# Patient Record
Sex: FEMALE | Race: WHITE | NOT HISPANIC OR LATINO | Employment: UNEMPLOYED | URBAN - METROPOLITAN AREA
[De-identification: names, ages, dates, MRNs, and addresses within clinical notes are randomized per-mention and may not be internally consistent; named-entity substitution may affect disease eponyms.]

---

## 2018-07-14 ENCOUNTER — HOSPITAL ENCOUNTER (EMERGENCY)
Facility: HOSPITAL | Age: 28
Discharge: HOME | End: 2018-07-14
Attending: EMERGENCY MEDICINE
Payer: COMMERCIAL

## 2018-07-14 ENCOUNTER — APPOINTMENT (EMERGENCY)
Dept: CT IMAGING | Facility: HOSPITAL | Age: 28
End: 2018-07-14
Payer: COMMERCIAL

## 2018-07-14 VITALS
SYSTOLIC BLOOD PRESSURE: 100 MMHG | WEIGHT: 121.25 LBS | RESPIRATION RATE: 18 BRPM | OXYGEN SATURATION: 100 % | DIASTOLIC BLOOD PRESSURE: 70 MMHG | HEART RATE: 74 BPM | TEMPERATURE: 97.7 F

## 2018-07-14 DIAGNOSIS — R11.0 NAUSEA: ICD-10-CM

## 2018-07-14 DIAGNOSIS — N23 RENAL COLIC ON RIGHT SIDE: Primary | ICD-10-CM

## 2018-07-14 DIAGNOSIS — N39.0 UTI (URINARY TRACT INFECTION): ICD-10-CM

## 2018-07-14 LAB
ALBUMIN SERPL BCP-MCNC: 3.9 G/DL (ref 3.5–5)
ALP SERPL-CCNC: 58 U/L (ref 46–116)
ALT SERPL W P-5'-P-CCNC: 19 U/L (ref 12–78)
ANION GAP SERPL CALCULATED.3IONS-SCNC: 5 MMOL/L (ref 4–13)
AST SERPL W P-5'-P-CCNC: 13 U/L (ref 5–45)
BACTERIA UR QL AUTO: ABNORMAL /HPF
BASOPHILS # BLD AUTO: 0.02 THOUSANDS/ΜL (ref 0–0.1)
BASOPHILS NFR BLD AUTO: 0 % (ref 0–1)
BILIRUB SERPL-MCNC: 0.5 MG/DL (ref 0.2–1)
BILIRUB UR QL STRIP: NEGATIVE
BUN SERPL-MCNC: 9 MG/DL (ref 5–25)
CALCIUM SERPL-MCNC: 9.4 MG/DL (ref 8.3–10.1)
CHLORIDE SERPL-SCNC: 103 MMOL/L (ref 100–108)
CK SERPL-CCNC: 57 U/L (ref 26–192)
CLARITY UR: ABNORMAL
CO2 SERPL-SCNC: 31 MMOL/L (ref 21–32)
COLOR UR: YELLOW
CREAT SERPL-MCNC: 0.82 MG/DL (ref 0.6–1.3)
EOSINOPHIL # BLD AUTO: 0.21 THOUSAND/ΜL (ref 0–0.61)
EOSINOPHIL NFR BLD AUTO: 3 % (ref 0–6)
ERYTHROCYTE [DISTWIDTH] IN BLOOD BY AUTOMATED COUNT: 13 % (ref 11.6–15.1)
EXT PREG TEST URINE: NEGATIVE
GFR SERPL CREATININE-BSD FRML MDRD: 98 ML/MIN/1.73SQ M
GLUCOSE SERPL-MCNC: 108 MG/DL (ref 65–140)
GLUCOSE UR STRIP-MCNC: NEGATIVE MG/DL
HCT VFR BLD AUTO: 41 % (ref 34.8–46.1)
HGB BLD-MCNC: 13.8 G/DL (ref 11.5–15.4)
HGB UR QL STRIP.AUTO: ABNORMAL
KETONES UR STRIP-MCNC: NEGATIVE MG/DL
LEUKOCYTE ESTERASE UR QL STRIP: ABNORMAL
LIPASE SERPL-CCNC: 60 U/L (ref 73–393)
LYMPHOCYTES # BLD AUTO: 2.12 THOUSANDS/ΜL (ref 0.6–4.47)
LYMPHOCYTES NFR BLD AUTO: 30 % (ref 14–44)
MCH RBC QN AUTO: 30.4 PG (ref 26.8–34.3)
MCHC RBC AUTO-ENTMCNC: 33.7 G/DL (ref 31.4–37.4)
MCV RBC AUTO: 90 FL (ref 82–98)
MONOCYTES # BLD AUTO: 0.5 THOUSAND/ΜL (ref 0.17–1.22)
MONOCYTES NFR BLD AUTO: 7 % (ref 4–12)
MUCOUS THREADS UR QL AUTO: ABNORMAL
NEUTROPHILS # BLD AUTO: 4.3 THOUSANDS/ΜL (ref 1.85–7.62)
NEUTS SEG NFR BLD AUTO: 60 % (ref 43–75)
NITRITE UR QL STRIP: NEGATIVE
NON-SQ EPI CELLS URNS QL MICRO: ABNORMAL /HPF
PH UR STRIP.AUTO: 6 [PH] (ref 4.5–8)
PLATELET # BLD AUTO: 278 THOUSANDS/UL (ref 149–390)
PMV BLD AUTO: 9.9 FL (ref 8.9–12.7)
POTASSIUM SERPL-SCNC: 4.3 MMOL/L (ref 3.5–5.3)
PROT SERPL-MCNC: 6.9 G/DL (ref 6.4–8.2)
PROT UR STRIP-MCNC: ABNORMAL MG/DL
RBC # BLD AUTO: 4.54 MILLION/UL (ref 3.81–5.12)
RBC #/AREA URNS AUTO: ABNORMAL /HPF
SODIUM SERPL-SCNC: 139 MMOL/L (ref 136–145)
SP GR UR STRIP.AUTO: 1.01 (ref 1–1.03)
UROBILINOGEN UR QL STRIP.AUTO: 0.2 E.U./DL
WBC # BLD AUTO: 7.15 THOUSAND/UL (ref 4.31–10.16)
WBC #/AREA URNS AUTO: ABNORMAL /HPF

## 2018-07-14 PROCEDURE — 80053 COMPREHEN METABOLIC PANEL: CPT | Performed by: EMERGENCY MEDICINE

## 2018-07-14 PROCEDURE — 83690 ASSAY OF LIPASE: CPT | Performed by: EMERGENCY MEDICINE

## 2018-07-14 PROCEDURE — 96376 TX/PRO/DX INJ SAME DRUG ADON: CPT

## 2018-07-14 PROCEDURE — 74176 CT ABD & PELVIS W/O CONTRAST: CPT

## 2018-07-14 PROCEDURE — 82550 ASSAY OF CK (CPK): CPT | Performed by: EMERGENCY MEDICINE

## 2018-07-14 PROCEDURE — 85025 COMPLETE CBC W/AUTO DIFF WBC: CPT | Performed by: EMERGENCY MEDICINE

## 2018-07-14 PROCEDURE — 81025 URINE PREGNANCY TEST: CPT | Performed by: EMERGENCY MEDICINE

## 2018-07-14 PROCEDURE — 96361 HYDRATE IV INFUSION ADD-ON: CPT

## 2018-07-14 PROCEDURE — 99284 EMERGENCY DEPT VISIT MOD MDM: CPT

## 2018-07-14 PROCEDURE — 96375 TX/PRO/DX INJ NEW DRUG ADDON: CPT

## 2018-07-14 PROCEDURE — 87086 URINE CULTURE/COLONY COUNT: CPT

## 2018-07-14 PROCEDURE — 81001 URINALYSIS AUTO W/SCOPE: CPT

## 2018-07-14 PROCEDURE — 87077 CULTURE AEROBIC IDENTIFY: CPT

## 2018-07-14 PROCEDURE — 36415 COLL VENOUS BLD VENIPUNCTURE: CPT | Performed by: EMERGENCY MEDICINE

## 2018-07-14 PROCEDURE — 96365 THER/PROPH/DIAG IV INF INIT: CPT

## 2018-07-14 RX ORDER — RANITIDINE 150 MG/1
150 TABLET ORAL 2 TIMES DAILY
COMMUNITY
End: 2018-08-28 | Stop reason: HOSPADM

## 2018-07-14 RX ORDER — OXYCODONE HYDROCHLORIDE AND ACETAMINOPHEN 5; 325 MG/1; MG/1
1 TABLET ORAL EVERY 4 HOURS PRN
Qty: 20 TABLET | Refills: 0 | Status: SHIPPED | OUTPATIENT
Start: 2018-07-14 | End: 2018-07-18

## 2018-07-14 RX ORDER — METOCLOPRAMIDE 10 MG/1
10 TABLET ORAL 4 TIMES DAILY
Qty: 28 TABLET | Refills: 0 | Status: SHIPPED | OUTPATIENT
Start: 2018-07-14 | End: 2018-07-21

## 2018-07-14 RX ORDER — TAMSULOSIN HYDROCHLORIDE 0.4 MG/1
0.4 CAPSULE ORAL
Qty: 10 CAPSULE | Refills: 0 | Status: SHIPPED | OUTPATIENT
Start: 2018-07-14 | End: 2018-08-03 | Stop reason: ALTCHOICE

## 2018-07-14 RX ORDER — TAMSULOSIN HYDROCHLORIDE 0.4 MG/1
0.4 CAPSULE ORAL ONCE
Status: COMPLETED | OUTPATIENT
Start: 2018-07-14 | End: 2018-07-14

## 2018-07-14 RX ORDER — ONDANSETRON 2 MG/ML
4 INJECTION INTRAMUSCULAR; INTRAVENOUS ONCE
Status: COMPLETED | OUTPATIENT
Start: 2018-07-14 | End: 2018-07-14

## 2018-07-14 RX ORDER — CEPHALEXIN 500 MG/1
500 CAPSULE ORAL 4 TIMES DAILY
Qty: 28 CAPSULE | Refills: 0 | Status: SHIPPED | OUTPATIENT
Start: 2018-07-14 | End: 2018-07-21

## 2018-07-14 RX ADMIN — HYDROMORPHONE HYDROCHLORIDE 0.5 MG: 1 INJECTION, SOLUTION INTRAMUSCULAR; INTRAVENOUS; SUBCUTANEOUS at 02:39

## 2018-07-14 RX ADMIN — CEFTRIAXONE 1000 MG: 1 INJECTION, POWDER, FOR SOLUTION INTRAMUSCULAR; INTRAVENOUS at 02:41

## 2018-07-14 RX ADMIN — ONDANSETRON 4 MG: 2 INJECTION INTRAMUSCULAR; INTRAVENOUS at 01:28

## 2018-07-14 RX ADMIN — SODIUM CHLORIDE 1000 ML: 0.9 INJECTION, SOLUTION INTRAVENOUS at 01:28

## 2018-07-14 RX ADMIN — TAMSULOSIN HYDROCHLORIDE 0.4 MG: 0.4 CAPSULE ORAL at 03:35

## 2018-07-14 RX ADMIN — HYDROMORPHONE HYDROCHLORIDE 0.5 MG: 1 INJECTION, SOLUTION INTRAMUSCULAR; INTRAVENOUS; SUBCUTANEOUS at 01:28

## 2018-07-14 NOTE — DISCHARGE INSTRUCTIONS
Acute Nausea and Vomiting   WHAT YOU NEED TO KNOW:   Acute nausea and vomiting start suddenly, worsen quickly, and last a short time  DISCHARGE INSTRUCTIONS:   Return to the emergency department if:   · You see blood in your vomit or your bowel movements  · You have sudden, severe pain in your chest and upper abdomen after hard vomiting or retching  · You have swelling in your neck and chest      · You are dizzy, cold, and thirsty and your eyes and mouth are dry  · You are urinating very little or not at all  · You have muscle weakness, leg cramps, and trouble breathing  · Your heart is beating much faster than normal      · You continue to vomit for more than 48 hours  Contact your healthcare provider if:   · You have frequent dry heaves (vomiting but nothing comes out)  · Your nausea and vomiting does not get better or go away after you use medicine  · You have questions or concerns about your condition or treatment  Medicines: You may need any of the following:  · Medicines  may be given to calm your stomach and stop your vomiting  You may also need medicines to help you feel more relaxed or to stop nausea and vomiting caused by motion sickness  · Gastrointestinal stimulants  are used to help empty your stomach and bowels  This may help decrease nausea and vomiting  · Take your medicine as directed  Contact your healthcare provider if you think your medicine is not helping or if you have side effects  Tell him or her if you are allergic to any medicine  Keep a list of the medicines, vitamins, and herbs you take  Include the amounts, and when and why you take them  Bring the list or the pill bottles to follow-up visits  Carry your medicine list with you in case of an emergency  Prevent or manage acute nausea and vomiting:   · Do not drink alcohol  Alcohol may upset or irritate your stomach  Too much alcohol can also cause acute nausea and vomiting  · Control stress    Headaches due to stress may cause nausea and vomiting  Find ways to relax and manage your stress  Get more rest and sleep  · Drink more liquids as directed  Vomiting can lead to dehydration  It is important to drink more liquids to help replace lost body fluids  Ask your healthcare provider how much liquid to drink each day and which liquids are best for you  Your provider may recommend that you drink an oral rehydration solution (ORS)  ORS contains water, salts, and sugar that are needed to replace the lost body fluids  Ask what kind of ORS to use, how much to drink, and where to get it  · Eat smaller meals, more often  Eat small amounts of food every 2 to 3 hours, even if you are not hungry  Food in your stomach may decrease your nausea  · Talk to your healthcare provider before you take over-the-counter (OTC) medicines  These medicines can cause serious problems if you use certain other medicines, or you have a medical condition  You may have problems if you use too much or use them for longer than the label says  Follow directions on the label carefully  Follow up with your healthcare provider as directed:  Write down your questions so you remember to ask them during your follow-up visits  © 2017 [employer]  Information is for End User's use only and may not be sold, redistributed or otherwise used for commercial purposes  All illustrations and images included in CareNotes® are the copyrighted property of [email or web address]  or Esau Ramesh  The above information is an  only  It is not intended as medical advice for individual conditions or treatments  Talk to your doctor, nurse or pharmacist before following any medical regimen to see if it is safe and effective for you  Renal Colic   WHAT YOU NEED TO KNOW:   Renal colic is severe pain in your lower back or sides  The pain is usually on one side, but may be on both sides of your lower back   Renal colic may start quickly, come and go, and become worse over time  Renal colic is caused by a blockage in your urinary tract  The most common cause of a blockage is a kidney stone  Blood clots, ureter spasms, and dead tissue may also block your urinary tract  DISCHARGE INSTRUCTIONS:   Return to the emergency department if:   · You cannot stop vomiting  · You see new or increased bleeding when you urinate  · You are urinating less than usual, or not at all  · Your pain is not getting better even after treatment  Contact your healthcare provider if:   · You have fever  · You need to urinate more often than usual, or right away  · You see a stone in your urine strainer after you urinate  · You have questions or concerns about your condition or care  Medicines:   · Medicines  may help decrease pain and muscle spasms  You may also need medicine to calm your stomach and stop vomiting  · Take your medicine as directed  Contact your healthcare provider if you think your medicine is not helping or if you have side effects  Tell him of her if you are allergic to any medicine  Keep a list of the medicines, vitamins, and herbs you take  Include the amounts, and when and why you take them  Bring the list or the pill bottles to follow-up visits  Carry your medicine list with you in case of an emergency  Manage your symptoms:   · Drink liquids as directed  to help decrease pain and flush blockages from your urinary tract  Ask how much liquid to drink each day and which liquids are best for you  You may need to drink about 3 liters (12 glasses) of liquids each day  Half of your total daily liquids should be water  Limit coffee, tea, and soda to 2 cups daily  Your urine should be pale and clear  · Strain your urine every time you urinate  Urinate into a strainer (funnel with a fine mesh on the bottom) or glass jar to collect kidney stones  Give the kidney stones to your healthcare provider at your next visit  · Eat a variety of healthy foods  Healthy foods include fruits, vegetables, whole-grain breads, low-fat dairy products, beans, lean meats, and fish  You may need to increase the amount of citrus fruit you eat, such as oranges  Ask your healthcare provider how much salt, calcium, and protein you should eat  · Avoid activity in hot temperatures  Heat may cause you to become dehydrated and urinate less  Follow up with your healthcare provider as directed: You may need to return for tests to check if your blockage has cleared  Write down your questions so you remember to ask them during your visits  © 2017 [employer] Information is for End User's use only and may not be sold, redistributed or otherwise used for commercial purposes  All illustrations and images included in CareNotes® are the copyrighted property of [employer] , [employer]  or Esau Ramesh  The above information is an  only  It is not intended as medical advice for individual conditions or treatments  Talk to your doctor, nurse or pharmacist before following any medical regimen to see if it is safe and effective for you  Urinary Tract Infection in Women   WHAT YOU NEED TO KNOW:   A urinary tract infection (UTI) is caused by bacteria that get inside your urinary tract  Most bacteria that enter your urinary tract come out when you urinate  If the bacteria stay in your urinary tract, you may get an infection  Your urinary tract includes your kidneys, ureters, bladder, and urethra  Urine is made in your kidneys, and it flows from the ureters to the bladder  Urine leaves the bladder through the urethra  A UTI is more common in your lower urinary tract, which includes your bladder and urethra  DISCHARGE INSTRUCTIONS:   Return to the emergency department if:   · You are urinating very little or not at all  · You have a high fever with shaking chills       · You have side or back pain that gets worse   Contact your healthcare provider if:   · You have a fever  · You do not feel better after 2 days of taking antibiotics  · You are vomiting  · You have questions or concerns about your condition or care  Medicines:   · Antibiotics  help fight a bacterial infection  · Medicines  may be given to decrease pain and burning when you urinate  They will also help decrease the feeling that you need to urinate often  These medicines will make your urine orange or red  · Take your medicine as directed  Contact your healthcare provider if you think your medicine is not helping or if you have side effects  Tell him or her if you are allergic to any medicine  Keep a list of the medicines, vitamins, and herbs you take  Include the amounts, and when and why you take them  Bring the list or the pill bottles to follow-up visits  Carry your medicine list with you in case of an emergency  Follow up with your healthcare provider as directed:  Write down your questions so you remember to ask them during your visits  Prevent another UTI:   · Empty your bladder often  Urinate and empty your bladder as soon as you feel the need  Do not hold your urine for long periods of time  · Wipe from front to back after you urinate or have a bowel movement  This will help prevent germs from getting into your urinary tract through your urethra  · Drink liquids as directed  Ask how much liquid to drink each day and which liquids are best for you  You may need to drink more liquids than usual to help flush out the bacteria  Do not drink alcohol, caffeine, or citrus juices  These can irritate your bladder and increase your symptoms  Your healthcare provider may recommend cranberry juice to help prevent a UTI  · Urinate after you have sex  This can help flush out bacteria passed during sex  · Do not douche or use feminine deodorants  These can change the chemical balance in your vagina      · Change sanitary pads or tampons often  This will help prevent germs from getting into your urinary tract  · Do pelvic muscle exercises often  Pelvic muscle exercises may help you start and stop urinating  Strong pelvic muscles may help you empty your bladder easier  Squeeze these muscles tightly for 5 seconds like you are trying to hold back urine  Then relax for 5 seconds  Gradually work up to squeezing for 10 seconds  Do 3 sets of 15 repetitions a day, or as directed  © 2017 [employer] Information is for End User's use only and may not be sold, redistributed or otherwise used for commercial purposes  All illustrations and images included in CareNotes® are the copyrighted property of [employer]  or Esau Ramesh  The above information is an  only  It is not intended as medical advice for individual conditions or treatments  Talk to your doctor, nurse or pharmacist before following any medical regimen to see if it is safe and effective for you

## 2018-07-14 NOTE — ED PROVIDER NOTES
History  Chief Complaint   Patient presents with    Flank Pain     Pt  complains of pain to right flank area starting around 2am yesterday morning, denies injury or urinary problems  Patient is a 29year old female with constant worsening right flank pain without radiation since 0200 last night  No fever  (+) nausea yesterday  No vomiting or diarrhea  LMP - 2-3 days ago  No urinary sx  No trauma  No h/o kidney stones  States she did not drive here  No rash  [facility] website checked on this patient and last Rx filled was on 2/18/18 for percocet for 2 day supply  History provided by:  Patient (boyfriend)   used: No    Flank Pain   Associated symptoms: nausea    Associated symptoms: no diarrhea, no fever and no vomiting        Prior to Admission Medications   Prescriptions Last Dose Informant Patient Reported? Taking? ranitidine (ZANTAC) 150 mg tablet   Yes Yes   Sig: Take 150 mg by mouth 2 (two) times a day      Facility-Administered Medications: None       Past Medical History:   Diagnosis Date    Asthma        No past surgical history on file  No family history on file  I have reviewed and agree with the history as documented  Social History   Substance Use Topics    Smoking status: Current Every Day Smoker     Packs/day: 0 50     Types: Cigarettes    Smokeless tobacco: Never Used    Alcohol use No        Review of Systems   Constitutional: Negative for fever  Gastrointestinal: Positive for nausea  Negative for abdominal pain, diarrhea and vomiting  Genitourinary: Positive for flank pain  Negative for difficulty urinating  Skin: Negative for rash  All other systems reviewed and are negative  Physical Exam  Physical Exam   Constitutional: She is oriented to person, place, and time  She appears well-developed and well-nourished  She appears distressed (moderate)  HENT:   Head: Normocephalic and atraumatic     Mucous membranes somewhat moist     Eyes: No scleral icterus  Neck: No tracheal deviation present  Cardiovascular: Normal rate, regular rhythm and normal heart sounds  No murmur heard  Pulmonary/Chest: Effort normal and breath sounds normal  No stridor  No respiratory distress  Abdominal: Soft  Bowel sounds are normal  She exhibits no distension  There is no tenderness  R CVAT  Musculoskeletal: She exhibits no edema or deformity  Neurological: She is alert and oriented to person, place, and time  Skin: Skin is warm and dry  No rash noted  Psychiatric:   Somewhat anxious  Nursing note and vitals reviewed        Vital Signs  ED Triage Vitals [07/14/18 0031]   Temperature Pulse Respirations Blood Pressure SpO2   97 7 °F (36 5 °C) 95 18 112/67 97 %      Temp Source Heart Rate Source Patient Position - Orthostatic VS BP Location FiO2 (%)   Oral Monitor Sitting Right arm --      Pain Score       9           Vitals:    07/14/18 0031 07/14/18 0315   BP: 112/67 98/71   Pulse: 95 78   Patient Position - Orthostatic VS: Sitting        Visual Acuity      ED Medications  Medications   sodium chloride 0 9 % bolus 1,000 mL (0 mL Intravenous Stopped 7/14/18 0228)   ondansetron (ZOFRAN) injection 4 mg (4 mg Intravenous Given 7/14/18 0128)   HYDROmorphone (DILAUDID) injection 0 5 mg (0 5 mg Intravenous Given 7/14/18 0128)   HYDROmorphone (DILAUDID) injection 0 5 mg (0 5 mg Intravenous Given 7/14/18 0239)   ceftriaxone (ROCEPHIN) 1 g/50 mL in dextrose IVPB (0 mg Intravenous Stopped 7/14/18 0311)   tamsulosin (FLOMAX) capsule 0 4 mg (0 4 mg Oral Given 7/14/18 0335)       Diagnostic Studies  Results Reviewed     Procedure Component Value Units Date/Time    Lipase [[id number]]  (Abnormal) Collected:  07/14/18 0120    Lab Status:  Final result Specimen:  Blood from Arm, Right Updated:  07/14/18 0205     Lipase 60 (L) u/L     Comprehensive metabolic panel [[id number]] Collected:  07/14/18 0120    Lab Status:  Final result Specimen:  Blood from Arm, Right Updated: 07/14/18 0205     Sodium 139 mmol/L      Potassium 4 3 mmol/L      Chloride 103 mmol/L      CO2 31 mmol/L      Anion Gap 5 mmol/L      BUN 9 mg/dL      Creatinine 0 82 mg/dL      Glucose 108 mg/dL      Calcium 9 4 mg/dL      AST 13 U/L      ALT 19 U/L      Alkaline Phosphatase 58 U/L      Total Protein 6 9 g/dL      Albumin 3 9 g/dL      Total Bilirubin 0 50 mg/dL      eGFR 98 ml/min/1 73sq m     Narrative:         National Kidney Disease Education Program recommendations are as follows:  GFR calculation is accurate only with a steady state creatinine  Chronic Kidney disease less than 60 ml/min/1 73 sq  meters  Kidney failure less than 15 ml/min/1 73 sq  meters  CK Total with Reflex CKMB [[id number]]  (Normal) Collected:  07/14/18 0120    Lab Status:  Final result Specimen:  Blood from Arm, Right Updated:  07/14/18 0205     Total CK 57 U/L     Urine Microscopic [[id number]]  (Abnormal) Collected:  07/14/18 0124    Lab Status:  Final result Specimen:  Urine from Urine, Clean Catch Updated:  07/14/18 0140     RBC, UA None Seen /hpf      WBC, UA Innumerable (A) /hpf      Epithelial Cells Occasional /hpf      Bacteria, UA Occasional /hpf      MUCOUS THREADS Occasional (A)    Urine culture [[id number]] Collected:  07/14/18 0124    Lab Status:   In process Specimen:  Urine from Urine, Clean Catch Updated:  07/14/18 0140    CBC and differential [[id number]]  (Normal) Collected:  07/14/18 0120    Lab Status:  Final result Specimen:  Blood from Arm, Right Updated:  07/14/18 0138     WBC 7 15 Thousand/uL      RBC 4 54 Million/uL      Hemoglobin 13 8 g/dL      Hematocrit 41 0 %      MCV 90 fL      MCH 30 4 pg      MCHC 33 7 g/dL      RDW 13 0 %      MPV 9 9 fL      Platelets 795 Thousands/uL      Neutrophils Relative 60 %      Lymphocytes Relative 30 %      Monocytes Relative 7 %      Eosinophils Relative 3 %      Basophils Relative 0 %      Neutrophils Absolute 4 30 Thousands/µL      Lymphocytes Absolute 2 12 Thousands/µL Monocytes Absolute 0 50 Thousand/µL      Eosinophils Absolute 0 21 Thousand/µL      Basophils Absolute 0 02 Thousands/µL     POCT pregnancy, urine [[id number]]  (Normal) Resulted:  07/14/18 0118    Lab Status:  Final result Updated:  07/14/18 0118     EXT PREG TEST UR (Ref: Negative) negative    ED Urine Macroscopic [[id number]]  (Abnormal) Collected:  07/14/18 0124    Lab Status:  Final result Specimen:  Urine Updated:  07/14/18 0117     Color, UA Yellow     Clarity, UA Cloudy     pH, UA 6 0     Leukocytes, UA Moderate (A)     Nitrite, UA Negative     Protein, UA Trace (A) mg/dl      Glucose, UA Negative mg/dl      Ketones, UA Negative mg/dl      Urobilinogen, UA 0 2 E U /dl      Bilirubin, UA Negative     Blood, UA Trace (A)     Specific [place], UA 1 015    Narrative:       CLINITEK RESULT                 CT renal stone study abdomen pelvis without contrast   ED Interpretation by Susu Finney MD (07/14 0308)   FINDINGS:      RIGHT KIDNEY AND URETER:   5 mm obstructing stone the distal right ureter (2:116; 602:82) resulting in right hydroureteronephrosis  LEFT KIDNEY AND URETER:   No urinary tract calculi   No hydronephrosis or hydroureter  URINARY BLADDER:    Unremarkable  No significant abnormality in the visualized lung bases  Limited low radiation dose noncontrast CT evaluation demonstrates no clinically significant abnormality of liver, spleen, pancreas, or adrenal glands  No calcified gallstones or gallbladder wall thickening noted  Trace ascites in the pelvis likely physiologic  No bulky lymphadenopathy on this limited noncontrast study  Bowel loops appear unremarkable  The appendix is well seen and there is no evidence of acute appendicitis  No acute fracture or destructive osseous lesion is identified        Impression:         5 mm obstructing stone the distal right ureter (2:116; 602:82) resulting in right hydroureteronephrosis                Workstation performed: [id number]         Final Result by Carly Gomez MD (07/14 0305)       5 mm obstructing stone the distal right ureter (2:116; 602:82) resulting in right hydroureteronephrosis  Workstation performed: [id number]                    Procedures  Procedures       Phone Contacts  ED Phone Contact    ED Course  ED Course as of Jul 14 0336   Sat Jul 14, 2018   0230 Labs d/w patient  Patient still with pain so more IV dilaudid ordered  IV rocephin ordered for UTI      0308 CT d/w patient  Pain improved  MDM  Number of Diagnoses or Management Options  Diagnosis management comments: DDx including but not limited to: renal colic, pyelonephritis, UTI, GI etiology, appendicitis, diverticulitis, cholecystitis, biliary colic, rhabdomyolysis, tumor  Amount and/or Complexity of Data Reviewed  Clinical lab tests: ordered and reviewed  Tests in the radiology section of CPT®: ordered and reviewed  Decide to obtain previous medical records or to obtain history from someone other than the patient: yes  Independent visualization of images, tracings, or specimens: yes      CritCare Time    Disposition  Final diagnoses:   Renal colic on right side   UTI (urinary tract infection)   Nausea     Time reflects when diagnosis was documented in both MDM as applicable and the Disposition within this note     Time User Action Codes Description Comment    7/14/2018  3:34 AM Leila Gola Add [C57] Renal colic on right side     7/14/2018  3:34 AM Leila Gola Add [N39 0] UTI (urinary tract infection)     7/14/2018  3:34 AM Leila Gola Add [R11 0] Nausea       ED Disposition     ED Disposition Condition Comment    Discharge  [facility] discharge to home/self care  Condition at discharge: Stable        Follow-up Information     Follow up With Specialties Details Why Rossi Chapa U  51  For Urology [facility] Call in 3 days Strain all urine  Drink fluids   no driving with percocet  Do not use acetaminophen with percocet  Return sooner if increased pain, fever, vomiting, difficulty urinating, rash  [address] [facility] [address] [phone]          Patient's Medications   Discharge Prescriptions    CEPHALEXIN (KEFLEX) 500 MG CAPSULE    Take 1 capsule (500 mg total) by mouth 4 (four) times a day for 7 days       Start Date: 7/14/2018 End Date: 7/21/2018       Order Dose: 500 mg       Quantity: 28 capsule    Refills: 0    METOCLOPRAMIDE (REGLAN) 10 MG TABLET    Take 1 tablet (10 mg total) by mouth 4 (four) times a day for 7 days As needed for nausea       Start Date: 7/14/2018 End Date: 7/21/2018       Order Dose: 10 mg       Quantity: 28 tablet    Refills: 0    OXYCODONE-ACETAMINOPHEN (PERCOCET) 5-325 MG PER TABLET    Take 1 tablet by mouth every 4 (four) hours as needed for moderate pain for up to 4 days Max Daily Amount: 6 tablets       Start Date: 7/14/2018 End Date: 7/18/2018       Order Dose: 1 tablet       Quantity: 20 tablet    Refills: 0    TAMSULOSIN (FLOMAX) 0 4 MG    Take 1 capsule (0 4 mg total) by mouth daily with dinner for 10 days       Start Date: 7/14/2018 End Date: 7/24/2018       Order Dose: 0 4 mg       Quantity: 10 capsule    Refills: 0     No discharge procedures on file      ED Provider  Electronically Signed by           Lanre Bazan MD  07/14/18 5999

## 2018-07-14 NOTE — ED NOTES
Discharged with instructions  Verbalized understanding  No distress at this time       Colette Cordova RN  07/14/18 1516

## 2018-07-15 LAB — BACTERIA UR CULT: ABNORMAL

## 2018-07-22 ENCOUNTER — HOSPITAL ENCOUNTER (EMERGENCY)
Facility: HOSPITAL | Age: 28
Discharge: HOME | End: 2018-07-22
Attending: EMERGENCY MEDICINE
Payer: COMMERCIAL

## 2018-07-22 ENCOUNTER — APPOINTMENT (EMERGENCY)
Dept: ULTRASOUND IMAGING | Facility: HOSPITAL | Age: 28
End: 2018-07-22
Payer: COMMERCIAL

## 2018-07-22 VITALS
DIASTOLIC BLOOD PRESSURE: 55 MMHG | HEART RATE: 84 BPM | RESPIRATION RATE: 16 BRPM | WEIGHT: 122 LBS | TEMPERATURE: 98.6 F | SYSTOLIC BLOOD PRESSURE: 98 MMHG | OXYGEN SATURATION: 95 %

## 2018-07-22 DIAGNOSIS — R10.9 RIGHT FLANK PAIN: Primary | ICD-10-CM

## 2018-07-22 LAB
ANION GAP SERPL CALCULATED.3IONS-SCNC: 2 MMOL/L (ref 4–13)
BACTERIA UR QL AUTO: ABNORMAL /HPF
BASOPHILS # BLD AUTO: 0.03 THOUSANDS/ΜL (ref 0–0.1)
BASOPHILS NFR BLD AUTO: 0 % (ref 0–1)
BILIRUB UR QL STRIP: NEGATIVE
BUN SERPL-MCNC: 6 MG/DL (ref 5–25)
CALCIUM SERPL-MCNC: 9.7 MG/DL (ref 8.3–10.1)
CHLORIDE SERPL-SCNC: 105 MMOL/L (ref 100–108)
CLARITY UR: CLEAR
CO2 SERPL-SCNC: 30 MMOL/L (ref 21–32)
COLOR UR: YELLOW
CREAT SERPL-MCNC: 0.85 MG/DL (ref 0.6–1.3)
EOSINOPHIL # BLD AUTO: 0.22 THOUSAND/ΜL (ref 0–0.61)
EOSINOPHIL NFR BLD AUTO: 3 % (ref 0–6)
ERYTHROCYTE [DISTWIDTH] IN BLOOD BY AUTOMATED COUNT: 12.9 % (ref 11.6–15.1)
EXT PREG TEST URINE: NEGATIVE
GFR SERPL CREATININE-BSD FRML MDRD: 94 ML/MIN/1.73SQ M
GLUCOSE SERPL-MCNC: 82 MG/DL (ref 65–140)
GLUCOSE UR STRIP-MCNC: NEGATIVE MG/DL
HCT VFR BLD AUTO: 42.8 % (ref 34.8–46.1)
HGB BLD-MCNC: 14.3 G/DL (ref 11.5–15.4)
HGB UR QL STRIP.AUTO: ABNORMAL
KETONES UR STRIP-MCNC: NEGATIVE MG/DL
LEUKOCYTE ESTERASE UR QL STRIP: ABNORMAL
LYMPHOCYTES # BLD AUTO: 2.05 THOUSANDS/ΜL (ref 0.6–4.47)
LYMPHOCYTES NFR BLD AUTO: 25 % (ref 14–44)
MCH RBC QN AUTO: 30.1 PG (ref 26.8–34.3)
MCHC RBC AUTO-ENTMCNC: 33.4 G/DL (ref 31.4–37.4)
MCV RBC AUTO: 90 FL (ref 82–98)
MONOCYTES # BLD AUTO: 0.52 THOUSAND/ΜL (ref 0.17–1.22)
MONOCYTES NFR BLD AUTO: 6 % (ref 4–12)
MUCOUS THREADS UR QL AUTO: ABNORMAL
NEUTROPHILS # BLD AUTO: 5.51 THOUSANDS/ΜL (ref 1.85–7.62)
NEUTS SEG NFR BLD AUTO: 66 % (ref 43–75)
NITRITE UR QL STRIP: NEGATIVE
NON-SQ EPI CELLS URNS QL MICRO: ABNORMAL /HPF
PH UR STRIP.AUTO: 7.5 [PH] (ref 4.5–8)
PLATELET # BLD AUTO: 284 THOUSANDS/UL (ref 149–390)
PMV BLD AUTO: 9.8 FL (ref 8.9–12.7)
POTASSIUM SERPL-SCNC: 3.9 MMOL/L (ref 3.5–5.3)
PROT UR STRIP-MCNC: NEGATIVE MG/DL
RBC # BLD AUTO: 4.75 MILLION/UL (ref 3.81–5.12)
RBC #/AREA URNS AUTO: ABNORMAL /HPF
SODIUM SERPL-SCNC: 137 MMOL/L (ref 136–145)
SP GR UR STRIP.AUTO: 1.01 (ref 1–1.03)
UROBILINOGEN UR QL STRIP.AUTO: 0.2 E.U./DL
WBC # BLD AUTO: 8.33 THOUSAND/UL (ref 4.31–10.16)
WBC #/AREA URNS AUTO: ABNORMAL /HPF

## 2018-07-22 PROCEDURE — 99284 EMERGENCY DEPT VISIT MOD MDM: CPT

## 2018-07-22 PROCEDURE — 96374 THER/PROPH/DIAG INJ IV PUSH: CPT

## 2018-07-22 PROCEDURE — 81025 URINE PREGNANCY TEST: CPT | Performed by: PHYSICIAN ASSISTANT

## 2018-07-22 PROCEDURE — 85025 COMPLETE CBC W/AUTO DIFF WBC: CPT | Performed by: PHYSICIAN ASSISTANT

## 2018-07-22 PROCEDURE — 80048 BASIC METABOLIC PNL TOTAL CA: CPT | Performed by: PHYSICIAN ASSISTANT

## 2018-07-22 PROCEDURE — 36415 COLL VENOUS BLD VENIPUNCTURE: CPT | Performed by: PHYSICIAN ASSISTANT

## 2018-07-22 PROCEDURE — 81001 URINALYSIS AUTO W/SCOPE: CPT | Performed by: PHYSICIAN ASSISTANT

## 2018-07-22 PROCEDURE — 96361 HYDRATE IV INFUSION ADD-ON: CPT

## 2018-07-22 PROCEDURE — 76770 US EXAM ABDO BACK WALL COMP: CPT

## 2018-07-22 RX ORDER — MORPHINE SULFATE 4 MG/ML
4 INJECTION, SOLUTION INTRAMUSCULAR; INTRAVENOUS ONCE
Status: COMPLETED | OUTPATIENT
Start: 2018-07-22 | End: 2018-07-22

## 2018-07-22 RX ADMIN — SODIUM CHLORIDE 1000 ML: 0.9 INJECTION, SOLUTION INTRAVENOUS at 19:11

## 2018-07-22 RX ADMIN — MORPHINE SULFATE 4 MG: 4 INJECTION, SOLUTION INTRAMUSCULAR; INTRAVENOUS at 19:12

## 2018-07-22 NOTE — ED PROVIDER NOTES
History  Chief Complaint   Patient presents with    Pelvic Pain     Pt  states was diagnosed with kidney stone on the 13th, still having pain in right flank and now also has pelvic pain  Denies seeing urologist, states is waiting for referral from pcp  55-year-old female presents for evaluation of right flank pain  Patient was seen the emergency department on 07/14 for the same complaints, she was diagnosed with a right-sided 5 mm obstructing kidney stone and was discharged home with Keflex, Flomax, Percocet and Reglan  She states she has been taking the medications however she continues to have pain  She has been straining her urine and denies passing the stone  She followed-up with her PCP and was referred to Urology but has not yet followed up  She describes a constant sharp pain in her right flank that radiates into the groin  Associated nausea but no vomiting  Denies any fevers or chills chest pain shortness of breath  She admits to urinary frequency and urgency but denies any gross hematuria  She has no other complaints this time  She has no history of kidney stones  Prior to Admission Medications   Prescriptions Last Dose Informant Patient Reported? Taking? cephalexin (KEFLEX) 500 mg capsule   No No   Sig: Take 1 capsule (500 mg total) by mouth 4 (four) times a day for 7 days   metoclopramide (REGLAN) 10 mg tablet   No No   Sig: Take 1 tablet (10 mg total) by mouth 4 (four) times a day for 7 days As needed for nausea   ranitidine (ZANTAC) 150 mg tablet   Yes Yes   Sig: Take 150 mg by mouth 2 (two) times a day   tamsulosin (FLOMAX) 0 4 mg   No No   Sig: Take 1 capsule (0 4 mg total) by mouth daily with dinner for 10 days      Facility-Administered Medications: None       Past Medical History:   Diagnosis Date    Asthma        History reviewed  No pertinent surgical history  History reviewed  No pertinent family history    I have reviewed and agree with the history as documented  Social History   Substance Use Topics    Smoking status: Current Every Day Smoker     Packs/day: 0 50     Types: Cigarettes    Smokeless tobacco: Never Used    Alcohol use No        Review of Systems   Constitutional: Negative for chills and fever  HENT: Negative for congestion and sore throat  Respiratory: Negative for cough, shortness of breath and wheezing  Cardiovascular: Negative for chest pain and palpitations  Gastrointestinal: Positive for nausea  Negative for abdominal pain, diarrhea and vomiting  Genitourinary: Positive for flank pain, frequency and urgency  Negative for dysuria and hematuria  Musculoskeletal: Negative for back pain  Skin: Negative for rash  Neurological: Negative for dizziness, weakness, light-headedness, numbness and headaches  Physical Exam  Physical Exam   Constitutional: She is oriented to person, place, and time  She appears well-developed and well-nourished  Non-toxic appearance  She does not have a sickly appearance  She does not appear ill  She appears distressed (appears uncomfortable)  HENT:   Head: Normocephalic and atraumatic  Right Ear: External ear normal    Left Ear: External ear normal    Nose: Nose normal    Mouth/Throat: Oropharynx is clear and moist    Eyes: Conjunctivae and EOM are normal  Pupils are equal, round, and reactive to light  Neck: Normal range of motion  Neck supple  Cardiovascular: Normal rate, regular rhythm and normal heart sounds  Exam reveals no gallop and no friction rub  No murmur heard  Pulmonary/Chest: Effort normal and breath sounds normal  No respiratory distress  She has no decreased breath sounds  She has no wheezes  She has no rhonchi  She has no rales  Abdominal: Soft  Bowel sounds are normal  She exhibits no distension and no mass  There is tenderness in the suprapubic area  There is no rigidity, no rebound, no guarding and no CVA tenderness         Musculoskeletal: Normal range of motion  Neurological: She is alert and oriented to person, place, and time  Skin: Skin is warm and dry  Capillary refill takes less than 2 seconds  She is not diaphoretic  Psychiatric: She has a normal mood and affect  Nursing note and vitals reviewed        Vital Signs  ED Triage Vitals [07/22/18 1805]   Temperature Pulse Respirations Blood Pressure SpO2   98 6 °F (37 °C) (!) 111 16 111/56 98 %      Temp Source Heart Rate Source Patient Position - Orthostatic VS BP Location FiO2 (%)   Oral Monitor Sitting Left arm --      Pain Score       9           Vitals:    07/22/18 1805 07/22/18 1926 07/22/18 1930 07/22/18 2030   BP: 111/56 104/58 104/61 98/55   Pulse: (!) 111 85 90 84   Patient Position - Orthostatic VS: Sitting Sitting Sitting Sitting       Visual Acuity      ED Medications  Medications   sodium chloride 0 9 % bolus 1,000 mL (0 mL Intravenous Stopped 7/22/18 2051)   morphine (PF) 4 mg/mL injection 4 mg (4 mg Intravenous Given 7/22/18 1912)       Diagnostic Studies  Results Reviewed     Procedure Component Value Units Date/Time    POCT pregnancy, urine [[id number]]  (Normal) Resulted:  07/22/18 2100    Lab Status:  Final result Updated:  07/22/18 2100     EXT PREG TEST UR (Ref: Negative) negative    Urine Microscopic [[id number]]  (Abnormal) Collected:  07/22/18 1836    Lab Status:  Final result Specimen:  Urine from Urine, Clean Catch Updated:  07/22/18 1914     RBC, UA 10-20 (A) /hpf      WBC, UA 0-1 (A) /hpf      Epithelial Cells Occasional /hpf      Bacteria, UA Occasional /hpf      MUCOUS THREADS Occasional (A)    Basic metabolic panel [[id number]]  (Abnormal) Collected:  07/22/18 1845    Lab Status:  Final result Specimen:  Blood from Arm, Right Updated:  07/22/18 1904     Sodium 137 mmol/L      Potassium 3 9 mmol/L      Chloride 105 mmol/L      CO2 30 mmol/L      Anion Gap 2 (L) mmol/L      BUN 6 mg/dL      Creatinine 0 85 mg/dL      Glucose 82 mg/dL      Calcium 9 7 mg/dL      eGFR 94 ml/min/1 73sq m Narrative:         National Kidney Disease Education Program recommendations are as follows:  GFR calculation is accurate only with a steady state creatinine  Chronic Kidney disease less than 60 ml/min/1 73 sq  meters  Kidney failure less than 15 ml/min/1 73 sq  meters  CBC and differential [[id number]]  (Normal) Collected:  07/22/18 1845    Lab Status:  Final result Specimen:  Blood from Arm, Right Updated:  07/22/18 1859     WBC 8 33 Thousand/uL      RBC 4 75 Million/uL      Hemoglobin 14 3 g/dL      Hematocrit 42 8 %      MCV 90 fL      MCH 30 1 pg      MCHC 33 4 g/dL      RDW 12 9 %      MPV 9 8 fL      Platelets 274 Thousands/uL      Neutrophils Relative 66 %      Lymphocytes Relative 25 %      Monocytes Relative 6 %      Eosinophils Relative 3 %      Basophils Relative 0 %      Neutrophils Absolute 5 51 Thousands/µL      Lymphocytes Absolute 2 05 Thousands/µL      Monocytes Absolute 0 52 Thousand/µL      Eosinophils Absolute 0 22 Thousand/µL      Basophils Absolute 0 03 Thousands/µL     UA w Reflex to Microscopic w Reflex to Culture [[id number]]  (Abnormal) Collected:  07/22/18 1836    Lab Status:  Final result Specimen:  Urine from Urine, Clean Catch Updated:  07/22/18 1842     Color, UA Yellow     Clarity, UA Clear     Specific Gravity, UA 1 015     pH, UA 7 5     Leukocytes, UA Trace (A)     Nitrite, UA Negative     Protein, UA Negative mg/dl      Glucose, UA Negative mg/dl      Ketones, UA Negative mg/dl      Urobilinogen, UA 0 2 E U /dl      Bilirubin, UA Negative     Blood, UA Moderate (A)                 US kidney and bladder   Final Result by Yolanda Mckinney MD (07/22 1905)   Normal renal ultrasound  No significant residual right hydronephrosis  Previously described distal right ureteral stone is not visualized        Workstation performed: [id number]                    Procedures  Procedures       Phone Contacts  ED Phone Contact    ED Course                               MDM  Number of Diagnoses or Management Options  Right flank pain: established and worsening  Diagnosis management comments:   28 yo F who presents for evaluation of worsening right flank pain  Seen in the ED on 7/14 diagnosed with 5mm obstructing right stone  Denies passing the stone  Will check labs, urine, renal US  Her labs were reviewed and benign  Urine with RBC  US of renal/bladder notes improvement of hydronephrosis and no visualized stone which I suspect has passed/is about to pass  Patient was advised to continue fluids, continue keflex, take ibuprofen and tylenol for pain and f/u with urology  Denies allergy to ibuprofen  RTED for worsening  Patient verbalizes understanding and agrees with plan  Amount and/or Complexity of Data Reviewed  Tests in the radiology section of CPT®: ordered and reviewed  Decide to obtain previous medical records or to obtain history from someone other than the patient: yes  Review and summarize past medical records: yes    Patient Progress  Patient progress: stable    CritCare Time    Disposition  Final diagnoses:   Right flank pain     Time reflects when diagnosis was documented in both MDM as applicable and the Disposition within this note     Time User Action Codes Description Comment    7/22/2018  8:49 PM Sd Jones Add [R10 9] Right flank pain       ED Disposition     ED Disposition Condition Comment    Discharge  [facility] discharge to home/self care      Condition at discharge: Good        Follow-up Information     Follow up With Specialties Details Why Contact Info Additional [address] [facility] Schedule an appointment as soon as possible for a visit  Reji Romo [address] [place] [id number]  [facility] Emergency Department Emergency Medicine  If symptoms worsen - fevers or worsening pain [address] [address]  [phone]  ED, [address] [facility] [facility], [id number]          Discharge Medication List as of 7/22/2018  9:09 PM      CONTINUE these medications which have NOT CHANGED    Details   ranitidine (ZANTAC) 150 mg tablet Take 150 mg by mouth 2 (two) times a day, Historical Med      tamsulosin (FLOMAX) 0 4 mg Take 1 capsule (0 4 mg total) by mouth daily with dinner for 10 days, Starting Sat 7/14/2018, Until Tue 7/24/2018, Print         STOP taking these medications       cephalexin (KEFLEX) 500 mg capsule Comments:   Reason for Stopping:         metoclopramide (REGLAN) 10 mg tablet Comments:   Reason for Stopping:             No discharge procedures on file      ED Provider  Electronically Signed by           Stefano Heller PA-C  07/25/18 7134

## 2018-07-23 NOTE — DISCHARGE INSTRUCTIONS
Abdominal Pain   WHAT YOU NEED TO KNOW:   Abdominal pain can be dull, achy, or sharp  You may have pain in one area of your abdomen, or in your entire abdomen  Your pain may be caused by a condition such as constipation, food sensitivity or poisoning, infection, or a blockage  Abdominal pain can also be from a hernia, appendicitis, or an ulcer  Liver, gallbladder, or kidney conditions can also cause abdominal pain  The cause of your abdominal pain may be unknown  DISCHARGE INSTRUCTIONS:   Return to the emergency department if:   · You have new chest pain or shortness of breath  · You have pulsing pain in your upper abdomen or lower back that suddenly becomes constant  · Your pain is in the right lower abdominal area and worsens with movement  · You have a fever over 100 4°F (38°C) or shaking chills  · You are vomiting and cannot keep food or liquids down  · Your pain does not improve or gets worse over the next 8 to 12 hours  · You see blood in your vomit or bowel movements, or they look black and tarry  · Your skin or the whites of your eyes turn yellow  · You are a woman and have a large amount of vaginal bleeding that is not your monthly period  Contact your healthcare provider if:   · You have pain in your lower back  · You are a man and have pain in your testicles  · You have pain when you urinate  · You have questions or concerns about your condition or care  Follow up with your healthcare provider within 24 hours or as directed:  Write down your questions so you remember to ask them during your visits  Medicines:   · Medicines  may be given to calm your stomach and prevent vomiting or to decrease pain  Ask how to take pain medicine safely  · Take your medicine as directed  Contact your healthcare provider if you think your medicine is not helping or if you have side effects  Tell him of her if you are allergic to any medicine   Keep a list of the medicines, vitamins, and herbs you take  Include the amounts, and when and why you take them  Bring the list or the pill bottles to follow-up visits  Carry your medicine list with you in case of an emergency  © 2017 [address] [employer] Information is for End User's use only and may not be sold, redistributed or otherwise used for commercial purposes  All illustrations and images included in CareNotes® are the copyrighted property of [employer] M [employer]  or Esau Ramesh  The above information is an  only  It is not intended as medical advice for individual conditions or treatments  Talk to your doctor, nurse or pharmacist before following any medical regimen to see if it is safe and effective for you

## 2018-08-03 ENCOUNTER — HOSPITAL ENCOUNTER (EMERGENCY)
Facility: HOSPITAL | Age: 28
Discharge: HOME | End: 2018-08-03
Attending: EMERGENCY MEDICINE | Admitting: EMERGENCY MEDICINE
Payer: COMMERCIAL

## 2018-08-03 VITALS
RESPIRATION RATE: 18 BRPM | SYSTOLIC BLOOD PRESSURE: 108 MMHG | DIASTOLIC BLOOD PRESSURE: 67 MMHG | TEMPERATURE: 98.1 F | WEIGHT: 124.12 LBS | HEART RATE: 66 BPM | OXYGEN SATURATION: 96 %

## 2018-08-03 DIAGNOSIS — L03.019 PARONYCHIA OF FINGER: ICD-10-CM

## 2018-08-03 DIAGNOSIS — K08.89 PAIN, DENTAL: Primary | ICD-10-CM

## 2018-08-03 PROCEDURE — 99283 EMERGENCY DEPT VISIT LOW MDM: CPT

## 2018-08-03 RX ORDER — CEPHALEXIN 250 MG/1
500 CAPSULE ORAL 4 TIMES DAILY
Qty: 28 CAPSULE | Refills: 0 | Status: SHIPPED | OUTPATIENT
Start: 2018-08-03 | End: 2018-08-10

## 2018-08-03 RX ORDER — CEPHALEXIN 250 MG/1
500 CAPSULE ORAL ONCE
Status: COMPLETED | OUTPATIENT
Start: 2018-08-03 | End: 2018-08-03

## 2018-08-03 RX ORDER — OXYCODONE HYDROCHLORIDE AND ACETAMINOPHEN 5; 325 MG/1; MG/1
1 TABLET ORAL ONCE
Status: COMPLETED | OUTPATIENT
Start: 2018-08-03 | End: 2018-08-03

## 2018-08-03 RX ORDER — OXYCODONE HYDROCHLORIDE AND ACETAMINOPHEN 5; 325 MG/1; MG/1
1 TABLET ORAL EVERY 4 HOURS PRN
Qty: 15 TABLET | Refills: 0 | Status: SHIPPED | OUTPATIENT
Start: 2018-08-03 | End: 2018-08-13

## 2018-08-03 RX ADMIN — OXYCODONE HYDROCHLORIDE AND ACETAMINOPHEN 1 TABLET: 5; 325 TABLET ORAL at 01:17

## 2018-08-03 RX ADMIN — CEPHALEXIN 500 MG: 250 CAPSULE ORAL at 01:17

## 2018-08-03 NOTE — DISCHARGE INSTRUCTIONS
Paronychia   WHAT YOU NEED TO KNOW:   What is paronychia? Paronychia is an infection of your nail fold caused by bacteria or a fungus  The nail fold is the skin around your nail  Paronychia may happen suddenly and last for 6 weeks or longer  You may have paronychia on more than 1 finger or toe  What increases my risk for paronychia? · Trauma:  Any injury that causes your skin to tear can lead to infection  Your risk is increased if you have ingrown nails, bite your nails, or wear acrylic nails  · Frequent contact with water:  Jobs that require you to soak your hands in water often may increase your risk for paronychia  Common examples are nurses, cooks, and   Swimmers also have increased risk  · Medical conditions:  Diabetes and other conditions that cause a weak immune system can increase your risk  Some examples are skin cancer, psoriasis, HIV, and lupus  · Chemicals:  Contact with soaps, detergents, and other chemicals can cause inflammation and lead to paronychia  · Allergies: Allergies to certain foods, nail polish, or latex can cause inflammation and increase your risk  What are the signs and symptoms of paronychia? · Red, hot, swollen, painful nail fold    · Pus coming out of your nail fold when you press on it    · Nail that pulls away from your nail fold and may fall off    · Changes in nail color, such as green nails    · Fever    · Thick, rough nail, or ridges in the nail  How is paronychia diagnosed? Your healthcare provider will examine your nails and ask about your symptoms  He may press on your infected nail to see if pus drains from it  He will send any pus to a lab for tests to learn what germ is causing your infection  This is called a fluid culture  How is paronychia treated? · Medicine:     ¨ Td vaccine  is a booster shot used to help prevent tetanus and diphtheria   The Td booster may be given to adolescents and adults every 10 years or for certain wounds and injuries  ¨ Antibiotics: This medicine will help fight or prevent an infection caused by bacteria  It may be given as a pill, cream, or ointment  ¨ Steroids: This medicine will help decrease inflammation  It may be given as a pill, cream, or ointment  ¨ Antifungal medicine: This medicine helps kill fungus that may be causing your infection  It may be given as a cream or ointment  ¨ NSAIDs:  These medicines decrease pain and swelling  NSAIDs are available without a doctor's order  Ask your healthcare provider which medicine is right for you  Ask how much to take and when to take it  Take as directed  NSAIDs can cause stomach bleeding and kidney problems if not taken correctly  · Procedures: You may need surgery to drain an abscess (pus pocket) in your finger or toe  Your nail may need to be removed  Infected tissue around your nail may also need to be removed  What are the risks of paronychia? Your nail may become loose, deformed, or fall off  The infection may form a large abscess on your nail  The infection may spread to nearby tissue and bone  How can paronychia be prevented? · Avoid chemicals and allergens that may harm your skin and nails  This includes soaps, laundry detergents, and nail products  · Keep your nails clean and dry  Do not soak your nails in water  Use cotton-lined rubber gloves or wear 2 rubber gloves if you work with food or water  The gloves will help protect your nail folds  · Keep your nails short  Do not bite your nails, pick at your hangnails, suck your fingers, or wear fake nails  Bring your own nail tools when you go to the nail salon  How can I manage my symptoms? · Soak your nail:  Soak your nail in a mixture of equal parts vinegar and water 3 or 4 times each day  This will help decrease inflammation  · Apply a warm compress:  Soak a washcloth in warm water and place it on your nail  This will help decrease inflammation       · Elevate:  Raise your nail above the level of your heart as often as you can  This will help decrease swelling and pain  Prop your nail on pillows or blankets to keep it elevated comfortably  · Use lotion:  Apply lotion after you wash your hands  This will prevent the skin from becoming too dry  When should I contact my healthcare provider? · Your nail becomes loose, deformed, or falls off  · You have a large abscess on your nail  · You have questions or concerns about your condition or care  When should I seek immediate care? · You have severe nail pain  · The inflammation spreads to your hand or arm  CARE AGREEMENT:   You have the right to help plan your care  Learn about your health condition and how it may be treated  Discuss treatment options with your caregivers to decide what care you want to receive  You always have the right to refuse treatment  The above information is an  only  It is not intended as medical advice for individual conditions or treatments  Talk to your doctor, nurse or pharmacist before following any medical regimen to see if it is safe and effective for you  © 2017 [facility] [facility] Information is for End User's use only and may not be sold, redistributed or otherwise used for commercial purposes  All illustrations and images included in CareNotes® are the copyrighted property of [employer]  or Esau Ramesh  Toothache   WHAT YOU NEED TO KNOW:   A toothache is pain that is caused by irritation of the nerves in the center of your tooth  The irritation may be caused by several problems, such as a cavity, an infection, a cracked tooth, or gum disease  It is very important to follow up with your dentist so the cause of your toothache can be diagnosed and treated  This can help prevent more serious problems  DISCHARGE INSTRUCTIONS:   Medicines: You may  need any of the following:  · NSAIDs  decrease swelling and pain   This medicine can be bought with or without a doctor's order  This medicine can cause stomach bleeding or kidney problems in certain people  If you take blood thinner medicine, always ask your healthcare provider if NSAIDs are safe for you  Always read the medicine label and follow the directions on it before using this medicine  · Acetaminophen  decreases pain  It is available without a doctor's order  Ask how much to take and how often to take it  Follow directions  Acetaminophen can cause liver damage if not taken correctly  · Pain medicine  may be given as a pill or as medicine that you put directly on your tooth or gums  Do not wait until the pain is severe before you take this medicine  · Antibiotics  help fight or prevent an infection caused by bacteria  Take them as directed  · Take your medicine as directed  Contact your healthcare provider if you think your medicine is not helping or if you have side effects  Tell him of her if you are allergic to any medicine  Keep a list of the medicines, vitamins, and herbs you take  Include the amounts, and when and why you take them  Bring the list or the pill bottles to follow-up visits  Carry your medicine list with you in case of an emergency  Follow up with your dentist as directed: You may be referred to a dental surgeon  Write down your questions so you remember to ask them during your visits  Self-care:   · Rinse your mouth with warm salt water 4 times a day or as directed  · You may need to eat soft foods to help relieve pain caused by chewing  Contact your dentist if:   · You have questions or concerns about your condition or care  Return to the emergency department if:   · You have trouble breathing  · You have swelling in your face or neck  · You have a fever and chills  · You have trouble speaking or swallowing  · You have trouble opening or closing your mouth    © 2017 [employer] Information is for End User's use only and may not be sold, redistributed or otherwise used for commercial purposes  All illustrations and images included in CareNotes® are the copyrighted property of [employer]  or Esau Ramesh  The above information is an  only  It is not intended as medical advice for individual conditions or treatments  Talk to your doctor, nurse or pharmacist before following any medical regimen to see if it is safe and effective for you

## 2018-08-03 NOTE — ED NOTES
Discharged with instructions  Verbalized understanding  No distress at this time       Viri Hanson RN  08/03/18 7405

## 2018-08-03 NOTE — ED PROVIDER NOTES
History  Chief Complaint   Patient presents with    Dental Pain     Pt reports to ED c/o tooth pain on her R upper jaw and on the front upper jaw   Finger Pain     Pt reports that she pulled a hang nail on her L middle finger and that she has puss coming out of the site     Patient presents to the emergency department with 2 complaints  The 1st is chronic dental pain for which she is getting tooth extraction at the end of the month  She states that Motrin and Tylenol are not relieving her pain  She also complains of a swelling to the left middle finger with some greenish discharge for the past 2 days  She denies trauma fall or injury  Denies fever chills  Prior to Admission Medications   Prescriptions Last Dose Informant Patient Reported? Taking? ranitidine (ZANTAC) 150 mg tablet 8/3/2018 at Unknown time  Yes Yes   Sig: Take 150 mg by mouth 2 (two) times a day      Facility-Administered Medications: None       Past Medical History:   Diagnosis Date    Asthma        History reviewed  No pertinent surgical history  History reviewed  No pertinent family history  I have reviewed and agree with the history as documented  Social History   Substance Use Topics    Smoking status: Current Every Day Smoker     Packs/day: 0 50     Types: Cigarettes    Smokeless tobacco: Never Used    Alcohol use No        Review of Systems   Constitutional: Negative for activity change, appetite change, chills, diaphoresis, fatigue and fever  HENT: Positive for dental problem  Negative for drooling, facial swelling, sore throat, trouble swallowing and voice change  Respiratory: Negative for chest tightness and shortness of breath  Skin: Positive for wound  Neurological: Negative for dizziness and weakness  Physical Exam  Physical Exam   Constitutional: She is oriented to person, place, and time  She appears well-developed and well-nourished  Nontoxic appearance  No respiratory distress  Patient looks comfortable sitting upright in the stretcher  HENT:   Head: Normocephalic and atraumatic  Mouth/Throat: No oropharyngeal exudate  No trismus or submandibular tenderness  No facial swelling  Normal voice without drooling  Poor dentition without obvious gingival swelling foul odor or discharge  Neck: Normal range of motion  Neck supple  Musculoskeletal: Normal range of motion  She exhibits tenderness  Small area of erythema and swelling to the ulnar side of the left 3rd nail plate  No active discharge  No fluctuance  No induration  Neurological: She is alert and oriented to person, place, and time  She displays normal reflexes  No cranial nerve deficit or sensory deficit  She exhibits normal muscle tone  Coordination normal    Skin: Skin is warm and dry  Psychiatric: She has a normal mood and affect  Her behavior is normal  Judgment and thought content normal    Nursing note and vitals reviewed  Vital Signs  ED Triage Vitals [08/03/18 0046]   Temperature Pulse Respirations Blood Pressure SpO2   98 1 °F (36 7 °C) 66 18 108/67 96 %      Temp Source Heart Rate Source Patient Position - Orthostatic VS BP Location FiO2 (%)   Oral Monitor Sitting Right arm --      Pain Score       9           Vitals:    08/03/18 0046   BP: 108/67   Pulse: 66   Patient Position - Orthostatic VS: Sitting       Visual Acuity      ED Medications  Medications   oxyCODONE-acetaminophen (PERCOCET) 5-325 mg per tablet 1 tablet (1 tablet Oral Given 8/3/18 0117)   cephalexin (KEFLEX) capsule 500 mg (500 mg Oral Given 8/3/18 0117)       Diagnostic Studies  Results Reviewed     None                 No orders to display              Procedures  Procedures       Phone Contacts  ED Phone Contact    ED Course  ED Course as of Aug 03 0127   Fri Aug 03, 2018   0103 Patient is stable for discharge  Keflex given for her mild paronychial infection and Percocet was given for her dental pain   She does have a appointment with an oral surgeon for tooth extraction  I did discuss signs and symptoms that would require return to the emergency department  [facility]  CritCare Time    Disposition  Final diagnoses:   Pain, dental   Paronychia of finger     Time reflects when diagnosis was documented in both MDM as applicable and the Disposition within this note     Time User Action Codes Description Comment    8/3/2018  1:05 AM Tamar Guerrero Add [K08 89] Pain, dental     8/3/2018  1:05 AM Abdias Mckeon Add [W37 313] Paronychia of finger       ED Disposition     ED Disposition Condition Comment    Discharge  Mike Pérez discharge to home/self care  Condition at discharge: Stable        Follow-up Information     Follow up With Specialties Details Why [facility] dentist  Schedule an appointment as soon as possible for a visit            Discharge Medication List as of 8/3/2018  1:08 AM      START taking these medications    Details   cephalexin (KEFLEX) 250 mg capsule Take 2 capsules (500 mg total) by mouth 4 (four) times a day for 7 days, Starting Fri 8/3/2018, Until Fri 8/10/2018, Normal      oxyCODONE-acetaminophen (PERCOCET) 5-325 mg per tablet Take 1 tablet by mouth every 4 (four) hours as needed for moderate pain for up to 10 days Max Daily Amount: 6 tablets, Starting Fri 8/3/2018, Until Mon 8/13/2018, Print         CONTINUE these medications which have NOT CHANGED    Details   ranitidine (ZANTAC) 150 mg tablet Take 150 mg by mouth 2 (two) times a day, Historical Med           No discharge procedures on file      ED Provider  Electronically Signed by           Robbin Peres MD  08/03/18 8168

## 2018-08-23 ENCOUNTER — APPOINTMENT (EMERGENCY)
Dept: RADIOLOGY | Facility: HOSPITAL | Age: 28
End: 2018-08-23
Payer: COMMERCIAL

## 2018-08-23 ENCOUNTER — HOSPITAL ENCOUNTER (EMERGENCY)
Facility: HOSPITAL | Age: 28
Discharge: HOME | End: 2018-08-23
Attending: EMERGENCY MEDICINE | Admitting: EMERGENCY MEDICINE
Payer: COMMERCIAL

## 2018-08-23 ENCOUNTER — APPOINTMENT (EMERGENCY)
Dept: CT IMAGING | Facility: HOSPITAL | Age: 28
End: 2018-08-23
Payer: COMMERCIAL

## 2018-08-23 ENCOUNTER — TELEPHONE (OUTPATIENT)
Dept: UROLOGY | Facility: AMBULATORY SURGERY CENTER | Age: 28
End: 2018-08-23

## 2018-08-23 VITALS
OXYGEN SATURATION: 99 % | RESPIRATION RATE: 20 BRPM | WEIGHT: 123 LBS | DIASTOLIC BLOOD PRESSURE: 72 MMHG | SYSTOLIC BLOOD PRESSURE: 118 MMHG | HEART RATE: 58 BPM | TEMPERATURE: 98.5 F

## 2018-08-23 DIAGNOSIS — N39.0 UTI (URINARY TRACT INFECTION): ICD-10-CM

## 2018-08-23 DIAGNOSIS — N23 RENAL COLIC ON RIGHT SIDE: Primary | ICD-10-CM

## 2018-08-23 DIAGNOSIS — R11.0 NAUSEA: ICD-10-CM

## 2018-08-23 LAB
ALBUMIN SERPL BCP-MCNC: 3.6 G/DL (ref 3.5–5)
ALP SERPL-CCNC: 59 U/L (ref 46–116)
ALT SERPL W P-5'-P-CCNC: 19 U/L (ref 12–78)
AMORPH PHOS CRY URNS QL MICRO: ABNORMAL /HPF
ANION GAP SERPL CALCULATED.3IONS-SCNC: 11 MMOL/L (ref 4–13)
AST SERPL W P-5'-P-CCNC: 12 U/L (ref 5–45)
ATRIAL RATE: 83 BPM
B-HCG SERPL-ACNC: <2 MIU/ML
BACTERIA UR QL AUTO: ABNORMAL /HPF
BASOPHILS # BLD AUTO: 0.04 THOUSANDS/ΜL (ref 0–0.1)
BASOPHILS NFR BLD AUTO: 0 % (ref 0–1)
BILIRUB SERPL-MCNC: 0.1 MG/DL (ref 0.2–1)
BILIRUB UR QL STRIP: NEGATIVE
BUN SERPL-MCNC: 11 MG/DL (ref 5–25)
CALCIUM SERPL-MCNC: 9.2 MG/DL (ref 8.3–10.1)
CHLORIDE SERPL-SCNC: 104 MMOL/L (ref 100–108)
CLARITY UR: ABNORMAL
CO2 SERPL-SCNC: 26 MMOL/L (ref 21–32)
COLOR UR: YELLOW
CREAT SERPL-MCNC: 0.76 MG/DL (ref 0.6–1.3)
EOSINOPHIL # BLD AUTO: 0.23 THOUSAND/ΜL (ref 0–0.61)
EOSINOPHIL NFR BLD AUTO: 3 % (ref 0–6)
ERYTHROCYTE [DISTWIDTH] IN BLOOD BY AUTOMATED COUNT: 12.7 % (ref 11.6–15.1)
GFR SERPL CREATININE-BSD FRML MDRD: 107 ML/MIN/1.73SQ M
GLUCOSE SERPL-MCNC: 95 MG/DL (ref 65–140)
GLUCOSE UR STRIP-MCNC: NEGATIVE MG/DL
HCT VFR BLD AUTO: 40.2 % (ref 34.8–46.1)
HGB BLD-MCNC: 13.5 G/DL (ref 11.5–15.4)
HGB UR QL STRIP.AUTO: ABNORMAL
IMM GRANULOCYTES # BLD AUTO: 0.02 THOUSAND/UL (ref 0–0.2)
IMM GRANULOCYTES NFR BLD AUTO: 0 % (ref 0–2)
KETONES UR STRIP-MCNC: NEGATIVE MG/DL
LACTATE SERPL-SCNC: 0.6 MMOL/L (ref 0.5–2)
LEUKOCYTE ESTERASE UR QL STRIP: ABNORMAL
LIPASE SERPL-CCNC: 83 U/L (ref 73–393)
LYMPHOCYTES # BLD AUTO: 3.02 THOUSANDS/ΜL (ref 0.6–4.47)
LYMPHOCYTES NFR BLD AUTO: 33 % (ref 14–44)
MCH RBC QN AUTO: 30.4 PG (ref 26.8–34.3)
MCHC RBC AUTO-ENTMCNC: 33.6 G/DL (ref 31.4–37.4)
MCV RBC AUTO: 91 FL (ref 82–98)
MONOCYTES # BLD AUTO: 0.67 THOUSAND/ΜL (ref 0.17–1.22)
MONOCYTES NFR BLD AUTO: 7 % (ref 4–12)
NEUTROPHILS # BLD AUTO: 5.19 THOUSANDS/ΜL (ref 1.85–7.62)
NEUTS SEG NFR BLD AUTO: 57 % (ref 43–75)
NITRITE UR QL STRIP: NEGATIVE
NON-SQ EPI CELLS URNS QL MICRO: ABNORMAL /HPF
NRBC BLD AUTO-RTO: 0 /100 WBCS
P AXIS: 66 DEGREES
PH UR STRIP.AUTO: 7.5 [PH] (ref 4.5–8)
PLATELET # BLD AUTO: 264 THOUSANDS/UL (ref 149–390)
PMV BLD AUTO: 9.9 FL (ref 8.9–12.7)
POTASSIUM SERPL-SCNC: 3.9 MMOL/L (ref 3.5–5.3)
PR INTERVAL: 124 MS
PROT SERPL-MCNC: 6.7 G/DL (ref 6.4–8.2)
PROT UR STRIP-MCNC: ABNORMAL MG/DL
QRS AXIS: 88 DEGREES
QRSD INTERVAL: 96 MS
QT INTERVAL: 352 MS
QTC INTERVAL: 413 MS
RBC # BLD AUTO: 4.44 MILLION/UL (ref 3.81–5.12)
RBC #/AREA URNS AUTO: ABNORMAL /HPF
SODIUM SERPL-SCNC: 141 MMOL/L (ref 136–145)
SP GR UR STRIP.AUTO: 1.02 (ref 1–1.03)
T WAVE AXIS: 46 DEGREES
UROBILINOGEN UR QL STRIP.AUTO: 1 E.U./DL
VENTRICULAR RATE: 83 BPM
WBC # BLD AUTO: 9.17 THOUSAND/UL (ref 4.31–10.16)
WBC #/AREA URNS AUTO: ABNORMAL /HPF

## 2018-08-23 PROCEDURE — 85025 COMPLETE CBC W/AUTO DIFF WBC: CPT | Performed by: EMERGENCY MEDICINE

## 2018-08-23 PROCEDURE — 96361 HYDRATE IV INFUSION ADD-ON: CPT

## 2018-08-23 PROCEDURE — 87077 CULTURE AEROBIC IDENTIFY: CPT

## 2018-08-23 PROCEDURE — 96376 TX/PRO/DX INJ SAME DRUG ADON: CPT

## 2018-08-23 PROCEDURE — 99285 EMERGENCY DEPT VISIT HI MDM: CPT

## 2018-08-23 PROCEDURE — 80053 COMPREHEN METABOLIC PANEL: CPT | Performed by: EMERGENCY MEDICINE

## 2018-08-23 PROCEDURE — 83690 ASSAY OF LIPASE: CPT | Performed by: EMERGENCY MEDICINE

## 2018-08-23 PROCEDURE — 84702 CHORIONIC GONADOTROPIN TEST: CPT | Performed by: EMERGENCY MEDICINE

## 2018-08-23 PROCEDURE — 93005 ELECTROCARDIOGRAM TRACING: CPT

## 2018-08-23 PROCEDURE — 87186 SC STD MICRODIL/AGAR DIL: CPT

## 2018-08-23 PROCEDURE — 87086 URINE CULTURE/COLONY COUNT: CPT

## 2018-08-23 PROCEDURE — 96375 TX/PRO/DX INJ NEW DRUG ADDON: CPT

## 2018-08-23 PROCEDURE — 93010 ELECTROCARDIOGRAM REPORT: CPT | Performed by: INTERNAL MEDICINE

## 2018-08-23 PROCEDURE — 36415 COLL VENOUS BLD VENIPUNCTURE: CPT | Performed by: EMERGENCY MEDICINE

## 2018-08-23 PROCEDURE — 71046 X-RAY EXAM CHEST 2 VIEWS: CPT

## 2018-08-23 PROCEDURE — 74177 CT ABD & PELVIS W/CONTRAST: CPT

## 2018-08-23 PROCEDURE — 83605 ASSAY OF LACTIC ACID: CPT | Performed by: EMERGENCY MEDICINE

## 2018-08-23 PROCEDURE — 96365 THER/PROPH/DIAG IV INF INIT: CPT

## 2018-08-23 PROCEDURE — 81001 URINALYSIS AUTO W/SCOPE: CPT

## 2018-08-23 RX ORDER — SODIUM CHLORIDE 9 MG/ML
125 INJECTION, SOLUTION INTRAVENOUS CONTINUOUS
Status: DISCONTINUED | OUTPATIENT
Start: 2018-08-23 | End: 2018-08-23 | Stop reason: HOSPADM

## 2018-08-23 RX ORDER — TAMSULOSIN HYDROCHLORIDE 0.4 MG/1
0.4 CAPSULE ORAL ONCE
Status: COMPLETED | OUTPATIENT
Start: 2018-08-23 | End: 2018-08-23

## 2018-08-23 RX ORDER — OXYCODONE HYDROCHLORIDE AND ACETAMINOPHEN 5; 325 MG/1; MG/1
1 TABLET ORAL ONCE
Status: COMPLETED | OUTPATIENT
Start: 2018-08-23 | End: 2018-08-23

## 2018-08-23 RX ORDER — TAMSULOSIN HYDROCHLORIDE 0.4 MG/1
0.4 CAPSULE ORAL
Qty: 10 CAPSULE | Refills: 0 | Status: SHIPPED | OUTPATIENT
Start: 2018-08-23 | End: 2018-08-28 | Stop reason: HOSPADM

## 2018-08-23 RX ORDER — CEPHALEXIN 500 MG/1
500 CAPSULE ORAL 4 TIMES DAILY
Qty: 28 CAPSULE | Refills: 0 | Status: SHIPPED | OUTPATIENT
Start: 2018-08-23 | End: 2018-08-30

## 2018-08-23 RX ORDER — METOCLOPRAMIDE 10 MG/1
10 TABLET ORAL 4 TIMES DAILY
Qty: 28 TABLET | Refills: 0 | Status: SHIPPED | OUTPATIENT
Start: 2018-08-23 | End: 2018-08-30

## 2018-08-23 RX ORDER — OXYCODONE HYDROCHLORIDE AND ACETAMINOPHEN 5; 325 MG/1; MG/1
1 TABLET ORAL EVERY 4 HOURS PRN
Qty: 20 TABLET | Refills: 0 | Status: ON HOLD | OUTPATIENT
Start: 2018-08-23 | End: 2018-08-28

## 2018-08-23 RX ORDER — ONDANSETRON 2 MG/ML
4 INJECTION INTRAMUSCULAR; INTRAVENOUS ONCE
Status: COMPLETED | OUTPATIENT
Start: 2018-08-23 | End: 2018-08-23

## 2018-08-23 RX ADMIN — IOHEXOL 100 ML: 350 INJECTION, SOLUTION INTRAVENOUS at 03:52

## 2018-08-23 RX ADMIN — SODIUM CHLORIDE 1000 ML: 0.9 INJECTION, SOLUTION INTRAVENOUS at 01:51

## 2018-08-23 RX ADMIN — OXYCODONE HYDROCHLORIDE AND ACETAMINOPHEN 1 TABLET: 5; 325 TABLET ORAL at 04:20

## 2018-08-23 RX ADMIN — ONDANSETRON 4 MG: 2 INJECTION INTRAMUSCULAR; INTRAVENOUS at 01:50

## 2018-08-23 RX ADMIN — IOHEXOL 50 ML: 240 INJECTION, SOLUTION INTRATHECAL; INTRAVASCULAR; INTRAVENOUS; ORAL at 02:04

## 2018-08-23 RX ADMIN — TAMSULOSIN HYDROCHLORIDE 0.4 MG: 0.4 CAPSULE ORAL at 04:20

## 2018-08-23 RX ADMIN — HYDROMORPHONE HYDROCHLORIDE 0.5 MG: 1 INJECTION, SOLUTION INTRAMUSCULAR; INTRAVENOUS; SUBCUTANEOUS at 01:51

## 2018-08-23 RX ADMIN — HYDROMORPHONE HYDROCHLORIDE 0.5 MG: 1 INJECTION, SOLUTION INTRAMUSCULAR; INTRAVENOUS; SUBCUTANEOUS at 02:52

## 2018-08-23 RX ADMIN — CEFTRIAXONE 1000 MG: 1 INJECTION, POWDER, FOR SOLUTION INTRAMUSCULAR; INTRAVENOUS at 03:01

## 2018-08-23 RX ADMIN — ONDANSETRON 4 MG: 2 INJECTION INTRAMUSCULAR; INTRAVENOUS at 02:49

## 2018-08-23 NOTE — ED PROVIDER NOTES
History  Chief Complaint   Patient presents with    Abdominal Pain     Patient c/o pain in RUQ from hernia  Started at 1300  Nausea present  Patient is a 29year old female with worsening RUQ pain with nausea with radiation to R side of chest for past 12 hours  States she did not drive here  No vomiting or diarrhea  No fever  No urinary sx  States she did not drive here  No abdominal surgery  Was last seen in this ED on 8/3/18 for dental pain and paronychia  [facility] website checked on this patient and last Rx filled was on 8/3/18 for percocet for 2 day supply  States she has an abdominal wall hernia and pain is there  History provided by:  Patient (don)   used: No    Abdominal Pain   Associated symptoms: chest pain and nausea    Associated symptoms: no diarrhea, no fever and no vomiting        Prior to Admission Medications   Prescriptions Last Dose Informant Patient Reported? Taking? ranitidine (ZANTAC) 150 mg tablet   Yes No   Sig: Take 150 mg by mouth 2 (two) times a day      Facility-Administered Medications: None       Past Medical History:   Diagnosis Date    Asthma        History reviewed  No pertinent surgical history  History reviewed  No pertinent family history  I have reviewed and agree with the history as documented  Social History   Substance Use Topics    Smoking status: Current Every Day Smoker     Packs/day: 0 50     Types: Cigarettes    Smokeless tobacco: Never Used    Alcohol use No        Review of Systems   Constitutional: Negative for fever  Cardiovascular: Positive for chest pain  Gastrointestinal: Positive for abdominal pain and nausea  Negative for diarrhea and vomiting  Genitourinary: Negative for difficulty urinating  All other systems reviewed and are negative  Physical Exam  Physical Exam   Constitutional: She is oriented to person, place, and time  She appears well-developed and well-nourished   She appears distressed (moderate)  HENT:   Head: Normocephalic and atraumatic  Mucous membranes somewhat moist     Eyes: No scleral icterus  Neck: No tracheal deviation present  Cardiovascular: Normal rate, regular rhythm and normal heart sounds  No murmur heard  Pulmonary/Chest: Effort normal and breath sounds normal  No stridor  No respiratory distress  Abdominal: Soft  Bowel sounds are normal  She exhibits no distension  There is tenderness (diffuse upper)  There is no rebound and no guarding  No hernia (No incarcerated hernia appreciated  )  No CVAT  Musculoskeletal: She exhibits no edema or deformity  Neurological: She is alert and oriented to person, place, and time  Skin: Skin is warm and dry  No rash noted  Psychiatric: She has a normal mood and affect  Nursing note and vitals reviewed        Vital Signs  ED Triage Vitals   Temperature Pulse Respirations Blood Pressure SpO2   08/23/18 0044 08/23/18 0043 08/23/18 0043 08/23/18 0043 08/23/18 0043   98 5 °F (36 9 °C) 90 20 101/57 99 %      Temp Source Heart Rate Source Patient Position - Orthostatic VS BP Location FiO2 (%)   08/23/18 0043 08/23/18 0043 08/23/18 0043 08/23/18 0043 --   Oral Monitor Sitting Left arm       Pain Score       08/23/18 0043       9           Vitals:    08/23/18 0043 08/23/18 0230 08/23/18 0315   BP: 101/57 98/67 118/72   Pulse: 90 74 58   Patient Position - Orthostatic VS: Sitting         Visual Acuity      ED Medications  Medications   sodium chloride 0 9 % infusion (not administered)   oxyCODONE-acetaminophen (PERCOCET) 5-325 mg per tablet 1 tablet (not administered)   tamsulosin (FLOMAX) capsule 0 4 mg (not administered)   sodium chloride 0 9 % bolus 1,000 mL (0 mL Intravenous Stopped 8/23/18 0251)   ondansetron (ZOFRAN) injection 4 mg (4 mg Intravenous Given 8/23/18 0150)   HYDROmorphone (DILAUDID) injection 0 5 mg (0 5 mg Intravenous Given 8/23/18 0151)   iohexol (OMNIPAQUE) 240 MG/ML solution 50 mL (50 mL Oral Given 8/23/18 1899)   ceftriaxone (ROCEPHIN) 1 g/50 mL in dextrose IVPB (0 mg Intravenous Stopped 8/23/18 0331)   HYDROmorphone (DILAUDID) injection 0 5 mg (0 5 mg Intravenous Given 8/23/18 0252)   ondansetron (ZOFRAN) injection 4 mg (4 mg Intravenous Given 8/23/18 0249)   iohexol (OMNIPAQUE) 350 MG/ML injection (MULTI-DOSE) 100 mL (100 mL Intravenous Given 8/23/18 0352)       Diagnostic Studies  Results Reviewed     Procedure Component Value Units Date/Time    Quantitative hCG [[id number]]  (Normal) Collected:  08/23/18 0150    Lab Status:  Final result Specimen:  Blood from Arm, Right Updated:  08/23/18 0226     HCG, Quant <2 mIU/mL     Narrative:          Expected Ranges:     Approximate               Approximate HCG  Gestation age          Concentration ( mIU/mL)  _____________          ______________________   Ples Jungling                      HCG values  0 2-1                       5-50  1-2                           2-3                         100-5000  3-4                         500-38704  4-5                         1000-09959  5-6                         [id number]  6-8                         [id number]  8-12                        44660-481495    Lipase [[id number]]  (Normal) Collected:  08/23/18 0150    Lab Status:  Final result Specimen:  Blood from Arm, Right Updated:  08/23/18 0224     Lipase 83 u/L     Lactic acid, plasma [[id number]]  (Normal) Collected:  08/23/18 0150    Lab Status:  Final result Specimen:  Blood from Arm, Right Updated:  08/23/18 0219     LACTIC ACID 0 6 mmol/L     Narrative:         Result may be elevated if tourniquet was used during collection      Comprehensive metabolic panel [[id number]]  (Abnormal) Collected:  08/23/18 0150    Lab Status:  Final result Specimen:  Blood from Arm, Right Updated:  08/23/18 0217     Sodium 141 mmol/L      Potassium 3 9 mmol/L      Chloride 104 mmol/L      CO2 26 mmol/L      Anion Gap 11 mmol/L      BUN 11 mg/dL      Creatinine 0 76 mg/dL      Glucose 95 mg/dL Calcium 9 2 mg/dL      AST 12 U/L      ALT 19 U/L      Alkaline Phosphatase 59 U/L      Total Protein 6 7 g/dL      Albumin 3 6 g/dL      Total Bilirubin 0 10 (L) mg/dL      eGFR 107 ml/min/1 73sq m     Narrative:         National Kidney Disease Education Program recommendations are as follows:  GFR calculation is accurate only with a steady state creatinine  Chronic Kidney disease less than 60 ml/min/1 73 sq  meters  Kidney failure less than 15 ml/min/1 73 sq  meters  CBC and differential [[id number]] Collected:  08/23/18 0150    Lab Status:  Final result Specimen:  Blood from Arm, Right Updated:  08/23/18 0201     WBC 9 17 Thousand/uL      RBC 4 44 Million/uL      Hemoglobin 13 5 g/dL      Hematocrit 40 2 %      MCV 91 fL      MCH 30 4 pg      MCHC 33 6 g/dL      RDW 12 7 %      MPV 9 9 fL      Platelets 378 Thousands/uL      nRBC 0 /100 WBCs      Neutrophils Relative 57 %      Immat GRANS % 0 %      Lymphocytes Relative 33 %      Monocytes Relative 7 %      Eosinophils Relative 3 %      Basophils Relative 0 %      Neutrophils Absolute 5 19 Thousands/µL      Immature Grans Absolute 0 02 Thousand/uL      Lymphocytes Absolute 3 02 Thousands/µL      Monocytes Absolute 0 67 Thousand/µL      Eosinophils Absolute 0 23 Thousand/µL      Basophils Absolute 0 04 Thousands/µL     Urine Microscopic [[id number]]  (Abnormal) Collected:  08/23/18 0132    Lab Status:  Final result Specimen:  Urine from Urine, Clean Catch Updated:  08/23/18 0136     RBC, UA None Seen /hpf      WBC, UA 10-20 (A) /hpf      Epithelial Cells Occasional /hpf      Bacteria, UA Occasional /hpf      AMORPH PHOSPATES Moderate /hpf     Urine culture [[id number]] Collected:  08/23/18 0132    Lab Status:   In process Specimen:  Urine from Urine, Clean Catch Updated:  08/23/18 0136    ED Urine Macroscopic [[id number]]  (Abnormal) Collected:  08/23/18 0132    Lab Status:  Final result Specimen:  Urine Updated:  08/23/18 0123     Color, UA Yellow     Clarity, UA Cloudy     pH, UA 7 5     Leukocytes, UA Moderate (A)     Nitrite, UA Negative     Protein, UA 30 (1+) (A) mg/dl      Glucose, UA Negative mg/dl      Ketones, UA Negative mg/dl      Urobilinogen, UA 1 0 E U /dl      Bilirubin, UA Negative     Blood, UA Trace (A)     Specific [place], UA 1 020    Narrative:       CLINITEK RESULT                 CT abdomen pelvis with contrast   ED Interpretation by Corinne Cosby MD (08/23 0411)   FINDINGS:      ABDOMEN      LOWER CHEST:  No clinically significant abnormality identified in the visualized lower chest       LIVER/BILIARY TREE:  Unremarkable  GALLBLADDER:  No calcified gallstones  No pericholecystic inflammatory change  SPLEEN:  Unremarkable  PANCREAS:  Unremarkable  ADRENAL GLANDS:  Unremarkable  KIDNEYS/URETERS: [place] is a 7 x 4 mm calculus at the right ureterovesicular junction with associated moderate right hydroureteronephrosis  STOMACH AND BOWEL:  Enteric contrast reaches the level of the distal small bowel without evidence of obstruction  APPENDIX:  A normal appendix was visualized  ABDOMINOPELVIC CAVITY:  Trace pelvic free fluid which may be physiologic   No free air   No lymphadenopathy  VESSELS:  Unremarkable for patient's age  PELVIS      REPRODUCTIVE ORGANS:  Unremarkable uterus   Left ovarian corpus luteum  URINARY BLADDER:  Unremarkable  ABDOMINAL WALL/INGUINAL REGIONS:  Unremarkable  OSSEOUS STRUCTURES:  No acute fracture or destructive osseous lesion  Impression:        7 x 4 mm right ureterovesicular junction calculus with associated moderate right hydroureteronephrosis  Workstation performed: [id number]         Final Result by Savannah Em MD (08/23 0407)      7 x 4 mm right ureterovesicular junction calculus with associated moderate right hydroureteronephrosis              Workstation performed: [id number]         XR chest 2 views   ED Interpretation by Alise Alfredo Yaneli Brown MD (08/23 0148)   No acute disease read by me  Procedures  ECG 12 Lead Documentation  Date/Time: 8/23/2018 1:19 AM  Performed by: Vi Bynum  Authorized by: Vi Bynum     Indications / Diagnosis:  Chest pain  ECG reviewed by me, the ED Provider: yes    Patient location:  ED  Previous ECG:     Previous ECG:  Unavailable  Interpretation:     Interpretation: normal    Rate:     ECG rate:  83    ECG rate assessment: normal    Rhythm:     Rhythm: sinus rhythm    Ectopy:     Ectopy: none    QRS:     QRS axis:  Normal    QRS intervals:  Normal  Conduction:     Conduction: normal    ST segments:     ST segments:  Normal  T waves:     T waves: normal             Phone Contacts  ED Phone Contact    ED Course  ED Course as of Aug 23 0419   Thu Aug 23, 2018   0223 Labs d/w patient  Patient still with pain and nausea so more IV dilaudid and zofran ordered  7664 CT d/w patient and fiance  Patient still with pain and nausea improved  Patient does not want to be admitted  MDM  Number of Diagnoses or Management Options  Diagnosis management comments: DDx including but not limited to: appendicitis, gastroenteritis, gastritis, PUD, GERD, gastroparesis, hepatitis, pancreatitis, colitis, enteritis, food poisoning, mesenteric adenitis, IBD, IBS, ileus, bowel obstruction, volvulus, cholecystitis, biliary colic, choledocholithiasis, perforated viscus, splenic etiology, diverticulitis, internal hernia, constipation, pelvic pathology, renal colic, pyelonephritis, UTI; doubt cardiac etiology          Amount and/or Complexity of Data Reviewed  Clinical lab tests: ordered and reviewed  Tests in the radiology section of CPT®: ordered and reviewed  Decide to obtain previous medical records or to obtain history from someone other than the patient: yes  Obtain history from someone other than the patient: yes  Review and summarize past medical records: yes  Independent visualization of images, tracings, or specimens: yes      CritCare Time    Disposition  Final diagnoses:   Renal colic on right side   UTI (urinary tract infection)   Nausea     Time reflects when diagnosis was documented in both MDM as applicable and the Disposition within this note     Time User Action Codes Description Comment    8/23/2018  4:16 AM Jyl Wilcox Add [G19] Renal colic on right side     8/23/2018  4:16 AM Jyl Wilcox Add [N39 0] UTI (urinary tract infection)     8/23/2018  4:16 AM Jyl Wilcox Add [R11 0] Nausea       ED Disposition     ED Disposition Condition Comment    [facility]  Condition at discharge: Stable        Follow-up Information     Follow up With Specialties Details Why Rossi Sammi U  51  For Urology [facility] Call in 1 day Strain all urine  Return sooner if increased pain, fever, vomiting, difficulty urinating  No driving with percocet  Do not use acetaminophen with percocet  Drink fluids    [address] [address] [phone]          Patient's Medications   Discharge Prescriptions    CEPHALEXIN (KEFLEX) 500 MG CAPSULE    Take 1 capsule (500 mg total) by mouth 4 (four) times a day for 7 days       Start Date: 8/23/2018 End Date: 8/30/2018       Order Dose: 500 mg       Quantity: 28 capsule    Refills: 0    METOCLOPRAMIDE (REGLAN) 10 MG TABLET    Take 1 tablet (10 mg total) by mouth 4 (four) times a day for 7 days As needed for nausea       Start Date: 8/23/2018 End Date: 8/30/2018       Order Dose: 10 mg       Quantity: 28 tablet    Refills: 0    OXYCODONE-ACETAMINOPHEN (PERCOCET) 5-325 MG PER TABLET    Take 1 tablet by mouth every 4 (four) hours as needed for moderate pain for up to 4 days Max Daily Amount: 6 tablets       Start Date: 8/23/2018 End Date: 8/27/2018       Order Dose: 1 tablet       Quantity: 20 tablet    Refills: 0    TAMSULOSIN (FLOMAX) 0 4 MG Take 1 capsule (0 4 mg total) by mouth daily with dinner for 10 days       Start Date: 8/23/2018 End Date: 9/2/2018       Order Dose: 0 4 mg       Quantity: 10 capsule    Refills: 0     No discharge procedures on file      ED Provider  Electronically Signed by           Kristina Villalpando MD  08/23/18 0448

## 2018-08-23 NOTE — TELEPHONE ENCOUNTER
Reason for appointment/Complaint/Diagnosis : 7 x 4 mm right ureterovesicular junction calculus with associated moderate right hydroureteronephrosis    Insurance: [employer]     History of Cancer? no                       If yes, what kind? Previous urologist?    NO                  Records requested/where? In [address] [place] [address] Rd     Outside testing/where? In [facility]     Location Preference for office visit?  [facility]

## 2018-08-23 NOTE — ED NOTES
Discharged with instructions  Verbalized understanding  No distress at this time       Keara Mercer RN  08/23/18 4945

## 2018-08-23 NOTE — DISCHARGE INSTRUCTIONS
Acute Nausea and Vomiting   WHAT YOU NEED TO KNOW:   Acute nausea and vomiting start suddenly, worsen quickly, and last a short time  DISCHARGE INSTRUCTIONS:   Return to the emergency department if:   · You see blood in your vomit or your bowel movements  · You have sudden, severe pain in your chest and upper abdomen after hard vomiting or retching  · You have swelling in your neck and chest      · You are dizzy, cold, and thirsty and your eyes and mouth are dry  · You are urinating very little or not at all  · You have muscle weakness, leg cramps, and trouble breathing  · Your heart is beating much faster than normal      · You continue to vomit for more than 48 hours  Contact your healthcare provider if:   · You have frequent dry heaves (vomiting but nothing comes out)  · Your nausea and vomiting does not get better or go away after you use medicine  · You have questions or concerns about your condition or treatment  Medicines: You may need any of the following:  · Medicines  may be given to calm your stomach and stop your vomiting  You may also need medicines to help you feel more relaxed or to stop nausea and vomiting caused by motion sickness  · Gastrointestinal stimulants  are used to help empty your stomach and bowels  This may help decrease nausea and vomiting  · Take your medicine as directed  Contact your healthcare provider if you think your medicine is not helping or if you have side effects  Tell him or her if you are allergic to any medicine  Keep a list of the medicines, vitamins, and herbs you take  Include the amounts, and when and why you take them  Bring the list or the pill bottles to follow-up visits  Carry your medicine list with you in case of an emergency  Prevent or manage acute nausea and vomiting:   · Do not drink alcohol  Alcohol may upset or irritate your stomach  Too much alcohol can also cause acute nausea and vomiting  · Control stress    Headaches due to stress may cause nausea and vomiting  Find ways to relax and manage your stress  Get more rest and sleep  · Drink more liquids as directed  Vomiting can lead to dehydration  It is important to drink more liquids to help replace lost body fluids  Ask your healthcare provider how much liquid to drink each day and which liquids are best for you  Your provider may recommend that you drink an oral rehydration solution (ORS)  ORS contains water, salts, and sugar that are needed to replace the lost body fluids  Ask what kind of ORS to use, how much to drink, and where to get it  · Eat smaller meals, more often  Eat small amounts of food every 2 to 3 hours, even if you are not hungry  Food in your stomach may decrease your nausea  · Talk to your healthcare provider before you take over-the-counter (OTC) medicines  These medicines can cause serious problems if you use certain other medicines, or you have a medical condition  You may have problems if you use too much or use them for longer than the label says  Follow directions on the label carefully  Follow up with your healthcare provider as directed:  Write down your questions so you remember to ask them during your follow-up visits  © 2017 [employer]  Information is for End User's use only and may not be sold, redistributed or otherwise used for commercial purposes  All illustrations and images included in CareNotes® are the copyrighted property of [employer] , [employer]  or [employer]amesh  The above information is an  only  It is not intended as medical advice for individual conditions or treatments  Talk to your doctor, nurse or pharmacist before following any medical regimen to see if it is safe and effective for you  Renal Colic   WHAT YOU NEED TO KNOW:   Renal colic is severe pain in your lower back or sides  The pain is usually on one side, but may be on both sides of your lower back   Renal colic may start quickly, come and go, and become worse over time  Renal colic is caused by a blockage in your urinary tract  The most common cause of a blockage is a kidney stone  Blood clots, ureter spasms, and dead tissue may also block your urinary tract  DISCHARGE INSTRUCTIONS:   Return to the emergency department if:   · You cannot stop vomiting  · You see new or increased bleeding when you urinate  · You are urinating less than usual, or not at all  · Your pain is not getting better even after treatment  Contact your healthcare provider if:   · You have fever  · You need to urinate more often than usual, or right away  · You see a stone in your urine strainer after you urinate  · You have questions or concerns about your condition or care  Medicines:   · Medicines  may help decrease pain and muscle spasms  You may also need medicine to calm your stomach and stop vomiting  · Take your medicine as directed  Contact your healthcare provider if you think your medicine is not helping or if you have side effects  Tell him of her if you are allergic to any medicine  Keep a list of the medicines, vitamins, and herbs you take  Include the amounts, and when and why you take them  Bring the list or the pill bottles to follow-up visits  Carry your medicine list with you in case of an emergency  Manage your symptoms:   · Drink liquids as directed  to help decrease pain and flush blockages from your urinary tract  Ask how much liquid to drink each day and which liquids are best for you  You may need to drink about 3 liters (12 glasses) of liquids each day  Half of your total daily liquids should be water  Limit coffee, tea, and soda to 2 cups daily  Your urine should be pale and clear  · Strain your urine every time you urinate  Urinate into a strainer (funnel with a fine mesh on the bottom) or glass jar to collect kidney stones  Give the kidney stones to your healthcare provider at your next visit  · Eat a variety of healthy foods  Healthy foods include fruits, vegetables, whole-grain breads, low-fat dairy products, beans, lean meats, and fish  You may need to increase the amount of citrus fruit you eat, such as oranges  Ask your healthcare provider how much salt, calcium, and protein you should eat  · Avoid activity in hot temperatures  Heat may cause you to become dehydrated and urinate less  Follow up with your healthcare provider as directed: You may need to return for tests to check if your blockage has cleared  Write down your questions so you remember to ask them during your visits  © 2017 [employer] Information is for End User's use only and may not be sold, redistributed or otherwise used for commercial purposes  All illustrations and images included in CareNotes® are the copyrighted property of [employer] , [employer]  or [employer]au [employer]amesh  The above information is an  only  It is not intended as medical advice for individual conditions or treatments  Talk to your doctor, nurse or pharmacist before following any medical regimen to see if it is safe and effective for you  Urinary Tract Infection in Women   WHAT YOU NEED TO KNOW:   A urinary tract infection (UTI) is caused by bacteria that get inside your urinary tract  Most bacteria that enter your urinary tract come out when you urinate  If the bacteria stay in your urinary tract, you may get an infection  Your urinary tract includes your kidneys, ureters, bladder, and urethra  Urine is made in your kidneys, and it flows from the ureters to the bladder  Urine leaves the bladder through the urethra  A UTI is more common in your lower urinary tract, which includes your bladder and urethra  DISCHARGE INSTRUCTIONS:   Return to the emergency department if:   · You are urinating very little or not at all  · You have a high fever with shaking chills       · You have side or back pain that gets worse   Contact your healthcare provider if:   · You have a fever  · You do not feel better after 2 days of taking antibiotics  · You are vomiting  · You have questions or concerns about your condition or care  Medicines:   · Antibiotics  help fight a bacterial infection  · Medicines  may be given to decrease pain and burning when you urinate  They will also help decrease the feeling that you need to urinate often  These medicines will make your urine orange or red  · Take your medicine as directed  Contact your healthcare provider if you think your medicine is not helping or if you have side effects  Tell him or her if you are allergic to any medicine  Keep a list of the medicines, vitamins, and herbs you take  Include the amounts, and when and why you take them  Bring the list or the pill bottles to follow-up visits  Carry your medicine list with you in case of an emergency  Follow up with your healthcare provider as directed:  Write down your questions so you remember to ask them during your visits  Prevent another UTI:   · Empty your bladder often  Urinate and empty your bladder as soon as you feel the need  Do not hold your urine for long periods of time  · Wipe from front to back after you urinate or have a bowel movement  This will help prevent germs from getting into your urinary tract through your urethra  · Drink liquids as directed  Ask how much liquid to drink each day and which liquids are best for you  You may need to drink more liquids than usual to help flush out the bacteria  Do not drink alcohol, caffeine, or citrus juices  These can irritate your bladder and increase your symptoms  Your healthcare provider may recommend cranberry juice to help prevent a UTI  · Urinate after you have sex  This can help flush out bacteria passed during sex  · Do not douche or use feminine deodorants  These can change the chemical balance in your vagina      · Change sanitary pads or tampons often  This will help prevent germs from getting into your urinary tract  · Do pelvic muscle exercises often  Pelvic muscle exercises may help you start and stop urinating  Strong pelvic muscles may help you empty your bladder easier  Squeeze these muscles tightly for 5 seconds like you are trying to hold back urine  Then relax for 5 seconds  Gradually work up to squeezing for 10 seconds  Do 3 sets of 15 repetitions a day, or as directed  © 2017 [employer] Information is for End User's use only and may not be sold, redistributed or otherwise used for commercial purposes  All illustrations and images included in CareNotes® are the copyrighted property of [employer]  or Esau Ramesh  The above information is an  only  It is not intended as medical advice for individual conditions or treatments  Talk to your doctor, nurse or pharmacist before following any medical regimen to see if it is safe and effective for you

## 2018-08-24 ENCOUNTER — OFFICE VISIT (OUTPATIENT)
Dept: UROLOGY | Facility: CLINIC | Age: 28
End: 2018-08-24
Payer: COMMERCIAL

## 2018-08-24 VITALS
WEIGHT: 122 LBS | HEIGHT: 60 IN | HEART RATE: 64 BPM | SYSTOLIC BLOOD PRESSURE: 108 MMHG | BODY MASS INDEX: 23.95 KG/M2 | DIASTOLIC BLOOD PRESSURE: 70 MMHG

## 2018-08-24 DIAGNOSIS — N20.0 NEPHROLITHIASIS: Primary | ICD-10-CM

## 2018-08-24 DIAGNOSIS — R11.0 NAUSEA: ICD-10-CM

## 2018-08-24 LAB
SL AMB  POCT GLUCOSE, UA: NORMAL
SL AMB LEUKOCYTE ESTERASE,UA: NORMAL
SL AMB POCT BILIRUBIN,UA: NORMAL
SL AMB POCT BLOOD,UA: NORMAL
SL AMB POCT CLARITY,UA: CLEAR
SL AMB POCT COLOR,UA: YELLOW
SL AMB POCT KETONES,UA: NORMAL
SL AMB POCT NITRITE,UA: NORMAL
SL AMB POCT PH,UA: 6
SL AMB POCT SPECIFIC GRAVITY,UA: 1.01
SL AMB POCT URINE PROTEIN: NORMAL
SL AMB POCT UROBILINOGEN: NORMAL

## 2018-08-24 PROCEDURE — 81002 URINALYSIS NONAUTO W/O SCOPE: CPT | Performed by: PHYSICIAN ASSISTANT

## 2018-08-24 PROCEDURE — 99204 OFFICE O/P NEW MOD 45 MIN: CPT | Performed by: PHYSICIAN ASSISTANT

## 2018-08-24 RX ORDER — ONDANSETRON 4 MG/1
4 TABLET, ORALLY DISINTEGRATING ORAL EVERY 6 HOURS PRN
Qty: 10 TABLET | Refills: 0 | Status: SHIPPED | OUTPATIENT
Start: 2018-08-24 | End: 2018-09-06

## 2018-08-24 NOTE — PROGRESS NOTES
1  Nephrolithiasis  POCT urine dip    Case request operating room: CYSTOSCOPY URETEROSCOPY WITH LITHOTRIPSY HOLMIUM LASER, RETROGRADE PYELOGRAM AND INSERTION STENT URETERAL    Case request operating room: CYSTOSCOPY URETEROSCOPY WITH LITHOTRIPSY HOLMIUM LASER, RETROGRADE PYELOGRAM AND INSERTION STENT URETERAL   2  Nausea  ondansetron (ZOFRAN-ODT) 4 mg disintegrating tablet         Assessment and plan:       1  7mm right UVJ calculus with moderate hydronephrosis  - I reviewed with the patient that it is possible that this is the same stone from previous imaging from July 2018  We also reviewed the possibility that this is a new stone  Patient did have an ultrasound in between which was negative for any hydronephrosis at that time   -we reviewed that given the size of the stone in the possibility of it being a retained stone, recommendations would be to proceed with cystoscopy, ureteroscopy, holmium laser, basket extraction, retrograde pyelogram, ureteral stent insertion  We did review that yesterday her labs did appear stable and negative for any overt uncontrolled infection  Patient was instructed to continue with daily tamsulosin, ibuprofen, and Percocet for breakthrough pain that was provided through the [facility]  Patient is having nausea and which she will be initiated on Zofran  She was also instructed to complete her course of Keflex   -I also reviewed the importance of proper hydration and heating pad  Patient states she is extremely constipated we discussed management of bowel movements  -patient will be scheduled for soonest available cystoscopy, ureteroscopy, holmium laser, basket extraction, retrograde pyelogram, ureteral stent insertion  Reviewed the risks of the procedure including but not limited to cardiopulmonary complications, bleeding, infection, damage to nearby structures, ureteral stricture, need for additional procedures  She verbalized understanding      Bertis Kawasaki, LAMAR      Chief Complaint     Chief Complaint   Patient presents with    Nephrolithiasis         History of Present Illness     Sue Hare is a 29 y o  female presenting as a new patient for obstructing ureteral calculus  Patient was initially in the emergency department in July 2018  A CT had confirmed a 5 mm right UT UVJ calculus  Patient had an ultrasound of her kidney and bladder thereafter which was negative for any hydronephrosis  Patient's pain had recurrent and she was in the emergency department yesterday 08/23/2018  A repeat CT scan revealed a 7 mm right UVJ calculus with moderate hydronephrosis  Patient's renal function has remained stable  White blood cells were within normal limits and patient was afebrile  She was discharged on Keflex, Percocet, tamsulosin, and right gland  Patient continues to have significant right flank pain as well as right lower quadrant pain  She does notice continued nausea  This is intermittently controlled between ibuprofen and Percocet  She denies any dysuria, gross hematuria, fevers, or chills  Patient denies any previous history of nephrolithiasis prior to July  She does have family history of nephrolithiasis and states her brother recently underwent ureteroscopy for stone  Laboratory     Lab Results   Component Value Date    CREATININE 0 76 08/23/2018       Review of Systems     Review of Systems   Constitutional: Negative for activity change, appetite change, chills, diaphoresis, fatigue, fever and unexpected weight change  Respiratory: Negative for chest tightness and shortness of breath  Cardiovascular: Negative for chest pain, palpitations and leg swelling  Gastrointestinal: Negative for abdominal distention, abdominal pain, constipation, diarrhea, nausea and vomiting  Genitourinary: Negative for decreased urine volume, difficulty urinating, dysuria, enuresis, flank pain, frequency, genital sores, hematuria and urgency  Musculoskeletal: Negative for back pain, gait problem and myalgias  Skin: Negative for color change, pallor, rash and wound  Psychiatric/Behavioral: Negative for behavioral problems  The patient is not nervous/anxious  Allergies     Allergies   Allergen Reactions    Aspirin     Naproxen     Tramadol        Physical Exam     Physical Exam   Constitutional: She is oriented to person, place, and time  She appears well-developed and well-nourished  No distress  Poor dentition   HENT:   Head: Normocephalic and atraumatic  Eyes: Conjunctivae are normal    Neck: Normal range of motion  No tracheal deviation present  Cardiovascular: Normal rate, regular rhythm and normal heart sounds  Exam reveals no friction rub  No murmur heard  Pulmonary/Chest: Effort normal and breath sounds normal  No respiratory distress  She has no wheezes  Abdominal:   CVA tenderness   Musculoskeletal: Normal range of motion  She exhibits no edema or deformity  Neurological: She is alert and oriented to person, place, and time  Skin: Skin is warm and dry  She is not diaphoretic  No erythema  No pallor  Psychiatric: She has a normal mood and affect   Her behavior is normal          Vital Signs     Vitals:    08/24/18 1455   BP: 108/70   BP Location: Left arm   Patient Position: Sitting   Cuff Size: Adult   Pulse: 64   Weight: 55 3 kg (122 lb)   Height: 5' (1 524 m)         Current Medications       Current Outpatient Prescriptions:     cephalexin (KEFLEX) 500 mg capsule, Take 1 capsule (500 mg total) by mouth 4 (four) times a day for 7 days, Disp: 28 capsule, Rfl: 0    metoclopramide (REGLAN) 10 mg tablet, Take 1 tablet (10 mg total) by mouth 4 (four) times a day for 7 days As needed for nausea, Disp: 28 tablet, Rfl: 0    oxyCODONE-acetaminophen (PERCOCET) 5-325 mg per tablet, Take 1 tablet by mouth every 4 (four) hours as needed for moderate pain for up to 4 days Max Daily Amount: 6 tablets, Disp: 20 tablet, Rfl: 0    ranitidine (ZANTAC) 150 mg tablet, Take 150 mg by mouth 2 (two) times a day, Disp: , Rfl:     tamsulosin (FLOMAX) 0 4 mg, Take 1 capsule (0 4 mg total) by mouth daily with dinner for 10 days, Disp: 10 capsule, Rfl: 0      Active Problems     There is no problem list on file for this patient  Past Medical History     Past Medical History:   Diagnosis Date    Asthma          Surgical History     History reviewed  No pertinent surgical history  Family History     History reviewed  No pertinent family history        Social History     Social History       Radiology

## 2018-08-25 LAB — BACTERIA UR CULT: ABNORMAL

## 2018-08-27 ENCOUNTER — APPOINTMENT (EMERGENCY)
Dept: CT IMAGING | Facility: HOSPITAL | Age: 28
DRG: 446 | End: 2018-08-27
Payer: COMMERCIAL

## 2018-08-27 ENCOUNTER — HOSPITAL ENCOUNTER (INPATIENT)
Facility: HOSPITAL | Age: 28
LOS: 1 days | Discharge: HOME | DRG: 446 | End: 2018-08-28
Attending: EMERGENCY MEDICINE | Admitting: UROLOGY
Payer: COMMERCIAL

## 2018-08-27 ENCOUNTER — TELEPHONE (OUTPATIENT)
Dept: UROLOGY | Facility: CLINIC | Age: 28
End: 2018-08-27

## 2018-08-27 DIAGNOSIS — N30.00 ACUTE CYSTITIS WITHOUT HEMATURIA: Primary | ICD-10-CM

## 2018-08-27 DIAGNOSIS — N20.0 KIDNEY STONE: Primary | ICD-10-CM

## 2018-08-27 DIAGNOSIS — N23 RENAL COLIC ON RIGHT SIDE: ICD-10-CM

## 2018-08-27 DIAGNOSIS — N13.30 HYDRONEPHROSIS: ICD-10-CM

## 2018-08-27 LAB
ANION GAP SERPL CALCULATED.3IONS-SCNC: 7 MMOL/L (ref 4–13)
BASOPHILS # BLD AUTO: 0.03 THOUSANDS/ΜL (ref 0–0.1)
BASOPHILS NFR BLD AUTO: 1 % (ref 0–1)
BUN SERPL-MCNC: 11 MG/DL (ref 5–25)
CALCIUM SERPL-MCNC: 9.1 MG/DL (ref 8.3–10.1)
CHLORIDE SERPL-SCNC: 106 MMOL/L (ref 100–108)
CO2 SERPL-SCNC: 26 MMOL/L (ref 21–32)
CREAT SERPL-MCNC: 0.75 MG/DL (ref 0.6–1.3)
EOSINOPHIL # BLD AUTO: 0.21 THOUSAND/ΜL (ref 0–0.61)
EOSINOPHIL NFR BLD AUTO: 4 % (ref 0–6)
ERYTHROCYTE [DISTWIDTH] IN BLOOD BY AUTOMATED COUNT: 12.6 % (ref 11.6–15.1)
GFR SERPL CREATININE-BSD FRML MDRD: 109 ML/MIN/1.73SQ M
GLUCOSE SERPL-MCNC: 97 MG/DL (ref 65–140)
HCT VFR BLD AUTO: 39.8 % (ref 34.8–46.1)
HGB BLD-MCNC: 13.7 G/DL (ref 11.5–15.4)
IMM GRANULOCYTES # BLD AUTO: 0.02 THOUSAND/UL (ref 0–0.2)
IMM GRANULOCYTES NFR BLD AUTO: 0 % (ref 0–2)
LYMPHOCYTES # BLD AUTO: 2.07 THOUSANDS/ΜL (ref 0.6–4.47)
LYMPHOCYTES NFR BLD AUTO: 35 % (ref 14–44)
MCH RBC QN AUTO: 30.5 PG (ref 26.8–34.3)
MCHC RBC AUTO-ENTMCNC: 34.4 G/DL (ref 31.4–37.4)
MCV RBC AUTO: 89 FL (ref 82–98)
MONOCYTES # BLD AUTO: 0.42 THOUSAND/ΜL (ref 0.17–1.22)
MONOCYTES NFR BLD AUTO: 7 % (ref 4–12)
NEUTROPHILS # BLD AUTO: 3.09 THOUSANDS/ΜL (ref 1.85–7.62)
NEUTS SEG NFR BLD AUTO: 53 % (ref 43–75)
NRBC BLD AUTO-RTO: 0 /100 WBCS
PLATELET # BLD AUTO: 259 THOUSANDS/UL (ref 149–390)
PMV BLD AUTO: 9.5 FL (ref 8.9–12.7)
POTASSIUM SERPL-SCNC: 4.1 MMOL/L (ref 3.5–5.3)
RBC # BLD AUTO: 4.49 MILLION/UL (ref 3.81–5.12)
SODIUM SERPL-SCNC: 139 MMOL/L (ref 136–145)
WBC # BLD AUTO: 5.84 THOUSAND/UL (ref 4.31–10.16)

## 2018-08-27 PROCEDURE — 96361 HYDRATE IV INFUSION ADD-ON: CPT

## 2018-08-27 PROCEDURE — 74176 CT ABD & PELVIS W/O CONTRAST: CPT

## 2018-08-27 PROCEDURE — 96375 TX/PRO/DX INJ NEW DRUG ADDON: CPT

## 2018-08-27 PROCEDURE — 85025 COMPLETE CBC W/AUTO DIFF WBC: CPT | Performed by: EMERGENCY MEDICINE

## 2018-08-27 PROCEDURE — 80048 BASIC METABOLIC PNL TOTAL CA: CPT | Performed by: EMERGENCY MEDICINE

## 2018-08-27 PROCEDURE — 99285 EMERGENCY DEPT VISIT HI MDM: CPT

## 2018-08-27 PROCEDURE — 96374 THER/PROPH/DIAG INJ IV PUSH: CPT

## 2018-08-27 PROCEDURE — 36415 COLL VENOUS BLD VENIPUNCTURE: CPT | Performed by: EMERGENCY MEDICINE

## 2018-08-27 RX ORDER — CIPROFLOXACIN 2 MG/ML
400 INJECTION, SOLUTION INTRAVENOUS EVERY 12 HOURS SCHEDULED
Status: DISCONTINUED | OUTPATIENT
Start: 2018-08-27 | End: 2018-08-28 | Stop reason: HOSPADM

## 2018-08-27 RX ORDER — TAMSULOSIN HYDROCHLORIDE 0.4 MG/1
0.4 CAPSULE ORAL
Status: DISCONTINUED | OUTPATIENT
Start: 2018-08-27 | End: 2018-08-28 | Stop reason: HOSPADM

## 2018-08-27 RX ORDER — ONDANSETRON 2 MG/ML
4 INJECTION INTRAMUSCULAR; INTRAVENOUS ONCE
Status: COMPLETED | OUTPATIENT
Start: 2018-08-27 | End: 2018-08-27

## 2018-08-27 RX ORDER — ONDANSETRON 4 MG/1
4 TABLET, ORALLY DISINTEGRATING ORAL EVERY 6 HOURS PRN
Status: DISCONTINUED | OUTPATIENT
Start: 2018-08-27 | End: 2018-08-28 | Stop reason: HOSPADM

## 2018-08-27 RX ORDER — CIPROFLOXACIN 500 MG/1
500 TABLET, FILM COATED ORAL 2 TIMES DAILY
Qty: 10 TABLET | Refills: 0 | Status: SHIPPED | OUTPATIENT
Start: 2018-08-27 | End: 2018-09-01

## 2018-08-27 RX ORDER — OXYCODONE HYDROCHLORIDE AND ACETAMINOPHEN 5; 325 MG/1; MG/1
1 TABLET ORAL EVERY 4 HOURS PRN
Status: DISCONTINUED | OUTPATIENT
Start: 2018-08-27 | End: 2018-08-28 | Stop reason: HOSPADM

## 2018-08-27 RX ORDER — OXYBUTYNIN CHLORIDE 5 MG/1
5 TABLET ORAL 2 TIMES DAILY PRN
Status: DISCONTINUED | OUTPATIENT
Start: 2018-08-27 | End: 2018-08-28 | Stop reason: HOSPADM

## 2018-08-27 RX ORDER — ACETAMINOPHEN 325 MG/1
325 TABLET ORAL EVERY 6 HOURS PRN
Status: DISCONTINUED | OUTPATIENT
Start: 2018-08-27 | End: 2018-08-28 | Stop reason: HOSPADM

## 2018-08-27 RX ORDER — SODIUM CHLORIDE 9 MG/ML
100 INJECTION, SOLUTION INTRAVENOUS CONTINUOUS
Status: DISCONTINUED | OUTPATIENT
Start: 2018-08-27 | End: 2018-08-28 | Stop reason: HOSPADM

## 2018-08-27 RX ADMIN — SODIUM CHLORIDE 1000 ML: 0.9 INJECTION, SOLUTION INTRAVENOUS at 22:01

## 2018-08-27 RX ADMIN — SODIUM CHLORIDE 100 ML/HR: 0.9 INJECTION, SOLUTION INTRAVENOUS at 23:40

## 2018-08-27 RX ADMIN — HYDROMORPHONE HYDROCHLORIDE 1 MG: 1 INJECTION, SOLUTION INTRAMUSCULAR; INTRAVENOUS; SUBCUTANEOUS at 22:01

## 2018-08-27 RX ADMIN — ONDANSETRON 4 MG: 2 INJECTION INTRAMUSCULAR; INTRAVENOUS at 22:08

## 2018-08-27 RX ADMIN — CIPROFLOXACIN 400 MG: 2 INJECTION, SOLUTION INTRAVENOUS at 23:40

## 2018-08-27 RX ADMIN — OXYBUTYNIN CHLORIDE 5 MG: 5 TABLET ORAL at 23:39

## 2018-08-27 RX ADMIN — TAMSULOSIN HYDROCHLORIDE 0.4 MG: 0.4 CAPSULE ORAL at 23:43

## 2018-08-27 RX ADMIN — OXYCODONE HYDROCHLORIDE AND ACETAMINOPHEN 1 TABLET: 5; 325 TABLET ORAL at 23:40

## 2018-08-27 NOTE — TELEPHONE ENCOUNTER
Gabe patient, scheduled for surgery with Dr Carmen Riggs on 8/30/18 [facility] - Cysto/URS/Lithotripsy, right stent  Received message from [employer] with Pre Admission [address] that patient was contacted for pre admission screening and patient informed them she has had cold symptoms / cough/ nausea for the past few days  Pre Admission calling to inform provider  Please review and advise and send back to [facility]

## 2018-08-27 NOTE — TELEPHONE ENCOUNTER
Patient's nausea is likely secondary to obstructing stone  She was provided with Rx for Zofran at her appointment of Friday  Patient's urine culture culture has finalized  I would recommend stopping her keflex  Prescription for Cipro 500mg PO BID x5 days sent to her pharmacy  Patient should monitor her temperatures  If having fevers or chills, she should present to the ER

## 2018-08-28 ENCOUNTER — ANESTHESIA EVENT (INPATIENT)
Dept: PERIOP | Facility: HOSPITAL | Age: 28
DRG: 446 | End: 2018-08-28
Payer: COMMERCIAL

## 2018-08-28 ENCOUNTER — ANESTHESIA (INPATIENT)
Dept: PERIOP | Facility: HOSPITAL | Age: 28
DRG: 446 | End: 2018-08-28
Payer: COMMERCIAL

## 2018-08-28 ENCOUNTER — APPOINTMENT (INPATIENT)
Dept: RADIOLOGY | Facility: HOSPITAL | Age: 28
DRG: 446 | End: 2018-08-28
Payer: COMMERCIAL

## 2018-08-28 VITALS
WEIGHT: 121.25 LBS | HEIGHT: 60 IN | SYSTOLIC BLOOD PRESSURE: 90 MMHG | TEMPERATURE: 98.6 F | HEART RATE: 68 BPM | OXYGEN SATURATION: 99 % | RESPIRATION RATE: 18 BRPM | DIASTOLIC BLOOD PRESSURE: 51 MMHG | BODY MASS INDEX: 23.81 KG/M2

## 2018-08-28 PROCEDURE — 74420 UROGRAPHY RTRGR +-KUB: CPT

## 2018-08-28 PROCEDURE — BT1D1ZZ FLUOROSCOPY OF RIGHT KIDNEY, URETER AND BLADDER USING LOW OSMOLAR CONTRAST: ICD-10-PCS | Performed by: UROLOGY

## 2018-08-28 PROCEDURE — C2617 STENT, NON-COR, TEM W/O DEL: HCPCS | Performed by: UROLOGY

## 2018-08-28 PROCEDURE — 82360 CALCULUS ASSAY QUANT: CPT | Performed by: UROLOGY

## 2018-08-28 PROCEDURE — 0T768DZ DILATION OF RIGHT URETER WITH INTRALUMINAL DEVICE, VIA NATURAL OR ARTIFICIAL OPENING ENDOSCOPIC: ICD-10-PCS | Performed by: UROLOGY

## 2018-08-28 PROCEDURE — 88300 SURGICAL PATH GROSS: CPT | Performed by: PATHOLOGY

## 2018-08-28 PROCEDURE — 0TC68ZZ EXTIRPATION OF MATTER FROM RIGHT URETER, VIA NATURAL OR ARTIFICIAL OPENING ENDOSCOPIC: ICD-10-PCS | Performed by: UROLOGY

## 2018-08-28 PROCEDURE — 52352 CYSTOURETERO W/STONE REMOVE: CPT | Performed by: UROLOGY

## 2018-08-28 PROCEDURE — 52332 CYSTOSCOPY AND TREATMENT: CPT | Performed by: UROLOGY

## 2018-08-28 PROCEDURE — C1769 GUIDE WIRE: HCPCS | Performed by: UROLOGY

## 2018-08-28 PROCEDURE — 99218 PR INITIAL OBSERVATION CARE/DAY 30 MINUTES: CPT | Performed by: UROLOGY

## 2018-08-28 DEVICE — STENT URETERAL 6FR 22CM INLAY OPTIMA W/NITINOL GDWR: Type: IMPLANTABLE DEVICE | Site: URETER | Status: FUNCTIONAL

## 2018-08-28 RX ORDER — PROPOFOL 10 MG/ML
INJECTION, EMULSION INTRAVENOUS CONTINUOUS PRN
Status: DISCONTINUED | OUTPATIENT
Start: 2018-08-28 | End: 2018-08-28 | Stop reason: SURG

## 2018-08-28 RX ORDER — PROPOFOL 10 MG/ML
INJECTION, EMULSION INTRAVENOUS AS NEEDED
Status: DISCONTINUED | OUTPATIENT
Start: 2018-08-28 | End: 2018-08-28 | Stop reason: SURG

## 2018-08-28 RX ORDER — IBUPROFEN 200 MG
600 TABLET ORAL EVERY 6 HOURS PRN
Refills: 0
Start: 2018-08-28 | End: 2018-08-28 | Stop reason: HOSPADM

## 2018-08-28 RX ORDER — OXYCODONE HYDROCHLORIDE AND ACETAMINOPHEN 5; 325 MG/1; MG/1
1 TABLET ORAL EVERY 4 HOURS PRN
Qty: 20 TABLET | Refills: 0 | Status: SHIPPED | OUTPATIENT
Start: 2018-08-28 | End: 2018-09-01

## 2018-08-28 RX ORDER — DOCUSATE SODIUM 100 MG/1
100 CAPSULE, LIQUID FILLED ORAL 2 TIMES DAILY
Qty: 30 CAPSULE | Refills: 0 | Status: SHIPPED | OUTPATIENT
Start: 2018-08-28 | End: 2018-08-28 | Stop reason: HOSPADM

## 2018-08-28 RX ORDER — SODIUM CHLORIDE 9 MG/ML
INJECTION, SOLUTION INTRAVENOUS AS NEEDED
Status: DISCONTINUED | OUTPATIENT
Start: 2018-08-28 | End: 2018-08-28 | Stop reason: HOSPADM

## 2018-08-28 RX ORDER — MIDAZOLAM HYDROCHLORIDE 1 MG/ML
INJECTION INTRAMUSCULAR; INTRAVENOUS AS NEEDED
Status: DISCONTINUED | OUTPATIENT
Start: 2018-08-28 | End: 2018-08-28 | Stop reason: SURG

## 2018-08-28 RX ORDER — ONDANSETRON 2 MG/ML
4 INJECTION INTRAMUSCULAR; INTRAVENOUS ONCE AS NEEDED
Status: DISCONTINUED | OUTPATIENT
Start: 2018-08-28 | End: 2018-08-28 | Stop reason: HOSPADM

## 2018-08-28 RX ORDER — FENTANYL CITRATE 50 UG/ML
INJECTION, SOLUTION INTRAMUSCULAR; INTRAVENOUS AS NEEDED
Status: DISCONTINUED | OUTPATIENT
Start: 2018-08-28 | End: 2018-08-28 | Stop reason: SURG

## 2018-08-28 RX ORDER — PHENAZOPYRIDINE HYDROCHLORIDE 200 MG/1
200 TABLET, FILM COATED ORAL 3 TIMES DAILY PRN
Qty: 10 TABLET | Refills: 0 | Status: SHIPPED | OUTPATIENT
Start: 2018-08-28 | End: 2018-08-28 | Stop reason: HOSPADM

## 2018-08-28 RX ORDER — MAGNESIUM HYDROXIDE 1200 MG/15ML
LIQUID ORAL AS NEEDED
Status: DISCONTINUED | OUTPATIENT
Start: 2018-08-28 | End: 2018-08-28 | Stop reason: HOSPADM

## 2018-08-28 RX ORDER — SUCCINYLCHOLINE CHLORIDE 20 MG/ML
INJECTION INTRAMUSCULAR; INTRAVENOUS AS NEEDED
Status: DISCONTINUED | OUTPATIENT
Start: 2018-08-28 | End: 2018-08-28 | Stop reason: SURG

## 2018-08-28 RX ORDER — MINERAL OIL AND PETROLATUM 150; 830 MG/G; MG/G
OINTMENT OPHTHALMIC
Status: DISCONTINUED | OUTPATIENT
Start: 2018-08-28 | End: 2018-08-28 | Stop reason: HOSPADM

## 2018-08-28 RX ORDER — ONDANSETRON 2 MG/ML
INJECTION INTRAMUSCULAR; INTRAVENOUS AS NEEDED
Status: DISCONTINUED | OUTPATIENT
Start: 2018-08-28 | End: 2018-08-28 | Stop reason: SURG

## 2018-08-28 RX ORDER — FENTANYL CITRATE/PF 50 MCG/ML
25 SYRINGE (ML) INJECTION
Status: DISCONTINUED | OUTPATIENT
Start: 2018-08-28 | End: 2018-08-28 | Stop reason: HOSPADM

## 2018-08-28 RX ORDER — SODIUM CHLORIDE 9 MG/ML
INJECTION, SOLUTION INTRAVENOUS CONTINUOUS PRN
Status: DISCONTINUED | OUTPATIENT
Start: 2018-08-28 | End: 2018-08-28

## 2018-08-28 RX ORDER — OXYBUTYNIN CHLORIDE 5 MG/1
5 TABLET ORAL 2 TIMES DAILY PRN
Qty: 10 TABLET | Refills: 0 | Status: SHIPPED | OUTPATIENT
Start: 2018-08-28 | End: 2018-09-06

## 2018-08-28 RX ORDER — HYDROCODONE BITARTRATE AND ACETAMINOPHEN 5; 325 MG/1; MG/1
1 TABLET ORAL EVERY 6 HOURS PRN
Qty: 5 TABLET | Refills: 0 | Status: SHIPPED | OUTPATIENT
Start: 2018-08-28 | End: 2018-09-06

## 2018-08-28 RX ADMIN — PROPOFOL 140 MCG/KG/MIN: 10 INJECTION, EMULSION INTRAVENOUS at 16:47

## 2018-08-28 RX ADMIN — OXYCODONE HYDROCHLORIDE AND ACETAMINOPHEN 1 TABLET: 5; 325 TABLET ORAL at 13:05

## 2018-08-28 RX ADMIN — PROPOFOL 200 MG: 10 INJECTION, EMULSION INTRAVENOUS at 16:45

## 2018-08-28 RX ADMIN — OXYBUTYNIN CHLORIDE 5 MG: 5 TABLET ORAL at 08:46

## 2018-08-28 RX ADMIN — MIDAZOLAM HYDROCHLORIDE 2 MG: 1 INJECTION, SOLUTION INTRAMUSCULAR; INTRAVENOUS at 16:40

## 2018-08-28 RX ADMIN — SODIUM CHLORIDE 100 ML/HR: 0.9 INJECTION, SOLUTION INTRAVENOUS at 12:03

## 2018-08-28 RX ADMIN — HYDROMORPHONE HYDROCHLORIDE 0.5 MG: 1 INJECTION, SOLUTION INTRAMUSCULAR; INTRAVENOUS; SUBCUTANEOUS at 18:07

## 2018-08-28 RX ADMIN — ONDANSETRON 4 MG: 4 TABLET, ORALLY DISINTEGRATING ORAL at 13:05

## 2018-08-28 RX ADMIN — CIPROFLOXACIN 400 MG: 2 INJECTION, SOLUTION INTRAVENOUS at 08:44

## 2018-08-28 RX ADMIN — CEFAZOLIN SODIUM 1000 MG: 1 SOLUTION INTRAVENOUS at 16:45

## 2018-08-28 RX ADMIN — FENTANYL CITRATE 100 MCG: 50 INJECTION INTRAMUSCULAR; INTRAVENOUS at 16:45

## 2018-08-28 RX ADMIN — DEXAMETHASONE SODIUM PHOSPHATE 10 MG: 10 INJECTION INTRAMUSCULAR; INTRAVENOUS at 16:50

## 2018-08-28 RX ADMIN — OXYCODONE HYDROCHLORIDE AND ACETAMINOPHEN 1 TABLET: 5; 325 TABLET ORAL at 18:50

## 2018-08-28 RX ADMIN — SUCCINYLCHOLINE CHLORIDE 100 MG: 20 INJECTION, SOLUTION INTRAMUSCULAR; INTRAVENOUS at 16:45

## 2018-08-28 RX ADMIN — ONDANSETRON 4 MG: 2 INJECTION INTRAMUSCULAR; INTRAVENOUS at 16:50

## 2018-08-28 RX ADMIN — OXYBUTYNIN CHLORIDE 5 MG: 5 TABLET ORAL at 18:07

## 2018-08-28 RX ADMIN — HYDROMORPHONE HYDROCHLORIDE 0.5 MG: 1 INJECTION, SOLUTION INTRAMUSCULAR; INTRAVENOUS; SUBCUTANEOUS at 01:39

## 2018-08-28 RX ADMIN — HYDROMORPHONE HYDROCHLORIDE 0.5 MG: 1 INJECTION, SOLUTION INTRAMUSCULAR; INTRAVENOUS; SUBCUTANEOUS at 12:12

## 2018-08-28 NOTE — ED PROVIDER NOTES
History  Chief Complaint   Patient presents with    Flank Pain     Pt reports being diagnosed wityh kidney stones last wed, saw the urologist on friday, has appt on thursday for stent placement  Pt reports 9/10 pain today, reports she finished her pain meds and urologist told her to come back if pain worsens  Patient referred to the emergency department by private urologist for evaluation of persistent and worsening right flank pain  She is scheduled to get a ureteral stent this Thursday for known right ureteral kidney stone  Patient has some nausea but no vomiting  No fevers or chills  No trauma fall or injury  Patient denies abdominal pain  Prior to Admission Medications   Prescriptions Last Dose Informant Patient Reported? Taking? cephalexin (KEFLEX) 500 mg capsule  Self No No   Sig: Take 1 capsule (500 mg total) by mouth 4 (four) times a day for 7 days   ciprofloxacin (CIPRO) 500 mg tablet   No No   Sig: Take 1 tablet (500 mg total) by mouth 2 (two) times a day for 5 days   metoclopramide (REGLAN) 10 mg tablet  Self No No   Sig: Take 1 tablet (10 mg total) by mouth 4 (four) times a day for 7 days As needed for nausea   ondansetron (ZOFRAN-ODT) 4 mg disintegrating tablet   No No   Sig: Take 1 tablet (4 mg total) by mouth every 6 (six) hours as needed for nausea or vomiting   oxyCODONE-acetaminophen (PERCOCET) 5-325 mg per tablet  Self No No   Sig: Take 1 tablet by mouth every 4 (four) hours as needed for moderate pain for up to 4 days Max Daily Amount: 6 tablets   ranitidine (ZANTAC) 150 mg tablet  Self Yes No   Sig: Take 150 mg by mouth 2 (two) times a day   tamsulosin (FLOMAX) 0 4 mg  Self No No   Sig: Take 1 capsule (0 4 mg total) by mouth daily with dinner for 10 days      Facility-Administered Medications: None       Past Medical History:   Diagnosis Date    Asthma     GERD (gastroesophageal reflux disease)     Hernia, abdominal     Migraines        History reviewed   No pertinent surgical history  Family History   Problem Relation Age of Onset    Diabetes Mother     Hyperlipidemia Mother     Stroke Mother     Heart disease Mother    Cathkodak Anitha Hyperthermia Mother     Nephrolithiasis Father     Nephrolithiasis Brother      I have reviewed and agree with the history as documented  Social History   Substance Use Topics    Smoking status: Current Every Day Smoker     Packs/day: 0 50     Types: Cigarettes    Smokeless tobacco: Never Used    Alcohol use No        Review of Systems   Constitutional: Negative  Negative for activity change, appetite change, chills, diaphoresis, fatigue and fever  HENT: Negative for congestion, dental problem, sinus pain, sneezing, sore throat and voice change  Eyes: Negative  Negative for photophobia and visual disturbance  Respiratory: Negative  Negative for chest tightness, shortness of breath, wheezing and stridor  Cardiovascular: Negative  Negative for chest pain, palpitations and leg swelling  Gastrointestinal: Positive for nausea  Negative for abdominal pain, anal bleeding, blood in stool, constipation, diarrhea, rectal pain and vomiting  Endocrine: Negative  Genitourinary: Positive for flank pain  Negative for difficulty urinating, dyspareunia, dysuria, enuresis, frequency, hematuria, pelvic pain, urgency, vaginal bleeding, vaginal discharge and vaginal pain  Musculoskeletal: Negative for back pain, neck pain and neck stiffness  Skin: Negative  Negative for rash and wound  Allergic/Immunologic: Negative  Neurological: Negative  Negative for dizziness, tremors, seizures, syncope, facial asymmetry, speech difficulty, light-headedness, numbness and headaches  Hematological: Negative  Does not bruise/bleed easily  Psychiatric/Behavioral: Negative  Negative for confusion, self-injury, sleep disturbance and suicidal ideas         Physical Exam  Physical Exam   Constitutional: She is oriented to person, place, and time  She appears well-developed and well-nourished  Nontoxic appearance without respiratory distress  Patient looks mildly uncomfortable  HENT:   Head: Normocephalic and atraumatic  Eyes: Conjunctivae and EOM are normal  Pupils are equal, round, and reactive to light  Neck: Normal range of motion  Cardiovascular: Normal rate, regular rhythm, normal heart sounds and intact distal pulses  Pulmonary/Chest: Effort normal and breath sounds normal  No respiratory distress  She has no wheezes  She has no rales  She exhibits no tenderness  Abdominal: Soft  Bowel sounds are normal  She exhibits no distension and no mass  There is no tenderness  There is no rebound and no guarding  No hernia  No reproducible abdominal tenderness  No peritoneal signs  Musculoskeletal: Normal range of motion  She exhibits no edema or tenderness  No reproducible flank tenderness   Neurological: She is alert and oriented to person, place, and time  She has normal reflexes  She displays normal reflexes  No cranial nerve deficit or sensory deficit  She exhibits normal muscle tone  Coordination normal    Skin: Skin is warm and dry  No rash noted  No erythema  No pallor  Psychiatric: She has a normal mood and affect  Her behavior is normal  Judgment and thought content normal    Nursing note and vitals reviewed        Vital Signs  ED Triage Vitals   Temperature Pulse Respirations Blood Pressure SpO2   08/27/18 2131 08/27/18 2131 08/27/18 2131 08/27/18 2131 08/27/18 2131   98 °F (36 7 °C) 99 20 113/69 98 %      Temp Source Heart Rate Source Patient Position - Orthostatic VS BP Location FiO2 (%)   08/27/18 2131 08/27/18 2131 08/27/18 2131 08/27/18 2131 --   Oral Monitor Lying Right arm       Pain Score       08/27/18 2129       9           Vitals:    08/27/18 2131   BP: 113/69   Pulse: 99   Patient Position - Orthostatic VS: Lying       Visual Acuity      ED Medications  Medications   sodium chloride 0 9 % bolus 1,000 mL (1,000 mL Intravenous New Bag 8/27/18 2201)   HYDROmorphone (DILAUDID) injection 1 mg (1 mg Intravenous Given 8/27/18 2201)   ondansetron (ZOFRAN) injection 4 mg (4 mg Intravenous Given 8/27/18 2208)       Diagnostic Studies  Results Reviewed     Procedure Component Value Units Date/Time    Basic metabolic panel [[id number]] Collected:  08/27/18 2201    Lab Status:  Final result Specimen:  Blood from Arm, Right Updated:  08/27/18 2218     Sodium 139 mmol/L      Potassium 4 1 mmol/L      Chloride 106 mmol/L      CO2 26 mmol/L      ANION GAP 7 mmol/L      BUN 11 mg/dL      Creatinine 0 75 mg/dL      Glucose 97 mg/dL      Calcium 9 1 mg/dL      eGFR 109 ml/min/1 73sq m     Narrative:         National Kidney Disease Education Program recommendations are as follows:  GFR calculation is accurate only with a steady state creatinine  Chronic Kidney disease less than 60 ml/min/1 73 sq  meters  Kidney failure less than 15 ml/min/1 73 sq  meters      CBC and differential [[id number]] Collected:  08/27/18 2201    Lab Status:  Final result Specimen:  Blood from Arm, Right Updated:  08/27/18 2210     WBC 5 84 Thousand/uL      RBC 4 49 Million/uL      Hemoglobin 13 7 g/dL      Hematocrit 39 8 %      MCV 89 fL      MCH 30 5 pg      MCHC 34 4 g/dL      RDW 12 6 %      MPV 9 5 fL      Platelets 991 Thousands/uL      nRBC 0 /100 WBCs      Neutrophils Relative 53 %      Immat GRANS % 0 %      Lymphocytes Relative 35 %      Monocytes Relative 7 %      Eosinophils Relative 4 %      Basophils Relative 1 %      Neutrophils Absolute 3 09 Thousands/µL      Immature Grans Absolute 0 02 Thousand/uL      Lymphocytes Absolute 2 07 Thousands/µL      Monocytes Absolute 0 42 Thousand/µL      Eosinophils Absolute 0 21 Thousand/µL      Basophils Absolute 0 03 Thousands/µL                  CT renal stone study abdomen pelvis without contrast   Final Result by Leola Castro MD (08/27 2245)      Stable obstructing 5 x 6 x 4 mm calculus at the right ureterovesical junction  Mild to moderate right hydronephrosis and hydroureter  Workstation performed: [id number]                    Procedures  Procedures       Phone Contacts  ED Phone Contact    ED Course  ED Course as of Aug 27 2258   Mon Aug 27, 2018   2256 Discussed with Slim Florian of the physician assistant covering for Urology who was accepted this patient to the Urology service  They will get her on the schedule for her stent procedure that was planned for Thursday  [facility]  [facility] Time    Disposition  Final diagnoses:   Kidney stone   Hydronephrosis     Time reflects when diagnosis was documented in both MDM as applicable and the Disposition within this note     Time User Action Codes Description Comment    8/27/2018 10:56 PM Dee Hale Add [N20 0] Kidney stone     8/27/2018 10:56 PM Jonathan Mckeon Add [N13 30] Hydronephrosis       ED Disposition     ED Disposition Condition Comment    Admit  Case was discussed with Slim Florian and the patient's admission status was agreed to be Admission Status: observation status to the service of Dr Berdie Sandhoff    None         Patient's Medications   Discharge Prescriptions    No medications on file     No discharge procedures on file      ED Provider  Electronically Signed by           Aleisha Delacruz MD  08/27/18 5803

## 2018-08-28 NOTE — ANESTHESIA POSTPROCEDURE EVALUATION
Post-Op Assessment Note      CV Status:  Stable    Mental Status:  Alert and awake    Hydration Status:  Euvolemic    PONV Controlled:  Controlled    Airway Patency:  Patent    Post Op Vitals Reviewed: Yes          Staff: CRNA       Comments: vss report           BP      Temp      Pulse     Resp      SpO2

## 2018-08-28 NOTE — ANESTHESIA PREPROCEDURE EVALUATION
Review of Systems/Medical History  Patient summary reviewed        Cardiovascular  Negative cardio ROS    Pulmonary  Asthma ,        GI/Hepatic    GERD ,        Kidney stones,        Endo/Other  Negative endo/other ROS      GYN  Negative gynecology ROS          Hematology  Negative hematology ROS      Musculoskeletal  Negative musculoskeletal ROS        Neurology    Headaches,    Psychology   Negative psychology ROS              Physical Exam    Airway    Mallampati score: II  TM Distance: >3 FB  Neck ROM: full     Dental       Cardiovascular  Comment: Negative ROS, Cardiovascular exam normal    Pulmonary  Pulmonary exam normal     Other Findings        Anesthesia Plan  ASA Score- 2 Emergent    Anesthesia Type- general with ASA Monitors  Additional Monitors:   Airway Plan:     Comment: NO MH HISTORY  Plan Factors-    Induction- intravenous  Postoperative Plan-     Informed Consent- Anesthetic plan and risks discussed with patient  I personally reviewed this patient with the CRNA  Discussed and agreed on the Anesthesia Plan with the CRNA  Bridgett Baptiste

## 2018-08-28 NOTE — H&P
HISTORY AND PHYSICAL      Patient Name: Soni William  Patient MRN:  [id number]  Admission Date:  8/27/2018  9:25 PM  Attending Provider:  Angelika Montiel MD  Service:  Urology                               Chief Complaint  Flank pain    HPI  --Ms Amarilis Davis is a 80-year-old female evaluated in our office 8/24 for nephrolithiasis with plans for elective ureteroscopy 8/30 presenting to [facility] Emergency room with left flank pain  CT confirmed presence of stable obstructing 5 x 6 x 4 mm calculus at the right UVJ with resultant mild to moderate right hydronephrosis  Patient denies any fever, chills; but endorses positive nausea/vomiting  Patient denies any alleviating or aggravating factors  Source:the patient      Review of Systems   Review of Systems - History obtained from chart review and the patient  General ROS: negative  Respiratory ROS: no cough, shortness of breath, or wheezing  Cardiovascular ROS: no chest pain or dyspnea on exertion  Gastrointestinal ROS: positive for - abdominal pain and nausea/vomiting  Genito-Urinary ROS: no dysuria, trouble voiding, or hematuria  Neurological ROS: no TIA or stroke symptoms  All others and a 13 point review of systems otherwise negative  Chart Review   The statement should read: The following portions of the patients history were reviewed and updated as appropriate:   She  has a past medical history of Asthma; GERD (gastroesophageal reflux disease); Hernia, abdominal; and Migraines  She   Patient Active Problem List    Diagnosis Date Noted    Nephrolithiasis 08/27/2018    Hydronephrosis 08/27/2018    Kidney stone 08/27/2018     She  has no past surgical history on file  Her family history includes Diabetes in her mother; Heart disease in her mother; Hyperlipidemia in her mother; Jearlean Cristhian in her mother; Nephrolithiasis in her brother and father; Stroke in her mother  She  reports that she has been smoking Cigarettes    She has been smoking about 0 50 packs per day  She has never used smokeless tobacco  She reports that she does not drink alcohol or use drugs  She is allergic to aspirin; naproxen; and tramadol  Vital Signs and physical exam  General appearance: alert and oriented, in no acute distress  Head: Normocephalic, without obvious abnormality, atraumatic  Neck: no adenopathy, no carotid bruit, no JVD, supple, symmetrical, trachea midline and thyroid not enlarged, symmetric, no tenderness/mass/nodules  Lungs: clear to auscultation bilaterally  Heart: regular rate and rhythm, S1, S2 normal, no murmur, click, rub or gallop  Abdomen: abnormal findings:  mild tenderness in the RLQ and in the right flank  Extremities: extremities normal, warm and well-perfused; no cyanosis, clubbing, or edema  Pulses: 2+ and symmetric  Neurologic: Grossly normal  No external urinary drains        Laboratory Studies  Lab Results   Component Value Date     08/27/2018    K 4 1 08/27/2018     08/27/2018    CO2 26 08/27/2018    CREATININE 0 75 08/27/2018    BUN 11 08/27/2018     Lab Results   Component Value Date    WBC 5 84 08/27/2018    RBC 4 49 08/27/2018    HGB 13 7 08/27/2018    HCT 39 8 08/27/2018    MCV 89 08/27/2018    MCH 30 5 08/27/2018    RDW 12 6 08/27/2018     08/27/2018         Imaging and Other Studies  )Xr Chest 2 Views    Result Date: 8/23/2018  Narrative: CHEST INDICATION:   chest pain  "pt c/o abdominal pain/nausea slightly above umbilical region that started today " COMPARISON:  None EXAM PERFORMED/VIEWS:  XR CHEST PA & LATERAL FINDINGS: No pneumothorax is seen and the lungs appear grossly clear  The cardiomediastinal silhouette appears unremarkable  The visualized bones appear intact  Impression: No acute cardiopulmonary disease is seen    Workstation performed: [id number]     Ct Renal Stone Study Abdomen Pelvis Without Contrast    Result Date: 8/27/2018  Narrative: CT ABDOMEN AND PELVIS WITHOUT IV CONTRAST - LOW DOSE RENAL STONE INDICATION: Flank pain  COMPARISON:  8/23/2018  TECHNIQUE:  Low dose thin section CT examination of the abdomen and pelvis was performed without intravenous or oral contrast according to a protocol specifically designed to evaluate for urinary tract calculus  Axial, sagittal, and coronal 2D reformatted images were created from the source data and submitted for interpretation  Evaluation for pathology in the abdomen and pelvis that is unrelated to urinary tract calculi is limited  Radiation dose length product (DLP) for this visit:  125 mGy-cm   This examination, like all CT scans performed in the [facility], was performed utilizing techniques to minimize radiation dose exposure, including the use of iterative reconstruction and automated exposure control  FINDINGS: RIGHT KIDNEY AND URETER: Stable mild to moderate right hydronephrosis and hydroureter  Stable position of obstructing 5 x 6 x 4 mm calculus at the right ureterovesical junction  LEFT KIDNEY AND URETER: No urinary tract calculi  No hydronephrosis or hydroureter  URINARY BLADDER: Stable position of calculus at the right ureterovesical junction  Clear lung bases  Limited low radiation dose noncontrast CT evaluation demonstrates no clinically significant abnormality of liver, spleen, pancreas, or adrenal glands  No calcified gallstones or gallbladder wall thickening noted  No ascites or bulky lymphadenopathy on this limited noncontrast study  No bowel obstruction  Residual contrast in the colon  No colitis or diverticulitis  The appendix is well seen and there is no evidence of acute appendicitis  No acute fracture or destructive osseous lesion is identified  Impression: Stable obstructing 5 x 6 x 4 mm calculus at the right ureterovesical junction  Mild to moderate right hydronephrosis and hydroureter   Workstation performed: [id number]     Ct Abdomen Pelvis With Contrast    Result Date: 8/23/2018  Narrative: CT ABDOMEN AND PELVIS WITH IV CONTRAST INDICATION:   upper abdominal pain, nausea  COMPARISON:  CT of the abdomen and pelvis on July 14, 2018  TECHNIQUE:  CT examination of the abdomen and pelvis was performed  Axial, sagittal, and coronal 2D reformatted images were created from the source data and submitted for interpretation  Radiation dose length product (DLP) for this visit:  229 mGy-cm   This examination, like all CT scans performed in the [facility], was performed utilizing techniques to minimize radiation dose exposure, including the use of iterative reconstruction and automated exposure control  IV Contrast:  100 mL of iohexol (OMNIPAQUE)  was administered intravenously without immediate adverse reaction  Enteric Contrast:  Enteric contrast was administered  FINDINGS: ABDOMEN LOWER CHEST:  No clinically significant abnormality identified in the visualized lower chest  LIVER/BILIARY TREE:  Unremarkable  GALLBLADDER:  No calcified gallstones  No pericholecystic inflammatory change  SPLEEN:  Unremarkable  PANCREAS:  Unremarkable  ADRENAL GLANDS:  Unremarkable  KIDNEYS/URETERS:  There is a 7 x 4 mm calculus at the right ureterovesicular junction with associated moderate right hydroureteronephrosis  STOMACH AND BOWEL:  Enteric contrast reaches the level of the distal small bowel without evidence of obstruction  APPENDIX:  A normal appendix was visualized  ABDOMINOPELVIC CAVITY:  Trace pelvic free fluid which may be physiologic  No free air  No lymphadenopathy  VESSELS:  Unremarkable for patient's age  PELVIS REPRODUCTIVE ORGANS:  Unremarkable uterus  Left ovarian corpus luteum  URINARY BLADDER:  Unremarkable  ABDOMINAL WALL/INGUINAL REGIONS:  Unremarkable  OSSEOUS STRUCTURES:  No acute fracture or destructive osseous lesion  Impression: 7 x 4 mm right ureterovesicular junction calculus with associated moderate right hydroureteronephrosis   Workstation performed: [id number]       Assessment/Plan Problems  Right Ureteral calculus  Hydronephrosis  Renal Colic      Plan: 1  Admission for ureteral calculus & renal colic  2  Initiate aggressive IVFs 3  Flomax  4  Analgesia/Narcotics 4  Anti-emetics 4  ATBs empirically while awaiting culture 5  Strain urine 6  Medical Consultation for Med  Mgmt (if applicable) 7  NPOfor OR    DVT Prophylaxis  ambulate ad robert    Communication  Explained risk, benefits and potential complications of ureteroscopic stone extraction  Patient has given formal informed consent  Level of Care  Admit to observation           Total time spent with patient 30 minutes, >50% spent counseling and/or coordination of care         MICHAEL Del Rio

## 2018-08-28 NOTE — CASE MANAGEMENT
Initial Clinical Review    Admission: Date/Time/Statement: 8/27/2018  2258 OBSERVATION AND CHANGED TO INPATIENT 8/28/2018  1214 RE:  Patient has hydronephrosis, failed outpatient treatment and came to ED with increasing pain, now requiring surgery to be done within 24h of ED treatment  Orders Placed This Encounter   Procedures    Place in Observation (expected length of stay for this patient is less than two midnights)     Standing Status:   Standing     Number of Occurrences:   1     Order Specific Question:   Admitting Physician     Answer:   Lucio Orona [[id number]]     Order Specific Question:   Level of Care     Answer:   Med Surg [16]         ED: Date/Time/Mode of Arrival:   ED Arrival Information     Expected Arrival Acuity Means of Arrival Escorted By Service Admission Type    - 8/27/2018 21:16 Urgent Walk-In [facility] General Medicine Urgent    Arrival Complaint    Flank Pain, Nausea (hx kidney stone)          Chief Complaint:   Chief Complaint   Patient presents with    Flank Pain     Pt reports being diagnosed wityh kidney stones last wed, saw the urologist on friday, has appt on thursday for stent placement  Pt reports 9/10 pain today, reports she finished her pain meds and urologist told her to come back if pain worsens  History of Illness: Patient referred to the emergency department by private urologist for evaluation of persistent and worsening right flank pain  She is scheduled to get a ureteral stent this Thursday for known right ureteral kidney stone    Patient has some nausea    ED Vital Signs:   ED Triage Vitals   Temperature Pulse Respirations Blood Pressure SpO2   08/27/18 2131 08/27/18 2131 08/27/18 2131 08/27/18 2131 08/27/18 2131   98 °F (36 7 °C) 99 20 113/69 98 %      Temp Source Heart Rate Source Patient Position - Orthostatic VS BP Location FiO2 (%)   08/27/18 2131 08/27/18 2131 08/27/18 2131 08/27/18 2131 --   Oral Monitor Lying Right arm       Pain Score       08/27/18 2129       9        Wt Readings from Last 1 Encounters:   08/27/18 55 kg (121 lb 4 1 oz)       Vital Signs (abnormal):   08/28/18 0743  98 2 °F (36 8 °C)  70  16   82/50  --  97 %  None (Room air)  Lying     Abnormal Labs/Diagnostic Test Results:   Ct abdomen - Stable obstructing 5 x 6 x 4 mm calculus at the right ureterovesical junction   Mild to moderate right hydronephrosis and hydroureter    ED Treatment:   Medication Administration from 08/27/2018 2116 to 08/27/2018 2330       Date/Time Order Dose Route Action Comments     08/27/2018 2301 sodium chloride 0 9 % bolus 1,000 mL 0 mL Intravenous Stopped      08/27/2018 2201 sodium chloride 0 9 % bolus 1,000 mL 1,000 mL Intravenous New Bag      08/27/2018 2201 HYDROmorphone (DILAUDID) injection 1 mg 1 mg Intravenous Given      08/27/2018 2208 ondansetron (ZOFRAN) injection 4 mg 4 mg Intravenous Given           Past Medical/Surgical History: Active Ambulatory Problems     Diagnosis Date Noted    Nephrolithiasis 08/27/2018     Resolved Ambulatory Problems     Diagnosis Date Noted    No Resolved Ambulatory Problems     Past Medical History:   Diagnosis Date    Asthma     GERD (gastroesophageal reflux disease)     Hernia, abdominal     Migraines        Admitting Diagnosis: Hydronephrosis [N13 30]  Kidney stone [N20 0]  Flank pain [R10 9]    Age/Sex: 29 y o  female    Assessment/Plan:  Right Ureteral calculus  Hydronephrosis  Renal Colic    Plan: 1  Admission for ureteral calculus & renal colic  2  Initiate aggressive IVFs 3  Flomax  4  Analgesia/Narcotics 4  Anti-emetics 4  ATBs empirically while awaiting culture 5  Strain urine 6  Medical Consultation for Med  Mgmt (if applicable) 7   NPOfor OR  Scheduled for CYSTOSCOPY URETEROSCOPY WITH LITHOTRIPSY HOLMIUM LASER, RETROGRADE PYELOGRAM AND INSERTION STENT URETERAL [33859 (CPT®)]    Admission Orders:  8/27/2018  2258 OBSERVATION  AND CHANGED TO INPATIENT 8/28/2018  1214  Scheduled Meds:   Current Facility-Administered Medications:  acetaminophen 325 mg Oral Q6H PRN    ciprofloxacin 400 mg Intravenous Q12H [address] Last Rate: 400 mg (08/28/18 0844)   HYDROmorphone 0 5 mg Intravenous Q4H PRN    ondansetron 4 mg Oral Q6H PRN    oxybutynin 5 mg Oral BID PRN    oxyCODONE-acetaminophen 1 tablet Oral Q4H PRN    sodium chloride 100 mL/hr Intravenous Continuous Last Rate: 100 mL/hr (08/27/18 2340)   tamsulosin 0 4 mg Oral HS      Continuous Infusions:   sodium chloride 100 mL/hr Last Rate: 100 mL/hr (08/27/18 2340)     PRN Meds:    HYDROmorphone 0 5 iv - used x 2 (0139; 1212)    Oxybutynin - used x 2    oxyCODONE-acetaminophen - used x 2  zofran 4 iv - used x 1 (0115)

## 2018-08-28 NOTE — NURSING NOTE
Called to room by PCA  Pt splashed chlorhexadine solution in eyes while removing cloths from package  Flushed with cool water and paged Ian Money  Seemed to improve with flushing

## 2018-08-28 NOTE — OP NOTE
Operative Note     PATIENT:  Ct Sevilla (MRN [id number])    DATE OF PROCEDURE:   8/28/2018    PRE-OP DIAGNOSIS:   1) Right ureteral calculus    POST-OP DIAGNOSIS:   1) Right ureteral calculus    PROCEDURES PERFORMED:  1) Cystoscopy  2) Right retrograde pyelography with fluoroscopic interpretation  3) Right ureteroscopy with basket extraction of stone  4) Right ureteral stent placement     SURGEON:  Angelika Montiel MD    NOTE:  There were no qualified teaching residents to assist with this case    ANESTHESIA: General     COMPLICATIONS:   None    ANTIBIOTICS:  Cefazolin    INTRAOPERATIVE THROMBOEMBOLISM PROPHYLAXIS:  Pneumatic compression stockings     FINDINGS:  Solitary distal right ureteral calculus, endoscopic appearance of a uric acid calculus  It was basket extracted in an atraumatic fashion and a postoperative ureteral stent left in place on a string  INDICATIONS FOR PROCEDURE:  Ct Sevilla is an 29 y o  old female with Right ureteral calculus  SHe has been hospitalized for poorly controlled pain from renal colic  After discussing the options, the patient elected to undergo ureteroscopy and ureteral stent placement  We discussed the procedure in detail, the alternatives, and the risks, and they signed informed consent to proceed  PROCEDURE IN DETAIL:   The patient was identified and brought to the OR  Antibiotic prophylaxis and DVT prophylaxis were administered  They were placed in the comfortable dorsal lithotomy position with care to pad all pressure points  They were prepped and draped in the usual sterile fashion using hibiclens  A surgical time out was performed with all in the room in agreement with the correct patient, procedure, indications, and laterality  A 21-[place] rigid cystoscope was used to enter the bladder  The bladder was inspected in its entirety and there were no lesions noted  The ureteral orifices were identified in their normal orthotopic positions       The Right ureteral orifice was identified and a 5 Fr open ended catheter was placed into the ureteral orifice  A retrograde pyelogram was performed with the findings as described above  A Sensor wire was advanced up to the kidney under fluoroscopic guidance  Leaving this safety wire in place, the bladder was drained  A   7 5 Nigerien semi-rigid ureteroscope was advanced up the ureter under vision   The stone was encountered in the distal ureter  The stone was not noted to be impacted  Based upon its location size and morphology it appeared to be amenable to direct basket extraction without the need for holmium laser lithotripsy  The stone was engaged with a 2 4 [place] Nitinol tip stone basket and removed intact under vision in an atraumatic fashion  I then reintroduced the ureteral scope and confirmed the patient was completely stone free and that there was no injury to the ureter  Retrograde pyelogram was then performed via the ureteral scope  There was a moderate degree of proximal hydroureteronephrosis as well as some tortuosity of the right proximal ureter  A JJ stent was then passed up the wire  under fluoroscopic guidance into the kidney with a good curl noted in the kidney and in the bladder  An externalized tethering string was left in place and secured to the skin  The bladder was drained  The patient was placed back supine, awakened from general anesthesia and brought to recovery room in stable condition  ESTIMATED BLOOD LOSS:  Minimal      SPECIMENS:     Order Name Source Comment Collection Info Order Time   STONE ANALYSIS Kidney, Right  Collected By: Khang Galeano MD 8/28/2018  4:57 PM   TISSUE EXAM Kidney, Right  Collected By: Khang Galeano MD 8/28/2018  4:57 PM        IMPLANTS:     Implant Name Type Inv   Item Serial No   Lot No  LRB No  Used   URETERAL STENT 6 FR X 22 CM OPTIMA INLAY - [id number]   URETERAL STENT 6 FR X 22 CM [facility] DIVISION [id number] Right 1        COMPLICATIONS: None    DISPOSITION: PACU    PLAN:  Patient will be scheduled for ureteral stent removal in the office later this week  SHe will then follow up in 2 months with a postoperative KUB renal ultrasound

## 2018-08-29 ENCOUNTER — TELEPHONE (OUTPATIENT)
Dept: UROLOGY | Facility: CLINIC | Age: 28
End: 2018-08-29

## 2018-08-29 NOTE — TELEPHONE ENCOUNTER
[facility] patient, s/p Right URS/ basket extraction of stone , right ureteral stent with string on 8/28/18 with Dr Liz Carter,  Per Dr Liz Carter, patient to be scheduled for stent with string removal on Friday 8/31/18, ok if patient wishes to remove at home,  Then f/u in 2 months with AP, KUB/US ptv

## 2018-08-29 NOTE — TELEPHONE ENCOUNTER
Agree with above recommendations for pain control for stent colic  No further narcotics will be distributed for stent colic at this time  Patient can also have oxybutynin 5mg PO TID PRN stent colic  Please let me know if she is interested and I will send to pharmacy  She should follow up as scheduled for stent removal  Thank you

## 2018-08-29 NOTE — TELEPHONE ENCOUNTER
Received call back from patient, she is calling regarding pain post operatively, states she has a lot of lower back pain and bladder pain  I reviewed stent management with patient, increase water, heating pad, and recommend Ibuprofen 600 mg every 8 hours for pain  Patient states she spoke to hospital who called her to check on her after discharge and they advised her to call the office due to her pain for additional pain prescription  Reviewed with patient medications she is taking  She has 3 Norco tablets left, states she has been also taking Ibuprofen 800 mg one hour after the Norco     I advised patient to alternate the Norco and Ibuprofen every 8 hours and then transition off of the [address] her it is normal to have lower back pain and bladder pressure with stent in place  Instructed to increase fluids, try alternating pain medication, and use heating pad  Patient states she is having bowel movements  Scheduled patient for stent with string removal on Friday 8/31/18 at 1 pm [facility]  Advised her I will send message to provider to review pain management / prescription request and advised her I will call her back after reviewed  Please advise and route to [facility] clinical pool

## 2018-08-29 NOTE — TELEPHONE ENCOUNTER
Called and reinforced instructions as stated  Patient is already on Oxybutynin BID  Patient questions if there is no relief in pain can she call office back, advised to try recommendations for 24 hours and if worsening symptoms call back for further recommendation  Follow up as scheduled

## 2018-08-29 NOTE — CASE MANAGEMENT
Notification of Discharge  This is a Notification of Discharge from our facility [address]  Please be advised that this patient has been discharge from our facility  Below you will find the admission and discharge date and time including the patients disposition  PRESENTATION DATE: 8/27/2018  9:25 PM  IP ADMISSION DATE: 8/28/18 1214  DISCHARGE DATE: 8/28/2018  8:09 PM  DISPOSITION: [address] [place] [address] in the [facility] [place] Utilization Review Department  Phone: [phone]; Fax [phone]  ATTENTION: The Network Utilization Review Department is now centralized for our 9 Facilities  Make a note that we have a new phone and fax numbers for our Department  Please call with any questions or concerns to [phone] and carefully follow the prompts so that you are directed to the right person  All voicemails are confidential  Fax any determinations, approvals, denials, and requests for initial or continue stay review clinical to [phone]  Due to HIGH CALL volume, it would be easier if you could please send faxed requests to expedite your requests and in part, help us provide discharge notifications faster    Reference #[id number]

## 2018-08-30 NOTE — TELEPHONE ENCOUNTER
Returned call from patient, she called this AM at 11:10 to see if it is normal to have blood in urine and that urine is orange  Advised patient that it is normal to have blood in urine while stent is in place and that urine turns orange from the Pyridium    Instructed to increase water intake and follow up as scheduled tomorrow for stent with string removal

## 2018-08-31 ENCOUNTER — HOSPITAL ENCOUNTER (EMERGENCY)
Facility: HOSPITAL | Age: 28
Discharge: HOME | End: 2018-08-31
Attending: EMERGENCY MEDICINE
Payer: COMMERCIAL

## 2018-08-31 ENCOUNTER — CLINICAL SUPPORT (OUTPATIENT)
Dept: UROLOGY | Facility: CLINIC | Age: 28
End: 2018-08-31
Payer: COMMERCIAL

## 2018-08-31 VITALS
HEART RATE: 90 BPM | DIASTOLIC BLOOD PRESSURE: 55 MMHG | TEMPERATURE: 97.9 F | OXYGEN SATURATION: 98 % | RESPIRATION RATE: 18 BRPM | SYSTOLIC BLOOD PRESSURE: 115 MMHG

## 2018-08-31 VITALS
DIASTOLIC BLOOD PRESSURE: 80 MMHG | HEIGHT: 60 IN | SYSTOLIC BLOOD PRESSURE: 108 MMHG | HEART RATE: 72 BPM | WEIGHT: 121.6 LBS | BODY MASS INDEX: 23.87 KG/M2

## 2018-08-31 DIAGNOSIS — N20.0 KIDNEY STONE: Primary | ICD-10-CM

## 2018-08-31 DIAGNOSIS — S02.5XXA BROKEN TOOTH: ICD-10-CM

## 2018-08-31 DIAGNOSIS — S02.5XXA CLOSED FRACTURE OF TOOTH, INITIAL ENCOUNTER: Primary | ICD-10-CM

## 2018-08-31 PROCEDURE — 99282 EMERGENCY DEPT VISIT SF MDM: CPT

## 2018-08-31 PROCEDURE — 99211 OFF/OP EST MAY X REQ PHY/QHP: CPT

## 2018-08-31 RX ORDER — HYDROCODONE BITARTRATE AND ACETAMINOPHEN 5; 325 MG/1; MG/1
1 TABLET ORAL ONCE
Status: COMPLETED | OUTPATIENT
Start: 2018-08-31 | End: 2018-08-31

## 2018-08-31 RX ORDER — DIPHENHYDRAMINE HCL 25 MG
25 TABLET ORAL ONCE
Status: COMPLETED | OUTPATIENT
Start: 2018-08-31 | End: 2018-08-31

## 2018-08-31 RX ORDER — AMOXICILLIN AND CLAVULANATE POTASSIUM 400; 57 MG/5ML; MG/5ML
875 POWDER, FOR SUSPENSION ORAL 2 TIMES DAILY
Qty: 200 ML | Refills: 0 | Status: SHIPPED | OUTPATIENT
Start: 2018-08-31 | End: 2018-09-06

## 2018-08-31 RX ORDER — LIDOCAINE HYDROCHLORIDE AND EPINEPHRINE 10; 10 MG/ML; UG/ML
10 INJECTION, SOLUTION INFILTRATION; PERINEURAL ONCE
Status: DISCONTINUED | OUTPATIENT
Start: 2018-08-31 | End: 2018-08-31 | Stop reason: HOSPADM

## 2018-08-31 RX ADMIN — DIPHENHYDRAMINE HCL 25 MG: 25 TABLET ORAL at 21:15

## 2018-08-31 RX ADMIN — HYDROCODONE BITARTRATE AND ACETAMINOPHEN 1 TABLET: 5; 325 TABLET ORAL at 21:15

## 2018-08-31 NOTE — PATIENT INSTRUCTIONS
Follow up in 2 months with KUB and ultrasound prior to visit  Increase fluids, medicate with Ibuprofen 600 mg every 8 hours as needed for pain, heating pad for pain  Call if develops fever , chills, nausea or vomiting

## 2018-08-31 NOTE — PROGRESS NOTES
Desi Wilson  [id number]  1990 8/31/2018      Diagnosis  Chief Complaint     Nephrolithiasis          Patient is s/p right ureteroscopy with stone extraction on 8/28/18 with Dr Mary Chaney  Plan  Follow up in 2 months with KUB and ultrasound prior to visit  Procedure Stent with String Removal    Vitals:    08/31/18 1247   BP: 108/80   Pulse: 72   Weight: 55 2 kg (121 lb 9 6 oz)   Height: 5' (1 524 m)       Stent with string removed without difficulty  Reviewed post stent removal symptoms including flank pain, nausea, dysuria, and hematuria  Instructed to increase PO fluids  Take NSAIDS and other prescribed pain medication PRN  Encouraged to call with for uncontrolled pain and fever        Michaela Doyle RN

## 2018-09-01 NOTE — DISCHARGE INSTRUCTIONS
Brijesh Weller 26   Call [employer] at  [phone] ([phone]) to obtain a primary care physician  They will be able to schedule you with a physician who sees patients with your insurance and physicians who see patients without insurance  In addition, they are able to schedule patients in all of the specialties offered by [facility], on any campus  Acute Dental Trauma   WHAT YOU NEED TO KNOW:   Acute dental trauma is a serious injury to one or more parts of your mouth  Your injury may include damage to any of your teeth, the tooth socket, the tooth root, or your jaw  You can also have injuries to the soft tissues of your mouth  These include your tongue, cheeks, gums, and lips  Severe injuries can expose the soft pulp inside the tooth  DISCHARGE INSTRUCTIONS:   Call 911 for any of the following:   · You have trouble breathing  Return to the emergency department immediately if:   · You lose one or more of your teeth, or your tooth moves out of place  · You have severe bleeding in your mouth that does not stop  Contact your healthcare provider if:   · You have a fever  · You have new symptoms, or your symptoms become worse  · You feel pain when air gets in contact with your damaged tooth  · You have tooth pain when you eat foods that are hot, cold, sweet, or sour  · Your tooth's color becomes darker  · You have questions or concern about your condition or care  Medicines: You may  need any of the following:  · Antibiotics  help treat or prevent a bacterial infection  · Acetaminophen  decreases pain and fever  It is available without a doctor's order  Ask how much to take and how often to take it  Follow directions  Read the labels of all other medicines you are using to see if they also contain acetaminophen, or ask your doctor or pharmacist  Acetaminophen can cause liver damage if not taken correctly   Do not use more than 4 grams (4,000 milligrams) total of acetaminophen in one day  · Take your medicine as directed  Contact your healthcare provider if you think your medicine is not helping or if you have side effects  Tell him or her if you are allergic to any medicine  Keep a list of the medicines, vitamins, and herbs you take  Include the amounts, and when and why you take them  Bring the list or the pill bottles to follow-up visits  Carry your medicine list with you in case of an emergency  Self-care:   · Apply ice  on your jaw or cheek for 15 to 20 minutes every hour or as directed  Use an ice pack, or put crushed ice in a plastic bag  Cover it with a towel  Ice helps prevent tissue damage and decreases swelling and pain  · Do not use your damaged tooth  Chewing food on your damaged tooth may put too much pressure on it and worsen your injury  · Eat soft foods or drink liquids  Soft foods and liquids may be easier to eat until your injury heals  Soft foods include applesauce, pudding, mashed potatoes, gelatin, or ice cream      · Keep your wounds clean  Use prescribed mouthwash as directed or gargle with a salt water solution  Mix 1 teaspoon of salt and 1 cup of warm water  You can also clean your wounds with hydrogen peroxide swabs  Ask your healthcare provider for more information on how to clean your wounds  · Wear protective gear when you play sports  Always wear a helmet and mouth guard that meet safety standards  These will prevent damage to your gums, teeth, and the bones that support your mouth  Follow up with your healthcare provider as directed:  Write down your questions so you remember to ask them during your visits  © 2017 [employer] Information is for End User's use only and may not be sold, redistributed or otherwise used for commercial purposes  All illustrations and images included in CareNotes® are the copyrighted property of [employer]  or [employer]au [employer]    The above information is an  only  It is not intended as medical advice for individual conditions or treatments  Talk to your doctor, nurse or pharmacist before following any medical regimen to see if it is safe and effective for you

## 2018-09-06 ENCOUNTER — APPOINTMENT (EMERGENCY)
Dept: RADIOLOGY | Facility: HOSPITAL | Age: 28
End: 2018-09-06
Payer: COMMERCIAL

## 2018-09-06 ENCOUNTER — HOSPITAL ENCOUNTER (EMERGENCY)
Facility: HOSPITAL | Age: 28
Discharge: HOME | End: 2018-09-07
Attending: EMERGENCY MEDICINE | Admitting: EMERGENCY MEDICINE
Payer: COMMERCIAL

## 2018-09-06 DIAGNOSIS — K08.89 TOOTHACHE: ICD-10-CM

## 2018-09-06 DIAGNOSIS — R07.9 CHEST PAIN: Primary | ICD-10-CM

## 2018-09-06 LAB
ALBUMIN SERPL BCP-MCNC: 4 G/DL (ref 3.5–5)
ALP SERPL-CCNC: 58 U/L (ref 46–116)
ALT SERPL W P-5'-P-CCNC: 28 U/L (ref 12–78)
ANION GAP SERPL CALCULATED.3IONS-SCNC: 11 MMOL/L (ref 4–13)
APTT PPP: 29 SECONDS (ref 24–36)
AST SERPL W P-5'-P-CCNC: 15 U/L (ref 5–45)
B-HCG SERPL-ACNC: <2 MIU/ML
BASOPHILS # BLD AUTO: 0.04 THOUSANDS/ΜL (ref 0–0.1)
BASOPHILS NFR BLD AUTO: 1 % (ref 0–1)
BILIRUB SERPL-MCNC: 0.2 MG/DL (ref 0.2–1)
BUN SERPL-MCNC: 13 MG/DL (ref 5–25)
CALCIUM SERPL-MCNC: 9.4 MG/DL (ref 8.3–10.1)
CHLORIDE SERPL-SCNC: 106 MMOL/L (ref 100–108)
CK SERPL-CCNC: 57 U/L (ref 26–192)
CO2 SERPL-SCNC: 27 MMOL/L (ref 21–32)
CREAT SERPL-MCNC: 0.84 MG/DL (ref 0.6–1.3)
DEPRECATED D DIMER PPP: <270 NG/ML (FEU) (ref 0–424)
EOSINOPHIL # BLD AUTO: 0.26 THOUSAND/ΜL (ref 0–0.61)
EOSINOPHIL NFR BLD AUTO: 4 % (ref 0–6)
ERYTHROCYTE [DISTWIDTH] IN BLOOD BY AUTOMATED COUNT: 12.2 % (ref 11.6–15.1)
GFR SERPL CREATININE-BSD FRML MDRD: 95 ML/MIN/1.73SQ M
GLUCOSE SERPL-MCNC: 104 MG/DL (ref 65–140)
HCT VFR BLD AUTO: 43.5 % (ref 34.8–46.1)
HGB BLD-MCNC: 14.6 G/DL (ref 11.5–15.4)
IMM GRANULOCYTES # BLD AUTO: 0.02 THOUSAND/UL (ref 0–0.2)
IMM GRANULOCYTES NFR BLD AUTO: 0 % (ref 0–2)
INR PPP: 1.03 (ref 0.86–1.17)
LYMPHOCYTES # BLD AUTO: 2.29 THOUSANDS/ΜL (ref 0.6–4.47)
LYMPHOCYTES NFR BLD AUTO: 38 % (ref 14–44)
MCH RBC QN AUTO: 30.2 PG (ref 26.8–34.3)
MCHC RBC AUTO-ENTMCNC: 33.6 G/DL (ref 31.4–37.4)
MCV RBC AUTO: 90 FL (ref 82–98)
MONOCYTES # BLD AUTO: 0.41 THOUSAND/ΜL (ref 0.17–1.22)
MONOCYTES NFR BLD AUTO: 7 % (ref 4–12)
NEUTROPHILS # BLD AUTO: 3.04 THOUSANDS/ΜL (ref 1.85–7.62)
NEUTS SEG NFR BLD AUTO: 50 % (ref 43–75)
NRBC BLD AUTO-RTO: 0 /100 WBCS
PLATELET # BLD AUTO: 342 THOUSANDS/UL (ref 149–390)
PMV BLD AUTO: 9.7 FL (ref 8.9–12.7)
POTASSIUM SERPL-SCNC: 4.2 MMOL/L (ref 3.5–5.3)
PROT SERPL-MCNC: 7.4 G/DL (ref 6.4–8.2)
PROTHROMBIN TIME: 13.2 SECONDS (ref 11.8–14.2)
RBC # BLD AUTO: 4.84 MILLION/UL (ref 3.81–5.12)
SODIUM SERPL-SCNC: 144 MMOL/L (ref 136–145)
TROPONIN I SERPL-MCNC: <0.02 NG/ML
WBC # BLD AUTO: 6.06 THOUSAND/UL (ref 4.31–10.16)

## 2018-09-06 PROCEDURE — 93005 ELECTROCARDIOGRAM TRACING: CPT

## 2018-09-06 PROCEDURE — 82550 ASSAY OF CK (CPK): CPT | Performed by: EMERGENCY MEDICINE

## 2018-09-06 PROCEDURE — 85025 COMPLETE CBC W/AUTO DIFF WBC: CPT | Performed by: EMERGENCY MEDICINE

## 2018-09-06 PROCEDURE — 96375 TX/PRO/DX INJ NEW DRUG ADDON: CPT

## 2018-09-06 PROCEDURE — 71046 X-RAY EXAM CHEST 2 VIEWS: CPT

## 2018-09-06 PROCEDURE — 80053 COMPREHEN METABOLIC PANEL: CPT | Performed by: EMERGENCY MEDICINE

## 2018-09-06 PROCEDURE — 84702 CHORIONIC GONADOTROPIN TEST: CPT | Performed by: EMERGENCY MEDICINE

## 2018-09-06 PROCEDURE — 85379 FIBRIN DEGRADATION QUANT: CPT | Performed by: EMERGENCY MEDICINE

## 2018-09-06 PROCEDURE — 96374 THER/PROPH/DIAG INJ IV PUSH: CPT

## 2018-09-06 PROCEDURE — 84484 ASSAY OF TROPONIN QUANT: CPT | Performed by: EMERGENCY MEDICINE

## 2018-09-06 PROCEDURE — 36415 COLL VENOUS BLD VENIPUNCTURE: CPT | Performed by: EMERGENCY MEDICINE

## 2018-09-06 PROCEDURE — 85730 THROMBOPLASTIN TIME PARTIAL: CPT | Performed by: EMERGENCY MEDICINE

## 2018-09-06 PROCEDURE — 96361 HYDRATE IV INFUSION ADD-ON: CPT

## 2018-09-06 PROCEDURE — 85610 PROTHROMBIN TIME: CPT | Performed by: EMERGENCY MEDICINE

## 2018-09-06 RX ORDER — MORPHINE SULFATE 2 MG/ML
2 INJECTION, SOLUTION INTRAMUSCULAR; INTRAVENOUS ONCE
Status: COMPLETED | OUTPATIENT
Start: 2018-09-06 | End: 2018-09-06

## 2018-09-06 RX ORDER — ACETAMINOPHEN 325 MG/1
650 TABLET ORAL ONCE
Status: COMPLETED | OUTPATIENT
Start: 2018-09-06 | End: 2018-09-06

## 2018-09-06 RX ORDER — RANITIDINE 150 MG/1
150 TABLET ORAL 2 TIMES DAILY
COMMUNITY
End: 2020-06-04

## 2018-09-06 RX ORDER — IBUPROFEN 800 MG/1
800 TABLET ORAL AS NEEDED
COMMUNITY
End: 2020-06-04

## 2018-09-06 RX ORDER — ONDANSETRON 2 MG/ML
4 INJECTION INTRAMUSCULAR; INTRAVENOUS ONCE
Status: COMPLETED | OUTPATIENT
Start: 2018-09-06 | End: 2018-09-06

## 2018-09-06 RX ADMIN — ACETAMINOPHEN 650 MG: 325 TABLET, FILM COATED ORAL at 23:45

## 2018-09-06 RX ADMIN — SODIUM CHLORIDE 1000 ML: 0.9 INJECTION, SOLUTION INTRAVENOUS at 23:23

## 2018-09-06 RX ADMIN — ONDANSETRON 4 MG: 2 INJECTION INTRAMUSCULAR; INTRAVENOUS at 23:18

## 2018-09-06 RX ADMIN — MORPHINE SULFATE 2 MG: 2 INJECTION, SOLUTION INTRAMUSCULAR; INTRAVENOUS at 23:18

## 2018-09-07 VITALS
WEIGHT: 124 LBS | HEART RATE: 58 BPM | BODY MASS INDEX: 24.35 KG/M2 | DIASTOLIC BLOOD PRESSURE: 55 MMHG | SYSTOLIC BLOOD PRESSURE: 95 MMHG | HEIGHT: 60 IN | OXYGEN SATURATION: 99 % | TEMPERATURE: 98 F | RESPIRATION RATE: 18 BRPM

## 2018-09-07 LAB
ATRIAL RATE: 53 BPM
ATRIAL RATE: 79 BPM
P AXIS: 56 DEGREES
P AXIS: 63 DEGREES
PR INTERVAL: 124 MS
PR INTERVAL: 126 MS
QRS AXIS: 77 DEGREES
QRS AXIS: 80 DEGREES
QRSD INTERVAL: 84 MS
QRSD INTERVAL: 84 MS
QT INTERVAL: 356 MS
QT INTERVAL: 416 MS
QTC INTERVAL: 390 MS
QTC INTERVAL: 408 MS
T WAVE AXIS: 56 DEGREES
T WAVE AXIS: 63 DEGREES
TROPONIN I SERPL-MCNC: <0.02 NG/ML
VENTRICULAR RATE: 53 BPM
VENTRICULAR RATE: 79 BPM

## 2018-09-07 PROCEDURE — 84484 ASSAY OF TROPONIN QUANT: CPT | Performed by: EMERGENCY MEDICINE

## 2018-09-07 PROCEDURE — 96361 HYDRATE IV INFUSION ADD-ON: CPT

## 2018-09-07 PROCEDURE — 93010 ELECTROCARDIOGRAM REPORT: CPT | Performed by: INTERNAL MEDICINE

## 2018-09-07 PROCEDURE — 93005 ELECTROCARDIOGRAM TRACING: CPT

## 2018-09-07 PROCEDURE — 36415 COLL VENOUS BLD VENIPUNCTURE: CPT | Performed by: EMERGENCY MEDICINE

## 2018-09-07 PROCEDURE — 99285 EMERGENCY DEPT VISIT HI MDM: CPT

## 2018-09-07 RX ORDER — CLINDAMYCIN HYDROCHLORIDE 150 MG/1
300 CAPSULE ORAL ONCE
Status: COMPLETED | OUTPATIENT
Start: 2018-09-07 | End: 2018-09-07

## 2018-09-07 RX ORDER — OXYCODONE HYDROCHLORIDE AND ACETAMINOPHEN 5; 325 MG/1; MG/1
1 TABLET ORAL ONCE
Status: COMPLETED | OUTPATIENT
Start: 2018-09-07 | End: 2018-09-07

## 2018-09-07 RX ORDER — OXYCODONE HYDROCHLORIDE AND ACETAMINOPHEN 5; 325 MG/1; MG/1
1 TABLET ORAL EVERY 4 HOURS PRN
Qty: 20 TABLET | Refills: 0 | Status: SHIPPED | OUTPATIENT
Start: 2018-09-07 | End: 2018-09-11

## 2018-09-07 RX ORDER — CLINDAMYCIN HYDROCHLORIDE 150 MG/1
300 CAPSULE ORAL 3 TIMES DAILY
Qty: 42 CAPSULE | Refills: 0 | Status: SHIPPED | OUTPATIENT
Start: 2018-09-07 | End: 2018-09-14

## 2018-09-07 RX ADMIN — OXYCODONE HYDROCHLORIDE AND ACETAMINOPHEN 1 TABLET: 5; 325 TABLET ORAL at 00:26

## 2018-09-07 RX ADMIN — CLINDAMYCIN HYDROCHLORIDE 300 MG: 150 CAPSULE ORAL at 00:25

## 2018-09-07 NOTE — ED PROVIDER NOTES
History  Chief Complaint   Patient presents with    Dental Pain     Pt presents with tooth pain on the top and bottom on the left side, states unable to see a dentist until the 24th    Chest Pain     Pt states she had chest pain last week on the left side and then moved to the right, states feels like she is "breathing through a straw"     Patient is a 29year old female with left sided chest pain the other day and now pain is on right side of chest with sob  Not pleuritic  No travel  No cough  No N/V  Also has left upper and lower toothache  Is on abx  Was last seen in this ED on 8/31/18 for tooth fracture  [facility] website checked on this patient and last Rx filled was on 9/3/18 for percocet for 3 day supply  Patient is allergic to ASA, naproxen and tramadol  States she did not drive here  (+) smokes  Mother had MI in her early 46s  History provided by:  Patient   used: No    Dental Pain   Associated symptoms: no fever    Chest Pain   Associated symptoms: shortness of breath    Associated symptoms: no cough, no fever, no nausea and not vomiting        Prior to Admission Medications   Prescriptions Last Dose Informant Patient Reported?  Taking?   ibuprofen (MOTRIN) 800 mg tablet   Yes Yes   Sig: Take 800 mg by mouth as needed for mild pain   ranitidine (ZANTAC) 150 mg tablet   Yes Yes   Sig: Take 150 mg by mouth 2 (two) times a day      Facility-Administered Medications: None       Past Medical History:   Diagnosis Date    Asthma     GERD (gastroesophageal reflux disease)     Hernia, abdominal     Migraines        Past Surgical History:   Procedure Laterality Date    FL RETROGRADE PYELOGRAM  8/28/2018    TN CYSTO/URETERO W/LITHOTRIPSY &INDWELL STENT INSRT Right 8/28/2018    Procedure: CYSTOSCOPY URETEROSCOPY WITH RETROGRADE PYELOGRAM AND INSERTION STENT URETERAL;  Surgeon: Charlyne Lefort, MD;  Location: [facility];  Service: Urology       Family History   Problem Relation Age of Onset    Diabetes Mother     Hyperlipidemia Mother     Stroke Mother     Heart disease Mother     Nephrolithiasis Father     Nephrolithiasis Brother      I have reviewed and agree with the history as documented  Social History   Substance Use Topics    Smoking status: Current Every Day Smoker     Packs/day: 0 50     Types: Cigarettes    Smokeless tobacco: Never Used    Alcohol use No        Review of Systems   Constitutional: Negative for fever  HENT: Positive for dental problem  Respiratory: Positive for shortness of breath  Negative for cough  Cardiovascular: Positive for chest pain  Gastrointestinal: Negative for nausea and vomiting  All other systems reviewed and are negative  Physical Exam  Physical Exam   Constitutional: She is oriented to person, place, and time  She appears well-developed and well-nourished  She appears distressed (moderate)  HENT:   Head: Normocephalic and atraumatic  Mouth/Throat: Oropharynx is clear and moist    Left maxillary and mandibular molar tooth tenderness  No significant gingival swelling  Eyes: No scleral icterus  Neck: Normal range of motion  Neck supple  Cardiovascular: Normal rate, regular rhythm and normal heart sounds  No murmur heard  Pulmonary/Chest: Effort normal and breath sounds normal  No stridor  No respiratory distress  She has no wheezes  She has no rales  She exhibits no tenderness  Abdominal: Soft  Bowel sounds are normal  She exhibits no distension  There is no tenderness  Musculoskeletal: She exhibits no edema, tenderness (No calf tenderness) or deformity  Neurological: She is alert and oriented to person, place, and time  Skin: Skin is warm and dry  No rash noted  Psychiatric: She has a normal mood and affect  Nursing note and vitals reviewed        Vital Signs  ED Triage Vitals [09/06/18 2250]   Temperature Pulse Respirations Blood Pressure SpO2   98 °F (36 7 °C) 78 18 104/73 98 %      Temp Source Heart Rate Source Patient Position - Orthostatic VS BP Location FiO2 (%)   Oral Monitor Sitting Right arm --      Pain Score       9           Vitals:    09/06/18 2345 09/07/18 0045 09/07/18 0100 09/07/18 0102   BP:   93/61 95/55   Pulse: 66 72 (!) 53 58   Patient Position - Orthostatic VS:   Lying Lying       Visual Acuity      ED Medications  Medications   sodium chloride 0 9 % bolus 1,000 mL (0 mL Intravenous Stopped 9/7/18 0101)   morphine injection 2 mg (2 mg Intravenous Given 9/6/18 2318)   ondansetron (ZOFRAN) injection 4 mg (4 mg Intravenous Given 9/6/18 2318)   acetaminophen (TYLENOL) tablet 650 mg (650 mg Oral Given 9/6/18 2345)   oxyCODONE-acetaminophen (PERCOCET) 5-325 mg per tablet 1 tablet (1 tablet Oral Given 9/7/18 0026)   clindamycin (CLEOCIN) capsule 300 mg (300 mg Oral Given 9/7/18 0025)       Diagnostic Studies  Results Reviewed     Procedure Component Value Units Date/Time    Troponin I [[id number]]  (Normal) Collected:  09/07/18 0059    Lab Status:  Final result Specimen:  Blood from Arm, Right Updated:  09/07/18 0126     Troponin I <0 02 ng/mL     Quantitative hCG [[id number]]  (Normal) Collected:  09/06/18 2310    Lab Status:  Final result Specimen:  Blood from Arm, Right Updated:  09/06/18 2346     HCG, Quant <2 mIU/mL     Narrative:          Expected Ranges:     Approximate               Approximate HCG  Gestation age          Concentration ( mIU/mL)  _____________          ______________________   Jenet Ma                      HCG values  0 2-1                       5-50  1-2                           2-3                         100-5000  3-4                         500-96123  4-5                         1000-58692  5-6                         [id number]  6-8                         52751-707008  8-12                        30206-413882    CK Total with Reflex CKMB [[id number]]  (Normal) Collected:  09/06/18 2310    Lab Status:  Final result Specimen:  Blood from Arm, Right Updated: 09/06/18 2346     Total CK 57 U/L     Comprehensive metabolic panel [[id number]] Collected:  09/06/18 2310    Lab Status:  Final result Specimen:  Blood from Arm, Right Updated:  09/06/18 2340     Sodium 144 mmol/L      Potassium 4 2 mmol/L      Chloride 106 mmol/L      CO2 27 mmol/L      ANION GAP 11 mmol/L      BUN 13 mg/dL      Creatinine 0 84 mg/dL      Glucose 104 mg/dL      Calcium 9 4 mg/dL      AST 15 U/L      ALT 28 U/L      Alkaline Phosphatase 58 U/L      Total Protein 7 4 g/dL      Albumin 4 0 g/dL      Total Bilirubin 0 20 mg/dL      eGFR 95 ml/min/1 73sq m     Narrative:         National Kidney Disease Education Program recommendations are as follows:  GFR calculation is accurate only with a steady state creatinine  Chronic Kidney disease less than 60 ml/min/1 73 sq  meters  Kidney failure less than 15 ml/min/1 73 sq  meters      Troponin I [[id number]]  (Normal) Collected:  09/06/18 2310    Lab Status:  Final result Specimen:  Blood from Arm, Right Updated:  09/06/18 2340     Troponin I <0 02 ng/mL     D-Dimer [[id number]]  (Normal) Collected:  09/06/18 2310    Lab Status:  Final result Specimen:  Blood from Arm, Right Updated:  09/06/18 2334     D-Dimer, Quant <270 ng/ml (FEU)     Protime-INR [[id number]]  (Normal) Collected:  09/06/18 2310    Lab Status:  Final result Specimen:  Blood from Arm, Right Updated:  09/06/18 2331     Protime 13 2 seconds      INR 1 03    APTT [[id number]]  (Normal) Collected:  09/06/18 2310    Lab Status:  Final result Specimen:  Blood from Arm, Right Updated:  09/06/18 2331     PTT 29 seconds     CBC and differential [[id number]] Collected:  09/06/18 2310    Lab Status:  Final result Specimen:  Blood from Arm, Right Updated:  09/06/18 2321     WBC 6 06 Thousand/uL      RBC 4 84 Million/uL      Hemoglobin 14 6 g/dL      Hematocrit 43 5 %      MCV 90 fL      MCH 30 2 pg      MCHC 33 6 g/dL      RDW 12 2 %      MPV 9 7 fL      Platelets 408 Thousands/uL      nRBC 0 /100 WBCs Neutrophils Relative 50 %      Immat GRANS % 0 %      Lymphocytes Relative 38 %      Monocytes Relative 7 %      Eosinophils Relative 4 %      Basophils Relative 1 %      Neutrophils Absolute 3 04 Thousands/µL      Immature Grans Absolute 0 02 Thousand/uL      Lymphocytes Absolute 2 29 Thousands/µL      Monocytes Absolute 0 41 Thousand/µL      Eosinophils Absolute 0 26 Thousand/µL      Basophils Absolute 0 04 Thousands/µL                  XR chest 2 views   ED Interpretation by Caralee Essex, MD (09/06 2662)   No acute disease read by me  Procedures  ECG 12 Lead Documentation  Date/Time: 9/6/2018 10:59 PM  Performed by: Connie Hester  Authorized by: Connie Hester     Indications / Diagnosis:  Chest pain  ECG reviewed by me, the ED Provider: yes    Patient location:  ED  Previous ECG:     Previous ECG:  Compared to current    Comparison ECG info:  8/23/18    Similarity:  No change  Rate:     ECG rate:  79    ECG rate assessment: normal    Rhythm:     Rhythm: sinus rhythm      Rhythm comment:  S  arrhythmia  Ectopy:     Ectopy: none    QRS:     QRS axis:  Normal    QRS intervals:  Normal  Conduction:     Conduction: normal    ST segments:     ST segments:  Normal  T waves:     T waves: normal    Comments:      I do not agree with computer reading of septal infarct, age undetermined       ECG 12 Lead Documentation  Date/Time: 9/7/2018 1:05 AM  Performed by: Connie Hester  Authorized by: Connie Hester     Indications / Diagnosis:  Repeat EKG for chest pain  ECG reviewed by me, the ED Provider: yes    Patient location:  ED  Previous ECG:     Previous ECG:  Compared to current    Comparison ECG info:  Earlier tonight    Similarity:  Changes noted (s  ember now)  Rate:     ECG rate:  53    ECG rate assessment: bradycardic    Rhythm:     Rhythm: sinus bradycardia    Ectopy:     Ectopy: none    QRS:     QRS axis:  Normal    QRS intervals:  Normal  Conduction:     Conduction: normal    ST segments:     ST segments:  Normal  T waves:     T waves: normal             Phone Contacts  ED Phone Contact    ED Course  ED Course as of Sep 07 0132   Thu Sep 06, 2018   2303 EKG d/w patient  2342 Tylenol ordered for a headache      2352 Labs and CXR d/w patient  Will recheck EKG and troponin at 0100 and if okay will discharge home  Fri Sep 07, 2018   0017 Patient c/o toothpain still so clindamycin and percocet ordered  0129 Repeat EKG and troponin unremarkable  Will discharge home  HEART Risk Score      Most Recent Value   History  0 Filed at: 09/06/2018 2343   ECG  0 Filed at: 09/06/2018 2343   Age  0 Filed at: 09/06/2018 2343   Risk Factors  1 Filed at: 09/06/2018 2343   Troponin  0 Filed at: 09/06/2018 2343   Heart Score Risk Calculator   History  0 Filed at: 09/06/2018 2343   ECG  0 Filed at: 09/06/2018 2343   Age  0 Filed at: 09/06/2018 2343   Risk Factors  1 Filed at: 09/06/2018 2343   Troponin  0 Filed at: 09/06/2018 2343   HEART Score  1 Filed at: 09/06/2018 2343   HEART Score  1 Filed at: 09/06/2018 2343                    VIKKI Risk Score      Most Recent Value   Age >= 72  0 Filed at: 09/06/2018 2343   Known CAD (stenosis >= 50%)  0 Filed at: 09/06/2018 2343   Recent (<=24 hrs) Service Angina  0 Filed at: 09/06/2018 2343   ST Deviation >= 0 5 mm  0 Filed at: 09/06/2018 2343   3+ CAD Risk Factors (FHx, HTN, HLP, DM, Smoker)  0 Filed at: 09/06/2018 2343   Aspirin Use Past 7 Days  0 Filed at: 09/06/2018 2343   Elevated Cardiac Markers  0 Filed at: 09/06/2018 2343   VIKKI Risk Score (Calculated)  0 Filed at: 09/06/2018 2343              MDM  Number of Diagnoses or Management Options  Diagnosis management comments: DDX including but not limited to: chest wall pain, pleurisy, costochondritis, pericarditis, myocarditis, PTX, pneumonia, GI etiology; doubt ACS or MI or dissection or PE or rhabdomyolysis  DDx including but not limited to: dental rossy, dental infection, dental abscess, dry socket, gingivitis; doubt dwayne's angina  Amount and/or Complexity of Data Reviewed  Clinical lab tests: ordered and reviewed  Tests in the radiology section of CPT®: ordered and reviewed  Decide to obtain previous medical records or to obtain history from someone other than the patient: yes  Obtain history from someone other than the patient: yes  Review and summarize past medical records: yes  Independent visualization of images, tracings, or specimens: yes      CritCare Time    Disposition  Final diagnoses:   Chest pain   Toothache     Time reflects when diagnosis was documented in both MDM as applicable and the Disposition within this note     Time User Action Codes Description Comment    9/7/2018  1:30 AM Jose Mcnally Add [R07 9] Chest pain     9/7/2018  1:30 AM Jose Mcnally Add [K08 89] Toothache       ED Disposition     ED Disposition Condition Comment    Discharge  [facility] discharge to home/self care  Condition at discharge: Stable        Follow-up Information     Follow up With Specialties Details Why Contact Zack Huitronn, DO General Practice Call in 1 day Return sooner if increased pain, fever, vomiting, difficulty breathing, rash, drooling  Stop other antibiotic  No driving with percocet  do not use acetaminophen with percocet    [address] [address]  Shira David [address], DMD Dental General Practice Call in 1 day  [place] [address]  [phone]            Patient's Medications   Discharge Prescriptions    CLINDAMYCIN (CLEOCIN) 150 MG CAPSULE    Take 2 capsules (300 mg total) by mouth 3 (three) times a day for 7 days       Start Date: 9/7/2018  End Date: 9/14/2018       Order Dose: 300 mg       Quantity: 42 capsule    Refills: 0    OXYCODONE-ACETAMINOPHEN (PERCOCET) 5-325 MG PER TABLET    Take 1 tablet by mouth every 4 (four) hours as needed for moderate pain for up to 4 days Max Daily Amount: 6 tablets       Start Date: 9/7/2018  End Date: 9/11/2018       Order Dose: 1 tablet       Quantity: 20 tablet    Refills: 0     No discharge procedures on file      ED Provider  Electronically Signed by           Princess Donna MD  09/07/18 9198

## 2018-09-07 NOTE — DISCHARGE INSTRUCTIONS
Chest Pain   WHAT YOU NEED TO KNOW:   Chest pain can be caused by a range of conditions, from not serious to life-threatening  Chest pain can be a symptom of a digestive problem, such as acid reflux or a stomach ulcer  An anxiety attack or a strong emotion, such as anger, can also cause chest pain  Infection, inflammation, or a fracture in the bones or cartilage in your chest can cause pain or discomfort  Sometimes chest pain or pressure is caused by poor blood flow to your heart (angina)  Chest pain may also be caused by life-threatening conditions such as a heart attack or blood clot in your lungs  DISCHARGE INSTRUCTIONS:   Call 911 if:   · You have any of the following signs of a heart attack:      ¨ Squeezing, pressure, or pain in your chest that lasts longer than 5 minutes or returns    ¨ Discomfort or pain in your back, neck, jaw, stomach, or arm     ¨ Trouble breathing    ¨ Nausea or vomiting    ¨ Lightheadedness or a sudden cold sweat, especially with chest pain or trouble breathing    Return to the emergency department if:   · You have chest discomfort that gets worse, even with medicine  · You cough or vomit blood  · Your bowel movements are black or bloody  · You cannot stop vomiting, or it hurts to swallow  Contact your healthcare provider if:   · You have questions or concerns about your condition or care  Medicines:   · Medicines  may be given to treat the cause of your chest pain  Examples include pain medicine, anxiety medicine, or medicines to increase blood flow to your heart  · Do not take certain medicines without asking your healthcare provider first   These include NSAIDs, herbal or vitamin supplements, or hormones (estrogen or progestin)  · Take your medicine as directed  Contact your healthcare provider if you think your medicine is not helping or if you have side effects  Tell him or her if you are allergic to any medicine   Keep a list of the medicines, vitamins, and herbs you take  Include the amounts, and when and why you take them  Bring the list or the pill bottles to follow-up visits  Carry your medicine list with you in case of an emergency  Follow up with your healthcare provider within 72 hours, or as directed: You may need to return for more tests to find the cause of your chest pain  You may be referred to a specialist, such as a cardiologist or gastroenterologist  Write down your questions so you remember to ask them during your visits  Healthy living tips: The following are general healthy guidelines  If your chest pain is caused by a heart problem, your healthcare provider will give you specific guidelines to follow  · Do not smoke  Nicotine and other chemicals in cigarettes and cigars can cause lung and heart damage  Ask your healthcare provider for information if you currently smoke and need help to quit  E-cigarettes or smokeless tobacco still contain nicotine  Talk to your healthcare provider before you use these products  · Eat a variety of healthy, low-fat foods  Healthy foods include fruits, vegetables, whole-grain breads, low-fat dairy products, beans, lean meats, and fish  Ask for more information about a heart healthy diet  · Ask about activity  Your healthcare provider will tell you which activities to limit or avoid  Ask when you can drive, return to work, and have sex  Ask about the best exercise plan for you  · Maintain a healthy weight  Ask your healthcare provider how much you should weigh  Ask him or her to help you create a weight loss plan if you are overweight  · Get the flu and pneumonia vaccines  All adults should get the influenza (flu) vaccine  Get it every year as soon as it becomes available  The pneumococcal vaccine is given to adults aged 72 years or older  The vaccine is given every 5 years to prevent pneumococcal disease, such as pneumonia    © 2017 [employer] Information is for End User's use only and may not be sold, redistributed or otherwise used for commercial purposes  All illustrations and images included in CareNotes® are the copyrighted property of A D [employer] M , [employer]  or [employer]au [employer]amesh  The above information is an  only  It is not intended as medical advice for individual conditions or treatments  Talk to your doctor, nurse or pharmacist before following any medical regimen to see if it is safe and effective for you  Toothache   WHAT YOU NEED TO KNOW:   A toothache is pain that is caused by irritation of the nerves in the center of your tooth  The irritation may be caused by several problems, such as a cavity, an infection, a cracked tooth, or gum disease  It is very important to follow up with your dentist so the cause of your toothache can be diagnosed and treated  This can help prevent more serious problems  DISCHARGE INSTRUCTIONS:   Medicines: You may  need any of the following:  · NSAIDs  decrease swelling and pain  This medicine can be bought with or without a doctor's order  This medicine can cause stomach bleeding or kidney problems in certain people  If you take blood thinner medicine, always ask your healthcare provider if NSAIDs are safe for you  Always read the medicine label and follow the directions on it before using this medicine  · Acetaminophen  decreases pain  It is available without a doctor's order  Ask how much to take and how often to take it  Follow directions  Acetaminophen can cause liver damage if not taken correctly  · Pain medicine  may be given as a pill or as medicine that you put directly on your tooth or gums  Do not wait until the pain is severe before you take this medicine  · Antibiotics  help fight or prevent an infection caused by bacteria  Take them as directed  · Take your medicine as directed  Contact your healthcare provider if you think your medicine is not helping or if you have side effects   Tell him of her if you are allergic to any medicine  Keep a list of the medicines, vitamins, and herbs you take  Include the amounts, and when and why you take them  Bring the list or the pill bottles to follow-up visits  Carry your medicine list with you in case of an emergency  Follow up with your dentist as directed: You may be referred to a dental surgeon  Write down your questions so you remember to ask them during your visits  Self-care:   · Rinse your mouth with warm salt water 4 times a day or as directed  · You may need to eat soft foods to help relieve pain caused by chewing  Contact your dentist if:   · You have questions or concerns about your condition or care  Return to the emergency department if:   · You have trouble breathing  · You have swelling in your face or neck  · You have a fever and chills  · You have trouble speaking or swallowing  · You have trouble opening or closing your mouth  © 2017 [employer]  Information is for End User's use only and may not be sold, redistributed or otherwise used for commercial purposes  All illustrations and images included in CareNotes® are the copyrighted property of A [employer] M , Inc  or Esau Ramesh  The above information is an  only  It is not intended as medical advice for individual conditions or treatments  Talk to your doctor, nurse or pharmacist before following any medical regimen to see if it is safe and effective for you

## 2018-09-21 ENCOUNTER — APPOINTMENT (EMERGENCY)
Dept: CT IMAGING | Facility: HOSPITAL | Age: 28
End: 2018-09-21
Payer: COMMERCIAL

## 2018-09-21 ENCOUNTER — HOSPITAL ENCOUNTER (EMERGENCY)
Facility: HOSPITAL | Age: 28
Discharge: HOME | End: 2018-09-21
Attending: EMERGENCY MEDICINE | Admitting: EMERGENCY MEDICINE
Payer: COMMERCIAL

## 2018-09-21 VITALS
WEIGHT: 120.6 LBS | RESPIRATION RATE: 16 BRPM | SYSTOLIC BLOOD PRESSURE: 93 MMHG | HEART RATE: 75 BPM | OXYGEN SATURATION: 100 % | DIASTOLIC BLOOD PRESSURE: 60 MMHG | TEMPERATURE: 98.1 F | HEIGHT: 60 IN | BODY MASS INDEX: 23.68 KG/M2

## 2018-09-21 DIAGNOSIS — R11.0 NAUSEA: ICD-10-CM

## 2018-09-21 DIAGNOSIS — R10.9 ABDOMINAL PAIN: Primary | ICD-10-CM

## 2018-09-21 LAB
ALBUMIN SERPL BCP-MCNC: 3.7 G/DL (ref 3.5–5)
ALP SERPL-CCNC: 53 U/L (ref 46–116)
ALT SERPL W P-5'-P-CCNC: 19 U/L (ref 12–78)
ANION GAP SERPL CALCULATED.3IONS-SCNC: 6 MMOL/L (ref 4–13)
AST SERPL W P-5'-P-CCNC: 11 U/L (ref 5–45)
BACTERIA UR QL AUTO: NORMAL /HPF
BASOPHILS # BLD AUTO: 0.04 THOUSANDS/ΜL (ref 0–0.1)
BASOPHILS NFR BLD AUTO: 1 % (ref 0–1)
BILIRUB SERPL-MCNC: 0.2 MG/DL (ref 0.2–1)
BILIRUB UR QL STRIP: NEGATIVE
BUN SERPL-MCNC: 8 MG/DL (ref 5–25)
CALCIUM SERPL-MCNC: 9 MG/DL (ref 8.3–10.1)
CHLORIDE SERPL-SCNC: 104 MMOL/L (ref 100–108)
CLARITY UR: CLEAR
CO2 SERPL-SCNC: 28 MMOL/L (ref 21–32)
COLOR UR: YELLOW
CREAT SERPL-MCNC: 0.67 MG/DL (ref 0.6–1.3)
EOSINOPHIL # BLD AUTO: 0.3 THOUSAND/ΜL (ref 0–0.61)
EOSINOPHIL NFR BLD AUTO: 5 % (ref 0–6)
ERYTHROCYTE [DISTWIDTH] IN BLOOD BY AUTOMATED COUNT: 12.4 % (ref 11.6–15.1)
EXT PREG TEST URINE: NEGATIVE
GFR SERPL CREATININE-BSD FRML MDRD: 120 ML/MIN/1.73SQ M
GLUCOSE SERPL-MCNC: 87 MG/DL (ref 65–140)
GLUCOSE UR STRIP-MCNC: NEGATIVE MG/DL
HCT VFR BLD AUTO: 40 % (ref 34.8–46.1)
HGB BLD-MCNC: 13.5 G/DL (ref 11.5–15.4)
HGB UR QL STRIP.AUTO: NEGATIVE
IMM GRANULOCYTES # BLD AUTO: 0.02 THOUSAND/UL (ref 0–0.2)
IMM GRANULOCYTES NFR BLD AUTO: 0 % (ref 0–2)
KETONES UR STRIP-MCNC: NEGATIVE MG/DL
LEUKOCYTE ESTERASE UR QL STRIP: ABNORMAL
LIPASE SERPL-CCNC: 79 U/L (ref 73–393)
LYMPHOCYTES # BLD AUTO: 2.36 THOUSANDS/ΜL (ref 0.6–4.47)
LYMPHOCYTES NFR BLD AUTO: 36 % (ref 14–44)
MCH RBC QN AUTO: 30.5 PG (ref 26.8–34.3)
MCHC RBC AUTO-ENTMCNC: 33.8 G/DL (ref 31.4–37.4)
MCV RBC AUTO: 91 FL (ref 82–98)
MONOCYTES # BLD AUTO: 0.45 THOUSAND/ΜL (ref 0.17–1.22)
MONOCYTES NFR BLD AUTO: 7 % (ref 4–12)
NEUTROPHILS # BLD AUTO: 3.35 THOUSANDS/ΜL (ref 1.85–7.62)
NEUTS SEG NFR BLD AUTO: 51 % (ref 43–75)
NITRITE UR QL STRIP: NEGATIVE
NON-SQ EPI CELLS URNS QL MICRO: NORMAL /HPF
NRBC BLD AUTO-RTO: 0 /100 WBCS
PH UR STRIP.AUTO: 6.5 [PH] (ref 4.5–8)
PLATELET # BLD AUTO: 244 THOUSANDS/UL (ref 149–390)
PMV BLD AUTO: 10 FL (ref 8.9–12.7)
POTASSIUM SERPL-SCNC: 4.3 MMOL/L (ref 3.5–5.3)
PROT SERPL-MCNC: 6.4 G/DL (ref 6.4–8.2)
PROT UR STRIP-MCNC: NEGATIVE MG/DL
RBC # BLD AUTO: 4.42 MILLION/UL (ref 3.81–5.12)
RBC #/AREA URNS AUTO: NORMAL /HPF
SODIUM SERPL-SCNC: 138 MMOL/L (ref 136–145)
SP GR UR STRIP.AUTO: 1.01 (ref 1–1.03)
UROBILINOGEN UR QL STRIP.AUTO: 0.2 E.U./DL
WBC # BLD AUTO: 6.52 THOUSAND/UL (ref 4.31–10.16)
WBC #/AREA URNS AUTO: NORMAL /HPF

## 2018-09-21 PROCEDURE — 80053 COMPREHEN METABOLIC PANEL: CPT | Performed by: EMERGENCY MEDICINE

## 2018-09-21 PROCEDURE — 85025 COMPLETE CBC W/AUTO DIFF WBC: CPT | Performed by: EMERGENCY MEDICINE

## 2018-09-21 PROCEDURE — 83690 ASSAY OF LIPASE: CPT | Performed by: EMERGENCY MEDICINE

## 2018-09-21 PROCEDURE — 96361 HYDRATE IV INFUSION ADD-ON: CPT

## 2018-09-21 PROCEDURE — 81001 URINALYSIS AUTO W/SCOPE: CPT | Performed by: EMERGENCY MEDICINE

## 2018-09-21 PROCEDURE — 74176 CT ABD & PELVIS W/O CONTRAST: CPT

## 2018-09-21 PROCEDURE — 81025 URINE PREGNANCY TEST: CPT | Performed by: EMERGENCY MEDICINE

## 2018-09-21 PROCEDURE — 96376 TX/PRO/DX INJ SAME DRUG ADON: CPT

## 2018-09-21 PROCEDURE — 36415 COLL VENOUS BLD VENIPUNCTURE: CPT | Performed by: EMERGENCY MEDICINE

## 2018-09-21 PROCEDURE — 99284 EMERGENCY DEPT VISIT MOD MDM: CPT

## 2018-09-21 PROCEDURE — 96374 THER/PROPH/DIAG INJ IV PUSH: CPT

## 2018-09-21 PROCEDURE — 96375 TX/PRO/DX INJ NEW DRUG ADDON: CPT

## 2018-09-21 RX ORDER — MORPHINE SULFATE 10 MG/ML
6 INJECTION, SOLUTION INTRAMUSCULAR; INTRAVENOUS ONCE
Status: COMPLETED | OUTPATIENT
Start: 2018-09-21 | End: 2018-09-21

## 2018-09-21 RX ORDER — ONDANSETRON 4 MG/1
4 TABLET, FILM COATED ORAL EVERY 6 HOURS
Qty: 12 TABLET | Refills: 0 | Status: SHIPPED | OUTPATIENT
Start: 2018-09-21 | End: 2018-09-26

## 2018-09-21 RX ORDER — DICYCLOMINE HCL 20 MG
20 TABLET ORAL EVERY 6 HOURS
Qty: 20 TABLET | Refills: 0 | Status: SHIPPED | OUTPATIENT
Start: 2018-09-21 | End: 2018-10-05

## 2018-09-21 RX ORDER — POLYETHYLENE GLYCOL 3350 17 G/17G
17 POWDER, FOR SOLUTION ORAL DAILY
Qty: 85 G | Refills: 0 | Status: SHIPPED | OUTPATIENT
Start: 2018-09-21 | End: 2018-10-15

## 2018-09-21 RX ORDER — ONDANSETRON 2 MG/ML
4 INJECTION INTRAMUSCULAR; INTRAVENOUS ONCE
Status: COMPLETED | OUTPATIENT
Start: 2018-09-21 | End: 2018-09-21

## 2018-09-21 RX ADMIN — SODIUM CHLORIDE 1000 ML: 0.9 INJECTION, SOLUTION INTRAVENOUS at 19:40

## 2018-09-21 RX ADMIN — HYDROMORPHONE HYDROCHLORIDE 1 MG: 1 INJECTION, SOLUTION INTRAMUSCULAR; INTRAVENOUS; SUBCUTANEOUS at 20:28

## 2018-09-21 RX ADMIN — MORPHINE SULFATE 6 MG: 10 INJECTION INTRAVENOUS at 19:41

## 2018-09-21 RX ADMIN — ONDANSETRON 4 MG: 2 INJECTION INTRAMUSCULAR; INTRAVENOUS at 19:41

## 2018-09-21 RX ADMIN — ONDANSETRON 4 MG: 2 INJECTION INTRAMUSCULAR; INTRAVENOUS at 21:41

## 2018-09-21 NOTE — ED PROVIDER NOTES
History  Chief Complaint   Patient presents with    Abdominal Pain     Right side abdominal pain beginning 2 days ago  Also nausea  Denies any other symptoms  26-year-old female presents to emergency department for evaluation of sharp right-sided flank and abdominal pain  Patient states that the pain started 2 days ago it came on suddenly  It feels similar to but is more severe than the pain she experienced with a kidney stone  She states that she had a kidney stone removed by urologist at the end of August of this year  Patient has associated nausea but no vomiting  She denies fevers or chills  No hematuria or dysuria  No change in bowel habits  Patient does have a small umbilical hernia that at times has tenderness but is not currently tender  Patient's last normal menstrual cycle was 3 weeks ago  No history of ovarian cyst   No abnormal vaginal bleeding  No abnormal vaginal discharge  History provided by:  Patient and medical records   used: No    Abdominal Pain   Pain location:  R flank, RUQ and RLQ  Pain quality: sharp    Pain radiates to:  RLQ  Pain severity:  Severe  Onset quality:  Sudden  Duration:  2 days  Timing:  Constant  Progression:  Unchanged  Chronicity:  New  Context: previous surgery    Context: not eating, not sick contacts and not trauma    Relieved by:  Nothing  Worsened by:  Nothing  Ineffective treatments:  None tried  Associated symptoms: nausea    Associated symptoms: no anorexia, no cough, no diarrhea, no dysuria, no fever, no hematemesis, no hematochezia, no hematuria, no sore throat, no vaginal discharge and no vomiting    Risk factors: not pregnant and no recent hospitalization        Prior to Admission Medications   Prescriptions Last Dose Informant Patient Reported?  Taking?   ibuprofen (MOTRIN) 800 mg tablet   Yes No   Sig: Take 800 mg by mouth as needed for mild pain   ranitidine (ZANTAC) 150 mg tablet   Yes No   Sig: Take 150 mg by mouth 2 (two) times a day      Facility-Administered Medications: None       Past Medical History:   Diagnosis Date    Asthma     GERD (gastroesophageal reflux disease)     Hernia, abdominal     Migraines        Past Surgical History:   Procedure Laterality Date    [place] RETROGRADE PYELOGRAM  8/28/2018    [place] CYSTO/URETERO W/LITHOTRIPSY &INDWELL STENT INSRT Right 8/28/2018    Procedure: CYSTOSCOPY URETEROSCOPY WITH RETROGRADE PYELOGRAM AND INSERTION STENT URETERAL;  Surgeon: King Whitfield MD;  Location: [facility];  Service: Urology       Family History   Problem Relation Age of Onset    Diabetes Mother     Hyperlipidemia Mother     Stroke Mother     Heart disease Mother     Nephrolithiasis Father     Nephrolithiasis Brother      I have reviewed and agree with the history as documented  Social History   Substance Use Topics    Smoking status: Current Every Day Smoker     Packs/day: 0 50     Types: Cigarettes    Smokeless tobacco: Never Used    Alcohol use No        Review of Systems   Constitutional: Negative for fever  HENT: Negative for sore throat  Respiratory: Negative for cough  Gastrointestinal: Positive for abdominal pain and nausea  Negative for anorexia, diarrhea, hematemesis, hematochezia and vomiting  Genitourinary: Positive for flank pain  Negative for dysuria, hematuria, menstrual problem and vaginal discharge  Musculoskeletal: Negative for back pain  Skin: Negative for rash  All other systems reviewed and are negative  Physical Exam  Physical Exam   Constitutional: She is oriented to person, place, and time  She appears well-developed and well-nourished  She appears distressed  HENT:   Head: Normocephalic  Nose: Nose normal    Mouth/Throat: Oropharynx is clear and moist  No oropharyngeal exudate  Eyes: Conjunctivae and EOM are normal  Pupils are equal, round, and reactive to light  Neck: Normal range of motion  Neck supple     Cardiovascular: Normal rate, regular rhythm, normal heart sounds and intact distal pulses  Pulmonary/Chest: Effort normal and breath sounds normal    Abdominal: Soft  Bowel sounds are normal  She exhibits no distension  There is no hepatosplenomegaly  There is tenderness in the right upper quadrant and right lower quadrant  There is CVA tenderness (Right)  There is no rebound and no guarding  A hernia is present  Musculoskeletal: Normal range of motion  She exhibits no edema, tenderness or deformity  Lymphadenopathy:     She has no cervical adenopathy  Neurological: She is alert and oriented to person, place, and time  She has normal strength and normal reflexes  No cranial nerve deficit or sensory deficit  She exhibits normal muscle tone  Coordination and gait normal    Skin: Skin is warm, dry and intact  No rash noted  Psychiatric: She has a normal mood and affect  Her behavior is normal  Judgment and thought content normal    Nursing note and vitals reviewed        Vital Signs  ED Triage Vitals [09/21/18 1905]   Temperature Pulse Respirations Blood Pressure SpO2   98 1 °F (36 7 °C) 89 18 105/56 99 %      Temp Source Heart Rate Source Patient Position - Orthostatic VS BP Location FiO2 (%)   Oral Monitor Sitting Left arm --      Pain Score       8           Vitals:    09/21/18 1905 09/21/18 2141   BP: 105/56 93/60   Pulse: 89 75   Patient Position - Orthostatic VS: Sitting Sitting       Visual Acuity      ED Medications  Medications   sodium chloride 0 9 % bolus 1,000 mL (0 mL Intravenous Stopped 9/21/18 2141)   ondansetron (ZOFRAN) injection 4 mg (4 mg Intravenous Given 9/21/18 1941)   morphine (PF) 10 mg/mL injection 6 mg (6 mg Intravenous Given 9/21/18 1941)   HYDROmorphone (DILAUDID) injection 1 mg (1 mg Intravenous Given 9/21/18 2028)   ondansetron (ZOFRAN) injection 4 mg (4 mg Intravenous Given 9/21/18 2141)       Diagnostic Studies  Results Reviewed     Procedure Component Value Units Date/Time    Urine Microscopic [[id number]]  (Normal) Collected:  09/21/18 2021    Lab Status:  Final result Specimen:  Urine from Urine, Clean Catch Updated:  09/21/18 2108     RBC, UA None Seen /hpf      WBC, UA 0-5 /hpf      Epithelial Cells Occasional /hpf      Bacteria, UA None Seen /hpf     UA w Reflex to Microscopic w Reflex to Culture [[id number]]  (Abnormal) Collected:  09/21/18 2021    Lab Status:  Final result Specimen:  Urine from Urine, Clean Catch Updated:  09/21/18 2036     Color, UA Yellow     Clarity, UA Clear     Specific Gravity, UA 1 010     pH, UA 6 5     Leukocytes, UA Trace (A)     Nitrite, UA Negative     Protein, UA Negative mg/dl      Glucose, UA Negative mg/dl      Ketones, UA Negative mg/dl      Urobilinogen, UA 0 2 E U /dl      Bilirubin, UA Negative     Blood, UA Negative    POCT pregnancy, urine [[id number]]  (Normal) Resulted:  09/21/18 2024    Lab Status:  Final result Updated:  09/21/18 2024     EXT PREG TEST UR (Ref: Negative) negative    Comprehensive metabolic panel [[id number]] Collected:  09/21/18 1940    Lab Status:  Final result Specimen:  Blood from Arm, Right Updated:  09/21/18 2005     Sodium 138 mmol/L      Potassium 4 3 mmol/L      Chloride 104 mmol/L      CO2 28 mmol/L      ANION GAP 6 mmol/L      BUN 8 mg/dL      Creatinine 0 67 mg/dL      Glucose 87 mg/dL      Calcium 9 0 mg/dL      AST 11 U/L      ALT 19 U/L      Alkaline Phosphatase 53 U/L      Total Protein 6 4 g/dL      Albumin 3 7 g/dL      Total Bilirubin 0 20 mg/dL      eGFR 120 ml/min/1 73sq m     Narrative:         National Kidney Disease Education Program recommendations are as follows:  GFR calculation is accurate only with a steady state creatinine  Chronic Kidney disease less than 60 ml/min/1 73 sq  meters  Kidney failure less than 15 ml/min/1 73 sq  meters      Lipase [[id number]]  (Normal) Collected:  09/21/18 1940    Lab Status:  Final result Specimen:  Blood from Arm, Right Updated:  09/21/18 2005     Lipase 79 u/L     CBC and differential [[id number]] Collected:  09/21/18 1940    Lab Status:  Final result Specimen:  Blood from Arm, Right Updated:  09/21/18 1948     WBC 6 52 Thousand/uL      RBC 4 42 Million/uL      Hemoglobin 13 5 g/dL      Hematocrit 40 0 %      MCV 91 fL      MCH 30 5 pg      MCHC 33 8 g/dL      RDW 12 4 %      MPV 10 0 fL      Platelets 093 Thousands/uL      nRBC 0 /100 WBCs      Neutrophils Relative 51 %      Immat GRANS % 0 %      Lymphocytes Relative 36 %      Monocytes Relative 7 %      Eosinophils Relative 5 %      Basophils Relative 1 %      Neutrophils Absolute 3 35 Thousands/µL      Immature Grans Absolute 0 02 Thousand/uL      Lymphocytes Absolute 2 36 Thousands/µL      Monocytes Absolute 0 45 Thousand/µL      Eosinophils Absolute 0 30 Thousand/µL      Basophils Absolute 0 04 Thousands/µL                  CT renal stone study abdomen pelvis without contrast   Final Result by Isidro Escalona MD (09/21 2052)         1  No evidence of nephrolithiasis or obstructive uropathy  2   No evidence of bowel obstruction, colitis or diverticulitis  Normal appendix  Workstation performed: [id number]                    Procedures  Procedures       Phone Contacts  ED Phone Contact    ED Course                               MDM  Number of Diagnoses or Management Options  Abdominal pain: new and requires workup  Nausea: new and requires workup     Amount and/or Complexity of Data Reviewed  Clinical lab tests: ordered and reviewed  Tests in the radiology section of CPT®: ordered and reviewed  Decide to obtain previous medical records or to obtain history from someone other than the patient: yes  Independent visualization of images, tracings, or specimens: yes    Risk of Complications, Morbidity, and/or Mortality  General comments: 60-year-old female with right-sided flank/abdominal pain radiating into the right lower quadrant, CT demonstrates moderate amount of stool otherwise unremarkable  Patient has normal blood work  No sign of recurrent kidney stone, no hematuria  Appendix visualized and normal  Patient's pain is improved though she does have nausea  I recommend initiating a bowel regimen as constipation may be triggering patient's pain  Discussed signs and symptoms to return to the emergency department  I did review the [facility]  ED web site  Patient has had 9 prescriptions for narcotics in the past year from [facility] providers  I do not feel that opiate medication is warranted at this time and will likely aggravate her existing constipation  Patient Progress  Patient progress: stable    CritCare Time    Disposition  Final diagnoses:   Abdominal pain   Nausea     Time reflects when diagnosis was documented in both MDM as applicable and the Disposition within this note     Time User Action Codes Description Comment    9/21/2018 10:22 PM Alice Lane Add [R10 9] Abdominal pain     9/21/2018 10:23 PM Alice Lane Add [R11 0] Nausea       ED Disposition     ED Disposition Condition Comment    Discharge  Mike Beto discharge to home/self care      Condition at discharge: Stable        Follow-up Information     Follow up With Specialties Details Why Contact Zack Sam DO General Practice Schedule an appointment as soon as possible for a visit in 2 days For recheck of current symptoms [address] [place] [id number]  [phone]            Discharge Medication List as of 9/21/2018 10:27 PM      START taking these medications    Details   dicyclomine (BENTYL) 20 mg tablet Take 1 tablet (20 mg total) by mouth every 6 (six) hours For crampy abdominal pain, Starting Fri 9/21/2018, Print      ondansetron (ZOFRAN) 4 mg tablet Take 1 tablet (4 mg total) by mouth every 6 (six) hours, Starting Fri 9/21/2018, Normal      polyethylene glycol (MIRALAX) 17 g packet Take 17 g by mouth daily Prn constipation, Starting Fri 9/21/2018, Print         CONTINUE these medications which have NOT CHANGED    Details   ibuprofen (MOTRIN) 800 mg tablet Take 800 mg by mouth as needed for mild pain, Historical Med      ranitidine (ZANTAC) 150 mg tablet Take 150 mg by mouth 2 (two) times a day, Historical Med           No discharge procedures on file      ED Provider  Electronically Signed by           Remington Palacios DO  09/21/18 4058

## 2018-09-22 NOTE — DISCHARGE INSTRUCTIONS
Acute Abdominal Pain   AMBULATORY CARE:   Acute abdominal pain  usually starts suddenly and gets worse quickly  Seek care immediately if:   · You vomit blood or cannot stop vomiting  · You have blood in your bowel movement or it looks like tar  · You have bleeding from your rectum  · Your abdomen is larger than usual, more painful, and hard  · You have severe pain in your abdomen  · You stop passing gas and having bowel movements  · You feel weak, dizzy, or faint  Contact your healthcare provider if:   · You have a fever  · You have new signs and symptoms  · Your symptoms do not get better with treatment  · You have questions or concerns about your condition or care  Treatment for acute abdominal pain  may depend on the cause of your abdominal pain  You may need any of the following:  · Medicines  may be given to decrease pain, treat an infection, and manage your symptoms, such as constipation  · Surgery  may be needed to treat a serious cause of abdominal pain  Examples include surgery to treat appendicitis or a blockage in your bowels  Manage your symptoms:   · Apply heat  on your abdomen for 20 to 30 minutes every 2 hours for as many days as directed  Heat helps decrease pain and muscle spasms  · Manage your stress  Stress may cause abdominal pain  Your healthcare provider may recommend relaxation techniques and deep breathing exercises to help decrease your stress  Your healthcare provider may recommend you talk to someone about your stress or anxiety, such as a counselor or a trusted friend  Get plenty of sleep and exercise regularly  · Limit or do not drink alcohol  Alcohol can make your abdominal pain worse  Ask your healthcare provider if it is safe for you to drink alcohol  Also ask how much is safe for you to drink  · Do not smoke  Nicotine and other chemicals in cigarettes can damage your esophagus and stomach   Ask your healthcare provider for information if you currently smoke and need help to quit  E-cigarettes or smokeless tobacco still contain nicotine  Talk to your healthcare provider before you use these products  Make changes to the food you eat as directed:  Do not eat foods that cause abdominal pain or other symptoms  Eat small meals more often  · Eat more high-fiber foods if you are constipated  High-fiber foods include fruits, vegetables, whole-grain foods, and legumes  · Do not eat foods that cause gas if you have bloating  Examples include broccoli, cabbage, and cauliflower  Do not drink soda or carbonated drinks, because these may also cause gas  · Do not eat foods or drinks that contain sorbitol or fructose if you have diarrhea and bloating  Some examples are fruit juices, candy, jelly, and sugar-free gum  · Do not eat high-fat foods, such as fried foods, cheeseburgers, hot dogs, and desserts  · Limit or do not drink caffeine  Caffeine may make symptoms, such as heart burn or nausea, worse  · Drink plenty of liquids to prevent dehydration from diarrhea or vomiting  Ask your healthcare provider how much liquid to drink each day and which liquids are best for you  Follow up with your healthcare provider as directed:  Write down your questions so you remember to ask them during your visits  © 2017 [employer] Information is for End User's use only and may not be sold, redistributed or otherwise used for commercial purposes  All illustrations and images included in CareNotes® are the copyrighted property of [employer]  or [employer]  The above information is an  only  It is not intended as medical advice for individual conditions or treatments  Talk to your doctor, nurse or pharmacist before following any medical regimen to see if it is safe and effective for you

## 2018-09-26 ENCOUNTER — HOSPITAL ENCOUNTER (EMERGENCY)
Facility: HOSPITAL | Age: 28
Discharge: HOME | End: 2018-09-26
Attending: EMERGENCY MEDICINE
Payer: COMMERCIAL

## 2018-09-26 VITALS
OXYGEN SATURATION: 100 % | SYSTOLIC BLOOD PRESSURE: 112 MMHG | DIASTOLIC BLOOD PRESSURE: 55 MMHG | HEART RATE: 98 BPM | RESPIRATION RATE: 18 BRPM | TEMPERATURE: 99.1 F

## 2018-09-26 DIAGNOSIS — K08.89 PAIN, DENTAL: Primary | ICD-10-CM

## 2018-09-26 DIAGNOSIS — R11.0 NAUSEA: ICD-10-CM

## 2018-09-26 PROCEDURE — 99282 EMERGENCY DEPT VISIT SF MDM: CPT

## 2018-09-26 RX ORDER — OXYCODONE HYDROCHLORIDE 5 MG/1
5 TABLET ORAL ONCE
Status: COMPLETED | OUTPATIENT
Start: 2018-09-26 | End: 2018-09-26

## 2018-09-26 RX ORDER — ONDANSETRON 4 MG/1
4 TABLET, FILM COATED ORAL EVERY 6 HOURS
Qty: 12 TABLET | Refills: 0 | Status: SHIPPED | OUTPATIENT
Start: 2018-09-26 | End: 2018-10-05

## 2018-09-26 RX ORDER — OXYCODONE HYDROCHLORIDE 5 MG/1
5 TABLET ORAL EVERY 6 HOURS PRN
Qty: 4 TABLET | Refills: 0 | Status: SHIPPED | OUTPATIENT
Start: 2018-09-26 | End: 2018-09-27

## 2018-09-26 RX ORDER — ONDANSETRON 4 MG/1
4 TABLET, ORALLY DISINTEGRATING ORAL ONCE
Status: COMPLETED | OUTPATIENT
Start: 2018-09-26 | End: 2018-09-26

## 2018-09-26 RX ADMIN — ONDANSETRON 4 MG: 4 TABLET, ORALLY DISINTEGRATING ORAL at 20:07

## 2018-09-26 RX ADMIN — OXYCODONE HYDROCHLORIDE 5 MG: 5 TABLET ORAL at 20:00

## 2018-09-26 NOTE — DISCHARGE INSTRUCTIONS
Toothache   WHAT YOU NEED TO KNOW:   A toothache is pain that is caused by irritation of the nerves in the center of your tooth  The irritation may be caused by several problems, such as a cavity, an infection, a cracked tooth, or gum disease  It is very important to follow up with your dentist so the cause of your toothache can be diagnosed and treated  This can help prevent more serious problems  DISCHARGE INSTRUCTIONS:   Medicines: You may  need any of the following:  · NSAIDs  decrease swelling and pain  This medicine can be bought with or without a doctor's order  This medicine can cause stomach bleeding or kidney problems in certain people  If you take blood thinner medicine, always ask your healthcare provider if NSAIDs are safe for you  Always read the medicine label and follow the directions on it before using this medicine  · Acetaminophen  decreases pain  It is available without a doctor's order  Ask how much to take and how often to take it  Follow directions  Acetaminophen can cause liver damage if not taken correctly  · Pain medicine  may be given as a pill or as medicine that you put directly on your tooth or gums  Do not wait until the pain is severe before you take this medicine  · Antibiotics  help fight or prevent an infection caused by bacteria  Take them as directed  · Take your medicine as directed  Contact your healthcare provider if you think your medicine is not helping or if you have side effects  Tell him of her if you are allergic to any medicine  Keep a list of the medicines, vitamins, and herbs you take  Include the amounts, and when and why you take them  Bring the list or the pill bottles to follow-up visits  Carry your medicine list with you in case of an emergency  Follow up with your dentist as directed: You may be referred to a dental surgeon  Write down your questions so you remember to ask them during your visits     Self-care:   · Rinse your mouth with warm salt water 4 times a day or as directed  · You may need to eat soft foods to help relieve pain caused by chewing  Contact your dentist if:   · You have questions or concerns about your condition or care  Return to the emergency department if:   · You have trouble breathing  · You have swelling in your face or neck  · You have a fever and chills  · You have trouble speaking or swallowing  · You have trouble opening or closing your mouth  © 2017 [employer] Information is for End User's use only and may not be sold, redistributed or otherwise used for commercial purposes  All illustrations and images included in CareNotes® are the copyrighted property of [employer]  or [employer]  The above information is an  only  It is not intended as medical advice for individual conditions or treatments  Talk to your doctor, nurse or pharmacist before following any medical regimen to see if it is safe and effective for you

## 2018-09-26 NOTE — ED PROVIDER NOTES
History  Chief Complaint   Patient presents with    Dental Pain     pt had teeth removed yesterday  here with c/o pain  unable to eat  71-year-old female presents to the emergency department with complaints of dental pain  States she had 2 teeth removed yesterday and has had persistent pain since the procedure  Taking Tylenol Motrin at home without relief of her symptoms  Patient states she has had difficult time eating due to pain and has also had some nausea  Currently taking amoxicillin post procedure  Had a procedure done at [place] [address] [facility]  Has not called today to notify of postprocedural pain  History provided by:  Patient   used: No        Prior to Admission Medications   Prescriptions Last Dose Informant Patient Reported?  Taking?   dicyclomine (BENTYL) 20 mg tablet   No No   Sig: Take 1 tablet (20 mg total) by mouth every 6 (six) hours For crampy abdominal pain   ibuprofen (MOTRIN) 800 mg tablet   Yes No   Sig: Take 800 mg by mouth as needed for mild pain   ondansetron (ZOFRAN) 4 mg tablet   No No   Sig: Take 1 tablet (4 mg total) by mouth every 6 (six) hours   polyethylene glycol (MIRALAX) 17 g packet   No No   Sig: Take 17 g by mouth daily Prn constipation   ranitidine (ZANTAC) 150 mg tablet   Yes No   Sig: Take 150 mg by mouth 2 (two) times a day      Facility-Administered Medications: None       Past Medical History:   Diagnosis Date    Asthma     GERD (gastroesophageal reflux disease)     Hernia, abdominal     Migraines        Past Surgical History:   Procedure Laterality Date    FL RETROGRADE PYELOGRAM  8/28/2018    [place] CYSTO/URETERO W/LITHOTRIPSY &INDWELL STENT INSRT Right 8/28/2018    Procedure: CYSTOSCOPY URETEROSCOPY WITH RETROGRADE PYELOGRAM AND INSERTION STENT URETERAL;  Surgeon: Jackson Callaway MD;  [facility];  Service: Urology       Family History   Problem Relation Age of Onset    Diabetes Mother     Hyperlipidemia Mother     Stroke Mother     Heart disease Mother     Nephrolithiasis Father     Nephrolithiasis Brother      I have reviewed and agree with the history as documented  Social History   Substance Use Topics    Smoking status: Current Every Day Smoker     Packs/day: 0 50     Types: Cigarettes    Smokeless tobacco: Never Used    Alcohol use No        Review of Systems   Constitutional: Negative for chills and fever  HENT: Positive for dental problem  Negative for congestion, drooling, ear pain, facial swelling, mouth sores, sneezing and sore throat  Eyes: Negative for pain  Respiratory: Negative for cough and shortness of breath  Cardiovascular: Negative for chest pain  Gastrointestinal: Positive for vomiting  Negative for abdominal pain  Musculoskeletal: Negative for neck pain  Skin: Negative for rash  Neurological: Negative for dizziness and headaches  Psychiatric/Behavioral: Negative for confusion  All other systems reviewed and are negative  Physical Exam  Physical Exam   Constitutional: She is oriented to person, place, and time  Vital signs are normal  She appears well-developed and well-nourished  No distress  HENT:   Head: Normocephalic and atraumatic  Right Ear: Hearing, tympanic membrane, external ear and ear canal normal    Left Ear: Hearing, tympanic membrane, external ear and ear canal normal    Nose: Nose normal    Mouth/Throat: Uvula is midline and oropharynx is clear and moist  No oropharyngeal exudate  Left upper incisor appears recently extracted  No bleeding at the site  Also with left lower extraction site the 2nd bicuspid 1st molar  No active bleeding  Tenderness to palpation  Mild amount of swelling  Eyes: Conjunctivae and EOM are normal  Right eye exhibits no discharge  Neck: Neck supple  No JVD present  Cardiovascular: Normal rate, regular rhythm and normal heart sounds  Exam reveals no gallop and no friction rub      No murmur heard   Pulmonary/Chest: Effort normal and breath sounds normal  No stridor  No respiratory distress  She has no wheezes  She has no rales  Musculoskeletal: Normal range of motion  Neurological: She is alert and oriented to person, place, and time  Skin: Skin is warm and dry  She is not diaphoretic  Psychiatric: She has a normal mood and affect  Her behavior is normal    Vitals reviewed  Vital Signs  ED Triage Vitals [09/26/18 1848]   Temperature Pulse Respirations Blood Pressure SpO2   99 1 °F (37 3 °C) 98 18 112/55 100 %      Temp Source Heart Rate Source Patient Position - Orthostatic VS BP Location FiO2 (%)   Oral Monitor Sitting Left arm --      Pain Score       Worst Possible Pain           Vitals:    09/26/18 1848   BP: 112/55   Pulse: 98   Patient Position - Orthostatic VS: Sitting       Visual Acuity      ED Medications  Medications   oxyCODONE (ROXICODONE) IR tablet 5 mg (not administered)   ondansetron (ZOFRAN-ODT) dispersible tablet 4 mg (not administered)       Diagnostic Studies  Results Reviewed     None                 No orders to display              Procedures  Procedures       Phone Contacts  ED Phone Contact    ED Course                               MDM  Number of Diagnoses or Management Options  Pain, dental:   Diagnosis management comments: Differential diagnosis includes but not limited to:  Postprocedure dental pain      Patient prescription history reviewed in the [facility]  Numerous small prescriptions for oxycodone see medicine 5/325 mg as well as more prescription for Tylenol with codeine prescribed on 9/22/2018 for 8 tablets      CritCare Time    Disposition  Final diagnoses:   Pain, dental     Time reflects when diagnosis was documented in both MDM as applicable and the Disposition within this note     Time User Action Codes Description Comment    9/26/2018  7:42 PM Meghan Ramirez Add [K08 89] Pain, dental     9/26/2018  7:42 PM Marychuy Veras Add [R11 0] Nausea       ED Disposition     ED Disposition Condition Comment    Discharge  Mike Pérez discharge to home/self care  Condition at discharge: Stable        Follow-up Information     Follow up With Specialties Details Why Marijaonnaa  Schedule an appointment as soon as possible for a visit  [address] [place] [address] [address]  [address] [employer] [address]  [phone]          Patient's Medications   Discharge Prescriptions    OXYCODONE (ROXICODONE) 5 MG IMMEDIATE RELEASE TABLET    Take 1 tablet (5 mg total) by mouth every 6 (six) hours as needed for moderate pain for up to 1 day Max Daily Amount: 20 mg       Start Date: 9/26/2018 End Date: 9/27/2018       Order Dose: 5 mg       Quantity: 4 tablet    Refills: 0     No discharge procedures on file      ED Provider  Electronically Signed by           Sofya Turner PA-C  09/26/18 1955

## 2018-09-28 ENCOUNTER — HOSPITAL ENCOUNTER (EMERGENCY)
Facility: HOSPITAL | Age: 28
Discharge: HOME | End: 2018-09-28
Attending: EMERGENCY MEDICINE | Admitting: EMERGENCY MEDICINE
Payer: COMMERCIAL

## 2018-09-28 VITALS
TEMPERATURE: 99.5 F | HEART RATE: 92 BPM | DIASTOLIC BLOOD PRESSURE: 59 MMHG | SYSTOLIC BLOOD PRESSURE: 129 MMHG | BODY MASS INDEX: 23.16 KG/M2 | OXYGEN SATURATION: 100 % | RESPIRATION RATE: 20 BRPM | WEIGHT: 118.6 LBS

## 2018-09-28 DIAGNOSIS — K08.89 PAIN, DENTAL: Primary | ICD-10-CM

## 2018-09-28 PROCEDURE — 99283 EMERGENCY DEPT VISIT LOW MDM: CPT

## 2018-09-28 RX ORDER — OXYCODONE HYDROCHLORIDE 5 MG/1
5 TABLET ORAL ONCE
Status: COMPLETED | OUTPATIENT
Start: 2018-09-28 | End: 2018-09-28

## 2018-09-28 RX ORDER — OXYCODONE HYDROCHLORIDE AND ACETAMINOPHEN 5; 325 MG/1; MG/1
1 TABLET ORAL EVERY 6 HOURS PRN
Qty: 12 TABLET | Refills: 0 | Status: SHIPPED | OUTPATIENT
Start: 2018-09-28 | End: 2018-10-01

## 2018-09-28 RX ADMIN — LIDOCAINE HYDROCHLORIDE 10 ML: 20 SOLUTION ORAL; TOPICAL at 18:23

## 2018-09-28 RX ADMIN — OXYCODONE HYDROCHLORIDE 5 MG: 5 TABLET ORAL at 18:24

## 2018-09-28 NOTE — DISCHARGE INSTRUCTIONS
Toothache   WHAT YOU NEED TO KNOW:   A toothache is pain that is caused by irritation of the nerves in the center of your tooth  The irritation may be caused by several problems, such as a cavity, an infection, a cracked tooth, or gum disease  It is very important to follow up with your dentist so the cause of your toothache can be diagnosed and treated  This can help prevent more serious problems  DISCHARGE INSTRUCTIONS:   Medicines: You may  need any of the following:  · NSAIDs  decrease swelling and pain  This medicine can be bought with or without a doctor's order  This medicine can cause stomach bleeding or kidney problems in certain people  If you take blood thinner medicine, always ask your healthcare provider if NSAIDs are safe for you  Always read the medicine label and follow the directions on it before using this medicine  · Acetaminophen  decreases pain  It is available without a doctor's order  Ask how much to take and how often to take it  Follow directions  Acetaminophen can cause liver damage if not taken correctly  · Pain medicine  may be given as a pill or as medicine that you put directly on your tooth or gums  Do not wait until the pain is severe before you take this medicine  · Antibiotics  help fight or prevent an infection caused by bacteria  Take them as directed  · Take your medicine as directed  Contact your healthcare provider if you think your medicine is not helping or if you have side effects  Tell him of her if you are allergic to any medicine  Keep a list of the medicines, vitamins, and herbs you take  Include the amounts, and when and why you take them  Bring the list or the pill bottles to follow-up visits  Carry your medicine list with you in case of an emergency  Follow up with your dentist as directed: You may be referred to a dental surgeon  Write down your questions so you remember to ask them during your visits     Self-care:   · Rinse your mouth with warm salt water 4 times a day or as directed  · You may need to eat soft foods to help relieve pain caused by chewing  Contact your dentist if:   · You have questions or concerns about your condition or care  Return to the emergency department if:   · You have trouble breathing  · You have swelling in your face or neck  · You have a fever and chills  · You have trouble speaking or swallowing  · You have trouble opening or closing your mouth  © 2017 [facility] Information is for End User's use only and may not be sold, redistributed or otherwise used for commercial purposes  All illustrations and images included in CareNotes® are the copyrighted property of A D A M [employer] Inc  or Esau Ramesh  The above information is an  only  It is not intended as medical advice for individual conditions or treatments  Talk to your doctor, nurse or pharmacist before following any medical regimen to see if it is safe and effective for you

## 2018-09-28 NOTE — ED PROVIDER NOTES
History  Chief Complaint   Patient presents with    Jaw Pain     PT reports "had my jaw bone filed down after a lower left side tooth was pulled at the [facility]  I called 4 dentists and 2 said they wont touch something another dentist did and 2 others said go to the ED  The pain is unbearable" PT "Not taking Toradol, it makes me sick, I had oxycodone, motrin, tylenol and nothing is helping"    Dental Pain     78-year-old female with past medical history of asthma, GERD, migraines, who presents to emergency department for 10/10 left lower dental pain status post getting tooth extracted and jaw filed down 3 days ago  Patient attempted to follow up with her dental surgeon who performed extraction, but they state that they are unable to see her for the next month  She attempted to contact multiple other oral surgeons, who refused to see the patient as they did not perform her surgery  They all recommend that the patient proceed to the emergency department for pain control  She is currently compliant with taking her amoxicillin for infection prophylaxis  She has attempted to relieve the pain with use of oxycodone, Motrin, Tylenol, but states that none of it is helping  Denies fevers, chills, facial swelling, redness, warmth, difficulty swallowing  History provided by:  Patient   used: No    Dental Pain   Associated symptoms: no congestion, no facial swelling, no fever, no headaches and no neck pain        Prior to Admission Medications   Prescriptions Last Dose Informant Patient Reported?  Taking?   dicyclomine (BENTYL) 20 mg tablet   No No   Sig: Take 1 tablet (20 mg total) by mouth every 6 (six) hours For crampy abdominal pain   ibuprofen (MOTRIN) 800 mg tablet   Yes No   Sig: Take 800 mg by mouth as needed for mild pain   ondansetron (ZOFRAN) 4 mg tablet   No No   Sig: Take 1 tablet (4 mg total) by mouth every 6 (six) hours   oxyCODONE (ROXICODONE) 5 mg immediate release tablet   No No   Sig: Take 1 tablet (5 mg total) by mouth every 6 (six) hours as needed for moderate pain for up to 1 day Max Daily Amount: 20 mg   polyethylene glycol (MIRALAX) 17 g packet   No No   Sig: Take 17 g by mouth daily Prn constipation   ranitidine (ZANTAC) 150 mg tablet   Yes No   Sig: Take 150 mg by mouth 2 (two) times a day      Facility-Administered Medications: None       Past Medical History:   Diagnosis Date    Asthma     GERD (gastroesophageal reflux disease)     Hernia, abdominal     Migraines        Past Surgical History:   Procedure Laterality Date    FL RETROGRADE PYELOGRAM  8/28/2018    [place] CYSTO/URETERO W/LITHOTRIPSY &INDWELL STENT INSRT Right 8/28/2018    Procedure: CYSTOSCOPY URETEROSCOPY WITH RETROGRADE PYELOGRAM AND INSERTION STENT URETERAL;  Surgeon: Jim Robles MD;  [facility];  Service: Urology    TOOTH EXTRACTION         Family History   Problem Relation Age of Onset    Diabetes Mother     Hyperlipidemia Mother     Stroke Mother     Heart disease Mother     Nephrolithiasis Father     Nephrolithiasis Brother      I have reviewed and agree with the history as documented  Social History   Substance Use Topics    Smoking status: Current Every Day Smoker     Packs/day: 1 00     Types: Cigarettes    Smokeless tobacco: Never Used    Alcohol use No        Review of Systems   Constitutional: Negative for chills and fever  HENT: Positive for dental problem  Negative for congestion, ear discharge, ear pain, facial swelling, rhinorrhea, sinus pain, sinus pressure, sore throat and trouble swallowing  Eyes: Negative  Respiratory: Negative for cough, chest tightness, shortness of breath and wheezing  Cardiovascular: Negative for chest pain, palpitations and leg swelling  Gastrointestinal: Negative for abdominal pain, constipation, diarrhea, nausea and vomiting  Genitourinary: Negative for dysuria, flank pain, frequency, hematuria and urgency  Musculoskeletal: Negative for arthralgias, back pain, gait problem, joint swelling, myalgias, neck pain and neck stiffness  Skin: Negative for color change, pallor, rash and wound  Neurological: Negative for dizziness, syncope, weakness, light-headedness, numbness and headaches  Psychiatric/Behavioral: Negative  Physical Exam  Physical Exam   Constitutional: She is oriented to person, place, and time  She appears well-developed and well-nourished  No distress  HENT:   Head: Normocephalic and atraumatic  Mouth/Throat:       Bilateral Tms are non-bulging and without erythema  Posterior oropharynx without erythema or edema  No tonsilar hypertrophy  Uvula is midline and non-edematous  Eyes: Pupils are equal, round, and reactive to light  Conjunctivae and EOM are normal    Neck: Normal range of motion  Neck supple  Cardiovascular: Normal rate, regular rhythm and intact distal pulses  Pulmonary/Chest: Effort normal and breath sounds normal  She has no wheezes  She has no rales  Abdominal: Soft  Bowel sounds are normal  She exhibits no distension  There is no tenderness  There is no rebound and no guarding  Musculoskeletal: Normal range of motion  She exhibits no edema or tenderness  Lymphadenopathy:     She has no cervical adenopathy  Neurological: She is alert and oriented to person, place, and time  No cranial nerve deficit or sensory deficit  She exhibits normal muscle tone  Coordination normal    Skin: Skin is warm and dry  Capillary refill takes less than 2 seconds  She is not diaphoretic  Psychiatric: She has a normal mood and affect  Her behavior is normal    Nursing note and vitals reviewed        Vital Signs  ED Triage Vitals   Temperature Pulse Respirations Blood Pressure SpO2   09/28/18 1713 09/28/18 1713 09/28/18 1713 09/28/18 1713 09/28/18 1713   99 5 °F (37 5 °C) 92 20 129/59 100 %      Temp Source Heart Rate Source Patient Position - Orthostatic VS BP Location FiO2 (%) 09/28/18 1713 09/28/18 1713 09/28/18 1713 09/28/18 1713 --   Oral Monitor Sitting Left arm       Pain Score       09/28/18 1824       Worst Possible Pain           Vitals:    09/28/18 1713   BP: 129/59   Pulse: 92   Patient Position - Orthostatic VS: Sitting       Visual Acuity      ED Medications  Medications   lidocaine viscous (XYLOCAINE) 2 % mucosal solution 10 mL (10 mL Swish & Spit Given 9/28/18 1823)   oxyCODONE (ROXICODONE) IR tablet 5 mg (5 mg Oral Given 9/28/18 1824)       Diagnostic Studies  Results Reviewed     None                 No orders to display              Procedures  Procedures       Phone Contacts  ED Phone Contact    ED Course                               MDM  Number of Diagnoses or Management Options  Pain, dental:   Diagnosis management comments: Differential Diagnosis includes but is not limited to:   Dental pain, dental abscess, dental infection  Treat with percocet and viscous lidocaine  Continue with amoxicillin  [address]  consulted with multiple small prescriptions of narcotics filled  Will dc home with oral surgery follow up  CritCare Time    Disposition  Final diagnoses:   Pain, dental     Time reflects when diagnosis was documented in both MDM as applicable and the Disposition within this note     Time User Action Codes Description Comment    9/28/2018  6:12 PM Rosendo Bower Add [K08 89] Pain, dental       ED Disposition     ED Disposition Condition Comment    Discharge  [facility] discharge to home/self care      Condition at discharge: Good        Follow-up Information     Follow up With Specialties Details Why Contact Info    Selvin Shows, DO General Practice In 2 days As needed, If symptoms worsen [address] [address] [facility]  Schedule an appointment as soon as possible for a visit  [address]  [place] [address]  [phone]          Discharge Medication List as of 9/28/2018  6:14 PM      START taking these medications    Details   lidocaine viscous (XYLOCAINE) 2 % mucosal solution Soak onto cotton ball and apply to site for 10 minutes 4 times a day as needed  , Print      oxyCODONE-acetaminophen (PERCOCET) 5-325 mg per tablet Take 1 tablet by mouth every 6 (six) hours as needed for severe pain for up to 3 days Max Daily Amount: 4 tablets, Starting Fri 9/28/2018, Until Mon 10/1/2018, Print         CONTINUE these medications which have NOT CHANGED    Details   dicyclomine (BENTYL) 20 mg tablet Take 1 tablet (20 mg total) by mouth every 6 (six) hours For crampy abdominal pain, Starting Fri 9/21/2018, Print      ibuprofen (MOTRIN) 800 mg tablet Take 800 mg by mouth as needed for mild pain, Historical Med      ondansetron (ZOFRAN) 4 mg tablet Take 1 tablet (4 mg total) by mouth every 6 (six) hours, Starting Wed 9/26/2018, Print      polyethylene glycol (MIRALAX) 17 g packet Take 17 g by mouth daily Prn constipation, Starting Fri 9/21/2018, Print      ranitidine (ZANTAC) 150 mg tablet Take 150 mg by mouth 2 (two) times a day, Historical Med         STOP taking these medications       oxyCODONE (ROXICODONE) 5 mg immediate release tablet Comments:   Reason for Stopping:             No discharge procedures on file      ED Provider  Electronically Signed by           Dana Hui PA-C  09/28/18 1942

## 2018-10-05 ENCOUNTER — APPOINTMENT (EMERGENCY)
Dept: RADIOLOGY | Facility: HOSPITAL | Age: 28
End: 2018-10-05
Payer: COMMERCIAL

## 2018-10-05 ENCOUNTER — HOSPITAL ENCOUNTER (EMERGENCY)
Facility: HOSPITAL | Age: 28
Discharge: HOME | End: 2018-10-05
Attending: EMERGENCY MEDICINE
Payer: COMMERCIAL

## 2018-10-05 VITALS
DIASTOLIC BLOOD PRESSURE: 84 MMHG | BODY MASS INDEX: 24.54 KG/M2 | RESPIRATION RATE: 16 BRPM | SYSTOLIC BLOOD PRESSURE: 155 MMHG | TEMPERATURE: 97.8 F | OXYGEN SATURATION: 98 % | WEIGHT: 125 LBS | HEIGHT: 60 IN | HEART RATE: 94 BPM

## 2018-10-05 DIAGNOSIS — J06.9 UPPER RESPIRATORY INFECTION: Primary | ICD-10-CM

## 2018-10-05 LAB — EXT PREG TEST URINE: NEGATIVE

## 2018-10-05 PROCEDURE — 81025 URINE PREGNANCY TEST: CPT | Performed by: EMERGENCY MEDICINE

## 2018-10-05 PROCEDURE — 71046 X-RAY EXAM CHEST 2 VIEWS: CPT

## 2018-10-05 PROCEDURE — 94640 AIRWAY INHALATION TREATMENT: CPT

## 2018-10-05 PROCEDURE — 99284 EMERGENCY DEPT VISIT MOD MDM: CPT

## 2018-10-05 RX ORDER — ONDANSETRON 4 MG/1
4 TABLET, ORALLY DISINTEGRATING ORAL EVERY 6 HOURS PRN
Qty: 20 TABLET | Refills: 0 | Status: SHIPPED | OUTPATIENT
Start: 2018-10-05 | End: 2018-10-15

## 2018-10-05 RX ORDER — ONDANSETRON 4 MG/1
4 TABLET, ORALLY DISINTEGRATING ORAL ONCE
Status: COMPLETED | OUTPATIENT
Start: 2018-10-05 | End: 2018-10-05

## 2018-10-05 RX ORDER — ACETAMINOPHEN 325 MG/1
650 TABLET ORAL ONCE
Status: COMPLETED | OUTPATIENT
Start: 2018-10-05 | End: 2018-10-05

## 2018-10-05 RX ORDER — IBUPROFEN 400 MG/1
800 TABLET ORAL ONCE
Status: COMPLETED | OUTPATIENT
Start: 2018-10-05 | End: 2018-10-05

## 2018-10-05 RX ORDER — AZITHROMYCIN 250 MG/1
500 TABLET, FILM COATED ORAL ONCE
Status: COMPLETED | OUTPATIENT
Start: 2018-10-05 | End: 2018-10-05

## 2018-10-05 RX ORDER — AZITHROMYCIN 250 MG/1
250 TABLET, FILM COATED ORAL DAILY
Qty: 6 TABLET | Refills: 0 | Status: SHIPPED | OUTPATIENT
Start: 2018-10-05 | End: 2018-10-10

## 2018-10-05 RX ORDER — ALBUTEROL SULFATE 2.5 MG/3ML
5 SOLUTION RESPIRATORY (INHALATION) ONCE
Status: COMPLETED | OUTPATIENT
Start: 2018-10-05 | End: 2018-10-05

## 2018-10-05 RX ADMIN — ACETAMINOPHEN 650 MG: 325 TABLET, FILM COATED ORAL at 23:15

## 2018-10-05 RX ADMIN — ONDANSETRON 4 MG: 4 TABLET, ORALLY DISINTEGRATING ORAL at 23:15

## 2018-10-05 RX ADMIN — IBUPROFEN 800 MG: 400 TABLET ORAL at 23:15

## 2018-10-05 RX ADMIN — ALBUTEROL SULFATE 5 MG: 2.5 SOLUTION RESPIRATORY (INHALATION) at 22:08

## 2018-10-05 RX ADMIN — AZITHROMYCIN 500 MG: 250 TABLET, FILM COATED ORAL at 23:15

## 2018-10-05 RX ADMIN — IPRATROPIUM BROMIDE 0.5 MG: 0.5 SOLUTION RESPIRATORY (INHALATION) at 22:08

## 2018-10-06 NOTE — DISCHARGE INSTRUCTIONS
Upper Respiratory Infection, Ambulatory Care   GENERAL INFORMATION:   An upper respiratory infection  is also called a common cold  It can affect your nose, throat, ears, and sinuses  Common symptoms include the following:   · Runny or stuffy nose    · Sneezing and coughing    · Sore throat or hoarseness    · Red, watery, and sore eyes    · Tiredness or restlessness    · Chills and fever    · Headache, body aches, or sore muscles  Seek immediate care for the following symptoms:   · Headaches or a stiff neck    · Bright lights hurt your eyes    · Chest pain or trouble breathing  Treatment for an upper respiratory infection  may include any of the following:  · Decongestants  help decrease nasal congestion and improve your breathing  Do not use decongestant sprays for more than a few days  · Cough suppressants  help decrease coughing  Ask your healthcare provider which type of cough medicine is best for you  Some cough medicines need a doctor's order  · NSAIDs  help decrease swelling and pain or fever  This medicine is available with or without a doctor's order  NSAIDs can cause stomach bleeding or kidney problems in certain people  If you take blood thinner medicine, always ask your healthcare provider if NSAIDs are safe for you  Always read the medicine label and follow directions  Care for an upper respiratory infection:   · Rest  until your fever is gone or you feel better  · Drink liquids as directed to prevent dehydration  You may need to drink 8 to 10 cups of liquid each day  Good liquids to drink include water, ginger ale, tea, or fruit juices  · Gargle  with warm salt water to help your sore throat feel better  Mix ¼ teaspoon salt with 1 cup warm water  You may also suck on hard candy or throat lozenges  · Saline nasal drops  help loosen your nasal congestion  They can be bought without a doctor's order      · Take a warm bath or shower  to help decrease body aches and help you breathe easier  · Use a cool-mist humidifier  to increase air moisture and make it easier for you to breathe  Prevent the spread of germs:   · Avoid others for the first 2 to 3 days of your cold  Germs are easily spread during this time  · Do not share food, drinks,  towels, or personal items with others  · Wash your hands often  Use soap and water  Wash your hands after you use the bathroom, change a child's diapers, or sneeze  Wash your hands before you prepare or eat food  Cover your mouth and nose with a tissue when you sneeze or cough  Follow up with your healthcare provider as directed:  Write down your questions so you remember to ask them during your visits  CARE AGREEMENT:   You have the right to help plan your care  Learn about your health condition and how it may be treated  Discuss treatment options with your caregivers to decide what care you want to receive  You always have the right to refuse treatment  The above information is an  only  It is not intended as medical advice for individual conditions or treatments  Talk to your doctor, nurse or pharmacist before following any medical regimen to see if it is safe and effective for you  © 2014 [address] is for End User's use only and may not be sold, redistributed or otherwise used for commercial purposes  All illustrations and images included in CareNotes® are the copyrighted property of [employer]  or Esau Ramesh

## 2018-10-15 ENCOUNTER — APPOINTMENT (EMERGENCY)
Dept: CT IMAGING | Facility: HOSPITAL | Age: 28
End: 2018-10-15
Payer: COMMERCIAL

## 2018-10-15 ENCOUNTER — HOSPITAL ENCOUNTER (EMERGENCY)
Facility: HOSPITAL | Age: 28
Discharge: HOME | End: 2018-10-15
Attending: EMERGENCY MEDICINE | Admitting: EMERGENCY MEDICINE
Payer: COMMERCIAL

## 2018-10-15 VITALS
DIASTOLIC BLOOD PRESSURE: 56 MMHG | RESPIRATION RATE: 18 BRPM | OXYGEN SATURATION: 99 % | SYSTOLIC BLOOD PRESSURE: 97 MMHG | TEMPERATURE: 98.2 F | BODY MASS INDEX: 24.19 KG/M2 | WEIGHT: 123.24 LBS | HEIGHT: 60 IN | HEART RATE: 68 BPM

## 2018-10-15 DIAGNOSIS — R10.31 BILATERAL LOWER ABDOMINAL CRAMPING: Primary | ICD-10-CM

## 2018-10-15 DIAGNOSIS — R10.32 BILATERAL LOWER ABDOMINAL CRAMPING: Primary | ICD-10-CM

## 2018-10-15 DIAGNOSIS — R11.0 NAUSEA: ICD-10-CM

## 2018-10-15 LAB
ALBUMIN SERPL BCP-MCNC: 3.6 G/DL (ref 3.5–5)
ALP SERPL-CCNC: 55 U/L (ref 46–116)
ALT SERPL W P-5'-P-CCNC: 19 U/L (ref 12–78)
ANION GAP SERPL CALCULATED.3IONS-SCNC: 8 MMOL/L (ref 4–13)
AST SERPL W P-5'-P-CCNC: 10 U/L (ref 5–45)
BASOPHILS # BLD AUTO: 0.05 THOUSANDS/ΜL (ref 0–0.1)
BASOPHILS NFR BLD AUTO: 1 % (ref 0–1)
BILIRUB SERPL-MCNC: 0.1 MG/DL (ref 0.2–1)
BILIRUB UR QL STRIP: NEGATIVE
BUN SERPL-MCNC: 12 MG/DL (ref 5–25)
CALCIUM SERPL-MCNC: 9.2 MG/DL (ref 8.3–10.1)
CHLORIDE SERPL-SCNC: 103 MMOL/L (ref 100–108)
CLARITY UR: CLEAR
CO2 SERPL-SCNC: 26 MMOL/L (ref 21–32)
COLOR UR: YELLOW
CREAT SERPL-MCNC: 0.77 MG/DL (ref 0.6–1.3)
EOSINOPHIL # BLD AUTO: 0.25 THOUSAND/ΜL (ref 0–0.61)
EOSINOPHIL NFR BLD AUTO: 4 % (ref 0–6)
ERYTHROCYTE [DISTWIDTH] IN BLOOD BY AUTOMATED COUNT: 12.4 % (ref 11.6–15.1)
EXT PREG TEST URINE: NEGATIVE
GFR SERPL CREATININE-BSD FRML MDRD: 105 ML/MIN/1.73SQ M
GLUCOSE SERPL-MCNC: 101 MG/DL (ref 65–140)
GLUCOSE UR STRIP-MCNC: NEGATIVE MG/DL
HCT VFR BLD AUTO: 41.5 % (ref 34.8–46.1)
HGB BLD-MCNC: 13.8 G/DL (ref 11.5–15.4)
HGB UR QL STRIP.AUTO: NEGATIVE
IMM GRANULOCYTES # BLD AUTO: 0.02 THOUSAND/UL (ref 0–0.2)
IMM GRANULOCYTES NFR BLD AUTO: 0 % (ref 0–2)
KETONES UR STRIP-MCNC: NEGATIVE MG/DL
LEUKOCYTE ESTERASE UR QL STRIP: NEGATIVE
LIPASE SERPL-CCNC: 89 U/L (ref 73–393)
LYMPHOCYTES # BLD AUTO: 2.51 THOUSANDS/ΜL (ref 0.6–4.47)
LYMPHOCYTES NFR BLD AUTO: 39 % (ref 14–44)
MCH RBC QN AUTO: 29.8 PG (ref 26.8–34.3)
MCHC RBC AUTO-ENTMCNC: 33.3 G/DL (ref 31.4–37.4)
MCV RBC AUTO: 90 FL (ref 82–98)
MONOCYTES # BLD AUTO: 0.45 THOUSAND/ΜL (ref 0.17–1.22)
MONOCYTES NFR BLD AUTO: 7 % (ref 4–12)
NEUTROPHILS # BLD AUTO: 3.16 THOUSANDS/ΜL (ref 1.85–7.62)
NEUTS SEG NFR BLD AUTO: 49 % (ref 43–75)
NITRITE UR QL STRIP: NEGATIVE
NRBC BLD AUTO-RTO: 0 /100 WBCS
PH UR STRIP.AUTO: 7 [PH] (ref 4.5–8)
PLATELET # BLD AUTO: 382 THOUSANDS/UL (ref 149–390)
PMV BLD AUTO: 9.1 FL (ref 8.9–12.7)
POTASSIUM SERPL-SCNC: 3.9 MMOL/L (ref 3.5–5.3)
PROT SERPL-MCNC: 6.8 G/DL (ref 6.4–8.2)
PROT UR STRIP-MCNC: NEGATIVE MG/DL
RBC # BLD AUTO: 4.63 MILLION/UL (ref 3.81–5.12)
SODIUM SERPL-SCNC: 137 MMOL/L (ref 136–145)
SP GR UR STRIP.AUTO: 1.02 (ref 1–1.03)
UROBILINOGEN UR QL STRIP.AUTO: 0.2 E.U./DL
WBC # BLD AUTO: 6.44 THOUSAND/UL (ref 4.31–10.16)

## 2018-10-15 PROCEDURE — 83690 ASSAY OF LIPASE: CPT | Performed by: EMERGENCY MEDICINE

## 2018-10-15 PROCEDURE — 80053 COMPREHEN METABOLIC PANEL: CPT | Performed by: EMERGENCY MEDICINE

## 2018-10-15 PROCEDURE — 96361 HYDRATE IV INFUSION ADD-ON: CPT

## 2018-10-15 PROCEDURE — 74177 CT ABD & PELVIS W/CONTRAST: CPT

## 2018-10-15 PROCEDURE — 81025 URINE PREGNANCY TEST: CPT | Performed by: EMERGENCY MEDICINE

## 2018-10-15 PROCEDURE — 36415 COLL VENOUS BLD VENIPUNCTURE: CPT | Performed by: EMERGENCY MEDICINE

## 2018-10-15 PROCEDURE — 85025 COMPLETE CBC W/AUTO DIFF WBC: CPT | Performed by: EMERGENCY MEDICINE

## 2018-10-15 PROCEDURE — 96375 TX/PRO/DX INJ NEW DRUG ADDON: CPT

## 2018-10-15 PROCEDURE — 96374 THER/PROPH/DIAG INJ IV PUSH: CPT

## 2018-10-15 PROCEDURE — 99284 EMERGENCY DEPT VISIT MOD MDM: CPT

## 2018-10-15 PROCEDURE — 81003 URINALYSIS AUTO W/O SCOPE: CPT

## 2018-10-15 PROCEDURE — 96376 TX/PRO/DX INJ SAME DRUG ADON: CPT

## 2018-10-15 RX ORDER — HYOSCYAMINE SULFATE 0.125 MG
0.12 TABLET ORAL EVERY 4 HOURS PRN
Qty: 20 TABLET | Refills: 0 | Status: SHIPPED | OUTPATIENT
Start: 2018-10-15 | End: 2018-12-16

## 2018-10-15 RX ORDER — ONDANSETRON 4 MG/1
4 TABLET, ORALLY DISINTEGRATING ORAL EVERY 6 HOURS PRN
Qty: 15 TABLET | Refills: 0 | Status: SHIPPED | OUTPATIENT
Start: 2018-10-15 | End: 2018-11-02 | Stop reason: ALTCHOICE

## 2018-10-15 RX ORDER — DICYCLOMINE HCL 20 MG
20 TABLET ORAL EVERY 6 HOURS PRN
Qty: 20 TABLET | Refills: 0 | Status: SHIPPED | OUTPATIENT
Start: 2018-10-15 | End: 2018-12-16

## 2018-10-15 RX ORDER — HYDROMORPHONE HCL/PF 1 MG/ML
1 SYRINGE (ML) INJECTION ONCE
Status: COMPLETED | OUTPATIENT
Start: 2018-10-15 | End: 2018-10-15

## 2018-10-15 RX ORDER — KETOROLAC TROMETHAMINE 30 MG/ML
15 INJECTION, SOLUTION INTRAMUSCULAR; INTRAVENOUS ONCE
Status: COMPLETED | OUTPATIENT
Start: 2018-10-15 | End: 2018-10-15

## 2018-10-15 RX ORDER — ONDANSETRON 2 MG/ML
4 INJECTION INTRAMUSCULAR; INTRAVENOUS ONCE
Status: COMPLETED | OUTPATIENT
Start: 2018-10-15 | End: 2018-10-15

## 2018-10-15 RX ADMIN — ONDANSETRON 4 MG: 2 INJECTION INTRAMUSCULAR; INTRAVENOUS at 21:22

## 2018-10-15 RX ADMIN — HYDROMORPHONE HYDROCHLORIDE 1 MG: 1 INJECTION, SOLUTION INTRAMUSCULAR; INTRAVENOUS; SUBCUTANEOUS at 18:34

## 2018-10-15 RX ADMIN — IOHEXOL 100 ML: 350 INJECTION, SOLUTION INTRAVENOUS at 21:09

## 2018-10-15 RX ADMIN — HYDROMORPHONE HYDROCHLORIDE 1 MG: 1 INJECTION, SOLUTION INTRAMUSCULAR; INTRAVENOUS; SUBCUTANEOUS at 20:50

## 2018-10-15 RX ADMIN — ONDANSETRON 4 MG: 2 INJECTION INTRAMUSCULAR; INTRAVENOUS at 18:34

## 2018-10-15 RX ADMIN — SODIUM CHLORIDE 1000 ML: 0.9 INJECTION, SOLUTION INTRAVENOUS at 18:34

## 2018-10-15 RX ADMIN — KETOROLAC TROMETHAMINE 15 MG: 30 INJECTION, SOLUTION INTRAMUSCULAR at 20:03

## 2018-10-15 NOTE — ED NOTES
Pt ambulated to restroom, unable to provide urine sample at this time       Stephanie Peck RN  10/15/18 8292

## 2018-10-15 NOTE — ED PROVIDER NOTES
History  Chief Complaint   Patient presents with    Flank Pain     B/L flank pain, mostly on the left side radiating into pelvis for about 2 days  Patient states she has nausea, denies vomitting, diarrhea  History provided by:  Patient   used: No     17-year-old female presented with bilateral pelvic pain with radiation to the groin beginning yesterday  States she took ibuprofen at home without any relief  Has had some nausea without vomiting  Reports a history of an abdominal hernia  No surgeries  No fever, chills, urinary symptoms, vaginal discharge  She does have a history of ureteral stone requiring stenting  Pain feels somewhat similar  On exam here she seems uncomfortable  Oropharynx clear  Abdomen soft with moderate tenderness in the left lower quadrant without rebound or guarding  Less tenderness in the right lower quadrant and minimal tenderness in the epigastrium  No palpable hernia  Prior to Admission Medications   Prescriptions Last Dose Informant Patient Reported?  Taking?   ibuprofen (MOTRIN) 800 mg tablet   Yes No   Sig: Take 800 mg by mouth as needed for mild pain   ranitidine (ZANTAC) 150 mg tablet   Yes No   Sig: Take 150 mg by mouth 2 (two) times a day      Facility-Administered Medications: None       Past Medical History:   Diagnosis Date    Asthma     GERD (gastroesophageal reflux disease)     Hernia, abdominal     Migraines        Past Surgical History:   Procedure Laterality Date    [place] RETROGRADE PYELOGRAM  8/28/2018    [place] CYSTO/URETERO W/LITHOTRIPSY &INDWELL STENT INSRT Right 8/28/2018    Procedure: CYSTOSCOPY URETEROSCOPY WITH RETROGRADE PYELOGRAM AND INSERTION STENT URETERAL;  Surgeon: Keven Mc MD;  Location: [facility];  Service: Urology    TOOTH EXTRACTION         Family History   Problem Relation Age of Onset    Diabetes Mother     Hyperlipidemia Mother     Stroke Mother     Heart disease Mother     Nephrolithiasis Father  Nephrolithiasis Brother      I have reviewed and agree with the history as documented  Social History   Substance Use Topics    Smoking status: Current Every Day Smoker     Packs/day: 1 00     Types: Cigarettes    Smokeless tobacco: Never Used    Alcohol use No        Review of Systems   Constitutional: Negative for activity change and appetite change  Respiratory: Negative for chest tightness and shortness of breath  Gastrointestinal: Positive for abdominal pain and nausea  Negative for vomiting  Genitourinary: Negative for difficulty urinating, dysuria, frequency, vaginal bleeding and vaginal discharge  Musculoskeletal: Negative for back pain and neck pain  Skin: Negative for color change and rash  Neurological: Negative for dizziness, weakness and headaches  All other systems reviewed and are negative  Physical Exam  Physical Exam   Constitutional: She appears well-developed and well-nourished  Seems uncomfortable  HENT:   Head: Normocephalic  Mouth/Throat: Oropharynx is clear and moist    Cardiovascular: Normal rate, regular rhythm and intact distal pulses  Pulmonary/Chest: Effort normal and breath sounds normal    Abdominal: Soft  She exhibits no distension  Tenderness in the left lower quadrant fairly focally  Also some tenderness in the right lower quadrant in the epigastrium  No rebound or guarding  No masses or palpable hernia  Musculoskeletal: Normal range of motion  She exhibits no edema or tenderness  Neurological: She is alert  Skin: Skin is warm and dry  Psychiatric: She has a normal mood and affect  Her behavior is normal    Nursing note and vitals reviewed        Vital Signs  ED Triage Vitals   Temperature Pulse Respirations Blood Pressure SpO2   10/15/18 1945 10/15/18 1800 10/15/18 1800 10/15/18 1800 10/15/18 1800   98 2 °F (36 8 °C) 71 18 118/60 96 %      Temp Source Heart Rate Source Patient Position - Orthostatic VS BP Location FiO2 (%)   10/15/18 1945 10/15/18 1800 10/15/18 1800 10/15/18 1800 --   Oral Monitor Sitting Right arm       Pain Score       10/15/18 1800       9           Vitals:    10/15/18 2015 10/15/18 2052 10/15/18 2115 10/15/18 2127   BP: 95/66 92/56 95/57 97/56   Pulse: 68 71 62 59   Patient Position - Orthostatic VS:  Sitting Lying Lying       Visual Acuity      ED Medications  Medications   HYDROmorphone (DILAUDID) injection 1 mg (1 mg Intravenous Given 10/15/18 1834)   sodium chloride 0 9 % bolus 1,000 mL (0 mL Intravenous Stopped 10/15/18 1943)   ondansetron (ZOFRAN) injection 4 mg (4 mg Intravenous Given 10/15/18 1834)   ketorolac (TORADOL) injection 15 mg (15 mg Intravenous Given 10/15/18 2003)   HYDROmorphone (DILAUDID) injection 1 mg (1 mg Intravenous Given 10/15/18 2050)   iohexol (OMNIPAQUE) 350 MG/ML injection (MULTI-DOSE) 100 mL (100 mL Intravenous Given 10/15/18 2109)   ondansetron (ZOFRAN) injection 4 mg (4 mg Intravenous Given 10/15/18 2122)       Diagnostic Studies  Results Reviewed     Procedure Component Value Units Date/Time    POCT pregnancy, urine [[id number]]  (Normal) Resulted:  10/15/18 2053    Lab Status:  Final result Updated:  10/15/18 2053     EXT PREG TEST UR (Ref: Negative) negative    ED Urine Macroscopic [[id number]] Collected:  10/15/18 2106    Lab Status:  Final result Specimen:  Urine Updated:  10/15/18 2052     Color, UA Yellow     Clarity, UA Clear     pH, UA 7 0     Leukocytes, UA Negative     Nitrite, UA Negative     Protein, UA Negative mg/dl      Glucose, UA Negative mg/dl      Ketones, UA Negative mg/dl      Urobilinogen, UA 0 2 E U /dl      Bilirubin, UA Negative     Blood, UA Negative     Specific Gravity, UA 1 020    Narrative:       CLINITEK RESULT    Comprehensive metabolic panel [[id number]]  (Abnormal) Collected:  10/15/18 1833    Lab Status:  Final result Specimen:  Blood from Arm, Right Updated:  10/15/18 1904     Sodium 137 mmol/L      Potassium 3 9 mmol/L      Chloride 103 mmol/L      CO2 26 mmol/L      ANION GAP 8 mmol/L      BUN 12 mg/dL      Creatinine 0 77 mg/dL      Glucose 101 mg/dL      Calcium 9 2 mg/dL      AST 10 U/L      ALT 19 U/L      Alkaline Phosphatase 55 U/L      Total Protein 6 8 g/dL      Albumin 3 6 g/dL      Total Bilirubin 0 10 (L) mg/dL      eGFR 105 ml/min/1 73sq m     Narrative:         National Kidney Disease Education Program recommendations are as follows:  GFR calculation is accurate only with a steady state creatinine  Chronic Kidney disease less than 60 ml/min/1 73 sq  meters  Kidney failure less than 15 ml/min/1 73 sq  meters  Lipase [[id number]]  (Normal) Collected:  10/15/18 1833    Lab Status:  Final result Specimen:  Blood from Arm, Right Updated:  10/15/18 1904     Lipase 89 u/L     CBC and differential [[id number]] Collected:  10/15/18 1833    Lab Status:  Final result Specimen:  Blood from Arm, Right Updated:  10/15/18 1841     WBC 6 44 Thousand/uL      RBC 4 63 Million/uL      Hemoglobin 13 8 g/dL      Hematocrit 41 5 %      MCV 90 fL      MCH 29 8 pg      MCHC 33 3 g/dL      RDW 12 4 %      MPV 9 1 fL      Platelets 157 Thousands/uL      nRBC 0 /100 WBCs      Neutrophils Relative 49 %      Immat GRANS % 0 %      Lymphocytes Relative 39 %      Monocytes Relative 7 %      Eosinophils Relative 4 %      Basophils Relative 1 %      Neutrophils Absolute 3 16 Thousands/µL      Immature Grans Absolute 0 02 Thousand/uL      Lymphocytes Absolute 2 51 Thousands/µL      Monocytes Absolute 0 45 Thousand/µL      Eosinophils Absolute 0 25 Thousand/µL      Basophils Absolute 0 05 Thousands/µL                  CT abdomen pelvis with contrast   Final Result by Gretta Roberson MD (10/15 2133)      1  No acute inflammatory process identified within the abdomen or pelvis  2   Stool-filled colon  Correlate for constipation              Workstation performed: [id number]                    Procedures  Procedures       Phone Contacts  ED Phone Contact    ED Course MDM  Number of Diagnoses or Management Options  Bilateral lower abdominal cramping:   Diagnosis management comments: 78-year-old female presented for evaluation of bilateral lower abdominal pain  More tender in the left lower quadrant on exam than the right  Labs, urine, CT unremarkable other than mild to moderate constipation  Patient reports that she has been having normal bowel movements  PDMP reviewed  The patient has had multiple opiate prescriptions from various providers for various complaints over the past 3 months  This may partly be the cause of her symptoms  Amount and/or Complexity of Data Reviewed  Clinical lab tests: ordered and reviewed  Tests in the radiology section of CPT®: ordered and reviewed    Patient Progress  Patient progress: improved    CritCare Time    Disposition  Final diagnoses:   Bilateral lower abdominal cramping     Time reflects when diagnosis was documented in both MDM as applicable and the Disposition within this note     Time User Action Codes Description Comment    10/15/2018 10:00 PM Onelia Hernandez Add [R10 31,  R10 32] Bilateral lower abdominal cramping       ED Disposition     ED Disposition Condition Comment    Discharge  Mike Beto discharge to home/self care      Condition at discharge: Stable        Follow-up Information     Follow up With Specialties Details Why Contact Info Additional [address] [facility] - [address] [facility],[address] Emergency Department Emergency Medicine  If symptoms worsen [address] [address]  [phone] [facility], [address], [place], [address]          Patient's Medications   Discharge Prescriptions    DICYCLOMINE (BENTYL) 20 MG TABLET    Take 1 tablet (20 mg total) by mouth every 6 (six) hours as needed (abdominal pain) Start Date: 10/15/2018End Date: --       Order Dose: 20 mg       Quantity: 20 tablet    Refills: 0     No discharge procedures on file      ED Provider  Electronically Signed by           David Dunn MD  10/15/18 [phone]

## 2018-10-16 NOTE — ED NOTES
Answered pt 's call bell, pt  Verbally aggressive, stating "they pressured me to pee now that I went no one wants to answer my call bell " Pt reassured that her needs will be met and that urine will be taken to be analyzed  Pt  Continues with vulgar language and degrading remarks about staff        Dez August RN  10/15/18 2059

## 2018-10-16 NOTE — ED NOTES
RN ambulated Pt to the bathroom to try to give urine sample at this time       Claudia Del Rosario RN  10/15/18 5648

## 2018-10-16 NOTE — ED NOTES
Pt discharge instructions reviewed, Pt has no further questions at this time  Pt awake and alert, no signs of acute distress noted  Pt ambulated out of ED with a steady gait       Luisa Aguilar RN  10/15/18 0855

## 2018-10-16 NOTE — ED NOTES
Pt reports Bentyl does not help her cramping, states she has some at home, requesting something else, provider aware       Frank Guadalupe, RN  10/15/18 5453

## 2018-10-18 ENCOUNTER — HOSPITAL ENCOUNTER (EMERGENCY)
Facility: HOSPITAL | Age: 28
Discharge: HOME | End: 2018-10-19
Attending: EMERGENCY MEDICINE | Admitting: EMERGENCY MEDICINE
Payer: COMMERCIAL

## 2018-10-18 DIAGNOSIS — R10.30 LOWER ABDOMINAL PAIN: Primary | ICD-10-CM

## 2018-10-18 DIAGNOSIS — M54.50 LOWER BACK PAIN: ICD-10-CM

## 2018-10-18 DIAGNOSIS — R11.0 NAUSEA: ICD-10-CM

## 2018-10-18 PROCEDURE — 81025 URINE PREGNANCY TEST: CPT | Performed by: EMERGENCY MEDICINE

## 2018-10-18 PROCEDURE — 99284 EMERGENCY DEPT VISIT MOD MDM: CPT

## 2018-10-18 RX ORDER — ONDANSETRON 2 MG/ML
4 INJECTION INTRAMUSCULAR; INTRAVENOUS ONCE
Status: COMPLETED | OUTPATIENT
Start: 2018-10-19 | End: 2018-10-19

## 2018-10-18 RX ORDER — HYDROMORPHONE HCL/PF 1 MG/ML
1 SYRINGE (ML) INJECTION ONCE
Status: COMPLETED | OUTPATIENT
Start: 2018-10-19 | End: 2018-10-19

## 2018-10-18 RX ORDER — SODIUM CHLORIDE 9 MG/ML
125 INJECTION, SOLUTION INTRAVENOUS CONTINUOUS
Status: DISCONTINUED | OUTPATIENT
Start: 2018-10-19 | End: 2018-10-19 | Stop reason: HOSPADM

## 2018-10-19 ENCOUNTER — APPOINTMENT (EMERGENCY)
Dept: CT IMAGING | Facility: HOSPITAL | Age: 28
End: 2018-10-19
Payer: COMMERCIAL

## 2018-10-19 VITALS
HEART RATE: 78 BPM | TEMPERATURE: 97.9 F | OXYGEN SATURATION: 97 % | RESPIRATION RATE: 14 BRPM | SYSTOLIC BLOOD PRESSURE: 101 MMHG | DIASTOLIC BLOOD PRESSURE: 67 MMHG

## 2018-10-19 LAB
ALBUMIN SERPL BCP-MCNC: 3.8 G/DL (ref 3.5–5)
ALP SERPL-CCNC: 58 U/L (ref 46–116)
ALT SERPL W P-5'-P-CCNC: 19 U/L (ref 12–78)
ANION GAP SERPL CALCULATED.3IONS-SCNC: 8 MMOL/L (ref 4–13)
AST SERPL W P-5'-P-CCNC: 11 U/L (ref 5–45)
B-HCG SERPL-ACNC: <2 MIU/ML
BASOPHILS # BLD AUTO: 0.05 THOUSANDS/ΜL (ref 0–0.1)
BASOPHILS NFR BLD AUTO: 1 % (ref 0–1)
BILIRUB SERPL-MCNC: 0.2 MG/DL (ref 0.2–1)
BILIRUB UR QL STRIP: NEGATIVE
BUN SERPL-MCNC: 12 MG/DL (ref 5–25)
CALCIUM SERPL-MCNC: 9.7 MG/DL (ref 8.3–10.1)
CHLAMYDIA DNA CVX QL NAA+PROBE: NORMAL
CHLORIDE SERPL-SCNC: 104 MMOL/L (ref 100–108)
CLARITY UR: CLEAR
CO2 SERPL-SCNC: 29 MMOL/L (ref 21–32)
COLOR UR: YELLOW
CREAT SERPL-MCNC: 0.91 MG/DL (ref 0.6–1.3)
EOSINOPHIL # BLD AUTO: 0.18 THOUSAND/ΜL (ref 0–0.61)
EOSINOPHIL NFR BLD AUTO: 2 % (ref 0–6)
ERYTHROCYTE [DISTWIDTH] IN BLOOD BY AUTOMATED COUNT: 12.5 % (ref 11.6–15.1)
EXT PREG TEST URINE: NEGATIVE
GFR SERPL CREATININE-BSD FRML MDRD: 86 ML/MIN/1.73SQ M
GLUCOSE SERPL-MCNC: 98 MG/DL (ref 65–140)
GLUCOSE UR STRIP-MCNC: NEGATIVE MG/DL
HCT VFR BLD AUTO: 40.2 % (ref 34.8–46.1)
HGB BLD-MCNC: 13.5 G/DL (ref 11.5–15.4)
HGB UR QL STRIP.AUTO: NEGATIVE
IMM GRANULOCYTES # BLD AUTO: 0.02 THOUSAND/UL (ref 0–0.2)
IMM GRANULOCYTES NFR BLD AUTO: 0 % (ref 0–2)
KETONES UR STRIP-MCNC: NEGATIVE MG/DL
LACTATE SERPL-SCNC: 1.2 MMOL/L (ref 0.5–2)
LEUKOCYTE ESTERASE UR QL STRIP: NEGATIVE
LIPASE SERPL-CCNC: 97 U/L (ref 73–393)
LYMPHOCYTES # BLD AUTO: 2.4 THOUSANDS/ΜL (ref 0.6–4.47)
LYMPHOCYTES NFR BLD AUTO: 29 % (ref 14–44)
MCH RBC QN AUTO: 30.1 PG (ref 26.8–34.3)
MCHC RBC AUTO-ENTMCNC: 33.6 G/DL (ref 31.4–37.4)
MCV RBC AUTO: 90 FL (ref 82–98)
MONOCYTES # BLD AUTO: 0.49 THOUSAND/ΜL (ref 0.17–1.22)
MONOCYTES NFR BLD AUTO: 6 % (ref 4–12)
N GONORRHOEA DNA GENITAL QL NAA+PROBE: NORMAL
NEUTROPHILS # BLD AUTO: 5.14 THOUSANDS/ΜL (ref 1.85–7.62)
NEUTS SEG NFR BLD AUTO: 62 % (ref 43–75)
NITRITE UR QL STRIP: NEGATIVE
NRBC BLD AUTO-RTO: 0 /100 WBCS
PH UR STRIP.AUTO: 7.5 [PH] (ref 4.5–8)
PLATELET # BLD AUTO: 328 THOUSANDS/UL (ref 149–390)
PMV BLD AUTO: 9.6 FL (ref 8.9–12.7)
POTASSIUM SERPL-SCNC: 4.3 MMOL/L (ref 3.5–5.3)
PROT SERPL-MCNC: 7.2 G/DL (ref 6.4–8.2)
PROT UR STRIP-MCNC: NEGATIVE MG/DL
RBC # BLD AUTO: 4.49 MILLION/UL (ref 3.81–5.12)
SODIUM SERPL-SCNC: 141 MMOL/L (ref 136–145)
SP GR UR STRIP.AUTO: 1.01 (ref 1–1.03)
UROBILINOGEN UR QL STRIP.AUTO: 0.2 E.U./DL
WBC # BLD AUTO: 8.28 THOUSAND/UL (ref 4.31–10.16)

## 2018-10-19 PROCEDURE — 87591 N.GONORRHOEAE DNA AMP PROB: CPT | Performed by: EMERGENCY MEDICINE

## 2018-10-19 PROCEDURE — 96374 THER/PROPH/DIAG INJ IV PUSH: CPT

## 2018-10-19 PROCEDURE — 96361 HYDRATE IV INFUSION ADD-ON: CPT

## 2018-10-19 PROCEDURE — 87491 CHLMYD TRACH DNA AMP PROBE: CPT | Performed by: EMERGENCY MEDICINE

## 2018-10-19 PROCEDURE — 96375 TX/PRO/DX INJ NEW DRUG ADDON: CPT

## 2018-10-19 PROCEDURE — 36415 COLL VENOUS BLD VENIPUNCTURE: CPT | Performed by: EMERGENCY MEDICINE

## 2018-10-19 PROCEDURE — 96376 TX/PRO/DX INJ SAME DRUG ADON: CPT

## 2018-10-19 PROCEDURE — 85025 COMPLETE CBC W/AUTO DIFF WBC: CPT | Performed by: EMERGENCY MEDICINE

## 2018-10-19 PROCEDURE — 81003 URINALYSIS AUTO W/O SCOPE: CPT

## 2018-10-19 PROCEDURE — 83690 ASSAY OF LIPASE: CPT | Performed by: EMERGENCY MEDICINE

## 2018-10-19 PROCEDURE — 80053 COMPREHEN METABOLIC PANEL: CPT | Performed by: EMERGENCY MEDICINE

## 2018-10-19 PROCEDURE — 83605 ASSAY OF LACTIC ACID: CPT | Performed by: EMERGENCY MEDICINE

## 2018-10-19 PROCEDURE — 74177 CT ABD & PELVIS W/CONTRAST: CPT

## 2018-10-19 PROCEDURE — 84702 CHORIONIC GONADOTROPIN TEST: CPT | Performed by: EMERGENCY MEDICINE

## 2018-10-19 RX ORDER — HYDROMORPHONE HCL/PF 1 MG/ML
1 SYRINGE (ML) INJECTION ONCE
Status: COMPLETED | OUTPATIENT
Start: 2018-10-19 | End: 2018-10-19

## 2018-10-19 RX ORDER — HYDROMORPHONE HCL/PF 1 MG/ML
1 SYRINGE (ML) INJECTION ONCE
Status: DISCONTINUED | OUTPATIENT
Start: 2018-10-19 | End: 2018-10-19

## 2018-10-19 RX ORDER — METOCLOPRAMIDE 10 MG/1
10 TABLET ORAL 4 TIMES DAILY
Qty: 28 TABLET | Refills: 0 | Status: SHIPPED | OUTPATIENT
Start: 2018-10-19 | End: 2018-10-26

## 2018-10-19 RX ORDER — ONDANSETRON 2 MG/ML
4 INJECTION INTRAMUSCULAR; INTRAVENOUS ONCE
Status: COMPLETED | OUTPATIENT
Start: 2018-10-19 | End: 2018-10-19

## 2018-10-19 RX ORDER — ACETAMINOPHEN 325 MG/1
975 TABLET ORAL ONCE
Status: COMPLETED | OUTPATIENT
Start: 2018-10-19 | End: 2018-10-19

## 2018-10-19 RX ADMIN — HYDROMORPHONE HYDROCHLORIDE 1 MG: 1 INJECTION, SOLUTION INTRAMUSCULAR; INTRAVENOUS; SUBCUTANEOUS at 00:10

## 2018-10-19 RX ADMIN — ONDANSETRON 4 MG: 2 INJECTION INTRAMUSCULAR; INTRAVENOUS at 01:05

## 2018-10-19 RX ADMIN — SODIUM CHLORIDE 1000 ML: 0.9 INJECTION, SOLUTION INTRAVENOUS at 00:10

## 2018-10-19 RX ADMIN — ONDANSETRON 4 MG: 2 INJECTION INTRAMUSCULAR; INTRAVENOUS at 00:10

## 2018-10-19 RX ADMIN — HYDROMORPHONE HYDROCHLORIDE 1 MG: 1 INJECTION, SOLUTION INTRAMUSCULAR; INTRAVENOUS; SUBCUTANEOUS at 00:49

## 2018-10-19 RX ADMIN — ACETAMINOPHEN 975 MG: 325 TABLET, FILM COATED ORAL at 02:36

## 2018-10-19 RX ADMIN — IOHEXOL 50 ML: 240 INJECTION, SOLUTION INTRATHECAL; INTRAVASCULAR; INTRAVENOUS; ORAL at 00:26

## 2018-10-19 RX ADMIN — IOHEXOL 100 ML: 350 INJECTION, SOLUTION INTRAVENOUS at 01:50

## 2018-10-19 NOTE — DISCHARGE INSTRUCTIONS
Abdominal Pain   WHAT YOU NEED TO KNOW:   Abdominal pain can be dull, achy, or sharp  You may have pain in one area of your abdomen, or in your entire abdomen  Your pain may be caused by a condition such as constipation, food sensitivity or poisoning, infection, or a blockage  Abdominal pain can also be from a hernia, appendicitis, or an ulcer  Liver, gallbladder, or kidney conditions can also cause abdominal pain  The cause of your abdominal pain may be unknown  DISCHARGE INSTRUCTIONS:   Return to the emergency department if:   · You have new chest pain or shortness of breath  · You have pulsing pain in your upper abdomen or lower back that suddenly becomes constant  · Your pain is in the right lower abdominal area and worsens with movement  · You have a fever over 100 4°F (38°C) or shaking chills  · You are vomiting and cannot keep food or liquids down  · Your pain does not improve or gets worse over the next 8 to 12 hours  · You see blood in your vomit or bowel movements, or they look black and tarry  · Your skin or the whites of your eyes turn yellow  · You are a woman and have a large amount of vaginal bleeding that is not your monthly period  Contact your healthcare provider if:   · You have pain in your lower back  · You are a man and have pain in your testicles  · You have pain when you urinate  · You have questions or concerns about your condition or care  Follow up with your healthcare provider within 24 hours or as directed:  Write down your questions so you remember to ask them during your visits  Medicines:   · Medicines  may be given to calm your stomach and prevent vomiting or to decrease pain  Ask how to take pain medicine safely  · Take your medicine as directed  Contact your healthcare provider if you think your medicine is not helping or if you have side effects  Tell him of her if you are allergic to any medicine   Keep a list of the medicines, vitamins, and herbs you take  Include the amounts, and when and why you take them  Bring the list or the pill bottles to follow-up visits  Carry your medicine list with you in case of an emergency  © 2017 [employer]  Information is for End User's use only and may not be sold, redistributed or otherwise used for commercial purposes  All illustrations and images included in CareNotes® are the copyrighted property of A [employer]  or [employer]  The above information is an  only  It is not intended as medical advice for individual conditions or treatments  Talk to your doctor, nurse or pharmacist before following any medical regimen to see if it is safe and effective for you  Acute Nausea and Vomiting   WHAT YOU NEED TO KNOW:   Acute nausea and vomiting start suddenly, worsen quickly, and last a short time  DISCHARGE INSTRUCTIONS:   Return to the emergency department if:   · You see blood in your vomit or your bowel movements  · You have sudden, severe pain in your chest and upper abdomen after hard vomiting or retching  · You have swelling in your neck and chest      · You are dizzy, cold, and thirsty and your eyes and mouth are dry  · You are urinating very little or not at all  · You have muscle weakness, leg cramps, and trouble breathing  · Your heart is beating much faster than normal      · You continue to vomit for more than 48 hours  Contact your healthcare provider if:   · You have frequent dry heaves (vomiting but nothing comes out)  · Your nausea and vomiting does not get better or go away after you use medicine  · You have questions or concerns about your condition or treatment  Medicines: You may need any of the following:  · Medicines  may be given to calm your stomach and stop your vomiting  You may also need medicines to help you feel more relaxed or to stop nausea and vomiting caused by motion sickness      · Gastrointestinal stimulants  are used to help empty your stomach and bowels  This may help decrease nausea and vomiting  · Take your medicine as directed  Contact your healthcare provider if you think your medicine is not helping or if you have side effects  Tell him or her if you are allergic to any medicine  Keep a list of the medicines, vitamins, and herbs you take  Include the amounts, and when and why you take them  Bring the list or the pill bottles to follow-up visits  Carry your medicine list with you in case of an emergency  Prevent or manage acute nausea and vomiting:   · Do not drink alcohol  Alcohol may upset or irritate your stomach  Too much alcohol can also cause acute nausea and vomiting  · Control stress  Headaches due to stress may cause nausea and vomiting  Find ways to relax and manage your stress  Get more rest and sleep  · Drink more liquids as directed  Vomiting can lead to dehydration  It is important to drink more liquids to help replace lost body fluids  Ask your healthcare provider how much liquid to drink each day and which liquids are best for you  Your provider may recommend that you drink an oral rehydration solution (ORS)  ORS contains water, salts, and sugar that are needed to replace the lost body fluids  Ask what kind of ORS to use, how much to drink, and where to get it  · Eat smaller meals, more often  Eat small amounts of food every 2 to 3 hours, even if you are not hungry  Food in your stomach may decrease your nausea  · Talk to your healthcare provider before you take over-the-counter (OTC) medicines  These medicines can cause serious problems if you use certain other medicines, or you have a medical condition  You may have problems if you use too much or use them for longer than the label says  Follow directions on the label carefully    Follow up with your healthcare provider as directed:  Write down your questions so you remember to ask them during your follow-up visits  © 2017 [facility] Information is for End User's use only and may not be sold, redistributed or otherwise used for commercial purposes  All illustrations and images included in CareNotes® are the copyrighted property of A D A M , Inc  or [employer]amesh  The above information is an  only  It is not intended as medical advice for individual conditions or treatments  Talk to your doctor, nurse or pharmacist before following any medical regimen to see if it is safe and effective for you  Back Pain   WHAT YOU NEED TO KNOW:   Back pain is common  It can be caused by many conditions, such as arthritis or the breakdown of spinal discs  Your risk for back pain is increased by injuries, lack of activity, or repeated bending and twisting  You may feel sore or stiff on one or both sides of your back  The pain may spread to your buttocks or thighs  DISCHARGE INSTRUCTIONS:   Medicines:   · NSAIDs  help decrease swelling and pain  This medicine is available with or without a doctor's order  NSAIDs can cause stomach bleeding or kidney problems in certain people  If you take blood thinner medicine, always ask your healthcare provider if NSAIDs are safe for you  Always read the medicine label and follow directions  · Acetaminophen  decreases pain  It is available without a doctor's order  Ask how much to take and how often to take it  Follow directions  Acetaminophen can cause liver damage if not taken correctly  · Prescription pain medicine  may be given  Ask your healthcare provider how to take this medicine safely  · Take your medicine as directed  Contact your healthcare provider if you think your medicine is not helping or if you have side effects  Tell him or her if you are allergic to any medicine  Keep a list of the medicines, vitamins, and herbs you take  Include the amounts, and when and why you take them  Bring the list or the pill bottles to follow-up visits  Carry your medicine list with you in case of an emergency  Follow up with your healthcare provider in 2 weeks, or as directed:  Write down your questions so you remember to ask them during your visits  How to manage your back pain:   · Apply ice  on your back or affected area for 15 to 20 minutes every hour or as directed  Use an ice pack, or put crushed ice in a plastic bag  Cover it with a towel  Ice helps prevent tissue damage and decreases pain  · Apply heat  on your back or affected area for 20 to 30 minutes every 2 hours for as many days as directed  Heat helps decrease pain and muscle spasms  · Stay active  as much as you can without causing more pain  Bed rest could make your back pain worse  Avoid heavy lifting until your pain is gone  Return to the emergency department if:   · You have pain, numbness, or weakness in one or both legs  · Your pain becomes so severe that you cannot walk  · You cannot control your urine or bowel movements  · You have severe back pain with chest pain  · You have severe back pain, nausea, and vomiting  · You have severe back pain that spreads to your side or genital area  Contact your healthcare provider if:   · You have back pain that does not get better with rest and pain medicine  · You have a fever  · You have pain that worsens when you are on your back or when you rest     · You have pain that worsens when you cough or sneeze  · You lose weight without trying  · You have questions or concerns about your condition or care  © 2017 [employer] Information is for End User's use only and may not be sold, redistributed or otherwise used for commercial purposes  All illustrations and images included in CareNotes® are the copyrighted property of [employer]  or [employer]au [employer]amesh  The above information is an  only  It is not intended as medical advice for individual conditions or treatments   Talk to your doctor, nurse or pharmacist before following any medical regimen to see if it is safe and effective for you

## 2018-10-19 NOTE — ED PROVIDER NOTES
History  Chief Complaint   Patient presents with    Abdominal Pain     Pt c/o RLQ and LLQ abdominal pain  Pt was evaluated here for the same and was dx with constipation  Pt last BM was today  Pt now states that pain is now radiating into both of her flanks  Patient is a 29year old female with worsening lower abdominal pain with radiation to both flanks and the back today  Was last seen in this ED on 10/15/18 for abdominal pain and nausea and had CT which showed increased stool  Patient states she has been moving her bowels  No diarrhea  No fever  No GI bleeding  No urinary sx  States she did not drive here  Has a h/o kidney stones  No abdominal surgery  [place] [facility] website checked on this patient and last Rx filled was on 10/2/18 for tylenol #3 for 1 day supply  History provided by:  Patient (don)   used: No    Abdominal Pain   Associated symptoms: nausea    Associated symptoms: no constipation, no diarrhea, no fever and no vomiting        Prior to Admission Medications   Prescriptions Last Dose Informant Patient Reported?  Taking?   dicyclomine (BENTYL) 20 mg tablet   No No   Sig: Take 1 tablet (20 mg total) by mouth every 6 (six) hours as needed (abdominal pain)   hyoscyamine (ANASPAZ,LEVSIN) 0 125 MG tablet   No No   Sig: Take 1 tablet (0 125 mg total) by mouth every 4 (four) hours as needed for cramping   ibuprofen (MOTRIN) 800 mg tablet   Yes No   Sig: Take 800 mg by mouth as needed for mild pain   ondansetron (ZOFRAN-ODT) 4 mg disintegrating tablet   No No   Sig: Take 1 tablet (4 mg total) by mouth every 6 (six) hours as needed for nausea or vomiting   ranitidine (ZANTAC) 150 mg tablet   Yes No   Sig: Take 150 mg by mouth 2 (two) times a day      Facility-Administered Medications: None       Past Medical History:   Diagnosis Date    Asthma     GERD (gastroesophageal reflux disease)     Hernia, abdominal     Migraines        Past Surgical History:   Procedure Laterality Date    FL RETROGRADE PYELOGRAM  8/28/2018    [place] CYSTO/URETERO W/LITHOTRIPSY &INDWELL STENT INSRT Right 8/28/2018    Procedure: CYSTOSCOPY URETEROSCOPY WITH RETROGRADE PYELOGRAM AND INSERTION STENT URETERAL;  Surgeon: Lowell Feliciano MD;  [facility];  Service: Urology    TOOTH EXTRACTION         Family History   Problem Relation Age of Onset    Diabetes Mother     Hyperlipidemia Mother     Stroke Mother     Heart disease Mother     Nephrolithiasis Father     Nephrolithiasis Brother      I have reviewed and agree with the history as documented  Social History   Substance Use Topics    Smoking status: Current Every Day Smoker     Packs/day: 0 50     Types: Cigarettes    Smokeless tobacco: Never Used    Alcohol use No        Review of Systems   Constitutional: Negative for fever  Gastrointestinal: Positive for abdominal pain and nausea  Negative for constipation, diarrhea and vomiting  Genitourinary: Positive for flank pain  Negative for difficulty urinating  Musculoskeletal: Positive for back pain  All other systems reviewed and are negative  Physical Exam  Physical Exam   Constitutional: She is oriented to person, place, and time  She appears well-developed and well-nourished  She appears distressed (moderate)  HENT:   Head: Normocephalic and atraumatic  Mouth/Throat: Oropharynx is clear and moist    Eyes: No scleral icterus  Neck: No tracheal deviation present  Cardiovascular: Regular rhythm and normal heart sounds  No murmur heard  Tachycardia  Pulmonary/Chest: Effort normal and breath sounds normal  No stridor  No respiratory distress  Abdominal: Soft  Bowel sounds are normal  She exhibits no distension  There is tenderness (diffuse lower)  There is no rebound and no guarding  No CVAT  Musculoskeletal: She exhibits no edema or deformity  Neurological: She is alert and oriented to person, place, and time  Skin: Skin is warm   No rash noted  She is diaphoretic  Psychiatric:   Anxious  Nursing note and vitals reviewed        Vital Signs  ED Triage Vitals [10/18/18 2348]   Temperature Pulse Respirations Blood Pressure SpO2   97 9 °F (36 6 °C) 105 18 121/59 99 %      Temp Source Heart Rate Source Patient Position - Orthostatic VS BP Location FiO2 (%)   Oral Monitor Sitting Right arm --      Pain Score       Worst Possible Pain           Vitals:    10/18/18 2348 10/19/18 0100   BP: 121/59 101/67   Pulse: 105 78   Patient Position - Orthostatic VS: Sitting Lying       Visual Acuity      ED Medications  Medications   sodium chloride 0 9 % infusion (not administered)   acetaminophen (TYLENOL) tablet 975 mg (not administered)   sodium chloride 0 9 % bolus 1,000 mL (0 mL Intravenous Stopped 10/19/18 0214)   HYDROmorphone (DILAUDID) injection 1 mg (1 mg Intravenous Given 10/19/18 0010)   ondansetron (ZOFRAN) injection 4 mg (4 mg Intravenous Given 10/19/18 0010)   iohexol (OMNIPAQUE) 240 MG/ML solution 50 mL (50 mL Oral Given 10/19/18 0026)   HYDROmorphone (DILAUDID) injection 1 mg (1 mg Intravenous Given 10/19/18 0049)   ondansetron (ZOFRAN) injection 4 mg (4 mg Intravenous Given 10/19/18 0105)   iohexol (OMNIPAQUE) 350 MG/ML injection (SINGLE-DOSE) 100 mL (100 mL Intravenous Given 10/19/18 0150)       Diagnostic Studies  Results Reviewed     Procedure Component Value Units Date/Time    Quantitative hCG [[id number]]  (Normal) Collected:  10/19/18 0005    Lab Status:  Final result Specimen:  Blood from Arm, Right Updated:  10/19/18 0042     HCG, Quant <2 mIU/mL     Narrative:          Expected Ranges:     Approximate               Approximate HCG  Gestation age          Concentration ( mIU/mL)  _____________          ______________________   Emily Forget                      HCG values  0 2-1                       5-50  1-2                           2-3                         100-5000  3-4                         500-27343  4-5 1000-74590  5-6                         [id number]  6-8                         87208-194336  8-12                        88347-301308    Lipase [[id number]]  (Normal) Collected:  10/19/18 0005    Lab Status:  Final result Specimen:  Blood from Arm, Right Updated:  10/19/18 0037     Lipase 97 u/L     Lactic acid, plasma [[id number]]  (Normal) Collected:  10/19/18 0005    Lab Status:  Final result Specimen:  Blood from Arm, Right Updated:  10/19/18 0032     LACTIC ACID 1 2 mmol/L     Narrative:         Result may be elevated if tourniquet was used during collection  POCT pregnancy, urine [[id number]]  (Normal) Resulted:  10/19/18 0025    Lab Status:  Final result Updated:  10/19/18 0030     EXT PREG TEST UR (Ref: Negative) negative    Comprehensive metabolic panel [[id number]] Collected:  10/19/18 0005    Lab Status:  Final result Specimen:  Blood from Arm, Right Updated:  10/19/18 0029     Sodium 141 mmol/L      Potassium 4 3 mmol/L      Chloride 104 mmol/L      CO2 29 mmol/L      ANION GAP 8 mmol/L      BUN 12 mg/dL      Creatinine 0 91 mg/dL      Glucose 98 mg/dL      Calcium 9 7 mg/dL      AST 11 U/L      ALT 19 U/L      Alkaline Phosphatase 58 U/L      Total Protein 7 2 g/dL      Albumin 3 8 g/dL      Total Bilirubin 0 20 mg/dL      eGFR 86 ml/min/1 73sq m     Narrative:         National Kidney Disease Education Program recommendations are as follows:  GFR calculation is accurate only with a steady state creatinine  Chronic Kidney disease less than 60 ml/min/1 73 sq  meters  Kidney failure less than 15 ml/min/1 73 sq  meters  [facility] amplified DNA by PCR [[id number]] Collected:  10/19/18 0005    Lab Status:   In process Specimen:  Urine from Urine, Other Updated:  10/19/18 0027    ED Urine Macroscopic [[id number]] Collected:  10/19/18 0039    Lab Status:  Final result Specimen:  Urine Updated:  10/19/18 0025     Color, UA Yellow     Clarity, UA Clear     pH, UA 7 5     Leukocytes, UA Negative     Nitrite, UA Negative     Protein, UA Negative mg/dl      Glucose, UA Negative mg/dl      Ketones, UA Negative mg/dl      Urobilinogen, UA 0 2 E U /dl      Bilirubin, UA Negative     Blood, UA Negative     Specific Gravity, UA 1 015    Narrative:       CLINITEK RESULT    CBC and differential [[id number]] Collected:  10/19/18 0005    Lab Status:  Final result Specimen:  Blood from Arm, Right Updated:  10/19/18 0011     WBC 8 28 Thousand/uL      RBC 4 49 Million/uL      Hemoglobin 13 5 g/dL      Hematocrit 40 2 %      MCV 90 fL      MCH 30 1 pg      MCHC 33 6 g/dL      RDW 12 5 %      MPV 9 6 fL      Platelets 723 Thousands/uL      nRBC 0 /100 WBCs      Neutrophils Relative 62 %      Immat GRANS % 0 %      Lymphocytes Relative 29 %      Monocytes Relative 6 %      Eosinophils Relative 2 %      Basophils Relative 1 %      Neutrophils Absolute 5 14 Thousands/µL      Immature Grans Absolute 0 02 Thousand/uL      Lymphocytes Absolute 2 40 Thousands/µL      Monocytes Absolute 0 49 Thousand/µL      Eosinophils Absolute 0 18 Thousand/µL      Basophils Absolute 0 05 Thousands/µL                  CT abdomen pelvis with contrast   ED Interpretation by Jerome Varghese MD (10/19 0083)   FINDINGS:      ABDOMEN      LOWER CHEST:  No clinically significant abnormality identified in the visualized lower chest       LIVER/BILIARY TREE:  Unremarkable  GALLBLADDER:  No calcified gallstones  No pericholecystic inflammatory change  SPLEEN:  Unremarkable  PANCREAS:  Unremarkable  ADRENAL GLANDS:  Unremarkable  KIDNEYS/URETERS:  One or more sharply circumscribed subcentimeter renal hypodensities are noted  These lesions are too small to accurately characterize, but are statistically most likely to represent benign cortical renal cyst(s)   According to the    guidelines published in the [facility] Paper of the ACR Incidental Findings Committee (Radiology 2010), no further workup of these lesions is recommended   No hydronephrosis  STOMACH AND BOWEL: Vivien [place] is no evidence of bowel obstruction  Vivien Honor is a moderate amount of stool throughout the colon suggesting a degree of constipation   Clinical correlation is recommended  APPENDIX:  No findings to suggest appendicitis  ABDOMINOPELVIC CAVITY:  No ascites or free intraperitoneal air  No lymphadenopathy  VESSELS:  Unremarkable for patient's age  PELVIS      REPRODUCTIVE ORGANS:  Unremarkable for patient's age  URINARY BLADDER:  Unremarkable  ABDOMINAL WALL/INGUINAL REGIONS:  Unremarkable  OSSEOUS STRUCTURES:  No acute fracture or destructive osseous lesion  Impression:        There is a moderate amount of stool throughout the colon suggesting a degree of constipation   Clinical correlation is recommended  Vivien [place] is no evidence of bowel obstruction  Probable small bilateral renal cysts   No evidence of hydronephrosis  Workstation performed: [id number]         Final Result by Ben Bennett MD (10/19 0882)      There is a moderate amount of stool throughout the colon suggesting a degree of constipation  Clinical correlation is recommended  There is no evidence of bowel obstruction  Probable small bilateral renal cysts  No evidence of hydronephrosis  Workstation performed: [id number]                    Procedures  Procedures       Phone Contacts  ED Phone Contact    ED Course  ED Course as of Oct 19 0234   Fri Oct 19, 2018   8066 Labs d/w patient and patient still with pain so more IV dilaudid ordered  7607 Patient with nausea so more IV zofran ordered  0217 Pain in back so tylenol ordered and no acute abdomen prior to discharge  CT d/w patient                                   MDM  Number of Diagnoses or Management Options  Diagnosis management comments: DDx including but not limited to: appendicitis, gastroenteritis, gastritis, PUD, GERD, gastroparesis, hepatitis, pancreatitis, colitis, enteritis, food poisoning, mesenteric adenitis, IBD, IBS, ileus, bowel obstruction, volvulus, cholecystitis, biliary colic, choledocholithiasis, perforated viscus, splenic etiology, diverticulitis, internal hernia, constipation, pelvic pathology, renal colic, pyelonephritis, UTI  Amount and/or Complexity of Data Reviewed  Clinical lab tests: ordered and reviewed  Tests in the radiology section of CPT®: ordered and reviewed  Decide to obtain previous medical records or to obtain history from someone other than the patient: yes  Review and summarize past medical records: yes      CritCare Time    Disposition  Final diagnoses:   Lower abdominal pain   Lower back pain   Nausea     Time reflects when diagnosis was documented in both MDM as applicable and the Disposition within this note     Time User Action Codes Description Comment    10/19/2018  2:32 AM Vickey Tello Add [R10 30] Lower abdominal pain     10/19/2018  2:32 AM Vickey Tello Add [M54 5] Lower back pain     10/19/2018  2:32 AM Vickey Tello Add [R11 0] Nausea       ED Disposition     ED Disposition Condition Comment    Discharge  [facility] discharge to home/self care  Condition at discharge: Stable        Follow-up Information     Follow up With Specialties Details Why Contact Zack Schumacher DO General Practice Call today return sooner if increased pain, fever, vomiting, diarrhea, difficulty urinating  Tylenol for pain  [id number] [facility]  [address] [facility] [address]  [phone]            Patient's Medications   Discharge Prescriptions    METOCLOPRAMIDE (REGLAN) 10 MG TABLET    Take 1 tablet (10 mg total) by mouth 4 (four) times a day for 7 days As needed for nausea       Start Date: 10/19/2018End Date: 10/26/2018       Order Dose: 10 mg       Quantity: 28 tablet    Refills: 0     No discharge procedures on file      ED Provider  Electronically Signed by           Kenneth Paez MD  10/19/18 9856

## 2018-10-19 NOTE — ED NOTES
Patient c/o increased pain in abdomen  Dr Danny Wilson made aware       Keesha Dinh, RN  10/19/18 8243

## 2018-10-26 ENCOUNTER — HOSPITAL ENCOUNTER (EMERGENCY)
Facility: HOSPITAL | Age: 28
Discharge: HOME | End: 2018-10-26
Attending: EMERGENCY MEDICINE
Payer: COMMERCIAL

## 2018-10-26 VITALS
HEART RATE: 110 BPM | OXYGEN SATURATION: 96 % | BODY MASS INDEX: 24.41 KG/M2 | WEIGHT: 125 LBS | DIASTOLIC BLOOD PRESSURE: 61 MMHG | TEMPERATURE: 98.1 F | RESPIRATION RATE: 16 BRPM | SYSTOLIC BLOOD PRESSURE: 117 MMHG

## 2018-10-26 DIAGNOSIS — R68.84 JAW PAIN: Primary | ICD-10-CM

## 2018-10-26 PROCEDURE — 99283 EMERGENCY DEPT VISIT LOW MDM: CPT

## 2018-10-26 RX ORDER — SENNOSIDES 8.6 MG
650 CAPSULE ORAL EVERY 8 HOURS PRN
Qty: 12 TABLET | Refills: 0 | Status: SHIPPED | OUTPATIENT
Start: 2018-10-26 | End: 2020-06-04

## 2018-10-27 NOTE — ED PROVIDER NOTES
History  Chief Complaint   Patient presents with    Jaw Pain     patient comes in with c/o jaw pain on the left side that states it has been painful for "a while, but the last two days have been hell " Patient denies headaches or ear aches     Darline Arauz is a 29 y o  female who presents to the ED with complaints of left jaw pain which radiates to the left ear "for a while" but has worsened over the past 2 days  Patient states she has tried PO Toradol, PO Tylenol, Heat, and Ice with no relief  Patient states she feels like her jaw is tight with spasm and noted a popping sensation when she tries to open her mouth  Patient states pain is worse with mastication  Patient states pain has been present since she had a tooth removed from the left lower jaw approximately 1 month ago when the "tooth was removed down to the bone " Patient states she was on antibiotics at this time  Patient states pain has been tolerable but has worsened over the past 2 days  Denies tooth pain, erythema, edema, drainage, sore throat, dysphagia, trismus, drooling, fever, chills, chest pain, SOB, arm pain  Of note, patient states she is taking Tylenol 325, Ibuprofen 800 mg and oragel with no relief  Patient states she is having some chronic tooth pain in the upper right tooth which she states she "broke a long time ago "         History provided by:  Patient      Prior to Admission Medications   Prescriptions Last Dose Informant Patient Reported?  Taking?   dicyclomine (BENTYL) 20 mg tablet   No Yes   Sig: Take 1 tablet (20 mg total) by mouth every 6 (six) hours as needed (abdominal pain)   hyoscyamine (ANASPAZ,LEVSIN) 0 125 MG tablet   No Yes   Sig: Take 1 tablet (0 125 mg total) by mouth every 4 (four) hours as needed for cramping   ibuprofen (MOTRIN) 800 mg tablet   Yes Yes   Sig: Take 800 mg by mouth as needed for mild pain   metoclopramide (REGLAN) 10 mg tablet   No Yes   Sig: Take 1 tablet (10 mg total) by mouth 4 (four) times a day for 7 days As needed for nausea   ondansetron (ZOFRAN-ODT) 4 mg disintegrating tablet   No Yes   Sig: Take 1 tablet (4 mg total) by mouth every 6 (six) hours as needed for nausea or vomiting   ranitidine (ZANTAC) 150 mg tablet   Yes Yes   Sig: Take 150 mg by mouth 2 (two) times a day      Facility-Administered Medications: None       Past Medical History:   Diagnosis Date    Asthma     GERD (gastroesophageal reflux disease)     Hernia, abdominal     Migraines        Past Surgical History:   Procedure Laterality Date    FL RETROGRADE PYELOGRAM  8/28/2018    [place] CYSTO/URETERO W/LITHOTRIPSY &INDWELL STENT INSRT Right 8/28/2018    Procedure: CYSTOSCOPY URETEROSCOPY WITH RETROGRADE PYELOGRAM AND INSERTION STENT URETERAL;  Surgeon: Kapil Cross MD;  [facility];  Service: Urology    TOOTH EXTRACTION         Family History   Problem Relation Age of Onset    Diabetes Mother     Hyperlipidemia Mother     Stroke Mother     Heart disease Mother     Nephrolithiasis Father     Nephrolithiasis Brother      I have reviewed and agree with the history as documented  Social History   Substance Use Topics    Smoking status: Current Every Day Smoker     Packs/day: 0 50     Types: Cigarettes    Smokeless tobacco: Never Used    Alcohol use No        Review of Systems   Constitutional: Negative for appetite change, chills, fever and unexpected weight change  HENT: Positive for dental problem  Negative for congestion, drooling, ear pain, facial swelling, hearing loss, rhinorrhea, sore throat, trouble swallowing and voice change  Jaw Pain   Eyes: Negative for pain, discharge, redness and visual disturbance  Respiratory: Negative for cough, shortness of breath, wheezing and stridor  Cardiovascular: Negative for chest pain, palpitations and leg swelling  Gastrointestinal: Negative for abdominal pain, blood in stool, constipation, diarrhea, nausea and vomiting     Genitourinary: Negative for dysuria, flank pain, frequency, hematuria and urgency  Musculoskeletal: Positive for arthralgias and myalgias  Negative for back pain, gait problem, joint swelling, neck pain and neck stiffness  Skin: Negative for color change, pallor, rash and wound  Neurological: Negative for dizziness, seizures, light-headedness and headaches  Physical Exam  Physical Exam   Constitutional: She is oriented to person, place, and time  Vital signs are normal  She appears well-developed and well-nourished  Non-toxic appearance  No distress  HENT:   Head: Normocephalic and atraumatic  Right Ear: External ear normal    Left Ear: External ear normal    Nose: Nose normal    Mouth/Throat: Uvula is midline, oropharynx is clear and moist and mucous membranes are normal  No oral lesions  No trismus in the jaw  Abnormal dentition  Dental caries present  No dental abscesses  Multiple missing teeth, no erythema or edema of the gingiva, no area of fluctuance  TTP of the TMJ and masseter, no clicking, crepitus or delayed movement of the jaw, no erythema or edema  Eyes: Pupils are equal, round, and reactive to light  Conjunctivae and EOM are normal    Cardiovascular: Normal rate and regular rhythm  Pulmonary/Chest: Effort normal and breath sounds normal    Neurological: She is alert and oriented to person, place, and time  Skin: Skin is warm and dry  Capillary refill takes less than 2 seconds  Psychiatric: She has a normal mood and affect  Nursing note and vitals reviewed        Vital Signs  ED Triage Vitals [10/26/18 2017]   Temperature Pulse Respirations Blood Pressure SpO2   98 1 °F (36 7 °C) (!) 110 16 117/61 96 %      Temp Source Heart Rate Source Patient Position - Orthostatic VS BP Location FiO2 (%)   Oral Monitor Sitting Left arm --      Pain Score       --           Vitals:    10/26/18 2017   BP: 117/61   Pulse: (!) 110   Patient Position - Orthostatic VS: Sitting       Visual Acuity      ED Medications  Medications - No data to display    Diagnostic Studies  Results Reviewed     None                 No orders to display              Procedures  Procedures       Phone Contacts  ED Phone Contact    ED Course  ED Course as of Oct 26 2154   Fri Oct 26, 2018   2037 I personally obtained records from the [facility] and [place]) for the patient  Patient has been given prescription for Tylenol #3 (10/02/18), Oxycodone/Acetaminophen (09/28/18), Oxycodone (09/26/18), Oxycodone/Acetaminophen (09/07/18, 09/03/18, 08/28/18, 08/03/18) all prescribed by different medical providers in different settings  In the last 30 days, patient has presented to the ED on multiple occasions for medical concerns of different systems  Concern for drug seeking behavior at this time  2059 Spoke extensively to patient regarding OTC remedies for jaw pain and/or possible TMJ disorder  Recommended and provided information for dental clinic follow-up  Provided patient with heat packs and massage recommendations at this time  Patient is stating that she needs stronger pain medication but I explained to patient that it is not indicated and I am concerned due to her repeated prescriptions for pain medication in the past 30 days  Patient verbalized understanding                                   MDM  Number of Diagnoses or Management Options  Jaw pain: new and does not require workup  Diagnosis management comments: Differential diagnosis included but not limited to: Jaw pain, TMJ dysfunction, mandibular disorder, referred tooth pain, poor dentition    Patient Progress  Patient progress: improved    CritCare Time    Disposition  Final diagnoses:   Jaw pain     Time reflects when diagnosis was documented in both MDM as applicable and the Disposition within this note     Time User Action Codes Description Comment    10/26/2018  8:50 PM Dorethea Scheuermann Add [R69 83] Jaw pain       ED Disposition     ED Disposition Condition Comment Discharge  Mike Pérez discharge to home/self care  Condition at discharge: Good        Follow-up Information     Follow up With Specialties Details Why Terence  Schedule an appointment as soon as possible for a visit  [address]  [facility]    [address] [address] [address]  Schedule an appointment as soon as possible for a visit  [address]  [phone]          Discharge Medication List as of 10/26/2018  8:52 PM      CONTINUE these medications which have NOT CHANGED    Details   dicyclomine (BENTYL) 20 mg tablet Take 1 tablet (20 mg total) by mouth every 6 (six) hours as needed (abdominal pain), Starting Mon 10/15/2018, Print      hyoscyamine (ANASPAZ,LEVSIN) 0 125 MG tablet Take 1 tablet (0 125 mg total) by mouth every 4 (four) hours as needed for cramping, Starting Mon 10/15/2018, Print      ibuprofen (MOTRIN) 800 mg tablet Take 800 mg by mouth as needed for mild pain, Historical Med      metoclopramide (REGLAN) 10 mg tablet Take 1 tablet (10 mg total) by mouth 4 (four) times a day for 7 days As needed for nausea, Starting Fri 10/19/2018, Until Fri 10/26/2018, Print      ondansetron (ZOFRAN-ODT) 4 mg disintegrating tablet Take 1 tablet (4 mg total) by mouth every 6 (six) hours as needed for nausea or vomiting, Starting Mon 10/15/2018, Print      ranitidine (ZANTAC) 150 mg tablet Take 150 mg by mouth 2 (two) times a day, Historical Med           No discharge procedures on file      ED Provider  Electronically Signed by           Latasha Mcwilliams PA-C  10/26/18 2107

## 2018-11-02 ENCOUNTER — APPOINTMENT (EMERGENCY)
Dept: CT IMAGING | Facility: HOSPITAL | Age: 28
End: 2018-11-02
Payer: COMMERCIAL

## 2018-11-02 ENCOUNTER — HOSPITAL ENCOUNTER (EMERGENCY)
Facility: HOSPITAL | Age: 28
Discharge: HOME | End: 2018-11-02
Attending: EMERGENCY MEDICINE | Admitting: EMERGENCY MEDICINE
Payer: COMMERCIAL

## 2018-11-02 VITALS
OXYGEN SATURATION: 98 % | TEMPERATURE: 98.1 F | DIASTOLIC BLOOD PRESSURE: 72 MMHG | SYSTOLIC BLOOD PRESSURE: 118 MMHG | RESPIRATION RATE: 20 BRPM | HEART RATE: 111 BPM

## 2018-11-02 DIAGNOSIS — R10.9 FLANK PAIN: Primary | ICD-10-CM

## 2018-11-02 DIAGNOSIS — R11.0 NAUSEA: ICD-10-CM

## 2018-11-02 LAB
ANION GAP SERPL CALCULATED.3IONS-SCNC: 9 MMOL/L (ref 4–13)
BACTERIA UR QL AUTO: ABNORMAL /HPF
BASOPHILS # BLD AUTO: 0.04 THOUSANDS/ΜL (ref 0–0.1)
BASOPHILS NFR BLD AUTO: 1 % (ref 0–1)
BILIRUB UR QL STRIP: NEGATIVE
BUN SERPL-MCNC: 10 MG/DL (ref 5–25)
CALCIUM SERPL-MCNC: 9.5 MG/DL (ref 8.3–10.1)
CHLORIDE SERPL-SCNC: 106 MMOL/L (ref 100–108)
CLARITY UR: CLEAR
CO2 SERPL-SCNC: 25 MMOL/L (ref 21–32)
COLOR UR: YELLOW
CREAT SERPL-MCNC: 0.72 MG/DL (ref 0.6–1.3)
EOSINOPHIL # BLD AUTO: 0.41 THOUSAND/ΜL (ref 0–0.61)
EOSINOPHIL NFR BLD AUTO: 5 % (ref 0–6)
ERYTHROCYTE [DISTWIDTH] IN BLOOD BY AUTOMATED COUNT: 13.1 % (ref 11.6–15.1)
EXT PREG TEST URINE: NEGATIVE
GFR SERPL CREATININE-BSD FRML MDRD: 114 ML/MIN/1.73SQ M
GLUCOSE SERPL-MCNC: 84 MG/DL (ref 65–140)
GLUCOSE UR STRIP-MCNC: NEGATIVE MG/DL
HCT VFR BLD AUTO: 39.3 % (ref 34.8–46.1)
HGB BLD-MCNC: 13.2 G/DL (ref 11.5–15.4)
HGB UR QL STRIP.AUTO: ABNORMAL
IMM GRANULOCYTES # BLD AUTO: 0.03 THOUSAND/UL (ref 0–0.2)
IMM GRANULOCYTES NFR BLD AUTO: 0 % (ref 0–2)
KETONES UR STRIP-MCNC: NEGATIVE MG/DL
LEUKOCYTE ESTERASE UR QL STRIP: NEGATIVE
LYMPHOCYTES # BLD AUTO: 1.54 THOUSANDS/ΜL (ref 0.6–4.47)
LYMPHOCYTES NFR BLD AUTO: 20 % (ref 14–44)
MCH RBC QN AUTO: 30.2 PG (ref 26.8–34.3)
MCHC RBC AUTO-ENTMCNC: 33.6 G/DL (ref 31.4–37.4)
MCV RBC AUTO: 90 FL (ref 82–98)
MONOCYTES # BLD AUTO: 0.54 THOUSAND/ΜL (ref 0.17–1.22)
MONOCYTES NFR BLD AUTO: 7 % (ref 4–12)
NEUTROPHILS # BLD AUTO: 5.04 THOUSANDS/ΜL (ref 1.85–7.62)
NEUTS SEG NFR BLD AUTO: 67 % (ref 43–75)
NITRITE UR QL STRIP: NEGATIVE
NON-SQ EPI CELLS URNS QL MICRO: ABNORMAL /HPF
NRBC BLD AUTO-RTO: 0 /100 WBCS
PH UR STRIP.AUTO: 7 [PH] (ref 4.5–8)
PLATELET # BLD AUTO: 251 THOUSANDS/UL (ref 149–390)
PMV BLD AUTO: 10.4 FL (ref 8.9–12.7)
POTASSIUM SERPL-SCNC: 4.2 MMOL/L (ref 3.5–5.3)
PROT UR STRIP-MCNC: NEGATIVE MG/DL
RBC # BLD AUTO: 4.37 MILLION/UL (ref 3.81–5.12)
RBC #/AREA URNS AUTO: ABNORMAL /HPF
SODIUM SERPL-SCNC: 140 MMOL/L (ref 136–145)
SP GR UR STRIP.AUTO: 1.02 (ref 1–1.03)
UROBILINOGEN UR QL STRIP.AUTO: 0.2 E.U./DL
WBC # BLD AUTO: 7.6 THOUSAND/UL (ref 4.31–10.16)
WBC #/AREA URNS AUTO: ABNORMAL /HPF

## 2018-11-02 PROCEDURE — 96374 THER/PROPH/DIAG INJ IV PUSH: CPT

## 2018-11-02 PROCEDURE — 81025 URINE PREGNANCY TEST: CPT | Performed by: EMERGENCY MEDICINE

## 2018-11-02 PROCEDURE — 96361 HYDRATE IV INFUSION ADD-ON: CPT

## 2018-11-02 PROCEDURE — 74176 CT ABD & PELVIS W/O CONTRAST: CPT

## 2018-11-02 PROCEDURE — 36415 COLL VENOUS BLD VENIPUNCTURE: CPT | Performed by: EMERGENCY MEDICINE

## 2018-11-02 PROCEDURE — 96376 TX/PRO/DX INJ SAME DRUG ADON: CPT

## 2018-11-02 PROCEDURE — 99284 EMERGENCY DEPT VISIT MOD MDM: CPT

## 2018-11-02 PROCEDURE — 80048 BASIC METABOLIC PNL TOTAL CA: CPT | Performed by: EMERGENCY MEDICINE

## 2018-11-02 PROCEDURE — 85025 COMPLETE CBC W/AUTO DIFF WBC: CPT | Performed by: EMERGENCY MEDICINE

## 2018-11-02 PROCEDURE — 96375 TX/PRO/DX INJ NEW DRUG ADDON: CPT

## 2018-11-02 PROCEDURE — 81001 URINALYSIS AUTO W/SCOPE: CPT

## 2018-11-02 RX ORDER — ONDANSETRON 2 MG/ML
4 INJECTION INTRAMUSCULAR; INTRAVENOUS ONCE
Status: COMPLETED | OUTPATIENT
Start: 2018-11-02 | End: 2018-11-02

## 2018-11-02 RX ORDER — IBUPROFEN 400 MG/1
800 TABLET ORAL ONCE
Status: COMPLETED | OUTPATIENT
Start: 2018-11-02 | End: 2018-11-02

## 2018-11-02 RX ORDER — KETOROLAC TROMETHAMINE 30 MG/ML
30 INJECTION, SOLUTION INTRAMUSCULAR; INTRAVENOUS ONCE
Status: COMPLETED | OUTPATIENT
Start: 2018-11-02 | End: 2018-11-02

## 2018-11-02 RX ADMIN — ONDANSETRON 4 MG: 2 INJECTION INTRAMUSCULAR; INTRAVENOUS at 21:50

## 2018-11-02 RX ADMIN — SODIUM CHLORIDE 1000 ML: 0.9 INJECTION, SOLUTION INTRAVENOUS at 20:51

## 2018-11-02 RX ADMIN — KETOROLAC TROMETHAMINE 30 MG: 30 INJECTION, SOLUTION INTRAMUSCULAR at 20:57

## 2018-11-02 RX ADMIN — ONDANSETRON 4 MG: 2 INJECTION INTRAMUSCULAR; INTRAVENOUS at 20:52

## 2018-11-02 RX ADMIN — IBUPROFEN 800 MG: 400 TABLET ORAL at 21:29

## 2018-11-02 NOTE — ED PROVIDER NOTES
History  Chief Complaint   Patient presents with    Pelvic Pain     pt presents ambulatory with c/o pelvic pain, worse on R side  dx with kidney stones at [place] last week     Patient presents to the emergency department complaining of right sided flank pain with radiation to the right lower quadrant area  She states she was at [facility] last week diagnosed with a kidney stone  She denies urinary symptoms  Denies any nausea vomiting or diarrhea  Denies fever chills or cough  Denies rash  Patient denies chest pain sob  She states Tylenol at home has not been working  She denies trauma fall or injury  Prior to Admission Medications   Prescriptions Last Dose Informant Patient Reported?  Taking?   acetaminophen (TYLENOL) 650 mg CR tablet   No Yes   Sig: Take 1 tablet (650 mg total) by mouth every 8 (eight) hours as needed for mild pain or moderate pain   dicyclomine (BENTYL) 20 mg tablet Not Taking at Unknown time  No No   Sig: Take 1 tablet (20 mg total) by mouth every 6 (six) hours as needed (abdominal pain)   Patient not taking: Reported on 11/2/2018    hyoscyamine (ANASPAZ,LEVSIN) 0 125 MG tablet Not Taking at Unknown time  No No   Sig: Take 1 tablet (0 125 mg total) by mouth every 4 (four) hours as needed for cramping   Patient not taking: Reported on 11/2/2018    ibuprofen (MOTRIN) 800 mg tablet   Yes Yes   Sig: Take 800 mg by mouth as needed for mild pain   ranitidine (ZANTAC) 150 mg tablet   Yes Yes   Sig: Take 150 mg by mouth 2 (two) times a day      Facility-Administered Medications: None       Past Medical History:   Diagnosis Date    Asthma     GERD (gastroesophageal reflux disease)     Hernia, abdominal     Migraines        Past Surgical History:   Procedure Laterality Date    [place] RETROGRADE PYELOGRAM  8/28/2018    [place] CYSTO/URETERO W/LITHOTRIPSY &INDWELL STENT INSRT Right 8/28/2018    Procedure: CYSTOSCOPY URETEROSCOPY WITH RETROGRADE PYELOGRAM AND INSERTION STENT URETERAL; Surgeon: Sascha Gonzalez MD;  Location: [facility];  Service: Urology    TOOTH EXTRACTION         Family History   Problem Relation Age of Onset    Diabetes Mother     Hyperlipidemia Mother     Stroke Mother     Heart disease Mother     Nephrolithiasis Father     Nephrolithiasis Brother      I have reviewed and agree with the history as documented  Social History   Substance Use Topics    Smoking status: Current Every Day Smoker     Packs/day: 0 50     Types: Cigarettes    Smokeless tobacco: Never Used    Alcohol use No        Review of Systems   Constitutional: Negative  Negative for activity change, appetite change, chills, diaphoresis, fatigue, fever and unexpected weight change  HENT: Negative  Negative for drooling, ear discharge, rhinorrhea, trouble swallowing and voice change  Eyes: Negative  Negative for photophobia and visual disturbance  Respiratory: Negative  Negative for cough, chest tightness and shortness of breath  Cardiovascular: Negative  Negative for chest pain, palpitations and leg swelling  Gastrointestinal: Negative  Negative for abdominal pain, blood in stool, constipation, diarrhea, nausea and vomiting  Endocrine: Negative  Genitourinary: Positive for flank pain  Negative for difficulty urinating, dysuria, frequency, hematuria, urgency, vaginal bleeding, vaginal discharge and vaginal pain  Musculoskeletal: Negative for back pain, neck pain and neck stiffness  Skin: Negative  Negative for rash and wound  Allergic/Immunologic: Negative  Neurological: Negative  Negative for dizziness, tremors, seizures, syncope, facial asymmetry, speech difficulty, weakness, light-headedness, numbness and headaches  Hematological: Negative  Does not bruise/bleed easily  Psychiatric/Behavioral: Negative  Negative for confusion  Physical Exam  Physical Exam   Constitutional: She is oriented to person, place, and time   She appears well-developed and well-nourished  Nontoxic appearance  No respiratory distress  Patient looks mildly uncomfortable sitting upright on the stretcher  HENT:   Head: Normocephalic and atraumatic  Right Ear: External ear normal    Left Ear: External ear normal    Mouth/Throat: Oropharynx is clear and moist    Eyes: Pupils are equal, round, and reactive to light  Conjunctivae and EOM are normal    Neck: Normal range of motion  Neck supple  Cardiovascular: Normal rate, regular rhythm, normal heart sounds and intact distal pulses  Pulmonary/Chest: Effort normal and breath sounds normal  No respiratory distress  She has no wheezes  She has no rales  She exhibits no tenderness  Abdominal: Soft  Bowel sounds are normal  She exhibits no distension and no mass  There is no tenderness  There is no rebound and no guarding  No hernia  No reproducible abdominal tenderness to palpation  No peritoneal signs  No masses or hernias  Musculoskeletal: Normal range of motion  She exhibits no edema, tenderness or deformity  Neurological: She is alert and oriented to person, place, and time  She has normal reflexes  She displays normal reflexes  No cranial nerve deficit or sensory deficit  She exhibits normal muscle tone  Coordination normal    Skin: Skin is warm and dry  No rash noted  Psychiatric: She has a normal mood and affect  Her behavior is normal  Judgment and thought content normal    Nursing note and vitals reviewed        Vital Signs  ED Triage Vitals [11/02/18 1948]   Temperature Pulse Respirations Blood Pressure SpO2   98 1 °F (36 7 °C) (!) 111 20 118/72 98 %      Temp Source Heart Rate Source Patient Position - Orthostatic VS BP Location FiO2 (%)   Oral Monitor -- -- --      Pain Score       Worst Possible Pain           Vitals:    11/02/18 1948   BP: 118/72   Pulse: (!) 111       Visual Acuity      ED Medications  Medications   sodium chloride 0 9 % bolus 1,000 mL (1,000 mL Intravenous New Bag 11/2/18 2051)   ketorolac (TORADOL) injection 30 mg (30 mg Intravenous Given 11/2/18 2057)   ondansetron (ZOFRAN) injection 4 mg (4 mg Intravenous Given 11/2/18 2052)   ibuprofen (MOTRIN) tablet 800 mg (800 mg Oral Given 11/2/18 2129)   ondansetron (ZOFRAN) injection 4 mg (4 mg Intravenous Given 11/2/18 2150)       Diagnostic Studies  Results Reviewed     Procedure Component Value Units Date/Time    CBC and differential [[id number]] Collected:  11/02/18 2059    Lab Status:  Final result Specimen:  Blood from Arm, Right Updated:  11/02/18 2118     WBC 7 60 Thousand/uL      RBC 4 37 Million/uL      Hemoglobin 13 2 g/dL      Hematocrit 39 3 %      MCV 90 fL      MCH 30 2 pg      MCHC 33 6 g/dL      RDW 13 1 %      MPV 10 4 fL      Platelets 872 Thousands/uL      nRBC 0 /100 WBCs      Neutrophils Relative 67 %      Immat GRANS % 0 %      Lymphocytes Relative 20 %      Monocytes Relative 7 %      Eosinophils Relative 5 %      Basophils Relative 1 %      Neutrophils Absolute 5 04 Thousands/µL      Immature Grans Absolute 0 03 Thousand/uL      Lymphocytes Absolute 1 54 Thousands/µL      Monocytes Absolute 0 54 Thousand/µL      Eosinophils Absolute 0 41 Thousand/µL      Basophils Absolute 0 04 Thousands/µL     Basic metabolic panel [[id number]] Collected:  11/02/18 2100    Lab Status:   In process Specimen:  Blood from Arm, Right Updated:  11/02/18 2116    Urine Microscopic [[id number]]  (Abnormal) Collected:  11/02/18 2020    Lab Status:  Final result Specimen:  Urine from Urine, Clean Catch Updated:  11/02/18 2030     RBC, UA 4-10 (A) /hpf      WBC, UA 2-4 (A) /hpf      Epithelial Cells Occasional /hpf      Bacteria, UA Occasional /hpf     POCT pregnancy, urine [[id number]]  (Normal) Resulted:  11/02/18 2007    Lab Status:  Final result Updated:  11/02/18 2007     EXT PREG TEST UR (Ref: Negative) NEGATIVE    ED Urine Macroscopic [[id number]]  (Abnormal) Collected:  11/02/18 2020    Lab Status:  Final result Specimen:  Urine Updated:  11/02/18 2005     Color, UA Yellow     Clarity, UA Clear     pH, UA 7 0     Leukocytes, UA Negative     Nitrite, UA Negative     Protein, UA Negative mg/dl      Glucose, UA Negative mg/dl      Ketones, UA Negative mg/dl      Urobilinogen, UA 0 2 E U /dl      Bilirubin, UA Negative     Blood, UA Large (A)     Specific [place], UA 1 020    Narrative:       CLINITEK RESULT                 CT renal stone study abdomen pelvis without contrast   Final Result by Dario Montano MD (11/02 2138)      No evidence of renal stone or hydronephrosis  Small amount of free fluid within the dependent pelvis, likely physiologic given patient's age and gender             Workstation performed: [id number]                    Procedures  Procedures       Phone Contacts  ED Phone Contact    ED Course  ED Course as of Nov 02 2156 Fri Nov 02, 2018 2148 Patient is stable for discharge  Her CT scan shows no evidence of intra-abdominal pathology or ureteral stone  Appendix looks normal   Discussed signs and symptoms that would require return to the emergency department  She will follow up with her private physician  [facility]  CritCare Time    Disposition  Final diagnoses:   Flank pain   Nausea     Time reflects when diagnosis was documented in both MDM as applicable and the Disposition within this note     Time User Action Codes Description Comment    11/2/2018  9:48 PM Octavia Mckeon Add [R10 9] Flank pain     11/2/2018  9:48 PM Octavia Mckeon Add [R11 0] Nausea       ED Disposition     ED Disposition Condition Comment    Discharge  [facility] discharge to home/self care      Condition at discharge: Stable        Follow-up Information     Follow up With Specialties Details Why Contact Info    Private physician  Schedule an appointment as soon as possible for a visit      Pascual Cain MD Urology Schedule an appointment as soon as possible for a visit Call for appointment [address] [facility] [id number]  [phone]            Patient's Medications   Discharge Prescriptions    No medications on file     No discharge procedures on file      ED Provider  Electronically Signed by           Johnathan Sherman MD  11/02/18 6018       Johnathan Sherman MD  11/02/18 1562

## 2018-11-03 NOTE — ED NOTES
PT awake and alert, no distress noted  No other questions upon d/c       April Maggy Gregory, RN  11/02/18 3217

## 2018-11-03 NOTE — DISCHARGE INSTRUCTIONS
Flank Pain   WHAT YOU NEED TO KNOW:   Flank pain is felt in the area below your ribcage and above your hip bones, often in the lower back  Your pain may be dull or so severe that you cannot get comfortable  The pain may stay in one area or radiate to another area  It may worsen and lighten in waves  Flank pain is often a sign of problems with your urinary tract, such as a kidney stone or infection  DISCHARGE INSTRUCTIONS:   Return to the emergency department if:   · You have a fever  · Your heart is fluttering or jumping  · You see blood in your urine  · Your pain radiates into your lower abdomen and genital area  · You have intense pain in your low back next to your spine  · You are much more tired than usual and have no desire to eat  · You have a headache and your muscles jerk  Contact your healthcare provider if:   · You have an upset stomach and are vomiting  · You have to urinate more often, and with urgency  · Your pain worsens or does not improve, and you cannot get comfortable  · You pass a stone when you urinate  · You have questions or concerns about your condition or care  Medicines: The following medicines may be ordered for you:  · Pain medicine  may help decrease or relieve your pain  Do not wait until the pain is severe before you take your medicine  · Antibiotics  may help treat a urinary tract infection caused by bacteria  · Take your medicine as directed  Contact your healthcare provider if you think your medicine is not helping or if you have side effects  Tell him of her if you are allergic to any medicine  Keep a list of the medicines, vitamins, and herbs you take  Include the amounts, and when and why you take them  Bring the list or the pill bottles to follow-up visits  Carry your medicine list with you in case of an emergency    Follow up with your healthcare provider in 1 to 2 days or as directed:  Write down your questions so you remember to ask them during your visits  © 2017 [employer] Information is for End User's use only and may not be sold, redistributed or otherwise used for commercial purposes  All illustrations and images included in CareNotes® are the copyrighted property of [employer]  or [employer]h  The above information is an  only  It is not intended as medical advice for individual conditions or treatments  Talk to your doctor, nurse or pharmacist before following any medical regimen to see if it is safe and effective for you  Flank Pain   AMBULATORY CARE:   Flank pain  is felt in the area below your ribcage and above your hip bones, often in the lower back  Your pain may be dull or so severe that you cannot get comfortable  The pain may stay in one area or radiate to another area  It may worsen and lighten in waves  Flank pain is often a sign of problems with your urinary tract, such as a kidney stone or infection  Seek care immediately if:   · You have a fever  · Your heart is fluttering or jumping  · You see blood in your urine  · Your pain radiates into your lower abdomen and genital area  · You have intense pain in your low back next to your spine  · You are much more tired than usual and have no desire to eat  · You have a headache and your muscles jerk  Contact your healthcare provider if:   · You have an upset stomach and are vomiting  · You have to urinate more often, and with urgency  · Your pain worsens or does not improve, and you cannot get comfortable  · You pass a stone when you urinate  · You have questions or concerns about your condition or care  Treatment for flank pain  may include medicine to decrease pain or treat a bacterial infection  Follow up with your healthcare provider as directed:  Write down your questions so you remember to ask them during your visits     © 2017 [employer] Information is for End User's use only and may not be sold, redistributed or otherwise used for commercial purposes  All illustrations and images included in CareNotes® are the copyrighted property of [employer]  or [employer]  The above information is an  only  It is not intended as medical advice for individual conditions or treatments  Talk to your doctor, nurse or pharmacist before following any medical regimen to see if it is safe and effective for you

## 2018-11-03 NOTE — ED NOTES
Patient transported to [facility], [address] [place] [address]  11/02/18 7652

## 2018-11-03 NOTE — ED NOTES
PT c/o "severe pain, toradol not helping"  S/W Dr Chanel Correia who gave me verbal order for 800mg of motrin     April M Jaison Beckford, RN  11/02/18 2128

## 2018-11-30 ENCOUNTER — HOSPITAL ENCOUNTER (EMERGENCY)
Facility: HOSPITAL | Age: 28
Discharge: HOME | End: 2018-12-01
Attending: EMERGENCY MEDICINE | Admitting: EMERGENCY MEDICINE
Payer: COMMERCIAL

## 2018-11-30 ENCOUNTER — APPOINTMENT (EMERGENCY)
Dept: CT IMAGING | Facility: HOSPITAL | Age: 28
End: 2018-11-30
Payer: COMMERCIAL

## 2018-11-30 VITALS
TEMPERATURE: 98 F | OXYGEN SATURATION: 99 % | BODY MASS INDEX: 23.47 KG/M2 | HEART RATE: 106 BPM | WEIGHT: 120.15 LBS | DIASTOLIC BLOOD PRESSURE: 64 MMHG | RESPIRATION RATE: 20 BRPM | SYSTOLIC BLOOD PRESSURE: 112 MMHG

## 2018-11-30 DIAGNOSIS — K08.89 TOOTHACHE: ICD-10-CM

## 2018-11-30 DIAGNOSIS — R11.0 NAUSEA: ICD-10-CM

## 2018-11-30 DIAGNOSIS — R10.9 RIGHT FLANK PAIN: Primary | ICD-10-CM

## 2018-11-30 DIAGNOSIS — N39.0 UTI (URINARY TRACT INFECTION): ICD-10-CM

## 2018-11-30 LAB
ALBUMIN SERPL BCP-MCNC: 4.1 G/DL (ref 3.5–5)
ALP SERPL-CCNC: 52 U/L (ref 46–116)
ALT SERPL W P-5'-P-CCNC: 21 U/L (ref 12–78)
ANION GAP SERPL CALCULATED.3IONS-SCNC: 11 MMOL/L (ref 4–13)
AST SERPL W P-5'-P-CCNC: 11 U/L (ref 5–45)
BACTERIA UR QL AUTO: ABNORMAL /HPF
BASOPHILS # BLD AUTO: 0.06 THOUSANDS/ΜL (ref 0–0.1)
BASOPHILS NFR BLD AUTO: 1 % (ref 0–1)
BILIRUB SERPL-MCNC: 0.2 MG/DL (ref 0.2–1)
BILIRUB UR QL STRIP: NEGATIVE
BUN SERPL-MCNC: 11 MG/DL (ref 5–25)
CALCIUM SERPL-MCNC: 9.8 MG/DL (ref 8.3–10.1)
CHLORIDE SERPL-SCNC: 104 MMOL/L (ref 100–108)
CK SERPL-CCNC: 39 U/L (ref 26–192)
CLARITY UR: ABNORMAL
CO2 SERPL-SCNC: 26 MMOL/L (ref 21–32)
COLOR UR: YELLOW
CREAT SERPL-MCNC: 0.77 MG/DL (ref 0.6–1.3)
EOSINOPHIL # BLD AUTO: 0.17 THOUSAND/ΜL (ref 0–0.61)
EOSINOPHIL NFR BLD AUTO: 3 % (ref 0–6)
ERYTHROCYTE [DISTWIDTH] IN BLOOD BY AUTOMATED COUNT: 12.9 % (ref 11.6–15.1)
EXT PREG TEST URINE: NEGATIVE
GFR SERPL CREATININE-BSD FRML MDRD: 105 ML/MIN/1.73SQ M
GLUCOSE SERPL-MCNC: 92 MG/DL (ref 65–140)
GLUCOSE UR STRIP-MCNC: NEGATIVE MG/DL
HCT VFR BLD AUTO: 40.9 % (ref 34.8–46.1)
HGB BLD-MCNC: 13.6 G/DL (ref 11.5–15.4)
HGB UR QL STRIP.AUTO: NEGATIVE
IMM GRANULOCYTES # BLD AUTO: 0.01 THOUSAND/UL (ref 0–0.2)
IMM GRANULOCYTES NFR BLD AUTO: 0 % (ref 0–2)
KETONES UR STRIP-MCNC: NEGATIVE MG/DL
LEUKOCYTE ESTERASE UR QL STRIP: ABNORMAL
LIPASE SERPL-CCNC: 90 U/L (ref 73–393)
LYMPHOCYTES # BLD AUTO: 2.33 THOUSANDS/ΜL (ref 0.6–4.47)
LYMPHOCYTES NFR BLD AUTO: 38 % (ref 14–44)
MCH RBC QN AUTO: 30 PG (ref 26.8–34.3)
MCHC RBC AUTO-ENTMCNC: 33.3 G/DL (ref 31.4–37.4)
MCV RBC AUTO: 90 FL (ref 82–98)
MONOCYTES # BLD AUTO: 0.54 THOUSAND/ΜL (ref 0.17–1.22)
MONOCYTES NFR BLD AUTO: 9 % (ref 4–12)
NEUTROPHILS # BLD AUTO: 3.11 THOUSANDS/ΜL (ref 1.85–7.62)
NEUTS SEG NFR BLD AUTO: 49 % (ref 43–75)
NITRITE UR QL STRIP: NEGATIVE
NON-SQ EPI CELLS URNS QL MICRO: ABNORMAL /HPF
NRBC BLD AUTO-RTO: 0 /100 WBCS
PH UR STRIP.AUTO: 7 [PH] (ref 4.5–8)
PLATELET # BLD AUTO: 243 THOUSANDS/UL (ref 149–390)
PMV BLD AUTO: 10 FL (ref 8.9–12.7)
POTASSIUM SERPL-SCNC: 3.7 MMOL/L (ref 3.5–5.3)
PROT SERPL-MCNC: 7.2 G/DL (ref 6.4–8.2)
PROT UR STRIP-MCNC: NEGATIVE MG/DL
RBC # BLD AUTO: 4.54 MILLION/UL (ref 3.81–5.12)
RBC #/AREA URNS AUTO: ABNORMAL /HPF
SODIUM SERPL-SCNC: 141 MMOL/L (ref 136–145)
SP GR UR STRIP.AUTO: 1.01 (ref 1–1.03)
UROBILINOGEN UR QL STRIP.AUTO: 0.2 E.U./DL
WBC # BLD AUTO: 6.22 THOUSAND/UL (ref 4.31–10.16)
WBC #/AREA URNS AUTO: ABNORMAL /HPF

## 2018-11-30 PROCEDURE — 96374 THER/PROPH/DIAG INJ IV PUSH: CPT

## 2018-11-30 PROCEDURE — 87086 URINE CULTURE/COLONY COUNT: CPT | Performed by: EMERGENCY MEDICINE

## 2018-11-30 PROCEDURE — 82550 ASSAY OF CK (CPK): CPT | Performed by: EMERGENCY MEDICINE

## 2018-11-30 PROCEDURE — 36415 COLL VENOUS BLD VENIPUNCTURE: CPT | Performed by: EMERGENCY MEDICINE

## 2018-11-30 PROCEDURE — 81025 URINE PREGNANCY TEST: CPT | Performed by: EMERGENCY MEDICINE

## 2018-11-30 PROCEDURE — 80053 COMPREHEN METABOLIC PANEL: CPT | Performed by: EMERGENCY MEDICINE

## 2018-11-30 PROCEDURE — 99284 EMERGENCY DEPT VISIT MOD MDM: CPT

## 2018-11-30 PROCEDURE — 74176 CT ABD & PELVIS W/O CONTRAST: CPT

## 2018-11-30 PROCEDURE — 87147 CULTURE TYPE IMMUNOLOGIC: CPT | Performed by: EMERGENCY MEDICINE

## 2018-11-30 PROCEDURE — 83690 ASSAY OF LIPASE: CPT | Performed by: EMERGENCY MEDICINE

## 2018-11-30 PROCEDURE — 85025 COMPLETE CBC W/AUTO DIFF WBC: CPT | Performed by: EMERGENCY MEDICINE

## 2018-11-30 PROCEDURE — 96361 HYDRATE IV INFUSION ADD-ON: CPT

## 2018-11-30 PROCEDURE — 96375 TX/PRO/DX INJ NEW DRUG ADDON: CPT

## 2018-11-30 PROCEDURE — 81001 URINALYSIS AUTO W/SCOPE: CPT

## 2018-11-30 RX ORDER — MORPHINE SULFATE 4 MG/ML
4 INJECTION, SOLUTION INTRAMUSCULAR; INTRAVENOUS ONCE
Status: COMPLETED | OUTPATIENT
Start: 2018-12-01 | End: 2018-12-01

## 2018-11-30 RX ORDER — MORPHINE SULFATE 4 MG/ML
4 INJECTION, SOLUTION INTRAMUSCULAR; INTRAVENOUS ONCE
Status: COMPLETED | OUTPATIENT
Start: 2018-11-30 | End: 2018-11-30

## 2018-11-30 RX ORDER — ONDANSETRON 2 MG/ML
4 INJECTION INTRAMUSCULAR; INTRAVENOUS ONCE
Status: COMPLETED | OUTPATIENT
Start: 2018-11-30 | End: 2018-11-30

## 2018-11-30 RX ADMIN — MORPHINE SULFATE 4 MG: 4 INJECTION INTRAVENOUS at 23:24

## 2018-11-30 RX ADMIN — SODIUM CHLORIDE 1000 ML: 0.9 INJECTION, SOLUTION INTRAVENOUS at 23:20

## 2018-11-30 RX ADMIN — ONDANSETRON 4 MG: 2 INJECTION INTRAMUSCULAR; INTRAVENOUS at 23:23

## 2018-12-01 PROCEDURE — 96376 TX/PRO/DX INJ SAME DRUG ADON: CPT

## 2018-12-01 RX ORDER — PENICILLIN V POTASSIUM 250 MG/1
500 TABLET ORAL ONCE
Status: COMPLETED | OUTPATIENT
Start: 2018-12-01 | End: 2018-12-01

## 2018-12-01 RX ORDER — METOCLOPRAMIDE 10 MG/1
10 TABLET ORAL 4 TIMES DAILY
Qty: 28 TABLET | Refills: 0 | Status: SHIPPED | OUTPATIENT
Start: 2018-12-01 | End: 2018-12-08

## 2018-12-01 RX ORDER — OXYCODONE HYDROCHLORIDE AND ACETAMINOPHEN 5; 325 MG/1; MG/1
1 TABLET ORAL EVERY 4 HOURS PRN
Qty: 20 TABLET | Refills: 0 | Status: SHIPPED | OUTPATIENT
Start: 2018-12-01 | End: 2018-12-05

## 2018-12-01 RX ORDER — PENICILLIN V POTASSIUM 500 MG/1
500 TABLET ORAL 4 TIMES DAILY
Qty: 28 TABLET | Refills: 0 | Status: SHIPPED | OUTPATIENT
Start: 2018-12-01 | End: 2018-12-08

## 2018-12-01 RX ORDER — NITROFURANTOIN 25; 75 MG/1; MG/1
100 CAPSULE ORAL ONCE
Status: COMPLETED | OUTPATIENT
Start: 2018-12-01 | End: 2018-12-01

## 2018-12-01 RX ORDER — ONDANSETRON 2 MG/ML
4 INJECTION INTRAMUSCULAR; INTRAVENOUS ONCE
Status: COMPLETED | OUTPATIENT
Start: 2018-12-01 | End: 2018-12-01

## 2018-12-01 RX ORDER — NITROFURANTOIN 25; 75 MG/1; MG/1
100 CAPSULE ORAL 2 TIMES DAILY
Qty: 14 CAPSULE | Refills: 0 | Status: SHIPPED | OUTPATIENT
Start: 2018-12-01 | End: 2018-12-08

## 2018-12-01 RX ADMIN — MORPHINE SULFATE 4 MG: 4 INJECTION INTRAVENOUS at 00:02

## 2018-12-01 RX ADMIN — ONDANSETRON 4 MG: 2 INJECTION INTRAMUSCULAR; INTRAVENOUS at 00:27

## 2018-12-01 RX ADMIN — PENICILLIN V POTASIUM 500 MG: 250 TABLET ORAL at 00:43

## 2018-12-01 RX ADMIN — NITROFURANTOIN (MONOHYDRATE/MACROCRYSTALS) 100 MG: 75; 25 CAPSULE ORAL at 00:43

## 2018-12-01 NOTE — DISCHARGE INSTRUCTIONS
Acute Nausea and Vomiting   WHAT YOU NEED TO KNOW:   Acute nausea and vomiting start suddenly, worsen quickly, and last a short time  DISCHARGE INSTRUCTIONS:   Return to the emergency department if:   · You see blood in your vomit or your bowel movements  · You have sudden, severe pain in your chest and upper abdomen after hard vomiting or retching  · You have swelling in your neck and chest      · You are dizzy, cold, and thirsty and your eyes and mouth are dry  · You are urinating very little or not at all  · You have muscle weakness, leg cramps, and trouble breathing  · Your heart is beating much faster than normal      · You continue to vomit for more than 48 hours  Contact your healthcare provider if:   · You have frequent dry heaves (vomiting but nothing comes out)  · Your nausea and vomiting does not get better or go away after you use medicine  · You have questions or concerns about your condition or treatment  Medicines: You may need any of the following:  · Medicines  may be given to calm your stomach and stop your vomiting  You may also need medicines to help you feel more relaxed or to stop nausea and vomiting caused by motion sickness  · Gastrointestinal stimulants  are used to help empty your stomach and bowels  This may help decrease nausea and vomiting  · Take your medicine as directed  Contact your healthcare provider if you think your medicine is not helping or if you have side effects  Tell him or her if you are allergic to any medicine  Keep a list of the medicines, vitamins, and herbs you take  Include the amounts, and when and why you take them  Bring the list or the pill bottles to follow-up visits  Carry your medicine list with you in case of an emergency  Prevent or manage acute nausea and vomiting:   · Do not drink alcohol  Alcohol may upset or irritate your stomach  Too much alcohol can also cause acute nausea and vomiting  · Control stress    Headaches due to stress may cause nausea and vomiting  Find ways to relax and manage your stress  Get more rest and sleep  · Drink more liquids as directed  Vomiting can lead to dehydration  It is important to drink more liquids to help replace lost body fluids  Ask your healthcare provider how much liquid to drink each day and which liquids are best for you  Your provider may recommend that you drink an oral rehydration solution (ORS)  ORS contains water, salts, and sugar that are needed to replace the lost body fluids  Ask what kind of ORS to use, how much to drink, and where to get it  · Eat smaller meals, more often  Eat small amounts of food every 2 to 3 hours, even if you are not hungry  Food in your stomach may decrease your nausea  · Talk to your healthcare provider before you take over-the-counter (OTC) medicines  These medicines can cause serious problems if you use certain other medicines, or you have a medical condition  You may have problems if you use too much or use them for longer than the label says  Follow directions on the label carefully  Follow up with your healthcare provider as directed:  Write down your questions so you remember to ask them during your follow-up visits  © 2017 [employer] [employer] Information is for End User's use only and may not be sold, redistributed or otherwise used for commercial purposes  All illustrations and images included in CareNotes® are the copyrighted property of [employer]  or Esau Ramesh  The above information is an  only  It is not intended as medical advice for individual conditions or treatments  Talk to your doctor, nurse or pharmacist before following any medical regimen to see if it is safe and effective for you  Flank Pain   WHAT YOU NEED TO KNOW:   Flank pain is felt in the area below your ribcage and above your hip bones, often in the lower back   Your pain may be dull or so severe that you cannot get comfortable  The pain may stay in one area or radiate to another area  It may worsen and lighten in waves  Flank pain is often a sign of problems with your urinary tract, such as a kidney stone or infection  DISCHARGE INSTRUCTIONS:   Return to the emergency department if:   · You have a fever  · Your heart is fluttering or jumping  · You see blood in your urine  · Your pain radiates into your lower abdomen and genital area  · You have intense pain in your low back next to your spine  · You are much more tired than usual and have no desire to eat  · You have a headache and your muscles jerk  Contact your healthcare provider if:   · You have an upset stomach and are vomiting  · You have to urinate more often, and with urgency  · Your pain worsens or does not improve, and you cannot get comfortable  · You pass a stone when you urinate  · You have questions or concerns about your condition or care  Medicines: The following medicines may be ordered for you:  · Pain medicine  may help decrease or relieve your pain  Do not wait until the pain is severe before you take your medicine  · Antibiotics  may help treat a urinary tract infection caused by bacteria  · Take your medicine as directed  Contact your healthcare provider if you think your medicine is not helping or if you have side effects  Tell him of her if you are allergic to any medicine  Keep a list of the medicines, vitamins, and herbs you take  Include the amounts, and when and why you take them  Bring the list or the pill bottles to follow-up visits  Carry your medicine list with you in case of an emergency  Follow up with your healthcare provider in 1 to 2 days or as directed:  Write down your questions so you remember to ask them during your visits  © 2017 [employer] [employer] Information is for End User's use only and may not be sold, redistributed or otherwise used for commercial purposes   All illustrations and images included in CareNotes® are the copyrighted property of [employer]  or Esau Ramesh  The above information is an  only  It is not intended as medical advice for individual conditions or treatments  Talk to your doctor, nurse or pharmacist before following any medical regimen to see if it is safe and effective for you  Toothache   WHAT YOU NEED TO KNOW:   A toothache is pain that is caused by irritation of the nerves in the center of your tooth  The irritation may be caused by several problems, such as a cavity, an infection, a cracked tooth, or gum disease  It is very important to follow up with your dentist so the cause of your toothache can be diagnosed and treated  This can help prevent more serious problems  DISCHARGE INSTRUCTIONS:   Medicines: You may  need any of the following:  · NSAIDs  decrease swelling and pain  This medicine can be bought with or without a doctor's order  This medicine can cause stomach bleeding or kidney problems in certain people  If you take blood thinner medicine, always ask your healthcare provider if NSAIDs are safe for you  Always read the medicine label and follow the directions on it before using this medicine  · Acetaminophen  decreases pain  It is available without a doctor's order  Ask how much to take and how often to take it  Follow directions  Acetaminophen can cause liver damage if not taken correctly  · Pain medicine  may be given as a pill or as medicine that you put directly on your tooth or gums  Do not wait until the pain is severe before you take this medicine  · Antibiotics  help fight or prevent an infection caused by bacteria  Take them as directed  · Take your medicine as directed  Contact your healthcare provider if you think your medicine is not helping or if you have side effects  Tell him of her if you are allergic to any medicine   Keep a list of the medicines, vitamins, and herbs you take  Include the amounts, and when and why you take them  Bring the list or the pill bottles to follow-up visits  Carry your medicine list with you in case of an emergency  Follow up with your dentist as directed: You may be referred to a dental surgeon  Write down your questions so you remember to ask them during your visits  Self-care:   · Rinse your mouth with warm salt water 4 times a day or as directed  · You may need to eat soft foods to help relieve pain caused by chewing  Contact your dentist if:   · You have questions or concerns about your condition or care  Return to the emergency department if:   · You have trouble breathing  · You have swelling in your face or neck  · You have a fever and chills  · You have trouble speaking or swallowing  · You have trouble opening or closing your mouth  © 2017 [address] [employer]  Information is for End User's use only and may not be sold, redistributed or otherwise used for commercial purposes  All illustrations and images included in CareNotes® are the copyrighted property of [employer]  or Esau Ramesh  The above information is an  only  It is not intended as medical advice for individual conditions or treatments  Talk to your doctor, nurse or pharmacist before following any medical regimen to see if it is safe and effective for you  Urinary Tract Infection in Women   WHAT YOU NEED TO KNOW:   A urinary tract infection (UTI) is caused by bacteria that get inside your urinary tract  Most bacteria that enter your urinary tract come out when you urinate  If the bacteria stay in your urinary tract, you may get an infection  Your urinary tract includes your kidneys, ureters, bladder, and urethra  Urine is made in your kidneys, and it flows from the ureters to the bladder  Urine leaves the bladder through the urethra   A UTI is more common in your lower urinary tract, which includes your bladder and urethra  DISCHARGE INSTRUCTIONS:   Return to the emergency department if:   · You are urinating very little or not at all  · You have a high fever with shaking chills  · You have side or back pain that gets worse  Contact your healthcare provider if:   · You have a fever  · You do not feel better after 2 days of taking antibiotics  · You are vomiting  · You have questions or concerns about your condition or care  Medicines:   · Antibiotics  help fight a bacterial infection  · Medicines  may be given to decrease pain and burning when you urinate  They will also help decrease the feeling that you need to urinate often  These medicines will make your urine orange or red  · Take your medicine as directed  Contact your healthcare provider if you think your medicine is not helping or if you have side effects  Tell him or her if you are allergic to any medicine  Keep a list of the medicines, vitamins, and herbs you take  Include the amounts, and when and why you take them  Bring the list or the pill bottles to follow-up visits  Carry your medicine list with you in case of an emergency  Follow up with your healthcare provider as directed:  Write down your questions so you remember to ask them during your visits  Prevent another UTI:   · Empty your bladder often  Urinate and empty your bladder as soon as you feel the need  Do not hold your urine for long periods of time  · Wipe from front to back after you urinate or have a bowel movement  This will help prevent germs from getting into your urinary tract through your urethra  · Drink liquids as directed  Ask how much liquid to drink each day and which liquids are best for you  You may need to drink more liquids than usual to help flush out the bacteria  Do not drink alcohol, caffeine, or citrus juices  These can irritate your bladder and increase your symptoms   Your healthcare provider may recommend cranberry juice to help prevent a UTI  · Urinate after you have sex  This can help flush out bacteria passed during sex  · Do not douche or use feminine deodorants  These can change the chemical balance in your vagina  · Change sanitary pads or tampons often  This will help prevent germs from getting into your urinary tract  · Do pelvic muscle exercises often  Pelvic muscle exercises may help you start and stop urinating  Strong pelvic muscles may help you empty your bladder easier  Squeeze these muscles tightly for 5 seconds like you are trying to hold back urine  Then relax for 5 seconds  Gradually work up to squeezing for 10 seconds  Do 3 sets of 15 repetitions a day, or as directed  © 2017 [facility] [facility] Information is for End User's use only and may not be sold, redistributed or otherwise used for commercial purposes  All illustrations and images included in CareNotes® are the copyrighted property of [employer]NA [employer] , [employer]  or Esau Ramesh  The above information is an  only  It is not intended as medical advice for individual conditions or treatments  Talk to your doctor, nurse or pharmacist before following any medical regimen to see if it is safe and effective for you

## 2018-12-01 NOTE — ED PROVIDER NOTES
History  Chief Complaint   Patient presents with    Flank Pain     PT reports "I have pain in the lower left side, it started in my back and it is now in the side" PT also c/o dental pain and would like to be seen for both     Patient is a 29year old female with right flank pain with radiation to right side of abdomen for past 1 week with nausea  No vomiting or diarrhea  No urinary sx  Has h/o kidney stones  States she did not drive here  Has had left lower toothache for past 1-2 days and has broken teeth which need fixing  No fever  Was last seen in this ED on 11/2/18 for flank pain  [facility] website checked on this patient and last Rx filled was on 11/25/18 for percocet for 5 day supply  Has tried muscle relaxers without relief  History provided by:  Patient   used: No        Prior to Admission Medications   Prescriptions Last Dose Informant Patient Reported?  Taking?   acetaminophen (TYLENOL) 650 mg CR tablet   No No   Sig: Take 1 tablet (650 mg total) by mouth every 8 (eight) hours as needed for mild pain or moderate pain   dicyclomine (BENTYL) 20 mg tablet   No No   Sig: Take 1 tablet (20 mg total) by mouth every 6 (six) hours as needed (abdominal pain)   Patient not taking: Reported on 11/2/2018    hyoscyamine (ANASPAZ,LEVSIN) 0 125 MG tablet   No No   Sig: Take 1 tablet (0 125 mg total) by mouth every 4 (four) hours as needed for cramping   Patient not taking: Reported on 11/2/2018    ibuprofen (MOTRIN) 800 mg tablet   Yes No   Sig: Take 800 mg by mouth as needed for mild pain   ranitidine (ZANTAC) 150 mg tablet   Yes No   Sig: Take 150 mg by mouth 2 (two) times a day      Facility-Administered Medications: None       Past Medical History:   Diagnosis Date    Asthma     GERD (gastroesophageal reflux disease)     Hernia, abdominal     Migraines        Past Surgical History:   Procedure Laterality Date    FL RETROGRADE PYELOGRAM  8/28/2018    IA CYSTO/URETERO W/LITHOTRIPSY &INDWELL STENT INSRT Right 8/28/2018    Procedure: CYSTOSCOPY URETEROSCOPY WITH RETROGRADE PYELOGRAM AND INSERTION STENT URETERAL;  Surgeon: Tatyana Sam MD;  Location: [facility];  Service: Urology    TOOTH EXTRACTION         Family History   Problem Relation Age of Onset    Diabetes Mother     Hyperlipidemia Mother     Stroke Mother     Heart disease Mother     Nephrolithiasis Father     Nephrolithiasis Brother      I have reviewed and agree with the history as documented  Social History   Substance Use Topics    Smoking status: Current Every Day Smoker     Packs/day: 0 50     Types: Cigarettes    Smokeless tobacco: Never Used    Alcohol use No        Review of Systems   Constitutional: Negative for fever  HENT: Positive for dental problem  Gastrointestinal: Positive for abdominal pain and nausea  Negative for diarrhea and vomiting  Genitourinary: Positive for flank pain  Negative for difficulty urinating  All other systems reviewed and are negative  Physical Exam  Physical Exam   Constitutional: She is oriented to person, place, and time  She appears distressed (moderate)  HENT:   Head: Normocephalic and atraumatic  Mouth/Throat: Oropharynx is clear and moist  No oropharyngeal exudate    (+) multiple dental erosions and tenderness of left mandibular molar tooth with no significant gingival swelling  Eyes: No scleral icterus  Neck: No tracheal deviation present  Cardiovascular: Regular rhythm and normal heart sounds  No murmur heard  Tachycardia  Pulmonary/Chest: Effort normal and breath sounds normal  No stridor  No respiratory distress  Abdominal: Soft  Bowel sounds are normal  She exhibits no distension  There is no tenderness  There is no rebound and no guarding  R CVAT  Musculoskeletal: She exhibits no edema or deformity  Neurological: She is alert and oriented to person, place, and time  Skin: Skin is warm and dry  No rash noted     Psychiatric: She has a normal mood and affect  Nursing note and vitals reviewed  Vital Signs  ED Triage Vitals [11/30/18 2302]   Temperature Pulse Respirations Blood Pressure SpO2   98 °F (36 7 °C) (!) 106 20 112/64 99 %      Temp Source Heart Rate Source Patient Position - Orthostatic VS BP Location FiO2 (%)   Oral Monitor Lying Right arm --      Pain Score       --           Vitals:    11/30/18 2302   BP: 112/64   Pulse: (!) 106   Patient Position - Orthostatic VS: Lying       Visual Acuity      ED Medications  Medications   penicillin V potassium (VEETID) tablet 500 mg (not administered)   nitrofurantoin (MACROBID) extended-release capsule 100 mg (not administered)   sodium chloride 0 9 % bolus 1,000 mL (1,000 mL Intravenous New Bag 11/30/18 2320)   ondansetron (ZOFRAN) injection 4 mg (4 mg Intravenous Given 11/30/18 2323)   morphine (PF) 4 mg/mL injection 4 mg (4 mg Intravenous Given 11/30/18 2324)   morphine (PF) 4 mg/mL injection 4 mg (4 mg Intravenous Given 12/1/18 0002)   ondansetron (ZOFRAN) injection 4 mg (4 mg Intravenous Given 12/1/18 0027)       Diagnostic Studies  Results Reviewed     Procedure Component Value Units Date/Time    Urine culture [[id number]] Collected:  11/30/18 2356    Lab Status:   In process Specimen:  Urine from Urine, Clean Catch Updated:  11/30/18 2356    Comprehensive metabolic panel [[id number]] Collected:  11/30/18 2317    Lab Status:  Final result Specimen:  Blood from Arm, Right Updated:  11/30/18 2353     Sodium 141 mmol/L      Potassium 3 7 mmol/L      Chloride 104 mmol/L      CO2 26 mmol/L      ANION GAP 11 mmol/L      BUN 11 mg/dL      Creatinine 0 77 mg/dL      Glucose 92 mg/dL      Calcium 9 8 mg/dL      AST 11 U/L      ALT 21 U/L      Alkaline Phosphatase 52 U/L      Total Protein 7 2 g/dL      Albumin 4 1 g/dL      Total Bilirubin 0 20 mg/dL      eGFR 105 ml/min/1 73sq m     Narrative:         National Kidney Disease Education Program recommendations are as follows:  GFR calculation is accurate only with a steady state creatinine  Chronic Kidney disease less than 60 ml/min/1 73 sq  meters  Kidney failure less than 15 ml/min/1 73 sq  meters      Lipase [[id number]]  (Normal) Collected:  11/30/18 2317    Lab Status:  Final result Specimen:  Blood from Arm, Right Updated:  11/30/18 2353     Lipase 90 u/L     CK Total with Reflex CKMB [[id number]]  (Normal) Collected:  11/30/18 2317    Lab Status:  Final result Specimen:  Blood from Arm, Right Updated:  11/30/18 2353     Total CK 39 U/L     Urine Microscopic [[id number]]  (Abnormal) Collected:  11/30/18 2318    Lab Status:  Final result Specimen:  Urine from Urine, Clean Catch Updated:  11/30/18 2341     RBC, UA 2-4 (A) /hpf      WBC, UA 4-10 (A) /hpf      Epithelial Cells Moderate (A) /hpf      Bacteria, UA Occasional /hpf     CBC and differential [[id number]] Collected:  11/30/18 2317    Lab Status:  Final result Specimen:  Blood from Arm, Right Updated:  11/30/18 2324     WBC 6 22 Thousand/uL      RBC 4 54 Million/uL      Hemoglobin 13 6 g/dL      Hematocrit 40 9 %      MCV 90 fL      MCH 30 0 pg      MCHC 33 3 g/dL      RDW 12 9 %      MPV 10 0 fL      Platelets 442 Thousands/uL      nRBC 0 /100 WBCs      Neutrophils Relative 49 %      Immat GRANS % 0 %      Lymphocytes Relative 38 %      Monocytes Relative 9 %      Eosinophils Relative 3 %      Basophils Relative 1 %      Neutrophils Absolute 3 11 Thousands/µL      Immature Grans Absolute 0 01 Thousand/uL      Lymphocytes Absolute 2 33 Thousands/µL      Monocytes Absolute 0 54 Thousand/µL      Eosinophils Absolute 0 17 Thousand/µL      Basophils Absolute 0 06 Thousands/µL     POCT pregnancy, urine [[id number]]  (Normal) Resulted:  11/30/18 2322    Lab Status:  Final result Updated:  11/30/18 2322     EXT PREG TEST UR (Ref: Negative) negative    ED Urine Macroscopic [[id number]]  (Abnormal) Collected:  11/30/18 2318    Lab Status:  Final result Specimen:  Urine Updated:  11/30/18 2321 Color, UA Yellow     Clarity, UA Cloudy     pH, UA 7 0     Leukocytes, UA Small (A)     Nitrite, UA Negative     Protein, UA Negative mg/dl      Glucose, UA Negative mg/dl      Ketones, UA Negative mg/dl      Urobilinogen, UA 0 2 E U /dl      Bilirubin, UA Negative     Blood, UA Negative     Specific Gravity, UA 1 015    Narrative:       CLINITEK RESULT                 CT renal stone study abdomen pelvis without contrast   ED Interpretation by Jennifer Brock MD (12/01 0034)   FINDINGS:      RIGHT KIDNEY AND URETER:   No urinary tract calculi   No hydronephrosis or hydroureter  LEFT KIDNEY AND URETER:   No urinary tract calculi   No hydronephrosis or hydroureter  URINARY BLADDER:    Unremarkable  No significant abnormality in the visualized lung bases  Limited low radiation dose noncontrast CT evaluation demonstrates no clinically significant abnormality of liver, spleen, pancreas, or adrenal glands  No calcified gallstones or gallbladder wall thickening noted  No significant ascites or bulky lymphadenopathy on this limited noncontrast study  Stool-filled colon  The appendix is well seen and there is no evidence of acute appendicitis  No acute fracture or destructive osseous lesion is identified  Impression:        No significant renal, ureteral, or bladder calculus   No hydronephrosis  Workstation performed: [id number]         Final Result by Gretta Roberson MD (12/01 0031)      No significant renal, ureteral, or bladder calculus  No hydronephrosis  Workstation performed: [id number]                    Procedures  Procedures       Phone Contacts  ED Phone Contact    ED Course  ED Course as of Dec 01 0039   Carole Larose Nov 30, 2018   2353 Patient still with pain so more IV morphine ordered  Labs d/w patient  Sat Dec 01, 2018   0023 Patient with nausea so more IV zofran ordered  0036 CT d/w patient                                   MDM  Number of Diagnoses or Management Options  Diagnosis management comments: DDx including but not limited to: renal colic, pyelonephritis, UTI, GI etiology, appendicitis, diverticulitis, pancreatitis, cholecystitis, biliary colic, rhabdomyolysis, tumor  DDx including but not limited to: dental rossy, dental infection, dental abscess, dry socket, gingivitis; doubt dwayne's angina  Amount and/or Complexity of Data Reviewed  Clinical lab tests: ordered and reviewed  Tests in the radiology section of CPT®: ordered and reviewed  Decide to obtain previous medical records or to obtain history from someone other than the patient: yes  Review and summarize past medical records: yes  Independent visualization of images, tracings, or specimens: yes      CritCare Time    Disposition  Final diagnoses:   Right flank pain   Nausea   Toothache   UTI (urinary tract infection)     Time reflects when diagnosis was documented in both MDM as applicable and the Disposition within this note     Time User Action Codes Description Comment    12/1/2018 12:36 AM Arby Nikolas Add [R10 9] Right flank pain     12/1/2018 12:36 AM Arby Nikolas Add [R11 0] Nausea     12/1/2018 12:36 AM Arby Nikolas Add [K08 89] Toothache     12/1/2018 12:37 AM Arby Nikolas Add [N39 0] UTI (urinary tract infection)       ED Disposition     ED Disposition Condition Comment    Discharge  [facility] discharge to home/self care  Condition at discharge: Stable        Follow-up Information     Follow up With Specialties Details Why Contact Zack Mustafa DO General Practice Call in 3 days and follow up with own dentist in 2 weeks  No driving with percocet  Do not use acetaminophen with percocet  Return sooner if increased pain, fever, vomiting, difficulty breathing, drooling, rash    [address] [address]  [phone]            Patient's Medications   Discharge Prescriptions METOCLOPRAMIDE (REGLAN) 10 MG TABLET    Take 1 tablet (10 mg total) by mouth 4 (four) times a day for 7 days As needed for nausea       Start Date: 12/1/2018 End Date: 12/8/2018       Order Dose: 10 mg       Quantity: 28 tablet    Refills: 0    NITROFURANTOIN (MACROBID) 100 MG CAPSULE    Take 1 capsule (100 mg total) by mouth 2 (two) times a day for 7 days       Start Date: 12/1/2018 End Date: 12/8/2018       Order Dose: 100 mg       Quantity: 14 capsule    Refills: 0    OXYCODONE-ACETAMINOPHEN (PERCOCET) 5-325 MG PER TABLET    Take 1 tablet by mouth every 4 (four) hours as needed for moderate pain for up to 4 days Max Daily Amount: 6 tablets       Start Date: 12/1/2018 End Date: 12/5/2018       Order Dose: 1 tablet       Quantity: 20 tablet    Refills: 0    PENICILLIN V POTASSIUM (VEETID) 500 MG TABLET    Take 1 tablet (500 mg total) by mouth 4 (four) times a day for 7 days       Start Date: 12/1/2018 End Date: 12/8/2018       Order Dose: 500 mg       Quantity: 28 tablet    Refills: 0     No discharge procedures on file      ED Provider  Electronically Signed by           Denia Juan MD  12/01/18 1376

## 2018-12-03 LAB
BACTERIA UR CULT: ABNORMAL
BACTERIA UR CULT: ABNORMAL

## 2018-12-16 ENCOUNTER — APPOINTMENT (EMERGENCY)
Dept: CT IMAGING | Facility: HOSPITAL | Age: 28
End: 2018-12-16
Payer: COMMERCIAL

## 2018-12-16 ENCOUNTER — HOSPITAL ENCOUNTER (EMERGENCY)
Facility: HOSPITAL | Age: 28
Discharge: HOME | End: 2018-12-17
Attending: EMERGENCY MEDICINE
Payer: COMMERCIAL

## 2018-12-16 DIAGNOSIS — K02.9 DENTAL CARIES: Primary | ICD-10-CM

## 2018-12-16 LAB
ANION GAP SERPL CALCULATED.3IONS-SCNC: 7 MMOL/L (ref 4–13)
BASE EXCESS BLDA CALC-SCNC: -1 MMOL/L (ref -2–3)
BASOPHILS # BLD AUTO: 0.04 THOUSANDS/ΜL (ref 0–0.1)
BASOPHILS NFR BLD AUTO: 1 % (ref 0–1)
BUN SERPL-MCNC: 11 MG/DL (ref 5–25)
CA-I BLD-SCNC: 1.27 MMOL/L (ref 1.12–1.32)
CALCIUM SERPL-MCNC: 9.6 MG/DL (ref 8.3–10.1)
CHLORIDE SERPL-SCNC: 105 MMOL/L (ref 100–108)
CO2 SERPL-SCNC: 27 MMOL/L (ref 21–32)
CREAT SERPL-MCNC: 0.8 MG/DL (ref 0.6–1.3)
EOSINOPHIL # BLD AUTO: 0.2 THOUSAND/ΜL (ref 0–0.61)
EOSINOPHIL NFR BLD AUTO: 4 % (ref 0–6)
ERYTHROCYTE [DISTWIDTH] IN BLOOD BY AUTOMATED COUNT: 12.7 % (ref 11.6–15.1)
GFR SERPL CREATININE-BSD FRML MDRD: 101 ML/MIN/1.73SQ M
GLUCOSE SERPL-MCNC: 102 MG/DL (ref 65–140)
GLUCOSE SERPL-MCNC: 99 MG/DL (ref 65–140)
HCG SERPL QL: NEGATIVE
HCO3 BLDA-SCNC: 26.4 MMOL/L (ref 24–30)
HCT VFR BLD AUTO: 42.6 % (ref 34.8–46.1)
HCT VFR BLD CALC: 42 % (ref 34.8–46.1)
HGB BLD-MCNC: 14.1 G/DL (ref 11.5–15.4)
HGB BLDA-MCNC: 14.3 G/DL (ref 11.5–15.4)
IMM GRANULOCYTES # BLD AUTO: 0.01 THOUSAND/UL (ref 0–0.2)
IMM GRANULOCYTES NFR BLD AUTO: 0 % (ref 0–2)
LYMPHOCYTES # BLD AUTO: 1.8 THOUSANDS/ΜL (ref 0.6–4.47)
LYMPHOCYTES NFR BLD AUTO: 34 % (ref 14–44)
MCH RBC QN AUTO: 30.1 PG (ref 26.8–34.3)
MCHC RBC AUTO-ENTMCNC: 33.1 G/DL (ref 31.4–37.4)
MCV RBC AUTO: 91 FL (ref 82–98)
MONOCYTES # BLD AUTO: 0.39 THOUSAND/ΜL (ref 0.17–1.22)
MONOCYTES NFR BLD AUTO: 7 % (ref 4–12)
NEUTROPHILS # BLD AUTO: 2.92 THOUSANDS/ΜL (ref 1.85–7.62)
NEUTS SEG NFR BLD AUTO: 54 % (ref 43–75)
NRBC BLD AUTO-RTO: 0 /100 WBCS
PCO2 BLD: 28 MMOL/L (ref 21–32)
PCO2 BLD: 52 MM HG (ref 42–50)
PH BLD: 7.31 [PH] (ref 7.3–7.4)
PLATELET # BLD AUTO: 266 THOUSANDS/UL (ref 149–390)
PMV BLD AUTO: 9.7 FL (ref 8.9–12.7)
PO2 BLD: 30 MM HG (ref 35–45)
POTASSIUM BLD-SCNC: 3.7 MMOL/L (ref 3.5–5.3)
POTASSIUM SERPL-SCNC: 3.7 MMOL/L (ref 3.5–5.3)
RBC # BLD AUTO: 4.69 MILLION/UL (ref 3.81–5.12)
SAO2 % BLD FROM PO2: 50 % (ref 95–98)
SODIUM BLD-SCNC: 143 MMOL/L (ref 136–145)
SODIUM SERPL-SCNC: 139 MMOL/L (ref 136–145)
SPECIMEN SOURCE: ABNORMAL
WBC # BLD AUTO: 5.36 THOUSAND/UL (ref 4.31–10.16)

## 2018-12-16 PROCEDURE — 70486 CT MAXILLOFACIAL W/O DYE: CPT

## 2018-12-16 PROCEDURE — 85014 HEMATOCRIT: CPT

## 2018-12-16 PROCEDURE — 82803 BLOOD GASES ANY COMBINATION: CPT

## 2018-12-16 PROCEDURE — 82947 ASSAY GLUCOSE BLOOD QUANT: CPT

## 2018-12-16 PROCEDURE — 84295 ASSAY OF SERUM SODIUM: CPT

## 2018-12-16 PROCEDURE — 96374 THER/PROPH/DIAG INJ IV PUSH: CPT

## 2018-12-16 PROCEDURE — 82330 ASSAY OF CALCIUM: CPT

## 2018-12-16 PROCEDURE — 99284 EMERGENCY DEPT VISIT MOD MDM: CPT

## 2018-12-16 PROCEDURE — 80048 BASIC METABOLIC PNL TOTAL CA: CPT | Performed by: PHYSICIAN ASSISTANT

## 2018-12-16 PROCEDURE — 84132 ASSAY OF SERUM POTASSIUM: CPT

## 2018-12-16 PROCEDURE — 84703 CHORIONIC GONADOTROPIN ASSAY: CPT | Performed by: PHYSICIAN ASSISTANT

## 2018-12-16 PROCEDURE — 85025 COMPLETE CBC W/AUTO DIFF WBC: CPT | Performed by: PHYSICIAN ASSISTANT

## 2018-12-16 PROCEDURE — 36415 COLL VENOUS BLD VENIPUNCTURE: CPT | Performed by: PHYSICIAN ASSISTANT

## 2018-12-16 RX ORDER — KETOROLAC TROMETHAMINE 30 MG/ML
30 INJECTION, SOLUTION INTRAMUSCULAR; INTRAVENOUS ONCE
Status: COMPLETED | OUTPATIENT
Start: 2018-12-16 | End: 2018-12-16

## 2018-12-16 RX ADMIN — KETOROLAC TROMETHAMINE 30 MG: 30 INJECTION, SOLUTION INTRAMUSCULAR at 23:21

## 2018-12-17 VITALS
TEMPERATURE: 98.2 F | BODY MASS INDEX: 24.15 KG/M2 | HEIGHT: 60 IN | OXYGEN SATURATION: 96 % | SYSTOLIC BLOOD PRESSURE: 97 MMHG | RESPIRATION RATE: 16 BRPM | DIASTOLIC BLOOD PRESSURE: 50 MMHG | WEIGHT: 123 LBS | HEART RATE: 78 BPM

## 2018-12-17 RX ORDER — CLINDAMYCIN HYDROCHLORIDE 150 MG/1
300 CAPSULE ORAL EVERY 8 HOURS SCHEDULED
Qty: 42 CAPSULE | Refills: 0 | Status: SHIPPED | OUTPATIENT
Start: 2018-12-17 | End: 2018-12-24

## 2018-12-17 NOTE — DISCHARGE INSTRUCTIONS
Dental Caries   WHAT YOU NEED TO KNOW:   Dental caries are also called cavities  Cavities are caused by bacteria  When the bacteria in tooth plaque (sticky film) mix with certain types of carbohydrate, this creates acid  The acid breaks down areas of enamel, which covers the outside of a tooth  This creates a small hole in the tooth called a cavity  DISCHARGE INSTRUCTIONS:   Return to the emergency department if:   · Your face, jaw, cheek, eye, or neck begin to swell  Contact your dentist if:   · You have a fever  · Your tooth pain gets worse  · You have questions or concerns about your condition or care  Follow up with your dentist as directed:  Write down your questions so you remember to ask them during your visits  Prevent dental caries:   · Brush your teeth at least 2 times a day with fluoride toothpaste  · Use dental floss to clean between your teeth at least once a day  · Rinse your mouth with water or mouthwash after meals and snacks  · Chew sugarless gum after meals and snacks  · See your dentist regularly for dental cleanings and oral exams  © 2017 [address] is for End User's use only and may not be sold, redistributed or otherwise used for commercial purposes  All illustrations and images included in CareNotes® are the copyrighted property of [employer]  or [employer]  The above information is an  only  It is not intended as medical advice for individual conditions or treatments  Talk to your doctor, nurse or pharmacist before following any medical regimen to see if it is safe and effective for you

## 2018-12-24 ENCOUNTER — HOSPITAL ENCOUNTER (EMERGENCY)
Facility: HOSPITAL | Age: 28
Discharge: HOME | End: 2018-12-24
Attending: EMERGENCY MEDICINE | Admitting: EMERGENCY MEDICINE
Payer: COMMERCIAL

## 2018-12-24 VITALS
BODY MASS INDEX: 23.25 KG/M2 | TEMPERATURE: 97.8 F | OXYGEN SATURATION: 98 % | DIASTOLIC BLOOD PRESSURE: 72 MMHG | HEART RATE: 91 BPM | RESPIRATION RATE: 20 BRPM | SYSTOLIC BLOOD PRESSURE: 124 MMHG | WEIGHT: 119.05 LBS

## 2018-12-24 DIAGNOSIS — K08.9 CHRONIC DENTAL PAIN: Primary | ICD-10-CM

## 2018-12-24 DIAGNOSIS — G89.29 CHRONIC DENTAL PAIN: Primary | ICD-10-CM

## 2018-12-24 PROCEDURE — 99283 EMERGENCY DEPT VISIT LOW MDM: CPT

## 2018-12-24 RX ORDER — PENICILLIN V POTASSIUM 500 MG/1
500 TABLET ORAL 4 TIMES DAILY
Qty: 28 TABLET | Refills: 0 | Status: SHIPPED | OUTPATIENT
Start: 2018-12-24 | End: 2018-12-31

## 2018-12-24 RX ORDER — PENICILLIN V POTASSIUM 250 MG/1
500 TABLET ORAL ONCE
Status: COMPLETED | OUTPATIENT
Start: 2018-12-24 | End: 2018-12-24

## 2018-12-24 RX ADMIN — PENICILLIN V POTASIUM 500 MG: 250 TABLET ORAL at 04:33

## 2018-12-24 NOTE — ED NOTES
Pt requesting Tylenol#3at discharge statin gthat is what her dentist prescribes her  As per Dr Raymond George, Pt needs to follow up with dentist for refill of script  Pt not satisfied with this response  Advised pt to start taking new course of antibiotics as clearing up the infection will decrease pain  Pt's family member at bedside stated "this is all lies  Once the infection is better the pain is worse"    Once again instructed pt to reach out to МАРИЯ Rinaldi RN  12/24/18 2668

## 2018-12-24 NOTE — DISCHARGE INSTRUCTIONS
Dental Caries   WHAT YOU NEED TO KNOW:   Dental caries are also called cavities  Cavities are caused by bacteria  When the bacteria in tooth plaque (sticky film) mix with certain types of carbohydrate, this creates acid  The acid breaks down areas of enamel, which covers the outside of a tooth  This creates a small hole in the tooth called a cavity  DISCHARGE INSTRUCTIONS:   Return to the emergency department if:   · Your face, jaw, cheek, eye, or neck begin to swell  Contact your dentist if:   · You have a fever  · Your tooth pain gets worse  · You have questions or concerns about your condition or care  Follow up with your dentist as directed:  Write down your questions so you remember to ask them during your visits  Prevent dental caries:   · Brush your teeth at least 2 times a day with fluoride toothpaste  · Use dental floss to clean between your teeth at least once a day  · Rinse your mouth with water or mouthwash after meals and snacks  · Chew sugarless gum after meals and snacks  · See your dentist regularly for dental cleanings and oral exams  © 2017 [address] is for End User's use only and may not be sold, redistributed or otherwise used for commercial purposes  All illustrations and images included in CareNotes® are the copyrighted property of [employer]  or [employer]  The above information is an  only  It is not intended as medical advice for individual conditions or treatments  Talk to your doctor, nurse or pharmacist before following any medical regimen to see if it is safe and effective for you

## 2018-12-28 ENCOUNTER — HOSPITAL ENCOUNTER (EMERGENCY)
Facility: HOSPITAL | Age: 28
Discharge: HOME | End: 2018-12-28
Attending: EMERGENCY MEDICINE
Payer: COMMERCIAL

## 2018-12-28 VITALS
DIASTOLIC BLOOD PRESSURE: 81 MMHG | TEMPERATURE: 98.1 F | RESPIRATION RATE: 18 BRPM | HEART RATE: 98 BPM | BODY MASS INDEX: 24.03 KG/M2 | WEIGHT: 123.02 LBS | OXYGEN SATURATION: 100 % | SYSTOLIC BLOOD PRESSURE: 119 MMHG

## 2018-12-28 DIAGNOSIS — R10.9 CHRONIC RIGHT FLANK PAIN: Primary | ICD-10-CM

## 2018-12-28 DIAGNOSIS — G89.29 CHRONIC RIGHT FLANK PAIN: Primary | ICD-10-CM

## 2018-12-28 DIAGNOSIS — R11.0 NAUSEA: ICD-10-CM

## 2018-12-28 LAB
BILIRUB UR QL STRIP: ABNORMAL
CLARITY UR: CLEAR
COLOR UR: YELLOW
EXT PREG TEST URINE: NORMAL
GLUCOSE UR STRIP-MCNC: NEGATIVE MG/DL
HGB UR QL STRIP.AUTO: NEGATIVE
KETONES UR STRIP-MCNC: ABNORMAL MG/DL
LEUKOCYTE ESTERASE UR QL STRIP: NEGATIVE
NITRITE UR QL STRIP: NEGATIVE
PH UR STRIP.AUTO: 6 [PH] (ref 4.5–8)
PROT UR STRIP-MCNC: NEGATIVE MG/DL
SP GR UR STRIP.AUTO: >=1.03 (ref 1–1.03)
UROBILINOGEN UR QL STRIP.AUTO: 1 E.U./DL

## 2018-12-28 PROCEDURE — 81025 URINE PREGNANCY TEST: CPT | Performed by: EMERGENCY MEDICINE

## 2018-12-28 PROCEDURE — 99284 EMERGENCY DEPT VISIT MOD MDM: CPT

## 2018-12-28 PROCEDURE — 81003 URINALYSIS AUTO W/O SCOPE: CPT

## 2018-12-28 RX ORDER — ONDANSETRON 4 MG/1
4 TABLET, ORALLY DISINTEGRATING ORAL ONCE
Status: COMPLETED | OUTPATIENT
Start: 2018-12-28 | End: 2018-12-28

## 2018-12-28 RX ORDER — ACETAMINOPHEN 325 MG/1
975 TABLET ORAL ONCE
Status: COMPLETED | OUTPATIENT
Start: 2018-12-28 | End: 2018-12-28

## 2018-12-28 RX ORDER — METOCLOPRAMIDE 10 MG/1
10 TABLET ORAL 4 TIMES DAILY
Qty: 28 TABLET | Refills: 0 | Status: SHIPPED | OUTPATIENT
Start: 2018-12-28 | End: 2019-01-04

## 2018-12-28 RX ADMIN — ONDANSETRON 4 MG: 4 TABLET, ORALLY DISINTEGRATING ORAL at 05:08

## 2018-12-28 RX ADMIN — ACETAMINOPHEN 975 MG: 325 TABLET ORAL at 05:15

## 2018-12-28 NOTE — DISCHARGE INSTRUCTIONS
Acute Nausea and Vomiting   WHAT YOU NEED TO KNOW:   Acute nausea and vomiting start suddenly, worsen quickly, and last a short time  DISCHARGE INSTRUCTIONS:   Return to the emergency department if:   · You see blood in your vomit or your bowel movements  · You have sudden, severe pain in your chest and upper abdomen after hard vomiting or retching  · You have swelling in your neck and chest      · You are dizzy, cold, and thirsty and your eyes and mouth are dry  · You are urinating very little or not at all  · You have muscle weakness, leg cramps, and trouble breathing  · Your heart is beating much faster than normal      · You continue to vomit for more than 48 hours  Contact your healthcare provider if:   · You have frequent dry heaves (vomiting but nothing comes out)  · Your nausea and vomiting does not get better or go away after you use medicine  · You have questions or concerns about your condition or treatment  Medicines: You may need any of the following:  · Medicines  may be given to calm your stomach and stop your vomiting  You may also need medicines to help you feel more relaxed or to stop nausea and vomiting caused by motion sickness  · Gastrointestinal stimulants  are used to help empty your stomach and bowels  This may help decrease nausea and vomiting  · Take your medicine as directed  Contact your healthcare provider if you think your medicine is not helping or if you have side effects  Tell him or her if you are allergic to any medicine  Keep a list of the medicines, vitamins, and herbs you take  Include the amounts, and when and why you take them  Bring the list or the pill bottles to follow-up visits  Carry your medicine list with you in case of an emergency  Prevent or manage acute nausea and vomiting:   · Do not drink alcohol  Alcohol may upset or irritate your stomach  Too much alcohol can also cause acute nausea and vomiting  · Control stress    Headaches due to stress may cause nausea and vomiting  Find ways to relax and manage your stress  Get more rest and sleep  · Drink more liquids as directed  Vomiting can lead to dehydration  It is important to drink more liquids to help replace lost body fluids  Ask your healthcare provider how much liquid to drink each day and which liquids are best for you  Your provider may recommend that you drink an oral rehydration solution (ORS)  ORS contains water, salts, and sugar that are needed to replace the lost body fluids  Ask what kind of ORS to use, how much to drink, and where to get it  · Eat smaller meals, more often  Eat small amounts of food every 2 to 3 hours, even if you are not hungry  Food in your stomach may decrease your nausea  · Talk to your healthcare provider before you take over-the-counter (OTC) medicines  These medicines can cause serious problems if you use certain other medicines, or you have a medical condition  You may have problems if you use too much or use them for longer than the label says  Follow directions on the label carefully  Follow up with your healthcare provider as directed:  Write down your questions so you remember to ask them during your follow-up visits  © 2017 [employer] Information is for End User's use only and may not be sold, redistributed or otherwise used for commercial purposes  All illustrations and images included in CareNotes® are the copyrighted property of [employer]  or [employer]au [employer]amesh  The above information is an  only  It is not intended as medical advice for individual conditions or treatments  Talk to your doctor, nurse or pharmacist before following any medical regimen to see if it is safe and effective for you  Chronic Pain   WHAT YOU NEED TO KNOW:   Chronic pain is pain that does not get better for 3 months or longer  Chronic pain may hurt all the time, or come and go     DISCHARGE INSTRUCTIONS:   Call 624 or have someone call 911 for any of the following:   · You are breathing slower than normal, or you have trouble breathing  · You cannot be awakened  · You have a seizure  Return to the emergency department if:   · Your heart is beating slower than normal     · Your heart feels like it is jumping or fluttering  · You cannot think clearly  Contact your healthcare provider if:   · You have side effects from prescription pain medicine, such as itching, nausea, or vomiting  · You have trouble sleeping  · Your pain gets worse, even after you take medicine  · You don't think the medicine is working  · You have questions or concerns about your condition or care  Medicines: You may need any of the following:  · Acetaminophen  decreases pain  It is available without a doctor's order  Ask how much to take and how often to take it  Follow directions  Read the labels of all other medicines you are using to see if they also contain acetaminophen, or ask your doctor or pharmacist  Acetaminophen can cause liver damage if not taken correctly  Do not use more than 4 grams (4,000 milligrams) total of acetaminophen in one day  · NSAIDs , such as ibuprofen, help decrease swelling, pain, and fever  This medicine is available with or without a doctor's order  NSAIDs can cause stomach bleeding or kidney problems in certain people  If you take blood thinner medicine, always ask your healthcare provider if NSAIDs are safe for you  Always read the medicine label and follow directions  · Prescription pain medicine  called narcotics or opioids may be given  Ask your healthcare provider how to take this medicine safely  · Anesthetics  can be rubbed on your skin or injected into a nerve or muscle to numb an area  · Other medicines  may reduce pain, anxiety, muscle tension, or swelling  · Take your medicine as directed    Contact your healthcare provider if you think your medicine is not helping or if you have side effects  Tell him of her if you are allergic to any medicine  Keep a list of the medicines, vitamins, and herbs you take  Include the amounts, and when and why you take them  Bring the list or the pill bottles to follow-up visits  Carry your medicine list with you in case of an emergency  Manage your chronic pain:   · Apply heat  on the area in pain for 20 to 30 minutes every 2 hours for as many days as directed  Heat helps decrease pain and muscle spasms  · Apply ice  on the part of your body that hurts for 15 to 20 minutes every hour or as directed  Use an ice pack, or put crushed ice in a plastic bag  Cover it with a towel  Ice decreases pain and swelling, and helps prevent tissue damage  · Go to physical therapy as directed  A physical therapist teaches you exercises to help improve movement and strength, and to decrease pain  · Exercise for 30 minutes, 3 times a week  Regular physical activity can help decrease pain and improve your quality of life  Ask your healthcare provider about the best exercise plan for your type of pain  · Get enough sleep  Create a relaxing bedtime routine  Go to sleep and wake up at the same time every day  Avoid caffeine in the afternoon  · Talk with a counselor or therapist   A type of counseling called cognitive behavioral therapy (CBT) can help your chronic pain by changing the way you think about it  CBT can also improve your mood, sleep, and ability to move  What you must know if you take narcotic pain medicine:   · You may need to take a bowel movement softener  The most common side effect of prescription pain medicine is constipation  Bowel movement softeners are available over the counter  · Do not mix prescription pain medicines  This can cause an overdose of medicine, which can become life-threatening  Read labels  Make sure you know the ingredients in all of your medicines      · Do not drink alcohol  when you take prescription pain medicine  It is not safe to mix narcotics or opioids with alcohol or illegal drugs  · Prescription pain medicine may impair your ability to drive or work safely  They may also cause dizziness and increase your risk for falling  · Store prescription pain medicine in a safe location at home  Keep your medicine away from children and other people  Never share your medicine with anyone  Follow up with your healthcare provider as directed: You may be referred to a pain specialist  Write down your questions so you remember to ask them during your visits  © 2017 [employer] Information is for End User's use only and may not be sold, redistributed or otherwise used for commercial purposes  All illustrations and images included in CareNotes® are the copyrighted property of [employer]  or [employer]au [employer]amesh  The above information is an  only  It is not intended as medical advice for individual conditions or treatments  Talk to your doctor, nurse or pharmacist before following any medical regimen to see if it is safe and effective for you  Flank Pain   WHAT YOU NEED TO KNOW:   Flank pain is felt in the area below your ribcage and above your hip bones, often in the lower back  Your pain may be dull or so severe that you cannot get comfortable  The pain may stay in one area or radiate to another area  It may worsen and lighten in waves  Flank pain is often a sign of problems with your urinary tract, such as a kidney stone or infection  DISCHARGE INSTRUCTIONS:   Return to the emergency department if:   · You have a fever  · Your heart is fluttering or jumping  · You see blood in your urine  · Your pain radiates into your lower abdomen and genital area  · You have intense pain in your low back next to your spine  · You are much more tired than usual and have no desire to eat  · You have a headache and your muscles jerk    Contact your healthcare provider if:   · You have an upset stomach and are vomiting  · You have to urinate more often, and with urgency  · Your pain worsens or does not improve, and you cannot get comfortable  · You pass a stone when you urinate  · You have questions or concerns about your condition or care  Medicines: The following medicines may be ordered for you:  · Pain medicine  may help decrease or relieve your pain  Do not wait until the pain is severe before you take your medicine  · Antibiotics  may help treat a urinary tract infection caused by bacteria  · Take your medicine as directed  Contact your healthcare provider if you think your medicine is not helping or if you have side effects  Tell him of her if you are allergic to any medicine  Keep a list of the medicines, vitamins, and herbs you take  Include the amounts, and when and why you take them  Bring the list or the pill bottles to follow-up visits  Carry your medicine list with you in case of an emergency  Follow up with your healthcare provider in 1 to 2 days or as directed:  Write down your questions so you remember to ask them during your visits  © 2017 [employer] Information is for End User's use only and may not be sold, redistributed or otherwise used for commercial purposes  All illustrations and images included in CareNotes® are the copyrighted property of [employer]  or Esau [employer]amesh  The above information is an  only  It is not intended as medical advice for individual conditions or treatments  Talk to your doctor, nurse or pharmacist before following any medical regimen to see if it is safe and effective for you

## 2018-12-28 NOTE — ED NOTES
Pt utilizing call bell, demanding IV be removed   Pt states, "he practically called me a drug user, said to stop coming in every day looking for pain medication " removed IV per request, asked pt if this RN can do anything else at this time, pt rolls eyes reports "no"     Arsen Arshad, MELI  12/28/18 2110

## 2018-12-28 NOTE — ED PROVIDER NOTES
History  Chief Complaint   Patient presents with    Flank Pain     Pt arrives with c/o worsening right flank pain x3 days  (+) N, Hx of kidney stones, Pt reports feels like "when I had a stone months ago "     Patient is a 29year old female with worsening R flank pain for past 3 days  (+) Nausea  No vomiting  No fever  No urinary sx  Was last seen in this ED on 12/24/18 for chronic dental pain  [facility] website checked on this patient and last Rx filled was on 11/25/18 for percocet for 5 day supply  Patient has had many narcotics filled by different prescribers over past 6 months  Has had kidney stones in the past and patient states this feels similar but I have ordered multiple CTs on patient recently which does not reveal ureteral stones  Last tylenol/ibuprofen was 5 hours ago  I am not comfortably ordering CTs on this patient every week nor am I comfortable ordering narcotic pain medications for this past every week either and this was explained to the patient  History provided by:  Patient   used: No    Flank Pain   Associated symptoms: nausea    Associated symptoms: no fever and no vomiting        Prior to Admission Medications   Prescriptions Last Dose Informant Patient Reported?  Taking?   acetaminophen (TYLENOL) 650 mg CR tablet   No Yes   Sig: Take 1 tablet (650 mg total) by mouth every 8 (eight) hours as needed for mild pain or moderate pain   ibuprofen (MOTRIN) 800 mg tablet   Yes Yes   Sig: Take 800 mg by mouth as needed for mild pain   penicillin V potassium (VEETID) 500 mg tablet   No Yes   Sig: Take 1 tablet (500 mg total) by mouth 4 (four) times a day for 7 days   ranitidine (ZANTAC) 150 mg tablet   Yes Yes   Sig: Take 150 mg by mouth 2 (two) times a day      Facility-Administered Medications: None       Past Medical History:   Diagnosis Date    Asthma     GERD (gastroesophageal reflux disease)     Hernia, abdominal     Migraines        Past Surgical History: Procedure Laterality Date    FL RETROGRADE PYELOGRAM  8/28/2018    DC CYSTO/URETERO W/LITHOTRIPSY &INDWELL STENT INSRT Right 8/28/2018    Procedure: CYSTOSCOPY URETEROSCOPY WITH RETROGRADE PYELOGRAM AND INSERTION STENT URETERAL;  Surgeon: Glory Schwarz MD;  [facility];  Service: Urology    TOOTH EXTRACTION         Family History   Problem Relation Age of Onset    Diabetes Mother     Hyperlipidemia Mother     Stroke Mother     Heart disease Mother     Nephrolithiasis Father     Nephrolithiasis Brother      I have reviewed and agree with the history as documented  Social History   Substance Use Topics    Smoking status: Current Every Day Smoker     Packs/day: 0 50     Types: Cigarettes    Smokeless tobacco: Never Used    Alcohol use No        Review of Systems   Constitutional: Negative for fever  Gastrointestinal: Positive for nausea  Negative for vomiting  Genitourinary: Positive for flank pain  Negative for difficulty urinating  All other systems reviewed and are negative  Physical Exam  Physical Exam   Constitutional: She is oriented to person, place, and time  She appears well-developed and well-nourished  She appears distressed (mild)  HENT:   Head: Normocephalic and atraumatic  Mouth/Throat: Oropharynx is clear and moist    Eyes: No scleral icterus  Neck: No tracheal deviation present  Cardiovascular: Normal rate, regular rhythm and normal heart sounds  No murmur heard  Pulmonary/Chest: Effort normal and breath sounds normal  No stridor  No respiratory distress  Abdominal: Soft  Bowel sounds are normal  She exhibits no distension  There is no tenderness  R CVAT  Musculoskeletal: She exhibits no edema or deformity  Neurological: She is alert and oriented to person, place, and time  Skin: Skin is warm and dry  No rash noted  Nursing note and vitals reviewed        Vital Signs  ED Triage Vitals [12/28/18 0446]   Temperature Pulse Respirations Blood Pressure SpO2   98 1 °F (36 7 °C) 98 18 119/81 100 %      Temp Source Heart Rate Source Patient Position - Orthostatic VS BP Location FiO2 (%)   Oral Monitor Lying Right arm --      Pain Score       Worst Possible Pain           Vitals:    12/28/18 0446   BP: 119/81   Pulse: 98   Patient Position - Orthostatic VS: Lying       Visual Acuity      ED Medications  Medications   ondansetron (ZOFRAN-ODT) dispersible tablet 4 mg (4 mg Oral Given 12/28/18 0508)   acetaminophen (TYLENOL) tablet 975 mg (975 mg Oral Given 12/28/18 0515)       Diagnostic Studies  Results Reviewed     Procedure Component Value Units Date/Time    POCT pregnancy, urine [[id number]]  (Normal) Resulted:  12/28/18 0520    Lab Status:  Final result Updated:  12/28/18 0520     EXT PREG TEST UR (Ref: Negative) neg    ED Urine Macroscopic [[id number]]  (Abnormal) Collected:  12/28/18 0518    Lab Status:  Final result Specimen:  Urine Updated:  12/28/18 0519     Color, UA Yellow     Clarity, UA Clear     pH, UA 6 0     Leukocytes, UA Negative     Nitrite, UA Negative     Protein, UA Negative mg/dl      Glucose, UA Negative mg/dl      Ketones, UA Trace (A) mg/dl      Urobilinogen, UA 1 0 E U /dl      Bilirubin, UA Interference- unable to analyze (A)     Blood, UA Negative     Specific Gravity, UA >=1 030    Narrative:       CLINITEK RESULT                 No orders to display              Procedures  Procedures       Phone Contacts  ED Phone Contact    ED Course  ED Course as of Dec 28 0538   Fri Dec 28, 2018   7832 Urine d/w patient  MDM  Number of Diagnoses or Management Options  Diagnosis management comments: DDx including but not limited to: Chronic flank pain; doubt renal colic, pyelonephritis, UTI, GI etiology, appendicitis, diverticulitis, pancreatitis, cholecystitis, biliary colic, rhabdomyolysis, tumor         Amount and/or Complexity of Data Reviewed  Decide to obtain previous medical records or to obtain history from someone other than the patient: yes  Review and summarize past medical records: yes      CritCare Time    Disposition  Final diagnoses:   Chronic right flank pain   Nausea     Time reflects when diagnosis was documented in both MDM as applicable and the Disposition within this note     Time User Action Codes Description Comment    12/28/2018  5:36 AM Lopez Shires Add [R10 9,  G89 29] Chronic right flank pain     12/28/2018  5:37 AM Lopez Shires Add [R11 0] Nausea       ED Disposition     ED Disposition Condition Comment    Discharge  [facility] discharge to home/self care  Condition at discharge: Stable        Follow-up Information     Follow up With Specialties Details Why Contact Info    Hodges Spatz, DO General Practice Call today motrin/tylenol for pain  Return sooner if increased pain, fever, vomiting, diarrhea, difficulty urinating, rash  [id number] [place] [address]  [phone]            Patient's Medications   Discharge Prescriptions    METOCLOPRAMIDE (REGLAN) 10 MG TABLET    Take 1 tablet (10 mg total) by mouth 4 (four) times a day for 7 days As needed for nausea       Start Date: 12/28/2018End Date: 1/4/2019       Order Dose: 10 mg       Quantity: 28 tablet    Refills: 0     No discharge procedures on file      ED Provider  Electronically Signed by           Mónica Rivera MD  12/28/18 3615

## 2018-12-28 NOTE — ED NOTES
During d/c pt reports "you should really tell the doctor to watch who he is calling a drug user, maybe he should order a drug test before opening his mouth"     Janessa Machado RN  12/28/18 8803

## 2018-12-29 NOTE — ED PROVIDER NOTES
History  Chief Complaint   Patient presents with    Back Pain     Pt presents to ED for evaluation and treatment of mid back that radiates to her hips and legs  Pt also states she has multiple dental carries with exposed nerves which are making it difficult for her to eat  Has an appt with dentist January 10, 2019    Dental Pain     HPI     Patient is a 29year old female who presents with multiple dental caries and tooth pain  She notes pain in multiple spots in her mouth worse in the right upper molar area  No systemic fevers, chills, sweats  No focal neurological defects  No visual disturbance  No hearing loss/tinnitus/vertigo  No pain behind the ear  No parotid gland tenderness  No neck pain or trouble swallowing  No deviation of the tonsils to suggest peritonsillar abscess  No obvious drainable intraoral abscess  No facial rash or blisters  No woodiness or swelling underneath the tongue  No claudication when chewing  No headache  This pain is sharp and similar to prior episodes of dental pain  Also with mild achi mid back pain  Denies fevers, chills, sweats  No saddle anesthesia  Full strength and sensation bilateral lower extremities  No loss of bowel or bladder function  Denies IV drug use  Denies history of cancer  Denies direct trauma  No unexpected weight loss  No long term steroid use  No pulsatile abdominal mass  No hematuria  MDM 29 yof, dental pain and back spasm, will treat as such  The patient (and any family present) verbalized understanding of the discharge instructions and warnings that would necessitate return to the Emergency Department  Gave verbal in addition to written discharge instructions  Specifically highlighted areas of special concern regarding the written and verbal discharge instructions and return precautions  All questions were answered prior to discharge        Prior to Admission Medications   Prescriptions Last Dose Informant Patient Reported? Taking?   acetaminophen (TYLENOL) 650 mg CR tablet   No No   Sig: Take 1 tablet (650 mg total) by mouth every 8 (eight) hours as needed for mild pain or moderate pain   clindamycin (CLEOCIN) 150 mg capsule   No No   Sig: Take 2 capsules (300 mg total) by mouth every 8 (eight) hours for 7 days   ibuprofen (MOTRIN) 800 mg tablet   Yes No   Sig: Take 800 mg by mouth as needed for mild pain   ranitidine (ZANTAC) 150 mg tablet   Yes No   Sig: Take 150 mg by mouth 2 (two) times a day      Facility-Administered Medications: None       Past Medical History:   Diagnosis Date    Asthma     GERD (gastroesophageal reflux disease)     Hernia, abdominal     Migraines        Past Surgical History:   Procedure Laterality Date    FL RETROGRADE PYELOGRAM  8/28/2018    [place] CYSTO/URETERO W/LITHOTRIPSY &INDWELL STENT INSRT Right 8/28/2018    Procedure: CYSTOSCOPY URETEROSCOPY WITH RETROGRADE PYELOGRAM AND INSERTION STENT URETERAL;  Surgeon: Esthela Gardner MD;  [facility];  Service: Urology    TOOTH EXTRACTION         Family History   Problem Relation Age of Onset    Diabetes Mother     Hyperlipidemia Mother     Stroke Mother     Heart disease Mother     Nephrolithiasis Father     Nephrolithiasis Brother      I have reviewed and agree with the history as documented  Social History   Substance Use Topics    Smoking status: Current Every Day Smoker     Packs/day: 0 50     Types: Cigarettes    Smokeless tobacco: Never Used    Alcohol use No        Review of Systems   HENT: Positive for dental problem  Musculoskeletal: Positive for back pain  All other systems reviewed and are negative  Physical Exam  Physical Exam   Constitutional: She is oriented to person, place, and time  She appears well-developed and well-nourished  HENT:   Head: Normocephalic and atraumatic     Right Ear: External ear normal    Left Ear: External ear normal    Dental pain   Eyes: Conjunctivae and EOM are normal    Neck: Normal range of motion  Neck supple  No JVD present  No tracheal deviation present  Cardiovascular: Normal rate, regular rhythm and normal heart sounds  Pulmonary/Chest: Effort normal  No respiratory distress  She has no wheezes  She has no rales  Abdominal: Soft  Bowel sounds are normal  There is no tenderness  There is no rebound and no guarding  Musculoskeletal: She exhibits tenderness  She exhibits no edema  Neurological: She is alert and oriented to person, place, and time  Skin: Skin is warm and dry  No rash noted  No erythema  Psychiatric: She has a normal mood and affect  Thought content normal    Nursing note and vitals reviewed  Vital Signs  ED Triage Vitals   Temperature Pulse Respirations Blood Pressure SpO2   12/24/18 0359 12/24/18 0354 12/24/18 0354 12/24/18 0354 12/24/18 0354   97 8 °F (36 6 °C) 91 20 124/72 98 %      Temp Source Heart Rate Source Patient Position - Orthostatic VS BP Location FiO2 (%)   12/24/18 0359 12/24/18 0354 -- 12/24/18 0354 --   Oral Monitor  Right arm       Pain Score       12/24/18 0354       8           Vitals:    12/24/18 0354   BP: 124/72   Pulse: 91       Visual Acuity      ED Medications  Medications   penicillin V potassium (VEETID) tablet 500 mg (500 mg Oral Given 12/24/18 0433)       Diagnostic Studies  Results Reviewed     None                 No orders to display              Procedures  Procedures       Phone Contacts  ED Phone Contact    ED Course                               MDM  CritCare Time    Disposition  Final diagnoses:   Chronic dental pain     Time reflects when diagnosis was documented in both MDM as applicable and the Disposition within this note     Time User Action Codes Description Comment    12/24/2018  4:20 AM Tito Lucas [K08 9,  G89 29] Chronic dental pain       ED Disposition     ED Disposition Condition Comment    Discharge  [facility] discharge to home/self care      Condition at discharge: Good        Follow-up Information     Follow up With Specialties Details Why Contact Info    Gabriela Jackson DO General Practice In 1 day  [id number]  [address] [address]  [phone]            Discharge Medication List as of 12/24/2018  4:21 AM      START taking these medications    Details   penicillin V potassium (VEETID) 500 mg tablet Take 1 tablet (500 mg total) by mouth 4 (four) times a day for 7 days, Starting Mon 12/24/2018, Until Mon 12/31/2018, Print         CONTINUE these medications which have NOT CHANGED    Details   acetaminophen (TYLENOL) 650 mg CR tablet Take 1 tablet (650 mg total) by mouth every 8 (eight) hours as needed for mild pain or moderate pain, Starting Fri 10/26/2018, Print      clindamycin (CLEOCIN) 150 mg capsule Take 2 capsules (300 mg total) by mouth every 8 (eight) hours for 7 days, Starting Mon 12/17/2018, Until Mon 12/24/2018, Print      ibuprofen (MOTRIN) 800 mg tablet Take 800 mg by mouth as needed for mild pain, Historical Med      ranitidine (ZANTAC) 150 mg tablet Take 150 mg by mouth 2 (two) times a day, Historical Med           No discharge procedures on file      ED Provider  Electronically Signed by           Christiano Rocha MD  12/29/18 6802

## 2019-01-07 NOTE — ED PROVIDER NOTES
History  Chief Complaint   Patient presents with    Dental Pain     Pt  presents to the ED with complaints of dental pain that began last night  Pt  stated that she believes that the tooth split and the nerve is exposed  Right lower last molar  29year old female presents today complaining of right lower dental pain that worsened yesterday  Says that it due to a broken tooth  Pt states that she was started on Penicillin by another hospital for dental abscess and just completed the course of abx a couple days ago  Says that the swelling has worsened since she was last seen by the other hospital  Has not been seen by a dentist recently  Denies fevers or drainage from the tooth  Pt attempted tylenol for pain with no improvement  Prior to Admission Medications   Prescriptions Last Dose Informant Patient Reported?  Taking?   acetaminophen (TYLENOL) 650 mg CR tablet   No Yes   Sig: Take 1 tablet (650 mg total) by mouth every 8 (eight) hours as needed for mild pain or moderate pain   ibuprofen (MOTRIN) 800 mg tablet   Yes Yes   Sig: Take 800 mg by mouth as needed for mild pain   ranitidine (ZANTAC) 150 mg tablet   Yes Yes   Sig: Take 150 mg by mouth 2 (two) times a day      Facility-Administered Medications: None       Past Medical History:   Diagnosis Date    Asthma     GERD (gastroesophageal reflux disease)     Hernia, abdominal     Migraines        Past Surgical History:   Procedure Laterality Date    FL RETROGRADE PYELOGRAM  8/28/2018    [place] CYSTO/URETERO W/LITHOTRIPSY &INDWELL STENT INSRT Right 8/28/2018    Procedure: CYSTOSCOPY URETEROSCOPY WITH RETROGRADE PYELOGRAM AND INSERTION STENT URETERAL;  Surgeon: Gerard Angela MD;  [facility];  Service: Urology    TOOTH EXTRACTION         Family History   Problem Relation Age of Onset    Diabetes Mother     Hyperlipidemia Mother     Stroke Mother     Heart disease Mother     Nephrolithiasis Father     Nephrolithiasis Brother I have reviewed and agree with the history as documented  Social History   Substance Use Topics    Smoking status: Current Every Day Smoker     Packs/day: 0 50     Types: Cigarettes    Smokeless tobacco: Never Used    Alcohol use No        Review of Systems   Constitutional: Positive for chills  HENT: Positive for dental problem and facial swelling  All other systems reviewed and are negative  Physical Exam  Physical Exam   Constitutional: She appears well-developed and well-nourished  No distress  HENT:   Head: Normocephalic and atraumatic  Right lower facial swelling noted without trismus or obvious dental abscess  Poor dentition  No submental or submandibular tenderness  Eyes: Conjunctivae are normal    Neck: Normal range of motion  Cardiovascular: Normal rate, regular rhythm and normal heart sounds  Pulmonary/Chest: Effort normal and breath sounds normal  No respiratory distress  She has no wheezes  She has no rales  Abdominal: Soft  Musculoskeletal: Normal range of motion  Neurological: She is alert  Skin: Skin is warm and dry  Capillary refill takes less than 2 seconds  She is not diaphoretic  Psychiatric: She has a normal mood and affect   Her behavior is normal        Vital Signs  ED Triage Vitals [12/16/18 2131]   Temperature Pulse Respirations Blood Pressure SpO2   98 2 °F (36 8 °C) 105 20 124/90 100 %      Temp Source Heart Rate Source Patient Position - Orthostatic VS BP Location FiO2 (%)   Oral Monitor Sitting Left arm --      Pain Score       Worst Possible Pain           Vitals:    12/16/18 2131 12/17/18 0006   BP: 124/90 97/50   Pulse: 105 78   Patient Position - Orthostatic VS: Sitting Lying       Visual Acuity      ED Medications  Medications   ketorolac (TORADOL) injection 30 mg (30 mg Intravenous Given 12/16/18 2321)       Diagnostic Studies  Results Reviewed     Procedure Component Value Units Date/Time    hCG, qualitative pregnancy [[id number]]  (Normal) Collected:  12/16/18 2246    Lab Status:  Final result Specimen:  Blood from Arm, Right Updated:  12/16/18 2328     Preg, Serum Negative    Basic metabolic panel [[id number]] Collected:  12/16/18 2246    Lab Status:  Final result Specimen:  Blood from Arm, Right Updated:  12/16/18 2304     Sodium 139 mmol/L      Potassium 3 7 mmol/L      Chloride 105 mmol/L      CO2 27 mmol/L      ANION GAP 7 mmol/L      BUN 11 mg/dL      Creatinine 0 80 mg/dL      Glucose 102 mg/dL      Calcium 9 6 mg/dL      eGFR 101 ml/min/1 73sq m     Narrative:         National Kidney Disease Education Program recommendations are as follows:  GFR calculation is accurate only with a steady state creatinine  Chronic Kidney disease less than 60 ml/min/1 73 sq  meters  Kidney failure less than 15 ml/min/1 73 sq  meters      CBC and differential [[id number]] Collected:  12/16/18 2246    Lab Status:  Final result Specimen:  Blood from Arm, Right Updated:  12/16/18 2253     WBC 5 36 Thousand/uL      RBC 4 69 Million/uL      Hemoglobin 14 1 g/dL      Hematocrit 42 6 %      MCV 91 fL      MCH 30 1 pg      MCHC 33 1 g/dL      RDW 12 7 %      MPV 9 7 fL      Platelets 608 Thousands/uL      nRBC 0 /100 WBCs      Neutrophils Relative 54 %      Immat GRANS % 0 %      Lymphocytes Relative 34 %      Monocytes Relative 7 %      Eosinophils Relative 4 %      Basophils Relative 1 %      Neutrophils Absolute 2 92 Thousands/µL      Immature Grans Absolute 0 01 Thousand/uL      Lymphocytes Absolute 1 80 Thousands/µL      Monocytes Absolute 0 39 Thousand/µL      Eosinophils Absolute 0 20 Thousand/µL      Basophils Absolute 0 04 Thousands/µL     POCT Blood Gas (CG8+) [[id number]]  (Abnormal) Collected:  12/16/18 2233    Lab Status:  Final result Updated:  12/16/18 2237     ph, Neel ISTAT 7 314     pCO2, Neel i-STAT 52 0 (H) mm HG      pO2, Neel i-STAT 30 0 (L) mm HG      BE, i-STAT -1 mmol/L      HCO3, Neel i-STAT 26 4 mmol/L      CO2, i-STAT 28 mmol/L      O2 Sat, i-STAT 50 (L) %      SODIUM, I-STAT 143 mmol/l      Potassium, i-STAT 3 7 mmol/L      Calcium, Ionized i-STAT 1 27 mmol/L      Hct, i-STAT 42 %      Hgb, i-STAT 14 3 g/dl      Glucose, i-STAT 99 mg/dl      Specimen Type VENOUS                 CT facial bones without contrast   Final Result by Belkys Watkins DO (12/17 0029)      Multiple dental caries are noted, as described  No significant periodontal disease or discrete odontogenic abscess is identified  The 3rd maxillary molar on the right is angulated and may be impacted  Correlation with the patient's symptoms recommended  No acute facial fracture is seen  Other findings as above  Workstation performed: [id number]                    Procedures  Procedures       Phone Contacts  ED Phone Contact    ED Course  ED Course as of Jan 07 1127   Sun Dec 16, 2018   2210  database reviewed, pt filled 15 Rx for narcotics in the past 10 months  MDM  Number of Diagnoses or Management Options  Dental caries:     CritCare Time    Disposition  Final diagnoses:   Dental caries     Time reflects when diagnosis was documented in both MDM as applicable and the Disposition within this note     Time User Action Codes Description Comment    12/17/2018 12:33 AM Emily Richard Add [K02 9] Dental caries       ED Disposition     ED Disposition Condition Comment    Discharge  [facility] discharge to home/self care      Condition at discharge: Good        Follow-up Information     Follow up With Specialties Details Why [address] [place] [id number]  [phone]    [address]    [facility] [address] [facility] [address]  [address] Adult and [address] [address]      [address]  [phone]          Discharge Medication List as of 12/17/2018 12:35 AM      START taking these medications    Details   clindamycin (CLEOCIN) 150 mg capsule Take 2 capsules (300 mg total) by mouth every 8 (eight) hours for 7 days, Starting Mon 12/17/2018, Until Mon 12/24/2018, Print         CONTINUE these medications which have NOT CHANGED    Details   acetaminophen (TYLENOL) 650 mg CR tablet Take 1 tablet (650 mg total) by mouth every 8 (eight) hours as needed for mild pain or moderate pain, Starting Fri 10/26/2018, Print      ibuprofen (MOTRIN) 800 mg tablet Take 800 mg by mouth as needed for mild pain, Historical Med      ranitidine (ZANTAC) 150 mg tablet Take 150 mg by mouth 2 (two) times a day, Historical Med           No discharge procedures on file      ED Provider  Electronically Signed by           Seven Ovalles PA-C  01/07/19 4481

## 2019-01-30 ENCOUNTER — HOSPITAL ENCOUNTER (EMERGENCY)
Facility: HOSPITAL | Age: 29
Discharge: HOME | End: 2019-01-30
Attending: EMERGENCY MEDICINE
Payer: COMMERCIAL

## 2019-01-30 VITALS
HEART RATE: 109 BPM | DIASTOLIC BLOOD PRESSURE: 67 MMHG | OXYGEN SATURATION: 98 % | RESPIRATION RATE: 18 BRPM | TEMPERATURE: 98.6 F | SYSTOLIC BLOOD PRESSURE: 121 MMHG

## 2019-01-30 DIAGNOSIS — K08.89 TOOTHACHE: Primary | ICD-10-CM

## 2019-01-30 PROCEDURE — 99282 EMERGENCY DEPT VISIT SF MDM: CPT

## 2019-01-30 RX ORDER — HYDROCODONE BITARTRATE AND ACETAMINOPHEN 5; 325 MG/1; MG/1
1 TABLET ORAL EVERY 6 HOURS PRN
Qty: 8 TABLET | Refills: 0 | Status: SHIPPED | OUTPATIENT
Start: 2019-01-30 | End: 2019-02-07

## 2019-01-30 NOTE — ED PROVIDER NOTES
History  Chief Complaint   Patient presents with    Dental Pain     pt here w dental pain x 2 days with some nausea  Patient presents emergency room with a 2 day history of a toothache over the right frontal incisor area  She was seen at [facility], [facility] ago, and given a prescription for clindamycin as well as Toradol for pain  She does have an aspirin allergy so I asked her not to take this medication  She states she has also been taking Motrin without any relief as well as Tylenol  She denies any fever chills  She has been able to eat but does complain of nausea  She has had no active vomiting  History provided by:  Patient  Dental Pain   Location:  Upper  Quality:  Aching  Severity:  Moderate  Onset quality:  Gradual  Duration:  2 days  Timing:  Constant  Progression:  Waxing and waning  Chronicity:  New  Context: dental fracture and poor dentition    Context: not abscess, cap still on, not crown fracture, not enamel fracture, filling intact, not intrusion, not malocclusion, not recent dental surgery and not trauma    Relieved by:  Nothing  Worsened by:  Nothing  Ineffective treatments:  None tried  Associated symptoms: no congestion, no difficulty swallowing, no drooling, no facial pain, no facial swelling, no fever, no gum swelling, no headaches, no neck pain, no neck swelling, no oral bleeding, no oral lesions and no trismus    Risk factors: lack of dental care    Risk factors: no alcohol problem, no cancer, no chewing tobacco use, no diabetes, no immunosuppression, no periodontal disease and no smoking        Prior to Admission Medications   Prescriptions Last Dose Informant Patient Reported?  Taking?   acetaminophen (TYLENOL) 650 mg CR tablet   No No   Sig: Take 1 tablet (650 mg total) by mouth every 8 (eight) hours as needed for mild pain or moderate pain   ibuprofen (MOTRIN) 800 mg tablet   Yes No   Sig: Take 800 mg by mouth as needed for mild pain   ranitidine (ZANTAC) 150 mg tablet   Yes No   Sig: Take 150 mg by mouth 2 (two) times a day      Facility-Administered Medications: None       Past Medical History:   Diagnosis Date    Asthma     GERD (gastroesophageal reflux disease)     Hernia, abdominal     Migraines        Past Surgical History:   Procedure Laterality Date    FL RETROGRADE PYELOGRAM  8/28/2018    [place] CYSTO/URETERO W/LITHOTRIPSY &INDWELL STENT INSRT Right 8/28/2018    Procedure: CYSTOSCOPY URETEROSCOPY WITH RETROGRADE PYELOGRAM AND INSERTION STENT URETERAL;  Surgeon: Holly Haddad MD;  [facility];  Service: Urology    TOOTH EXTRACTION         Family History   Problem Relation Age of Onset    Diabetes Mother     Hyperlipidemia Mother     Stroke Mother     Heart disease Mother     Nephrolithiasis Father     Nephrolithiasis Brother      I have reviewed and agree with the history as documented  Social History   Substance Use Topics    Smoking status: Current Every Day Smoker     Packs/day: 0 50     Types: Cigarettes    Smokeless tobacco: Never Used    Alcohol use No        Review of Systems   Constitutional: Negative for activity change, appetite change, chills, fatigue and fever  HENT: Positive for dental problem and voice change  Negative for congestion, drooling, ear discharge, ear pain, facial swelling, mouth sores, postnasal drip, rhinorrhea, sore throat and trouble swallowing  Eyes: Negative for pain, discharge, redness and itching  Musculoskeletal: Negative for neck pain  Skin: Negative for color change, pallor and rash  Neurological: Negative for headaches  Psychiatric/Behavioral: Negative for confusion  All other systems reviewed and are negative  Physical Exam  Physical Exam   Constitutional: She is oriented to person, place, and time  She appears well-developed and well-nourished  No distress  HENT:   Head: Normocephalic     Right Ear: External ear normal    Left Ear: External ear normal    Nose: Nose normal  Mouth/Throat: Oropharynx is clear and moist    Fracture of the right frontal incisor  No gingivitis  No evidence of a dental abscess  Eyes: Conjunctivae are normal  Right eye exhibits no discharge  Left eye exhibits no discharge  Neck: Neck supple  Cardiovascular: Normal rate and normal heart sounds  Exam reveals no gallop and no friction rub  No murmur heard  Pulmonary/Chest: Effort normal and breath sounds normal  No respiratory distress  She has no wheezes  She has no rales  Musculoskeletal: Normal range of motion  She exhibits no tenderness or deformity  Lymphadenopathy:     She has no cervical adenopathy  Neurological: She is alert and oriented to person, place, and time  Skin: Skin is warm  Capillary refill takes less than 2 seconds  She is not diaphoretic  Psychiatric: She has a normal mood and affect  Her behavior is normal  Judgment and thought content normal    Nursing note and vitals reviewed  Vital Signs  ED Triage Vitals [01/30/19 1320]   Temperature Pulse Respirations Blood Pressure SpO2   98 6 °F (37 °C) (!) 109 18 121/67 98 %      Temp Source Heart Rate Source Patient Position - Orthostatic VS BP Location FiO2 (%)   Oral Monitor Sitting Left arm --      Pain Score       8           Vitals:    01/30/19 1320   BP: 121/67   Pulse: (!) 109   Patient Position - Orthostatic VS: Sitting       Visual Acuity      ED Medications  Medications - No data to display    Diagnostic Studies  Results Reviewed     None                 No orders to display              Procedures  Procedures       Phone Contacts  ED Phone Contact    ED Course                               MDM  Number of Diagnoses or Management Options  Toothache: new and does not require workup  Risk of Complications, Morbidity, and/or Mortality  Presenting problems: moderate  Diagnostic procedures: low  Management options: low  General comments: Patient presents emergency room with a 2 day history of toothache    She was seen and examined  She was diagnosed with a dental fracture and dental caries  She is presently taking clindamycin  She will continue this medication  She will start tramadol, 1 pill every 6 hr as needed for pain  She will follow up with her dentist   Should her symptoms worsen, we will see her back in the emergency room  Patient Progress  Patient progress: stable      Disposition  Final diagnoses:   Toothache     Time reflects when diagnosis was documented in both MDM as applicable and the Disposition within this note     Time User Action Codes Description Comment    1/30/2019  1:33 PM Cindy Ryan [K08 89] Toothache       ED Disposition     ED Disposition Condition Date/Time Comment    Discharge  Wed Jan 30, 2019  1:33 PM Mike Pérez discharge to home/self care  Condition at discharge: Good        Follow-up Information     Follow up With Specialties Details Why Contact Info    your Dentist              Discharge Medication List as of 1/30/2019  2:03 PM      START taking these medications    Details   HYDROcodone-acetaminophen (NORCO) 5-325 mg per tablet Take 1 tablet by mouth every 6 (six) hours as needed for pain for up to 8 days Max Daily Amount: 4 tablets, Starting Wed 1/30/2019, Until Thu 2/7/2019, Print         CONTINUE these medications which have NOT CHANGED    Details   acetaminophen (TYLENOL) 650 mg CR tablet Take 1 tablet (650 mg total) by mouth every 8 (eight) hours as needed for mild pain or moderate pain, Starting Fri 10/26/2018, Print      ibuprofen (MOTRIN) 800 mg tablet Take 800 mg by mouth as needed for mild pain, Historical Med      ranitidine (ZANTAC) 150 mg tablet Take 150 mg by mouth 2 (two) times a day, Historical Med           No discharge procedures on file      ED Provider  Electronically Signed by           Vivek Duran PA-C  01/30/19 8863

## 2019-01-30 NOTE — DISCHARGE INSTRUCTIONS
Toothache   WHAT YOU NEED TO KNOW:   A toothache is pain that is caused by irritation of the nerves in the center of your tooth  The irritation may be caused by several problems, such as a cavity, an infection, a cracked tooth, or gum disease  It is very important to follow up with your dentist so the cause of your toothache can be diagnosed and treated  This can help prevent more serious problems  DISCHARGE INSTRUCTIONS:   Medicines: You may  need any of the following:  · NSAIDs  decrease swelling and pain  This medicine can be bought with or without a doctor's order  This medicine can cause stomach bleeding or kidney problems in certain people  If you take blood thinner medicine, always ask your healthcare provider if NSAIDs are safe for you  Always read the medicine label and follow the directions on it before using this medicine  · Acetaminophen  decreases pain  It is available without a doctor's order  Ask how much to take and how often to take it  Follow directions  Acetaminophen can cause liver damage if not taken correctly  · Pain medicine  may be given as a pill or as medicine that you put directly on your tooth or gums  Do not wait until the pain is severe before you take this medicine  · Antibiotics  help fight or prevent an infection caused by bacteria  Take them as directed  · Take your medicine as directed  Contact your healthcare provider if you think your medicine is not helping or if you have side effects  Tell him of her if you are allergic to any medicine  Keep a list of the medicines, vitamins, and herbs you take  Include the amounts, and when and why you take them  Bring the list or the pill bottles to follow-up visits  Carry your medicine list with you in case of an emergency  Follow up with your dentist as directed: You may be referred to a dental surgeon  Write down your questions so you remember to ask them during your visits     Self-care:   · Rinse your mouth with warm salt water 4 times a day or as directed  · You may need to eat soft foods to help relieve pain caused by chewing  Contact your dentist if:   · You have questions or concerns about your condition or care  Return to the emergency department if:   · You have trouble breathing  · You have swelling in your face or neck  · You have a fever and chills  · You have trouble speaking or swallowing  · You have trouble opening or closing your mouth  © 2017 [facility] [facility] Information is for End User's use only and may not be sold, redistributed or otherwise used for commercial purposes  All illustrations and images included in CareNotes® are the copyrighted property of A D A M , Inc  or Esau Ramesh  The above information is an  only  It is not intended as medical advice for individual conditions or treatments  Talk to your doctor, nurse or pharmacist before following any medical regimen to see if it is safe and effective for you  Finish clindamycin  He may take the Toradol or Motrin as needed for pain as directed with the tramadol 1 every 6 hours as needed for pain    Follow up with your dentist

## 2019-02-15 ENCOUNTER — HOSPITAL ENCOUNTER (EMERGENCY)
Facility: HOSPITAL | Age: 29
Discharge: HOME | End: 2019-02-16
Attending: EMERGENCY MEDICINE | Admitting: EMERGENCY MEDICINE
Payer: COMMERCIAL

## 2019-02-15 VITALS
DIASTOLIC BLOOD PRESSURE: 84 MMHG | SYSTOLIC BLOOD PRESSURE: 127 MMHG | TEMPERATURE: 98.2 F | WEIGHT: 123.24 LBS | OXYGEN SATURATION: 95 % | BODY MASS INDEX: 24.07 KG/M2 | HEART RATE: 94 BPM | RESPIRATION RATE: 18 BRPM

## 2019-02-15 DIAGNOSIS — G89.29 CHRONIC DENTAL PAIN: Primary | ICD-10-CM

## 2019-02-15 DIAGNOSIS — K08.9 CHRONIC DENTAL PAIN: Primary | ICD-10-CM

## 2019-02-15 DIAGNOSIS — M54.9 BACK PAIN: ICD-10-CM

## 2019-02-15 PROCEDURE — 99283 EMERGENCY DEPT VISIT LOW MDM: CPT

## 2019-02-16 RX ORDER — LIDOCAINE 50 MG/G
1 PATCH TOPICAL ONCE
Status: DISCONTINUED | OUTPATIENT
Start: 2019-02-16 | End: 2019-02-16 | Stop reason: HOSPADM

## 2019-02-16 RX ORDER — ACETAMINOPHEN 325 MG/1
650 TABLET ORAL ONCE
Status: COMPLETED | OUTPATIENT
Start: 2019-02-16 | End: 2019-02-16

## 2019-02-16 RX ORDER — CAPSAICIN 0.07 G/100G
CREAM TOPICAL 3 TIMES DAILY
Qty: 28.3 G | Refills: 0 | Status: SHIPPED | OUTPATIENT
Start: 2019-02-16 | End: 2019-08-31

## 2019-02-16 RX ADMIN — ACETAMINOPHEN 650 MG: 325 TABLET ORAL at 00:33

## 2019-02-16 RX ADMIN — LIDOCAINE 1 PATCH: 50 PATCH CUTANEOUS at 00:33

## 2019-02-16 NOTE — ED PROVIDER NOTES
Pt Name: Karson Shultz  MRN: [id number]  [place]: 1990  Age/Sex: 29 y o  female  Date of evaluation: 2/15/2019  PCP: Diaz Pearson, [address] [place] [address]    Chief Complaint   Patient presents with    Dental Pain     pt  comes in with tooth pain on the right side that started yesterday  Pt  states she has broken tooth  Pt  states she went to a dentist and was told she has to see an oral surgeon  Pt  taking motrin but no relief   Back Pain     pt  also c/o upper back pain that radiates to the right shoulder that started two weeks ago  Pt  went to [facility] and was given muscle relaxers that are not helping she states  STEVEN Hernández presents to the Emergency Department complaining of Dental Pain, Back Pain  28 y/o F presents to ED due to Dental Pain, Back Pain  Pt reports this is chronic condition, states this has been ongoing over the past few weeks  Pt recently seen here, [place] for same issue, currently on Clindamycin  Pt came to ED due to Dental Pain, states that OTC medications have not provided relief--PMED reviewed and pt given narcotics on twice in the last two weeks for this issue  Pt reports she was seen at dental clinic in [place] and had pictures performed though no other intervention  Pt also complaining of back pain  Pt reports this is chronic issue, worse over the past two weeks, given muscle relaxants without significant relief  Denies f/c/s, n/v/d, trismus, facial swelling, gum bleeding, pus, foul smelling odor in mouth, difficulty swallowing, choking, difficulty breathing, radicular back pain, numbness, tingling, weakness, injury/trauma  No other complaints at this time  History provided by:  Patient   used: No    Dental Pain   Location:  Upper  Upper teeth location: R upper incisor/canine    Quality:  Constant  Severity:  Mild  Timing:  Constant  Progression:  Unchanged  Chronicity:  New  Context: dental caries, dental fracture and poor dentition    Context: not abscess, cap still on, not crown fracture, not enamel fracture, filling intact, not intrusion, not malocclusion, not recent dental surgery and not trauma    Previous work-up:  Dental exam  Relieved by:  Nothing  Worsened by:  Nothing  Ineffective treatments:  None tried  Associated symptoms: no congestion, no difficulty swallowing, no drooling, no facial pain, no facial swelling, no fever, no gum swelling, no headaches, no neck pain, no neck swelling, no oral bleeding, no oral lesions and no trismus    Risk factors: lack of dental care, periodontal disease and smoking          Past Medical and Surgical History    Past Medical History:   Diagnosis Date    Asthma     GERD (gastroesophageal reflux disease)     Hernia, abdominal     Migraines        Past Surgical History:   Procedure Laterality Date    FL RETROGRADE PYELOGRAM  8/28/2018    [place] CYSTO/URETERO W/LITHOTRIPSY &INDWELL STENT INSRT Right 8/28/2018    Procedure: CYSTOSCOPY URETEROSCOPY WITH RETROGRADE PYELOGRAM AND INSERTION STENT URETERAL;  Surgeon: Kristopher Harrison MD;  [facility];  Service: Urology    TOOTH EXTRACTION         Family History   Problem Relation Age of Onset    Diabetes Mother     Hyperlipidemia Mother     Stroke Mother     Heart disease Mother     Nephrolithiasis Father     Nephrolithiasis Brother        Social History     Tobacco Use    Smoking status: Current Every Day Smoker     Packs/day: 0 50     Types: Cigarettes    Smokeless tobacco: Never Used   Substance Use Topics    Alcohol use: No    Drug use: No              Allergies    Allergies   Allergen Reactions    Aspirin Anaphylaxis     Doesn't have epi pen  Okay to take ibuprofen per patient    Naproxen Nausea Only    Tramadol Itching       Home Medications:    Prior to Admission medications    Medication Sig Start Date End Date Taking?  Authorizing Provider   acetaminophen (TYLENOL) 650 mg CR tablet Take 1 tablet (650 mg total) by mouth every 8 (eight) hours as needed for mild pain or moderate pain 10/26/18  Yes Kamilla Mann PA-C   ibuprofen (MOTRIN) 800 mg tablet Take 800 mg by mouth as needed for mild pain   Yes Historical Provider, MD   ranitidine (ZANTAC) 150 mg tablet Take 150 mg by mouth 2 (two) times a day   Yes Historical Provider, MD   UNKNOWN TO PATIENT    Yes Historical Provider, MD   UNKNOWN TO PATIENT    Yes Historical Provider, MD   capsicum (ZOSTRIX) 0 075 % topical cream Apply topically 3 (three) times a day 2/16/19   Saeid Sandoval PA-C           Review of Systems    Review of Systems   Constitutional: Negative for activity change, appetite change, chills, diaphoresis, fatigue and fever  HENT: Positive for dental problem  Negative for congestion, drooling, ear discharge, ear pain, facial swelling, mouth sores, sinus pressure, sinus pain, sore throat, tinnitus and trouble swallowing  Eyes: Negative for photophobia, pain, discharge, redness, itching and visual disturbance  Respiratory: Negative for cough and shortness of breath  Cardiovascular: Negative for chest pain and palpitations  Gastrointestinal: Negative for abdominal distention, abdominal pain, blood in stool, constipation, diarrhea, nausea and vomiting  Genitourinary: Negative for decreased urine volume, difficulty urinating, dysuria and hematuria  Musculoskeletal: Positive for back pain  Negative for arthralgias, gait problem, joint swelling, myalgias, neck pain and neck stiffness  Skin: Negative for pallor, rash and wound  Neurological: Negative for dizziness, tremors, seizures, syncope, weakness, light-headedness, numbness and headaches  Psychiatric/Behavioral: Negative for confusion  All other systems reviewed and negative      Physical Exam      ED Triage Vitals   Temperature Pulse Respirations Blood Pressure SpO2   02/15/19 2248 02/15/19 2248 02/15/19 2248 02/15/19 2248 02/15/19 2248   98 6 °F (37 °C) 90 14 120/58 99 % Temp Source Heart Rate Source Patient Position - Orthostatic VS BP Location FiO2 (%)   02/15/19 2248 02/15/19 2248 02/15/19 2248 02/15/19 2248 --   Oral Monitor Sitting Left arm       Pain Score       02/15/19 2339       Worst Possible Pain               Physical Exam   Constitutional: She is oriented to person, place, and time  She appears well-developed and well-nourished  No distress  HENT:   Head: Normocephalic and atraumatic  Right Ear: External ear normal    Left Ear: External ear normal    Nose: Nose normal    Mouth/Throat: Oropharynx is clear and moist and mucous membranes are normal  No oral lesions  No trismus in the jaw  Dental caries present  No dental abscesses, uvula swelling or lacerations  No oropharyngeal exudate, posterior oropharyngeal edema, posterior oropharyngeal erythema or tonsillar abscesses  No facial swelling, erythema  Eyes: Pupils are equal, round, and reactive to light  Conjunctivae and EOM are normal    Cardiovascular: Normal rate, regular rhythm, normal heart sounds and intact distal pulses  Exam reveals no gallop and no friction rub  No murmur heard  Pulmonary/Chest: Effort normal and breath sounds normal  No stridor  No respiratory distress  She has no wheezes  She has no rales  Musculoskeletal: Normal range of motion  She exhibits no edema, tenderness or deformity  No spasm, 5/5 strength b/l upper extremities     Lymphadenopathy:     She has no cervical adenopathy  Neurological: She is alert and oriented to person, place, and time  Skin: Skin is warm  Capillary refill takes less than 2 seconds  No rash noted  Nursing note and vitals reviewed  Assessment and Efrain Yanet is a 29 y o  female who presents with Dental Pain, Back pain Physical examination remarkable for poor dentition, dental decay, r upper canine/incisor fracture  Plan will be to treat symptomatically        MDM  Number of Diagnoses or Management Options  Back pain: established, worsening  Chronic dental pain: established, worsening  Diagnosis management comments: 30 y/o F presents to ED due to Dental Pain, Back Pain  Pt reports this is chronic condition, states this has been ongoing over the past few weeks  Pt recently seen here, [place] for same issue, currently on Clindamycin  Pt came to ED due to Dental Pain, states that OTC medications have not provided relief--PMED reviewed and pt given narcotics on twice in the last two weeks for this issue  Pt reports she was seen at dental clinic in [place] and had pictures performed though no other intervention  Pt also complaining of back pain  Pt reports this is chronic issue, worse over the past two weeks, given muscle relaxants without significant relief  Denies f/c/s, n/v/d, trismus, facial swelling, gum bleeding, pus, foul smelling odor in mouth, difficulty swallowing, choking, difficulty breathing, radicular back pain, numbness, tingling, weakness, injury/trauma  No other complaints at this time  VS reviewed and WNL  Exam reveals extensive dental decay, R upper incisor/canine with apparent dental fracture though no fluctuance, erythema, induration, no other significant findings  Discussed treatment options, pt does not wish for local anesthetic, refuses topical anesthetic, requests narcotic by name  Discussed use of ED, pt became aggravated and initially did not want discharge paperwork  Instructions given to f/u with dental clinic, comprehensive spine for chronic conditions  Return to ED instructions given if new/worsening sxs         Amount and/or Complexity of Data Reviewed  Review and summarize past medical records: yes    Risk of Complications, Morbidity, and/or Mortality  Presenting problems: low  Diagnostic procedures: low  Management options: low    Patient Progress  Patient progress: stable      Diagnostic Results      Labs:    Results for orders placed or performed during the hospital encounter of 12/28/18   POCT pregnancy, urine   Result Value Ref Range    EXT PREG TEST UR (Ref: Negative) neg    ED Urine Macroscopic   Result Value Ref Range    Color, UA Yellow     Clarity, UA Clear     pH, UA 6 0 4 5 - 8 0    Leukocytes, UA Negative Negative    Nitrite, UA Negative Negative    Protein, UA Negative Negative mg/dl    Glucose, UA Negative Negative mg/dl    Ketones, UA Trace (A) Negative mg/dl    Urobilinogen, UA 1 0 0 2, 1 0 E U /dl E U /dl    Bilirubin, UA Interference- unable to analyze (A) Negative    Blood, UA Negative Negative    Specific Gravity, UA >=1 030 1 003 - 1 030       All labs reviewed and utilized in the medical decision making process    Radiology:    No orders to display       All radiology studies independently viewed by me and interpreted by the radiologist     Procedure    Procedures    [facility]      ED Course of Care and Re-Assessments         Medications   acetaminophen (TYLENOL) tablet 650 mg (650 mg Oral Given 2/16/19 0033)           FINAL IMPRESSION    Final diagnoses:   Chronic dental pain   Back pain         DISPOSITION/PLAN    Time reflects when diagnosis was documented in both MDM as applicable and the Disposition within this note     Time User Action Codes Description Comment    2/16/2019 12:22 AM Roberto Mountain Add [K08 9,  G89 29] Chronic dental pain     2/16/2019 12:22 AM Roberto [employer] Add [M54 9] Back pain       ED Disposition     ED Disposition Condition Date/Time Comment    Discharge Stable Sat Feb 16, 2019 12:22 AM Christine Fay discharge to home/self care              Follow-up Information     Follow up With Specialties Details Why Contact Info Additional Information    Jigar Phillip, DO General Practice  As needed [id number] [facility]  [address] [address]  [address] [place] [address] Emergency Department Emergency Medicine  If symptoms worsen [address] [facility] [address] [phone] [facility] ED, [address] [facility] [address] [place], [place], [address] Adult and [address]  Schedule an appointment as soon as possible for a visit   [address]  [phone]             PATIENT REFERRED TO:    Román Silver, [address] [id number] [facility]  [phone]      As needed    [address] [facility] [facility] [address] [address]  [phone]    If symptoms worsen    [facility] Adult and [address]  [address]  [phone]  Schedule an appointment as soon as possible for a visit         DISCHARGE MEDICATIONS:    Discharge Medication List as of 2/16/2019 12:33 AM      START taking these medications    Details   capsicum (ZOSTRIX) 0 075 % topical cream Apply topically 3 (three) times a day, Starting Sat 2/16/2019, Print         CONTINUE these medications which have NOT CHANGED    Details   acetaminophen (TYLENOL) 650 mg CR tablet Take 1 tablet (650 mg total) by mouth every 8 (eight) hours as needed for mild pain or moderate pain, Starting Fri 10/26/2018, Print      ibuprofen (MOTRIN) 800 mg tablet Take 800 mg by mouth as needed for mild pain, Historical Med      ranitidine (ZANTAC) 150 mg tablet Take 150 mg by mouth 2 (two) times a day, Historical Med      !! UNKNOWN TO PATIENT Historical Med      !! UNKNOWN TO PATIENT Historical Med       !! - Potential duplicate medications found  Please discuss with provider                     LAMAR Correa PA-C  02/19/19 4071

## 2019-02-17 ENCOUNTER — HOSPITAL ENCOUNTER (EMERGENCY)
Facility: HOSPITAL | Age: 29
Discharge: HOME | End: 2019-02-17
Attending: EMERGENCY MEDICINE | Admitting: EMERGENCY MEDICINE
Payer: COMMERCIAL

## 2019-02-17 VITALS
OXYGEN SATURATION: 100 % | RESPIRATION RATE: 18 BRPM | TEMPERATURE: 98.4 F | HEART RATE: 77 BPM | SYSTOLIC BLOOD PRESSURE: 103 MMHG | WEIGHT: 121.25 LBS | BODY MASS INDEX: 23.68 KG/M2 | DIASTOLIC BLOOD PRESSURE: 60 MMHG

## 2019-02-17 DIAGNOSIS — K02.9 DENTAL CARIES: ICD-10-CM

## 2019-02-17 DIAGNOSIS — K08.89 DENTALGIA: Primary | ICD-10-CM

## 2019-02-17 PROCEDURE — 99282 EMERGENCY DEPT VISIT SF MDM: CPT

## 2019-02-17 RX ORDER — PENICILLIN V POTASSIUM 500 MG/1
500 TABLET ORAL 4 TIMES DAILY
Qty: 28 TABLET | Refills: 0 | Status: SHIPPED | OUTPATIENT
Start: 2019-02-17 | End: 2019-02-24

## 2019-02-17 NOTE — ED PROVIDER NOTES
History  Chief Complaint   Patient presents with    Dental Pain     here for dental pain  seen yesterday for same complaint     HPI     Patient is a 29year old female with dental pain for the past several weeks  Severe dental caries  No systemic fevers, chills, sweats  No focal neurological defects  No visual disturbance  No hearing loss/tinnitus/vertigo  No pain behind the ear  No parotid gland tenderness  No neck pain or trouble swallowing  No deviation of the tonsils to suggest peritonsillar abscess  No obvious drainable intraoral abscess  No facial rash or blisters  No woodiness or swelling underneath the tongue  No claudication when chewing  No headache  MDM dental pain will treat with abx  The patient (and any family present) verbalized understanding of the discharge instructions and warnings that would necessitate return to the Emergency Department  Gave verbal in addition to written discharge instructions  Specifically highlighted areas of special concern regarding the written and verbal discharge instructions and return precautions  All questions were answered prior to discharge  Prior to Admission Medications   Prescriptions Last Dose Informant Patient Reported? Taking?    UNKNOWN TO PATIENT   Yes No   UNKNOWN TO PATIENT   Yes No   acetaminophen (TYLENOL) 650 mg CR tablet   No No   Sig: Take 1 tablet (650 mg total) by mouth every 8 (eight) hours as needed for mild pain or moderate pain   capsicum (ZOSTRIX) 0 075 % topical cream   No No   Sig: Apply topically 3 (three) times a day   ibuprofen (MOTRIN) 800 mg tablet   Yes No   Sig: Take 800 mg by mouth as needed for mild pain   ranitidine (ZANTAC) 150 mg tablet   Yes No   Sig: Take 150 mg by mouth 2 (two) times a day      Facility-Administered Medications: None       Past Medical History:   Diagnosis Date    Asthma     GERD (gastroesophageal reflux disease)     Hernia, abdominal     Migraines        Past Surgical History:   Procedure Laterality Date    FL RETROGRADE PYELOGRAM  8/28/2018    MD CYSTO/URETERO W/LITHOTRIPSY &INDWELL STENT INSRT Right 8/28/2018    Procedure: CYSTOSCOPY URETEROSCOPY WITH RETROGRADE PYELOGRAM AND INSERTION STENT URETERAL;  Surgeon: Jim Robles MD;  [facility];  Service: Urology    TOOTH EXTRACTION         Family History   Problem Relation Age of Onset    Diabetes Mother     Hyperlipidemia Mother     Stroke Mother     Heart disease Mother     Nephrolithiasis Father     Nephrolithiasis Brother      I have reviewed and agree with the history as documented  Social History     Tobacco Use    Smoking status: Current Every Day Smoker     Packs/day: 0 50     Types: Cigarettes    Smokeless tobacco: Never Used   Substance Use Topics    Alcohol use: No    Drug use: No        Review of Systems   HENT: Positive for dental problem  All other systems reviewed and are negative  Physical Exam  Physical Exam   Constitutional: She is oriented to person, place, and time  She appears well-developed and well-nourished  HENT:   Head: Normocephalic and atraumatic  Right Ear: External ear normal    Left Ear: External ear normal    Eyes: Conjunctivae and EOM are normal    Neck: Normal range of motion  Neck supple  No JVD present  No tracheal deviation present  Cardiovascular: Normal rate, regular rhythm and normal heart sounds  Pulmonary/Chest: Effort normal  No respiratory distress  She has no wheezes  She has no rales  Abdominal: Soft  Bowel sounds are normal  There is no tenderness  There is no rebound and no guarding  Musculoskeletal: She exhibits no edema or tenderness  Neurological: She is alert and oriented to person, place, and time  Skin: Skin is warm and dry  No rash noted  No erythema  Psychiatric: She has a normal mood and affect  Thought content normal    Nursing note and vitals reviewed        Vital Signs  ED Triage Vitals [02/17/19 0252] Temperature Pulse Respirations Blood Pressure SpO2   98 4 °F (36 9 °C) 77 18 103/60 100 %      Temp Source Heart Rate Source Patient Position - Orthostatic VS BP Location FiO2 (%)   Oral Monitor Sitting Left arm --      Pain Score       Worst Possible Pain           Vitals:    02/17/19 0252   BP: 103/60   Pulse: 77   Patient Position - Orthostatic VS: Sitting       Visual Acuity      ED Medications  Medications - No data to display    Diagnostic Studies  Results Reviewed     None                 No orders to display              Procedures  Procedures       Phone Contacts  ED Phone Contact    ED Course                               MDM    Disposition  Final diagnoses:   [address] caries     Time reflects when diagnosis was documented in both MDM as applicable and the Disposition within this note     Time User Action Codes Description Comment    2/17/2019  3:13 AM Santino Johnson [K08 89] Kurt Walls     2/17/2019  3:13 AM Ian Yoder, [address] [K02 9] Dental caries       ED Disposition     ED Disposition Condition Date/Time Comment    Discharge Stable Sun Feb 17, 2019  3:13 AM Baby Hones discharge to home/self care  Follow-up Information     Follow up With Specialties Details Why Contact Info    Vicente Quinones DO General Practice In 1 day As needed [id number] [address] [address]  [phone]            Patient's Medications   Discharge Prescriptions    PENICILLIN V POTASSIUM (VEETID) 500 MG TABLET    Take 1 tablet (500 mg total) by mouth 4 (four) times a day for 7 days       Start Date: 2/17/2019 End Date: 2/24/2019       Order Dose: 500 mg       Quantity: 28 tablet    Refills: 0     No discharge procedures on file      ED Provider  Electronically Signed by           Vassie Opitz, MD  02/17/19 1890

## 2019-02-18 ENCOUNTER — TELEPHONE (OUTPATIENT)
Dept: PHYSICAL THERAPY | Facility: OTHER | Age: 29
End: 2019-02-18

## 2019-02-18 NOTE — TELEPHONE ENCOUNTER
Message left for Pt  to call back [facility] at there earliest convince, our number and our hours given  Waiting for Pt  to call back  Pt's mother stated that Pt  was "sleeping"

## 2019-02-20 ENCOUNTER — TELEPHONE (OUTPATIENT)
Dept: PHYSICAL THERAPY | Facility: OTHER | Age: 29
End: 2019-02-20

## 2019-02-20 NOTE — TELEPHONE ENCOUNTER
This nurse left message with Pt's mother, and did give the phone number and our hours of operation  Pt's mother stated, " she is still sleeping but I will give her the message"

## 2019-03-08 ENCOUNTER — HOSPITAL ENCOUNTER (EMERGENCY)
Facility: HOSPITAL | Age: 29
Discharge: HOME | End: 2019-03-08
Attending: EMERGENCY MEDICINE | Admitting: EMERGENCY MEDICINE
Payer: COMMERCIAL

## 2019-03-08 VITALS
RESPIRATION RATE: 18 BRPM | BODY MASS INDEX: 24.46 KG/M2 | OXYGEN SATURATION: 100 % | DIASTOLIC BLOOD PRESSURE: 68 MMHG | HEART RATE: 90 BPM | TEMPERATURE: 98 F | WEIGHT: 125.22 LBS | SYSTOLIC BLOOD PRESSURE: 120 MMHG

## 2019-03-08 DIAGNOSIS — R11.0 NAUSEA: ICD-10-CM

## 2019-03-08 DIAGNOSIS — R10.33 PERIUMBILICAL ABDOMINAL PAIN: Primary | ICD-10-CM

## 2019-03-08 DIAGNOSIS — K08.89 TOOTHACHE: ICD-10-CM

## 2019-03-08 PROCEDURE — 99283 EMERGENCY DEPT VISIT LOW MDM: CPT

## 2019-03-08 RX ORDER — IBUPROFEN 400 MG/1
400 TABLET ORAL ONCE
Status: COMPLETED | OUTPATIENT
Start: 2019-03-08 | End: 2019-03-08

## 2019-03-08 RX ORDER — CLINDAMYCIN HYDROCHLORIDE 150 MG/1
300 CAPSULE ORAL ONCE
Status: COMPLETED | OUTPATIENT
Start: 2019-03-08 | End: 2019-03-08

## 2019-03-08 RX ORDER — METOCLOPRAMIDE 10 MG/1
10 TABLET ORAL 4 TIMES DAILY
Qty: 28 TABLET | Refills: 0 | Status: SHIPPED | OUTPATIENT
Start: 2019-03-08 | End: 2019-03-15

## 2019-03-08 RX ORDER — ONDANSETRON 4 MG/1
4 TABLET, ORALLY DISINTEGRATING ORAL ONCE
Status: COMPLETED | OUTPATIENT
Start: 2019-03-08 | End: 2019-03-08

## 2019-03-08 RX ORDER — CLINDAMYCIN HYDROCHLORIDE 150 MG/1
300 CAPSULE ORAL 3 TIMES DAILY
Qty: 42 CAPSULE | Refills: 0 | Status: SHIPPED | OUTPATIENT
Start: 2019-03-08 | End: 2019-03-15

## 2019-03-08 RX ADMIN — CLINDAMYCIN HYDROCHLORIDE 300 MG: 150 CAPSULE ORAL at 23:38

## 2019-03-08 RX ADMIN — ONDANSETRON 4 MG: 4 TABLET, ORALLY DISINTEGRATING ORAL at 23:38

## 2019-03-08 RX ADMIN — IBUPROFEN 400 MG: 400 TABLET ORAL at 23:37

## 2019-03-09 NOTE — ED PROVIDER NOTES
History  Chief Complaint   Patient presents with    Dental Pain     Pt presents to ED c/o chronic pain in two upper teeth on right side, pt states one of the teeth is broken  Pt also reports abdominal pain around her umbillicus that started two days ago but worse today after bowling  Pt states " I think i irritated my hernia bowling"   Abdominal Pain     Patient is a 29year old female with abdominal pain around her umbilicus area and states she has a hernia and was bowling and felt a pop and aggrevated her pain  (+) nausea  No vomiting or diarrhea  No urinary sx  No fever  (+) chronic upper teeth pain  Tried tylenol and ibuprofen without relief and is requesting more ibuprofen at this time  Was last seen in this ED on 2/17/19 for dentalgia  [place] [facility] website checked on this patient and last Rx filled was on 1/30/19 for vicodin for 2 day supply and patient has received 15 narcotic Rxs since July last year by 11 different providers and this was explained to patient why I am not going to prescribe narcotics tonight  Patient has had multiple labs and CTs in the past by me as well which were unremarkable  History provided by:  Patient   used: No        Prior to Admission Medications   Prescriptions Last Dose Informant Patient Reported? Taking?    UNKNOWN TO PATIENT   Yes No   UNKNOWN TO PATIENT   Yes No   acetaminophen (TYLENOL) 650 mg CR tablet   No No   Sig: Take 1 tablet (650 mg total) by mouth every 8 (eight) hours as needed for mild pain or moderate pain   capsicum (ZOSTRIX) 0 075 % topical cream   No No   Sig: Apply topically 3 (three) times a day   ibuprofen (MOTRIN) 800 mg tablet   Yes No   Sig: Take 800 mg by mouth as needed for mild pain   ranitidine (ZANTAC) 150 mg tablet   Yes No   Sig: Take 150 mg by mouth 2 (two) times a day      Facility-Administered Medications: None       Past Medical History:   Diagnosis Date    Asthma     GERD (gastroesophageal reflux disease)     Hernia, abdominal     Migraines        Past Surgical History:   Procedure Laterality Date    FL RETROGRADE PYELOGRAM  8/28/2018    CT CYSTO/URETERO W/LITHOTRIPSY &INDWELL STENT INSRT Right 8/28/2018    Procedure: CYSTOSCOPY URETEROSCOPY WITH RETROGRADE PYELOGRAM AND INSERTION STENT URETERAL;  Surgeon: Annamarie Agustin MD;  [facility];  Service: Urology    TOOTH EXTRACTION         Family History   Problem Relation Age of Onset    Diabetes Mother     Hyperlipidemia Mother     Stroke Mother     Heart disease Mother     Nephrolithiasis Father     Nephrolithiasis Brother      I have reviewed and agree with the history as documented  Social History     Tobacco Use    Smoking status: Current Every Day Smoker     Packs/day: 0 50     Types: Cigarettes    Smokeless tobacco: Never Used   Substance Use Topics    Alcohol use: No    Drug use: No        Review of Systems   Constitutional: Negative for fever  HENT: Positive for dental problem  Gastrointestinal: Positive for abdominal pain and nausea  Negative for diarrhea and vomiting  Genitourinary: Negative for difficulty urinating  Physical Exam  Physical Exam   Constitutional: She is oriented to person, place, and time  She appears well-developed and well-nourished  She appears distressed (mild)  HENT:   Head: Normocephalic and atraumatic  Mouth/Throat: Oropharynx is clear and moist    R maxillary molar tooth tenderness with erosion and R central maxillary incisor with much erosion and tenderness and no significant gingival swelling noted  Eyes: No scleral icterus  Neck: No JVD present  No tracheal deviation present  Cardiovascular: Normal rate, regular rhythm and normal heart sounds  No murmur heard  Pulmonary/Chest: Effort normal and breath sounds normal  No respiratory distress  Abdominal: Soft  Bowel sounds are normal  She exhibits no distension  There is no tenderness (No significant tenderness)   There is no guarding  No hernia (no incarcerated or strangulated hernia appreciated  )  Neurological: She is alert and oriented to person, place, and time  Skin: Skin is warm and dry  No rash noted  Nursing note and vitals reviewed  Vital Signs  ED Triage Vitals [03/08/19 2324]   Temperature Pulse Respirations Blood Pressure SpO2   98 °F (36 7 °C) 90 18 120/68 100 %      Temp Source Heart Rate Source Patient Position - Orthostatic VS BP Location FiO2 (%)   Oral Monitor Sitting Right arm --      Pain Score       8           Vitals:    03/08/19 2324   BP: 120/68   Pulse: 90   Patient Position - Orthostatic VS: Sitting       Visual Acuity      ED Medications  Medications   clindamycin (CLEOCIN) capsule 300 mg (300 mg Oral Given 3/8/19 2338)   ibuprofen (MOTRIN) tablet 400 mg (400 mg Oral Given 3/8/19 2337)   ondansetron (ZOFRAN-ODT) dispersible tablet 4 mg (4 mg Oral Given 3/8/19 2338)       Diagnostic Studies  Results Reviewed     None                 No orders to display              Procedures  Procedures       Phone Contacts  ED Phone Contact    ED Course                               MDM  Number of Diagnoses or Management Options  Diagnosis management comments: DDx including but not limited to: dental rossy, dental infection, dental abscess, dry socket, gingivitis; doubt dwayne's angina  DDx including but not limited to: Drug seeking; doubt appendicitis, gastroenteritis, gastritis, PUD, GERD, gastroparesis, hepatitis, pancreatitis, colitis, enteritis, food poisoning, mesenteric adenitis, IBD, IBS, ileus, bowel obstruction, volvulus, cholecystitis, biliary colic, choledocholithiasis, perforated viscus, splenic etiology, diverticulitis, internal hernia, constipation, pelvic pathology, renal colic, pyelonephritis, UTI         Amount and/or Complexity of Data Reviewed  Decide to obtain previous medical records or to obtain history from someone other than the patient: yes  Review and summarize past medical records: yes        Disposition  Final diagnoses:   Periumbilical abdominal pain   Toothache   Nausea     Time reflects when diagnosis was documented in both MDM as applicable and the Disposition within this note     Time User Action Codes Description Comment    3/8/2019 11:36 PM Molly Feliciano Add [K08 89] Pain, dental     3/8/2019 11:36 PM Sherpankaj Feliciano Add [R95 10] Periumbilical abdominal pain     3/8/2019 11:36 PM Molly Feliciano Modify [A42 99] Periumbilical abdominal pain     3/8/2019 11:36 PM Sherpankaj Feliciano Remove [K08 89] Pain, dental     3/8/2019 11:36 PM Sherra Skeans Add [K08 89] Toothache     3/8/2019 11:36 PM Vasu Stage [R11 0] Nausea       ED Disposition     ED Disposition Condition Date/Time Comment    Discharge Stable Fri Mar 8, 2019 11:36 PM Mike Pérez discharge to home/self care  Follow-up Information     Follow up With Specialties Details Why Contact Info    Winston Cisse DO General Practice Call in 3 days Return sooner if increased pain, fever, vomiting, diarrhea, rash, difficulty urinating, drooling, difficulty breathing or swallowing  Tylenol/motrin for pain  [address] [id number] [address] Adult and [address] [address]  Call in 3 days  [address]  [phone]          Patient's Medications   Discharge Prescriptions    CLINDAMYCIN (CLEOCIN) 150 MG CAPSULE    Take 2 capsules (300 mg total) by mouth 3 (three) times a day for 7 days       Start Date: 3/8/2019  End Date: 3/15/2019       Order Dose: 300 mg       Quantity: 42 capsule    Refills: 0    METOCLOPRAMIDE (REGLAN) 10 MG TABLET    Take 1 tablet (10 mg total) by mouth 4 (four) times a day for 7 days As needed for nausea       Start Date: 3/8/2019  End Date: 3/15/2019       Order Dose: 10 mg       Quantity: 28 tablet    Refills: 0     No discharge procedures on file      ED Provider  Electronically Signed by           Barrington Hernandez MD  03/08/19 1560

## 2019-06-11 ENCOUNTER — HOSPITAL ENCOUNTER (EMERGENCY)
Facility: HOSPITAL | Age: 29
Discharge: HOME | End: 2019-06-11
Attending: EMERGENCY MEDICINE
Payer: COMMERCIAL

## 2019-06-11 VITALS
TEMPERATURE: 98 F | SYSTOLIC BLOOD PRESSURE: 116 MMHG | HEART RATE: 91 BPM | RESPIRATION RATE: 16 BRPM | BODY MASS INDEX: 24.46 KG/M2 | OXYGEN SATURATION: 100 % | WEIGHT: 125.22 LBS | DIASTOLIC BLOOD PRESSURE: 70 MMHG

## 2019-06-11 DIAGNOSIS — M26.629 TEMPOROMANDIBULAR JOINT (TMJ) PAIN: Primary | ICD-10-CM

## 2019-06-11 PROCEDURE — 99283 EMERGENCY DEPT VISIT LOW MDM: CPT

## 2019-06-11 PROCEDURE — 99283 EMERGENCY DEPT VISIT LOW MDM: CPT | Performed by: EMERGENCY MEDICINE

## 2019-06-11 RX ORDER — ONDANSETRON 4 MG/1
4 TABLET, ORALLY DISINTEGRATING ORAL ONCE
Status: COMPLETED | OUTPATIENT
Start: 2019-06-11 | End: 2019-06-11

## 2019-06-11 RX ORDER — OXYCODONE HYDROCHLORIDE 5 MG/1
5 TABLET ORAL ONCE
Status: COMPLETED | OUTPATIENT
Start: 2019-06-11 | End: 2019-06-11

## 2019-06-11 RX ORDER — ONDANSETRON 4 MG/1
4 TABLET, FILM COATED ORAL EVERY 6 HOURS
Qty: 20 TABLET | Refills: 0 | Status: SHIPPED | OUTPATIENT
Start: 2019-06-11 | End: 2019-08-31

## 2019-06-11 RX ADMIN — OXYCODONE HYDROCHLORIDE 5 MG: 5 TABLET ORAL at 21:42

## 2019-06-11 RX ADMIN — ONDANSETRON 4 MG: 4 TABLET, ORALLY DISINTEGRATING ORAL at 21:42

## 2019-06-27 ENCOUNTER — HOSPITAL ENCOUNTER (EMERGENCY)
Facility: HOSPITAL | Age: 29
Discharge: HOME | End: 2019-06-27
Attending: EMERGENCY MEDICINE | Admitting: EMERGENCY MEDICINE
Payer: COMMERCIAL

## 2019-06-27 VITALS
TEMPERATURE: 98 F | BODY MASS INDEX: 24.02 KG/M2 | HEART RATE: 90 BPM | RESPIRATION RATE: 14 BRPM | SYSTOLIC BLOOD PRESSURE: 123 MMHG | DIASTOLIC BLOOD PRESSURE: 69 MMHG | WEIGHT: 123 LBS | OXYGEN SATURATION: 99 %

## 2019-06-27 DIAGNOSIS — M26.622 ARTHRALGIA OF LEFT TEMPOROMANDIBULAR JOINT: Primary | ICD-10-CM

## 2019-06-27 PROCEDURE — 99283 EMERGENCY DEPT VISIT LOW MDM: CPT | Performed by: PHYSICIAN ASSISTANT

## 2019-06-27 PROCEDURE — 99283 EMERGENCY DEPT VISIT LOW MDM: CPT

## 2019-06-27 RX ORDER — OXYCODONE HYDROCHLORIDE 5 MG/1
5 TABLET ORAL ONCE
Status: COMPLETED | OUTPATIENT
Start: 2019-06-27 | End: 2019-06-27

## 2019-06-27 RX ORDER — ONDANSETRON 4 MG/1
4 TABLET, ORALLY DISINTEGRATING ORAL ONCE
Status: COMPLETED | OUTPATIENT
Start: 2019-06-27 | End: 2019-06-27

## 2019-06-27 RX ADMIN — ONDANSETRON 4 MG: 4 TABLET, ORALLY DISINTEGRATING ORAL at 22:37

## 2019-06-27 RX ADMIN — OXYCODONE HYDROCHLORIDE 5 MG: 5 TABLET ORAL at 22:37

## 2019-06-28 NOTE — ED PROVIDER NOTES
History  Chief Complaint   Patient presents with    Jaw Pain     pt  comes in c/o left sided jaw pain  Pt  states she was told she has TMJ and states the pain is radiating up into the left ear and shes having difficulty eating  States she has dental appt  but not until july 10th         27-year-old female presents to the emergency department with complaints of left-sided jaw pain  States that she was previously seen in the emergency department diagnosed with TMJ  Reports that she has been unable to follow up with oral maxillofacial surgery left far  Currently waiting on an appointment with her family doctor on July 10th for referral to see OM[facility]  Describes a left-sided jaw pain with radiation to the ear which is worse with opening and closing her mouth  Difficulty eating due to pain  Currently taking muscle relaxers for her back without relief of symptoms with the jaw  She denies any new trauma to her teeth  Patient tells me that she has been taking Tylenol and ibuprofen without relief of her symptoms  History provided by:  Patient   used: No        Prior to Admission Medications   Prescriptions Last Dose Informant Patient Reported? Taking?    UNKNOWN TO PATIENT   Yes No   UNKNOWN TO PATIENT   Yes No   acetaminophen (TYLENOL) 650 mg CR tablet   No No   Sig: Take 1 tablet (650 mg total) by mouth every 8 (eight) hours as needed for mild pain or moderate pain   capsicum (ZOSTRIX) 0 075 % topical cream   No No   Sig: Apply topically 3 (three) times a day   ibuprofen (MOTRIN) 800 mg tablet   Yes No   Sig: Take 800 mg by mouth as needed for mild pain   ondansetron (ZOFRAN) 4 mg tablet   No No   Sig: Take 1 tablet (4 mg total) by mouth every 6 (six) hours   ranitidine (ZANTAC) 150 mg tablet   Yes No   Sig: Take 150 mg by mouth 2 (two) times a day      Facility-Administered Medications: None       Past Medical History:   Diagnosis Date    Asthma     GERD (gastroesophageal reflux disease)     Hernia, abdominal     Migraines        Past Surgical History:   Procedure Laterality Date    FL RETROGRADE PYELOGRAM  8/28/2018    HERNIA REPAIR      NE CYSTO/URETERO W/LITHOTRIPSY &INDWELL STENT INSRT Right 8/28/2018    Procedure: CYSTOSCOPY URETEROSCOPY WITH RETROGRADE PYELOGRAM AND INSERTION STENT URETERAL;  Surgeon: Nadine Kearns MD;  [facility];  Service: Urology    TOOTH EXTRACTION         Family History   Problem Relation Age of Onset    Diabetes Mother     Hyperlipidemia Mother     Stroke Mother     Heart disease Mother     Nephrolithiasis Father     Nephrolithiasis Brother      I have reviewed and agree with the history as documented  Social History     Tobacco Use    Smoking status: Current Every Day Smoker     Packs/day: 0 50     Types: Cigarettes    Smokeless tobacco: Never Used   Substance Use Topics    Alcohol use: No    Drug use: No        Review of Systems   Constitutional: Negative for activity change, chills and fever  HENT: Negative for dental problem, drooling, ear pain, facial swelling, mouth sores, sinus pressure, sneezing, sore throat, tinnitus and trouble swallowing  Jaw pain     Eyes: Negative for pain, discharge and itching  Respiratory: Negative for cough, chest tightness, shortness of breath and wheezing  Cardiovascular: Negative for chest pain  Skin: Negative for rash  Neurological: Negative for dizziness, light-headedness and headaches  All other systems reviewed and are negative  Physical Exam  Physical Exam   Constitutional: She is oriented to person, place, and time  Vital signs are normal  She appears well-developed and well-nourished  No distress  HENT:   Head: Normocephalic and atraumatic         Right Ear: Hearing, tympanic membrane, external ear and ear canal normal    Left Ear: Hearing, tympanic membrane, external ear and ear canal normal    Nose: Nose normal    Mouth/Throat: Uvula is midline and oropharynx is clear and moist  Dental caries present  No dental abscesses  No oropharyngeal exudate  Multiple carious and fractured teeth without local signs of abscess formation  Eyes: Conjunctivae and EOM are normal  Right eye exhibits no discharge  Neck: Neck supple  No JVD present  Cardiovascular: Normal rate, regular rhythm and normal heart sounds  Exam reveals no gallop and no friction rub  No murmur heard  Pulmonary/Chest: Effort normal and breath sounds normal  No stridor  No respiratory distress  She has no wheezes  She has no rales  Musculoskeletal: Normal range of motion  Neurological: She is alert and oriented to person, place, and time  Skin: Skin is warm and dry  She is not diaphoretic  Psychiatric: She has a normal mood and affect  Her behavior is normal    Vitals reviewed  Vital Signs  ED Triage Vitals [06/27/19 2221]   Temperature Pulse Respirations Blood Pressure SpO2   98 °F (36 7 °C) 90 14 123/69 99 %      Temp Source Heart Rate Source Patient Position - Orthostatic VS BP Location FiO2 (%)   Oral Monitor Sitting Right arm --      Pain Score       --           Vitals:    06/27/19 2221   BP: 123/69   Pulse: 90   Patient Position - Orthostatic VS: Sitting         Visual Acuity      ED Medications  Medications   oxyCODONE (ROXICODONE) IR tablet 5 mg (5 mg Oral Given 6/27/19 2237)   ondansetron (ZOFRAN-ODT) dispersible tablet 4 mg (4 mg Oral Given 6/27/19 2237)       Diagnostic Studies  Results Reviewed     None                 No orders to display              Procedures  Procedures       ED Course                               MDM  Number of Diagnoses or Management Options  Arthralgia of left temporomandibular joint:   Diagnosis management comments:   Differential diagnosis includes but not limited to:    Dental pain, TMJ, drug-seeking behavior  Patient's prescription history reviewed in the [facility]    Numerous short-term prescriptions from different providers over the past several months  Most recently prescription for oxycodone 5 mg #12 written on 6/21/2019  Disposition  Final diagnoses:   Arthralgia of left temporomandibular joint     Time reflects when diagnosis was documented in both MDM as applicable and the Disposition within this note     Time User Action Codes Description Comment    6/27/2019 10:34 PM Osei Salazar Matthewrach Add [D35 593] Arthralgia of left temporomandibular joint       ED Disposition     ED Disposition Condition Date/Time Comment    Discharge Stable Thu Jun 27, 2019 10:34 PM Mike Pérez discharge to home/self care  Follow-up Information     Follow up With Specialties Details Why [address] [facility] [place] [facility] [address]          Discharge Medication List as of 6/27/2019 10:36 PM      CONTINUE these medications which have NOT CHANGED    Details   acetaminophen (TYLENOL) 650 mg CR tablet Take 1 tablet (650 mg total) by mouth every 8 (eight) hours as needed for mild pain or moderate pain, Starting Fri 10/26/2018, Print      capsicum (ZOSTRIX) 0 075 % topical cream Apply topically 3 (three) times a day, Starting Sat 2/16/2019, Print      ibuprofen (MOTRIN) 800 mg tablet Take 800 mg by mouth as needed for mild pain, Historical Med      ondansetron (ZOFRAN) 4 mg tablet Take 1 tablet (4 mg total) by mouth every 6 (six) hours, Starting Tue 6/11/2019, Normal      ranitidine (ZANTAC) 150 mg tablet Take 150 mg by mouth 2 (two) times a day, Historical Med      !! UNKNOWN TO PATIENT Historical Med      !! UNKNOWN TO PATIENT Historical Med       !! - Potential duplicate medications found  Please discuss with provider  No discharge procedures on file      ED Provider  Electronically Signed by           Tee Radford PA-C  06/27/19 8173

## 2019-07-07 ENCOUNTER — HOSPITAL ENCOUNTER (EMERGENCY)
Facility: HOSPITAL | Age: 29
Discharge: HOME | End: 2019-07-07
Attending: EMERGENCY MEDICINE
Payer: COMMERCIAL

## 2019-07-07 VITALS
BODY MASS INDEX: 24.03 KG/M2 | HEART RATE: 77 BPM | RESPIRATION RATE: 16 BRPM | OXYGEN SATURATION: 100 % | DIASTOLIC BLOOD PRESSURE: 59 MMHG | TEMPERATURE: 98.4 F | WEIGHT: 123.02 LBS | SYSTOLIC BLOOD PRESSURE: 126 MMHG

## 2019-07-07 DIAGNOSIS — R68.84 JAW PAIN: Primary | ICD-10-CM

## 2019-07-07 PROCEDURE — 99283 EMERGENCY DEPT VISIT LOW MDM: CPT | Performed by: EMERGENCY MEDICINE

## 2019-07-07 PROCEDURE — 99283 EMERGENCY DEPT VISIT LOW MDM: CPT

## 2019-07-07 RX ORDER — ACETAMINOPHEN 325 MG/1
650 TABLET ORAL EVERY 6 HOURS PRN
Qty: 30 TABLET | Refills: 0 | Status: SHIPPED | OUTPATIENT
Start: 2019-07-07 | End: 2019-07-12

## 2019-07-08 NOTE — ED PROVIDER NOTES
History  Chief Complaint   Patient presents with    Jaw Pain     pt states "I  have TMJ in this side, now it's in this side"  Pt states she was seen at [facility] for same problem  HPI    Patient is a 34 yof with jaw pain  Bilateral      History of chronic jaw pain        States that she was previously seen in the emergency department diagnosed with TMJ   Describes a bilateral-sided jaw pain with radiation to the ear which is worse with opening and closing her mouth  Difficulty eating due to pain  Requesting muscle relaxers  She denies any new trauma to her teeth  Patient tells me that she has been taking Tylenol and ibuprofen without relief of her symptoms  MDM 34 yof, jaw pain, will have her followup, I suspect there is a degree of drug seeking  Prior to Admission Medications   Prescriptions Last Dose Informant Patient Reported? Taking?    UNKNOWN TO PATIENT   Yes No   UNKNOWN TO PATIENT   Yes No   acetaminophen (TYLENOL) 650 mg CR tablet   No No   Sig: Take 1 tablet (650 mg total) by mouth every 8 (eight) hours as needed for mild pain or moderate pain   capsicum (ZOSTRIX) 0 075 % topical cream   No No   Sig: Apply topically 3 (three) times a day   ibuprofen (MOTRIN) 800 mg tablet   Yes No   Sig: Take 800 mg by mouth as needed for mild pain   ondansetron (ZOFRAN) 4 mg tablet   No No   Sig: Take 1 tablet (4 mg total) by mouth every 6 (six) hours   ranitidine (ZANTAC) 150 mg tablet   Yes No   Sig: Take 150 mg by mouth 2 (two) times a day      Facility-Administered Medications: None       Past Medical History:   Diagnosis Date    Asthma     GERD (gastroesophageal reflux disease)     Hernia, abdominal     Migraines        Past Surgical History:   Procedure Laterality Date    FL RETROGRADE PYELOGRAM  8/28/2018    HERNIA REPAIR      ME CYSTO/URETERO W/LITHOTRIPSY &INDWELL STENT INSRT Right 8/28/2018    Procedure: CYSTOSCOPY URETEROSCOPY WITH RETROGRADE PYELOGRAM AND INSERTION STENT URETERAL;  Surgeon: Kellie Zimmer MD;  [facility];  Service: Urology    TOOTH EXTRACTION         Family History   Problem Relation Age of Onset    Diabetes Mother     Hyperlipidemia Mother     Stroke Mother     Heart disease Mother     Nephrolithiasis Father     Nephrolithiasis Brother      I have reviewed and agree with the history as documented  Social History     Tobacco Use    Smoking status: Current Every Day Smoker     Packs/day: 0 50     Types: Cigarettes    Smokeless tobacco: Never Used   Substance Use Topics    Alcohol use: No    Drug use: No        Review of Systems   HENT:        Jaw pain   All other systems reviewed and are negative  Physical Exam  Physical Exam   Constitutional: She is oriented to person, place, and time  She appears well-developed and well-nourished  HENT:   Head: Normocephalic and atraumatic  Right Ear: External ear normal    Left Ear: External ear normal    Eyes: Conjunctivae and EOM are normal    Neck: Normal range of motion  Neck supple  No JVD present  No tracheal deviation present  Cardiovascular: Normal rate, regular rhythm and normal heart sounds  Pulmonary/Chest: Effort normal  No respiratory distress  She has no wheezes  She has no rales  Abdominal: Soft  Bowel sounds are normal  There is no tenderness  There is no rebound and no guarding  Musculoskeletal: She exhibits no edema or tenderness  Neurological: She is alert and oriented to person, place, and time  Skin: Skin is warm and dry  No rash noted  No erythema  Psychiatric: She has a normal mood and affect  Thought content normal    Nursing note and vitals reviewed        Vital Signs  ED Triage Vitals [07/07/19 2247]   Temperature Pulse Respirations Blood Pressure SpO2   98 4 °F (36 9 °C) 77 16 126/59 100 %      Temp Source Heart Rate Source Patient Position - Orthostatic VS BP Location FiO2 (%)   Oral Monitor Lying Right arm --      Pain Score       Worst Possible Pain Vitals:    07/07/19 2247   BP: 126/59   Pulse: 77   Patient Position - Orthostatic VS: Lying         Visual Acuity      ED Medications  Medications - No data to display    Diagnostic Studies  Results Reviewed     None                 No orders to display              Procedures  Procedures       ED Course                               MDM    Disposition  Final diagnoses:   Jaw pain     Time reflects when diagnosis was documented in both MDM as applicable and the Disposition within this note     Time User Action Codes Description Comment    7/7/2019 11:16 PM Trena Ruby, [address] [R68 84] Jaw pain       ED Disposition     ED Disposition Condition Date/Time Comment    Discharge Stable Sun Jul 7, 2019 11:15 PM Mike Browny discharge to home/self care  Follow-up Information     Follow up With Specialties Details Why Contact Info    Lucina Trevino DO General Practice In 1 day  [id number]  [address] [facility] [address]  [phone]            Patient's Medications   Discharge Prescriptions    ACETAMINOPHEN (TYLENOL) 325 MG TABLET    Take 2 tablets (650 mg total) by mouth every 6 (six) hours as needed for mild pain for up to 5 days       Start Date: 7/7/2019  End Date: 7/12/2019       Order Dose: 650 mg       Quantity: 30 tablet    Refills: 0     No discharge procedures on file      ED Provider  Electronically Signed by           Radha Colon MD  07/07/19 9401

## 2019-07-17 ENCOUNTER — HOSPITAL ENCOUNTER (EMERGENCY)
Facility: HOSPITAL | Age: 29
Discharge: HOME | End: 2019-07-17
Attending: EMERGENCY MEDICINE
Payer: COMMERCIAL

## 2019-07-17 VITALS
RESPIRATION RATE: 14 BRPM | SYSTOLIC BLOOD PRESSURE: 101 MMHG | TEMPERATURE: 98 F | BODY MASS INDEX: 24.02 KG/M2 | OXYGEN SATURATION: 98 % | HEART RATE: 83 BPM | WEIGHT: 123 LBS | DIASTOLIC BLOOD PRESSURE: 59 MMHG

## 2019-07-17 DIAGNOSIS — R68.84 JAW PAIN: Primary | ICD-10-CM

## 2019-07-17 DIAGNOSIS — M26.629 TMJ ARTHRALGIA: ICD-10-CM

## 2019-07-17 PROCEDURE — 99283 EMERGENCY DEPT VISIT LOW MDM: CPT | Performed by: PHYSICIAN ASSISTANT

## 2019-07-17 PROCEDURE — 99283 EMERGENCY DEPT VISIT LOW MDM: CPT

## 2019-07-17 RX ORDER — CAPSAICIN 0.07 G/100G
CREAM TOPICAL 3 TIMES DAILY
Qty: 28.3 G | Refills: 0 | Status: SHIPPED | OUTPATIENT
Start: 2019-07-17 | End: 2019-08-31

## 2019-07-17 RX ORDER — IBUPROFEN 600 MG/1
600 TABLET ORAL EVERY 6 HOURS PRN
Qty: 15 TABLET | Refills: 0 | Status: SHIPPED | OUTPATIENT
Start: 2019-07-17 | End: 2020-06-04

## 2019-07-17 RX ORDER — CYCLOBENZAPRINE HCL 5 MG
5 TABLET ORAL
COMMUNITY
End: 2019-08-31

## 2019-07-19 ENCOUNTER — APPOINTMENT (EMERGENCY)
Dept: RADIOLOGY | Facility: HOSPITAL | Age: 29
End: 2019-07-19
Payer: COMMERCIAL

## 2019-07-19 ENCOUNTER — HOSPITAL ENCOUNTER (EMERGENCY)
Facility: HOSPITAL | Age: 29
Discharge: HOME | End: 2019-07-19
Attending: EMERGENCY MEDICINE | Admitting: EMERGENCY MEDICINE
Payer: COMMERCIAL

## 2019-07-19 VITALS
BODY MASS INDEX: 22.91 KG/M2 | WEIGHT: 117.28 LBS | DIASTOLIC BLOOD PRESSURE: 60 MMHG | OXYGEN SATURATION: 100 % | RESPIRATION RATE: 16 BRPM | SYSTOLIC BLOOD PRESSURE: 96 MMHG | HEART RATE: 96 BPM | TEMPERATURE: 98 F

## 2019-07-19 DIAGNOSIS — R68.84 JAW PAIN: Primary | ICD-10-CM

## 2019-07-19 PROCEDURE — 70110 X-RAY EXAM OF JAW 4/> VIEWS: CPT

## 2019-07-19 PROCEDURE — 99283 EMERGENCY DEPT VISIT LOW MDM: CPT

## 2019-07-19 RX ORDER — ONDANSETRON 4 MG/1
4 TABLET, ORALLY DISINTEGRATING ORAL ONCE
Status: COMPLETED | OUTPATIENT
Start: 2019-07-19 | End: 2019-07-19

## 2019-07-19 RX ORDER — HYDROCODONE BITARTRATE AND ACETAMINOPHEN 5; 325 MG/1; MG/1
1 TABLET ORAL ONCE
Status: COMPLETED | OUTPATIENT
Start: 2019-07-19 | End: 2019-07-19

## 2019-07-19 RX ADMIN — ONDANSETRON 4 MG: 4 TABLET, ORALLY DISINTEGRATING ORAL at 21:02

## 2019-07-19 RX ADMIN — HYDROCODONE BITARTRATE AND ACETAMINOPHEN 1 TABLET: 5; 325 TABLET ORAL at 21:02

## 2019-07-20 NOTE — ED PROVIDER NOTES
History  Chief Complaint   Patient presents with    Jaw Pain     c/o pain in left side of jaw, staes was at the dentist and was having x-rays done when jaw popped and now has pain in jaw radiating up to ear     33 y/o female presents with left sided jaw pain, difficultly opening jaw without pain, no truama  Patient says she was at the dentist when they were trying to put XR plates inside her mouth she had pain  Been at several different EDs for the same jaw pain  No breathing or swallowing difficulties  Denies any other complaints  History provided by:  Patient   used: No        Prior to Admission Medications   Prescriptions Last Dose Informant Patient Reported? Taking?    UNKNOWN TO PATIENT Not Taking at Unknown time  Yes No   UNKNOWN TO PATIENT Not Taking at Unknown time  Yes No   acetaminophen (TYLENOL) 650 mg CR tablet 7/18/2019 at Unknown time  No Yes   Sig: Take 1 tablet (650 mg total) by mouth every 8 (eight) hours as needed for mild pain or moderate pain   capsicum (ZOSTRIX) 0 075 % topical cream Not Taking at Unknown time  No No   Sig: Apply topically 3 (three) times a day   Patient not taking: Reported on 7/17/2019   capsicum (ZOSTRIX) 0 075 % topical cream Not Taking at Unknown time  No No   Sig: Apply topically 3 (three) times a day   Patient not taking: Reported on 7/19/2019   cyclobenzaprine (FLEXERIL) 5 mg tablet Not Taking at Unknown time Self Yes No   Sig: Take 5 mg by mouth   ibuprofen (MOTRIN) 600 mg tablet Not Taking at Unknown time  No No   Sig: Take 1 tablet (600 mg total) by mouth every 6 (six) hours as needed for mild pain for up to 15 doses   Patient not taking: Reported on 7/22/2019   ibuprofen (MOTRIN) 800 mg tablet 7/19/2019 at 1500  Yes Yes   Sig: Take 800 mg by mouth as needed for mild pain   ondansetron (ZOFRAN) 4 mg tablet Not Taking at Unknown time  No No   Sig: Take 1 tablet (4 mg total) by mouth every 6 (six) hours   Patient not taking: Reported on 7/17/2019   ranitidine (ZANTAC) 150 mg tablet 7/19/2019 at 0900  Yes Yes   Sig: Take 150 mg by mouth 2 (two) times a day      Facility-Administered Medications: None       Past Medical History:   Diagnosis Date    Asthma     GERD (gastroesophageal reflux disease)     Hernia, abdominal     Migraines     Renal disorder     kidney stones       Past Surgical History:   Procedure Laterality Date    [place] RETROGRADE PYELOGRAM  8/28/2018    HERNIA REPAIR      [place] CYSTO/URETERO W/LITHOTRIPSY &INDWELL STENT INSRT Right 8/28/2018    Procedure: CYSTOSCOPY URETEROSCOPY WITH RETROGRADE PYELOGRAM AND INSERTION STENT URETERAL;  Surgeon: Soledad Goodell, MD;  [facility];  Service: Urology    TOOTH EXTRACTION         Family History   Problem Relation Age of Onset    Diabetes Mother     Hyperlipidemia Mother     Stroke Mother     Heart disease Mother     Nephrolithiasis Father     Nephrolithiasis Brother      I have reviewed and agree with the history as documented  Social History     Tobacco Use    Smoking status: Current Every Day Smoker     Packs/day: 0 50     Types: Cigarettes    Smokeless tobacco: Never Used   Substance Use Topics    Alcohol use: No    Drug use: No        Review of Systems   All other systems reviewed and are negative  Physical Exam  Physical Exam   Constitutional: She is oriented to person, place, and time  She appears well-developed and well-nourished  HENT:   Head: Normocephalic and atraumatic  Jaw tender maxillary, and mandible left side, able to move and open mouth, bite feels normal     Eyes: Pupils are equal, round, and reactive to light  EOM are normal    Neck: Normal range of motion  Neck supple  Cardiovascular: Normal rate and regular rhythm  Pulmonary/Chest: Effort normal and breath sounds normal    Abdominal: Soft  Bowel sounds are normal    Musculoskeletal: Normal range of motion  Neurological: She is alert and oriented to person, place, and time  Skin: Skin is warm and dry  Psychiatric: She has a normal mood and affect  Nursing note and vitals reviewed  Vital Signs  ED Triage Vitals [07/19/19 1916]   Temperature Pulse Respirations Blood Pressure SpO2   98 °F (36 7 °C) 96 16 96/60 100 %      Temp Source Heart Rate Source Patient Position - Orthostatic VS BP Location FiO2 (%)   Tympanic Monitor Sitting Right arm --      Pain Score       9           Vitals:    07/19/19 1916   BP: 96/60   Pulse: 96   Patient Position - Orthostatic VS: Sitting         Visual Acuity      ED Medications  Medications   HYDROcodone-acetaminophen (NORCO) 5-325 mg per tablet 1 tablet (1 tablet Oral Given 7/19/19 2102)   ondansetron (ZOFRAN-ODT) dispersible tablet 4 mg (4 mg Oral Given 7/19/19 2102)       Diagnostic Studies  Results Reviewed     None                 XR mandible 4+ views   Final Result by Renu Murry MD (07/19 2121)      No mandibular fracture  Workstation performed: [id number]                    Procedures  Procedures       ED Course                               MDM  Number of Diagnoses or Management Options  Jaw pain:   Diagnosis management comments: Patient evaluated with imaging  I reviewed the results and discussed them with the patient  Patient discharged with appropriate instructions and follow-up  Patient verbalized understanding had no further questions at the time of discharge  Patient had stable vital signs and well-appearing at the time of discharge         Amount and/or Complexity of Data Reviewed  Tests in the radiology section of CPT®: reviewed and ordered  Tests in the medicine section of CPT®: ordered and reviewed    Patient Progress  Patient progress: stable      Disposition  Final diagnoses:   Jaw pain     Time reflects when diagnosis was documented in both MDM as applicable and the Disposition within this note     Time User Action Codes Description Comment    7/19/2019  9:11 PM Maria T Alegria Add [X97 16] Jaw pain       ED Disposition     ED Disposition Condition Date/Time Comment    Discharge Stable Fri Jul 19, 2019  9:11 PM Mike Pérez discharge to home/self care  Follow-up Information     Follow up With Specialties Details Why Contact Info Additional Information    Talia Bender,  General Practice Schedule an appointment as soon as possible for a visit   [address] Emergency Department Emergency Medicine  If symptoms worsen [address] [facility] ED, [place], [place], [address]          Discharge Medication List as of 7/19/2019  9:12 PM      CONTINUE these medications which have NOT CHANGED    Details   acetaminophen (TYLENOL) 650 mg CR tablet Take 1 tablet (650 mg total) by mouth every 8 (eight) hours as needed for mild pain or moderate pain, Starting Fri 10/26/2018, Print      !! ibuprofen (MOTRIN) 800 mg tablet Take 800 mg by mouth as needed for mild pain, Historical Med      ranitidine (ZANTAC) 150 mg tablet Take 150 mg by mouth 2 (two) times a day, Historical Med      !! capsicum (ZOSTRIX) 0 075 % topical cream Apply topically 3 (three) times a day, Starting Sat 2/16/2019, Print      !! capsicum (ZOSTRIX) 0 075 % topical cream Apply topically 3 (three) times a day, Starting Wed 7/17/2019, Print      cyclobenzaprine (FLEXERIL) 5 mg tablet Take 5 mg by mouth, Historical Med      !! ibuprofen (MOTRIN) 600 mg tablet Take 1 tablet (600 mg total) by mouth every 6 (six) hours as needed for mild pain for up to 15 doses, Starting Wed 7/17/2019, Print      ondansetron (ZOFRAN) 4 mg tablet Take 1 tablet (4 mg total) by mouth every 6 (six) hours, Starting Tue 6/11/2019, Normal      !! UNKNOWN TO PATIENT Historical Med      !! UNKNOWN TO PATIENT Historical Med       !! - Potential duplicate medications found  Please discuss with provider          No discharge procedures on file      ED Provider  Electronically Signed by           Jeffery Dove DO  07/24/19 7211

## 2019-07-20 NOTE — ED NOTES
Pt reports jaw started bothering pt about 2 years ago and then had subsided  Pt reports pain with popping and grinding started again about 1 week ago, and today saw the dentist and had x-rays taken  Pt reports while xrays were taken hygienist opened mouth and pt felt a large painful pop to left side  Pt reports pain from corner of jaw up to left ear         Susanne Velazquez RN  07/19/19 2003

## 2019-07-22 ENCOUNTER — HOSPITAL ENCOUNTER (EMERGENCY)
Facility: HOSPITAL | Age: 29
Discharge: HOME | End: 2019-07-22
Attending: EMERGENCY MEDICINE | Admitting: EMERGENCY MEDICINE
Payer: COMMERCIAL

## 2019-07-22 VITALS
HEART RATE: 87 BPM | HEIGHT: 60 IN | DIASTOLIC BLOOD PRESSURE: 51 MMHG | WEIGHT: 117 LBS | TEMPERATURE: 99.2 F | OXYGEN SATURATION: 98 % | RESPIRATION RATE: 18 BRPM | SYSTOLIC BLOOD PRESSURE: 121 MMHG | BODY MASS INDEX: 22.97 KG/M2

## 2019-07-22 DIAGNOSIS — R68.84 JAW PAIN: Primary | ICD-10-CM

## 2019-07-22 PROCEDURE — 99283 EMERGENCY DEPT VISIT LOW MDM: CPT

## 2019-07-22 RX ORDER — FAMOTIDINE 20 MG/1
40 TABLET, FILM COATED ORAL ONCE
Status: COMPLETED | OUTPATIENT
Start: 2019-07-22 | End: 2019-07-22

## 2019-07-22 RX ORDER — PREDNISONE 50 MG/1
50 TABLET ORAL DAILY
Qty: 5 TABLET | Refills: 0 | Status: SHIPPED | OUTPATIENT
Start: 2019-07-22 | End: 2019-07-22

## 2019-07-22 RX ORDER — CYCLOBENZAPRINE HCL 10 MG
10 TABLET ORAL 2 TIMES DAILY PRN
Qty: 20 TABLET | Refills: 0 | Status: SHIPPED | OUTPATIENT
Start: 2019-07-22 | End: 2019-08-31

## 2019-07-22 RX ORDER — CYCLOBENZAPRINE HCL 10 MG
10 TABLET ORAL ONCE
Status: COMPLETED | OUTPATIENT
Start: 2019-07-22 | End: 2019-07-22

## 2019-07-22 RX ORDER — IBUPROFEN 400 MG/1
800 TABLET ORAL ONCE
Status: COMPLETED | OUTPATIENT
Start: 2019-07-22 | End: 2019-07-22

## 2019-07-22 RX ORDER — DIPHENHYDRAMINE HCL 25 MG
50 TABLET ORAL ONCE
Status: COMPLETED | OUTPATIENT
Start: 2019-07-22 | End: 2019-07-22

## 2019-07-22 RX ADMIN — CYCLOBENZAPRINE HYDROCHLORIDE 10 MG: 10 TABLET, FILM COATED ORAL at 20:32

## 2019-07-22 RX ADMIN — IBUPROFEN 800 MG: 400 TABLET ORAL at 22:54

## 2019-07-22 RX ADMIN — FAMOTIDINE 40 MG: 20 TABLET ORAL at 21:51

## 2019-07-22 RX ADMIN — PREDNISONE 50 MG: 20 TABLET ORAL at 20:32

## 2019-07-22 RX ADMIN — DIPHENHYDRAMINE HCL 50 MG: 25 TABLET ORAL at 20:57

## 2019-07-22 NOTE — ED PROVIDER NOTES
Pt Name: Desi Wilson  MRN: [id number]  [place]: 1990  Age/Sex: 34 y o  female  Date of evaluation: 7/17/2019  PCP: Janiya Liriano, [address] [place] [address]    Chief Complaint   Patient presents with    Jaw Pain     pt  comes in c/o jaw pain that started yesterday  Pt  has hx  of TMJ  Pt  states she is nauseas at times  Pt  states she has taken flexeril and ibuprofen for pain with no relief  HPI    Zhou Segura presents to the Emergency Department complaining of Jaw Pain  Desi Wilson is a 34 y o  female who presents due to Jaw Pain  Pt reports bilateral jaw pain starting yesterday, sts that this radiates "up and down my head" with aching sensation, worsening, and associated with nausea  Pt reports no relief with flexeril, ibuprofen, TMJ guard at night  Pt reports nothing makes this better, though opening and closing mouth makes the pain worse  Pt does state that this began yesterday, though seen in this ED several times for this issue, questioned about this and sts now that this is ongoing issue and her primary care was unavailabe  Pt does report that she is awaiting appointment with PCP for referral for OMFS  Denies dental pain, facial swelling, rash, f/c/s, n/v, numbness, weakness, and no other complaints at this time          History provided by:  Patient and significant other   used: No          Past Medical and Surgical History    Past Medical History:   Diagnosis Date    Asthma     GERD (gastroesophageal reflux disease)     Hernia, abdominal     Migraines     Renal disorder     kidney stones       Past Surgical History:   Procedure Laterality Date    FL RETROGRADE PYELOGRAM  8/28/2018    HERNIA REPAIR      [place] CYSTO/URETERO W/LITHOTRIPSY &INDWELL STENT INSRT Right 8/28/2018    Procedure: CYSTOSCOPY URETEROSCOPY WITH RETROGRADE PYELOGRAM AND INSERTION STENT URETERAL;  Surgeon: Annetta Mesa MD;  [facility];  Service: Urology    TOOTH EXTRACTION Family History   Problem Relation Age of Onset    Diabetes Mother     Hyperlipidemia Mother     Stroke Mother     Heart disease Mother     Nephrolithiasis Father     Nephrolithiasis Brother        Social History     Tobacco Use    Smoking status: Current Every Day Smoker     Packs/day: 0 50     Types: Cigarettes    Smokeless tobacco: Never Used   Substance Use Topics    Alcohol use: No    Drug use: No              Allergies    Allergies   Allergen Reactions    Aspirin Anaphylaxis     Doesn't have epi pen  Okay to take ibuprofen per patient    Naproxen Nausea Only    Toradol [Ketorolac Tromethamine]     Tramadol Itching       Home Medications:    Prior to Admission medications    Medication Sig Start Date End Date Taking?  Authorizing Provider   acetaminophen (TYLENOL) 650 mg CR tablet Take 1 tablet (650 mg total) by mouth every 8 (eight) hours as needed for mild pain or moderate pain 10/26/18  Yes Kamilla Mann PA-C   cyclobenzaprine (FLEXERIL) 5 mg tablet Take 5 mg by mouth   Yes Historical Provider, MD   ibuprofen (MOTRIN) 800 mg tablet Take 800 mg by mouth as needed for mild pain   Yes Historical Provider, MD   ranitidine (ZANTAC) 150 mg tablet Take 150 mg by mouth 2 (two) times a day   Yes Historical Provider, MD   UNKNOWN TO PATIENT    Yes Historical Provider, MD   capsicum (ZOSTRIX) 0 075 % topical cream Apply topically 3 (three) times a day  Patient not taking: Reported on 7/17/2019 2/16/19   Diann Xavier PA-C   capsicum (ZOSTRIX) 0 075 % topical cream Apply topically 3 (three) times a day  Patient not taking: Reported on 7/19/2019 7/17/19   Diann Xavier PA-C   ibuprofen (MOTRIN) 600 mg tablet Take 1 tablet (600 mg total) by mouth every 6 (six) hours as needed for mild pain for up to 15 doses  Patient not taking: Reported on 7/19/2019 7/17/19   Diann Xavier PA-C   ondansetron (ZOFRAN) 4 mg tablet Take 1 tablet (4 mg total) by mouth every 6 (six) hours  Patient not taking: Reported on 7/17/2019 6/11/19   Concha Riedel, PA-C   UNKNOWN TO PATIENT     Historical Provider, MD           Review of Systems    Review of Systems   Constitutional: Negative for activity change, appetite change, chills, diaphoresis, fatigue and fever  HENT: Negative for congestion, drooling, ear discharge, ear pain, facial swelling, postnasal drip, rhinorrhea, sinus pressure, sinus pain, sore throat, trouble swallowing and voice change  Jaw pain     Eyes: Negative for discharge  Respiratory: Negative for apnea, cough, choking, chest tightness, shortness of breath, wheezing and stridor  Cardiovascular: Negative for chest pain  Gastrointestinal: Negative for abdominal pain  Musculoskeletal: Negative for arthralgias and joint swelling  Skin: Negative for rash  Neurological: Negative for dizziness, light-headedness and headaches  Psychiatric/Behavioral: The patient is not nervous/anxious  All other systems reviewed and negative  Physical Exam      ED Triage Vitals [07/17/19 1934]   Temperature Pulse Respirations Blood Pressure SpO2   98 °F (36 7 °C) 83 14 101/59 98 %      Temp Source Heart Rate Source Patient Position - Orthostatic VS BP Location FiO2 (%)   Oral Monitor Sitting Left arm --      Pain Score       --               Physical Exam   Constitutional: She is oriented to person, place, and time  She appears well-developed and well-nourished  No distress  HENT:   Head: Normocephalic and atraumatic  Right Ear: Hearing, tympanic membrane, external ear and ear canal normal  No lacerations  No drainage, swelling or tenderness  No foreign bodies  No mastoid tenderness  Tympanic membrane is not injected, not scarred, not perforated, not erythematous, not retracted and not bulging  No middle ear effusion  No hemotympanum  No decreased hearing is noted  Left Ear: Hearing, tympanic membrane, external ear and ear canal normal  No lacerations   No drainage, swelling or tenderness  No foreign bodies  No mastoid tenderness  Tympanic membrane is not injected, not scarred, not perforated, not erythematous, not retracted and not bulging  No middle ear effusion  No hemotympanum  No decreased hearing is noted  Nose: Nose normal  Right sinus exhibits no maxillary sinus tenderness and no frontal sinus tenderness  Left sinus exhibits no maxillary sinus tenderness and no frontal sinus tenderness  Mouth/Throat: Uvula is midline, oropharynx is clear and moist and mucous membranes are normal  No trismus in the jaw  No uvula swelling  No oropharyngeal exudate, posterior oropharyngeal edema, posterior oropharyngeal erythema or tonsillar abscesses  Tonsils are 1+ on the right  Tonsils are 1+ on the left  No tonsillar exudate  No trismus  Poor dentition  Eyes: Pupils are equal, round, and reactive to light  Conjunctivae and EOM are normal  Right eye exhibits no discharge  Left eye exhibits no discharge  Neck: Normal range of motion  Neck supple  Cardiovascular: Normal rate, regular rhythm, normal heart sounds and intact distal pulses  Pulmonary/Chest: Effort normal and breath sounds normal  No stridor  No respiratory distress  She has no wheezes  She has no rales  She exhibits no tenderness  Musculoskeletal: Normal range of motion  Lymphadenopathy:     She has no cervical adenopathy  Neurological: She is alert and oriented to person, place, and time  Skin: Skin is warm and dry  Capillary refill takes less than 2 seconds  She is not diaphoretic  Nursing note and vitals reviewed            Diagnostic Results    ECG      Labs:    Results for orders placed or performed during the hospital encounter of 12/28/18   POCT pregnancy, urine   Result Value Ref Range    EXT PREG TEST UR (Ref: Negative) neg    ED Urine Macroscopic   Result Value Ref Range    Color, UA Yellow     Clarity, UA Clear     pH, UA 6 0 4 5 - 8 0    Leukocytes, UA Negative Negative    Nitrite, UA Negative Negative Protein, UA Negative Negative mg/dl    Glucose, UA Negative Negative mg/dl    Ketones, UA Trace (A) Negative mg/dl    Urobilinogen, UA 1 0 0 2, 1 0 E U /dl E U /dl    Bilirubin, UA Interference- unable to analyze (A) Negative    Blood, UA Negative Negative    Specific Gravity, UA >=1 030 1 003 - 1 030       All labs reviewed and utilized in the medical decision making process    Radiology:    No orders to display       All radiology studies independently viewed by me and interpreted by the radiologist     Procedure    Procedures      Assessment and Plan    MDM  Number of Diagnoses or Management Options  Jaw pain: established, worsening  TMJ arthralgia: established, worsening     Amount and/or Complexity of Data Reviewed  Obtain history from someone other than the patient: yes  Review and summarize past medical records: yes  Independent visualization of images, tracings, or specimens: yes    Risk of Complications, Morbidity, and/or Mortality  Presenting problems: moderate  Diagnostic procedures: moderate  Management options: moderate    Patient Progress  Patient progress: stable      Initial ED assessment:  Bharathi Quinteros is a 34 y o  female with no significant PMH who presents with Jaw Pain  Vitals signs reviewed  Physical examination remarkable for poor dentition  Initial Ddx  includes but is not limited to:   Jaw pain, Dental Pain, doubt dental abscess, fx, contusion, sprain    Initial ED plan:   Plan will be to treat symptomatically  Final ED summary/disposition: Discussed results of diagnostic testing with pt and significant other in detail  Home care recommendations given with discharge paperwork  Return to ED instructions given if new/worsening sxs        MDM  Reviewed: previous chart, nursing note and vitals        ED Course of Care and Re-Assessments                            Medications - No data to display      FINAL IMPRESSION    Final diagnoses:   Jaw pain   TMJ arthralgia DISPOSITION/PLAN  Time reflects when diagnosis was documented in both MDM as applicable and the Disposition within this note     Time User Action Codes Description Comment    7/17/2019  9:23 PM Magda Poe Add [R68 84] Jaw pain     7/17/2019  9:23 PM Magdashasta Gonzales Add [A02 837] TMJ arthralgia       ED Disposition     ED Disposition Condition Date/Time Comment    Discharge Stable Wed Jul 17, 2019  9:23 PM Mike Beto discharge to home/self care              Follow-up Information     Follow up With Specialties Details Why Contact Info Additional Information    Gina Ochoa, DO General Practice Go to  For follow up [address] Emergency Department Emergency Medicine Go to  If symptoms worsen [address] [id number] [phone]  ED, [address] Internal Medicine [place] Internal Medicine Call  For follow up if no primary care provider available [address] [facility] [phone]  [address] [facility] Internal Medicine [place], [address] [place] [address], [phone]              PATIENT REFERRED TO:    Gina Ochoa, [address] [facility] [address] [address]  [phone]    Go to   For follow up    [facility] Emergency Department  [address] [address] [phone]  Go to   If symptoms worsen    [facility] Internal Medicine [place]  [address]  [phone]  Call   For follow up if no primary care provider available      DISCHARGE MEDICATIONS:    Discharge Medication List as of 7/17/2019  9:27 PM      START taking these medications    Details   !! capsicum (ZOSTRIX) 0 075 % topical cream Apply topically 3 (three) times a day, Starting Wed 7/17/2019, Print      !! ibuprofen (MOTRIN) 600 mg tablet Take 1 tablet (600 mg total) by mouth every 6 (six) hours as needed for mild pain for up to 15 doses, Starting Wed 7/17/2019, Print       !! - Potential duplicate medications found  Please discuss with provider  CONTINUE these medications which have NOT CHANGED    Details   acetaminophen (TYLENOL) 650 mg CR tablet Take 1 tablet (650 mg total) by mouth every 8 (eight) hours as needed for mild pain or moderate pain, Starting Fri 10/26/2018, Print      !! ibuprofen (MOTRIN) 800 mg tablet Take 800 mg by mouth as needed for mild pain, Historical Med      ranitidine (ZANTAC) 150 mg tablet Take 150 mg by mouth 2 (two) times a day, Historical Med      !! capsicum (ZOSTRIX) 0 075 % topical cream Apply topically 3 (three) times a day, Starting Sat 2/16/2019, Print      cyclobenzaprine (FLEXERIL) 5 mg tablet Take 5 mg by mouth, Historical Med      ondansetron (ZOFRAN) 4 mg tablet Take 1 tablet (4 mg total) by mouth every 6 (six) hours, Starting Tue 6/11/2019, Normal      !! UNKNOWN TO PATIENT Historical Med      !! UNKNOWN TO PATIENT Historical Med       !! - Potential duplicate medications found  Please discuss with provider  No discharge procedures on file           LAMAR Ronquillo PA-C  07/22/19 1927

## 2019-07-23 NOTE — DISCHARGE INSTRUCTIONS
Please take a list of all of your medications and discharge paperwork with you to all of your follow-up medical visits  Please take all of your medications as directed  Please call your family doctor or return to the ER if you have increased shortness of breath, chest pain, fevers, chills, nausea, vomiting, diarrhea, or any other worsening symptoms  Temporomandibular Disorder   WHAT YOU NEED TO KNOW:   What is temporomandibular disorder? Temporomandibular disorder is a condition that causes pain in your jaw  The disorder affects the joint between your temporal bone and your mandible (jawbone)  The muscles and nerves around the joint are also affected  What causes temporomandibular disorder? · Dislocation of the cartilage disc in the joint    · Deformities of the jaw    · Inflammation, infection, arthritis, muscle problems, or tumors in the jaw area     · Injury to or fracture of the jawbone    · Muscle strain from chewing or teeth clenching or grinding  What are the signs and symptoms of temporomandibular disorder? · Popping or grating sound when you open or close your mouth    · Headache or pain in your jaw, ear, neck, or face    · Pain or swelling of the jaw muscles    · Tingling or numbness in the jaw or face    · Trouble opening or closing your mouth, or your jaw locks  How is temporomandibular disorder diagnosed? Your healthcare provider will examine your jaw, face, and neck  He will ask you about your health conditions or injuries  You may also need the following tests:  · X-rays: You may need to have x-rays of your skull, jaw, or teeth  · Arthrogram:  This is an x-ray that uses contrast dye to help the pictures show up better  Tell the healthcare provider if you have ever had an allergic reaction to contrast dye  · MRI:  This scan uses powerful magnets and a computer to take pictures of your jaw  You may be given contrast dye to help the pictures show up better   Tell the healthcare provider if you have ever had an allergic reaction to contrast dye  Do not enter the MRI room with anything metal  Metal can cause serious injury  Tell the healthcare provider if you have any metal in or on your body  · Bone scan: This is a test done to look at the bones in your body  The bone scan shows areas where your bone is diseased or damaged  You will get a radioactive liquid, called a tracer, through a vein in your arm  The tracer collects in your bones  Pictures will then be taken to look for problems  Examples of bone problems include fractures (breaks) and infection  How is temporomandibular disorder treated? · Medicines:      ¨ NSAIDs:  These medicines decrease swelling and pain  You can buy NSAIDs without a doctor's order  Ask your healthcare provider which medicine is right for you, and how much to take  Take as directed  NSAIDs can cause stomach bleeding or kidney problems if not taken correctly  ¨ Botulinum toxin:  This may be injected into the muscles of your jaw to decrease pain  Aren  Steroid medicine: These may be injected into the joint to decrease pain and swelling  ¨ Muscle relaxers  help decrease pain and muscle spasms  · Surgery: You may need surgery to fix your teeth, jawbone, or the joint  What are the risks of temporomandibular disorder? You may bleed or get an infection if you have surgery  If left untreated, your condition may get worse  You may have trouble breathing, eating, drinking, talking, or opening your mouth  If not treated early, temporomandibular disorder may lead to permanent injury, such as nerve damage, deformity, or paralysis  How can I manage my symptoms? · Eat soft foods: Your healthcare provider may suggest that you eat only soft foods for several days  A dietitian may work with you to find foods that are easier to bite, chew, or swallow  Examples are soup, applesauce, cottage cheese, pudding, yogurt, and soft fruits       · Use jaw supporting devices:  Splints may be used to support your jaw or keep it from moving  You may need to wear a mouth guard to keep you from clenching or grinding your teeth while you are sleeping  · Use ice and heat:  Ice helps decrease swelling and pain  Ice may also help prevent tissue damage  Use an ice pack, or put crushed ice in a plastic bag  Cover it with a towel and place it on your jaw for 15 to 20 minutes every hour or as directed  After the first 24 to 48 hours, use heat to decrease pain, swelling, and muscle spasms  Apply heat on the area for 20 to 30 minutes every 2 hours for as many days as directed  · Go to physical therapy:  A physical therapist teaches you exercises to help improve movement and strength, and to decrease pain in your jaw  A speech therapist may help you with swallowing and speech exercises  When should I contact my healthcare provider? · You have a fever  · Your splint or mouth guard is loose  · You have questions or concerns about your condition or care  When should I seek immediate care or call 911? · You have nausea, are vomiting, or cannot keep liquids down  · You have pain that does not go away even after you take your pain medicine  · You have problems breathing, talking, drinking, eating, or swallowing  · Your splint or mouth guard gets damaged or broken  CARE AGREEMENT:   You have the right to help plan your care  Learn about your health condition and how it may be treated  Discuss treatment options with your caregivers to decide what care you want to receive  You always have the right to refuse treatment  The above information is an  only  It is not intended as medical advice for individual conditions or treatments  Talk to your doctor, nurse or pharmacist before following any medical regimen to see if it is safe and effective for you    © 2017 [employer] Information is for End User's use only and may not be sold, redistributed or otherwise used for commercial purposes  All illustrations and images included in CareNotes® are the copyrighted property of A D A M [employer]  or Esau Ramesh

## 2019-07-23 NOTE — ED PROVIDER NOTES
History  Chief Complaint   Patient presents with    Jaw Pain     pt c/o TMJ pain x 1 1/2 months  took 800mg Motrin & 1000mg Tylenol @ 1300 today  30-year-old female with past medical history of asthma, GERD, migraines, presents to the ER with complaint of bilateral jaw and TMJ pain recurrently over the past few weeks  Patient has been seen at our ER and [facility] ER multiple times for this pain  Patient states that her PCP referred her to [facility] however she could not be seen due to insurance issues  Patient denies any recent dental procedures or trauma  Patient does have TMJ guard at night  Patient came to the ER for further evaluation  Patient states that she is running out of her Flexeril  Patient did take some ibuprofen and Tylenol at home  History provided by:  Patient      Prior to Admission Medications   Prescriptions Last Dose Informant Patient Reported? Taking?    UNKNOWN TO PATIENT Not Taking at Unknown time  Yes No   UNKNOWN TO PATIENT Not Taking at Unknown time  Yes No   acetaminophen (TYLENOL) 650 mg CR tablet Past Week at Unknown time  No Yes   Sig: Take 1 tablet (650 mg total) by mouth every 8 (eight) hours as needed for mild pain or moderate pain   capsicum (ZOSTRIX) 0 075 % topical cream Not Taking at Unknown time  No No   Sig: Apply topically 3 (three) times a day   Patient not taking: Reported on 7/17/2019   capsicum (ZOSTRIX) 0 075 % topical cream Not Taking at Unknown time  No No   Sig: Apply topically 3 (three) times a day   Patient not taking: Reported on 7/19/2019   cyclobenzaprine (FLEXERIL) 5 mg tablet Not Taking at Unknown time Self Yes No   Sig: Take 5 mg by mouth   ibuprofen (MOTRIN) 600 mg tablet Not Taking at Unknown time  No No   Sig: Take 1 tablet (600 mg total) by mouth every 6 (six) hours as needed for mild pain for up to 15 doses   Patient not taking: Reported on 7/22/2019   ibuprofen (MOTRIN) 800 mg tablet 7/22/2019 at Unknown time  Yes Yes   Sig: Take 800 mg by mouth as needed for mild pain   ondansetron (ZOFRAN) 4 mg tablet Not Taking at Unknown time  No No   Sig: Take 1 tablet (4 mg total) by mouth every 6 (six) hours   Patient not taking: Reported on 7/17/2019   ranitidine (ZANTAC) 150 mg tablet 7/22/2019 at Unknown time  Yes Yes   Sig: Take 150 mg by mouth 2 (two) times a day      Facility-Administered Medications: None       Past Medical History:   Diagnosis Date    Asthma     GERD (gastroesophageal reflux disease)     Hernia, abdominal     Migraines     Renal disorder     kidney stones       Past Surgical History:   Procedure Laterality Date    FL RETROGRADE PYELOGRAM  8/28/2018    HERNIA REPAIR      WI CYSTO/URETERO W/LITHOTRIPSY &INDWELL STENT INSRT Right 8/28/2018    Procedure: CYSTOSCOPY URETEROSCOPY WITH RETROGRADE PYELOGRAM AND INSERTION STENT URETERAL;  Surgeon: Savanah Conde MD;  [facility];  Service: Urology    TOOTH EXTRACTION         Family History   Problem Relation Age of Onset    Diabetes Mother     Hyperlipidemia Mother     Stroke Mother     Heart disease Mother     Nephrolithiasis Father     Nephrolithiasis Brother      I have reviewed and agree with the history as documented  Social History     Tobacco Use    Smoking status: Current Every Day Smoker     Packs/day: 0 50     Types: Cigarettes    Smokeless tobacco: Never Used   Substance Use Topics    Alcohol use: No    Drug use: No        Review of Systems   Constitutional: Negative for activity change, appetite change, chills and fever  HENT: Negative for congestion and ear pain  Jaw pain   Eyes: Negative for pain and discharge  Respiratory: Negative for cough, chest tightness, shortness of breath, wheezing and stridor  Cardiovascular: Negative for chest pain and palpitations  Gastrointestinal: Negative for abdominal distention, abdominal pain, constipation, diarrhea and nausea  Endocrine: Negative for cold intolerance     Genitourinary: Negative for dysuria, frequency and urgency  Musculoskeletal: Positive for arthralgias  Negative for back pain  Skin: Negative for color change and rash  Allergic/Immunologic: Negative for environmental allergies and food allergies  Neurological: Negative for dizziness, weakness, numbness and headaches  Hematological: Negative for adenopathy  Psychiatric/Behavioral: Negative for agitation, behavioral problems and confusion  The patient is not nervous/anxious  All other systems reviewed and are negative  Physical Exam  Physical Exam   Constitutional: She is oriented to person, place, and time  She appears well-developed and well-nourished  HENT:   Head: Normocephalic and atraumatic  Mouth/Throat: Oropharynx is clear and moist    Tenderness to palpation noted along the entire mandible  Mild tenderness to palpation noted over the bilateral TMJ  Mild edema noted along the mandible bilateral   No bony deformities, warm to touch, erythema noted on examination of face  Eyes: Conjunctivae and EOM are normal    Neck: Normal range of motion  Neck supple  Cardiovascular: Normal rate, regular rhythm, normal heart sounds and intact distal pulses  Pulmonary/Chest: Effort normal and breath sounds normal    Abdominal: Soft  Bowel sounds are normal  She exhibits no distension  There is no tenderness  Musculoskeletal: Normal range of motion  Neurological: She is alert and oriented to person, place, and time  Skin: Skin is warm and dry  Psychiatric: She has a normal mood and affect  Her behavior is normal  Judgment and thought content normal    Nursing note and vitals reviewed        Vital Signs  ED Triage Vitals [07/22/19 1957]   Temperature Pulse Respirations Blood Pressure SpO2   99 2 °F (37 3 °C) 87 18 121/51 98 %      Temp Source Heart Rate Source Patient Position - Orthostatic VS BP Location FiO2 (%)   Tympanic Monitor Sitting Right arm --      Pain Score       9           Vitals: 07/22/19 1957   BP: 121/51   Pulse: 87   Patient Position - Orthostatic VS: Sitting         Visual Acuity      ED Medications  Medications   predniSONE tablet 50 mg (50 mg Oral Given 7/22/19 2032)   cyclobenzaprine (FLEXERIL) tablet 10 mg (10 mg Oral Given 7/22/19 2032)   diphenhydrAMINE (BENADRYL) tablet 50 mg (50 mg Oral Given 7/22/19 2057)   famotidine (PEPCID) tablet 40 mg (40 mg Oral Given 7/22/19 2151)   ibuprofen (MOTRIN) tablet 800 mg (800 mg Oral Given 7/22/19 2254)       Diagnostic Studies  Results Reviewed     None                 No orders to display              Procedures  Procedures       ED Course  ED Course as of Jul 22 2316 Mon Jul 22, 2019 2055 Patient complained of some itchy throat after given Flexeril and prednisone before patient cannot recall if she was had taken prednisone in the past   Will give Benadryl and reassess  2135 Patient re-examined at bedside  Patient states that the itchiness in her throat is improving after Benadryl  However patient now complains of itchy hives on her forearms  2241 Patient re-examined at bedside  Rash is almost completely resolved after given Benadryl and Pepcid  Patient is complaining of increasing jaw pain  Patient takes 800 mg of ibuprofen at home  Last dose was at noon  Will give another dose now  MDM  Number of Diagnoses or Management Options  Jaw pain:   Risk of Complications, Morbidity, and/or Mortality  General comments: Patient presented to the ER with recurrent jaw and TMJ pain  Patient has been seen numerous time at our ER and [facility] ER  Patient was placed on prednisone burst and a refill prescription for Flexeril was given and patient was planned to be discharged home with follow-up to [facility]  Patient was given a dose of prednisone and Flexeril in the ED  Patient then started to have pruritic rash and itchy sensation in the back of her throat    Patient has taken Flexeril for long time on an outpatient basis  Patient cannot recall ever taking prednisone  At this time patient's skin reaction may be secondary to prednisone but is difficult to say  Patient's symptoms almost completely resolved in the ED with Benadryl and Pepcid  Patient was observed for almost 2 hours in the ED with no worsening rash  I discontinued patient's prescription for prednisone  At this time patient is discharged home with continuation of her home regimen and follow-up to PCP, dentist, [facility]  Close return instructions given to return to the ER for any worsening symptoms  Patient agrees with discharge plan  Patient well appearing at time of discharge  Patient Progress  Patient progress: stable      Disposition  Final diagnoses:   Jaw pain     Time reflects when diagnosis was documented in both MDM as applicable and the Disposition within this note     Time User Action Codes Description Comment    7/22/2019  8:27 PM Justus Millan Add [R68 [id number]] Jaw pain       ED Disposition     ED Disposition Condition Date/Time Comment    Discharge Stable Mon Jul 22, 2019  8:27 PM Mike Pérez discharge to home/self care  Follow-up Information     Follow up With Specialties Details Why [address] [place] [address] for Oral and Maxillofacial Surgery [facility] to make an appointment as soon as possible  [address] [place] [address]  [phone]        Please follow-up with your dentist as needed    Keegan Seymour, DO General Practice  As needed [id number] [place] [id number]  [phone]            Patient's Medications   Discharge Prescriptions    CYCLOBENZAPRINE (FLEXERIL) 10 MG TABLET    Take 1 tablet (10 mg total) by mouth 2 (two) times a day as needed for muscle spasms       Start Date: 7/22/2019 End Date: --       Order Dose: 10 mg       Quantity: 20 tablet    Refills: 0     No discharge procedures on file      ED Provider  Electronically Signed by           Joanna Pelayo,   07/22/19 2040       Joanna Pelayo, DO  07/22/19 [id number]

## 2019-07-23 NOTE — ED NOTES
Pt noted with faint rash to forearms bilat  Pt reports itching to arms and legs  No difficulty swallowing  Resp easy and unlabored        Cortes Garcia RN  07/22/19 6750

## 2019-07-23 NOTE — ED NOTES
Pt reports pain to jaw that is getting worse  Pt reports attempting to call for appointments to see oral surgery with no success  No other c/o         Pablo Corcoran, MELI  07/22/19 2035

## 2019-07-23 NOTE — ED NOTES
Pt requesting pain meds at this time reporting meds given "are not helping"        Isabel Coronel, MELI  07/22/19 7308

## 2019-07-23 NOTE — ED NOTES
Pt reports feeling like roof of mouth and throat feel itchy and "it just started"  Dr Marcelo Chan aware, and at bedside to evaluate  Pt denies any difficulty breathing, able to swallow with no drooling noted         Tammy Merida RN  07/22/19 2053

## 2019-07-23 NOTE — ED NOTES
Pt noted with no further rash  Pt requests d/c instructions  Will notify Dr Tristian Correa, MELI  07/22/19 5360

## 2019-07-23 NOTE — ED NOTES
Dr Blaire Brice to pt bedside  D/c instructions given to pt by Dr Blaire Brice  No distress noted         Cortes Garcia RN  07/22/19 6139

## 2019-08-07 ENCOUNTER — HOSPITAL ENCOUNTER (EMERGENCY)
Facility: HOSPITAL | Age: 29
Discharge: HOME | End: 2019-08-07
Attending: EMERGENCY MEDICINE | Admitting: EMERGENCY MEDICINE
Payer: COMMERCIAL

## 2019-08-07 ENCOUNTER — APPOINTMENT (EMERGENCY)
Dept: RADIOLOGY | Facility: HOSPITAL | Age: 29
End: 2019-08-07
Payer: COMMERCIAL

## 2019-08-07 VITALS
SYSTOLIC BLOOD PRESSURE: 93 MMHG | RESPIRATION RATE: 18 BRPM | BODY MASS INDEX: 22.97 KG/M2 | OXYGEN SATURATION: 99 % | HEIGHT: 60 IN | TEMPERATURE: 98.7 F | DIASTOLIC BLOOD PRESSURE: 56 MMHG | WEIGHT: 117 LBS | HEART RATE: 73 BPM

## 2019-08-07 DIAGNOSIS — M54.9 MUSCULOSKELETAL BACK PAIN: ICD-10-CM

## 2019-08-07 DIAGNOSIS — R10.9 FLANK PAIN: Primary | ICD-10-CM

## 2019-08-07 LAB
ALBUMIN SERPL BCP-MCNC: 3.9 G/DL (ref 3.5–5)
ALP SERPL-CCNC: 58 U/L (ref 46–116)
ALT SERPL W P-5'-P-CCNC: 29 U/L (ref 12–78)
ANION GAP SERPL CALCULATED.3IONS-SCNC: 8 MMOL/L (ref 4–13)
AST SERPL W P-5'-P-CCNC: 15 U/L (ref 5–45)
BASOPHILS # BLD AUTO: 0.05 THOUSANDS/ΜL (ref 0–0.1)
BASOPHILS NFR BLD AUTO: 1 % (ref 0–1)
BILIRUB SERPL-MCNC: 0.4 MG/DL (ref 0.2–1)
BILIRUB UR QL STRIP: NEGATIVE
BUN SERPL-MCNC: 12 MG/DL (ref 5–25)
CALCIUM SERPL-MCNC: 9.2 MG/DL (ref 8.3–10.1)
CHLORIDE SERPL-SCNC: 104 MMOL/L (ref 100–108)
CLARITY UR: NORMAL
CO2 SERPL-SCNC: 29 MMOL/L (ref 21–32)
COLOR UR: NORMAL
CREAT SERPL-MCNC: 0.76 MG/DL (ref 0.6–1.3)
EOSINOPHIL # BLD AUTO: 0.2 THOUSAND/ΜL (ref 0–0.61)
EOSINOPHIL NFR BLD AUTO: 3 % (ref 0–6)
ERYTHROCYTE [DISTWIDTH] IN BLOOD BY AUTOMATED COUNT: 12.9 % (ref 11.6–15.1)
EXT PREG TEST URINE: NORMAL
EXT. CONTROL ED NAV: NORMAL
GFR SERPL CREATININE-BSD FRML MDRD: 106 ML/MIN/1.73SQ M
GLUCOSE SERPL-MCNC: 95 MG/DL (ref 65–140)
GLUCOSE UR STRIP-MCNC: NEGATIVE MG/DL
HCT VFR BLD AUTO: 41.9 % (ref 34.8–46.1)
HGB BLD-MCNC: 13.6 G/DL (ref 11.5–15.4)
HGB UR QL STRIP.AUTO: NEGATIVE
IMM GRANULOCYTES # BLD AUTO: 0.01 THOUSAND/UL (ref 0–0.2)
IMM GRANULOCYTES NFR BLD AUTO: 0 % (ref 0–2)
KETONES UR STRIP-MCNC: NEGATIVE MG/DL
LEUKOCYTE ESTERASE UR QL STRIP: NEGATIVE
LIPASE SERPL-CCNC: 132 U/L (ref 73–393)
LYMPHOCYTES # BLD AUTO: 1.96 THOUSANDS/ΜL (ref 0.6–4.47)
LYMPHOCYTES NFR BLD AUTO: 29 % (ref 14–44)
MCH RBC QN AUTO: 30.4 PG (ref 26.8–34.3)
MCHC RBC AUTO-ENTMCNC: 32.5 G/DL (ref 31.4–37.4)
MCV RBC AUTO: 94 FL (ref 82–98)
MONOCYTES # BLD AUTO: 0.42 THOUSAND/ΜL (ref 0.17–1.22)
MONOCYTES NFR BLD AUTO: 6 % (ref 4–12)
NEUTROPHILS # BLD AUTO: 4.15 THOUSANDS/ΜL (ref 1.85–7.62)
NEUTS SEG NFR BLD AUTO: 61 % (ref 43–75)
NITRITE UR QL STRIP: NEGATIVE
NRBC BLD AUTO-RTO: 0 /100 WBCS
PH UR STRIP.AUTO: 7.5 [PH]
PLATELET # BLD AUTO: 251 THOUSANDS/UL (ref 149–390)
PMV BLD AUTO: 10.4 FL (ref 8.9–12.7)
POTASSIUM SERPL-SCNC: 4.8 MMOL/L (ref 3.5–5.3)
PROT SERPL-MCNC: 6.9 G/DL (ref 6.4–8.2)
PROT UR STRIP-MCNC: NEGATIVE MG/DL
RBC # BLD AUTO: 4.47 MILLION/UL (ref 3.81–5.12)
SODIUM SERPL-SCNC: 141 MMOL/L (ref 136–145)
SP GR UR STRIP.AUTO: 1.01 (ref 1–1.03)
UROBILINOGEN UR QL STRIP.AUTO: 0.2 E.U./DL
WBC # BLD AUTO: 6.79 THOUSAND/UL (ref 4.31–10.16)

## 2019-08-07 PROCEDURE — 96375 TX/PRO/DX INJ NEW DRUG ADDON: CPT

## 2019-08-07 PROCEDURE — 81025 URINE PREGNANCY TEST: CPT | Performed by: PHYSICIAN ASSISTANT

## 2019-08-07 PROCEDURE — 81003 URINALYSIS AUTO W/O SCOPE: CPT | Performed by: PHYSICIAN ASSISTANT

## 2019-08-07 PROCEDURE — 96361 HYDRATE IV INFUSION ADD-ON: CPT

## 2019-08-07 PROCEDURE — 96374 THER/PROPH/DIAG INJ IV PUSH: CPT

## 2019-08-07 PROCEDURE — 36415 COLL VENOUS BLD VENIPUNCTURE: CPT | Performed by: PHYSICIAN ASSISTANT

## 2019-08-07 PROCEDURE — 99284 EMERGENCY DEPT VISIT MOD MDM: CPT

## 2019-08-07 PROCEDURE — 83690 ASSAY OF LIPASE: CPT | Performed by: PHYSICIAN ASSISTANT

## 2019-08-07 PROCEDURE — 74176 CT ABD & PELVIS W/O CONTRAST: CPT

## 2019-08-07 PROCEDURE — 85025 COMPLETE CBC W/AUTO DIFF WBC: CPT | Performed by: PHYSICIAN ASSISTANT

## 2019-08-07 PROCEDURE — 80053 COMPREHEN METABOLIC PANEL: CPT | Performed by: PHYSICIAN ASSISTANT

## 2019-08-07 RX ORDER — CEPHALEXIN 500 MG/1
500 CAPSULE ORAL EVERY 12 HOURS SCHEDULED
COMMUNITY
End: 2019-08-31

## 2019-08-07 RX ORDER — METAXALONE 800 MG/1
800 TABLET ORAL 3 TIMES DAILY
Qty: 21 TABLET | Refills: 0 | Status: SHIPPED | OUTPATIENT
Start: 2019-08-07 | End: 2021-03-31

## 2019-08-07 RX ORDER — ONDANSETRON 2 MG/ML
4 INJECTION INTRAMUSCULAR; INTRAVENOUS ONCE
Status: COMPLETED | OUTPATIENT
Start: 2019-08-07 | End: 2019-08-07

## 2019-08-07 RX ADMIN — MORPHINE SULFATE 2 MG: 2 INJECTION, SOLUTION INTRAMUSCULAR; INTRAVENOUS at 19:46

## 2019-08-07 RX ADMIN — SODIUM CHLORIDE 1000 ML: 0.9 INJECTION, SOLUTION INTRAVENOUS at 19:36

## 2019-08-07 RX ADMIN — ONDANSETRON 4 MG: 2 INJECTION INTRAMUSCULAR; INTRAVENOUS at 19:46

## 2019-08-07 NOTE — ED PROVIDER NOTES
History  Chief Complaint   Patient presents with    Flank Pain     pt states she has right side pain since 4-5 days, no pain moving to up side & around front, states her dr told her its her colon  34year old female hx asthma, GERD, kidney stones presents with R flank pain x5 days  She states she has had kidney stones in the past   She states this feels worse than prior kidney stone pain  She states it is right flank pain which is now radiating into her abdomen on the R side  +nausea without vomiting  No diarrhea or constipation  Last bowel movement earlier today was soft, nonbloody  She was seen in [address] [place] [address] last week who said she had a UTI and an "enlarged colon " She was given keflex for her UTI although she states she is asymptomatic  She is on day 5/7 for antibiotics  She was also given bentyl with minimal relief  She saw her pcp today who said to return to the ER if her pain worsened, and she is here because it has worsened  No fever, chills, chest pain, difficulty breathing, headache, dizziness, lightheadedness, weakness  No vaginal discharge or bleeding  Prior to Admission Medications   Prescriptions Last Dose Informant Patient Reported? Taking?    UNKNOWN TO PATIENT Not Taking at Unknown time  Yes No   UNKNOWN TO PATIENT Not Taking at Unknown time  Yes No   acetaminophen (TYLENOL) 650 mg CR tablet 8/7/2019 at Unknown time  No Yes   Sig: Take 1 tablet (650 mg total) by mouth every 8 (eight) hours as needed for mild pain or moderate pain   capsicum (ZOSTRIX) 0 075 % topical cream Not Taking at Unknown time  No No   Sig: Apply topically 3 (three) times a day   Patient not taking: Reported on 7/17/2019   capsicum (ZOSTRIX) 0 075 % topical cream Not Taking at Unknown time  No No   Sig: Apply topically 3 (three) times a day   Patient not taking: Reported on 7/19/2019   cephalexin (KEFLEX) 500 mg capsule 8/7/2019 at Unknown time  Yes Yes   Sig: Take 500 mg by mouth every 12 (twelve) hours cyclobenzaprine (FLEXERIL) 10 mg tablet Not Taking at Unknown time  No No   Sig: Take 1 tablet (10 mg total) by mouth 2 (two) times a day as needed for muscle spasms   Patient not taking: Reported on 8/7/2019   cyclobenzaprine (FLEXERIL) 5 mg tablet Not Taking at Unknown time Self Yes No   Sig: Take 5 mg by mouth   ibuprofen (MOTRIN) 600 mg tablet   No No   Sig: Take 1 tablet (600 mg total) by mouth every 6 (six) hours as needed for mild pain for up to 15 doses   Patient not taking: Reported on 7/22/2019   ibuprofen (MOTRIN) 800 mg tablet 8/7/2019 at Unknown time  Yes Yes   Sig: Take 800 mg by mouth as needed for mild pain   ondansetron (ZOFRAN) 4 mg tablet Not Taking at Unknown time  No No   Sig: Take 1 tablet (4 mg total) by mouth every 6 (six) hours   Patient not taking: Reported on 7/17/2019   ranitidine (ZANTAC) 150 mg tablet 8/7/2019 at Unknown time  Yes Yes   Sig: Take 150 mg by mouth 2 (two) times a day      Facility-Administered Medications: None       Past Medical History:   Diagnosis Date    Asthma     GERD (gastroesophageal reflux disease)     Hernia, abdominal     Migraines     Renal disorder     kidney stones       Past Surgical History:   Procedure Laterality Date    FL RETROGRADE PYELOGRAM  8/28/2018    HERNIA REPAIR      [place] CYSTO/URETERO W/LITHOTRIPSY &INDWELL STENT INSRT Right 8/28/2018    Procedure: CYSTOSCOPY URETEROSCOPY WITH RETROGRADE PYELOGRAM AND INSERTION STENT URETERAL;  Surgeon: Neyda Valle MD;  Location: [facility];  Service: Urology    TOOTH EXTRACTION         Family History   Problem Relation Age of Onset    Diabetes Mother     Hyperlipidemia Mother     Stroke Mother     Heart disease Mother     Nephrolithiasis Father     Nephrolithiasis Brother      I have reviewed and agree with the history as documented      Social History     Tobacco Use    Smoking status: Current Every Day Smoker     Packs/day: 0 50     Types: Cigarettes    Smokeless tobacco: Never Used Substance Use Topics    Alcohol use: No    Drug use: No        Review of Systems   Constitutional: Negative for chills and fever  HENT: Negative for sneezing and sore throat  Respiratory: Negative for cough and shortness of breath  Cardiovascular: Negative for chest pain, palpitations and leg swelling  Gastrointestinal: Positive for abdominal pain  Negative for constipation, diarrhea, nausea and vomiting  Genitourinary: Positive for flank pain  Negative for decreased urine volume, difficulty urinating, dysuria, frequency, hematuria, pelvic pain and urgency  Musculoskeletal: Negative for back pain, gait problem and joint swelling  Skin: Negative for color change, pallor, rash and wound  Neurological: Negative for dizziness, syncope, weakness, light-headedness, numbness and headaches  All other systems reviewed and are negative  Physical Exam  Physical Exam   Constitutional: She appears well-developed and well-nourished  No distress  HENT:   Head: Normocephalic and atraumatic  Nose: Nose normal    Eyes: EOM are normal    Neck: Normal range of motion  Cardiovascular: Normal rate, regular rhythm, normal heart sounds and intact distal pulses  Exam reveals no gallop and no friction rub  No murmur heard  Pulmonary/Chest: Effort normal and breath sounds normal  No stridor  No respiratory distress  She has no wheezes  She has no rales  Sp02 is 100% indicating adequate oxygenation on room air   Abdominal: Soft  Normal appearance and bowel sounds are normal  She exhibits no distension and no mass  There is tenderness in the right lower quadrant  There is CVA tenderness  There is no rigidity, no rebound and no guarding  Abdomen soft, no peritoneal signs  + R CVA tenderness noted   Skin: Skin is warm  Capillary refill takes less than 2 seconds  No rash noted  She is not diaphoretic  No erythema  No pallor  Nursing note and vitals reviewed        Vital Signs  ED Triage Vitals [08/07/19 1919]   Temperature Pulse Respirations Blood Pressure SpO2   (!) 97 2 °F (36 2 °C) 80 20 110/78 100 %      Temp Source Heart Rate Source Patient Position - Orthostatic VS BP Location FiO2 (%)   Tympanic Monitor Sitting Right arm --      Pain Score       8           Vitals:    08/07/19 1919   BP: 110/78   Pulse: 80   Patient Position - Orthostatic VS: Sitting         Visual Acuity      ED Medications  Medications   sodium chloride 0 9 % bolus 1,000 mL (1,000 mL Intravenous New Bag 8/7/19 1936)   ondansetron (ZOFRAN) injection 4 mg (has no administration in time range)   morphine injection 2 mg (has no administration in time range)       Diagnostic Studies  Results Reviewed     Procedure Component Value Units Date/Time    CBC and differential [[id number]] Collected:  08/07/19 1937    Lab Status:  No result Specimen:  Blood from Arm, Right     Comprehensive metabolic panel [[id number]] Collected:  08/07/19 1937    Lab Status:  No result Specimen:  Blood from Arm, Right     Lipase [[id number]] Collected:  08/07/19 1937    Lab Status:  No result Specimen:  Blood from Arm, Right     UA w Reflex to Microscopic w Reflex to Culture [[id number]]     Lab Status:  No result Specimen:  Urine     POCT pregnancy, urine [[id number]]     Lab Status:  No result                  CT renal stone study abdomen pelvis wo contrast    (Results Pending)              Procedures  Procedures       ED Course                               MDM  Number of Diagnoses or Management Options  Diagnosis management comments: End of shift - awaiting labs/CT scan report - signed out case to Dr Luana Cisneros       Amount and/or Complexity of Data Reviewed  Clinical lab tests: ordered  Tests in the radiology section of CPT®: ordered  Tests in the medicine section of CPT®: ordered  Discussion of test results with the performing providers: yes  Review and summarize past medical records: yes  Discuss the patient with other providers: yes (Dr Luana Cisneros)  Independent visualization of images, tracings, or specimens: yes        Disposition  Final diagnoses:   None     ED Disposition     None      Follow-up Information    None         Patient's Medications   Discharge Prescriptions    No medications on file     No discharge procedures on file      ED Provider  Electronically Signed by           Constantine Kwon PA-C  08/07/19 4359

## 2019-08-31 ENCOUNTER — APPOINTMENT (EMERGENCY)
Dept: RADIOLOGY | Facility: HOSPITAL | Age: 29
End: 2019-08-31
Payer: COMMERCIAL

## 2019-08-31 ENCOUNTER — HOSPITAL ENCOUNTER (EMERGENCY)
Facility: HOSPITAL | Age: 29
Discharge: HOME | End: 2019-08-31
Attending: EMERGENCY MEDICINE | Admitting: EMERGENCY MEDICINE
Payer: COMMERCIAL

## 2019-08-31 VITALS
TEMPERATURE: 97.9 F | SYSTOLIC BLOOD PRESSURE: 107 MMHG | WEIGHT: 117 LBS | BODY MASS INDEX: 22.97 KG/M2 | HEIGHT: 60 IN | OXYGEN SATURATION: 98 % | HEART RATE: 74 BPM | DIASTOLIC BLOOD PRESSURE: 68 MMHG | RESPIRATION RATE: 19 BRPM

## 2019-08-31 DIAGNOSIS — M54.9 BACK PAIN: Primary | ICD-10-CM

## 2019-08-31 LAB
ALBUMIN SERPL BCP-MCNC: 3.8 G/DL (ref 3.5–5)
ALP SERPL-CCNC: 65 U/L (ref 46–116)
ALT SERPL W P-5'-P-CCNC: 12 U/L (ref 12–78)
ANION GAP SERPL CALCULATED.3IONS-SCNC: 10 MMOL/L (ref 4–13)
APTT PPP: 28 SECONDS (ref 25–32)
AST SERPL W P-5'-P-CCNC: 10 U/L (ref 5–45)
BACTERIA UR QL AUTO: ABNORMAL /HPF
BASOPHILS # BLD AUTO: 0.03 THOUSANDS/ΜL (ref 0–0.1)
BASOPHILS NFR BLD AUTO: 1 % (ref 0–1)
BILIRUB SERPL-MCNC: 0.2 MG/DL (ref 0.2–1)
BILIRUB UR QL STRIP: NEGATIVE
BUN SERPL-MCNC: 11 MG/DL (ref 5–25)
CALCIUM SERPL-MCNC: 9 MG/DL (ref 8.3–10.1)
CHLORIDE SERPL-SCNC: 104 MMOL/L (ref 100–108)
CLARITY UR: CLEAR
CO2 SERPL-SCNC: 27 MMOL/L (ref 21–32)
COLOR UR: YELLOW
CREAT SERPL-MCNC: 0.79 MG/DL (ref 0.6–1.3)
DEPRECATED D DIMER PPP: <190 NG/ML (FEU) (ref 190–520)
EOSINOPHIL # BLD AUTO: 0.19 THOUSAND/ΜL (ref 0–0.61)
EOSINOPHIL NFR BLD AUTO: 4 % (ref 0–6)
ERYTHROCYTE [DISTWIDTH] IN BLOOD BY AUTOMATED COUNT: 12.6 % (ref 11.6–15.1)
EXT PREG TEST URINE: NEGATIVE
EXT. CONTROL ED NAV: NORMAL
GFR SERPL CREATININE-BSD FRML MDRD: 101 ML/MIN/1.73SQ M
GLUCOSE SERPL-MCNC: 102 MG/DL (ref 65–140)
GLUCOSE UR STRIP-MCNC: NEGATIVE MG/DL
HCT VFR BLD AUTO: 42 % (ref 34.8–46.1)
HGB BLD-MCNC: 13.7 G/DL (ref 11.5–15.4)
HGB UR QL STRIP.AUTO: ABNORMAL
IMM GRANULOCYTES # BLD AUTO: 0.01 THOUSAND/UL (ref 0–0.2)
IMM GRANULOCYTES NFR BLD AUTO: 0 % (ref 0–2)
INR PPP: 1.06 (ref 0.91–1.09)
KETONES UR STRIP-MCNC: NEGATIVE MG/DL
LEUKOCYTE ESTERASE UR QL STRIP: NEGATIVE
LIPASE SERPL-CCNC: 68 U/L (ref 73–393)
LYMPHOCYTES # BLD AUTO: 1.81 THOUSANDS/ΜL (ref 0.6–4.47)
LYMPHOCYTES NFR BLD AUTO: 36 % (ref 14–44)
MCH RBC QN AUTO: 29.8 PG (ref 26.8–34.3)
MCHC RBC AUTO-ENTMCNC: 32.6 G/DL (ref 31.4–37.4)
MCV RBC AUTO: 91 FL (ref 82–98)
MONOCYTES # BLD AUTO: 0.54 THOUSAND/ΜL (ref 0.17–1.22)
MONOCYTES NFR BLD AUTO: 11 % (ref 4–12)
NEUTROPHILS # BLD AUTO: 2.39 THOUSANDS/ΜL (ref 1.85–7.62)
NEUTS SEG NFR BLD AUTO: 48 % (ref 43–75)
NITRITE UR QL STRIP: NEGATIVE
NON-SQ EPI CELLS URNS QL MICRO: ABNORMAL /HPF
NRBC BLD AUTO-RTO: 0 /100 WBCS
PH UR STRIP.AUTO: 7 [PH]
PLATELET # BLD AUTO: 283 THOUSANDS/UL (ref 149–390)
PMV BLD AUTO: 9.5 FL (ref 8.9–12.7)
POTASSIUM SERPL-SCNC: 3.6 MMOL/L (ref 3.5–5.3)
PROT SERPL-MCNC: 7 G/DL (ref 6.4–8.2)
PROT UR STRIP-MCNC: NEGATIVE MG/DL
PROTHROMBIN TIME: 11.3 SECONDS (ref 9.8–12)
RBC # BLD AUTO: 4.6 MILLION/UL (ref 3.81–5.12)
RBC #/AREA URNS AUTO: ABNORMAL /HPF
SODIUM SERPL-SCNC: 141 MMOL/L (ref 136–145)
SP GR UR STRIP.AUTO: 1.01 (ref 1–1.03)
TROPONIN I SERPL-MCNC: <0.02 NG/ML
UROBILINOGEN UR QL STRIP.AUTO: 0.2 E.U./DL
WBC # BLD AUTO: 4.97 THOUSAND/UL (ref 4.31–10.16)
WBC #/AREA URNS AUTO: ABNORMAL /HPF

## 2019-08-31 PROCEDURE — 96361 HYDRATE IV INFUSION ADD-ON: CPT

## 2019-08-31 PROCEDURE — 83690 ASSAY OF LIPASE: CPT | Performed by: EMERGENCY MEDICINE

## 2019-08-31 PROCEDURE — 96374 THER/PROPH/DIAG INJ IV PUSH: CPT

## 2019-08-31 PROCEDURE — 81001 URINALYSIS AUTO W/SCOPE: CPT | Performed by: EMERGENCY MEDICINE

## 2019-08-31 PROCEDURE — 80053 COMPREHEN METABOLIC PANEL: CPT | Performed by: EMERGENCY MEDICINE

## 2019-08-31 PROCEDURE — 93005 ELECTROCARDIOGRAM TRACING: CPT

## 2019-08-31 PROCEDURE — 85379 FIBRIN DEGRADATION QUANT: CPT | Performed by: EMERGENCY MEDICINE

## 2019-08-31 PROCEDURE — 99284 EMERGENCY DEPT VISIT MOD MDM: CPT

## 2019-08-31 PROCEDURE — 84484 ASSAY OF TROPONIN QUANT: CPT | Performed by: EMERGENCY MEDICINE

## 2019-08-31 PROCEDURE — 81025 URINE PREGNANCY TEST: CPT | Performed by: EMERGENCY MEDICINE

## 2019-08-31 PROCEDURE — 85610 PROTHROMBIN TIME: CPT | Performed by: EMERGENCY MEDICINE

## 2019-08-31 PROCEDURE — 85730 THROMBOPLASTIN TIME PARTIAL: CPT | Performed by: EMERGENCY MEDICINE

## 2019-08-31 PROCEDURE — 36415 COLL VENOUS BLD VENIPUNCTURE: CPT | Performed by: EMERGENCY MEDICINE

## 2019-08-31 PROCEDURE — 85025 COMPLETE CBC W/AUTO DIFF WBC: CPT | Performed by: EMERGENCY MEDICINE

## 2019-08-31 PROCEDURE — 71046 X-RAY EXAM CHEST 2 VIEWS: CPT

## 2019-08-31 RX ORDER — ONDANSETRON 2 MG/ML
4 INJECTION INTRAMUSCULAR; INTRAVENOUS ONCE
Status: COMPLETED | OUTPATIENT
Start: 2019-08-31 | End: 2019-08-31

## 2019-08-31 RX ORDER — LIDOCAINE 50 MG/G
1 PATCH TOPICAL ONCE
Status: DISCONTINUED | OUTPATIENT
Start: 2019-08-31 | End: 2019-08-31 | Stop reason: HOSPADM

## 2019-08-31 RX ORDER — CYCLOBENZAPRINE HCL 10 MG
10 TABLET ORAL ONCE
Status: COMPLETED | OUTPATIENT
Start: 2019-08-31 | End: 2019-08-31

## 2019-08-31 RX ORDER — ACETAMINOPHEN 325 MG/1
650 TABLET ORAL ONCE
Status: COMPLETED | OUTPATIENT
Start: 2019-08-31 | End: 2019-08-31

## 2019-08-31 RX ORDER — ONDANSETRON 4 MG/1
4 TABLET, ORALLY DISINTEGRATING ORAL EVERY 6 HOURS PRN
Qty: 15 TABLET | Refills: 0 | Status: SHIPPED | OUTPATIENT
Start: 2019-08-31 | End: 2020-04-22

## 2019-08-31 RX ORDER — CYCLOBENZAPRINE HCL 10 MG
10 TABLET ORAL 3 TIMES DAILY PRN
Qty: 15 TABLET | Refills: 0 | Status: SHIPPED | OUTPATIENT
Start: 2019-08-31 | End: 2021-03-31

## 2019-08-31 RX ADMIN — CYCLOBENZAPRINE HYDROCHLORIDE 10 MG: 10 TABLET, FILM COATED ORAL at 01:31

## 2019-08-31 RX ADMIN — ONDANSETRON 4 MG: 2 INJECTION INTRAMUSCULAR; INTRAVENOUS at 01:45

## 2019-08-31 RX ADMIN — SODIUM CHLORIDE 1000 ML: 0.9 INJECTION, SOLUTION INTRAVENOUS at 01:29

## 2019-08-31 RX ADMIN — LIDOCAINE 1 PATCH: 50 PATCH TOPICAL at 01:30

## 2019-08-31 RX ADMIN — ACETAMINOPHEN 650 MG: 325 TABLET, FILM COATED ORAL at 01:31

## 2019-08-31 NOTE — DISCHARGE INSTRUCTIONS
Back Pain   AMBULATORY CARE:   Back pain  is common  You may feel sore or stiff on one or both sides of your back  The pain may spread to your buttocks or thighs  Back pain may be caused by an injury, lack of exercise, or obesity  Repeated bending, lifting, twisting, or lifting heavy items can also cause back pain  Seek immediate care for the following symptoms:   · Pain, numbness, or weakness in one or both legs    · Pain that is so severe, you cannot walk    · Unable to control your urine or bowel movements    · Severe back pain with chest pain    · Severe back pain, nausea, and vomiting    · Severe back pain that spreads to your side or genital area  Contact your healthcare provider if:   · You have back pain that does not get better with rest and pain medicine  · You have a fever  · You have pain that worsens when you are on your back or when you rest     · You have pain that worsens when you cough or sneeze  · You lose weight without trying  · You have questions or concerns about your condition or care  Treatment for back pain  may include any of the following:  · NSAIDs , such as ibuprofen, help decrease swelling, pain, and fever  This medicine is available with or without a doctor's order  NSAIDs can cause stomach bleeding or kidney problems in certain people  If you take blood thinner medicine, always ask your healthcare provider if NSAIDs are safe for you  Always read the medicine label and follow directions  · Acetaminophen  decreases pain  It is available without a doctor's order  Ask how much to take and how often to take it  Follow directions  Acetaminophen can cause liver damage if not taken correctly  · Prescription pain medicine  may be given  Ask your healthcare provider how to take this medicine safely  Manage your back pain:   · Apply ice  on your back for 15 to 20 minutes every hour or as directed  Use an ice pack, or put crushed ice in a plastic bag   Cover it with a towel  Ice helps decrease swelling and pain  · Apply heat  on your back for 20 to 30 minutes every 2 hours for as many days as directed  Heat helps decrease pain and muscle spasms  You can alternate ice and heat  · Stay active  as much as you can without causing more pain  Bed rest could make your back pain worse  Avoid heavy lifting until your pain is gone  Follow up with your healthcare provider as directed:  Write down your questions so you remember to ask them during your visits  © 2017 [employer] Information is for End User's use only and may not be sold, redistributed or otherwise used for commercial purposes  All illustrations and images included in CareNotes® are the copyrighted property of [employer]  or [employer]  The above information is an  only  It is not intended as medical advice for individual conditions or treatments  Talk to your doctor, nurse or pharmacist before following any medical regimen to see if it is safe and effective for you

## 2019-08-31 NOTE — ED NOTES
Pt ambulated out of ed with fiance at side, steady gait observed no distress noted      Betsy Connors RN  08/31/19 5644

## 2019-08-31 NOTE — ED NOTES
Pt aware of meds given reports took tylenol/motrin at home around 2p "It didn't work that's why im here" aware enough time has passed that she can have another dose of tylenol and is also being given a muscle relaxer  Pt also reporting nausea and requesting something for same stating nausea began 4 days ago with decreased appetite, no vomiting  Denies urinary complaints  Pt reports pain is in middle and "all over my lower back and wrap around my stomach" also reports right scapular pain radiating into chest  Nausea x4 days pain x2  Denies sob, speaking in complete sentences resp easy and unlabored, placed on CM with SR noted, VSS skin warm pink and dry  Awaiting lab results in no apparent distress   IV patent and intact with NS infusing without difficulty, no edema no erythema      Vangie Kolb, RN  08/31/19 3575

## 2019-08-31 NOTE — ED PROCEDURE NOTE
PROCEDURE  ECG 12 Lead Documentation Only  Date/Time: 8/31/2019 1:59 AM  Performed by: Hannah Gandara DO  Authorized by: Hannah Gandara DO     ECG reviewed by me, the ED Provider: yes    Patient location:  ED  Interpretation:     Interpretation: normal    Rate:     ECG rate:  67    ECG rate assessment: normal    Rhythm:     Rhythm: sinus rhythm    Ectopy:     Ectopy: none    ST segments:     ST segments:  Normal  T waves:     T waves: normal           Hannah Gandara DO  08/31/19 0159

## 2019-08-31 NOTE — ED NOTES
RN answered call bell pt inquiring if all labs were back reports pain continues 9/10   Aware all labs were back and MD would be in shortly to discuss labs and x ray results      Pedro Luis Joseph RN  08/31/19 4449

## 2019-09-01 LAB
ATRIAL RATE: 67 BPM
P AXIS: 63 DEGREES
PR INTERVAL: 136 MS
QRS AXIS: 77 DEGREES
QRSD INTERVAL: 90 MS
QT INTERVAL: 426 MS
QTC INTERVAL: 450 MS
T WAVE AXIS: 29 DEGREES
VENTRICULAR RATE: 67 BPM

## 2019-09-01 PROCEDURE — 93010 ELECTROCARDIOGRAM REPORT: CPT | Performed by: INTERNAL MEDICINE

## 2019-09-01 NOTE — ED PROVIDER NOTES
History  Chief Complaint   Patient presents with    Back Pain     patient c/o lower bilateral stabbing back pain and upper right back pain radiating to chest, starting yesterday, c/o nausea also, denies any trauma or injury     Patient presents for evaluation of back pain radiating around to the chest and upper abdomen  Some nausea but no vomiting  Denies trauma or injury  States started yesterday  History provided by:  Patient   used: No    Back Pain   Associated symptoms: abdominal pain        Prior to Admission Medications   Prescriptions Last Dose Informant Patient Reported? Taking?    UNKNOWN TO PATIENT   Yes No   UNKNOWN TO PATIENT   Yes No   acetaminophen (TYLENOL) 650 mg CR tablet 8/30/2019 at Unknown time  No Yes   Sig: Take 1 tablet (650 mg total) by mouth every 8 (eight) hours as needed for mild pain or moderate pain   ibuprofen (MOTRIN) 600 mg tablet   No No   Sig: Take 1 tablet (600 mg total) by mouth every 6 (six) hours as needed for mild pain for up to 15 doses   Patient not taking: Reported on 7/22/2019   ibuprofen (MOTRIN) 800 mg tablet 8/30/2019 at Unknown time  Yes Yes   Sig: Take 800 mg by mouth as needed for mild pain   metaxalone (SKELAXIN) 800 mg tablet Not Taking at Unknown time  No No   Sig: Take 1 tablet (800 mg total) by mouth 3 (three) times a day   Patient not taking: Reported on 8/31/2019   ranitidine (ZANTAC) 150 mg tablet 8/30/2019 at Unknown time  Yes Yes   Sig: Take 150 mg by mouth 2 (two) times a day      Facility-Administered Medications: None       Past Medical History:   Diagnosis Date    Asthma     GERD (gastroesophageal reflux disease)     Hernia, abdominal     Migraines     Renal disorder     kidney stones       Past Surgical History:   Procedure Laterality Date    FL RETROGRADE PYELOGRAM  8/28/2018    HERNIA REPAIR      NH CYSTO/URETERO W/LITHOTRIPSY &INDWELL STENT INSRT Right 8/28/2018    Procedure: CYSTOSCOPY URETEROSCOPY WITH RETROGRADE PYELOGRAM AND INSERTION STENT URETERAL;  Surgeon: Marimar Castro MD;  [facility];  Service: Urology    TOOTH EXTRACTION         Family History   Problem Relation Age of Onset    Diabetes Mother     Hyperlipidemia Mother     Stroke Mother     Heart disease Mother     Nephrolithiasis Father     Nephrolithiasis Brother      I have reviewed and agree with the history as documented  Social History     Tobacco Use    Smoking status: Current Every Day Smoker     Packs/day: 0 50     Types: Cigarettes    Smokeless tobacco: Never Used   Substance Use Topics    Alcohol use: No    Drug use: No        Review of Systems   Gastrointestinal: Positive for abdominal pain and nausea  Musculoskeletal: Positive for back pain  All other systems reviewed and are negative  Physical Exam  Physical Exam   Constitutional: She is oriented to person, place, and time  No distress  HENT:   Mouth/Throat: Oropharynx is clear and moist    Eyes: Pupils are equal, round, and reactive to light  Neck: Normal range of motion  Cardiovascular: Normal rate, regular rhythm and intact distal pulses  Pulmonary/Chest: Effort normal and breath sounds normal  No respiratory distress  She exhibits tenderness  Abdominal: Soft  There is no tenderness  Musculoskeletal: Normal range of motion  She exhibits tenderness  Arms:  Neurological: She is alert and oriented to person, place, and time  No sensory deficit  She exhibits normal muscle tone  Skin: Capillary refill takes less than 2 seconds  She is not diaphoretic  Nursing note and vitals reviewed        Vital Signs  ED Triage Vitals [08/31/19 0047]   Temperature Pulse Respirations Blood Pressure SpO2   97 9 °F (36 6 °C) 88 18 105/75 99 %      Temp Source Heart Rate Source Patient Position - Orthostatic VS BP Location FiO2 (%)   Oral Monitor Sitting Right arm --      Pain Score       9           Vitals:    08/31/19 0047 08/31/19 0145 08/31/19 0213   BP: 105/75 102/64 107/68   Pulse: 88 74 74   Patient Position - Orthostatic VS: Sitting Sitting Sitting         Visual Acuity      ED Medications  Medications   sodium chloride 0 9 % bolus 1,000 mL (0 mL Intravenous Stopped 8/31/19 0211)   acetaminophen (TYLENOL) tablet 650 mg (650 mg Oral Given 8/31/19 0131)   cyclobenzaprine (FLEXERIL) tablet 10 mg (10 mg Oral Given 8/31/19 0131)   ondansetron (ZOFRAN) injection 4 mg (4 mg Intravenous Given 8/31/19 0145)       Diagnostic Studies  Results Reviewed     Procedure Component Value Units Date/Time    D-Dimer [[id number]]  (Abnormal) Collected:  08/31/19 0115    Lab Status:  Final result Specimen:  Blood from Arm, Left Updated:  08/31/19 0147     D-Dimer, Quant <190 ng/ml (FEU)     Protime-INR [[id number]]  (Normal) Collected:  08/31/19 0115    Lab Status:  Final result Specimen:  Blood from Arm, Left Updated:  08/31/19 0146     Protime 11 3 seconds      INR 1 06    APTT [[id number]]  (Normal) Collected:  08/31/19 0115    Lab Status:  Final result Specimen:  Blood from Arm, Left Updated:  08/31/19 0146     PTT 28 seconds     Troponin I [[id number]]  (Normal) Collected:  08/31/19 0115    Lab Status:  Final result Specimen:  Blood from Arm, Left Updated:  08/31/19 0143     Troponin I <0 02 ng/mL     Comprehensive metabolic panel [[id number]] Collected:  08/31/19 0115    Lab Status:  Final result Specimen:  Blood from Arm, Left Updated:  08/31/19 0142     Sodium 141 mmol/L      Potassium 3 6 mmol/L      Chloride 104 mmol/L      CO2 27 mmol/L      ANION GAP 10 mmol/L      BUN 11 mg/dL      Creatinine 0 79 mg/dL      Glucose 102 mg/dL      Calcium 9 0 mg/dL      AST 10 U/L      ALT 12 U/L      Alkaline Phosphatase 65 U/L      Total Protein 7 0 g/dL      Albumin 3 8 g/dL      Total Bilirubin 0 20 mg/dL      eGFR 101 ml/min/1 73sq m     Narrative:       [place] guidelines for Chronic Kidney Disease (CKD):     Stage 1 with normal or high GFR (GFR > 90 mL/min/1 73 square meters)    Stage 2 Mild CKD (GFR = 60-89 mL/min/1 73 square meters)    Stage 3A Moderate CKD (GFR = 45-59 mL/min/1 73 square meters)    Stage 3B Moderate CKD (GFR = 30-44 mL/min/1 73 square meters)    Stage 4 Severe CKD (GFR = 15-29 mL/min/1 73 square meters)    Stage 5 End Stage CKD (GFR <15 mL/min/1 73 square meters)  Note: GFR calculation is accurate only with a steady state creatinine    Lipase [[id number]]  (Abnormal) Collected:  08/31/19 0115    Lab Status:  Final result Specimen:  Blood from Arm, Left Updated:  08/31/19 0142     Lipase 68 u/L     Urine Microscopic [[id number]]  (Abnormal) Collected:  08/31/19 0118    Lab Status:  Final result Specimen:  Urine, Clean Catch Updated:  08/31/19 0136     RBC, UA 0-1 /hpf      WBC, UA 2-4 /hpf      Epithelial Cells Occasional /hpf      Bacteria, UA Occasional /hpf     UA w Reflex to Microscopic [[id number]]  (Abnormal) Collected:  08/31/19 0118    Lab Status:  Final result Specimen:  Urine, Clean Catch Updated:  08/31/19 0124     Color, UA Yellow     Clarity, UA Clear     Specific Gravity, UA 1 010     pH, UA 7 0     Leukocytes, UA Negative     Nitrite, UA Negative     Protein, UA Negative mg/dl      Glucose, UA Negative mg/dl      Ketones, UA Negative mg/dl      Urobilinogen, UA 0 2 E U /dl      Bilirubin, UA Negative     Blood, UA Trace-Intact    CBC and differential [[id number]] Collected:  08/31/19 0115    Lab Status:  Final result Specimen:  Blood from Arm, Left Updated:  08/31/19 0124     WBC 4 97 Thousand/uL      RBC 4 60 Million/uL      Hemoglobin 13 7 g/dL      Hematocrit 42 0 %      MCV 91 fL      MCH 29 8 pg      MCHC 32 6 g/dL      RDW 12 6 %      MPV 9 5 fL      Platelets 668 Thousands/uL      nRBC 0 /100 WBCs      Neutrophils Relative 48 %      Immat GRANS % 0 %      Lymphocytes Relative 36 %      Monocytes Relative 11 %      Eosinophils Relative 4 %      Basophils Relative 1 %      Neutrophils Absolute 2 39 Thousands/µL Immature Grans Absolute 0 01 Thousand/uL      Lymphocytes Absolute 1 81 Thousands/µL      Monocytes Absolute 0 54 Thousand/µL      Eosinophils Absolute 0 19 Thousand/µL      Basophils Absolute 0 03 Thousands/µL     POCT pregnancy, urine [[id number]]  (Normal) Resulted:  08/31/19 0123    Lab Status:  Final result Updated:  08/31/19 0123     EXT PREG TEST UR (Ref: Negative) negative     Control valid                 XR chest 2 views   Final Result by Daysi Davalos MD (08/31 1004)      No acute cardiopulmonary disease  Workstation performed: [id number]                    Procedures  Procedures       ED Course         HEART Risk Score      Most Recent Value   History  0 Filed at: 08/31/2019 0224   ECG  0 Filed at: 08/31/2019 0224   Age  0 Filed at: 08/31/2019 0224   Risk Factors  1 Filed at: 08/31/2019 0224   Troponin  0 Filed at: 08/31/2019 0224   Heart Score Risk Calculator   History  0 Filed at: 08/31/2019 0224   ECG  0 Filed at: 08/31/2019 0224   Age  0 Filed at: 08/31/2019 0224   Risk Factors  1 Filed at: 08/31/2019 0224   Troponin  0 Filed at: 08/31/2019 0224   HEART Score  1 Filed at: 08/31/2019 0224   HEART Score  1 Filed at: 08/31/2019 0224                            MDM  Number of Diagnoses or Management Options  Back pain:   Diagnosis management comments: Pulse ox 98% on RA indicating adequate oxygenation  CXR: NAD as read by me    Patient has multiple ER visits for pain complaints and has been flagged by different doctors as drug seeking  Will not use narcotics  Test results discussed with patient          Amount and/or Complexity of Data Reviewed  Clinical lab tests: ordered and reviewed  Tests in the radiology section of CPT®: ordered and reviewed  Decide to obtain previous medical records or to obtain history from someone other than the patient: yes  Review and summarize past medical records: yes  Independent visualization of images, tracings, or specimens: yes    Patient Progress  Patient progress: stable      Disposition  Final diagnoses:   Back pain     Time reflects when diagnosis was documented in both MDM as applicable and the Disposition within this note     Time User Action Codes Description Comment    8/31/2019  2:23 AM Kavita Huerta Add [M54 9] Back pain       ED Disposition     ED Disposition Condition Date/Time Comment    Discharge Stable Sat Aug 31, 2019 Mike Flanagan discharge to home/self care              Follow-up Information     Follow up With Specialties Details Why Contact Info Additional Information    Clarita Sam,  General Practice In 3 days  [address] [address]  11 [facility] Emergency Department Emergency Medicine  If symptoms worsen [address] [facility] [address]  [phone] [facility] ED, [facility], [place], [address]          Discharge Medication List as of 8/31/2019  2:24 AM      START taking these medications    Details   cyclobenzaprine (FLEXERIL) 10 mg tablet Take 1 tablet (10 mg total) by mouth 3 (three) times a day as needed for muscle spasms for up to 15 doses, Starting Sat 8/31/2019, Normal      ondansetron (ZOFRAN-ODT) 4 mg disintegrating tablet Take 1 tablet (4 mg total) by mouth every 6 (six) hours as needed for nausea for up to 15 doses, Starting Sat 8/31/2019, Normal         CONTINUE these medications which have NOT CHANGED    Details   acetaminophen (TYLENOL) 650 mg CR tablet Take 1 tablet (650 mg total) by mouth every 8 (eight) hours as needed for mild pain or moderate pain, Starting Fri 10/26/2018, Print      !! ibuprofen (MOTRIN) 800 mg tablet Take 800 mg by mouth as needed for mild pain, Historical Med      ranitidine (ZANTAC) 150 mg tablet Take 150 mg by mouth 2 (two) times a day, Historical Med      !! ibuprofen (MOTRIN) 600 mg tablet Take 1 tablet (600 mg total) by mouth every 6 (six) hours as needed for mild pain for up to 15 doses, Starting Wed 7/17/2019, Print      metaxalone (SKELAXIN) 800 mg tablet Take 1 tablet (800 mg total) by mouth 3 (three) times a day, Starting Wed 8/7/2019, Normal      !! UNKNOWN TO PATIENT Historical Med      !! UNKNOWN TO PATIENT Historical Med       !! - Potential duplicate medications found  Please discuss with provider  No discharge procedures on file      ED Provider  Electronically Signed by           Alannah Gray DO  09/01/19 7627

## 2019-09-07 ENCOUNTER — HOSPITAL ENCOUNTER (EMERGENCY)
Facility: HOSPITAL | Age: 29
Discharge: HOME | End: 2019-09-07
Attending: EMERGENCY MEDICINE | Admitting: EMERGENCY MEDICINE
Payer: COMMERCIAL

## 2019-09-07 VITALS
SYSTOLIC BLOOD PRESSURE: 110 MMHG | TEMPERATURE: 98.3 F | RESPIRATION RATE: 20 BRPM | DIASTOLIC BLOOD PRESSURE: 67 MMHG | OXYGEN SATURATION: 97 % | HEART RATE: 92 BPM

## 2019-09-07 DIAGNOSIS — Z76.5 DRUG-SEEKING BEHAVIOR: ICD-10-CM

## 2019-09-07 DIAGNOSIS — R10.9 ABDOMINAL PAIN: Primary | ICD-10-CM

## 2019-09-07 PROCEDURE — 99283 EMERGENCY DEPT VISIT LOW MDM: CPT

## 2019-09-07 RX ORDER — DICYCLOMINE HCL 20 MG
20 TABLET ORAL EVERY 6 HOURS PRN
Qty: 20 TABLET | Refills: 0 | Status: SHIPPED | OUTPATIENT
Start: 2019-09-07 | End: 2021-03-31

## 2019-09-07 RX ORDER — DICYCLOMINE HYDROCHLORIDE 10 MG/1
20 CAPSULE ORAL ONCE
Status: COMPLETED | OUTPATIENT
Start: 2019-09-07 | End: 2019-09-07

## 2019-09-07 RX ADMIN — DICYCLOMINE HYDROCHLORIDE 20 MG: 10 CAPSULE ORAL at 20:15

## 2019-09-08 NOTE — DISCHARGE INSTRUCTIONS
Your doctor should refer you to a GI specialist at this point  You are telling me you had normal blood tests today and a normal US yesterday and you had a normal CT scan here a month ago  Use tylenol and a heating pad and you can try the Bentyl as needed for abdominal discomfort  Call your doctor on Monday

## 2019-09-08 NOTE — ED PROVIDER NOTES
History  Chief Complaint   Patient presents with    Abdominal Pain     c/o mid lower abd pain for about a week, seen at [facility], which is her Dr and also at [facility] for same, [facility] did an ultrasound and bloodwork which was normal  concerned as pain persists and unsure what is wrong, her Dr told her to go to a different hospital     35 yo female c/o mid abdominal pain x one week - says it is different than the abdominal pain she was seen here a week ago for  Pain has been constant, worse the last 3 days  Seen by PMD and had US outpt  Yesterday which was normal she says  Then went to [facility] ER today and had normal blood work and was discharged  Then she says she went back to her doctor at [facility] today and they told her to go to a different hospital to get checked  + loose stools but not diarrhea and not constipated  No vomiting  No fever  No urinary symptoms  No vaginal discharge or bleeding   + decreased appetite  No relief with ibuprofen  History provided by:  Patient   used: No    Abdominal Pain   Associated symptoms: nausea    Associated symptoms: no chest pain, no constipation, no cough, no diarrhea, no dysuria, no fever, no shortness of breath and no vomiting        Prior to Admission Medications   Prescriptions Last Dose Informant Patient Reported? Taking?    UNKNOWN TO PATIENT Not Taking at Unknown time  Yes No   UNKNOWN TO PATIENT Not Taking at Unknown time  Yes No   acetaminophen (TYLENOL) 650 mg CR tablet 9/7/2019 at Unknown time  No Yes   Sig: Take 1 tablet (650 mg total) by mouth every 8 (eight) hours as needed for mild pain or moderate pain   cyclobenzaprine (FLEXERIL) 10 mg tablet 9/6/2019 at Unknown time  No Yes   Sig: Take 1 tablet (10 mg total) by mouth 3 (three) times a day as needed for muscle spasms for up to 15 doses   ibuprofen (MOTRIN) 600 mg tablet   No No   Sig: Take 1 tablet (600 mg total) by mouth every 6 (six) hours as needed for mild pain for up to 15 doses   Patient not taking: Reported on 7/22/2019   ibuprofen (MOTRIN) 800 mg tablet 9/7/2019 at Unknown time  Yes Yes   Sig: Take 800 mg by mouth as needed for mild pain   metaxalone (SKELAXIN) 800 mg tablet   No No   Sig: Take 1 tablet (800 mg total) by mouth 3 (three) times a day   Patient not taking: Reported on 8/31/2019   ondansetron (ZOFRAN-ODT) 4 mg disintegrating tablet 9/7/2019 at Unknown time  No Yes   Sig: Take 1 tablet (4 mg total) by mouth every 6 (six) hours as needed for nausea for up to 15 doses   ranitidine (ZANTAC) 150 mg tablet 9/6/2019 at Unknown time  Yes Yes   Sig: Take 150 mg by mouth 2 (two) times a day      Facility-Administered Medications: None       Past Medical History:   Diagnosis Date    Asthma     GERD (gastroesophageal reflux disease)     Hernia, abdominal     Migraines     Renal disorder     kidney stones       Past Surgical History:   Procedure Laterality Date    FL RETROGRADE PYELOGRAM  8/28/2018    HERNIA REPAIR      MD CYSTO/URETERO W/LITHOTRIPSY &INDWELL STENT INSRT Right 8/28/2018    Procedure: CYSTOSCOPY URETEROSCOPY WITH RETROGRADE PYELOGRAM AND INSERTION STENT URETERAL;  Surgeon: Moo Holman MD;  [facility];  Service: Urology    TOOTH EXTRACTION         Family History   Problem Relation Age of Onset    Diabetes Mother     Hyperlipidemia Mother     Stroke Mother     Heart disease Mother     Nephrolithiasis Father     Nephrolithiasis Brother      I have reviewed and agree with the history as documented  Social History     Tobacco Use    Smoking status: Current Every Day Smoker     Packs/day: 0 50     Types: Cigarettes    Smokeless tobacco: Never Used   Substance Use Topics    Alcohol use: No    Drug use: No        Review of Systems   Constitutional: Negative  Negative for fever  HENT: Negative  Eyes: Negative  Respiratory: Negative  Negative for cough and shortness of breath  Cardiovascular: Negative    Negative for chest pain  Gastrointestinal: Positive for abdominal pain and nausea  Negative for constipation, diarrhea and vomiting  Genitourinary: Negative  Negative for dysuria and flank pain  Musculoskeletal: Negative  Negative for back pain and myalgias  Skin: Positive for rash  Negative for wound  Neurological: Negative  Negative for dizziness and headaches  Hematological: Does not bruise/bleed easily  Psychiatric/Behavioral: Negative  All other systems reviewed and are negative  Physical Exam  Physical Exam   Constitutional: She appears well-developed and well-nourished  No distress  HENT:   Head: Normocephalic and atraumatic  Eyes: Pupils are equal, round, and reactive to light  Conjunctivae are normal    Neck: Normal range of motion  Neck supple  Cardiovascular: Normal rate, regular rhythm and normal heart sounds  No murmur heard  Pulmonary/Chest: Effort normal and breath sounds normal  No respiratory distress  Abdominal: Soft  Bowel sounds are normal  She exhibits no distension  There is no tenderness  There is no CVA tenderness  Musculoskeletal: Normal range of motion  She exhibits no edema or deformity  Neurological: She is alert  No cranial nerve deficit  Skin: Skin is warm and dry  No rash noted  She is not diaphoretic  No pallor  Psychiatric: She has a normal mood and affect  Her behavior is normal    Nursing note and vitals reviewed        Vital Signs  ED Triage Vitals [09/07/19 1913]   Temperature Pulse Respirations Blood Pressure SpO2   98 3 °F (36 8 °C) 92 20 110/67 97 %      Temp Source Heart Rate Source Patient Position - Orthostatic VS BP Location FiO2 (%)   Tympanic Monitor Sitting Right arm --      Pain Score       9           Vitals:    09/07/19 1913   BP: 110/67   Pulse: 92   Patient Position - Orthostatic VS: Sitting         Visual Acuity      ED Medications  Medications   dicyclomine (BENTYL) capsule 20 mg (20 mg Oral Given 9/7/19 2015)       Diagnostic Studies  Results Reviewed     None                 No orders to display              Procedures  Procedures       ED Course                               MDM  Number of Diagnoses or Management Options  Abdominal pain:   Diagnosis management comments: Pt  Not tender to palpation at all on exam   Belly is soft  Vitals normal   Just seen here one week ago and had normal labs and neg  Preg  Test   She is telling me labs today again and outpt  US normal   Prescription monitoring reviewed and pt  Has multiple narcotic scripts from multiple doctors  Prior records reviewed  Pt  Has 26 ER visits in the last year for pain related complaints  Last script for percocet 7/26/19  Had negative CT scan here one month ago  I feel pt  Is stable for discharge without any further repeat of blood tests or xrays  I will try course of bentyl for pain and advised can also use tylenol and heating pad and pt  Should follow up with PMD for GI referral   Note: nurse said pt  Wanted to complain to ER director - advised she should call and ask to speak with him  Amount and/or Complexity of Data Reviewed  Review and summarize past medical records: yes        Disposition  Final diagnoses:   Abdominal pain   Drug-seeking behavior     Time reflects when diagnosis was documented in both MDM as applicable and the Disposition within this note     Time User Action Codes Description Comment    3/4/6717  9:85 PM Александр OLIVARES Add [A17 0] Abdominal pain     5/2/8281 19:89 AM Radha Rievra Add [F32 5] Drug-seeking behavior       ED Disposition     ED Disposition Condition Date/Time Comment    Discharge Stable Sat Sep 7, 2019  8:01 PM Mike Pérez discharge to home/self care              Follow-up Information     Follow up With Specialties Details Why Contact Info    Thomas Cruz DO General Practice Schedule an appointment as soon as possible for a visit   [id number] [facility]  [address] [facility] [address]  [phone]            Discharge Medication List as of 9/7/2019  8:04 PM      START taking these medications    Details   dicyclomine (BENTYL) 20 mg tablet Take 1 tablet (20 mg total) by mouth every 6 (six) hours as needed (abdominal discomfort), Starting Sat 9/7/2019, Print         CONTINUE these medications which have NOT CHANGED    Details   acetaminophen (TYLENOL) 650 mg CR tablet Take 1 tablet (650 mg total) by mouth every 8 (eight) hours as needed for mild pain or moderate pain, Starting Fri 10/26/2018, Print      cyclobenzaprine (FLEXERIL) 10 mg tablet Take 1 tablet (10 mg total) by mouth 3 (three) times a day as needed for muscle spasms for up to 15 doses, Starting Sat 8/31/2019, Normal      !! ibuprofen (MOTRIN) 800 mg tablet Take 800 mg by mouth as needed for mild pain, Historical Med      ondansetron (ZOFRAN-ODT) 4 mg disintegrating tablet Take 1 tablet (4 mg total) by mouth every 6 (six) hours as needed for nausea for up to 15 doses, Starting Sat 8/31/2019, Normal      ranitidine (ZANTAC) 150 mg tablet Take 150 mg by mouth 2 (two) times a day, Historical Med      !! ibuprofen (MOTRIN) 600 mg tablet Take 1 tablet (600 mg total) by mouth every 6 (six) hours as needed for mild pain for up to 15 doses, Starting Wed 7/17/2019, Print      metaxalone (SKELAXIN) 800 mg tablet Take 1 tablet (800 mg total) by mouth 3 (three) times a day, Starting Wed 8/7/2019, Normal      !! UNKNOWN TO PATIENT Historical Med      !! UNKNOWN TO PATIENT Historical Med       !! - Potential duplicate medications found  Please discuss with provider  No discharge procedures on file      ED Provider  Electronically Signed by           Ceferino Frazier MD  42/43/94 [id number]       Ceferino Frazier MD  24/95/42 5252

## 2020-02-25 ENCOUNTER — APPOINTMENT (EMERGENCY)
Dept: RADIOLOGY | Facility: HOSPITAL | Age: 30
End: 2020-02-25
Payer: COMMERCIAL

## 2020-02-25 ENCOUNTER — HOSPITAL ENCOUNTER (EMERGENCY)
Facility: HOSPITAL | Age: 30
Discharge: HOME | End: 2020-02-26
Attending: EMERGENCY MEDICINE | Admitting: EMERGENCY MEDICINE
Payer: COMMERCIAL

## 2020-02-25 VITALS
DIASTOLIC BLOOD PRESSURE: 69 MMHG | HEART RATE: 92 BPM | RESPIRATION RATE: 18 BRPM | TEMPERATURE: 98.1 F | OXYGEN SATURATION: 98 % | SYSTOLIC BLOOD PRESSURE: 110 MMHG

## 2020-02-25 DIAGNOSIS — M25.511 RIGHT SHOULDER PAIN: Primary | ICD-10-CM

## 2020-02-25 PROCEDURE — 93005 ELECTROCARDIOGRAM TRACING: CPT

## 2020-02-25 PROCEDURE — 99284 EMERGENCY DEPT VISIT MOD MDM: CPT

## 2020-02-25 PROCEDURE — 73030 X-RAY EXAM OF SHOULDER: CPT

## 2020-02-25 PROCEDURE — 99283 EMERGENCY DEPT VISIT LOW MDM: CPT | Performed by: EMERGENCY MEDICINE

## 2020-02-26 LAB
ATRIAL RATE: 97 BPM
P AXIS: 64 DEGREES
PR INTERVAL: 126 MS
QRS AXIS: 82 DEGREES
QRSD INTERVAL: 86 MS
QT INTERVAL: 346 MS
QTC INTERVAL: 439 MS
T WAVE AXIS: 19 DEGREES
VENTRICULAR RATE: 97 BPM

## 2020-02-26 PROCEDURE — 93010 ELECTROCARDIOGRAM REPORT: CPT | Performed by: INTERNAL MEDICINE

## 2020-02-26 RX ORDER — ONDANSETRON 4 MG/1
4 TABLET, ORALLY DISINTEGRATING ORAL EVERY 6 HOURS PRN
Qty: 16 TABLET | Refills: 0 | Status: SHIPPED | OUTPATIENT
Start: 2020-02-26 | End: 2020-04-22 | Stop reason: SDUPTHER

## 2020-02-26 NOTE — ED NOTES
Pt requested prescription for nausea medication because the pain makes her nauseous at times  Dr Kim Obrien aware        Cristy Marshall, RN  02/26/20 9766

## 2020-02-26 NOTE — ED PROVIDER NOTES
History  Chief Complaint   Patient presents with    Jaw Pain     hx of TMJ, c/o left sided jaw pain    Shoulder Pain     right sided shoulder pain starting 4 days ago, denies injury     HPI     Patient is a 79-year-old female that reports to the emergency department with a history of TMJ  She reports that she has her current jaw pain  It is similar in nature to prior  Achy, worse with opening her mouth  No fevers, chills, sweats  No intraoral abscess  She also notes right-sided shoulder pain, worse with movement, worse with palpation, achy, no redness, no fevers, chills, sweats to suggest septic joint  No skin redness to suggest cellulitis  No chest pain  No shortness of breath  No focal neurological defects  Medical decision making 79-year-old female, shoulder pain, achy, started 4 days ago, nonradiating, worse with movement, suspect musculoskeletal, for now, will have the patient follow-up with her primary care doctor  No fractures identified on x-ray  Treat conservatively  Prior to Admission Medications   Prescriptions Last Dose Informant Patient Reported? Taking?    UNKNOWN TO PATIENT Not Taking at Unknown time  Yes No   UNKNOWN TO PATIENT Not Taking at Unknown time  Yes No   acetaminophen (TYLENOL) 650 mg CR tablet 2/25/2020 at Unknown time  No Yes   Sig: Take 1 tablet (650 mg total) by mouth every 8 (eight) hours as needed for mild pain or moderate pain   cyclobenzaprine (FLEXERIL) 10 mg tablet 2/24/2020 at Unknown time  No Yes   Sig: Take 1 tablet (10 mg total) by mouth 3 (three) times a day as needed for muscle spasms for up to 15 doses   dicyclomine (BENTYL) 20 mg tablet Not Taking at Unknown time  No No   Sig: Take 1 tablet (20 mg total) by mouth every 6 (six) hours as needed (abdominal discomfort)   Patient not taking: Reported on 2/25/2020   ibuprofen (MOTRIN) 600 mg tablet Not Taking at Unknown time  No No   Sig: Take 1 tablet (600 mg total) by mouth every 6 (six) hours as needed for mild pain for up to 15 doses   Patient not taking: Reported on 2/25/2020   ibuprofen (MOTRIN) 800 mg tablet 2/25/2020 at Unknown time  Yes Yes   Sig: Take 800 mg by mouth as needed for mild pain   metaxalone (SKELAXIN) 800 mg tablet Not Taking at Unknown time  No No   Sig: Take 1 tablet (800 mg total) by mouth 3 (three) times a day   Patient not taking: Reported on 8/31/2019   ondansetron (ZOFRAN-ODT) 4 mg disintegrating tablet Not Taking at Unknown time  No No   Sig: Take 1 tablet (4 mg total) by mouth every 6 (six) hours as needed for nausea for up to 15 doses   Patient not taking: Reported on 2/25/2020   ranitidine (ZANTAC) 150 mg tablet 2/25/2020 at Unknown time  Yes Yes   Sig: Take 150 mg by mouth 2 (two) times a day      Facility-Administered Medications: None       Past Medical History:   Diagnosis Date    Asthma     GERD (gastroesophageal reflux disease)     Hernia, abdominal     Migraines     Renal disorder     kidney stones       Past Surgical History:   Procedure Laterality Date    FL RETROGRADE PYELOGRAM  8/28/2018    HERNIA REPAIR      [place] CYSTO/URETERO W/LITHOTRIPSY &INDWELL STENT INSRT Right 8/28/2018    Procedure: CYSTOSCOPY URETEROSCOPY WITH RETROGRADE PYELOGRAM AND INSERTION STENT URETERAL;  Surgeon: Yue Plok MD;  [facility];  Service: Urology    TOOTH EXTRACTION         Family History   Problem Relation Age of Onset    Diabetes Mother     Hyperlipidemia Mother     Stroke Mother     Heart disease Mother     Nephrolithiasis Father     Nephrolithiasis Brother      I have reviewed and agree with the history as documented  E-Cigarette/Vaping     E-Cigarette/Vaping Substances     Social History     Tobacco Use    Smoking status: Current Every Day Smoker     Packs/day: 0 50     Types: Cigarettes    Smokeless tobacco: Never Used   Substance Use Topics    Alcohol use: No    Drug use: No       Review of Systems   Musculoskeletal: Positive for arthralgias     All other systems reviewed and are negative  Physical Exam  Physical Exam   Constitutional: She is oriented to person, place, and time  She appears well-developed and well-nourished  HENT:   Head: Normocephalic and atraumatic  Right Ear: External ear normal    Left Ear: External ear normal    Eyes: Conjunctivae and EOM are normal    Neck: Normal range of motion  Neck supple  No JVD present  No tracheal deviation present  Cardiovascular: Normal rate, regular rhythm and normal heart sounds  Pulmonary/Chest: Effort normal  No respiratory distress  She has no wheezes  She has no rales  Abdominal: Soft  Bowel sounds are normal  There is no tenderness  There is no rebound and no guarding  Musculoskeletal: She exhibits no edema or tenderness  Neurological: She is alert and oriented to person, place, and time  Skin: Skin is warm and dry  No rash noted  No erythema  Psychiatric: She has a normal mood and affect  Thought content normal    Nursing note and vitals reviewed        Vital Signs  ED Triage Vitals   Temperature Pulse Respirations Blood Pressure SpO2   02/25/20 2259 02/25/20 2253 02/25/20 2253 02/25/20 2253 02/25/20 2253   98 1 °F (36 7 °C) 92 18 110/69 98 %      Temp Source Heart Rate Source Patient Position - Orthostatic VS BP Location FiO2 (%)   02/25/20 2259 02/25/20 2253 -- -- --   Oral Monitor         Pain Score       --                  Vitals:    02/25/20 2253   BP: 110/69   Pulse: 92         Visual Acuity      ED Medications  Medications - No data to display    Diagnostic Studies  Results Reviewed     None                 XR shoulder 2+ views RIGHT    (Results Pending)              Procedures  Procedures         ED Course                               MDM      Disposition  Final diagnoses:   Right shoulder pain     Time reflects when diagnosis was documented in both MDM as applicable and the Disposition within this note     Time User Action Codes Description Comment    2/25/2020 11:54 PM Ally Pulido Add [I73 798] Right shoulder pain       ED Disposition     ED Disposition Condition Date/Time Comment    Discharge Stable Tue Feb 25, 2020 11:54 PM Mike Pérez discharge to home/self care  Follow-up Information     Follow up With Specialties Details Why Contact Info    Daniela Lang DO General Practice In 1 day  [id number] [address] [address]  [phone]            Patient's Medications   Discharge Prescriptions    No medications on file     No discharge procedures on file      PDMP Review     None          ED Provider  Electronically Signed by           Baltazar Art MD  02/25/20 6883

## 2020-04-19 ENCOUNTER — TELEPHONE (OUTPATIENT)
Dept: OTHER | Facility: OTHER | Age: 30
End: 2020-04-19

## 2020-04-20 RX ORDER — IBUPROFEN 800 MG/1
800 TABLET ORAL EVERY 6 HOURS PRN
COMMUNITY
End: 2020-09-25

## 2020-04-20 RX ORDER — ACETAMINOPHEN 500 MG
500 TABLET ORAL EVERY 6 HOURS PRN
COMMUNITY
End: 2021-03-31

## 2020-04-20 RX ORDER — RANITIDINE 150 MG/1
150 CAPSULE ORAL 2 TIMES DAILY
COMMUNITY
End: 2020-06-04 | Stop reason: SDUPTHER

## 2020-04-22 ENCOUNTER — TELEPHONE (OUTPATIENT)
Dept: OTHER | Facility: OTHER | Age: 30
End: 2020-04-22

## 2020-04-22 ENCOUNTER — TELEMEDICINE (OUTPATIENT)
Dept: FAMILY MEDICINE CLINIC | Facility: CLINIC | Age: 30
End: 2020-04-22
Payer: COMMERCIAL

## 2020-04-22 VITALS — BODY MASS INDEX: 20.98 KG/M2 | HEIGHT: 62 IN | WEIGHT: 114 LBS

## 2020-04-22 DIAGNOSIS — R11.0 NAUSEA: Primary | ICD-10-CM

## 2020-04-22 DIAGNOSIS — M25.511 RIGHT SHOULDER PAIN, UNSPECIFIED CHRONICITY: ICD-10-CM

## 2020-04-22 DIAGNOSIS — Z12.4 SCREENING FOR CERVICAL CANCER: ICD-10-CM

## 2020-04-22 DIAGNOSIS — K21.9 GASTROESOPHAGEAL REFLUX DISEASE WITHOUT ESOPHAGITIS: ICD-10-CM

## 2020-04-22 DIAGNOSIS — R11.0 NAUSEA: ICD-10-CM

## 2020-04-22 PROCEDURE — 3008F BODY MASS INDEX DOCD: CPT | Performed by: FAMILY MEDICINE

## 2020-04-22 PROCEDURE — 99203 OFFICE O/P NEW LOW 30 MIN: CPT | Performed by: FAMILY MEDICINE

## 2020-04-22 RX ORDER — ONDANSETRON 4 MG/1
4 TABLET, ORALLY DISINTEGRATING ORAL EVERY 6 HOURS PRN
Qty: 16 TABLET | Refills: 0 | Status: SHIPPED | OUTPATIENT
Start: 2020-04-22 | End: 2020-04-22 | Stop reason: SDUPTHER

## 2020-04-22 RX ORDER — PANTOPRAZOLE SODIUM 40 MG/1
40 TABLET, DELAYED RELEASE ORAL DAILY
Qty: 90 TABLET | Refills: 1 | Status: SHIPPED | OUTPATIENT
Start: 2020-04-22 | End: 2020-06-04 | Stop reason: SDUPTHER

## 2020-04-22 RX ORDER — ONDANSETRON 4 MG/1
4 TABLET, ORALLY DISINTEGRATING ORAL EVERY 4 HOURS PRN
Qty: 30 TABLET | Refills: 0 | Status: SHIPPED | OUTPATIENT
Start: 2020-04-22 | End: 2020-06-04 | Stop reason: SDUPTHER

## 2020-06-04 ENCOUNTER — TELEMEDICINE (OUTPATIENT)
Dept: FAMILY MEDICINE CLINIC | Facility: CLINIC | Age: 30
End: 2020-06-04
Payer: COMMERCIAL

## 2020-06-04 DIAGNOSIS — R11.0 NAUSEA: ICD-10-CM

## 2020-06-04 DIAGNOSIS — N39.0 URINARY TRACT INFECTION WITHOUT HEMATURIA, SITE UNSPECIFIED: Primary | ICD-10-CM

## 2020-06-04 DIAGNOSIS — K21.9 GASTROESOPHAGEAL REFLUX DISEASE WITHOUT ESOPHAGITIS: ICD-10-CM

## 2020-06-04 PROCEDURE — 99214 OFFICE O/P EST MOD 30 MIN: CPT | Performed by: FAMILY MEDICINE

## 2020-06-04 RX ORDER — PANTOPRAZOLE SODIUM 40 MG/1
40 TABLET, DELAYED RELEASE ORAL DAILY
Qty: 90 TABLET | Refills: 1 | Status: SHIPPED | OUTPATIENT
Start: 2020-06-04 | End: 2020-12-08 | Stop reason: SDUPTHER

## 2020-06-04 RX ORDER — RANITIDINE 150 MG/1
150 CAPSULE ORAL 2 TIMES DAILY
Qty: 30 CAPSULE | Refills: 1 | Status: SHIPPED | OUTPATIENT
Start: 2020-06-04 | End: 2020-06-04

## 2020-06-04 RX ORDER — NITROFURANTOIN 25; 75 MG/1; MG/1
100 CAPSULE ORAL 2 TIMES DAILY
Qty: 10 CAPSULE | Refills: 0 | Status: SHIPPED | OUTPATIENT
Start: 2020-06-04 | End: 2020-06-09

## 2020-06-04 RX ORDER — ONDANSETRON 4 MG/1
4 TABLET, ORALLY DISINTEGRATING ORAL EVERY 8 HOURS PRN
Qty: 30 TABLET | Refills: 0 | Status: SHIPPED | OUTPATIENT
Start: 2020-06-04 | End: 2020-07-16 | Stop reason: SDUPTHER

## 2020-07-06 ENCOUNTER — HOSPITAL ENCOUNTER (EMERGENCY)
Facility: HOSPITAL | Age: 30
Discharge: HOME | End: 2020-07-07
Attending: EMERGENCY MEDICINE
Payer: COMMERCIAL

## 2020-07-06 DIAGNOSIS — R10.31 RIGHT LOWER QUADRANT ABDOMINAL PAIN: Primary | ICD-10-CM

## 2020-07-06 PROCEDURE — 99285 EMERGENCY DEPT VISIT HI MDM: CPT

## 2020-07-07 ENCOUNTER — APPOINTMENT (EMERGENCY)
Dept: CT IMAGING | Facility: HOSPITAL | Age: 30
End: 2020-07-07
Payer: COMMERCIAL

## 2020-07-07 ENCOUNTER — APPOINTMENT (EMERGENCY)
Dept: ULTRASOUND IMAGING | Facility: HOSPITAL | Age: 30
End: 2020-07-07
Payer: COMMERCIAL

## 2020-07-07 VITALS
WEIGHT: 123 LBS | DIASTOLIC BLOOD PRESSURE: 65 MMHG | RESPIRATION RATE: 16 BRPM | OXYGEN SATURATION: 100 % | TEMPERATURE: 97.4 F | HEIGHT: 60 IN | HEART RATE: 72 BPM | SYSTOLIC BLOOD PRESSURE: 94 MMHG | BODY MASS INDEX: 24.15 KG/M2

## 2020-07-07 LAB
ABO GROUP BLD: NORMAL
ALBUMIN SERPL BCP-MCNC: 4.2 G/DL (ref 3.4–4.8)
ALP SERPL-CCNC: 46.8 U/L (ref 35–140)
ALT SERPL W P-5'-P-CCNC: 10 U/L (ref 5–54)
ANION GAP SERPL CALCULATED.3IONS-SCNC: 6 MMOL/L (ref 4–13)
APTT PPP: 26 SECONDS (ref 23–31)
AST SERPL W P-5'-P-CCNC: 11 U/L (ref 15–41)
ATRIAL RATE: 67 BPM
B-HCG SERPL-ACNC: <1 MIU/ML
BASOPHILS # BLD AUTO: 0.04 THOUSANDS/ΜL (ref 0–0.1)
BASOPHILS NFR BLD AUTO: 1 % (ref 0–1)
BILIRUB SERPL-MCNC: 0.21 MG/DL (ref 0.3–1.2)
BLD GP AB SCN SERPL QL: NEGATIVE
BUN SERPL-MCNC: 10 MG/DL (ref 6–20)
CALCIUM SERPL-MCNC: 9.5 MG/DL (ref 8.4–10.2)
CHLORIDE SERPL-SCNC: 106 MMOL/L (ref 96–108)
CO2 SERPL-SCNC: 27 MMOL/L (ref 22–33)
CREAT SERPL-MCNC: 0.68 MG/DL (ref 0.4–1.1)
EOSINOPHIL # BLD AUTO: 0.23 THOUSAND/ΜL (ref 0–0.61)
EOSINOPHIL NFR BLD AUTO: 3 % (ref 0–6)
ERYTHROCYTE [DISTWIDTH] IN BLOOD BY AUTOMATED COUNT: 12.4 % (ref 11.6–15.1)
GFR SERPL CREATININE-BSD FRML MDRD: 118 ML/MIN/1.73SQ M
GLUCOSE SERPL-MCNC: 127 MG/DL (ref 65–140)
HCT VFR BLD AUTO: 40.2 % (ref 34.8–46.1)
HGB BLD-MCNC: 13.6 G/DL (ref 11.5–15.4)
IMM GRANULOCYTES # BLD AUTO: 0.01 THOUSAND/UL (ref 0–0.2)
IMM GRANULOCYTES NFR BLD AUTO: 0 % (ref 0–2)
INR PPP: 1.06 (ref 0.9–1.1)
LYMPHOCYTES # BLD AUTO: 1.58 THOUSANDS/ΜL (ref 0.6–4.47)
LYMPHOCYTES NFR BLD AUTO: 23 % (ref 14–44)
MCH RBC QN AUTO: 30.8 PG (ref 26.8–34.3)
MCHC RBC AUTO-ENTMCNC: 33.8 G/DL (ref 31.4–37.4)
MCV RBC AUTO: 91 FL (ref 82–98)
MONOCYTES # BLD AUTO: 0.56 THOUSAND/ΜL (ref 0.17–1.22)
MONOCYTES NFR BLD AUTO: 8 % (ref 4–12)
NEUTROPHILS # BLD AUTO: 4.41 THOUSANDS/ΜL (ref 1.85–7.62)
NEUTS SEG NFR BLD AUTO: 65 % (ref 43–75)
P AXIS: 70 DEGREES
PLATELET # BLD AUTO: 261 THOUSANDS/UL (ref 149–390)
PMV BLD AUTO: 9.9 FL (ref 8.9–12.7)
POTASSIUM SERPL-SCNC: 4.1 MMOL/L (ref 3.5–5)
PR INTERVAL: 131 MS
PROT SERPL-MCNC: 6.5 G/DL (ref 6.4–8.3)
PROTHROMBIN TIME: 11.2 SECONDS (ref 9.5–12.1)
QRS AXIS: 83 DEGREES
QRSD INTERVAL: 95 MS
QT INTERVAL: 430 MS
QTC INTERVAL: 454 MS
RBC # BLD AUTO: 4.42 MILLION/UL (ref 3.81–5.12)
RH BLD: NORMAL
SODIUM SERPL-SCNC: 139 MMOL/L (ref 133–145)
SPECIMEN EXPIRATION DATE: NORMAL
T WAVE AXIS: 28 DEGREES
VENTRICULAR RATE: 67 BPM
WBC # BLD AUTO: 6.83 THOUSAND/UL (ref 4.31–10.16)

## 2020-07-07 PROCEDURE — 76830 TRANSVAGINAL US NON-OB: CPT

## 2020-07-07 PROCEDURE — 76856 US EXAM PELVIC COMPLETE: CPT

## 2020-07-07 PROCEDURE — 96376 TX/PRO/DX INJ SAME DRUG ADON: CPT

## 2020-07-07 PROCEDURE — 85730 THROMBOPLASTIN TIME PARTIAL: CPT | Performed by: EMERGENCY MEDICINE

## 2020-07-07 PROCEDURE — 93005 ELECTROCARDIOGRAM TRACING: CPT

## 2020-07-07 PROCEDURE — 86850 RBC ANTIBODY SCREEN: CPT | Performed by: EMERGENCY MEDICINE

## 2020-07-07 PROCEDURE — 93010 ELECTROCARDIOGRAM REPORT: CPT | Performed by: INTERNAL MEDICINE

## 2020-07-07 PROCEDURE — 74177 CT ABD & PELVIS W/CONTRAST: CPT

## 2020-07-07 PROCEDURE — 85025 COMPLETE CBC W/AUTO DIFF WBC: CPT | Performed by: EMERGENCY MEDICINE

## 2020-07-07 PROCEDURE — 96375 TX/PRO/DX INJ NEW DRUG ADDON: CPT

## 2020-07-07 PROCEDURE — 36415 COLL VENOUS BLD VENIPUNCTURE: CPT | Performed by: EMERGENCY MEDICINE

## 2020-07-07 PROCEDURE — 80053 COMPREHEN METABOLIC PANEL: CPT | Performed by: EMERGENCY MEDICINE

## 2020-07-07 PROCEDURE — 86901 BLOOD TYPING SEROLOGIC RH(D): CPT | Performed by: EMERGENCY MEDICINE

## 2020-07-07 PROCEDURE — 85610 PROTHROMBIN TIME: CPT | Performed by: EMERGENCY MEDICINE

## 2020-07-07 PROCEDURE — 99285 EMERGENCY DEPT VISIT HI MDM: CPT | Performed by: EMERGENCY MEDICINE

## 2020-07-07 PROCEDURE — 96361 HYDRATE IV INFUSION ADD-ON: CPT

## 2020-07-07 PROCEDURE — 86900 BLOOD TYPING SEROLOGIC ABO: CPT | Performed by: EMERGENCY MEDICINE

## 2020-07-07 PROCEDURE — 84702 CHORIONIC GONADOTROPIN TEST: CPT | Performed by: EMERGENCY MEDICINE

## 2020-07-07 PROCEDURE — 96374 THER/PROPH/DIAG INJ IV PUSH: CPT

## 2020-07-07 RX ORDER — DICYCLOMINE HCL 20 MG
20 TABLET ORAL ONCE
Status: DISCONTINUED | OUTPATIENT
Start: 2020-07-07 | End: 2020-07-07

## 2020-07-07 RX ORDER — SODIUM CHLORIDE 9 MG/ML
1000 INJECTION, SOLUTION INTRAVENOUS CONTINUOUS
Status: DISCONTINUED | OUTPATIENT
Start: 2020-07-07 | End: 2020-07-07 | Stop reason: HOSPADM

## 2020-07-07 RX ORDER — MORPHINE SULFATE 4 MG/ML
4 INJECTION, SOLUTION INTRAMUSCULAR; INTRAVENOUS ONCE
Status: COMPLETED | OUTPATIENT
Start: 2020-07-07 | End: 2020-07-07

## 2020-07-07 RX ORDER — ONDANSETRON 4 MG/1
4 TABLET, FILM COATED ORAL EVERY 6 HOURS
Qty: 12 TABLET | Refills: 0 | Status: SHIPPED | OUTPATIENT
Start: 2020-07-07 | End: 2020-09-24 | Stop reason: CLARIF

## 2020-07-07 RX ORDER — METOCLOPRAMIDE HYDROCHLORIDE 5 MG/ML
10 INJECTION INTRAMUSCULAR; INTRAVENOUS ONCE
Status: COMPLETED | OUTPATIENT
Start: 2020-07-07 | End: 2020-07-07

## 2020-07-07 RX ORDER — HYDROMORPHONE HCL/PF 1 MG/ML
0.5 SYRINGE (ML) INJECTION ONCE
Status: COMPLETED | OUTPATIENT
Start: 2020-07-07 | End: 2020-07-07

## 2020-07-07 RX ORDER — DIPHENHYDRAMINE HYDROCHLORIDE 50 MG/ML
12.5 INJECTION INTRAMUSCULAR; INTRAVENOUS ONCE
Status: COMPLETED | OUTPATIENT
Start: 2020-07-07 | End: 2020-07-07

## 2020-07-07 RX ORDER — MORPHINE SULFATE 10 MG/ML
6 INJECTION, SOLUTION INTRAMUSCULAR; INTRAVENOUS ONCE
Status: DISCONTINUED | OUTPATIENT
Start: 2020-07-07 | End: 2020-07-07

## 2020-07-07 RX ORDER — SODIUM CHLORIDE 9 MG/ML
1000 INJECTION INTRAVENOUS ONCE
Status: DISCONTINUED | OUTPATIENT
Start: 2020-07-07 | End: 2020-07-07

## 2020-07-07 RX ADMIN — MORPHINE SULFATE 4 MG: 4 INJECTION INTRAVENOUS at 00:22

## 2020-07-07 RX ADMIN — IOHEXOL 100 ML: 350 INJECTION, SOLUTION INTRAVENOUS at 02:34

## 2020-07-07 RX ADMIN — METOCLOPRAMIDE HYDROCHLORIDE 10 MG: 5 INJECTION INTRAMUSCULAR; INTRAVENOUS at 02:10

## 2020-07-07 RX ADMIN — DIPHENHYDRAMINE HYDROCHLORIDE 12.5 MG: 50 INJECTION, SOLUTION INTRAMUSCULAR; INTRAVENOUS at 00:22

## 2020-07-07 RX ADMIN — HYDROMORPHONE HYDROCHLORIDE 0.5 MG: 1 INJECTION, SOLUTION INTRAMUSCULAR; INTRAVENOUS; SUBCUTANEOUS at 01:07

## 2020-07-07 RX ADMIN — METOCLOPRAMIDE HYDROCHLORIDE 10 MG: 5 INJECTION INTRAMUSCULAR; INTRAVENOUS at 00:19

## 2020-07-07 RX ADMIN — SODIUM CHLORIDE 1000 ML/HR: 0.9 INJECTION, SOLUTION INTRAVENOUS at 00:25

## 2020-07-07 NOTE — ED PROVIDER NOTES
History  Chief Complaint   Patient presents with    Abdominal Pain     Abdominal cramping with nausea x 1 day  Last took Tylenol and Motrin at 2100     History of ovarian cysts, patient is unsure what size of the last time her cysts were checked  Started her period 2 days ago, she has her typical amount of bleeding  Right lower quadrant pain which feels typical of the pain that was previously diagnosis ovarian cyst-related  Pain but much worse than usual started about 3 hours prior to arrival is significantly worsened initial   It makes patient nauseous, she has not yet thrown up but she feels that she will soon, get sweaty with the pain, it has become unrelenting  No urinary or stool changes      History provided by:  Patient  Abdominal Pain       Prior to Admission Medications   Prescriptions Last Dose Informant Patient Reported? Taking?    UNKNOWN TO PATIENT Not Taking at Unknown time  Yes No   UNKNOWN TO PATIENT Not Taking at Unknown time  Yes No   acetaminophen (TYLENOL) 500 mg tablet Not Taking at Unknown time  Yes No   Sig: Take 500 mg by mouth every 6 (six) hours as needed   cyclobenzaprine (FLEXERIL) 10 mg tablet Not Taking at Unknown time  No No   Sig: Take 1 tablet (10 mg total) by mouth 3 (three) times a day as needed for muscle spasms for up to 15 doses   Patient not taking: Reported on 7/6/2020   dicyclomine (BENTYL) 20 mg tablet 7/6/2020 at Unknown time  No Yes   Sig: Take 1 tablet (20 mg total) by mouth every 6 (six) hours as needed (abdominal discomfort)   ibuprofen (MOTRIN) 800 mg tablet 7/6/2020 at Unknown time  Yes Yes   Sig: Take 800 mg by mouth every 6 (six) hours as needed   metaxalone (SKELAXIN) 800 mg tablet Not Taking at Unknown time  No No   Sig: Take 1 tablet (800 mg total) by mouth 3 (three) times a day   Patient not taking: Reported on 8/31/2019   ondansetron (ZOFRAN-ODT) 4 mg disintegrating tablet Not Taking at Unknown time  No No   Sig: Take 1 tablet (4 mg total) by mouth every 8 (eight) hours as needed for nausea or vomiting   Patient not taking: Reported on 7/6/2020   pantoprazole (PROTONIX) 40 mg tablet 7/6/2020 at Unknown time  No Yes   Sig: Take 1 tablet (40 mg total) by mouth daily      Facility-Administered Medications: None       Past Medical History:   Diagnosis Date    Asthma     GERD (gastroesophageal reflux disease)     Hernia, abdominal     Migraines     Psychiatric disorder     Anx, Depression    Renal disorder     kidney stones       Past Surgical History:   Procedure Laterality Date    FL RETROGRADE PYELOGRAM  8/28/2018    HERNIA REPAIR      [place] CYSTO/URETERO W/LITHOTRIPSY &INDWELL STENT INSRT Right 8/28/2018    Procedure: CYSTOSCOPY URETEROSCOPY WITH RETROGRADE PYELOGRAM AND INSERTION STENT URETERAL;  Surgeon: Javed Cheung MD;  [facility];  Service: Urology    TOOTH EXTRACTION         Family History   Problem Relation Age of Onset    Diabetes Mother     Hyperlipidemia Mother     Stroke Mother     Heart disease Mother     Asthma Mother    Clifm Delfino Other Mother         Degen  of Intervertabral disc   Nephrolithiasis Father     Nephrolithiasis Brother      I have reviewed and agree with the history as documented  E-Cigarette/Vaping    E-Cigarette Use Never User      E-Cigarette/Vaping Substances     Social History     Tobacco Use    Smoking status: Current Every Day Smoker     Packs/day: 0 50     Years: 13 00     Pack years: 6 50     Types: Cigarettes    Smokeless tobacco: Never Used   Substance Use Topics    Alcohol use: No    Drug use: No       Review of Systems   Gastrointestinal: Positive for abdominal pain  All other systems reviewed and are negative  Physical Exam  Physical Exam   Constitutional: She is oriented to person, place, and time  She appears well-developed and well-nourished  She appears distressed  HENT:   Head: Normocephalic and atraumatic     Mouth/Throat: Oropharynx is clear and moist    Eyes: Pupils are equal, round, and reactive to light  EOM are normal    Cardiovascular: Regular rhythm  Tachycardic to 110s   Pulmonary/Chest: Effort normal and breath sounds normal    Abdominal: Soft  Bowel sounds are normal  She exhibits no distension  There is tenderness in the right lower quadrant, periumbilical area and suprapubic area  There is rebound and guarding  There is no rigidity  No hernia  Genitourinary:   Genitourinary Comments: Patient defers, she would rather wait till after ultrasound   Neurological: She is alert and oriented to person, place, and time  Skin: Skin is warm  She is diaphoretic  Psychiatric: She has a normal mood and affect   Her behavior is normal        Vital Signs  ED Triage Vitals   Temperature Pulse Respirations Blood Pressure SpO2   07/06/20 2316 07/06/20 2316 07/06/20 2316 07/06/20 2320 07/06/20 2316   (!) 97 4 °F (36 3 °C) (!) 118 20 136/61 99 %      Temp Source Heart Rate Source Patient Position - Orthostatic VS BP Location FiO2 (%)   07/06/20 2316 07/07/20 0146 07/06/20 2316 07/06/20 2316 --   Tympanic Monitor Sitting Left arm       Pain Score       07/07/20 0107       8           Vitals:    07/06/20 2320 07/07/20 0100 07/07/20 0146 07/07/20 0335   BP: 136/61 95/56 95/61 94/65   Pulse:   70 72   Patient Position - Orthostatic VS:   Sitting          Visual Acuity      ED Medications  Medications   sodium chloride 0 9 % infusion (0 mL/hr Intravenous Stopped 7/7/20 0112)   metoclopramide (REGLAN) injection 10 mg (10 mg Intravenous Given 7/7/20 0019)   diphenhydrAMINE (BENADRYL) injection 12 5 mg (12 5 mg Intravenous Given 7/7/20 0022)   morphine (PF) 4 mg/mL injection 4 mg (4 mg Intravenous Given 7/7/20 0022)   HYDROmorphone (DILAUDID) injection 0 5 mg (0 5 mg Intravenous Given 7/7/20 0107)   metoclopramide (REGLAN) injection 10 mg (10 mg Intravenous Given 7/7/20 0210)   iohexol (OMNIPAQUE) 350 MG/ML injection (SINGLE-DOSE) 100 mL (100 mL Intravenous Given 7/7/20 0234)       Diagnostic Studies  Results Reviewed     None                 CT abdomen pelvis with contrast   Final Result by Della Ortez MD (07/07 8858)      No acute intra-abdominal abnormality  Normal appendix visualized  Trace pelvic free fluid likely physiologic in nature  Workstation performed: [id number]         US pelvis complete w transvaginal   Final Result by Della Ortez MD (07/07 9217)       Positive Doppler flow noted within both ovaries  No solid or cystic adnexal mass  Unremarkable uterus  No pelvic free fluid  Workstation performed: [id number]                    Procedures  ECG 12 Lead Documentation Only  Date/Time: 7/7/2020 2:13 AM  Performed by: Fang Umaña DO  Authorized by: Fang Umaña DO     ECG reviewed by me, the ED Provider: yes    Patient location:  ED  Comments:      NSR at 67 beats p m , no LISANDRO/D, no TWI, no acute ischemia, intervals are reassuring             ED Course  ED Course as of Jul 07 0337 Tue Jul 07, 2020   0006 Call placed to ultrasound as they are not currently on campus due to overnight shift, patient is being evaluated for rule out ovarian torsion and requires emergent ultrasound  Discussed pain medication options with patient, she would like to try morphine with Benadryl as Toradol has given her itching in the past, she also agrees with Reglan for nausea  0102 We were able to get in touch with ultrasound, they are en route to the ED for patient's scan  On reassessment, patient is still having a lot of pain, will give 0 5 mg Dilaudid  0138 Normal white blood cell count, normal hemoglobin, normal platelets  Metabolic panel is reassuring with normal electrolytes, normal creatinine  HCG is negative      0157 Ultrasound is not consistent with ovarian cyst or recent rupture  Patient instead just has fluid within the uterus consistent with menstruation    On review of prior records, patient has a flag for DSB on her chart, over the last 2 years she has had repeated evaluations at multiple emergency departments with multiple narcotic scripts from different physicians recorded in prior notes  Patient does have a history of kidney stone in 2018  Given her presentation today it is possible she has a stone, we will assess with CT as patient is diaphoretic is still complaining of pain with an overall reassuring workup  9896 CT abdomen pelvis with contrast   0243 CT abdomen pelvis with contrast   0244 CT abdomen pelvis with contrast   0331 Ultrasound and CT scan are negative for acute pathology  Discussed results with patient, strongly encouraged to follow up with primary doctor as well as OBGYN and consider GI referral as well for recurring bouts of pain  Discussed plan for discharge, return precautions  Patient is tolerating p o  And stable for discharge  US AUDIT      Most Recent Value   Initial Alcohol Screen: US AUDIT-C    1  How often do you have a drink containing alcohol?  0 Filed at: 07/06/2020 2319   2  How many drinks containing alcohol do you have on a typical day you are drinking? 0 Filed at: 07/06/2020 2319   3a  Male UNDER 65: How often do you have five or more drinks on one occasion? 0 Filed at: 07/06/2020 2319   3b  FEMALE Any Age, or MALE 65+: How often do you have 4 or more drinks on one occassion? 0 Filed at: 07/06/2020 2319   Audit-C Score  0 Filed at: 07/06/2020 2319                  CRISTINO/DAST-10      Most Recent Value   How many times in the past year have you    Used an illegal drug or used a prescription medication for non-medical reasons?   Never Filed at: 07/06/2020 2319                                MDM      Disposition  Final diagnoses:   Right lower quadrant abdominal pain     Time reflects when diagnosis was documented in both MDM as applicable and the Disposition within this note     Time User Action Codes Description Comment    7/7/2020  3:32 AM Onel Diaz Add [R10 31] Right lower quadrant abdominal pain       ED Disposition     ED Disposition Condition Date/Time Comment    Discharge Stable Tue Jul 7, 2020  3:32 AM Can Hodgson discharge to home/self care  Follow-up Information     Follow up With Specialties Details Why Contact Info    Marshall Rader MD Family Medicine In 1 day To discuss your recurrent abdominal pain [address]  [address]  [phone]            Discharge Medication List as of 7/7/2020  3:33 AM      START taking these medications    Details   ondansetron (ZOFRAN) 4 mg tablet Take 1 tablet (4 mg total) by mouth every 6 (six) hours, Starting Tue 7/7/2020, Normal         CONTINUE these medications which have NOT CHANGED    Details   dicyclomine (BENTYL) 20 mg tablet Take 1 tablet (20 mg total) by mouth every 6 (six) hours as needed (abdominal discomfort), Starting Sat 9/7/2019, Print      ibuprofen (MOTRIN) 800 mg tablet Take 800 mg by mouth every 6 (six) hours as needed, Historical Med      pantoprazole (PROTONIX) 40 mg tablet Take 1 tablet (40 mg total) by mouth daily, Starting Thu 6/4/2020, Normal      acetaminophen (TYLENOL) 500 mg tablet Take 500 mg by mouth every 6 (six) hours as needed, Historical Med      cyclobenzaprine (FLEXERIL) 10 mg tablet Take 1 tablet (10 mg total) by mouth 3 (three) times a day as needed for muscle spasms for up to 15 doses, Starting Sat 8/31/2019, Normal      metaxalone (SKELAXIN) 800 mg tablet Take 1 tablet (800 mg total) by mouth 3 (three) times a day, Starting Wed 8/7/2019, Normal      ondansetron (ZOFRAN-ODT) 4 mg disintegrating tablet Take 1 tablet (4 mg total) by mouth every 8 (eight) hours as needed for nausea or vomiting, Starting Thu 6/4/2020, Normal      !! UNKNOWN TO PATIENT Historical Med      !! UNKNOWN TO PATIENT Historical Med       !! - Potential duplicate medications found  Please discuss with provider  No discharge procedures on file      PDMP Review     None ED Provider  Electronically Signed by           Little Mena DO  07/07/20 0248

## 2020-07-07 NOTE — ED NOTES
Pt returned from [address] [place] [address] Rd,[address] via wheelchair     Roland Jones RN  07/07/20 6295

## 2020-07-16 DIAGNOSIS — R11.0 NAUSEA: ICD-10-CM

## 2020-07-16 DIAGNOSIS — K21.9 GASTROESOPHAGEAL REFLUX DISEASE WITHOUT ESOPHAGITIS: ICD-10-CM

## 2020-07-17 RX ORDER — ONDANSETRON 4 MG/1
4 TABLET, ORALLY DISINTEGRATING ORAL EVERY 8 HOURS PRN
Qty: 30 TABLET | Refills: 0 | Status: SHIPPED | OUTPATIENT
Start: 2020-07-17 | End: 2020-09-24 | Stop reason: CLARIF

## 2020-09-24 ENCOUNTER — HOSPITAL ENCOUNTER (EMERGENCY)
Facility: HOSPITAL | Age: 30
Discharge: HOME | End: 2020-09-24
Attending: EMERGENCY MEDICINE | Admitting: EMERGENCY MEDICINE
Payer: COMMERCIAL

## 2020-09-24 VITALS
RESPIRATION RATE: 18 BRPM | SYSTOLIC BLOOD PRESSURE: 110 MMHG | TEMPERATURE: 97.2 F | WEIGHT: 122 LBS | OXYGEN SATURATION: 98 % | DIASTOLIC BLOOD PRESSURE: 66 MMHG | BODY MASS INDEX: 23.83 KG/M2 | HEART RATE: 104 BPM

## 2020-09-24 DIAGNOSIS — K02.9 PAIN DUE TO DENTAL CARIES: ICD-10-CM

## 2020-09-24 DIAGNOSIS — K04.7 DENTAL INFECTION: Primary | ICD-10-CM

## 2020-09-24 PROCEDURE — 99282 EMERGENCY DEPT VISIT SF MDM: CPT

## 2020-09-24 PROCEDURE — 99284 EMERGENCY DEPT VISIT MOD MDM: CPT | Performed by: EMERGENCY MEDICINE

## 2020-09-24 RX ORDER — AMOXICILLIN AND CLAVULANATE POTASSIUM 875; 125 MG/1; MG/1
1 TABLET, FILM COATED ORAL EVERY 12 HOURS
Qty: 20 TABLET | Refills: 0 | Status: SHIPPED | OUTPATIENT
Start: 2020-09-24 | End: 2020-10-04

## 2020-09-24 RX ORDER — HYDROCODONE BITARTRATE AND ACETAMINOPHEN 5; 325 MG/1; MG/1
1 TABLET ORAL EVERY 6 HOURS PRN
Qty: 10 TABLET | Refills: 0 | Status: SHIPPED | OUTPATIENT
Start: 2020-09-24 | End: 2020-10-04

## 2020-09-24 RX ORDER — HYDROCODONE BITARTRATE AND ACETAMINOPHEN 5; 325 MG/1; MG/1
1 TABLET ORAL ONCE
Status: COMPLETED | OUTPATIENT
Start: 2020-09-24 | End: 2020-09-24

## 2020-09-24 RX ORDER — ONDANSETRON 4 MG/1
4 TABLET, FILM COATED ORAL EVERY 6 HOURS
Qty: 12 TABLET | Refills: 0 | OUTPATIENT
Start: 2020-09-24 | End: 2020-11-06

## 2020-09-24 RX ORDER — AMOXICILLIN AND CLAVULANATE POTASSIUM 875; 125 MG/1; MG/1
1 TABLET, FILM COATED ORAL ONCE
Status: COMPLETED | OUTPATIENT
Start: 2020-09-24 | End: 2020-09-24

## 2020-09-24 RX ADMIN — AMOXICILLIN AND CLAVULANATE POTASSIUM 1 TABLET: 875; 125 TABLET, FILM COATED ORAL at 20:02

## 2020-09-24 RX ADMIN — HYDROCODONE BITARTRATE AND ACETAMINOPHEN 1 TABLET: 5; 325 TABLET ORAL at 20:02

## 2020-09-24 NOTE — ED PROVIDER NOTES
History  Chief Complaint   Patient presents with    Dental Pain     Dental pain from 2 R upper and 2 R lower teeth, x2 days, denies fevers     This is a 28-year-old female presenting for evaluation of dental pain, that started yesterday  She has history of poor dentition and was supposed to have several teeth pulled a while ago she states  She states that she called her dentist who made an appointment for her for 2 weeks from now  She denies any fevers or chills or difficulty opening her mouth or breathing  However, teeth are very sensitive and she is only able to eat things like soup and she is very edgy and not sleeping well due to the pain  He states the worst pain is to her right upper posterior molars  Dental Pain   Location:  Upper  Upper teeth location:  3/RU 1st molar and 2/RU 2nd molar  Quality:  Aching, throbbing and shooting  Severity:  Moderate  Onset quality:  Gradual  Duration:  1 day  Timing:  Constant  Progression:  Worsening  Chronicity:  Chronic  Context: dental caries, dental fracture and poor dentition    Previous work-up:  Dental exam  Relieved by: Topical anesthetic gel and NSAIDs  Worsened by:  Cold food/drink, hot food/drink, touching and pressure  Associated symptoms: no fever and no neck swelling    Risk factors: lack of dental care, periodontal disease and smoking        Prior to Admission Medications   Prescriptions Last Dose Informant Patient Reported? Taking?    UNKNOWN TO PATIENT   Yes No   UNKNOWN TO PATIENT   Yes No   acetaminophen (TYLENOL) 500 mg tablet   Yes No   Sig: Take 500 mg by mouth every 6 (six) hours as needed   cyclobenzaprine (FLEXERIL) 10 mg tablet   No No   Sig: Take 1 tablet (10 mg total) by mouth 3 (three) times a day as needed for muscle spasms for up to 15 doses   Patient not taking: Reported on 7/6/2020   dicyclomine (BENTYL) 20 mg tablet   No No   Sig: Take 1 tablet (20 mg total) by mouth every 6 (six) hours as needed (abdominal discomfort)   ibuprofen (MOTRIN) 800 mg tablet   Yes No   Sig: Take 800 mg by mouth every 6 (six) hours as needed   metaxalone (SKELAXIN) 800 mg tablet   No No   Sig: Take 1 tablet (800 mg total) by mouth 3 (three) times a day   Patient not taking: Reported on 8/31/2019   pantoprazole (PROTONIX) 40 mg tablet   No No   Sig: Take 1 tablet (40 mg total) by mouth daily      Facility-Administered Medications: None       Past Medical History:   Diagnosis Date    Asthma     GERD (gastroesophageal reflux disease)     Hernia, abdominal     Migraines     Psychiatric disorder     Anx, Depression    Renal disorder     kidney stones       Past Surgical History:   Procedure Laterality Date    FL RETROGRADE PYELOGRAM  8/28/2018    HERNIA REPAIR      [place] CYSTO/URETERO W/LITHOTRIPSY &INDWELL STENT INSRT Right 8/28/2018    Procedure: CYSTOSCOPY URETEROSCOPY WITH RETROGRADE PYELOGRAM AND INSERTION STENT URETERAL;  Surgeon: Brenden Molina MD;  [facility];  Service: Urology    TOOTH EXTRACTION         Family History   Problem Relation Age of Onset    Diabetes Mother     Hyperlipidemia Mother     Stroke Mother     Heart disease Mother     Asthma Mother    [facility] Other Mother         Degen  of Intervertabral disc   Nephrolithiasis Father     Nephrolithiasis Brother      I have reviewed and agree with the history as documented  E-Cigarette/Vaping    E-Cigarette Use Never User      E-Cigarette/Vaping Substances     Social History     Tobacco Use    Smoking status: Current Every Day Smoker     Packs/day: 0 50     Years: 13 00     Pack years: 6 50     Types: Cigarettes    Smokeless tobacco: Never Used   Substance Use Topics    Alcohol use: No    Drug use: No       Review of Systems   Constitutional: Negative for fever  All other systems reviewed and are negative  Physical Exam  Physical Exam  Vitals signs and nursing note reviewed  Constitutional:       Appearance: Normal appearance  She is normal weight        Comments: Mild pain distress, guarding right maxilla   HENT:      Head: Normocephalic and atraumatic  Jaw: There is normal jaw occlusion  Tenderness, swelling and pain on movement present  No trismus or malocclusion  Mouth/Throat:      Mouth: Mucous membranes are dry  Dentition: Abnormal dentition  Dental tenderness, gingival swelling and dental caries present  Pharynx: Oropharynx is clear  No oropharyngeal exudate or posterior oropharyngeal erythema  Comments: Pt has gingival swelling predominantly to upper gum from the incisors extending posteriorly to the right posterior molars  Dentition is in various degrees of decay  No definitive abscess  Tenderness to palpation most severe to R posterior molars and gumline  Eyes:      Conjunctiva/sclera: Conjunctivae normal    Cardiovascular:      Rate and Rhythm: Normal rate and regular rhythm  Pulses: Normal pulses  Heart sounds: Normal heart sounds  Pulmonary:      Effort: Pulmonary effort is normal    Abdominal:      General: Abdomen is flat  Neurological:      General: No focal deficit present  Mental Status: She is alert and oriented to person, place, and time  Mental status is at baseline     Psychiatric:         Mood and Affect: Mood normal          Behavior: Behavior normal          Vital Signs  ED Triage Vitals [09/24/20 1913]   Temperature Pulse Respirations Blood Pressure SpO2   (!) 97 2 °F (36 2 °C) 104 18 110/66 98 %      Temp Source Heart Rate Source Patient Position - Orthostatic VS BP Location FiO2 (%)   Tympanic Monitor Sitting Left arm --      Pain Score       9           Vitals:    09/24/20 1913   BP: 110/66   Pulse: 104   Patient Position - Orthostatic VS: Sitting         Visual Acuity      ED Medications  Medications   amoxicillin-clavulanate (AUGMENTIN) 875-125 mg per tablet 1 tablet (1 tablet Oral Given 9/24/20 2002)   HYDROcodone-acetaminophen (NORCO) 5-325 mg per tablet 1 tablet (1 tablet Oral Given 9/24/20 2002) Diagnostic Studies  Results Reviewed     None                 No orders to display              Procedures  Procedures         ED Course                                       MDM  Number of Diagnoses or Management Options  Dental infection: established and worsening  Pain due to dental caries: established and worsening  Diagnosis management comments: 26 yo F w/ very poor dentition, gingivitis, dental caries  Needs dental f/u, however has poor insurance and limited resources  Given list of dental clinics, antibiotics and pain medication  Offered to give injections locally for pain relief, pt states has fear of needles and defers at this time  Pt has no signs of deep neck space infection very well appearing  Risk of Complications, Morbidity, and/or Mortality  Presenting problems: low  Diagnostic procedures: low  Management options: low    Patient Progress  Patient progress: stable      Disposition  Final diagnoses:   Dental infection   Pain due to dental caries     Time reflects when diagnosis was documented in both MDM as applicable and the Disposition within this note     Time User Action Codes Description Comment    9/24/2020  7:59 PM Jenise Ryan [K04 7] Dental infection     9/24/2020  7:59 PM Collette Plenty [K02 9] Pain due to dental caries       ED Disposition     ED Disposition Condition Date/Time Comment    Discharge Stable Thu Sep 24, 2020  7:59 PM Mike Pérez discharge to home/self care              Follow-up Information    None         Discharge Medication List as of 9/24/2020  8:02 PM      START taking these medications    Details   amoxicillin-clavulanate (AUGMENTIN) 875-125 mg per tablet Take 1 tablet by mouth every 12 (twelve) hours for 10 days, Starting Thu 9/24/2020, Until Sun 10/4/2020, Print      HYDROcodone-acetaminophen (NORCO) 5-325 mg per tablet Take 1 tablet by mouth every 6 (six) hours as needed for pain for up to 10 daysMax Daily Amount: 4 tablets, Starting Thu 9/24/2020, Until Sun 10/4/2020, Print         CONTINUE these medications which have NOT CHANGED    Details   acetaminophen (TYLENOL) 500 mg tablet Take 500 mg by mouth every 6 (six) hours as needed, Historical Med      cyclobenzaprine (FLEXERIL) 10 mg tablet Take 1 tablet (10 mg total) by mouth 3 (three) times a day as needed for muscle spasms for up to 15 doses, Starting Sat 8/31/2019, Normal      dicyclomine (BENTYL) 20 mg tablet Take 1 tablet (20 mg total) by mouth every 6 (six) hours as needed (abdominal discomfort), Starting Sat 9/7/2019, Print      ibuprofen (MOTRIN) 800 mg tablet Take 800 mg by mouth every 6 (six) hours as needed, Historical Med      metaxalone (SKELAXIN) 800 mg tablet Take 1 tablet (800 mg total) by mouth 3 (three) times a day, Starting Wed 8/7/2019, Normal      pantoprazole (PROTONIX) 40 mg tablet Take 1 tablet (40 mg total) by mouth daily, Starting Thu 6/4/2020, Normal      !! UNKNOWN TO PATIENT Historical Med      !! UNKNOWN TO PATIENT Historical Med      ondansetron (ZOFRAN) 4 mg tablet Take 1 tablet (4 mg total) by mouth every 6 (six) hours, Starting Tue 7/7/2020, Normal      ondansetron (ZOFRAN-ODT) 4 mg disintegrating tablet Take 1 tablet (4 mg total) by mouth every 8 (eight) hours as needed for nausea or vomiting, Starting Fri 7/17/2020, Normal       !! - Potential duplicate medications found  Please discuss with provider  No discharge procedures on file      PDMP Review       Value Time User    PDMP Reviewed  Yes 9/24/2020  7:59 PM Juan Alberto Thompson DO          ED Provider  Electronically Signed by           Juan Alberto Thompson DO  09/24/20 5108

## 2020-09-25 ENCOUNTER — TELEMEDICINE (OUTPATIENT)
Dept: FAMILY MEDICINE CLINIC | Facility: CLINIC | Age: 30
End: 2020-09-25
Payer: COMMERCIAL

## 2020-09-25 DIAGNOSIS — K04.7 DENTAL ABSCESS: Primary | ICD-10-CM

## 2020-09-25 DIAGNOSIS — K02.9 DENTAL CARIES: ICD-10-CM

## 2020-09-25 PROCEDURE — 99213 OFFICE O/P EST LOW 20 MIN: CPT | Performed by: FAMILY MEDICINE

## 2020-09-25 RX ORDER — ONDANSETRON 4 MG/1
4 TABLET, ORALLY DISINTEGRATING ORAL EVERY 6 HOURS PRN
Qty: 20 TABLET | Refills: 0 | OUTPATIENT
Start: 2020-09-25 | End: 2020-12-04

## 2020-09-25 RX ORDER — IBUPROFEN 600 MG/1
600 TABLET ORAL EVERY 6 HOURS PRN
Qty: 30 TABLET | Refills: 0 | Status: SHIPPED | OUTPATIENT
Start: 2020-09-25 | End: 2021-03-31

## 2020-09-25 NOTE — PROGRESS NOTES
Virtual Regular Visit    Assessment/Plan:  Problem List Items Addressed This Visit     None      Visit Diagnoses     Dental abscess    -  Primary    Relevant Medications    ondansetron (ZOFRAN-ODT) 4 mg disintegrating tablet    ibuprofen (MOTRIN) 600 mg tablet  - cont Augmentin and advised to take NSAIDs with food  - f/u with Dentist - pt aware and agreeable     Dental caries               Reason for visit is dental abscess, ETC f/u   Chief Complaint   Patient presents with    Virtual Regular Visit        Encounter provider Alphonza Lennox, DO  Provider located at [address] [place] [id number]      Recent Visits  No visits were found meeting these conditions  Showing recent visits within past 7 days and meeting all other requirements     Today's Visits  Date Type Provider Dept   09/25/20 Telemedicine Harlene Seth DO [facility]   Showing today's visits and meeting all other requirements     Future Appointments  No visits were found meeting these conditions  Showing future appointments within next 150 days and meeting all other requirements        The patient was identified by name and date of birth  Kiarramaurisio Lott was informed that this is a telemedicine visit and that the visit is being conducted through [facility] and patient was informed that this is a secure, HIPAA-compliant platform  She agrees to proceed     My office door was closed  No one else was in the room  She acknowledged consent and understanding of privacy and security of the video platform  The patient has agreed to participate and understands they can discontinue the visit at any time  Patient is aware this is a billable service  Tammy Valentino is a 27 y o  female who presents virtually for ETC f/u       HPI   - was seen at [facility] for eval of dental pain and d/w dental infection 9/24/2020  - of note, pt has a h/o poor dentition, dental carries and was supposed to have several teeth pulled prior   - was started on Augmentin and given Narco and Zofran at the ETC   - pt states pain not controlled with Narco and would like to get an eRx for Ibuprofen 600mg Q6 (which she has taken in the past and tolerated well)   - pt would like dissolving Zofran   - has an appt scheduled with her Dentist for 10/9/2020   - denies F/C/V/difficulty breathing   - has an appetite and able to tolerate liquids and soft foods  - does feel an burning when items touch her infected teeth       Past Medical History:   Diagnosis Date    Asthma     GERD (gastroesophageal reflux disease)     Hernia, abdominal     Migraines     Psychiatric disorder     Anx, Depression    Renal disorder     kidney stones       Past Surgical History:   Procedure Laterality Date    FL RETROGRADE PYELOGRAM  8/28/2018    HERNIA REPAIR      [place] CYSTO/URETERO W/LITHOTRIPSY &INDWELL STENT INSRT Right 8/28/2018    Procedure: CYSTOSCOPY URETEROSCOPY WITH RETROGRADE PYELOGRAM AND INSERTION STENT URETERAL;  Surgeon: Jose Valdivia MD;  [facility];  Service: Urology    TOOTH EXTRACTION         Current Outpatient Medications   Medication Sig Dispense Refill    acetaminophen (TYLENOL) 500 mg tablet Take 500 mg by mouth every 6 (six) hours as needed      amoxicillin-clavulanate (AUGMENTIN) 875-125 mg per tablet Take 1 tablet by mouth every 12 (twelve) hours for 10 days 20 tablet 0    cyclobenzaprine (FLEXERIL) 10 mg tablet Take 1 tablet (10 mg total) by mouth 3 (three) times a day as needed for muscle spasms for up to 15 doses (Patient not taking: Reported on 7/6/2020) 15 tablet 0    dicyclomine (BENTYL) 20 mg tablet Take 1 tablet (20 mg total) by mouth every 6 (six) hours as needed (abdominal discomfort) 20 tablet 0    HYDROcodone-acetaminophen (NORCO) 5-325 mg per tablet Take 1 tablet by mouth every 6 (six) hours as needed for pain for up to 10 daysMax Daily Amount: 4 tablets 10 tablet 0    ibuprofen (MOTRIN) 600 mg tablet Take 1 tablet (600 mg total) by mouth every 6 (six) hours as needed for mild pain 30 tablet 0    metaxalone (SKELAXIN) 800 mg tablet Take 1 tablet (800 mg total) by mouth 3 (three) times a day (Patient not taking: Reported on 8/31/2019) 21 tablet 0    ondansetron (ZOFRAN) 4 mg tablet Take 1 tablet (4 mg total) by mouth every 6 (six) hours 12 tablet 0    ondansetron (ZOFRAN-ODT) 4 mg disintegrating tablet Take 1 tablet (4 mg total) by mouth every 6 (six) hours as needed for nausea or vomiting 20 tablet 0    pantoprazole (PROTONIX) 40 mg tablet Take 1 tablet (40 mg total) by mouth daily 90 tablet 1    UNKNOWN TO PATIENT       UNKNOWN TO PATIENT        No current facility-administered medications for this visit  Allergies   Allergen Reactions    Aspirin Anaphylaxis     Doesn't have epi pen  Okay to take ibuprofen per patient    Ketorolac     Naproxen Nausea Only    Prednisone     Toradol [Ketorolac Tromethamine]     Tramadol Itching       Review of Systems as per HPI    Video Exam  There were no vitals filed for this visit  Physical Exam   General: AAOx3, NAD  HEENT: NC/AT, EOMI, clear conjunctiva, nml external ear and nose, poor dentition and missing teeth    Cardio: deferred  Pulm: no acute respiratory distress, able to talk in complete sentences w/o getting short of breath   Abd: deferred   Psych: nml mood/affect/behavior     I spent 15 minutes directly with the patient during this visit      Mike Guy acknowledges that she has consented to an online visit or consultation  She understands that the online visit is based solely on information provided by her, and that, in the absence of a face-to-face physical evaluation by the physician, the diagnosis she receives is both limited and provisional in terms of accuracy and completeness  This is not intended to replace a full medical face-to-face evaluation by the physician  Tricia Park understands and accepts these terms

## 2020-10-03 ENCOUNTER — HOSPITAL ENCOUNTER (EMERGENCY)
Facility: HOSPITAL | Age: 30
Discharge: HOME | End: 2020-10-03
Attending: EMERGENCY MEDICINE | Admitting: EMERGENCY MEDICINE
Payer: COMMERCIAL

## 2020-10-03 VITALS
OXYGEN SATURATION: 98 % | RESPIRATION RATE: 20 BRPM | TEMPERATURE: 97.8 F | DIASTOLIC BLOOD PRESSURE: 61 MMHG | HEIGHT: 60 IN | SYSTOLIC BLOOD PRESSURE: 114 MMHG | BODY MASS INDEX: 24.15 KG/M2 | HEART RATE: 89 BPM | WEIGHT: 123 LBS

## 2020-10-03 DIAGNOSIS — B37.3 VULVOVAGINAL CANDIDIASIS: Primary | ICD-10-CM

## 2020-10-03 PROCEDURE — 99283 EMERGENCY DEPT VISIT LOW MDM: CPT

## 2020-10-03 PROCEDURE — 99284 EMERGENCY DEPT VISIT MOD MDM: CPT | Performed by: PHYSICIAN ASSISTANT

## 2020-10-03 RX ORDER — FLUCONAZOLE 150 MG/1
150 TABLET ORAL ONCE
Qty: 1 TABLET | Refills: 0 | Status: SHIPPED | OUTPATIENT
Start: 2020-10-03 | End: 2020-10-03

## 2020-10-03 RX ORDER — DIAPER,BRIEF,INFANT-TODD,DISP
EACH MISCELLANEOUS
Qty: 15 G | Refills: 0 | Status: SHIPPED | OUTPATIENT
Start: 2020-10-03 | End: 2021-03-31

## 2020-10-09 ENCOUNTER — HOSPITAL ENCOUNTER (EMERGENCY)
Facility: HOSPITAL | Age: 30
Discharge: HOME | End: 2020-10-09
Attending: EMERGENCY MEDICINE | Admitting: EMERGENCY MEDICINE
Payer: COMMERCIAL

## 2020-10-09 VITALS
RESPIRATION RATE: 16 BRPM | DIASTOLIC BLOOD PRESSURE: 63 MMHG | HEART RATE: 89 BPM | BODY MASS INDEX: 23.62 KG/M2 | OXYGEN SATURATION: 99 % | SYSTOLIC BLOOD PRESSURE: 112 MMHG | TEMPERATURE: 98.6 F | WEIGHT: 120.3 LBS | HEIGHT: 60 IN

## 2020-10-09 DIAGNOSIS — R11.0 NAUSEA: ICD-10-CM

## 2020-10-09 DIAGNOSIS — B37.3 CANDIDAL VULVOVAGINITIS: Primary | ICD-10-CM

## 2020-10-09 LAB
BILIRUB UR QL STRIP: NEGATIVE
CLARITY UR: CLEAR
COLOR UR: YELLOW
EXT PREG TEST URINE: NEGATIVE
EXT. CONTROL ED NAV: NORMAL
GLUCOSE UR STRIP-MCNC: NEGATIVE MG/DL
HGB UR QL STRIP.AUTO: NEGATIVE
KETONES UR STRIP-MCNC: NEGATIVE MG/DL
LEUKOCYTE ESTERASE UR QL STRIP: NEGATIVE
NITRITE UR QL STRIP: NEGATIVE
PH UR STRIP.AUTO: 6 [PH]
PROT UR STRIP-MCNC: NEGATIVE MG/DL
SP GR UR STRIP.AUTO: >=1.03 (ref 1–1.03)
UROBILINOGEN UR QL STRIP.AUTO: 1 E.U./DL

## 2020-10-09 PROCEDURE — 81003 URINALYSIS AUTO W/O SCOPE: CPT | Performed by: EMERGENCY MEDICINE

## 2020-10-09 PROCEDURE — 99284 EMERGENCY DEPT VISIT MOD MDM: CPT | Performed by: EMERGENCY MEDICINE

## 2020-10-09 PROCEDURE — 81025 URINE PREGNANCY TEST: CPT | Performed by: EMERGENCY MEDICINE

## 2020-10-09 PROCEDURE — 99283 EMERGENCY DEPT VISIT LOW MDM: CPT

## 2020-10-09 RX ORDER — ONDANSETRON 4 MG/1
4 TABLET, ORALLY DISINTEGRATING ORAL ONCE
Status: COMPLETED | OUTPATIENT
Start: 2020-10-09 | End: 2020-10-09

## 2020-10-09 RX ORDER — LIDOCAINE HYDROCHLORIDE 20 MG/ML
JELLY TOPICAL ONCE
Status: COMPLETED | OUTPATIENT
Start: 2020-10-09 | End: 2020-10-09

## 2020-10-09 RX ORDER — ONDANSETRON 4 MG/1
4 TABLET, ORALLY DISINTEGRATING ORAL EVERY 8 HOURS PRN
Qty: 20 TABLET | Refills: 0 | OUTPATIENT
Start: 2020-10-09 | End: 2020-11-06

## 2020-10-09 RX ADMIN — ONDANSETRON 4 MG: 4 TABLET, ORALLY DISINTEGRATING ORAL at 20:09

## 2020-10-09 RX ADMIN — LIDOCAINE HYDROCHLORIDE 1 APPLICATION: 20 JELLY TOPICAL at 20:09

## 2020-10-17 DIAGNOSIS — K04.7 DENTAL ABSCESS: ICD-10-CM

## 2020-10-21 RX ORDER — IBUPROFEN 600 MG/1
600 TABLET ORAL EVERY 6 HOURS PRN
Qty: 30 TABLET | Refills: 0 | OUTPATIENT
Start: 2020-10-21

## 2020-10-26 ENCOUNTER — HOSPITAL ENCOUNTER (EMERGENCY)
Facility: HOSPITAL | Age: 30
Discharge: HOME | End: 2020-10-26
Attending: EMERGENCY MEDICINE | Admitting: EMERGENCY MEDICINE
Payer: COMMERCIAL

## 2020-10-26 VITALS
WEIGHT: 123 LBS | OXYGEN SATURATION: 99 % | HEART RATE: 96 BPM | BODY MASS INDEX: 24.02 KG/M2 | DIASTOLIC BLOOD PRESSURE: 59 MMHG | RESPIRATION RATE: 18 BRPM | SYSTOLIC BLOOD PRESSURE: 104 MMHG

## 2020-10-26 DIAGNOSIS — R68.84 JAW PAIN: ICD-10-CM

## 2020-10-26 DIAGNOSIS — M26.609 TMJ DISEASE: Primary | ICD-10-CM

## 2020-10-26 PROCEDURE — 99284 EMERGENCY DEPT VISIT MOD MDM: CPT | Performed by: PHYSICIAN ASSISTANT

## 2020-10-26 PROCEDURE — 99283 EMERGENCY DEPT VISIT LOW MDM: CPT

## 2020-10-26 RX ORDER — OXYCODONE HYDROCHLORIDE 5 MG/1
5 TABLET ORAL ONCE
Status: COMPLETED | OUTPATIENT
Start: 2020-10-26 | End: 2020-10-26

## 2020-10-26 RX ORDER — ACETAMINOPHEN AND CODEINE PHOSPHATE 300; 30 MG/1; MG/1
1 TABLET ORAL EVERY 6 HOURS PRN
Qty: 12 TABLET | Refills: 0 | Status: SHIPPED | OUTPATIENT
Start: 2020-10-26 | End: 2021-03-31

## 2020-10-26 RX ADMIN — OXYCODONE HYDROCHLORIDE 5 MG: 5 TABLET ORAL at 19:05

## 2020-10-28 ENCOUNTER — HOSPITAL ENCOUNTER (EMERGENCY)
Facility: HOSPITAL | Age: 30
Discharge: HOME | End: 2020-10-28
Attending: INTERNAL MEDICINE | Admitting: INTERNAL MEDICINE
Payer: COMMERCIAL

## 2020-10-28 VITALS
HEIGHT: 60 IN | OXYGEN SATURATION: 100 % | BODY MASS INDEX: 24.15 KG/M2 | HEART RATE: 97 BPM | RESPIRATION RATE: 20 BRPM | SYSTOLIC BLOOD PRESSURE: 106 MMHG | DIASTOLIC BLOOD PRESSURE: 49 MMHG | TEMPERATURE: 98.7 F | WEIGHT: 123 LBS

## 2020-10-28 DIAGNOSIS — R10.9 ABDOMINAL PAIN: Primary | ICD-10-CM

## 2020-10-28 DIAGNOSIS — R11.0 NAUSEA: ICD-10-CM

## 2020-10-28 LAB
ALBUMIN SERPL BCP-MCNC: 4.3 G/DL (ref 3.4–4.8)
ALP SERPL-CCNC: 43.9 U/L (ref 35–140)
ALT SERPL W P-5'-P-CCNC: 5 U/L (ref 5–54)
ANION GAP SERPL CALCULATED.3IONS-SCNC: 6 MMOL/L (ref 4–13)
AST SERPL W P-5'-P-CCNC: 9 U/L (ref 15–41)
BASOPHILS # BLD AUTO: 0.04 THOUSANDS/ΜL (ref 0–0.1)
BASOPHILS NFR BLD AUTO: 1 % (ref 0–1)
BILIRUB SERPL-MCNC: 0.3 MG/DL (ref 0.3–1.2)
BILIRUB UR QL STRIP: ABNORMAL
BUN SERPL-MCNC: 10 MG/DL (ref 6–20)
CALCIUM SERPL-MCNC: 9.9 MG/DL (ref 8.4–10.2)
CHLORIDE SERPL-SCNC: 105 MMOL/L (ref 96–108)
CLARITY UR: CLEAR
CO2 SERPL-SCNC: 28 MMOL/L (ref 22–33)
COLOR UR: ABNORMAL
CREAT SERPL-MCNC: 0.65 MG/DL (ref 0.4–1.1)
EOSINOPHIL # BLD AUTO: 0.11 THOUSAND/ΜL (ref 0–0.61)
EOSINOPHIL NFR BLD AUTO: 2 % (ref 0–6)
ERYTHROCYTE [DISTWIDTH] IN BLOOD BY AUTOMATED COUNT: 12.2 % (ref 11.6–15.1)
EXT PREG TEST URINE: NEGATIVE
EXT. CONTROL ED NAV: NORMAL
GFR SERPL CREATININE-BSD FRML MDRD: 120 ML/MIN/1.73SQ M
GLUCOSE SERPL-MCNC: 101 MG/DL (ref 65–140)
GLUCOSE UR STRIP-MCNC: NEGATIVE MG/DL
HCT VFR BLD AUTO: 38.1 % (ref 34.8–46.1)
HGB BLD-MCNC: 12.8 G/DL (ref 11.5–15.4)
HGB UR QL STRIP.AUTO: NEGATIVE
IMM GRANULOCYTES # BLD AUTO: 0 THOUSAND/UL (ref 0–0.2)
IMM GRANULOCYTES NFR BLD AUTO: 0 % (ref 0–2)
KETONES UR STRIP-MCNC: ABNORMAL MG/DL
LACTATE SERPL-SCNC: 0.8 MMOL/L (ref 0–2)
LEUKOCYTE ESTERASE UR QL STRIP: NEGATIVE
LIPASE SERPL-CCNC: 9 U/L (ref 13–60)
LYMPHOCYTES # BLD AUTO: 1.67 THOUSANDS/ΜL (ref 0.6–4.47)
LYMPHOCYTES NFR BLD AUTO: 27 % (ref 14–44)
MCH RBC QN AUTO: 30.9 PG (ref 26.8–34.3)
MCHC RBC AUTO-ENTMCNC: 33.6 G/DL (ref 31.4–37.4)
MCV RBC AUTO: 92 FL (ref 82–98)
MONOCYTES # BLD AUTO: 0.37 THOUSAND/ΜL (ref 0.17–1.22)
MONOCYTES NFR BLD AUTO: 6 % (ref 4–12)
NEUTROPHILS # BLD AUTO: 4.05 THOUSANDS/ΜL (ref 1.85–7.62)
NEUTS SEG NFR BLD AUTO: 64 % (ref 43–75)
NITRITE UR QL STRIP: NEGATIVE
PH UR STRIP.AUTO: 5.5 [PH]
PLATELET # BLD AUTO: 233 THOUSANDS/UL (ref 149–390)
PMV BLD AUTO: 9.9 FL (ref 8.9–12.7)
POTASSIUM SERPL-SCNC: 3.7 MMOL/L (ref 3.5–5)
PROT SERPL-MCNC: 6.5 G/DL (ref 6.4–8.3)
PROT UR STRIP-MCNC: NEGATIVE MG/DL
RBC # BLD AUTO: 4.14 MILLION/UL (ref 3.81–5.12)
SODIUM SERPL-SCNC: 139 MMOL/L (ref 133–145)
SP GR UR STRIP.AUTO: >=1.03 (ref 1–1.03)
UROBILINOGEN UR QL STRIP.AUTO: 1 E.U./DL
WBC # BLD AUTO: 6.24 THOUSAND/UL (ref 4.31–10.16)

## 2020-10-28 PROCEDURE — 36415 COLL VENOUS BLD VENIPUNCTURE: CPT | Performed by: PHYSICIAN ASSISTANT

## 2020-10-28 PROCEDURE — 99284 EMERGENCY DEPT VISIT MOD MDM: CPT | Performed by: PHYSICIAN ASSISTANT

## 2020-10-28 PROCEDURE — 99284 EMERGENCY DEPT VISIT MOD MDM: CPT

## 2020-10-28 PROCEDURE — 81025 URINE PREGNANCY TEST: CPT | Performed by: PHYSICIAN ASSISTANT

## 2020-10-28 PROCEDURE — 81003 URINALYSIS AUTO W/O SCOPE: CPT | Performed by: PHYSICIAN ASSISTANT

## 2020-10-28 PROCEDURE — 80053 COMPREHEN METABOLIC PANEL: CPT | Performed by: PHYSICIAN ASSISTANT

## 2020-10-28 PROCEDURE — 83690 ASSAY OF LIPASE: CPT | Performed by: PHYSICIAN ASSISTANT

## 2020-10-28 PROCEDURE — 96361 HYDRATE IV INFUSION ADD-ON: CPT

## 2020-10-28 PROCEDURE — 96374 THER/PROPH/DIAG INJ IV PUSH: CPT

## 2020-10-28 PROCEDURE — 83605 ASSAY OF LACTIC ACID: CPT | Performed by: PHYSICIAN ASSISTANT

## 2020-10-28 PROCEDURE — 85025 COMPLETE CBC W/AUTO DIFF WBC: CPT | Performed by: PHYSICIAN ASSISTANT

## 2020-10-28 RX ORDER — METOCLOPRAMIDE HYDROCHLORIDE 5 MG/ML
10 INJECTION INTRAMUSCULAR; INTRAVENOUS ONCE
Status: COMPLETED | OUTPATIENT
Start: 2020-10-28 | End: 2020-10-28

## 2020-10-28 RX ORDER — DIPHENHYDRAMINE HYDROCHLORIDE 50 MG/ML
25 INJECTION INTRAMUSCULAR; INTRAVENOUS ONCE
Status: DISCONTINUED | OUTPATIENT
Start: 2020-10-28 | End: 2020-10-28 | Stop reason: HOSPADM

## 2020-10-28 RX ORDER — PHENAZOPYRIDINE HYDROCHLORIDE 100 MG/1
100 TABLET, FILM COATED ORAL ONCE
Status: DISCONTINUED | OUTPATIENT
Start: 2020-10-28 | End: 2020-10-28

## 2020-10-28 RX ORDER — METOCLOPRAMIDE 10 MG/1
10 TABLET ORAL EVERY 6 HOURS
Qty: 20 TABLET | Refills: 0 | Status: SHIPPED | OUTPATIENT
Start: 2020-10-28 | End: 2021-03-31

## 2020-10-28 RX ADMIN — SODIUM CHLORIDE 1000 ML: 0.9 INJECTION, SOLUTION INTRAVENOUS at 19:29

## 2020-10-28 RX ADMIN — METOCLOPRAMIDE HYDROCHLORIDE 10 MG: 5 INJECTION INTRAMUSCULAR; INTRAVENOUS at 19:33

## 2020-10-29 ENCOUNTER — TELEPHONE (OUTPATIENT)
Dept: FAMILY MEDICINE CLINIC | Facility: CLINIC | Age: 30
End: 2020-10-29

## 2020-11-06 ENCOUNTER — APPOINTMENT (EMERGENCY)
Dept: RADIOLOGY | Facility: HOSPITAL | Age: 30
End: 2020-11-06
Payer: COMMERCIAL

## 2020-11-06 ENCOUNTER — HOSPITAL ENCOUNTER (EMERGENCY)
Facility: HOSPITAL | Age: 30
Discharge: HOME | End: 2020-11-06
Attending: EMERGENCY MEDICINE | Admitting: EMERGENCY MEDICINE
Payer: COMMERCIAL

## 2020-11-06 VITALS
SYSTOLIC BLOOD PRESSURE: 113 MMHG | DIASTOLIC BLOOD PRESSURE: 63 MMHG | RESPIRATION RATE: 18 BRPM | HEART RATE: 110 BPM | TEMPERATURE: 98.1 F | OXYGEN SATURATION: 99 %

## 2020-11-06 DIAGNOSIS — S69.92XA INJURY OF LEFT HAND: ICD-10-CM

## 2020-11-06 DIAGNOSIS — S89.92XA INJURY OF LEFT KNEE: ICD-10-CM

## 2020-11-06 DIAGNOSIS — S89.91XA INJURY OF RIGHT KNEE: ICD-10-CM

## 2020-11-06 DIAGNOSIS — S69.91XA INJURY OF RIGHT HAND: Primary | ICD-10-CM

## 2020-11-06 DIAGNOSIS — R11.0 NAUSEA: ICD-10-CM

## 2020-11-06 PROCEDURE — 73130 X-RAY EXAM OF HAND: CPT

## 2020-11-06 PROCEDURE — 73564 X-RAY EXAM KNEE 4 OR MORE: CPT

## 2020-11-06 PROCEDURE — 99283 EMERGENCY DEPT VISIT LOW MDM: CPT

## 2020-11-06 PROCEDURE — 99285 EMERGENCY DEPT VISIT HI MDM: CPT | Performed by: PHYSICIAN ASSISTANT

## 2020-11-06 RX ORDER — ONDANSETRON 4 MG/1
4 TABLET, ORALLY DISINTEGRATING ORAL ONCE
Status: COMPLETED | OUTPATIENT
Start: 2020-11-06 | End: 2020-11-06

## 2020-11-06 RX ORDER — ACETAMINOPHEN 325 MG/1
650 TABLET ORAL ONCE
Status: DISCONTINUED | OUTPATIENT
Start: 2020-11-06 | End: 2020-11-06 | Stop reason: HOSPADM

## 2020-11-06 RX ORDER — ONDANSETRON 4 MG/1
4 TABLET, ORALLY DISINTEGRATING ORAL EVERY 6 HOURS PRN
Qty: 6 TABLET | Refills: 0 | OUTPATIENT
Start: 2020-11-06 | End: 2020-12-04

## 2020-11-06 RX ORDER — OXYCODONE HYDROCHLORIDE 5 MG/1
5 TABLET ORAL ONCE
Status: COMPLETED | OUTPATIENT
Start: 2020-11-06 | End: 2020-11-06

## 2020-11-06 RX ADMIN — OXYCODONE HYDROCHLORIDE 5 MG: 5 TABLET ORAL at 20:09

## 2020-11-06 RX ADMIN — ONDANSETRON 4 MG: 4 TABLET, ORALLY DISINTEGRATING ORAL at 20:04

## 2020-11-07 ENCOUNTER — HOSPITAL ENCOUNTER (EMERGENCY)
Facility: HOSPITAL | Age: 30
Discharge: HOME | End: 2020-11-07
Attending: EMERGENCY MEDICINE | Admitting: EMERGENCY MEDICINE
Payer: COMMERCIAL

## 2020-11-07 VITALS
SYSTOLIC BLOOD PRESSURE: 106 MMHG | BODY MASS INDEX: 24.02 KG/M2 | DIASTOLIC BLOOD PRESSURE: 69 MMHG | OXYGEN SATURATION: 97 % | WEIGHT: 123 LBS | RESPIRATION RATE: 18 BRPM | HEART RATE: 91 BPM | TEMPERATURE: 98.2 F

## 2020-11-07 DIAGNOSIS — M25.562 LEFT KNEE PAIN: Primary | ICD-10-CM

## 2020-11-07 DIAGNOSIS — R11.0 NAUSEA: ICD-10-CM

## 2020-11-07 PROCEDURE — 99284 EMERGENCY DEPT VISIT MOD MDM: CPT | Performed by: PHYSICIAN ASSISTANT

## 2020-11-07 PROCEDURE — 99283 EMERGENCY DEPT VISIT LOW MDM: CPT

## 2020-11-07 RX ORDER — METOCLOPRAMIDE 10 MG/1
10 TABLET ORAL EVERY 6 HOURS
Qty: 12 TABLET | Refills: 0 | Status: SHIPPED | OUTPATIENT
Start: 2020-11-07 | End: 2020-11-10

## 2020-11-07 RX ADMIN — DICLOFENAC SODIUM 2 G: 10 GEL TOPICAL at 22:18

## 2020-12-04 ENCOUNTER — HOSPITAL ENCOUNTER (EMERGENCY)
Facility: HOSPITAL | Age: 30
Discharge: HOME | End: 2020-12-04
Attending: EMERGENCY MEDICINE
Payer: COMMERCIAL

## 2020-12-04 VITALS
SYSTOLIC BLOOD PRESSURE: 93 MMHG | OXYGEN SATURATION: 100 % | HEART RATE: 100 BPM | TEMPERATURE: 97.4 F | DIASTOLIC BLOOD PRESSURE: 60 MMHG | RESPIRATION RATE: 18 BRPM

## 2020-12-04 DIAGNOSIS — M26.629 TMJ ARTHRALGIA: Primary | ICD-10-CM

## 2020-12-04 PROCEDURE — 99283 EMERGENCY DEPT VISIT LOW MDM: CPT

## 2020-12-04 PROCEDURE — 99284 EMERGENCY DEPT VISIT MOD MDM: CPT | Performed by: EMERGENCY MEDICINE

## 2020-12-04 RX ORDER — IBUPROFEN 600 MG/1
600 TABLET ORAL ONCE
Status: COMPLETED | OUTPATIENT
Start: 2020-12-04 | End: 2020-12-04

## 2020-12-04 RX ORDER — ONDANSETRON 4 MG/1
4 TABLET, ORALLY DISINTEGRATING ORAL EVERY 6 HOURS PRN
Qty: 20 TABLET | Refills: 0 | Status: SHIPPED | OUTPATIENT
Start: 2020-12-04 | End: 2021-03-06 | Stop reason: SDUPTHER

## 2020-12-04 RX ORDER — ONDANSETRON 4 MG/1
4 TABLET, ORALLY DISINTEGRATING ORAL ONCE
Status: COMPLETED | OUTPATIENT
Start: 2020-12-04 | End: 2020-12-04

## 2020-12-04 RX ADMIN — IBUPROFEN 600 MG: 600 TABLET, FILM COATED ORAL at 18:12

## 2020-12-04 RX ADMIN — ONDANSETRON 4 MG: 4 TABLET, ORALLY DISINTEGRATING ORAL at 18:12

## 2020-12-05 DIAGNOSIS — R11.0 NAUSEA: ICD-10-CM

## 2020-12-05 DIAGNOSIS — K21.9 GASTROESOPHAGEAL REFLUX DISEASE WITHOUT ESOPHAGITIS: ICD-10-CM

## 2020-12-07 RX ORDER — PANTOPRAZOLE SODIUM 40 MG/1
TABLET, DELAYED RELEASE ORAL
Qty: 30 TABLET | Refills: 5 | OUTPATIENT
Start: 2020-12-07

## 2020-12-08 DIAGNOSIS — K21.9 GASTROESOPHAGEAL REFLUX DISEASE WITHOUT ESOPHAGITIS: ICD-10-CM

## 2020-12-08 DIAGNOSIS — R11.0 NAUSEA: ICD-10-CM

## 2020-12-08 RX ORDER — PANTOPRAZOLE SODIUM 40 MG/1
40 TABLET, DELAYED RELEASE ORAL DAILY
Qty: 90 TABLET | Refills: 1 | Status: SHIPPED | OUTPATIENT
Start: 2020-12-08 | End: 2021-08-13

## 2021-02-25 ENCOUNTER — HOSPITAL ENCOUNTER (EMERGENCY)
Facility: HOSPITAL | Age: 31
Discharge: HOME | End: 2021-02-25
Attending: EMERGENCY MEDICINE
Payer: COMMERCIAL

## 2021-02-25 VITALS
TEMPERATURE: 98.2 F | SYSTOLIC BLOOD PRESSURE: 102 MMHG | OXYGEN SATURATION: 99 % | HEART RATE: 95 BPM | RESPIRATION RATE: 16 BRPM | DIASTOLIC BLOOD PRESSURE: 64 MMHG

## 2021-02-25 DIAGNOSIS — S03.00XA DISLOCATION OF TEMPOROMANDIBULAR JOINT, INITIAL ENCOUNTER: ICD-10-CM

## 2021-02-25 DIAGNOSIS — K08.89 DENTALGIA: Primary | ICD-10-CM

## 2021-02-25 PROCEDURE — 99284 EMERGENCY DEPT VISIT MOD MDM: CPT | Performed by: PHYSICIAN ASSISTANT

## 2021-02-25 PROCEDURE — 99282 EMERGENCY DEPT VISIT SF MDM: CPT

## 2021-02-25 RX ORDER — ONDANSETRON 4 MG/1
4 TABLET, ORALLY DISINTEGRATING ORAL ONCE
Status: COMPLETED | OUTPATIENT
Start: 2021-02-25 | End: 2021-02-25

## 2021-02-25 RX ORDER — OXYCODONE HYDROCHLORIDE 5 MG/1
5 TABLET ORAL ONCE
Status: COMPLETED | OUTPATIENT
Start: 2021-02-25 | End: 2021-02-25

## 2021-02-25 RX ADMIN — ONDANSETRON 4 MG: 4 TABLET, ORALLY DISINTEGRATING ORAL at 21:34

## 2021-02-25 RX ADMIN — OXYCODONE HYDROCHLORIDE 5 MG: 5 TABLET ORAL at 21:34

## 2021-03-03 NOTE — ED PROVIDER NOTES
EMERGENCY MEDICINE NOTE        PATIENT IDENTIFICATION PHYSICIAN/SERVICE INFORMATION   Name: Matthias Govea  MRN: [id number]  YOB: 1990  Age/Sex: 27 y o  female  Preferred Language: English  Code Status: Prior  Encounter Date: 2/25/2021  Attending Physician: Radha Shaver MD  Admitting Physician: No admitting provider for patient encounter  Primary Care Physician: Eliane Patricio MD         Primary Care Phone: [phone]       CHIEF COMPLAINT     Chief Complaint   Patient presents with    Dental Pain     pt has known broken teeth, states her dentist wont do anything for her because of having TMJ  +nausea  pt states her pain is getting worse and its causing her not to sleep at night  HISTORY OF PRESENT ILLNESS     Matthias Govea is a 34 y o  female who presents due to Dental Pain Jaw Pain  Pt reports bilateral jaw pain ongoing for several months sts that this radiates "up and down my head" with aching sensation, worsening, and associated with nausea  Pt reports no relief with flexeril, ibuprofen, TMJ guard at night  Pt reports nothing makes this better, though opening and closing mouth makes the pain worse  Seen in this ED several times for this issue over several months, questioned about this and sts now that this is ongoing issue and her primary care was unavailable, dentist has not been helpful  Denies dental pain, facial swelling, rash, f/c/s, n/v, numbness, weakness, and no other complaints at this time        History provided by:  Patient   used: No    Dental Pain  Associated symptoms: no fever and no headaches          PAST MEDICAL AND SURGICAL HISTORY     Past Medical History:   Diagnosis Date    Asthma     GERD (gastroesophageal reflux disease)     Hernia, abdominal     Migraines     Psychiatric disorder     Anx, Depression    Renal disorder     kidney stones       Past Surgical History:   Procedure Laterality Date    FL RETROGRADE PYELOGRAM  8/28/2018  HERNIA REPAIR      [place] CYSTO/URETERO W/LITHOTRIPSY &INDWELL STENT INSRT Right 8/28/2018    Procedure: CYSTOSCOPY URETEROSCOPY WITH RETROGRADE PYELOGRAM AND INSERTION STENT URETERAL;  Surgeon: Christiano Drew MD;  [facility];  Service: Urology    TOOTH EXTRACTION         Family History   Problem Relation Age of Onset    Diabetes Mother     Hyperlipidemia Mother     Stroke Mother     Heart disease Mother     Asthma Mother    Giana Knight Other Mother         Degen  of Intervertabral disc   Nephrolithiasis Father     Nephrolithiasis Brother        E-Cigarette/Vaping    E-Cigarette Use Never User      E-Cigarette/Vaping Substances     Social History     Tobacco Use    Smoking status: Current Every Day Smoker     Packs/day: 0 25     Years: 13 00     Pack years: 3 25     Types: Cigarettes    Smokeless tobacco: Never Used   Substance Use Topics    Alcohol use: No    Drug use: No         ALLERGIES     Allergies   Allergen Reactions    Aspirin Anaphylaxis     Doesn't have epi pen  Okay to take ibuprofen per patient    Ketorolac     Naproxen Nausea Only    Prednisone     Toradol [Ketorolac Tromethamine]     Tramadol Itching         HOME MEDICATIONS     Prior to Admission Medications   Prescriptions Last Dose Informant Patient Reported? Taking?    UNKNOWN TO PATIENT   Yes No   UNKNOWN TO PATIENT   Yes No   acetaminophen (TYLENOL) 500 mg tablet   Yes No   Sig: Take 500 mg by mouth every 6 (six) hours as needed   acetaminophen-codeine (TYLENOL #3) 300-30 mg per tablet   No No   Sig: Take 1 tablet by mouth every 6 (six) hours as needed for moderate pain for up to 12 doses   cyclobenzaprine (FLEXERIL) 10 mg tablet   No No   Sig: Take 1 tablet (10 mg total) by mouth 3 (three) times a day as needed for muscle spasms for up to 15 doses   Patient not taking: Reported on 7/6/2020   diclofenac sodium (VOLTAREN) 1 %   No No   Sig: Apply 2 g topically 4 (four) times a day for 4 days   dicyclomine (BENTYL) 20 mg tablet   No No   Sig: Take 1 tablet (20 mg total) by mouth every 6 (six) hours as needed (abdominal discomfort)   hydrocortisone 1 % cream   No No   Sig: Apply to affected area 2 times daily   ibuprofen (MOTRIN) 600 mg tablet   No No   Sig: Take 1 tablet (600 mg total) by mouth every 6 (six) hours as needed for mild pain   metaxalone (SKELAXIN) 800 mg tablet   No No   Sig: Take 1 tablet (800 mg total) by mouth 3 (three) times a day   Patient not taking: Reported on 8/31/2019   metoclopramide (REGLAN) 10 mg tablet   No No   Sig: Take 1 tablet (10 mg total) by mouth every 6 (six) hours   miconazole (MONISTAT 7) 100 mg vaginal suppository   No No   Sig: Insert 1 suppository (100 mg total) into the vagina daily at bedtime   ondansetron (ZOFRAN-ODT) 4 mg disintegrating tablet   No No   Sig: Take 1 tablet (4 mg total) by mouth every 6 (six) hours as needed for nausea or vomiting   pantoprazole (PROTONIX) 40 mg tablet   No No   Sig: Take 1 tablet (40 mg total) by mouth daily      Facility-Administered Medications: None         REVIEW OF SYSTEMS     Review of Systems   Constitutional: Negative for activity change, appetite change, chills, diaphoresis, fatigue and fever  HENT: Positive for dental problem  Jaw pain     Respiratory: Negative for cough and shortness of breath  Cardiovascular: Negative for chest pain  Gastrointestinal: Negative for abdominal pain  Skin: Negative for rash  Neurological: Negative for headaches  All other systems reviewed and are negative          PHYSICAL EXAMINATION     ED Triage Vitals [02/25/21 2015]   Temperature Pulse Respirations Blood Pressure SpO2   98 2 °F (36 8 °C) 95 16 102/64 99 %      Temp Source Heart Rate Source Patient Position - Orthostatic VS BP Location FiO2 (%)   Oral Monitor Sitting Left arm --      Pain Score       8         Wt Readings from Last 3 Encounters:   11/07/20 55 8 kg (123 lb)   10/28/20 55 8 kg (123 lb)   10/26/20 55 8 kg (123 lb) Physical Exam  Vitals signs and nursing note reviewed  Constitutional:       General: She is not in acute distress  Appearance: She is well-developed  She is not ill-appearing, toxic-appearing or diaphoretic  HENT:      Head: Normocephalic and atraumatic  Mouth/Throat:      Mouth: Mucous membranes are moist       Dentition: Abnormal dentition (severe dental decay)  Does not have dentures  Dental caries present  No dental tenderness, gingival swelling, dental abscesses or gum lesions  Tongue: No lesions  Pharynx: Oropharynx is clear  Uvula midline  Tonsils: No tonsillar exudate or tonsillar abscesses  Eyes:      Conjunctiva/sclera: Conjunctivae normal       Pupils: Pupils are equal, round, and reactive to light  Neck:      Musculoskeletal: Normal range of motion and neck supple  Cardiovascular:      Rate and Rhythm: Normal rate and regular rhythm  Heart sounds: Normal heart sounds  Pulmonary:      Effort: Pulmonary effort is normal  No respiratory distress  Breath sounds: Normal breath sounds  Musculoskeletal: Normal range of motion  Skin:     General: Skin is warm  Capillary Refill: Capillary refill takes less than 2 seconds  Neurological:      Mental Status: She is alert and oriented to person, place, and time             DIAGNOSTIC RESULTS     Laboratory results:    Labs Reviewed - No data to display    All labs reviewed and utilized in the medical decision making process    Radiology results:    No orders to display       All radiology studies independently viewed by me and interpreted by the radiologist       PROCEDURES     Procedures      Invasive Devices     Drain            Ureteral Drain/Stent Right ureter 6 Fr  917 days                ASSESSMENT AND PLAN     MDM  Number of Diagnoses or Management Options  Dentalgia: established, worsening  Dislocation of temporomandibular joint, initial encounter: established, worsening     Amount and/or Complexity of Data Reviewed  Review and summarize past medical records: yes    Risk of Complications, Morbidity, and/or Mortality  Presenting problems: low  Diagnostic procedures: low  Management options: low    Patient Progress  Patient progress: stable      Initial ED assessment:  Jamia Cook is a 27 y o  female who presents with Dental, jaw Pain  Vitals signs reviewed and WNL  Physical examination is remarkable for severe dental decay    Initial Ddx  includes but is not limited to:  TMJ, dental rossy, dental infection, dental abscess, dry socket, gingivitis; doubt dwayne's angina  Initial ED plan:   Plan will be to treat symptomatically  Final ED summary/disposition: Pt completing abx (PCN) with dentist  Home care recommendations given with discharge paperwork  Return to ED instructions given if new/worsening sxs  Verbalized understanding  MDM  Reviewed: previous chart, nursing note and vitals          ED COURSE OF CARE AND REASSESSMENT                                          Medications   oxyCODONE (ROXICODONE) IR tablet 5 mg (5 mg Oral Given 2/25/21 2134)   ondansetron (ZOFRAN-ODT) dispersible tablet 4 mg (4 mg Oral Given 2/25/21 2134)         FINAL IMPRESSION     Final diagnoses:   [place]   Dislocation of temporomandibular joint, initial encounter         DISPOSITION AND PLANNING     Time reflects when diagnosis was documented in both MDM as applicable and the Disposition within this note     Time User Action Codes Description Comment    2/25/2021  9:26 PM Zakia West Add [K08 89] Eulas [place]     2/25/2021  9:26 PM Zakia West Add [S03 00XA] Dislocation of temporomandibular joint, initial encounter       ED Disposition     ED Disposition Condition Date/Time Comment    Discharge Stable u Feb 25, 2021  9:26 PM Jamia Cook discharge to home/self care              Follow-up Information     Follow up With Specialties Details Why Contact Info Additional Information    Maninder Ortiz MD Family Medicine Call in 1 day For follow up [address]  [phone]       [facility]  Call in 1 day For follow up [address] Emergency Department Emergency Medicine Go to  If symptoms worsen [address] [facility] [address] [facility] Emergency Department, [address], [place], [place], [address] [facility]  [address]  [address]  Lakisha Dasilva [address] [facility]  [phone]    Call in 1 day  For follow up    [facility] [address]  [address]  [address]  [phone]  Call in 1 day  For follow up    [address] [facility] [place] [address] [id number]  [phone]  Go to   If symptoms worsen        DISCHARGE MEDICATIONS     Discharge Medication List as of 2/25/2021  9:27 PM      CONTINUE these medications which have NOT CHANGED    Details   acetaminophen (TYLENOL) 500 mg tablet Take 500 mg by mouth every 6 (six) hours as needed, Historical Med      acetaminophen-codeine (TYLENOL #3) 300-30 mg per tablet Take 1 tablet by mouth every 6 (six) hours as needed for moderate pain for up to 12 doses, Starting Mon 10/26/2020, Normal      cyclobenzaprine (FLEXERIL) 10 mg tablet Take 1 tablet (10 mg total) by mouth 3 (three) times a day as needed for muscle spasms for up to 15 doses, Starting Sat 8/31/2019, Normal      diclofenac sodium (VOLTAREN) 1 % Apply 2 g topically 4 (four) times a day for 4 days, Starting Sat 11/7/2020, Until Wed 11/11/2020, Normal      dicyclomine (BENTYL) 20 mg tablet Take 1 tablet (20 mg total) by mouth every 6 (six) hours as needed (abdominal discomfort), Starting Sat 9/7/2019, Print      hydrocortisone 1 % cream Apply to affected area 2 times daily, Normal      ibuprofen (MOTRIN) 600 mg tablet Take 1 tablet (600 mg total) by mouth every 6 (six) hours as needed for mild pain, Starting Fri 9/25/2020, Normal      metaxalone (SKELAXIN) 800 mg tablet Take 1 tablet (800 mg total) by mouth 3 (three) times a day, Starting Wed 8/7/2019, Normal      metoclopramide (REGLAN) 10 mg tablet Take 1 tablet (10 mg total) by mouth every 6 (six) hours, Starting Wed 10/28/2020, Normal      miconazole (MONISTAT 7) 100 mg vaginal suppository Insert 1 suppository (100 mg total) into the vagina daily at bedtime, Starting Fri 10/9/2020, Print      ondansetron (ZOFRAN-ODT) 4 mg disintegrating tablet Take 1 tablet (4 mg total) by mouth every 6 (six) hours as needed for nausea or vomiting, Starting Fri 12/4/2020, Normal      pantoprazole (PROTONIX) 40 mg tablet Take 1 tablet (40 mg total) by mouth daily, Starting Tue 12/8/2020, Normal      !! UNKNOWN TO PATIENT Historical Med      !! UNKNOWN TO PATIENT Historical Med       !! - Potential duplicate medications found  Please discuss with provider  No discharge procedures on file      PDMP Review       Value Time User    PDMP Reviewed  Yes 9/24/2020  7:59 PM DO Billy Felix PA-C Peggi Cordon, [place]  03/03/21 0913

## 2021-03-06 ENCOUNTER — HOSPITAL ENCOUNTER (EMERGENCY)
Facility: HOSPITAL | Age: 31
Discharge: HOME | End: 2021-03-06
Attending: EMERGENCY MEDICINE | Admitting: EMERGENCY MEDICINE
Payer: COMMERCIAL

## 2021-03-06 ENCOUNTER — APPOINTMENT (EMERGENCY)
Dept: CT IMAGING | Facility: HOSPITAL | Age: 31
End: 2021-03-06
Payer: COMMERCIAL

## 2021-03-06 VITALS
SYSTOLIC BLOOD PRESSURE: 94 MMHG | OXYGEN SATURATION: 100 % | RESPIRATION RATE: 18 BRPM | TEMPERATURE: 98.7 F | WEIGHT: 120 LBS | HEIGHT: 60 IN | DIASTOLIC BLOOD PRESSURE: 61 MMHG | HEART RATE: 77 BPM | BODY MASS INDEX: 23.56 KG/M2

## 2021-03-06 DIAGNOSIS — R10.9 ABDOMINAL PAIN: ICD-10-CM

## 2021-03-06 DIAGNOSIS — R10.9 FLANK PAIN: Primary | ICD-10-CM

## 2021-03-06 DIAGNOSIS — R11.0 NAUSEA: ICD-10-CM

## 2021-03-06 DIAGNOSIS — M26.629 TMJ ARTHRALGIA: ICD-10-CM

## 2021-03-06 LAB
ALBUMIN SERPL BCP-MCNC: 4.4 G/DL (ref 3.4–4.8)
ALP SERPL-CCNC: 50.2 U/L (ref 35–140)
ALT SERPL W P-5'-P-CCNC: 11 U/L (ref 5–54)
ANION GAP SERPL CALCULATED.3IONS-SCNC: 6 MMOL/L (ref 4–13)
AST SERPL W P-5'-P-CCNC: 12 U/L (ref 15–41)
BACTERIA UR QL AUTO: ABNORMAL /HPF
BASOPHILS # BLD AUTO: 0.03 THOUSANDS/ΜL (ref 0–0.1)
BASOPHILS NFR BLD AUTO: 1 % (ref 0–1)
BILIRUB SERPL-MCNC: 0.32 MG/DL (ref 0.3–1.2)
BILIRUB UR QL STRIP: NEGATIVE
BUN SERPL-MCNC: 11 MG/DL (ref 6–20)
CALCIUM SERPL-MCNC: 9.4 MG/DL (ref 8.4–10.2)
CHLORIDE SERPL-SCNC: 107 MMOL/L (ref 96–108)
CLARITY UR: CLEAR
CO2 SERPL-SCNC: 27 MMOL/L (ref 22–33)
COLOR UR: ABNORMAL
CREAT SERPL-MCNC: 0.72 MG/DL (ref 0.4–1.1)
EOSINOPHIL # BLD AUTO: 0.17 THOUSAND/ΜL (ref 0–0.61)
EOSINOPHIL NFR BLD AUTO: 3 % (ref 0–6)
ERYTHROCYTE [DISTWIDTH] IN BLOOD BY AUTOMATED COUNT: 12.4 % (ref 11.6–15.1)
EXT PREG TEST URINE: NEGATIVE
EXT. CONTROL ED NAV: NORMAL
GFR SERPL CREATININE-BSD FRML MDRD: 113 ML/MIN/1.73SQ M
GLUCOSE SERPL-MCNC: 75 MG/DL (ref 65–140)
GLUCOSE UR STRIP-MCNC: NEGATIVE MG/DL
HCT VFR BLD AUTO: 38.7 % (ref 34.8–46.1)
HGB BLD-MCNC: 13 G/DL (ref 11.5–15.4)
HGB UR QL STRIP.AUTO: NEGATIVE
IMM GRANULOCYTES # BLD AUTO: 0.01 THOUSAND/UL (ref 0–0.2)
IMM GRANULOCYTES NFR BLD AUTO: 0 % (ref 0–2)
KETONES UR STRIP-MCNC: NEGATIVE MG/DL
LEUKOCYTE ESTERASE UR QL STRIP: ABNORMAL
LYMPHOCYTES # BLD AUTO: 2.21 THOUSANDS/ΜL (ref 0.6–4.47)
LYMPHOCYTES NFR BLD AUTO: 38 % (ref 14–44)
MCH RBC QN AUTO: 30.6 PG (ref 26.8–34.3)
MCHC RBC AUTO-ENTMCNC: 33.6 G/DL (ref 31.4–37.4)
MCV RBC AUTO: 91 FL (ref 82–98)
MONOCYTES # BLD AUTO: 0.53 THOUSAND/ΜL (ref 0.17–1.22)
MONOCYTES NFR BLD AUTO: 9 % (ref 4–12)
NEUTROPHILS # BLD AUTO: 2.87 THOUSANDS/ΜL (ref 1.85–7.62)
NEUTS SEG NFR BLD AUTO: 49 % (ref 43–75)
NITRITE UR QL STRIP: NEGATIVE
NON-SQ EPI CELLS URNS QL MICRO: ABNORMAL /HPF
PH UR STRIP.AUTO: 5.5 [PH]
PLATELET # BLD AUTO: 247 THOUSANDS/UL (ref 149–390)
PMV BLD AUTO: 9.8 FL (ref 8.9–12.7)
POTASSIUM SERPL-SCNC: 3.8 MMOL/L (ref 3.5–5)
PROT SERPL-MCNC: 6.6 G/DL (ref 6.4–8.3)
PROT UR STRIP-MCNC: NEGATIVE MG/DL
RBC # BLD AUTO: 4.25 MILLION/UL (ref 3.81–5.12)
RBC #/AREA URNS AUTO: ABNORMAL /HPF
SODIUM SERPL-SCNC: 140 MMOL/L (ref 133–145)
SP GR UR STRIP.AUTO: <=1.005 (ref 1–1.03)
UROBILINOGEN UR QL STRIP.AUTO: 0.2 E.U./DL
WBC # BLD AUTO: 5.82 THOUSAND/UL (ref 4.31–10.16)
WBC #/AREA URNS AUTO: ABNORMAL /HPF

## 2021-03-06 PROCEDURE — 96361 HYDRATE IV INFUSION ADD-ON: CPT

## 2021-03-06 PROCEDURE — 99284 EMERGENCY DEPT VISIT MOD MDM: CPT

## 2021-03-06 PROCEDURE — G1004 CDSM NDSC: HCPCS

## 2021-03-06 PROCEDURE — 85025 COMPLETE CBC W/AUTO DIFF WBC: CPT | Performed by: EMERGENCY MEDICINE

## 2021-03-06 PROCEDURE — 81025 URINE PREGNANCY TEST: CPT | Performed by: EMERGENCY MEDICINE

## 2021-03-06 PROCEDURE — 80053 COMPREHEN METABOLIC PANEL: CPT | Performed by: EMERGENCY MEDICINE

## 2021-03-06 PROCEDURE — 96374 THER/PROPH/DIAG INJ IV PUSH: CPT

## 2021-03-06 PROCEDURE — 81001 URINALYSIS AUTO W/SCOPE: CPT | Performed by: EMERGENCY MEDICINE

## 2021-03-06 PROCEDURE — 74176 CT ABD & PELVIS W/O CONTRAST: CPT

## 2021-03-06 PROCEDURE — 99285 EMERGENCY DEPT VISIT HI MDM: CPT | Performed by: EMERGENCY MEDICINE

## 2021-03-06 PROCEDURE — 81003 URINALYSIS AUTO W/O SCOPE: CPT | Performed by: EMERGENCY MEDICINE

## 2021-03-06 PROCEDURE — 36415 COLL VENOUS BLD VENIPUNCTURE: CPT | Performed by: EMERGENCY MEDICINE

## 2021-03-06 PROCEDURE — 96375 TX/PRO/DX INJ NEW DRUG ADDON: CPT

## 2021-03-06 RX ORDER — MORPHINE SULFATE 4 MG/ML
4 INJECTION, SOLUTION INTRAMUSCULAR; INTRAVENOUS ONCE
Status: COMPLETED | OUTPATIENT
Start: 2021-03-06 | End: 2021-03-06

## 2021-03-06 RX ORDER — ONDANSETRON 4 MG/1
4 TABLET, ORALLY DISINTEGRATING ORAL EVERY 6 HOURS PRN
Qty: 20 TABLET | Refills: 0 | Status: SHIPPED | OUTPATIENT
Start: 2021-03-06 | End: 2021-03-31

## 2021-03-06 RX ORDER — ONDANSETRON 2 MG/ML
4 INJECTION INTRAMUSCULAR; INTRAVENOUS ONCE
Status: COMPLETED | OUTPATIENT
Start: 2021-03-06 | End: 2021-03-06

## 2021-03-06 RX ORDER — ACETAMINOPHEN 325 MG/1
975 TABLET ORAL ONCE
Status: DISCONTINUED | OUTPATIENT
Start: 2021-03-06 | End: 2021-03-07 | Stop reason: HOSPADM

## 2021-03-06 RX ADMIN — SODIUM CHLORIDE 1000 ML: 0.9 INJECTION, SOLUTION INTRAVENOUS at 21:45

## 2021-03-06 RX ADMIN — MORPHINE SULFATE 4 MG: 4 INJECTION INTRAVENOUS at 21:46

## 2021-03-06 RX ADMIN — ONDANSETRON 4 MG: 2 INJECTION INTRAMUSCULAR; INTRAVENOUS at 21:46

## 2021-03-06 NOTE — Clinical Note
Pako Neither was seen and treated in our emergency department on 3/6/2021  Diagnosis: flank pain    Christine    She may return on this date: 03/08/2021         If you have any questions or concerns, please don't hesitate to call        Ulises Clark DO    ______________________________           _______________          _______________  Hospital Representative                              Date                                Time

## 2021-03-07 NOTE — ED PROVIDER NOTES
History  Chief Complaint   Patient presents with    Flank Pain     x 4 days; nausea; hx of kidney stones     Patient is a 20-year-old female with a history of kidney stones who presents for left flank pain  Patient says she has had the pain over the last 4 days but it has been progressing  She says is located in the lower back area and radiates around into her abdomen  She says it feels similar to when she had kidney stones in the past   She admits to associated nausea  She denies any fevers, chills, upper abdominal pain, chest pain, shortness of breath  She denies any urinary symptoms, hematuria, vaginal bleeding or discharge  Prior to Admission Medications   Prescriptions Last Dose Informant Patient Reported? Taking?    UNKNOWN TO PATIENT   Yes No   UNKNOWN TO PATIENT   Yes No   acetaminophen (TYLENOL) 500 mg tablet   Yes No   Sig: Take 500 mg by mouth every 6 (six) hours as needed   acetaminophen-codeine (TYLENOL #3) 300-30 mg per tablet   No No   Sig: Take 1 tablet by mouth every 6 (six) hours as needed for moderate pain for up to 12 doses   cyclobenzaprine (FLEXERIL) 10 mg tablet   No No   Sig: Take 1 tablet (10 mg total) by mouth 3 (three) times a day as needed for muscle spasms for up to 15 doses   Patient not taking: Reported on 7/6/2020   diclofenac sodium (VOLTAREN) 1 %   No No   Sig: Apply 2 g topically 4 (four) times a day for 4 days   dicyclomine (BENTYL) 20 mg tablet   No No   Sig: Take 1 tablet (20 mg total) by mouth every 6 (six) hours as needed (abdominal discomfort)   hydrocortisone 1 % cream   No No   Sig: Apply to affected area 2 times daily   ibuprofen (MOTRIN) 600 mg tablet   No No   Sig: Take 1 tablet (600 mg total) by mouth every 6 (six) hours as needed for mild pain   metaxalone (SKELAXIN) 800 mg tablet   No No   Sig: Take 1 tablet (800 mg total) by mouth 3 (three) times a day   Patient not taking: Reported on 8/31/2019   metoclopramide (REGLAN) 10 mg tablet   No No   Sig: Take 1 tablet (10 mg total) by mouth every 6 (six) hours   miconazole (MONISTAT 7) 100 mg vaginal suppository   No No   Sig: Insert 1 suppository (100 mg total) into the vagina daily at bedtime   ondansetron (ZOFRAN-ODT) 4 mg disintegrating tablet   No No   Sig: Take 1 tablet (4 mg total) by mouth every 6 (six) hours as needed for nausea or vomiting   ondansetron (ZOFRAN-ODT) 4 mg disintegrating tablet   No No   Sig: Take 1 tablet (4 mg total) by mouth every 6 (six) hours as needed for nausea or vomiting   pantoprazole (PROTONIX) 40 mg tablet   No No   Sig: Take 1 tablet (40 mg total) by mouth daily      Facility-Administered Medications: None       Past Medical History:   Diagnosis Date    Asthma     GERD (gastroesophageal reflux disease)     Hernia, abdominal     Migraines     Psychiatric disorder     Anx, Depression    Renal disorder     kidney stones       Past Surgical History:   Procedure Laterality Date    FL RETROGRADE PYELOGRAM  8/28/2018    HERNIA REPAIR      CT CYSTO/URETERO W/LITHOTRIPSY &INDWELL STENT INSRT Right 8/28/2018    Procedure: CYSTOSCOPY URETEROSCOPY WITH RETROGRADE PYELOGRAM AND INSERTION STENT URETERAL;  Surgeon: Keith Pfeiffer MD;  [facility];  Service: Urology    TOOTH EXTRACTION         Family History   Problem Relation Age of Onset    Diabetes Mother     Hyperlipidemia Mother     Stroke Mother     Heart disease Mother     Asthma Mother    [facility] Other Mother         Degen  of Intervertabral disc   Nephrolithiasis Father     Nephrolithiasis Brother      I have reviewed and agree with the history as documented      E-Cigarette/Vaping    E-Cigarette Use Never User      E-Cigarette/Vaping Substances     Social History     Tobacco Use    Smoking status: Current Every Day Smoker     Packs/day: 0 25     Years: 13 00     Pack years: 3 25     Types: Cigarettes    Smokeless tobacco: Never Used   Substance Use Topics    Alcohol use: No    Drug use: No       Review of Systems   Constitutional: Negative for chills, diaphoresis and fever  HENT: Negative for congestion, sinus pressure, sore throat and trouble swallowing  Eyes: Negative for pain, discharge and itching  Respiratory: Negative for cough, chest tightness, shortness of breath and wheezing  Cardiovascular: Negative for chest pain, palpitations and leg swelling  Gastrointestinal: Positive for abdominal pain (left lower) and nausea  Negative for abdominal distention, blood in stool, diarrhea and vomiting  Endocrine: Negative for polyphagia and polyuria  Genitourinary: Positive for flank pain  Negative for difficulty urinating, dysuria, hematuria, pelvic pain and vaginal bleeding  Musculoskeletal: Negative for arthralgias and back pain  Skin: Negative for rash  Neurological: Negative for dizziness, syncope, weakness, light-headedness and headaches  Physical Exam  Physical Exam  Vitals signs and nursing note reviewed  Constitutional:       General: She is not in acute distress  Appearance: She is well-developed  HENT:      Head: Normocephalic and atraumatic  Right Ear: External ear normal       Left Ear: External ear normal       Mouth/Throat:      Pharynx: No oropharyngeal exudate  Eyes:      Conjunctiva/sclera: Conjunctivae normal       Pupils: Pupils are equal, round, and reactive to light  Neck:      Musculoskeletal: Normal range of motion and neck supple  Cardiovascular:      Rate and Rhythm: Normal rate and regular rhythm  Heart sounds: Normal heart sounds  No murmur  No friction rub  No gallop  Pulmonary:      Effort: Pulmonary effort is normal  No respiratory distress  Breath sounds: Normal breath sounds  No wheezing or rales  Abdominal:      General: There is no distension  Palpations: Abdomen is soft  Tenderness: There is abdominal tenderness (Left lower quadrant)  There is no guarding  Musculoskeletal: Normal range of motion           General: No tenderness or deformity  Lymphadenopathy:      Cervical: No cervical adenopathy  Skin:     General: Skin is warm and dry  Neurological:      Mental Status: She is alert and oriented to person, place, and time  Cranial Nerves: No cranial nerve deficit  Sensory: No sensory deficit  Motor: No abnormal muscle tone           Vital Signs  ED Triage Vitals   Temperature Pulse Respirations Blood Pressure SpO2   03/06/21 2123 03/06/21 2120 03/06/21 2120 03/06/21 2123 03/06/21 2120   98 7 °F (37 1 °C) 100 20 109/63 100 %      Temp Source Heart Rate Source Patient Position - Orthostatic VS BP Location FiO2 (%)   03/06/21 2123 03/06/21 2120 03/06/21 2120 03/06/21 2120 --   Oral Monitor Sitting Right arm       Pain Score       03/06/21 2120       9           Vitals:    03/06/21 2120 03/06/21 2123 03/06/21 2306   BP:  109/63 94/61   Pulse: 100  77   Patient Position - Orthostatic VS: Sitting  Sitting         Visual Acuity      ED Medications  Medications   acetaminophen (TYLENOL) tablet 975 mg (975 mg Oral Not Given 3/6/21 2148)   sodium chloride 0 9 % bolus 1,000 mL (0 mL Intravenous Stopped 3/6/21 2302)   morphine (PF) 4 mg/mL injection 4 mg (4 mg Intravenous Given 3/6/21 2146)   ondansetron (ZOFRAN) injection 4 mg (4 mg Intravenous Given 3/6/21 2146)       Diagnostic Studies  Results Reviewed     Procedure Component Value Units Date/Time    Comprehensive metabolic panel [[id number]]  (Abnormal) Collected: 03/06/21 2144    Lab Status: Final result Specimen: Blood from Arm, Right Updated: 03/06/21 2210     Sodium 140 mmol/L      Potassium 3 8 mmol/L      Chloride 107 mmol/L      CO2 27 mmol/L      ANION GAP 6 mmol/L      BUN 11 mg/dL      Creatinine 0 72 mg/dL      Glucose 75 mg/dL      Calcium 9 4 mg/dL      AST 12 U/L      ALT 11 U/L      Alkaline Phosphatase 50 2 U/L      Total Protein 6 6 g/dL      Albumin 4 4 g/dL      Total Bilirubin 0 32 mg/dL      eGFR 113 ml/min/1 73sq m     Narrative: National Kidney Disease Foundation guidelines for Chronic Kidney Disease (CKD):     Stage 1 with normal or high GFR (GFR > 90 mL/min/1 73 square meters)    Stage 2 Mild CKD (GFR = 60-89 mL/min/1 73 square meters)    Stage 3A Moderate CKD (GFR = 45-59 mL/min/1 73 square meters)    Stage 3B Moderate CKD (GFR = 30-44 mL/min/1 73 square meters)    Stage 4 Severe CKD (GFR = 15-29 mL/min/1 73 square meters)    Stage 5 End Stage CKD (GFR <15 mL/min/1 73 square meters)  Note: GFR calculation is accurate only with a steady state creatinine    Urine Microscopic [[id number]]  (Abnormal) Collected: 03/06/21 2136    Lab Status: Final result Specimen: Urine, Clean Catch Updated: 03/06/21 2159     RBC, UA 1-2 /hpf      WBC, UA 2-4 /hpf      Epithelial Cells Innumerable /hpf      Bacteria, UA Occasional /hpf     CBC and differential [[id number]]  (Normal) Collected: 03/06/21 2144    Lab Status: Final result Specimen: Blood from Arm, Right Updated: 03/06/21 2153     WBC 5 82 Thousand/uL      RBC 4 25 Million/uL      Hemoglobin 13 0 g/dL      Hematocrit 38 7 %      MCV 91 fL      MCH 30 6 pg      MCHC 33 6 g/dL      RDW 12 4 %      MPV 9 8 fL      Platelets 534 Thousands/uL      Neutrophils Relative 49 %      Immat GRANS % 0 %      Lymphocytes Relative 38 %      Monocytes Relative 9 %      Eosinophils Relative 3 %      Basophils Relative 1 %      Neutrophils Absolute 2 87 Thousands/µL      Immature Grans Absolute 0 01 Thousand/uL      Lymphocytes Absolute 2 21 Thousands/µL      Monocytes Absolute 0 53 Thousand/µL      Eosinophils Absolute 0 17 Thousand/µL      Basophils Absolute 0 03 Thousands/µL     UA (URINE) with reflex to Scope [[id number]]  (Abnormal) Collected: 03/06/21 2136    Lab Status: Final result Specimen: Urine, Clean Catch Updated: 03/06/21 2142     Color, UA Straw     Clarity, UA Clear     Specific Gravity, UA <=1 005     pH, UA 5 5     Leukocytes, UA 1+     Nitrite, UA Negative     Protein, UA Negative mg/dl Glucose, UA Negative mg/dl      Ketones, UA Negative mg/dl      Urobilinogen, UA 0 2 E U /dl      Bilirubin, UA Negative     Blood, UA Negative    POCT pregnancy, urine [[id number]]  (Normal) Resulted: 03/06/21 2139    Lab Status: Final result Updated: 03/06/21 2139     EXT PREG TEST UR (Ref: Negative) Negative     Control Valid                 CT renal stone study abdomen pelvis wo contrast   Final Result by Lina Raines MD (03/06 2220)      No significant renal, ureteral, or bladder calculi  No hydronephrosis  Workstation performed: [id number]                    Procedures  Procedures         ED Course                             SBIRT 20yo+      Most Recent Value   SBIRT (22 yo +)   In order to provide better care to our patients, we are screening all of our patients for alcohol and drug use  Would it be okay to ask you these screening questions? No Filed at: 03/06/2021 2307   Initial Alcohol Screen: US AUDIT-C    1  How often do you have a drink containing alcohol?  0 Filed at: 03/06/2021 2307   2  How many drinks containing alcohol do you have on a typical day you are drinking? 0 Filed at: 03/06/2021 2307   3a  Male UNDER 65: How often do you have five or more drinks on one occasion? 0 Filed at: 03/06/2021 2307   3b  FEMALE Any Age, or MALE 65+: How often do you have 4 or more drinks on one occassion? 0 Filed at: 03/06/2021 2307   Audit-C Score  0 Filed at: 03/06/2021 2307   CRISTINO: How many times in the past year have you    Used an illegal drug or used a prescription medication for non-medical reasons? Never Filed at: 03/06/2021 2307                    MDM  Number of Diagnoses or Management Options  Abdominal pain:   Flank pain:   Nausea:   Diagnosis management comments: 51-year-old female with history of kidney stones presenting for left-sided flank pain, radiating into left abdomen  Associated with nausea  History of kidney stones in the past   Feels similar    Denies any hematuria, increased urinary frequency or dysuria  No fevers or chills  Will obtain CBC, BMP, CT renal stone study  Will give IV pain medications, IV fluids and Zofran  CT shows no evidence of kidney stone  UA nose signs of infection  Patient discharged, told to follow with family doctor      Disposition  Final diagnoses:   Flank pain   Abdominal pain   Nausea     Time reflects when diagnosis was documented in both MDM as applicable and the Disposition within this note     Time User Action Codes Description Comment    3/6/2021 10:46 PM Nicolette Brocks Add [R10 9] Flank pain     3/6/2021 10:46 PM Tiarra Dredge [R10 9] Abdominal pain     3/6/2021 10:53 PM Nicolette Brocks Add [R11 0] Nausea     3/6/2021 10:54 PM Nicolette Brocks Add [H13 081] TMJ arthralgia       ED Disposition     ED Disposition Condition Date/Time Comment    Discharge Stable Sat Mar 6, 2021 10:46 PM Mike Pérez discharge to home/self care              Follow-up Information     Follow up With Specialties Details Why Contact Info Additional Information    Isrrael Mitchell MD Family Medicine Schedule an appointment as soon as possible for a visit  For follow up of symptoms 2003 [address] [facility] Emergency Medicine Go to  If symptoms worsen [address] [facility],[address] [id number]  [address] [facility] Emergency Department, [address], [address]          Discharge Medication List as of 3/6/2021 10:47 PM      CONTINUE these medications which have NOT CHANGED    Details   acetaminophen (TYLENOL) 500 mg tablet Take 500 mg by mouth every 6 (six) hours as needed, Historical Med      acetaminophen-codeine (TYLENOL #3) 300-30 mg per tablet Take 1 tablet by mouth every 6 (six) hours as needed for moderate pain for up to 12 doses, Starting Mon 10/26/2020, Normal      cyclobenzaprine (FLEXERIL) 10 mg tablet Take 1 tablet (10 mg total) by mouth 3 (three) times a day as needed for muscle spasms for up to 15 doses, Starting Sat 8/31/2019, Normal      diclofenac sodium (VOLTAREN) 1 % Apply 2 g topically 4 (four) times a day for 4 days, Starting Sat 11/7/2020, Until Wed 11/11/2020, Normal      dicyclomine (BENTYL) 20 mg tablet Take 1 tablet (20 mg total) by mouth every 6 (six) hours as needed (abdominal discomfort), Starting Sat 9/7/2019, Print      hydrocortisone 1 % cream Apply to affected area 2 times daily, Normal      ibuprofen (MOTRIN) 600 mg tablet Take 1 tablet (600 mg total) by mouth every 6 (six) hours as needed for mild pain, Starting Fri 9/25/2020, Normal      metaxalone (SKELAXIN) 800 mg tablet Take 1 tablet (800 mg total) by mouth 3 (three) times a day, Starting Wed 8/7/2019, Normal      metoclopramide (REGLAN) 10 mg tablet Take 1 tablet (10 mg total) by mouth every 6 (six) hours, Starting Wed 10/28/2020, Normal      miconazole (MONISTAT 7) 100 mg vaginal suppository Insert 1 suppository (100 mg total) into the vagina daily at bedtime, Starting Fri 10/9/2020, Print      pantoprazole (PROTONIX) 40 mg tablet Take 1 tablet (40 mg total) by mouth daily, Starting Tue 12/8/2020, Normal      !! UNKNOWN TO PATIENT Historical Med      !! UNKNOWN TO PATIENT Historical Med      ondansetron (ZOFRAN-ODT) 4 mg disintegrating tablet Take 1 tablet (4 mg total) by mouth every 6 (six) hours as needed for nausea or vomiting, Starting Fri 12/4/2020, Normal       !! - Potential duplicate medications found  Please discuss with provider  No discharge procedures on file      PDMP Review       Value Time User    PDMP Reviewed  Yes 9/24/2020  7:59 PM Barbie Reza DO          ED Provider  Electronically Signed by           Rommel Restrepo DO  03/06/21 1178

## 2021-03-31 ENCOUNTER — HOSPITAL ENCOUNTER (EMERGENCY)
Facility: HOSPITAL | Age: 31
Discharge: HOME | End: 2021-03-31
Attending: EMERGENCY MEDICINE | Admitting: EMERGENCY MEDICINE
Payer: COMMERCIAL

## 2021-03-31 VITALS
BODY MASS INDEX: 24.35 KG/M2 | TEMPERATURE: 97.9 F | OXYGEN SATURATION: 100 % | RESPIRATION RATE: 17 BRPM | HEART RATE: 92 BPM | HEIGHT: 60 IN | DIASTOLIC BLOOD PRESSURE: 59 MMHG | SYSTOLIC BLOOD PRESSURE: 116 MMHG | WEIGHT: 124 LBS

## 2021-03-31 DIAGNOSIS — K08.89 PAIN, DENTAL: Primary | ICD-10-CM

## 2021-03-31 PROCEDURE — 99284 EMERGENCY DEPT VISIT MOD MDM: CPT | Performed by: EMERGENCY MEDICINE

## 2021-03-31 PROCEDURE — 99283 EMERGENCY DEPT VISIT LOW MDM: CPT

## 2021-03-31 RX ORDER — PENICILLIN V POTASSIUM 250 MG/1
500 TABLET ORAL ONCE
Status: COMPLETED | OUTPATIENT
Start: 2021-03-31 | End: 2021-03-31

## 2021-03-31 RX ORDER — IBUPROFEN 600 MG/1
600 TABLET ORAL ONCE
Status: DISCONTINUED | OUTPATIENT
Start: 2021-03-31 | End: 2021-03-31 | Stop reason: HOSPADM

## 2021-03-31 RX ORDER — PENICILLIN V POTASSIUM 500 MG/1
500 TABLET ORAL 4 TIMES DAILY
Qty: 28 TABLET | Refills: 0 | Status: SHIPPED | OUTPATIENT
Start: 2021-03-31 | End: 2021-04-07

## 2021-03-31 RX ORDER — IBUPROFEN 600 MG/1
600 TABLET ORAL EVERY 6 HOURS PRN
Qty: 30 TABLET | Refills: 0 | OUTPATIENT
Start: 2021-03-31 | End: 2021-07-14

## 2021-03-31 RX ORDER — ONDANSETRON 4 MG/1
4 TABLET, ORALLY DISINTEGRATING ORAL ONCE
Status: COMPLETED | OUTPATIENT
Start: 2021-03-31 | End: 2021-03-31

## 2021-03-31 RX ORDER — ONDANSETRON 4 MG/1
4 TABLET, ORALLY DISINTEGRATING ORAL EVERY 8 HOURS PRN
Qty: 20 TABLET | Refills: 0 | OUTPATIENT
Start: 2021-03-31 | End: 2021-04-22

## 2021-03-31 RX ADMIN — ONDANSETRON 4 MG: 4 TABLET, ORALLY DISINTEGRATING ORAL at 21:24

## 2021-03-31 RX ADMIN — PENICILLIN V POTASSIUM 500 MG: 250 TABLET, FILM COATED ORAL at 21:24

## 2021-04-01 NOTE — ED NOTES
AVS reviewed and questions answered, pt verbalized understanding  Pt d/c to home, ambulatory and with steady gait        Анна Vallejo RN  03/31/21 3956

## 2021-04-01 NOTE — ED PROVIDER NOTES
History  Chief Complaint   Patient presents with    Dental Pain     pt presents to ed via walk in with multiple complaint of dental pain on the left upper and rt lower hx of same thing      Patient is a 25-year-old female  She presents to the emergency room for evaluation of dental pain  It has been going on for at least 2 days  She has a history of the same  She is complaining of pain to the right maxillary and left mandibular area  No facial swelling  No fever or chills  No difficulty breathing or swallowing  Pain is severe without relieving factors  She has had relief with antibiotics in the past           Prior to Admission Medications   Prescriptions Last Dose Informant Patient Reported? Taking? pantoprazole (PROTONIX) 40 mg tablet 3/31/2021 at Unknown time  No Yes   Sig: Take 1 tablet (40 mg total) by mouth daily      Facility-Administered Medications: None       Past Medical History:   Diagnosis Date    Asthma     GERD (gastroesophageal reflux disease)     Hernia, abdominal     Migraines     Psychiatric disorder     Anx, Depression    Renal disorder     kidney stones       Past Surgical History:   Procedure Laterality Date    FL RETROGRADE PYELOGRAM  8/28/2018    HERNIA REPAIR      [place] CYSTO/URETERO W/LITHOTRIPSY &INDWELL STENT INSRT Right 8/28/2018    Procedure: CYSTOSCOPY URETEROSCOPY WITH RETROGRADE PYELOGRAM AND INSERTION STENT URETERAL;  Surgeon: Samuel White MD;  [facility];  Service: Urology    TOOTH EXTRACTION         Family History   Problem Relation Age of Onset    Diabetes Mother     Hyperlipidemia Mother     Stroke Mother     Heart disease Mother     Asthma Mother     Other Mother         Degen  of Intervertabral disc   Nephrolithiasis Father     Nephrolithiasis Brother      I have reviewed and agree with the history as documented      E-Cigarette/Vaping    E-Cigarette Use Never User      E-Cigarette/Vaping Substances     Social History     Tobacco Use    Smoking status: Current Every Day Smoker     Packs/day: 0 25     Years: 13 00     Pack years: 3 25     Types: Cigarettes    Smokeless tobacco: Never Used   Substance Use Topics    Alcohol use: No    Drug use: No       Review of Systems   Constitutional: Negative for chills and fever  HENT: Positive for dental problem  Negative for drooling, facial swelling, sore throat, trouble swallowing and voice change  Respiratory: Negative for shortness of breath and stridor  All other systems reviewed and are negative  Physical Exam  Physical Exam  Vitals signs reviewed  Constitutional:       General: She is not in acute distress  HENT:      Head: Normocephalic and atraumatic  Nose: Nose normal       Mouth/Throat:      Mouth: Mucous membranes are moist       Comments: Patient has poor dentition  There is no intraoral swelling  No posterior pharynx swelling  No swelling under the tongue or to the submandibular area  There is decay to the right maxillary and left mandibular molars  Eyes:      General:         Right eye: No discharge  Left eye: No discharge  Conjunctiva/sclera: Conjunctivae normal    Neck:      Musculoskeletal: Normal range of motion and neck supple  Pulmonary:      Effort: Pulmonary effort is normal  No respiratory distress  Musculoskeletal:         General: No deformity or signs of injury  Skin:     General: Skin is warm and dry  Findings: No rash  Neurological:      General: No focal deficit present  Mental Status: She is alert and oriented to person, place, and time     Psychiatric:         Mood and Affect: Mood normal          Behavior: Behavior normal          Vital Signs  ED Triage Vitals [03/31/21 2106]   Temperature Pulse Respirations Blood Pressure SpO2   97 9 °F (36 6 °C) 92 17 116/59 100 %      Temp Source Heart Rate Source Patient Position - Orthostatic VS BP Location FiO2 (%)   Oral Monitor Sitting Right arm --      Pain Score 7           Vitals:    03/31/21 2106   BP: 116/59   Pulse: 92   Patient Position - Orthostatic VS: Sitting         Visual Acuity      ED Medications  Medications   ibuprofen (MOTRIN) tablet 600 mg (600 mg Oral Not Given 3/31/21 2124)   penicillin V potassium (VEETID) tablet 500 mg (500 mg Oral Given 3/31/21 2124)   ondansetron (ZOFRAN-ODT) dispersible tablet 4 mg (4 mg Oral Given 3/31/21 2124)       Diagnostic Studies  Results Reviewed     None                 No orders to display              Procedures  Procedures         ED Course                                           MDM  Number of Diagnoses or Management Options  Pain, dental:   Diagnosis management comments: No Tk's angina      Disposition  Final diagnoses:   Pain, dental     Time reflects when diagnosis was documented in both MDM as applicable and the Disposition within this note     Time User Action Codes Description Comment    3/31/2021  9:20 PM Caio Guy Add [K08 89] Pain, dental       ED Disposition     ED Disposition Condition Date/Time Comment    Discharge Stable Wed Mar 31, 2021  9:20 PM Mike Beto discharge to home/self care              Follow-up Information     Follow up With Specialties Details Lavelle Lawsonnaa  In 2 days  [address] [address]  [phone]          Patient's Medications   Discharge Prescriptions    IBUPROFEN (MOTRIN) 600 MG TABLET    Take 1 tablet (600 mg total) by mouth every 6 (six) hours as needed (Pain)       Start Date: 3/31/2021 End Date: --       Order Dose: 600 mg       Quantity: 30 tablet    Refills: 0    ONDANSETRON (ZOFRAN-ODT) 4 MG DISINTEGRATING TABLET    Take 1 tablet (4 mg total) by mouth every 8 (eight) hours as needed for nausea or vomiting       Start Date: 3/31/2021 End Date: --       Order Dose: 4 mg       Quantity: 20 tablet    Refills: 0    PENICILLIN V POTASSIUM (VEETID) 500 MG TABLET    Take 1 tablet (500 mg total) by mouth 4 (four) times a day for 7 days       Start Date: 3/31/2021 End Date: 4/7/2021       Order Dose: 500 mg       Quantity: 28 tablet    Refills: 0     No discharge procedures on file      PDMP Review       Value Time User    PDMP Reviewed  Yes 9/24/2020  7:59 PM Rigo Chicas DO          ED Provider  Electronically Signed by           Martha Medeiros MD  03/31/21 2829

## 2021-04-22 ENCOUNTER — HOSPITAL ENCOUNTER (EMERGENCY)
Facility: HOSPITAL | Age: 31
Discharge: HOME | End: 2021-04-22
Attending: INTERNAL MEDICINE
Payer: COMMERCIAL

## 2021-04-22 VITALS
HEIGHT: 60 IN | WEIGHT: 124 LBS | SYSTOLIC BLOOD PRESSURE: 118 MMHG | RESPIRATION RATE: 20 BRPM | BODY MASS INDEX: 24.35 KG/M2 | TEMPERATURE: 98 F | OXYGEN SATURATION: 99 % | HEART RATE: 112 BPM | DIASTOLIC BLOOD PRESSURE: 71 MMHG

## 2021-04-22 DIAGNOSIS — K02.9 PAIN DUE TO DENTAL CARIES: Primary | ICD-10-CM

## 2021-04-22 PROCEDURE — 99284 EMERGENCY DEPT VISIT MOD MDM: CPT | Performed by: PHYSICIAN ASSISTANT

## 2021-04-22 PROCEDURE — 99282 EMERGENCY DEPT VISIT SF MDM: CPT

## 2021-04-22 RX ORDER — ONDANSETRON 4 MG/1
4 TABLET, ORALLY DISINTEGRATING ORAL ONCE
Status: COMPLETED | OUTPATIENT
Start: 2021-04-22 | End: 2021-04-22

## 2021-04-22 RX ORDER — ONDANSETRON 4 MG/1
4 TABLET, FILM COATED ORAL EVERY 8 HOURS PRN
Qty: 10 TABLET | Refills: 0 | Status: SHIPPED | OUTPATIENT
Start: 2021-04-22 | End: 2021-05-20

## 2021-04-22 RX ORDER — ACETAMINOPHEN AND CODEINE PHOSPHATE 300; 30 MG/1; MG/1
1 TABLET ORAL EVERY 6 HOURS PRN
Qty: 10 TABLET | Refills: 0 | Status: SHIPPED | OUTPATIENT
Start: 2021-04-22 | End: 2021-04-25

## 2021-04-22 RX ORDER — OXYCODONE HYDROCHLORIDE 5 MG/1
5 TABLET ORAL ONCE
Status: COMPLETED | OUTPATIENT
Start: 2021-04-22 | End: 2021-04-22

## 2021-04-22 RX ADMIN — ONDANSETRON 4 MG: 4 TABLET, ORALLY DISINTEGRATING ORAL at 18:10

## 2021-04-22 RX ADMIN — OXYCODONE HYDROCHLORIDE 5 MG: 5 TABLET ORAL at 18:10

## 2021-04-22 NOTE — ED PROVIDER NOTES
History  Chief Complaint   Patient presents with    Dental Pain     Pt with Past Medical History: Asthma, GERD, Hernia, abdominal, Migraines, Psychiatric disorder, Anxiety/ Depression, Renal disorder/kidney stones  Past Surgical History: HERNIA REPAIR, right CYSTO/URETERO W/LITHOTRIPSY & INDWELL STENT INSERT 8/28/2018, TOOTH EXTRACTION  Presents to ED c/o R-sided dental pain, to right upper tooth and 2 right lower teeth; pt recently finished oral antibiotics, Pt states can't get pain under control with OTC meds  Pt states has appt in 2 weeks with dental clinic  Prior to Admission Medications   Prescriptions Last Dose Informant Patient Reported? Taking?   ibuprofen (MOTRIN) 600 mg tablet   No No   Sig: Take 1 tablet (600 mg total) by mouth every 6 (six) hours as needed (Pain)   ondansetron (ZOFRAN-ODT) 4 mg disintegrating tablet   No No   Sig: Take 1 tablet (4 mg total) by mouth every 8 (eight) hours as needed for nausea or vomiting   pantoprazole (PROTONIX) 40 mg tablet   No No   Sig: Take 1 tablet (40 mg total) by mouth daily      Facility-Administered Medications: None       Past Medical History:   Diagnosis Date    Asthma     GERD (gastroesophageal reflux disease)     Hernia, abdominal     Migraines     Psychiatric disorder     Anx, Depression    Renal disorder     kidney stones       Past Surgical History:   Procedure Laterality Date    [place] RETROGRADE PYELOGRAM  8/28/2018    HERNIA REPAIR      [place] CYSTO/URETERO W/LITHOTRIPSY &INDWELL STENT INSRT Right 8/28/2018    Procedure: CYSTOSCOPY URETEROSCOPY WITH RETROGRADE PYELOGRAM AND INSERTION STENT URETERAL;  Surgeon: Glory Schwarz MD;  [facility];  Service: Urology    TOOTH EXTRACTION         Family History   Problem Relation Age of Onset    Diabetes Mother     Hyperlipidemia Mother     Stroke Mother     Heart disease Mother     Asthma Mother    Flory Mcnamara Other Mother         Degen  of Intervertabral disc      Nephrolithiasis Father     Nephrolithiasis Brother      I have reviewed and agree with the history as documented  E-Cigarette/Vaping    E-Cigarette Use Never User      E-Cigarette/Vaping Substances     Social History     Tobacco Use    Smoking status: Current Every Day Smoker     Packs/day: 0 25     Years: 13 00     Pack years: 3 25     Types: Cigarettes    Smokeless tobacco: Never Used   Substance Use Topics    Alcohol use: No    Drug use: No       Review of Systems   Constitutional: Negative for chills and fever  HENT: Positive for dental problem  Negative for hearing loss, mouth sores, sore throat and trouble swallowing  Eyes: Negative for visual disturbance  Respiratory: Negative for shortness of breath  Gastrointestinal: Negative for abdominal pain and vomiting  Genitourinary: Negative for urgency  Musculoskeletal: Negative for arthralgias and myalgias  Skin: Negative for color change, pallor and rash  Neurological: Negative for dizziness and weakness  Psychiatric/Behavioral: Negative for behavioral problems  All other systems reviewed and are negative  Physical Exam  Physical Exam  Vitals signs and nursing note reviewed  Constitutional:       General: She is in acute distress  Appearance: Normal appearance  She is well-developed  HENT:      Head: Normocephalic and atraumatic  Right Ear: External ear normal       Left Ear: External ear normal       Nose: Nose normal       Mouth/Throat:      Mouth: Mucous membranes are moist       Pharynx: Oropharynx is clear  Comments: Multiple decaying, broken teeth, tenderness to multiple teeth-that are circled, no facial swelling, no obvious abscess  Eyes:      Conjunctiva/sclera: Conjunctivae normal    Neck:      Musculoskeletal: Normal range of motion  Cardiovascular:      Rate and Rhythm: Regular rhythm  Tachycardia present  Pulmonary:      Effort: Pulmonary effort is normal  No respiratory distress     Musculoskeletal: Normal range of motion  Right lower leg: No edema  Left lower leg: No edema  Skin:     General: Skin is warm and dry  Findings: No rash  Neurological:      Mental Status: She is alert and oriented to person, place, and time  Psychiatric:         Behavior: Behavior normal          Vital Signs  ED Triage Vitals [04/22/21 1741]   Temperature Pulse Respirations Blood Pressure SpO2   98 °F (36 7 °C) (!) 112 20 118/71 99 %      Temp src Heart Rate Source Patient Position - Orthostatic VS BP Location FiO2 (%)   -- -- -- -- --      Pain Score       --           Vitals:    04/22/21 1741   BP: 118/71   Pulse: (!) 112         Visual Acuity      ED Medications  Medications   oxyCODONE (ROXICODONE) IR tablet 5 mg (5 mg Oral Given 4/22/21 1810)   ondansetron (ZOFRAN-ODT) dispersible tablet 4 mg (4 mg Oral Given 4/22/21 1810)       Diagnostic Studies  Results Reviewed     None                 No orders to display              Procedures  Procedures         ED Course                                           MDM  Number of Diagnoses or Management Options  Pain due to dental caries:   Diagnosis management comments: Patient with several visits for mouth, dental, TMJ pain, however, no recent narcotics  Discussed with patient I would treat her here and dc with a short course of Tylenol with codeine, but that she needs to follow-up for her chronic dental issues  Amount and/or Complexity of Data Reviewed  Review and summarize past medical records: yes        Disposition  Final diagnoses:   Pain due to dental caries     Time reflects when diagnosis was documented in both MDM as applicable and the Disposition within this note     Time User Action Codes Description Comment    4/22/2021  5:57 PM Shahram Guerra Add [K02 9] Pain due to dental caries       ED Disposition     ED Disposition Condition Date/Time Comment    Discharge Stable u Apr 22, 2021  5:56 PM Mike Pérez discharge to home/self care  Follow-up Information     Follow up With Specialties Details Why Contact Info    Pal Rodriguez MD Family Medicine   [address] [place] [address] [place] [address]  [phone]      [facility]    [address] [facility]    [address] [facility] [address]  [address]          Discharge Medication List as of 4/22/2021  6:04 PM      START taking these medications    Details   acetaminophen-codeine (TYLENOL #3) 300-30 mg per tablet Take 1 tablet by mouth every 6 (six) hours as needed for moderate pain for up to 3 days, Starting Thu 4/22/2021, Until Sun 4/25/2021, Normal      ondansetron (ZOFRAN) 4 mg tablet Take 1 tablet (4 mg total) by mouth every 8 (eight) hours as needed for nausea or vomiting, Starting Thu 4/22/2021, Normal         CONTINUE these medications which have NOT CHANGED    Details   ibuprofen (MOTRIN) 600 mg tablet Take 1 tablet (600 mg total) by mouth every 6 (six) hours as needed (Pain), Starting Wed 3/31/2021, Normal      pantoprazole (PROTONIX) 40 mg tablet Take 1 tablet (40 mg total) by mouth daily, Starting Tue 12/8/2020, Normal         STOP taking these medications       ondansetron (ZOFRAN-ODT) 4 mg disintegrating tablet Comments:   Reason for Stopping:             No discharge procedures on file      PDMP Review       Value Time User    PDMP Reviewed  Yes 9/24/2020  7:59 PM Evon Lee DO          ED Provider  Electronically Signed by           Lio Crump PA-C  04/22/21 6441

## 2021-04-22 NOTE — DISCHARGE INSTRUCTIONS
You need to follow-up with dentist as soon as possible    Use Motrin first for pain, if no improvement, add Tylenol #3

## 2021-04-22 NOTE — ED TRIAGE NOTES
Pt with R sided dental pain, was recently rx antibiotics  Pt cant get pain under control with OTC meds  Has appt 2 weeks with dental clinic

## 2021-04-23 VITALS
WEIGHT: 124 LBS | BODY MASS INDEX: 24.22 KG/M2 | RESPIRATION RATE: 18 BRPM | TEMPERATURE: 98.4 F | OXYGEN SATURATION: 100 % | HEART RATE: 85 BPM

## 2021-04-23 PROCEDURE — 99283 EMERGENCY DEPT VISIT LOW MDM: CPT

## 2021-04-23 PROCEDURE — 99283 EMERGENCY DEPT VISIT LOW MDM: CPT | Performed by: EMERGENCY MEDICINE

## 2021-04-24 ENCOUNTER — HOSPITAL ENCOUNTER (EMERGENCY)
Facility: HOSPITAL | Age: 31
Discharge: HOME | End: 2021-04-24
Attending: EMERGENCY MEDICINE | Admitting: EMERGENCY MEDICINE
Payer: COMMERCIAL

## 2021-04-24 DIAGNOSIS — K08.89 PAIN, DENTAL: Primary | ICD-10-CM

## 2021-04-24 PROCEDURE — 96372 THER/PROPH/DIAG INJ SC/IM: CPT

## 2021-04-24 RX ORDER — OXYCODONE HYDROCHLORIDE 5 MG/1
5 TABLET ORAL EVERY 8 HOURS PRN
Qty: 8 TABLET | Refills: 0 | Status: SHIPPED | OUTPATIENT
Start: 2021-04-24 | End: 2021-04-27

## 2021-04-24 RX ORDER — ONDANSETRON 4 MG/1
4 TABLET, ORALLY DISINTEGRATING ORAL EVERY 6 HOURS PRN
Qty: 16 TABLET | Refills: 0 | Status: SHIPPED | OUTPATIENT
Start: 2021-04-24 | End: 2021-05-20

## 2021-04-24 RX ORDER — AMOXICILLIN AND CLAVULANATE POTASSIUM 875; 125 MG/1; MG/1
1 TABLET, FILM COATED ORAL EVERY 12 HOURS
Qty: 20 TABLET | Refills: 0 | Status: SHIPPED | OUTPATIENT
Start: 2021-04-24 | End: 2021-05-04

## 2021-04-24 RX ORDER — MORPHINE SULFATE 4 MG/ML
4 INJECTION, SOLUTION INTRAMUSCULAR; INTRAVENOUS ONCE
Status: COMPLETED | OUTPATIENT
Start: 2021-04-24 | End: 2021-04-24

## 2021-04-24 RX ORDER — AMOXICILLIN AND CLAVULANATE POTASSIUM 875; 125 MG/1; MG/1
1 TABLET, FILM COATED ORAL ONCE
Status: COMPLETED | OUTPATIENT
Start: 2021-04-24 | End: 2021-04-24

## 2021-04-24 RX ORDER — ONDANSETRON 4 MG/1
4 TABLET, ORALLY DISINTEGRATING ORAL ONCE
Status: COMPLETED | OUTPATIENT
Start: 2021-04-24 | End: 2021-04-24

## 2021-04-24 RX ADMIN — ONDANSETRON 4 MG: 4 TABLET, ORALLY DISINTEGRATING ORAL at 00:32

## 2021-04-24 RX ADMIN — MORPHINE SULFATE 4 MG: 4 INJECTION INTRAVENOUS at 00:33

## 2021-04-24 RX ADMIN — AMOXICILLIN AND CLAVULANATE POTASSIUM 1 TABLET: 875; 125 TABLET, FILM COATED ORAL at 00:32

## 2021-04-24 NOTE — ED PROVIDER NOTES
History  Chief Complaint   Patient presents with    Dental Pain     dental caries  patient was seen in [address] [place] [address] yesterday, states she ws given tylenol 4 and she becomes nauseated from them  she is still in pain     HPI     Patient is a 27 yof who presents with right sided dental pain  Notes she just finished a course of penicillin yesterday  No f/c/s  No vomiting  Pain is achi, non radiating  No systemic fevers, chills, sweats  No focal neurological defects  No visual disturbance  No hearing loss/tinnitus/vertigo  No pain behind the ear  No parotid gland tenderness  No neck pain or trouble swallowing  No deviation of the tonsils to suggest peritonsillar abscess  No obvious drainable intraoral abscess  No facial rash or blisters  No woodiness or swelling underneath the tongue  No claudication when chewing  No headache  MDM toothache, will treat pain and give abx, return precautions  Prior to Admission Medications   Prescriptions Last Dose Informant Patient Reported?  Taking?   acetaminophen-codeine (TYLENOL #3) 300-30 mg per tablet   No No   Sig: Take 1 tablet by mouth every 6 (six) hours as needed for moderate pain for up to 3 days   ibuprofen (MOTRIN) 600 mg tablet   No No   Sig: Take 1 tablet (600 mg total) by mouth every 6 (six) hours as needed (Pain)   ondansetron (ZOFRAN) 4 mg tablet   No No   Sig: Take 1 tablet (4 mg total) by mouth every 8 (eight) hours as needed for nausea or vomiting   pantoprazole (PROTONIX) 40 mg tablet   No No   Sig: Take 1 tablet (40 mg total) by mouth daily      Facility-Administered Medications: None       Past Medical History:   Diagnosis Date    Asthma     GERD (gastroesophageal reflux disease)     Hernia, abdominal     Migraines     Psychiatric disorder     Anx, Depression    Renal disorder     kidney stones       Past Surgical History:   Procedure Laterality Date    [place] RETROGRADE PYELOGRAM  8/28/2018    HERNIA REPAIR      [place] CYSTO/URETERO W/LITHOTRIPSY &INDWELL STENT INSRT Right 8/28/2018    Procedure: CYSTOSCOPY URETEROSCOPY WITH RETROGRADE PYELOGRAM AND INSERTION STENT URETERAL;  Surgeon: Pat Urena MD;  [facility];  Service: Urology    TOOTH EXTRACTION         Family History   Problem Relation Age of Onset    Diabetes Mother     Hyperlipidemia Mother     Stroke Mother     Heart disease Mother     Asthma Mother    [facility] Other Mother         Degen  of Intervertabral disc   Nephrolithiasis Father     Nephrolithiasis Brother      I have reviewed and agree with the history as documented  E-Cigarette/Vaping    E-Cigarette Use Never User      E-Cigarette/Vaping Substances     Social History     Tobacco Use    Smoking status: Current Every Day Smoker     Packs/day: 0 25     Years: 13 00     Pack years: 3 25     Types: Cigarettes    Smokeless tobacco: Never Used   Substance Use Topics    Alcohol use: No    Drug use: No       Review of Systems   HENT: Positive for dental problem  Physical Exam  Physical Exam  Vitals signs and nursing note reviewed  Constitutional:       Appearance: She is well-developed  HENT:      Head: Normocephalic and atraumatic  Comments: Multiple dental caries throughout the mouth  Right Ear: External ear normal       Left Ear: External ear normal    Eyes:      Conjunctiva/sclera: Conjunctivae normal    Neck:      Musculoskeletal: Normal range of motion and neck supple  Vascular: No JVD  Trachea: No tracheal deviation  Cardiovascular:      Rate and Rhythm: Normal rate and regular rhythm  Heart sounds: Normal heart sounds  Pulmonary:      Effort: Pulmonary effort is normal  No respiratory distress  Breath sounds: No wheezing or rales  Abdominal:      Palpations: Abdomen is soft  Tenderness: There is no abdominal tenderness  There is no guarding or rebound  Musculoskeletal:         General: No tenderness     Skin:     General: Skin is warm and dry  Findings: No erythema or rash  Neurological:      General: No focal deficit present  Mental Status: She is alert and oriented to person, place, and time  Motor: No weakness  Psychiatric:         Behavior: Behavior normal          Thought Content: Thought content normal          Vital Signs  ED Triage Vitals [04/23/21 2340]   Temperature Pulse Respirations BP SpO2   98 4 °F (36 9 °C) 85 18 -- 100 %      Temp Source Heart Rate Source Patient Position - Orthostatic VS BP Location FiO2 (%)   Oral Monitor Sitting Left arm --      Pain Score       9           Vitals:    04/23/21 2340   Pulse: 85   Patient Position - Orthostatic VS: Sitting         Visual Acuity      ED Medications  Medications   morphine (PF) 4 mg/mL injection 4 mg (4 mg Intramuscular Given 4/24/21 0033)   ondansetron (ZOFRAN-ODT) dispersible tablet 4 mg (4 mg Oral Given 4/24/21 0032)   amoxicillin-clavulanate (AUGMENTIN) 875-125 mg per tablet 1 tablet (1 tablet Oral Given 4/24/21 0032)       Diagnostic Studies  Results Reviewed     None                 No orders to display              Procedures  Procedures         ED Course                                           MDM    Disposition  Final diagnoses:   Pain, dental     Time reflects when diagnosis was documented in both MDM as applicable and the Disposition within this note     Time User Action Codes Description Comment    4/24/2021 12:13 AM Samy Lucas Add [K08 89] Pain, dental       ED Disposition     ED Disposition Condition Date/Time Comment    Discharge Stable Sat Apr 24, 2021 12:13 AM Christine Fay discharge to home/self care              Follow-up Information     Follow up With Specialties Details Why Contact Info Additional Information    Your dentist ASAP  In 1 day       [facility] Emergency Medicine In 1 day If symptoms worsen [address] [facility], [address], [place], [place], [address]          Discharge Medication List as of 4/24/2021 12:16 AM      START taking these medications    Details   amoxicillin-clavulanate (AUGMENTIN) 875-125 mg per tablet Take 1 tablet by mouth every 12 (twelve) hours for 10 days, Starting Sat 4/24/2021, Until Tue 5/4/2021, Normal      ondansetron (ZOFRAN-ODT) 4 mg disintegrating tablet Take 1 tablet (4 mg total) by mouth every 6 (six) hours as needed for nausea or vomiting, Starting Sat 4/24/2021, Normal      oxyCODONE (ROXICODONE) 5 mg immediate release tablet Take 1 tablet (5 mg total) by mouth every 8 (eight) hours as needed for moderate pain for up to 3 daysMax Daily Amount: 15 mg, Starting Sat 4/24/2021, Until Tue 4/27/2021, Normal         CONTINUE these medications which have NOT CHANGED    Details   acetaminophen-codeine (TYLENOL #3) 300-30 mg per tablet Take 1 tablet by mouth every 6 (six) hours as needed for moderate pain for up to 3 days, Starting Thu 4/22/2021, Until Sun 4/25/2021, Normal      ibuprofen (MOTRIN) 600 mg tablet Take 1 tablet (600 mg total) by mouth every 6 (six) hours as needed (Pain), Starting Wed 3/31/2021, Normal      ondansetron (ZOFRAN) 4 mg tablet Take 1 tablet (4 mg total) by mouth every 8 (eight) hours as needed for nausea or vomiting, Starting Thu 4/22/2021, Normal      pantoprazole (PROTONIX) 40 mg tablet Take 1 tablet (40 mg total) by mouth daily, Starting Tue 12/8/2020, Normal           No discharge procedures on file      PDMP Review       Value Time User    PDMP Reviewed  Yes 9/24/2020  7:59 PM Juan Alberto Thompson DO          ED Provider  Electronically Signed by           Ansley Rooney MD  04/24/21 7164

## 2021-04-30 ENCOUNTER — APPOINTMENT (EMERGENCY)
Dept: CT IMAGING | Facility: HOSPITAL | Age: 31
End: 2021-04-30
Payer: COMMERCIAL

## 2021-04-30 ENCOUNTER — HOSPITAL ENCOUNTER (EMERGENCY)
Facility: HOSPITAL | Age: 31
Discharge: HOME | End: 2021-04-30
Attending: EMERGENCY MEDICINE
Payer: COMMERCIAL

## 2021-04-30 VITALS
RESPIRATION RATE: 16 BRPM | SYSTOLIC BLOOD PRESSURE: 131 MMHG | DIASTOLIC BLOOD PRESSURE: 70 MMHG | TEMPERATURE: 98.3 F | OXYGEN SATURATION: 99 % | HEART RATE: 88 BPM

## 2021-04-30 DIAGNOSIS — N73.9 PELVIC INFLAMMATORY DISEASE (PID): Primary | ICD-10-CM

## 2021-04-30 DIAGNOSIS — B37.3 VAGINAL YEAST INFECTION: ICD-10-CM

## 2021-04-30 LAB
ALBUMIN SERPL BCP-MCNC: 3.9 G/DL (ref 3.5–5)
ALP SERPL-CCNC: 58 U/L (ref 46–116)
ALT SERPL W P-5'-P-CCNC: 16 U/L (ref 12–78)
ANION GAP SERPL CALCULATED.3IONS-SCNC: 9 MMOL/L (ref 4–13)
AST SERPL W P-5'-P-CCNC: 11 U/L (ref 5–45)
BASOPHILS # BLD AUTO: 0.04 THOUSANDS/ΜL (ref 0–0.1)
BASOPHILS NFR BLD AUTO: 1 % (ref 0–1)
BILIRUB SERPL-MCNC: 0.2 MG/DL (ref 0.2–1)
BILIRUB UR QL STRIP: NEGATIVE
BUN SERPL-MCNC: 12 MG/DL (ref 5–25)
CALCIUM SERPL-MCNC: 9.6 MG/DL (ref 8.3–10.1)
CHLORIDE SERPL-SCNC: 105 MMOL/L (ref 100–108)
CLARITY UR: NORMAL
CO2 SERPL-SCNC: 28 MMOL/L (ref 21–32)
COLOR UR: YELLOW
CREAT SERPL-MCNC: 0.8 MG/DL (ref 0.6–1.3)
EOSINOPHIL # BLD AUTO: 0.18 THOUSAND/ΜL (ref 0–0.61)
EOSINOPHIL NFR BLD AUTO: 3 % (ref 0–6)
ERYTHROCYTE [DISTWIDTH] IN BLOOD BY AUTOMATED COUNT: 12.2 % (ref 11.6–15.1)
EXT PREG TEST URINE: NEGATIVE
EXT. CONTROL ED NAV: NORMAL
GFR SERPL CREATININE-BSD FRML MDRD: 99 ML/MIN/1.73SQ M
GLUCOSE SERPL-MCNC: 110 MG/DL (ref 65–140)
GLUCOSE UR STRIP-MCNC: NEGATIVE MG/DL
HCT VFR BLD AUTO: 40 % (ref 34.8–46.1)
HGB BLD-MCNC: 13.2 G/DL (ref 11.5–15.4)
HGB UR QL STRIP.AUTO: NEGATIVE
IMM GRANULOCYTES # BLD AUTO: 0.02 THOUSAND/UL (ref 0–0.2)
IMM GRANULOCYTES NFR BLD AUTO: 0 % (ref 0–2)
KETONES UR STRIP-MCNC: NEGATIVE MG/DL
LEUKOCYTE ESTERASE UR QL STRIP: NEGATIVE
LYMPHOCYTES # BLD AUTO: 1.78 THOUSANDS/ΜL (ref 0.6–4.47)
LYMPHOCYTES NFR BLD AUTO: 33 % (ref 14–44)
MCH RBC QN AUTO: 29.8 PG (ref 26.8–34.3)
MCHC RBC AUTO-ENTMCNC: 33 G/DL (ref 31.4–37.4)
MCV RBC AUTO: 90 FL (ref 82–98)
MONOCYTES # BLD AUTO: 0.39 THOUSAND/ΜL (ref 0.17–1.22)
MONOCYTES NFR BLD AUTO: 7 % (ref 4–12)
NEUTROPHILS # BLD AUTO: 3.07 THOUSANDS/ΜL (ref 1.85–7.62)
NEUTS SEG NFR BLD AUTO: 56 % (ref 43–75)
NITRITE UR QL STRIP: NEGATIVE
NRBC BLD AUTO-RTO: 0 /100 WBCS
PH UR STRIP.AUTO: 7 [PH] (ref 4.5–8)
PLATELET # BLD AUTO: 294 THOUSANDS/UL (ref 149–390)
PMV BLD AUTO: 10.2 FL (ref 8.9–12.7)
POTASSIUM SERPL-SCNC: 3.7 MMOL/L (ref 3.5–5.3)
PROT SERPL-MCNC: 7.1 G/DL (ref 6.4–8.2)
PROT UR STRIP-MCNC: NEGATIVE MG/DL
RBC # BLD AUTO: 4.43 MILLION/UL (ref 3.81–5.12)
SODIUM SERPL-SCNC: 142 MMOL/L (ref 136–145)
SP GR UR STRIP.AUTO: 1.02 (ref 1–1.03)
UROBILINOGEN UR QL STRIP.AUTO: 1 E.U./DL
WBC # BLD AUTO: 5.48 THOUSAND/UL (ref 4.31–10.16)

## 2021-04-30 PROCEDURE — 85025 COMPLETE CBC W/AUTO DIFF WBC: CPT | Performed by: EMERGENCY MEDICINE

## 2021-04-30 PROCEDURE — 96375 TX/PRO/DX INJ NEW DRUG ADDON: CPT

## 2021-04-30 PROCEDURE — 87591 N.GONORRHOEAE DNA AMP PROB: CPT | Performed by: EMERGENCY MEDICINE

## 2021-04-30 PROCEDURE — 99284 EMERGENCY DEPT VISIT MOD MDM: CPT

## 2021-04-30 PROCEDURE — G1004 CDSM NDSC: HCPCS

## 2021-04-30 PROCEDURE — 36415 COLL VENOUS BLD VENIPUNCTURE: CPT | Performed by: EMERGENCY MEDICINE

## 2021-04-30 PROCEDURE — 81025 URINE PREGNANCY TEST: CPT | Performed by: EMERGENCY MEDICINE

## 2021-04-30 PROCEDURE — 74177 CT ABD & PELVIS W/CONTRAST: CPT

## 2021-04-30 PROCEDURE — 80053 COMPREHEN METABOLIC PANEL: CPT | Performed by: EMERGENCY MEDICINE

## 2021-04-30 PROCEDURE — 99285 EMERGENCY DEPT VISIT HI MDM: CPT | Performed by: EMERGENCY MEDICINE

## 2021-04-30 PROCEDURE — 81003 URINALYSIS AUTO W/O SCOPE: CPT

## 2021-04-30 PROCEDURE — 96372 THER/PROPH/DIAG INJ SC/IM: CPT

## 2021-04-30 PROCEDURE — 87491 CHLMYD TRACH DNA AMP PROBE: CPT | Performed by: EMERGENCY MEDICINE

## 2021-04-30 PROCEDURE — 81514 NFCT DS BV&VAGINITIS DNA ALG: CPT | Performed by: EMERGENCY MEDICINE

## 2021-04-30 PROCEDURE — 96374 THER/PROPH/DIAG INJ IV PUSH: CPT

## 2021-04-30 RX ORDER — ONDANSETRON 4 MG/1
4 TABLET, ORALLY DISINTEGRATING ORAL EVERY 8 HOURS PRN
Qty: 9 TABLET | Refills: 0 | Status: SHIPPED | OUTPATIENT
Start: 2021-04-30 | End: 2021-05-20

## 2021-04-30 RX ORDER — ONDANSETRON 2 MG/ML
4 INJECTION INTRAMUSCULAR; INTRAVENOUS ONCE
Status: COMPLETED | OUTPATIENT
Start: 2021-04-30 | End: 2021-04-30

## 2021-04-30 RX ORDER — DOXYCYCLINE HYCLATE 100 MG/1
100 CAPSULE ORAL ONCE
Status: COMPLETED | OUTPATIENT
Start: 2021-04-30 | End: 2021-04-30

## 2021-04-30 RX ORDER — MORPHINE SULFATE 4 MG/ML
4 INJECTION, SOLUTION INTRAMUSCULAR; INTRAVENOUS ONCE
Status: COMPLETED | OUTPATIENT
Start: 2021-04-30 | End: 2021-04-30

## 2021-04-30 RX ORDER — ONDANSETRON 4 MG/1
4 TABLET, ORALLY DISINTEGRATING ORAL ONCE
Status: COMPLETED | OUTPATIENT
Start: 2021-04-30 | End: 2021-04-30

## 2021-04-30 RX ORDER — DOXYCYCLINE HYCLATE 100 MG/1
100 CAPSULE ORAL EVERY 12 HOURS SCHEDULED
Qty: 28 CAPSULE | Refills: 0 | Status: SHIPPED | OUTPATIENT
Start: 2021-04-30 | End: 2021-05-14

## 2021-04-30 RX ORDER — OXYCODONE HYDROCHLORIDE 5 MG/1
5 TABLET ORAL ONCE
Status: COMPLETED | OUTPATIENT
Start: 2021-04-30 | End: 2021-04-30

## 2021-04-30 RX ADMIN — ONDANSETRON 4 MG: 4 TABLET, ORALLY DISINTEGRATING ORAL at 22:54

## 2021-04-30 RX ADMIN — DOXYCYCLINE 100 MG: 100 CAPSULE ORAL at 22:54

## 2021-04-30 RX ADMIN — ONDANSETRON 4 MG: 2 INJECTION INTRAMUSCULAR; INTRAVENOUS at 20:26

## 2021-04-30 RX ADMIN — MORPHINE SULFATE 4 MG: 4 INJECTION INTRAVENOUS at 20:27

## 2021-04-30 RX ADMIN — OXYCODONE HYDROCHLORIDE 5 MG: 5 TABLET ORAL at 21:25

## 2021-04-30 RX ADMIN — CEFTRIAXONE 500 MG: 1 INJECTION, POWDER, FOR SOLUTION INTRAMUSCULAR; INTRAVENOUS at 22:54

## 2021-04-30 RX ADMIN — IOHEXOL 100 ML: 350 INJECTION, SOLUTION INTRAVENOUS at 21:44

## 2021-05-01 LAB
C GLABRATA DNA VAG QL NAA+PROBE: NEGATIVE
C KRUSEI DNA VAG QL NAA+PROBE: NEGATIVE
CANDIDA SP 6 PNL VAG NAA+PROBE: POSITIVE
T VAGINALIS DNA VAG QL NAA+PROBE: NEGATIVE
VAGINOSIS/ITIS DNA PNL VAG PROBE+SIG AMP: NEGATIVE

## 2021-05-01 NOTE — RESULT ENCOUNTER NOTE
Discussed results with patient  Patient states that the vaginal suppositories work better for her than the oral medication  Monistat sent to patient's pharmacy

## 2021-05-01 NOTE — ED PROVIDER NOTES
History  Chief Complaint   Patient presents with    Abdominal Pain     Pt complains of pain in the lower abdomen that goes to her groin area  Pt states that she is "bleeding from her clit" Pt states that she has a Hx of kidney stones and UTI       HPI     66-year-old female with history of kidney stones presenting for evaluation of pain in her bilateral flanks that radiates to her lower abdomen and vaginal area  Pain has been present for the last 4 days, described as constant and severe  Reports bleeding from her clitoris associated with severe pain in the area  Denies abnormal vaginal discharge or vaginal pain  No history of STIs  Last menstrual period was 1 month ago and described as normal   Denies fevers or chills  Reports nausea without vomiting  No change in her bowel movements  Denies trauma to the clitoral region  She has required stenting in the past for prior kidney stones but denies history of other surgeries  Prior to Admission Medications   Prescriptions Last Dose Informant Patient Reported?  Taking?   amoxicillin-clavulanate (AUGMENTIN) 875-125 mg per tablet   No No   Sig: Take 1 tablet by mouth every 12 (twelve) hours for 10 days   ibuprofen (MOTRIN) 600 mg tablet   No No   Sig: Take 1 tablet (600 mg total) by mouth every 6 (six) hours as needed (Pain)   ondansetron (ZOFRAN) 4 mg tablet   No No   Sig: Take 1 tablet (4 mg total) by mouth every 8 (eight) hours as needed for nausea or vomiting   ondansetron (ZOFRAN-ODT) 4 mg disintegrating tablet   No No   Sig: Take 1 tablet (4 mg total) by mouth every 6 (six) hours as needed for nausea or vomiting   pantoprazole (PROTONIX) 40 mg tablet   No No   Sig: Take 1 tablet (40 mg total) by mouth daily      Facility-Administered Medications: None       Past Medical History:   Diagnosis Date    Asthma     GERD (gastroesophageal reflux disease)     Hernia, abdominal     Migraines     Psychiatric disorder     Anx, Depression    Renal disorder kidney stones       Past Surgical History:   Procedure Laterality Date    FL RETROGRADE PYELOGRAM  8/28/2018    HERNIA REPAIR      NM CYSTO/URETERO W/LITHOTRIPSY &INDWELL STENT INSRT Right 8/28/2018    Procedure: CYSTOSCOPY URETEROSCOPY WITH RETROGRADE PYELOGRAM AND INSERTION STENT URETERAL;  Surgeon: Jose Valdivia MD;  [facility];  Service: Urology    TOOTH EXTRACTION         Family History   Problem Relation Age of Onset    Diabetes Mother     Hyperlipidemia Mother     Stroke Mother     Heart disease Mother     Asthma Mother    Wu Other Mother         Degen  of Intervertabral disc   Nephrolithiasis Father     Nephrolithiasis Brother      I have reviewed and agree with the history as documented  E-Cigarette/Vaping    E-Cigarette Use Never User      E-Cigarette/Vaping Substances     Social History     Tobacco Use    Smoking status: Current Every Day Smoker     Packs/day: 0 25     Years: 13 00     Pack years: 3 25     Types: Cigarettes    Smokeless tobacco: Never Used   Substance Use Topics    Alcohol use: No    Drug use: No       Review of Systems   Constitutional: Negative for chills and fever  HENT: Negative for congestion  Eyes: Negative for visual disturbance  Respiratory: Negative for cough and shortness of breath  Cardiovascular: Negative for chest pain and leg swelling  Gastrointestinal: Positive for abdominal pain and nausea  Negative for diarrhea and vomiting  Genitourinary: Positive for flank pain  Negative for dysuria, frequency, hematuria, vaginal bleeding, vaginal discharge and vaginal pain         "bleeding and pain in my clit"   Musculoskeletal: Negative for arthralgias, back pain, neck pain and neck stiffness  Skin: Negative for rash  Neurological: Negative for weakness, numbness and headaches  Psychiatric/Behavioral: Negative for agitation, behavioral problems and confusion         Physical Exam  Physical Exam  Constitutional:       General: She is in acute distress (appears uncomfortable)  Appearance: She is well-developed  She is not diaphoretic  HENT:      Head: Normocephalic and atraumatic  Right Ear: External ear normal       Left Ear: External ear normal       Nose: Nose normal    Eyes:      Conjunctiva/sclera: Conjunctivae normal    Neck:      Musculoskeletal: Normal range of motion and neck supple  Cardiovascular:      Rate and Rhythm: Normal rate and regular rhythm  Heart sounds: Normal heart sounds  No murmur  No friction rub  No gallop  Pulmonary:      Effort: Pulmonary effort is normal  No respiratory distress  Breath sounds: Normal breath sounds  No wheezing or rales  Abdominal:      General: Bowel sounds are normal  There is no distension  Palpations: Abdomen is soft  Tenderness: There is abdominal tenderness (bilateral lower quadrants, suprapubic region, and the R flank)  There is no guarding  Genitourinary:     Comments: Normal appearance to the clitoris, clitoral ross, and labia majora and minora  Vaginal orifice normal in appearance  Copious thin clear vaginal discharge within the vaginal vault  No blood  Cervix normal in appearance without friability  Patient reports discomfort with gentle insertion of the speculum, as well as cervical motion tenderness  No adnexal tenderness or fullness  Uterus normal in size and position with tenderness with palpation  Musculoskeletal: Normal range of motion  General: No deformity  Right lower leg: No edema  Left lower leg: No edema  Skin:     General: Skin is warm and dry  Neurological:      Mental Status: She is alert and oriented to person, place, and time  Motor: No abnormal muscle tone     Psychiatric:         Mood and Affect: Mood normal          Vital Signs  ED Triage Vitals   Temperature Pulse Respirations Blood Pressure SpO2   04/30/21 1948 04/30/21 1948 04/30/21 1948 04/30/21 1948 04/30/21 1948   98 5 °F (36 9 °C) 103 18 132/69 100 %      Temp Source Heart Rate Source Patient Position - Orthostatic VS BP Location FiO2 (%)   04/30/21 1948 -- -- -- --   Oral          Pain Score       04/30/21 2027       7           Vitals:    04/30/21 1948   BP: 132/69   Pulse: 103         Visual Acuity      ED Medications  Medications   doxycycline hyclate (VIBRAMYCIN) capsule 100 mg (has no administration in time range)   cefTRIAXone (ROCEPHIN) 500 mg in lidocaine (PF) (XYLOCAINE-MPF) 1 % IM only syringe (has no administration in time range)   ondansetron (ZOFRAN-ODT) dispersible tablet 4 mg (has no administration in time range)   morphine (PF) 4 mg/mL injection 4 mg (4 mg Intravenous Given 4/30/21 2027)   ondansetron (ZOFRAN) injection 4 mg (4 mg Intravenous Given 4/30/21 2026)   oxyCODONE (ROXICODONE) IR tablet 5 mg (5 mg Oral Given 4/30/21 2125)   iohexol (OMNIPAQUE) 350 MG/ML injection (MULTI-DOSE) 100 mL (100 mL Intravenous Given 4/30/21 2144)       Diagnostic Studies  Results Reviewed     Procedure Component Value Units Date/Time    Comprehensive metabolic panel [[id number]] Collected: 04/30/21 2028    Lab Status: Final result Specimen: Blood from Arm, Right Updated: 04/30/21 2137     Sodium 142 mmol/L      Potassium 3 7 mmol/L      Chloride 105 mmol/L      CO2 28 mmol/L      ANION GAP 9 mmol/L      BUN 12 mg/dL      Creatinine 0 80 mg/dL      Glucose 110 mg/dL      Calcium 9 6 mg/dL      AST 11 U/L      ALT 16 U/L      Alkaline Phosphatase 58 U/L      Total Protein 7 1 g/dL      Albumin 3 9 g/dL      Total Bilirubin 0 20 mg/dL      eGFR 99 ml/min/1 73sq m     Narrative:      Ingrid guidelines for Chronic Kidney Disease (CKD):     Stage 1 with normal or high GFR (GFR > 90 mL/min/1 73 square meters)    Stage 2 Mild CKD (GFR = 60-89 mL/min/1 73 square meters)    Stage 3A Moderate CKD (GFR = 45-59 mL/min/1 73 square meters)    Stage 3B Moderate CKD (GFR = 30-44 mL/min/1 73 square meters)    Stage 4 Severe CKD (GFR = 15-29 mL/min/1 73 square meters)    Stage 5 End Stage CKD (GFR <15 mL/min/1 73 square meters)  Note: GFR calculation is accurate only with a steady state creatinine    CBC and differential [[id number]] Collected: 04/30/21 2028    Lab Status: Final result Specimen: Blood from Arm, Right Updated: 04/30/21 2116     WBC 5 48 Thousand/uL      RBC 4 43 Million/uL      Hemoglobin 13 2 g/dL      Hematocrit 40 0 %      MCV 90 fL      MCH 29 8 pg      MCHC 33 0 g/dL      RDW 12 2 %      MPV 10 2 fL      Platelets 598 Thousands/uL      nRBC 0 /100 WBCs      Neutrophils Relative 56 %      Immat GRANS % 0 %      Lymphocytes Relative 33 %      Monocytes Relative 7 %      Eosinophils Relative 3 %      Basophils Relative 1 %      Neutrophils Absolute 3 07 Thousands/µL      Immature Grans Absolute 0 02 Thousand/uL      Lymphocytes Absolute 1 78 Thousands/µL      Monocytes Absolute 0 39 Thousand/µL      Eosinophils Absolute 0 18 Thousand/µL      Basophils Absolute 0 04 Thousands/µL     Molecular Vaginal Panel [[id number]] Collected: 04/30/21 2104    Lab Status: In process Specimen: Genital from Vaginal Updated: 04/30/21 2108    [facility] amplified DNA by PCR [[id number]] Collected: 04/30/21 2016    Lab Status:  In process Specimen: Urine, Other Updated: 04/30/21 2019    POCT pregnancy, urine [[id number]]  (Normal) Resulted: 04/30/21 2014    Lab Status: Final result Updated: 04/30/21 2014     EXT PREG TEST UR (Ref: Negative) negative     Control valid    Urine Macroscopic, POC [[id number]] Collected: 04/30/21 2012    Lab Status: Final result Specimen: Urine Updated: 04/30/21 2013     Color, UA Yellow     Clarity, UA Cloudy     pH, UA 7 0     Leukocytes, UA Negative     Nitrite, UA Negative     Protein, UA Negative mg/dl      Glucose, UA Negative mg/dl      Ketones, UA Negative mg/dl      Urobilinogen, UA 1 0 E U /dl      Bilirubin, UA Negative     Blood, UA Negative     Specific Gravity, UA 1 020    Narrative:      CLINITEK RESULT                 CT abdomen pelvis with contrast   Final Result by Leydi Rodriguez DO (04/30 2202)      No acute abnormality identified  Workstation performed: [id number]                    Procedures  Procedures         ED Course                                           MDM  Number of Diagnoses or Management Options  Pelvic inflammatory disease (PID): new and requires workup  Diagnosis management comments: Patient appears uncomfortable but is afebrile and hemodynamically stable  Abdomen with tenderness to the bilateral lower quadrants and suprapubic region, as well as to the flank  UA not consistent with UTI  Patient reports severe pain in the area of her clitoris with associated bleeding  Exam of the external genitalia is normal, without blood, swelling, or signs of trauma in the clitoral region  Patient does have cervical motion tenderness and copious clear vaginal discharge  She denies history of or concern for STIs but testing was sent for gonorrhea and chlamydia as well as molecular vaginal panel  CT of the abdomen pelvis without evidence of acute abnormality  Her reproductive organs appear unremarkable  Based on the location of her pain is bilateral as well as her exam and subacute onset doubt ovarian torsion  Will treat for suspected PID  Phone number given to establish with an OB gyn for follow-up  Return precautions discussed         Amount and/or Complexity of Data Reviewed  Clinical lab tests: ordered and reviewed  Tests in the radiology section of CPT®: reviewed and ordered    Patient Progress  Patient progress: stable           Disposition  Final diagnoses:   Pelvic inflammatory disease (PID)     Time reflects when diagnosis was documented in both MDM as applicable and the Disposition within this note     Time User Action Codes Description Comment    4/30/2021 10:28 PM Stefany Darrick Add [N73 9] Pelvic inflammatory disease (PID)       ED Disposition     ED Disposition Condition Date/Time Comment    Discharge Stable Fri Apr 30, 2021 10:28 PM Sydni Villatoro discharge to home/self care  Follow-up Information     Follow up With Specialties Details Why Contact Info Additional [address] [facility]  Please call to schedule an appointment with an ob gyn for further evaluation of your pelvic pain  [address] [facility] [id number]  [place] [address], [facility] [address], [place], [place], [address],     [address] Emergency Department Emergency Medicine  As we discussed, return to the Emergency Department immediately for sudden worsening of your pain, fever, or vomiting with inability to tolerate oral intake  [address] [facility] [address] [facility] Emergency Department, [address], [place], [place], [address]          Patient's Medications   Discharge Prescriptions    DOXYCYCLINE HYCLATE (VIBRAMYCIN) 100 MG CAPSULE    Take 1 capsule (100 mg total) by mouth every 12 (twelve) hours for 14 days       Start Date: 4/30/2021 End Date: 5/14/2021       Order Dose: 100 mg       Quantity: 28 capsule    Refills: 0    ONDANSETRON (ZOFRAN-ODT) 4 MG DISINTEGRATING TABLET    Take 1 tablet (4 mg total) by mouth every 8 (eight) hours as needed for nausea or vomiting for up to 3 days       Start Date: 4/30/2021 End Date: 5/3/2021       Order Dose: 4 mg       Quantity: 9 tablet    Refills: 0     No discharge procedures on file      PDMP Review       Value Time User    PDMP Reviewed  Yes 9/24/2020  7:59 PM Pooja Shaffer DO          ED Provider  Electronically Signed by           Syed Sharma MD  04/30/21 1376

## 2021-05-04 LAB
C TRACH DNA SPEC QL NAA+PROBE: NEGATIVE
N GONORRHOEA DNA SPEC QL NAA+PROBE: NEGATIVE

## 2021-05-20 ENCOUNTER — HOSPITAL ENCOUNTER (EMERGENCY)
Facility: HOSPITAL | Age: 31
Discharge: HOME | End: 2021-05-21
Payer: COMMERCIAL

## 2021-05-20 VITALS
SYSTOLIC BLOOD PRESSURE: 119 MMHG | WEIGHT: 124 LBS | HEART RATE: 108 BPM | BODY MASS INDEX: 24.35 KG/M2 | OXYGEN SATURATION: 99 % | TEMPERATURE: 98 F | RESPIRATION RATE: 20 BRPM | HEIGHT: 60 IN | DIASTOLIC BLOOD PRESSURE: 62 MMHG

## 2021-05-20 DIAGNOSIS — N30.00 ACUTE CYSTITIS WITHOUT HEMATURIA: Primary | ICD-10-CM

## 2021-05-20 DIAGNOSIS — R11.0 NAUSEA: ICD-10-CM

## 2021-05-20 PROCEDURE — 81025 URINE PREGNANCY TEST: CPT

## 2021-05-20 PROCEDURE — 85610 PROTHROMBIN TIME: CPT

## 2021-05-20 PROCEDURE — 85730 THROMBOPLASTIN TIME PARTIAL: CPT

## 2021-05-20 PROCEDURE — 80053 COMPREHEN METABOLIC PANEL: CPT

## 2021-05-20 PROCEDURE — 85025 COMPLETE CBC W/AUTO DIFF WBC: CPT

## 2021-05-20 PROCEDURE — 36415 COLL VENOUS BLD VENIPUNCTURE: CPT

## 2021-05-20 PROCEDURE — 99285 EMERGENCY DEPT VISIT HI MDM: CPT

## 2021-05-20 PROCEDURE — 99284 EMERGENCY DEPT VISIT MOD MDM: CPT

## 2021-05-20 RX ORDER — ONDANSETRON 2 MG/ML
4 INJECTION INTRAMUSCULAR; INTRAVENOUS ONCE
Status: COMPLETED | OUTPATIENT
Start: 2021-05-20 | End: 2021-05-21

## 2021-05-20 RX ORDER — MORPHINE SULFATE 4 MG/ML
4 INJECTION, SOLUTION INTRAMUSCULAR; INTRAVENOUS ONCE
Status: COMPLETED | OUTPATIENT
Start: 2021-05-20 | End: 2021-05-21

## 2021-05-20 RX ORDER — SODIUM CHLORIDE 9 MG/ML
500 INJECTION, SOLUTION INTRAVENOUS ONCE
Status: COMPLETED | OUTPATIENT
Start: 2021-05-20 | End: 2021-05-21

## 2021-05-21 ENCOUNTER — APPOINTMENT (EMERGENCY)
Dept: CT IMAGING | Facility: HOSPITAL | Age: 31
End: 2021-05-21
Payer: COMMERCIAL

## 2021-05-21 LAB
ALBUMIN SERPL BCP-MCNC: 4.1 G/DL (ref 3.4–4.8)
ALP SERPL-CCNC: 50.1 U/L (ref 35–140)
ALT SERPL W P-5'-P-CCNC: 8 U/L (ref 5–54)
ANION GAP SERPL CALCULATED.3IONS-SCNC: 6 MMOL/L (ref 4–13)
APTT PPP: 26 SECONDS (ref 23–31)
AST SERPL W P-5'-P-CCNC: 11 U/L (ref 15–41)
BACTERIA UR QL AUTO: ABNORMAL /HPF
BASOPHILS # BLD AUTO: 0.03 THOUSANDS/ΜL (ref 0–0.1)
BASOPHILS NFR BLD AUTO: 0 % (ref 0–1)
BILIRUB SERPL-MCNC: 0.24 MG/DL (ref 0.3–1.2)
BILIRUB UR QL STRIP: NEGATIVE
BUN SERPL-MCNC: 13 MG/DL (ref 6–20)
CALCIUM SERPL-MCNC: 9.4 MG/DL (ref 8.4–10.2)
CHLORIDE SERPL-SCNC: 106 MMOL/L (ref 96–108)
CLARITY UR: CLEAR
CO2 SERPL-SCNC: 27 MMOL/L (ref 22–33)
COLOR UR: YELLOW
CREAT SERPL-MCNC: 0.67 MG/DL (ref 0.4–1.1)
EOSINOPHIL # BLD AUTO: 0.24 THOUSAND/ΜL (ref 0–0.61)
EOSINOPHIL NFR BLD AUTO: 3 % (ref 0–6)
ERYTHROCYTE [DISTWIDTH] IN BLOOD BY AUTOMATED COUNT: 12.5 % (ref 11.6–15.1)
EXT PREG TEST URINE: NEGATIVE
EXT. CONTROL ED NAV: NORMAL
GFR SERPL CREATININE-BSD FRML MDRD: 118 ML/MIN/1.73SQ M
GLUCOSE SERPL-MCNC: 123 MG/DL (ref 65–140)
GLUCOSE UR STRIP-MCNC: NEGATIVE MG/DL
HCT VFR BLD AUTO: 39.6 % (ref 34.8–46.1)
HGB BLD-MCNC: 13.1 G/DL (ref 11.5–15.4)
HGB UR QL STRIP.AUTO: ABNORMAL
IMM GRANULOCYTES # BLD AUTO: 0.01 THOUSAND/UL (ref 0–0.2)
IMM GRANULOCYTES NFR BLD AUTO: 0 % (ref 0–2)
INR PPP: 1.09 (ref 0.9–1.1)
KETONES UR STRIP-MCNC: NEGATIVE MG/DL
LEUKOCYTE ESTERASE UR QL STRIP: ABNORMAL
LYMPHOCYTES # BLD AUTO: 2.28 THOUSANDS/ΜL (ref 0.6–4.47)
LYMPHOCYTES NFR BLD AUTO: 31 % (ref 14–44)
MCH RBC QN AUTO: 30.5 PG (ref 26.8–34.3)
MCHC RBC AUTO-ENTMCNC: 33.1 G/DL (ref 31.4–37.4)
MCV RBC AUTO: 92 FL (ref 82–98)
MONOCYTES # BLD AUTO: 0.6 THOUSAND/ΜL (ref 0.17–1.22)
MONOCYTES NFR BLD AUTO: 8 % (ref 4–12)
NEUTROPHILS # BLD AUTO: 4.14 THOUSANDS/ΜL (ref 1.85–7.62)
NEUTS SEG NFR BLD AUTO: 58 % (ref 43–75)
NITRITE UR QL STRIP: NEGATIVE
NON-SQ EPI CELLS URNS QL MICRO: ABNORMAL /HPF
PH UR STRIP.AUTO: 6.5 [PH]
PLATELET # BLD AUTO: 259 THOUSANDS/UL (ref 149–390)
PMV BLD AUTO: 10.1 FL (ref 8.9–12.7)
POTASSIUM SERPL-SCNC: 3.9 MMOL/L (ref 3.5–5)
PROT SERPL-MCNC: 6.5 G/DL (ref 6.4–8.3)
PROT UR STRIP-MCNC: NEGATIVE MG/DL
PROTHROMBIN TIME: 12.3 SECONDS (ref 9.5–12.1)
RBC # BLD AUTO: 4.3 MILLION/UL (ref 3.81–5.12)
RBC #/AREA URNS AUTO: ABNORMAL /HPF
SODIUM SERPL-SCNC: 139 MMOL/L (ref 133–145)
SP GR UR STRIP.AUTO: 1.01 (ref 1–1.03)
UROBILINOGEN UR QL STRIP.AUTO: 0.2 E.U./DL
WBC # BLD AUTO: 7.3 THOUSAND/UL (ref 4.31–10.16)
WBC #/AREA URNS AUTO: ABNORMAL /HPF

## 2021-05-21 PROCEDURE — 74176 CT ABD & PELVIS W/O CONTRAST: CPT

## 2021-05-21 PROCEDURE — 96361 HYDRATE IV INFUSION ADD-ON: CPT

## 2021-05-21 PROCEDURE — G1004 CDSM NDSC: HCPCS

## 2021-05-21 PROCEDURE — 81003 URINALYSIS AUTO W/O SCOPE: CPT

## 2021-05-21 PROCEDURE — 96374 THER/PROPH/DIAG INJ IV PUSH: CPT

## 2021-05-21 PROCEDURE — 96375 TX/PRO/DX INJ NEW DRUG ADDON: CPT

## 2021-05-21 PROCEDURE — 81001 URINALYSIS AUTO W/SCOPE: CPT

## 2021-05-21 PROCEDURE — 87086 URINE CULTURE/COLONY COUNT: CPT

## 2021-05-21 RX ORDER — ONDANSETRON 4 MG/1
4 TABLET, FILM COATED ORAL EVERY 6 HOURS
Qty: 12 TABLET | Refills: 0 | Status: SHIPPED | OUTPATIENT
Start: 2021-05-21 | End: 2021-05-30

## 2021-05-21 RX ORDER — PHENAZOPYRIDINE HYDROCHLORIDE 100 MG/1
100 TABLET, FILM COATED ORAL ONCE
Status: COMPLETED | OUTPATIENT
Start: 2021-05-21 | End: 2021-05-21

## 2021-05-21 RX ORDER — PHENAZOPYRIDINE HYDROCHLORIDE 200 MG/1
200 TABLET, FILM COATED ORAL 3 TIMES DAILY
Qty: 6 TABLET | Refills: 0 | Status: SHIPPED | OUTPATIENT
Start: 2021-05-21 | End: 2021-05-30

## 2021-05-21 RX ORDER — NITROFURANTOIN 25; 75 MG/1; MG/1
100 CAPSULE ORAL 2 TIMES DAILY
Qty: 14 CAPSULE | Refills: 0 | Status: SHIPPED | OUTPATIENT
Start: 2021-05-21 | End: 2021-05-28

## 2021-05-21 RX ORDER — NITROFURANTOIN 25; 75 MG/1; MG/1
100 CAPSULE ORAL 2 TIMES DAILY WITH MEALS
Status: DISCONTINUED | OUTPATIENT
Start: 2021-05-21 | End: 2021-05-21

## 2021-05-21 RX ORDER — NITROFURANTOIN 25; 75 MG/1; MG/1
100 CAPSULE ORAL 2 TIMES DAILY WITH MEALS
Status: DISCONTINUED | OUTPATIENT
Start: 2021-05-21 | End: 2021-05-21 | Stop reason: HOSPADM

## 2021-05-21 RX ADMIN — MORPHINE SULFATE 4 MG: 4 INJECTION INTRAVENOUS at 00:12

## 2021-05-21 RX ADMIN — ONDANSETRON 4 MG: 2 INJECTION INTRAMUSCULAR; INTRAVENOUS at 00:11

## 2021-05-21 RX ADMIN — PHENAZOPYRIDINE 100 MG: 100 TABLET ORAL at 02:03

## 2021-05-21 RX ADMIN — NITROFURANTOIN (MONOHYDRATE/MACROCRYSTALS) 100 MG: 75; 25 CAPSULE ORAL at 02:03

## 2021-05-21 RX ADMIN — SODIUM CHLORIDE 500 ML/HR: 0.9 INJECTION, SOLUTION INTRAVENOUS at 00:10

## 2021-05-21 NOTE — ED PROVIDER NOTES
History  Chief Complaint   Patient presents with    Nausea     pt presents to ed via walk in with menstraul cramps really bad and nausea      40-year-old female known history multiple medical problems including asthma GERD migraines TMJ here secondary to significant suprapubic pain radiating to the right  Flank  Patient complaining of moderate to severe pain says he can not get comfortable she is nauseous  Patient states that she started on her period 3 days ago and states that she feels this is just very painful     Patient recently had PID diagnosis was treated for this a few weeks ago  Prior to Admission Medications   Prescriptions Last Dose Informant Patient Reported? Taking?   ibuprofen (MOTRIN) 600 mg tablet 5/20/2021 at Unknown time  No Yes   Sig: Take 1 tablet (600 mg total) by mouth every 6 (six) hours as needed (Pain)   pantoprazole (PROTONIX) 40 mg tablet 5/20/2021 at Unknown time  No Yes   Sig: Take 1 tablet (40 mg total) by mouth daily      Facility-Administered Medications: None       Past Medical History:   Diagnosis Date    Asthma     GERD (gastroesophageal reflux disease)     Hernia, abdominal     Migraines     Psychiatric disorder     Anx, Depression    Renal disorder     kidney stones       Past Surgical History:   Procedure Laterality Date    FL RETROGRADE PYELOGRAM  8/28/2018    HERNIA REPAIR      OR CYSTO/URETERO W/LITHOTRIPSY &INDWELL STENT INSRT Right 8/28/2018    Procedure: CYSTOSCOPY URETEROSCOPY WITH RETROGRADE PYELOGRAM AND INSERTION STENT URETERAL;  Surgeon: Holly Haddad MD;  [facility];  Service: Urology    TOOTH EXTRACTION         Family History   Problem Relation Age of Onset    Diabetes Mother     Hyperlipidemia Mother     Stroke Mother     Heart disease Mother     Asthma Mother    Talia Nine Other Mother         Degen  of Intervertabral disc      Nephrolithiasis Father     Nephrolithiasis Brother      I have reviewed and agree with the history as documented  E-Cigarette/Vaping    E-Cigarette Use Never User      E-Cigarette/Vaping Substances     Social History     Tobacco Use    Smoking status: Current Every Day Smoker     Packs/day: 0 25     Years: 13 00     Pack years: 3 25     Types: Cigarettes    Smokeless tobacco: Never Used   Substance Use Topics    Alcohol use: No    Drug use: No       Review of Systems   Constitutional: Negative for chills and fever  HENT: Negative for congestion  Eyes: Negative for visual disturbance  Respiratory: Negative for shortness of breath  Cardiovascular: Negative for chest pain  Gastrointestinal: Positive for abdominal pain and nausea  Endocrine: Negative for cold intolerance  Genitourinary: Negative for frequency  Musculoskeletal: Positive for back pain  Negative for gait problem  Skin: Negative for rash  Neurological: Negative for dizziness  Psychiatric/Behavioral: Negative for behavioral problems and confusion  Physical Exam  Physical Exam  Vitals signs and nursing note reviewed  Constitutional:       Appearance: She is well-developed  HENT:      Head: Normocephalic and atraumatic  Left Ear: Tympanic membrane normal       Nose: Nose normal       Mouth/Throat:      Mouth: Mucous membranes are moist    Eyes:      Conjunctiva/sclera: Conjunctivae normal       Pupils: Pupils are equal, round, and reactive to light  Neck:      Musculoskeletal: Normal range of motion and neck supple  Cardiovascular:      Rate and Rhythm: Normal rate and regular rhythm  Heart sounds: Normal heart sounds  Pulmonary:      Effort: Pulmonary effort is normal       Breath sounds: Normal breath sounds  Abdominal:      General: Bowel sounds are normal       Tenderness: There is right CVA tenderness  Comments: Pain to palpation suprapubic region   Musculoskeletal: Normal range of motion  Skin:     General: Skin is warm and dry        Capillary Refill: Capillary refill takes less than 2 seconds  Neurological:      Mental Status: She is alert and oriented to person, place, and time  Psychiatric:         Behavior: Behavior normal          Vital Signs  ED Triage Vitals [05/20/21 2310]   Temperature Pulse Respirations Blood Pressure SpO2   98 °F (36 7 °C) (!) 108 20 119/62 99 %      Temp Source Heart Rate Source Patient Position - Orthostatic VS BP Location FiO2 (%)   Oral Monitor Sitting Right arm --      Pain Score       9           Vitals:    05/20/21 2310   BP: 119/62   Pulse: (!) 108   Patient Position - Orthostatic VS: Sitting         Visual Acuity      ED Medications  Medications   phenazopyridine (PYRIDIUM) tablet 100 mg (has no administration in time range)   nitrofurantoin (MACROBID) extended-release capsule 100 mg (has no administration in time range)   sodium chloride 0 9 % infusion (0 mL/hr Intravenous Stopped 5/21/21 0042)   morphine (PF) 4 mg/mL injection 4 mg (4 mg Intravenous Given 5/21/21 0012)   ondansetron (ZOFRAN) injection 4 mg (4 mg Intravenous Given 5/21/21 0011)       Diagnostic Studies  Results Reviewed     Procedure Component Value Units Date/Time    POCT pregnancy, urine [[id number]]  (Normal) Resulted: 05/21/21 0112    Lab Status: Final result Updated: 05/21/21 0112     EXT PREG TEST UR (Ref: Negative) negative     Control valid    Urine Microscopic [[id number]]  (Abnormal) Collected: 05/21/21 0019    Lab Status: Final result Specimen: Urine, Clean Catch Updated: 05/21/21 0106     RBC, UA 30-50 /hpf      WBC, UA 10-20 /hpf      Epithelial Cells Innumerable /hpf      Bacteria, UA Moderate /hpf     Urine culture [[id number]] Collected: 05/21/21 0019    Lab Status:  In process Specimen: Urine, Clean Catch Updated: 05/21/21 0106    UA w Reflex to Microscopic w Reflex to Culture [[id number]]  (Abnormal) Collected: 05/21/21 0019    Lab Status: Final result Specimen: Urine, Clean Catch Updated: 05/21/21 0104     Color, UA Yellow     Clarity, UA Clear     Specific [place], UA 1 010 pH, UA 6 5     Leukocytes, UA 1+     Nitrite, UA Negative     Protein, UA Negative mg/dl      Glucose, UA Negative mg/dl      Ketones, UA Negative mg/dl      Urobilinogen, UA 0 2 E U /dl      Bilirubin, UA Negative     Blood, UA 3+    Protime-INR [[id number]]  (Abnormal) Collected: 05/20/21 2345    Lab Status: Final result Specimen: Blood from Arm, Right Updated: 05/21/21 0011     Protime 12 3 seconds      INR 1 09    Narrative:      INR Reference Ranges:  No Anticoagulant, Normal:           0 9-1 1  Standard Dose, Oral Anticoagulant:  2 0-3 0  High Dose, Oral Anticoagulant:      2 5-3 5    APTT [[id number]]  (Normal) Collected: 05/20/21 2345    Lab Status: Final result Specimen: Blood from Arm, Right Updated: 05/21/21 0011     PTT 26 seconds     Comprehensive metabolic panel [[id number]]  (Abnormal) Collected: 05/20/21 2345    Lab Status: Final result Specimen: Blood from Arm, Right Updated: 05/21/21 0009     Sodium 139 mmol/L      Potassium 3 9 mmol/L      Chloride 106 mmol/L      CO2 27 mmol/L      ANION GAP 6 mmol/L      BUN 13 mg/dL      Creatinine 0 67 mg/dL      Glucose 123 mg/dL      Calcium 9 4 mg/dL      AST 11 U/L      ALT 8 U/L      Alkaline Phosphatase 50 1 U/L      Total Protein 6 5 g/dL      Albumin 4 1 g/dL      Total Bilirubin 0 24 mg/dL      eGFR 118 ml/min/1 73sq m     Narrative:      Ingrid guidelines for Chronic Kidney Disease (CKD):     Stage 1 with normal or high GFR (GFR > 90 mL/min/1 73 square meters)    Stage 2 Mild CKD (GFR = 60-89 mL/min/1 73 square meters)    Stage 3A Moderate CKD (GFR = 45-59 mL/min/1 73 square meters)    Stage 3B Moderate CKD (GFR = 30-44 mL/min/1 73 square meters)    Stage 4 Severe CKD (GFR = 15-29 mL/min/1 73 square meters)    Stage 5 End Stage CKD (GFR <15 mL/min/1 73 square meters)  Note: GFR calculation is accurate only with a steady state creatinine    CBC and differential [[id number]]  (Normal) Collected: 05/20/21 2345    Lab Status: Final result Specimen: Blood from Arm, Right Updated: 05/21/21 0005     WBC 7 30 Thousand/uL      RBC 4 30 Million/uL      Hemoglobin 13 1 g/dL      Hematocrit 39 6 %      MCV 92 fL      MCH 30 5 pg      MCHC 33 1 g/dL      RDW 12 5 %      MPV 10 1 fL      Platelets 454 Thousands/uL      Neutrophils Relative 58 %      Immat GRANS % 0 %      Lymphocytes Relative 31 %      Monocytes Relative 8 %      Eosinophils Relative 3 %      Basophils Relative 0 %      Neutrophils Absolute 4 14 Thousands/µL      Immature Grans Absolute 0 01 Thousand/uL      Lymphocytes Absolute 2 28 Thousands/µL      Monocytes Absolute 0 60 Thousand/µL      Eosinophils Absolute 0 24 Thousand/µL      Basophils Absolute 0 03 Thousands/µL                  CT abdomen pelvis wo contrast   Final Result by Glenny Rand DO (05/21 0127)      Thickening of the urinary bladder wall which may represent cystitis  Workstation performed: [id number]                    Procedures  Procedures         ED Course                             SBIRT 20yo+      Most Recent Value   SBIRT (25 yo +)   In order to provide better care to our patients, we are screening all of our patients for alcohol and drug use  Would it be okay to ask you these screening questions? No Filed at: 05/21/2021 0114                    [facility]  Number of Diagnoses or Management Options  Diagnosis management comments: Urine hCG negative urinalysis with straight started 30 DVTs per high-power patient had positive blood positive bacteria the white count H&H essentially unremarkable chemistry since remarkable patient received morphine 4 mg IV Zofran 4 mg IV for her symptoms  CT scan abdomen pelvis no contrast demonstrates no signs of a kidney stone no signs of any acute pelvic process just thickening of the bladder consistent with cystitis    My impression is patient has severe cystitis and be placed on Pyridium and Bactrim discharge home to follow-up with her physician within the next 5-7 days if not better      Disposition  Final diagnoses:   Acute cystitis without hematuria     Time reflects when diagnosis was documented in both MDM as applicable and the Disposition within this note     Time User Action Codes Description Comment    5/21/2021  1:46 AM Marilyn Recinos Connor [N30 00] Acute cystitis without hematuria       ED Disposition     ED Disposition Condition Date/Time Comment    Discharge Stable Fri May 21, 2021  1:46 AM Yeyo Junior discharge to home/self care  Follow-up Information     Follow up With Specialties Details Why Contact Info    Juan Barragan MD Family Medicine Schedule an appointment as soon as possible for a visit in 3 days  [address] [facility]  [phone]            Patient's Medications   Discharge Prescriptions    NITROFURANTOIN (MACROBID) 100 MG CAPSULE    Take 1 capsule (100 mg total) by mouth 2 (two) times a day for 7 days       Start Date: 5/21/2021 End Date: 5/28/2021       Order Dose: 100 mg       Quantity: 14 capsule    Refills: 0    PHENAZOPYRIDINE (PYRIDIUM) 200 MG TABLET    Take 1 tablet (200 mg total) by mouth 3 (three) times a day       Start Date: 5/21/2021 End Date: --       Order Dose: 200 mg       Quantity: 6 tablet    Refills: 0     No discharge procedures on file      PDMP Review       Value Time User    PDMP Reviewed  Yes 9/24/2020  7:59 PM Mark May DO          ED Provider  Electronically Signed by           Rylie Coon MD  05/21/21 9361

## 2021-05-21 NOTE — ED NOTES
AVS reviewed and questions answered, pt verbalized understanding  Pt d/c to home, ambulatory and with steady gait        Nina Ibrahim RN  05/21/21 0055
Home

## 2021-05-22 LAB — BACTERIA UR CULT: NORMAL

## 2021-05-27 ENCOUNTER — APPOINTMENT (EMERGENCY)
Dept: RADIOLOGY | Facility: HOSPITAL | Age: 31
End: 2021-05-27
Payer: COMMERCIAL

## 2021-05-27 ENCOUNTER — HOSPITAL ENCOUNTER (EMERGENCY)
Facility: HOSPITAL | Age: 31
Discharge: HOME | End: 2021-05-28
Attending: EMERGENCY MEDICINE | Admitting: EMERGENCY MEDICINE
Payer: COMMERCIAL

## 2021-05-27 VITALS
SYSTOLIC BLOOD PRESSURE: 124 MMHG | DIASTOLIC BLOOD PRESSURE: 79 MMHG | RESPIRATION RATE: 16 BRPM | TEMPERATURE: 98 F | OXYGEN SATURATION: 100 % | HEART RATE: 112 BPM

## 2021-05-27 DIAGNOSIS — R10.9 RIGHT FLANK PAIN: ICD-10-CM

## 2021-05-27 DIAGNOSIS — R11.0 NAUSEA: ICD-10-CM

## 2021-05-27 DIAGNOSIS — R07.89 CHEST WALL PAIN: Primary | ICD-10-CM

## 2021-05-27 DIAGNOSIS — M25.511 RIGHT SHOULDER PAIN: ICD-10-CM

## 2021-05-27 LAB
BILIRUB UR QL STRIP: NEGATIVE
CLARITY UR: ABNORMAL
COLOR UR: YELLOW
EXT PREG TEST URINE: NEGATIVE
EXT. CONTROL ED NAV: NORMAL
GLUCOSE UR STRIP-MCNC: NEGATIVE MG/DL
HGB UR QL STRIP.AUTO: NEGATIVE
KETONES UR STRIP-MCNC: ABNORMAL MG/DL
LEUKOCYTE ESTERASE UR QL STRIP: NEGATIVE
NITRITE UR QL STRIP: NEGATIVE
PH UR STRIP.AUTO: 6.5 [PH] (ref 4.5–8)
PROT UR STRIP-MCNC: NEGATIVE MG/DL
SP GR UR STRIP.AUTO: >=1.03 (ref 1–1.03)
UROBILINOGEN UR QL STRIP.AUTO: 2 E.U./DL

## 2021-05-27 PROCEDURE — 71046 X-RAY EXAM CHEST 2 VIEWS: CPT

## 2021-05-27 PROCEDURE — 81025 URINE PREGNANCY TEST: CPT | Performed by: EMERGENCY MEDICINE

## 2021-05-27 PROCEDURE — 73030 X-RAY EXAM OF SHOULDER: CPT

## 2021-05-27 PROCEDURE — 99284 EMERGENCY DEPT VISIT MOD MDM: CPT | Performed by: EMERGENCY MEDICINE

## 2021-05-27 PROCEDURE — 81003 URINALYSIS AUTO W/O SCOPE: CPT

## 2021-05-27 PROCEDURE — 99284 EMERGENCY DEPT VISIT MOD MDM: CPT

## 2021-05-27 PROCEDURE — 93005 ELECTROCARDIOGRAM TRACING: CPT

## 2021-05-27 RX ORDER — ONDANSETRON 4 MG/1
4 TABLET, ORALLY DISINTEGRATING ORAL ONCE
Status: COMPLETED | OUTPATIENT
Start: 2021-05-27 | End: 2021-05-27

## 2021-05-27 RX ADMIN — ONDANSETRON 4 MG: 4 TABLET, ORALLY DISINTEGRATING ORAL at 23:57

## 2021-05-28 LAB
ATRIAL RATE: 106 BPM
P AXIS: 73 DEGREES
PR INTERVAL: 120 MS
QRS AXIS: 83 DEGREES
QRSD INTERVAL: 86 MS
QT INTERVAL: 314 MS
QTC INTERVAL: 397 MS
T WAVE AXIS: -2 DEGREES
VENTRICULAR RATE: 96 BPM

## 2021-05-28 PROCEDURE — 93010 ELECTROCARDIOGRAM REPORT: CPT | Performed by: INTERNAL MEDICINE

## 2021-05-28 RX ORDER — ONDANSETRON 4 MG/1
4 TABLET, FILM COATED ORAL EVERY 8 HOURS PRN
Qty: 15 TABLET | Refills: 0 | Status: SHIPPED | OUTPATIENT
Start: 2021-05-28 | End: 2021-05-30

## 2021-05-28 NOTE — ED PROVIDER NOTES
History  Chief Complaint   Patient presents with    Pain     pt has r flank pain 2 days ago, r shoulder pain last night, tonight r shoulder and chest hurt     Patient is a 32year old female with right flank pain 2 days ago and again today with chest pain and right shoulder pain  No fever  No urinary sx  No travel  (+) nausea  No trauma  [place] [facility] [place] for cystitis and nausea  Tried tylenol and tylenol #3 at home without relief  Declines lidocaine patch since it makes her skin burn  [facility] website checked on this patient and last Rx filled was on 4/24/21 for oxycodone for 3 day supply and multiple narcotic Rxs seen written by multiple providers  History provided by:  Patient and spouse   used: No        Prior to Admission Medications   Prescriptions Last Dose Informant Patient Reported?  Taking?   ibuprofen (MOTRIN) 600 mg tablet   No No   Sig: Take 1 tablet (600 mg total) by mouth every 6 (six) hours as needed (Pain)   nitrofurantoin (MACROBID) 100 mg capsule   No No   Sig: Take 1 capsule (100 mg total) by mouth 2 (two) times a day for 7 days   ondansetron (ZOFRAN) 4 mg tablet   No No   Sig: Take 1 tablet (4 mg total) by mouth every 6 (six) hours   pantoprazole (PROTONIX) 40 mg tablet   No No   Sig: Take 1 tablet (40 mg total) by mouth daily   phenazopyridine (PYRIDIUM) 200 mg tablet   No No   Sig: Take 1 tablet (200 mg total) by mouth 3 (three) times a day      Facility-Administered Medications: None       Past Medical History:   Diagnosis Date    Asthma     GERD (gastroesophageal reflux disease)     Hernia, abdominal     Migraines     Psychiatric disorder     Anx, Depression    Renal disorder     kidney stones       Past Surgical History:   Procedure Laterality Date    FL RETROGRADE PYELOGRAM  8/28/2018    HERNIA REPAIR      [place] CYSTO/URETERO W/LITHOTRIPSY &INDWELL STENT INSRT Right 8/28/2018    Procedure: CYSTOSCOPY URETEROSCOPY WITH RETROGRADE PYELOGRAM AND INSERTION STENT URETERAL;  Surgeon: Javed Cheung MD;  [facility];  Service: Urology    TOOTH EXTRACTION         Family History   Problem Relation Age of Onset    Diabetes Mother     Hyperlipidemia Mother     Stroke Mother     Heart disease Mother     Asthma Mother    Judsoniferin Saleh Other Mother         Degen  of Intervertabral disc   Nephrolithiasis Father     Nephrolithiasis Brother      I have reviewed and agree with the history as documented  E-Cigarette/Vaping    E-Cigarette Use Never User      E-Cigarette/Vaping Substances     Social History     Tobacco Use    Smoking status: Current Every Day Smoker     Packs/day: 0 25     Years: 13 00     Pack years: 3 25     Types: Cigarettes    Smokeless tobacco: Never Used   Substance Use Topics    Alcohol use: No    Drug use: No       Review of Systems   Constitutional: Negative for fever  Cardiovascular: Positive for chest pain  Gastrointestinal: Positive for nausea  Genitourinary: Positive for flank pain  Negative for difficulty urinating  Musculoskeletal: Positive for arthralgias  Physical Exam  Physical Exam  Vitals signs and nursing note reviewed  Constitutional:       General: She is in acute distress (mild)  Appearance: She is not toxic-appearing  HENT:      Head: Normocephalic and atraumatic  Mouth/Throat:      Comments: Mask in place  Eyes:      General: No scleral icterus  Neck:      Musculoskeletal: Normal range of motion and neck supple  Cardiovascular:      Rate and Rhythm: Regular rhythm  Tachycardia present  Heart sounds: Normal heart sounds  No murmur  Pulmonary:      Effort: Pulmonary effort is normal  No respiratory distress  Breath sounds: Normal breath sounds  No stridor  No wheezing, rhonchi or rales  Chest:      Chest wall: Tenderness present  Abdominal:      General: Bowel sounds are normal  There is no distension  Palpations: Abdomen is soft  Tenderness:  There is no abdominal tenderness  There is no right CVA tenderness  Musculoskeletal:         General: No deformity or signs of injury  Skin:     General: Skin is warm and dry  Findings: No erythema or rash  Neurological:      General: No focal deficit present  Mental Status: She is alert  Psychiatric:      Comments: Somewhat anxious  Vital Signs  ED Triage Vitals [05/27/21 2234]   Temperature Pulse Respirations Blood Pressure SpO2   98 °F (36 7 °C) (!) 112 16 124/79 100 %      Temp src Heart Rate Source Patient Position - Orthostatic VS BP Location FiO2 (%)   -- -- Sitting Left arm --      Pain Score       9           Vitals:    05/27/21 2234   BP: 124/79   Pulse: (!) 112   Patient Position - Orthostatic VS: Sitting         Visual Acuity      ED Medications  Medications   ondansetron (ZOFRAN-ODT) dispersible tablet 4 mg (4 mg Oral Given 5/27/21 2357)       Diagnostic Studies  Results Reviewed     Procedure Component Value Units Date/Time    POCT pregnancy, urine [[id number]]  (Normal) Resulted: 05/27/21 2333    Lab Status: Final result Updated: 05/27/21 2333     EXT PREG TEST UR (Ref: Negative) negative     Control valid    Urine Macroscopic, POC [[id number]]  (Abnormal) Collected: 05/27/21 2331    Lab Status: Final result Specimen: Urine Updated: 05/27/21 2332     Color, UA Yellow     Clarity, UA Cloudy     pH, UA 6 5     Leukocytes, UA Negative     Nitrite, UA Negative     Protein, UA Negative mg/dl      Glucose, UA Negative mg/dl      Ketones, UA Trace mg/dl      Urobilinogen, UA 2 0 E U /dl      Bilirubin, UA Negative     Blood, UA Negative     Specific Gravity, UA >=1 030    Narrative:      CLINITEK RESULT                 XR shoulder 2+ views RIGHT   ED Interpretation by Reymundo Miller MD (05/27 2348)   No fx or dislocation read by  me  XR chest 2 views   ED Interpretation by Reymundo Miller MD (05/27 2348)   No acute disease read by me                    Procedures  ECG 12 Lead Documentation Only    Date/Time: 5/27/2021 11:08 PM  Performed by: Lesley Green MD  Authorized by: Lesley Green MD     Indications / Diagnosis:  Chest pain  ECG reviewed by me, the ED Provider: yes    Patient location:  ED  Previous ECG:     Previous ECG:  Compared to current    Comparison ECG info:  7/7/20    Similarity:  Changes noted (s  arrhythmia now)  Quality:     Tracing quality:  Limited by artifact  Rate:     ECG rate:  96    ECG rate assessment: normal    Rhythm:     Rhythm: sinus rhythm      Rhythm comment:  S  arrhythmia  Ectopy:     Ectopy: none    QRS:     QRS axis:  Normal    QRS intervals:  Normal  Conduction:     Conduction: normal    ST segments:     ST segments:  Normal  T waves:     T waves: normal    Comments:      I do not agree with computer reading of nonspecific ST abnormality and abnormal QRS-T angle, consider primary T wave abnormality             ED Course  ED Course as of May 28 0251   Thu May 27, 2021   2323 EKG d/w patient  2348 X-rays and lab d/w patient  I had explained to patient earlier that I am not giving narcotic pain medication  MDM  Number of Diagnoses or Management Options  Diagnosis management comments: DDX including but not limited to: chest wall pain, pleurisy, costochondritis, pericarditis, myocarditis, PTX, pneumonia, GI etiology, arthritis, bursitis; doubt ACS or MI or dissection or PE or rhabdomyolysis or renal colic or pyelonephritis or septic arthritis or fx or dislocation          Amount and/or Complexity of Data Reviewed  Clinical lab tests: reviewed and ordered  Tests in the radiology section of CPT®: ordered and reviewed  Decide to obtain previous medical records or to obtain history from someone other than the patient: yes  Review and summarize past medical records: yes  Independent visualization of images, tracings, or specimens: yes        Disposition  Final diagnoses:   Chest wall pain   Right shoulder pain   Right flank pain   Nausea     Time reflects when diagnosis was documented in both MDM as applicable and the Disposition within this note     Time User Action Codes Description Comment    5/27/2021 11:50 PM Reynaldo Lemus Add [R07 89] Chest wall pain     5/27/2021 11:50 PM Donnal Lemus Add [G22 694] Right shoulder pain     5/27/2021 11:50 PM Donnal Lemus Add [R10 9] Right flank pain     5/27/2021 11:52 PM Donnal Lemus Add [R11 0] Nausea       ED Disposition     ED Disposition Condition Date/Time Comment    Discharge Stable Thu May 27, 2021 11:50 PM Mike Pérez discharge to home/self care  Follow-up Information     Follow up With Specialties Details Why Contact Info Additional Information    Adolfo Orozco MD Family Medicine Call in 1 day tylenol for pain  Return sooner if increased pain, fever, vomiting, difficulty breathing, rash, weakness, dizziness   [address]  [address]  [facility] [address] [facility]  [phone] [address] [facility] Call in 1 day  [address] [phone] [address] [facility] [place], [address], [address] [place], [place], [facility]          Discharge Medication List as of 5/27/2021 11:53 PM      CONTINUE these medications which have NOT CHANGED    Details   ibuprofen (MOTRIN) 600 mg tablet Take 1 tablet (600 mg total) by mouth every 6 (six) hours as needed (Pain), Starting Wed 3/31/2021, Normal      nitrofurantoin (MACROBID) 100 mg capsule Take 1 capsule (100 mg total) by mouth 2 (two) times a day for 7 days, Starting Fri 5/21/2021, Until Fri 5/28/2021, Normal      ondansetron (ZOFRAN) 4 mg tablet Take 1 tablet (4 mg total) by mouth every 6 (six) hours, Starting Fri 5/21/2021, Normal      pantoprazole (PROTONIX) 40 mg tablet Take 1 tablet (40 mg total) by mouth daily, Starting Tue 12/8/2020, Normal phenazopyridine (PYRIDIUM) 200 mg tablet Take 1 tablet (200 mg total) by mouth 3 (three) times a day, Starting Fri 5/21/2021, Normal           No discharge procedures on file      PDMP Review       Value Time User    PDMP Reviewed  Yes 5/27/2021 10:50 PM Roberto Carlos Baptiste MD          ED Provider  Electronically Signed by           Roberto Carlos Baptiste MD  05/27/21 8483       Roberto Carlos Baptiste MD  05/28/21 5989

## 2021-05-30 ENCOUNTER — APPOINTMENT (EMERGENCY)
Dept: CT IMAGING | Facility: HOSPITAL | Age: 31
End: 2021-05-30
Payer: COMMERCIAL

## 2021-05-30 ENCOUNTER — HOSPITAL ENCOUNTER (EMERGENCY)
Facility: HOSPITAL | Age: 31
Discharge: HOME | End: 2021-05-30
Attending: EMERGENCY MEDICINE | Admitting: EMERGENCY MEDICINE
Payer: COMMERCIAL

## 2021-05-30 VITALS
SYSTOLIC BLOOD PRESSURE: 92 MMHG | OXYGEN SATURATION: 99 % | HEART RATE: 65 BPM | BODY MASS INDEX: 24.54 KG/M2 | TEMPERATURE: 98.4 F | DIASTOLIC BLOOD PRESSURE: 57 MMHG | RESPIRATION RATE: 18 BRPM | WEIGHT: 125 LBS | HEIGHT: 60 IN

## 2021-05-30 DIAGNOSIS — K52.9 COLITIS: Primary | ICD-10-CM

## 2021-05-30 LAB
ALBUMIN SERPL BCP-MCNC: 4.3 G/DL (ref 3.4–4.8)
ALP SERPL-CCNC: 49.9 U/L (ref 35–140)
ALT SERPL W P-5'-P-CCNC: 13 U/L (ref 5–54)
ANION GAP SERPL CALCULATED.3IONS-SCNC: 7 MMOL/L (ref 4–13)
AST SERPL W P-5'-P-CCNC: 14 U/L (ref 15–41)
BASOPHILS # BLD AUTO: 0.02 THOUSANDS/ΜL (ref 0–0.1)
BASOPHILS NFR BLD AUTO: 0 % (ref 0–1)
BILIRUB SERPL-MCNC: 0.47 MG/DL (ref 0.3–1.2)
BILIRUB UR QL STRIP: NEGATIVE
BUN SERPL-MCNC: 10 MG/DL (ref 6–20)
CALCIUM SERPL-MCNC: 9.7 MG/DL (ref 8.4–10.2)
CHLORIDE SERPL-SCNC: 104 MMOL/L (ref 96–108)
CLARITY UR: ABNORMAL
CO2 SERPL-SCNC: 29 MMOL/L (ref 22–33)
COLOR UR: YELLOW
CREAT SERPL-MCNC: 0.76 MG/DL (ref 0.4–1.1)
EOSINOPHIL # BLD AUTO: 0.07 THOUSAND/ΜL (ref 0–0.61)
EOSINOPHIL NFR BLD AUTO: 1 % (ref 0–6)
ERYTHROCYTE [DISTWIDTH] IN BLOOD BY AUTOMATED COUNT: 12.6 % (ref 11.6–15.1)
EXT PREG TEST URINE: NORMAL
EXT. CONTROL ED NAV: NORMAL
GFR SERPL CREATININE-BSD FRML MDRD: 105 ML/MIN/1.73SQ M
GLUCOSE SERPL-MCNC: 147 MG/DL (ref 65–140)
GLUCOSE UR STRIP-MCNC: NEGATIVE MG/DL
HCT VFR BLD AUTO: 40.3 % (ref 34.8–46.1)
HGB BLD-MCNC: 13.5 G/DL (ref 11.5–15.4)
HGB UR QL STRIP.AUTO: NEGATIVE
IMM GRANULOCYTES # BLD AUTO: 0 THOUSAND/UL (ref 0–0.2)
IMM GRANULOCYTES NFR BLD AUTO: 0 % (ref 0–2)
KETONES UR STRIP-MCNC: NEGATIVE MG/DL
LEUKOCYTE ESTERASE UR QL STRIP: NEGATIVE
LIPASE SERPL-CCNC: 11 U/L (ref 13–60)
LYMPHOCYTES # BLD AUTO: 1.54 THOUSANDS/ΜL (ref 0.6–4.47)
LYMPHOCYTES NFR BLD AUTO: 26 % (ref 14–44)
MCH RBC QN AUTO: 30.8 PG (ref 26.8–34.3)
MCHC RBC AUTO-ENTMCNC: 33.5 G/DL (ref 31.4–37.4)
MCV RBC AUTO: 92 FL (ref 82–98)
MONOCYTES # BLD AUTO: 0.41 THOUSAND/ΜL (ref 0.17–1.22)
MONOCYTES NFR BLD AUTO: 7 % (ref 4–12)
NEUTROPHILS # BLD AUTO: 3.9 THOUSANDS/ΜL (ref 1.85–7.62)
NEUTS SEG NFR BLD AUTO: 66 % (ref 43–75)
NITRITE UR QL STRIP: NEGATIVE
PH UR STRIP.AUTO: 7.5 [PH]
PLATELET # BLD AUTO: 307 THOUSANDS/UL (ref 149–390)
PMV BLD AUTO: 9.7 FL (ref 8.9–12.7)
POTASSIUM SERPL-SCNC: 3.9 MMOL/L (ref 3.5–5)
PROT SERPL-MCNC: 6.6 G/DL (ref 6.4–8.3)
PROT UR STRIP-MCNC: NEGATIVE MG/DL
RBC # BLD AUTO: 4.38 MILLION/UL (ref 3.81–5.12)
SODIUM SERPL-SCNC: 140 MMOL/L (ref 133–145)
SP GR UR STRIP.AUTO: 1.01 (ref 1–1.03)
UROBILINOGEN UR QL STRIP.AUTO: 1 E.U./DL
WBC # BLD AUTO: 5.94 THOUSAND/UL (ref 4.31–10.16)

## 2021-05-30 PROCEDURE — 80053 COMPREHEN METABOLIC PANEL: CPT | Performed by: EMERGENCY MEDICINE

## 2021-05-30 PROCEDURE — 36415 COLL VENOUS BLD VENIPUNCTURE: CPT | Performed by: EMERGENCY MEDICINE

## 2021-05-30 PROCEDURE — 83690 ASSAY OF LIPASE: CPT | Performed by: EMERGENCY MEDICINE

## 2021-05-30 PROCEDURE — 74177 CT ABD & PELVIS W/CONTRAST: CPT

## 2021-05-30 PROCEDURE — 99284 EMERGENCY DEPT VISIT MOD MDM: CPT

## 2021-05-30 PROCEDURE — 85025 COMPLETE CBC W/AUTO DIFF WBC: CPT | Performed by: EMERGENCY MEDICINE

## 2021-05-30 PROCEDURE — 99285 EMERGENCY DEPT VISIT HI MDM: CPT | Performed by: EMERGENCY MEDICINE

## 2021-05-30 PROCEDURE — 96375 TX/PRO/DX INJ NEW DRUG ADDON: CPT

## 2021-05-30 PROCEDURE — 96361 HYDRATE IV INFUSION ADD-ON: CPT

## 2021-05-30 PROCEDURE — 96374 THER/PROPH/DIAG INJ IV PUSH: CPT

## 2021-05-30 PROCEDURE — 96376 TX/PRO/DX INJ SAME DRUG ADON: CPT

## 2021-05-30 PROCEDURE — 81025 URINE PREGNANCY TEST: CPT | Performed by: EMERGENCY MEDICINE

## 2021-05-30 PROCEDURE — 81003 URINALYSIS AUTO W/O SCOPE: CPT | Performed by: EMERGENCY MEDICINE

## 2021-05-30 RX ORDER — ONDANSETRON 2 MG/ML
4 INJECTION INTRAMUSCULAR; INTRAVENOUS ONCE
Status: COMPLETED | OUTPATIENT
Start: 2021-05-30 | End: 2021-05-30

## 2021-05-30 RX ORDER — METRONIDAZOLE 500 MG/1
500 TABLET ORAL EVERY 12 HOURS SCHEDULED
Qty: 14 TABLET | Refills: 0 | Status: SHIPPED | OUTPATIENT
Start: 2021-05-30 | End: 2021-06-06

## 2021-05-30 RX ORDER — CIPROFLOXACIN 500 MG/1
500 TABLET, FILM COATED ORAL ONCE
Status: COMPLETED | OUTPATIENT
Start: 2021-05-30 | End: 2021-05-30

## 2021-05-30 RX ORDER — ONDANSETRON 4 MG/1
4 TABLET, ORALLY DISINTEGRATING ORAL EVERY 6 HOURS PRN
Qty: 20 TABLET | Refills: 0 | Status: SHIPPED | OUTPATIENT
Start: 2021-05-30 | End: 2021-06-11 | Stop reason: SDUPTHER

## 2021-05-30 RX ORDER — CIPROFLOXACIN 500 MG/1
500 TABLET, FILM COATED ORAL 2 TIMES DAILY
Qty: 14 TABLET | Refills: 0 | Status: SHIPPED | OUTPATIENT
Start: 2021-05-30 | End: 2021-06-06

## 2021-05-30 RX ORDER — MORPHINE SULFATE 4 MG/ML
4 INJECTION, SOLUTION INTRAMUSCULAR; INTRAVENOUS ONCE
Status: COMPLETED | OUTPATIENT
Start: 2021-05-30 | End: 2021-05-30

## 2021-05-30 RX ORDER — METRONIDAZOLE 500 MG/1
500 TABLET ORAL ONCE
Status: COMPLETED | OUTPATIENT
Start: 2021-05-30 | End: 2021-05-30

## 2021-05-30 RX ADMIN — MORPHINE SULFATE 4 MG: 4 INJECTION INTRAVENOUS at 20:31

## 2021-05-30 RX ADMIN — SODIUM CHLORIDE 1000 ML: 0.9 INJECTION, SOLUTION INTRAVENOUS at 20:31

## 2021-05-30 RX ADMIN — ONDANSETRON 4 MG: 2 INJECTION INTRAMUSCULAR; INTRAVENOUS at 21:38

## 2021-05-30 RX ADMIN — ONDANSETRON 4 MG: 2 INJECTION INTRAMUSCULAR; INTRAVENOUS at 20:31

## 2021-05-30 RX ADMIN — CIPROFLOXACIN HYDROCHLORIDE 500 MG: 500 TABLET, FILM COATED ORAL at 22:18

## 2021-05-30 RX ADMIN — IOHEXOL 100 ML: 350 INJECTION, SOLUTION INTRAVENOUS at 21:10

## 2021-05-30 RX ADMIN — METRONIDAZOLE 500 MG: 500 TABLET, FILM COATED ORAL at 22:18

## 2021-05-31 NOTE — ED PROVIDER NOTES
History  Chief Complaint   Patient presents with    Abdominal Pain     shooting abd pain started at 1 pm today  c/o nausea     This is a 60-year-old female presents emergency department complaining of abdominal pain  Patient states pain began approximately 1:00 p m  Today  The patient states the pain is sharp and severe  Is located on the left side  Nothing makes it better or worse  The patient states the pain is not associated with eating or drinking  Patient states the pain comes and goes in intensity  The patient denies difficulty with bowel or bladder  She had a normal bowel movement today  She states she does have nausea but no vomiting  She denies chest pain or trouble breathing  She denies back pain  She denies fevers or chills  Prior to Admission Medications   Prescriptions Last Dose Informant Patient Reported?  Taking?   ibuprofen (MOTRIN) 600 mg tablet 5/30/2021 at Unknown time  No Yes   Sig: Take 1 tablet (600 mg total) by mouth every 6 (six) hours as needed (Pain)   pantoprazole (PROTONIX) 40 mg tablet 5/30/2021 at Unknown time  No Yes   Sig: Take 1 tablet (40 mg total) by mouth daily      Facility-Administered Medications: None       Past Medical History:   Diagnosis Date    Asthma     GERD (gastroesophageal reflux disease)     Hernia, abdominal     Migraines     Psychiatric disorder     Anx, Depression    Renal disorder     kidney stones       Past Surgical History:   Procedure Laterality Date    FL RETROGRADE PYELOGRAM  8/28/2018    HERNIA REPAIR      IN CYSTO/URETERO W/LITHOTRIPSY &INDWELL STENT INSRT Right 8/28/2018    Procedure: CYSTOSCOPY URETEROSCOPY WITH RETROGRADE PYELOGRAM AND INSERTION STENT URETERAL;  Surgeon: Vivien Acosta MD;  [facility];  Service: Urology    TOOTH EXTRACTION         Family History   Problem Relation Age of Onset    Diabetes Mother     Hyperlipidemia Mother     Stroke Mother     Heart disease Mother     Asthma Mother  Other Mother         Degen  of Intervertabral disc   Nephrolithiasis Father     Nephrolithiasis Brother      I have reviewed and agree with the history as documented  E-Cigarette/Vaping    E-Cigarette Use Never User      E-Cigarette/Vaping Substances     Social History     Tobacco Use    Smoking status: Current Every Day Smoker     Packs/day: 0 25     Years: 13 00     Pack years: 3 25     Types: Cigarettes    Smokeless tobacco: Never Used   Substance Use Topics    Alcohol use: No    Drug use: No       Review of Systems   All other systems reviewed and are negative        Physical Exam  Physical Exam  Constitutional:  Vital signs reviewed, patient appears nontoxic, no acute distress  Eyes: Pupils equal round reactive to light and accommodation, extraocular muscles intact  HEENT: trachea midline, no JVD, moist mucous membranes  Respiratory: lung sounds clear throughout, no rhonchi, no rales  Cardiovascular: regular rate rhythm, no murmurs or rubs  Abdomen: soft, left-sided tenderness, nondistended, no rebound or guarding  Back: no CVA tenderness, normal inspection  Extremities: no edema, pulses equal in all 4 extremities  Neuro: awake, alert, oriented, no focal weakness  Skin: warm, dry, intact, no rashes noted    Vital Signs  ED Triage Vitals [05/30/21 2004]   Temperature Pulse Respirations Blood Pressure SpO2   98 4 °F (36 9 °C) 98 20 103/70 100 %      Temp Source Heart Rate Source Patient Position - Orthostatic VS BP Location FiO2 (%)   Oral Monitor -- -- --      Pain Score       9           Vitals:    05/30/21 2004 05/30/21 2100   BP: 103/70 92/57   Pulse: 98 65         Visual Acuity      ED Medications  Medications   ondansetron (ZOFRAN) injection 4 mg (4 mg Intravenous Given 5/30/21 2031)   morphine (PF) 4 mg/mL injection 4 mg (4 mg Intravenous Given 5/30/21 2031)   sodium chloride 0 9 % bolus 1,000 mL (0 mL Intravenous Stopped 5/30/21 2136)   iohexol (OMNIPAQUE) 350 MG/ML injection (MULTI-DOSE) 100 mL (100 mL Intravenous Given 5/30/21 2110)   ondansetron (ZOFRAN) injection 4 mg (4 mg Intravenous Given 5/30/21 2138)   ciprofloxacin (CIPRO) tablet 500 mg (500 mg Oral Given 5/30/21 2218)   metroNIDAZOLE (FLAGYL) tablet 500 mg (500 mg Oral Given 5/30/21 2218)       Diagnostic Studies  Results Reviewed     Procedure Component Value Units Date/Time    CMP [[id number]]  (Abnormal) Collected: 05/30/21 2027    Lab Status: Final result Specimen: Blood from Arm, Right Updated: 05/30/21 2049     Sodium 140 mmol/L      Potassium 3 9 mmol/L      Chloride 104 mmol/L      CO2 29 mmol/L      ANION GAP 7 mmol/L      BUN 10 mg/dL      Creatinine 0 76 mg/dL      Glucose 147 mg/dL      Calcium 9 7 mg/dL      AST 14 U/L      ALT 13 U/L      Alkaline Phosphatase 49 9 U/L      Total Protein 6 6 g/dL      Albumin 4 3 g/dL      Total Bilirubin 0 47 mg/dL      eGFR 105 ml/min/1 73sq m     Narrative:      [place] guidelines for Chronic Kidney Disease (CKD):     Stage 1 with normal or high GFR (GFR > 90 mL/min/1 73 square meters)    Stage 2 Mild CKD (GFR = 60-89 mL/min/1 73 square meters)    Stage 3A Moderate CKD (GFR = 45-59 mL/min/1 73 square meters)    Stage 3B Moderate CKD (GFR = 30-44 mL/min/1 73 square meters)    Stage 4 Severe CKD (GFR = 15-29 mL/min/1 73 square meters)    Stage 5 End Stage CKD (GFR <15 mL/min/1 73 square meters)  Note: GFR calculation is accurate only with a steady state creatinine    Lipase [[id number]]  (Abnormal) Collected: 05/30/21 2027    Lab Status: Final result Specimen: Blood from Arm, Right Updated: 05/30/21 2049     Lipase 11 u/L     UA (URINE) with reflex to Scope [[id number]]  (Abnormal) Collected: 05/30/21 2024    Lab Status: Final result Specimen: Urine, Clean Catch Updated: 05/30/21 2035     Color, UA Yellow     Clarity, UA Slightly Cloudy     Specific Gravity, UA 1 015     pH, UA 7 5     Leukocytes, UA Negative     Nitrite, UA Negative     Protein, UA Negative mg/dl      Glucose, UA Negative mg/dl      Ketones, UA Negative mg/dl      Urobilinogen, UA 1 0 E U /dl      Bilirubin, UA Negative     Blood, UA Negative    CBC and differential [[id number]]  (Normal) Collected: 05/30/21 2027    Lab Status: Final result Specimen: Blood from Arm, Right Updated: 05/30/21 2032     WBC 5 94 Thousand/uL      RBC 4 38 Million/uL      Hemoglobin 13 5 g/dL      Hematocrit 40 3 %      MCV 92 fL      MCH 30 8 pg      MCHC 33 5 g/dL      RDW 12 6 %      MPV 9 7 fL      Platelets 799 Thousands/uL      Neutrophils Relative 66 %      Immat GRANS % 0 %      Lymphocytes Relative 26 %      Monocytes Relative 7 %      Eosinophils Relative 1 %      Basophils Relative 0 %      Neutrophils Absolute 3 90 Thousands/µL      Immature Grans Absolute 0 00 Thousand/uL      Lymphocytes Absolute 1 54 Thousands/µL      Monocytes Absolute 0 41 Thousand/µL      Eosinophils Absolute 0 07 Thousand/µL      Basophils Absolute 0 02 Thousands/µL     POCT pregnancy, urine [[id number]]  (Normal) Resulted: 05/30/21 2031    Lab Status: Final result Specimen: Urine Updated: 05/30/21 2031     EXT PREG TEST UR (Ref: Negative) Negative (-)     Control Valid                 CT Abdomen pelvis with contrast   Final Result by Alpa Voss MD (05/30 2148)      Mild diffuse thickening of the colon which may be due to underdistention versus nonspecific colitis  Workstation performed: [id number]                    Procedures  Procedures         ED Course                             SBIRT 22yo+      Most Recent Value   SBIRT (22 yo +)   In order to provide better care to our patients, we are screening all of our patients for alcohol and drug use  Would it be okay to ask you these screening questions? No Filed at: 05/30/2021 [address] [place] [address]  Number of Diagnoses or Management Options  Colitis:   Diagnosis management comments:  This is a 63-year-old female presented to the emergency department complaining of left-sided abdominal pain  The patient had left-sided tenderness on exam   I considered diverticulosis/diverticulitis, colitis, gastritis, kidney stones  These and other diagnoses were considered  The patient had lab work significant for a normal white count and a normal lipase  Patient had a CT scan that showed diffuse colitis  The patient was started on broad-spectrum antibiotics and discharged with follow-up to her primary care physician as needed  Patient was given return instructions for the emergency department  Amount and/or Complexity of Data Reviewed  Clinical lab tests: reviewed and ordered  Tests in the radiology section of CPT®: reviewed and ordered        Disposition  Final diagnoses:   Colitis     Time reflects when diagnosis was documented in both MDM as applicable and the Disposition within this note     Time User Action Codes Description Comment    5/30/2021 10:07 PM Bella Gemmajoshua Add [K52 9] Colitis       ED Disposition     ED Disposition Condition Date/Time Comment    Discharge Stable Sun May 30, 2021 10:07 PM Mike Beto discharge to home/self care              Follow-up Information     Follow up With Specialties Details Why Contact Info    Juan Saldana MD Family Medicine In 2 days  [address] [place] [address] [address] [facility]  [phone]            Discharge Medication List as of 5/30/2021 10:09 PM      START taking these medications    Details   ciprofloxacin (CIPRO) 500 mg tablet Take 1 tablet (500 mg total) by mouth 2 (two) times a day for 7 days, Starting Sun 5/30/2021, Until Sun 6/6/2021, Normal      metroNIDAZOLE (FLAGYL) 500 mg tablet Take 1 tablet (500 mg total) by mouth every 12 (twelve) hours for 7 days, Starting Sun 5/30/2021, Until Sun 6/6/2021, Normal      ondansetron (ZOFRAN-ODT) 4 mg disintegrating tablet Take 1 tablet (4 mg total) by mouth every 6 (six) hours as needed for nausea or vomiting, Starting Sun 5/30/2021, Normal         CONTINUE these medications which have NOT CHANGED    Details   ibuprofen (MOTRIN) 600 mg tablet Take 1 tablet (600 mg total) by mouth every 6 (six) hours as needed (Pain), Starting Wed 3/31/2021, Normal      pantoprazole (PROTONIX) 40 mg tablet Take 1 tablet (40 mg total) by mouth daily, Starting Tue 12/8/2020, Normal           No discharge procedures on file      PDMP Review       Value Time User    PDMP Reviewed  Yes 5/27/2021 10:50 PM Jeffrey Khan MD          ED Provider  Electronically Signed by           Yulisa Crum DO  05/31/21 5327

## 2021-05-31 NOTE — ED NOTES
Pt josiane, states "the pain has returned and now I'm nauseous again " Dr Mitali Albrecht aware, new orders to be followed     Alessio Raymond, MELI  05/30/21 8460

## 2021-06-11 ENCOUNTER — HOSPITAL ENCOUNTER (EMERGENCY)
Facility: HOSPITAL | Age: 31
Discharge: HOME | End: 2021-06-11
Attending: EMERGENCY MEDICINE | Admitting: EMERGENCY MEDICINE
Payer: COMMERCIAL

## 2021-06-11 VITALS
DIASTOLIC BLOOD PRESSURE: 59 MMHG | RESPIRATION RATE: 16 BRPM | HEART RATE: 58 BPM | SYSTOLIC BLOOD PRESSURE: 94 MMHG | TEMPERATURE: 98.2 F | OXYGEN SATURATION: 100 %

## 2021-06-11 DIAGNOSIS — R11.0 NAUSEA: Primary | ICD-10-CM

## 2021-06-11 DIAGNOSIS — K52.9 COLITIS: ICD-10-CM

## 2021-06-11 LAB
EXT PREG TEST URINE: NEGATIVE
EXT. CONTROL ED NAV: NORMAL

## 2021-06-11 PROCEDURE — 99283 EMERGENCY DEPT VISIT LOW MDM: CPT

## 2021-06-11 PROCEDURE — 99284 EMERGENCY DEPT VISIT MOD MDM: CPT | Performed by: EMERGENCY MEDICINE

## 2021-06-11 PROCEDURE — 81025 URINE PREGNANCY TEST: CPT | Performed by: EMERGENCY MEDICINE

## 2021-06-11 RX ORDER — ONDANSETRON 4 MG/1
4 TABLET, ORALLY DISINTEGRATING ORAL EVERY 6 HOURS PRN
Qty: 20 TABLET | Refills: 0 | OUTPATIENT
Start: 2021-06-11 | End: 2021-06-20

## 2021-06-11 RX ORDER — ONDANSETRON 4 MG/1
4 TABLET, ORALLY DISINTEGRATING ORAL ONCE
Status: COMPLETED | OUTPATIENT
Start: 2021-06-11 | End: 2021-06-11

## 2021-06-11 RX ADMIN — ONDANSETRON 4 MG: 4 TABLET, ORALLY DISINTEGRATING ORAL at 21:54

## 2021-06-12 NOTE — ED PROVIDER NOTES
History  Chief Complaint   Patient presents with    Nausea     nausea past 3 days "just comes on for no reason"         Patient is a 79-year-old female with history of anxiety, depression, migraines who presents for evaluation of nausea  The patient says that she has had the symptoms off and on for the past 2 days  She says that she just feels like she is going to throw up but has not actually vomited  According to chart review, the patient is a frequent visitor to the ED as this is her 9th visit since February   The patient was seen here on 5/30 and diagnosed with colitis  She was started on cipro/flagyl and given a script for zofran  She says she has not been taking the Zofran that was prescribed that she must have lost the bottle    She says she has been taking over-the-counter nausea relief without relief  She denies any abdominal pain, fevers, chills, loose stools, vaginal bleeding or discharge, urinary symptoms  Prior to Admission Medications   Prescriptions Last Dose Informant Patient Reported?  Taking?   ibuprofen (MOTRIN) 600 mg tablet   No No   Sig: Take 1 tablet (600 mg total) by mouth every 6 (six) hours as needed (Pain)   ondansetron (ZOFRAN-ODT) 4 mg disintegrating tablet   No No   Sig: Take 1 tablet (4 mg total) by mouth every 6 (six) hours as needed for nausea or vomiting   ondansetron (ZOFRAN-ODT) 4 mg disintegrating tablet   No No   Sig: Take 1 tablet (4 mg total) by mouth every 6 (six) hours as needed for nausea or vomiting   pantoprazole (PROTONIX) 40 mg tablet   No No   Sig: Take 1 tablet (40 mg total) by mouth daily      Facility-Administered Medications: None       Past Medical History:   Diagnosis Date    Asthma     GERD (gastroesophageal reflux disease)     Hernia, abdominal     Migraines     Psychiatric disorder     Anx, Depression    Renal disorder     kidney stones       Past Surgical History:   Procedure Laterality Date    FL RETROGRADE PYELOGRAM  8/28/2018    HERNIA REPAIR      [place] CYSTO/URETERO W/LITHOTRIPSY &INDWELL STENT INSRT Right 8/28/2018    Procedure: CYSTOSCOPY URETEROSCOPY WITH RETROGRADE PYELOGRAM AND INSERTION STENT URETERAL;  Surgeon: Shukri Prater MD;  [facility];  Service: Urology    TOOTH EXTRACTION         Family History   Problem Relation Age of Onset    Diabetes Mother     Hyperlipidemia Mother     Stroke Mother     Heart disease Mother     Asthma Mother    Adrian Pert Other Mother         Degen  of Intervertabral disc   Nephrolithiasis Father     Nephrolithiasis Brother      I have reviewed and agree with the history as documented  E-Cigarette/Vaping    E-Cigarette Use Never User      E-Cigarette/Vaping Substances     Social History     Tobacco Use    Smoking status: Current Every Day Smoker     Packs/day: 0 25     Years: 13 00     Pack years: 3 25     Types: Cigarettes    Smokeless tobacco: Never Used   Substance Use Topics    Alcohol use: No    Drug use: No       Review of Systems   Constitutional: Negative for chills, diaphoresis and fever  HENT: Negative for congestion, sinus pressure, sore throat and trouble swallowing  Eyes: Negative for pain, discharge and itching  Respiratory: Negative for cough, chest tightness, shortness of breath and wheezing  Cardiovascular: Negative for chest pain, palpitations and leg swelling  Gastrointestinal: Positive for nausea  Negative for abdominal distention, abdominal pain, blood in stool, diarrhea and vomiting  Endocrine: Negative for polyphagia and polyuria  Genitourinary: Negative for difficulty urinating, dysuria, flank pain, hematuria, pelvic pain and vaginal bleeding  Musculoskeletal: Negative for arthralgias and back pain  Skin: Negative for rash  Neurological: Negative for dizziness, syncope, weakness, light-headedness and headaches  Physical Exam  Physical Exam  Vitals signs and nursing note reviewed     Constitutional:       General: She is not in acute distress  Appearance: She is well-developed  HENT:      Head: Normocephalic and atraumatic  Right Ear: External ear normal       Left Ear: External ear normal       Mouth/Throat:      Pharynx: No oropharyngeal exudate  Eyes:      Conjunctiva/sclera: Conjunctivae normal       Pupils: Pupils are equal, round, and reactive to light  Neck:      Musculoskeletal: Normal range of motion and neck supple  Cardiovascular:      Rate and Rhythm: Normal rate and regular rhythm  Heart sounds: Normal heart sounds  No murmur  No friction rub  No gallop  Pulmonary:      Effort: Pulmonary effort is normal  No respiratory distress  Breath sounds: Normal breath sounds  No wheezing or rales  Abdominal:      General: There is no distension  Palpations: Abdomen is soft  Tenderness: There is no abdominal tenderness  There is no guarding  Musculoskeletal: Normal range of motion  General: No tenderness or deformity  Lymphadenopathy:      Cervical: No cervical adenopathy  Skin:     General: Skin is warm and dry  Neurological:      Mental Status: She is alert and oriented to person, place, and time  Cranial Nerves: No cranial nerve deficit  Sensory: No sensory deficit  Motor: No abnormal muscle tone           Vital Signs  ED Triage Vitals [06/11/21 2114]   Temperature Pulse Respirations Blood Pressure SpO2   98 2 °F (36 8 °C) 82 16 102/65 98 %      Temp Source Heart Rate Source Patient Position - Orthostatic VS BP Location FiO2 (%)   Oral Monitor Sitting Right arm --      Pain Score       --           Vitals:    06/11/21 2114 06/11/21 2230   BP: 102/65 94/59   Pulse: 82 58   Patient Position - Orthostatic VS: Sitting Sitting         Visual Acuity      ED Medications  Medications   ondansetron (ZOFRAN-ODT) dispersible tablet 4 mg (4 mg Oral Given 6/11/21 2154)       Diagnostic Studies  Results Reviewed     Procedure Component Value Units Date/Time    POCT pregnancy, urine [[id number]]  (Normal) Resulted: 06/11/21 2327    Lab Status: Final result Updated: 06/11/21 2327     EXT PREG TEST UR (Ref: Negative) Negative     Control Valid                 No orders to display              Procedures  Procedures         ED Course                             SBIRT 20yo+      Most Recent Value   SBIRT (24 yo +)   In order to provide better care to our patients, we are screening all of our patients for alcohol and drug use  Would it be okay to ask you these screening questions? No Filed at: 06/11/2021 2328                    MDM  Number of Diagnoses or Management Options  Diagnosis management comments: 25-year-old female presenting for nausea off and on for 2 days  No actual vomiting  Lost her script for Zofran that was given to her and the end of May when she diagnosed with colitis  Denies any abdominal pain  Is in no acute distress  Has no abdominal tenderness on exam, vitals are within normal limits  Will treat with Zofran ODT  Will obtain urine pregnancy test   Will give script for Zofran  Told to follow up with family doctor      Disposition  Final diagnoses:   Nausea     Time reflects when diagnosis was documented in both MDM as applicable and the Disposition within this note     Time User Action Codes Description Comment    6/11/2021 10:18 PM Rashida Mass [R11 0] Nausea     6/11/2021 10:18 PM Masood Escalona Add [K52 9] Colitis       ED Disposition     ED Disposition Condition Date/Time Comment    Discharge Stable Fri Jun 11, 2021 10:18 PM Mike Beto discharge to home/self care              Follow-up Information     Follow up With Specialties Details Why Contact Info    Jose Elias Thakkar MD Family Medicine Schedule an appointment as soon as possible for a visit  For follow up [address] [facility]  [phone]            Discharge Medication List as of 6/11/2021 11:27 PM      CONTINUE these medications which have CHANGED    Details   ondansetron (ZOFRAN-ODT) 4 mg disintegrating tablet Take 1 tablet (4 mg total) by mouth every 6 (six) hours as needed for nausea or vomiting, Starting Fri 6/11/2021, Normal         CONTINUE these medications which have NOT CHANGED    Details   ibuprofen (MOTRIN) 600 mg tablet Take 1 tablet (600 mg total) by mouth every 6 (six) hours as needed (Pain), Starting Wed 3/31/2021, Normal      pantoprazole (PROTONIX) 40 mg tablet Take 1 tablet (40 mg total) by mouth daily, Starting Tue 12/8/2020, Normal           No discharge procedures on file      PDMP Review       Value Time User    PDMP Reviewed  Yes 5/27/2021 10:50 PM Tk Harris MD          ED Provider  Electronically Signed by           Luciana Reynolds DO  06/11/21 6410

## 2021-06-15 ENCOUNTER — HOSPITAL ENCOUNTER (EMERGENCY)
Facility: HOSPITAL | Age: 31
Discharge: HOME | End: 2021-06-16
Attending: EMERGENCY MEDICINE | Admitting: EMERGENCY MEDICINE
Payer: COMMERCIAL

## 2021-06-15 ENCOUNTER — APPOINTMENT (EMERGENCY)
Dept: CT IMAGING | Facility: HOSPITAL | Age: 31
End: 2021-06-15
Payer: COMMERCIAL

## 2021-06-15 VITALS
TEMPERATURE: 98.4 F | OXYGEN SATURATION: 94 % | SYSTOLIC BLOOD PRESSURE: 110 MMHG | RESPIRATION RATE: 20 BRPM | HEART RATE: 56 BPM | DIASTOLIC BLOOD PRESSURE: 65 MMHG | HEIGHT: 60 IN | BODY MASS INDEX: 23.56 KG/M2 | WEIGHT: 120 LBS

## 2021-06-15 DIAGNOSIS — R31.9 HEMATURIA: ICD-10-CM

## 2021-06-15 DIAGNOSIS — R11.0 NAUSEA: ICD-10-CM

## 2021-06-15 DIAGNOSIS — K59.00 CONSTIPATION: Primary | ICD-10-CM

## 2021-06-15 LAB
ALBUMIN SERPL BCP-MCNC: 4 G/DL (ref 3.5–5)
ALP SERPL-CCNC: 64 U/L (ref 46–116)
ALT SERPL W P-5'-P-CCNC: 13 U/L (ref 12–78)
ANION GAP SERPL CALCULATED.3IONS-SCNC: 8 MMOL/L (ref 4–13)
APTT PPP: 28 SECONDS (ref 23–37)
AST SERPL W P-5'-P-CCNC: 10 U/L (ref 5–45)
BASOPHILS # BLD AUTO: 0.04 THOUSANDS/ΜL (ref 0–0.1)
BASOPHILS NFR BLD AUTO: 0 % (ref 0–1)
BILIRUB SERPL-MCNC: 0.14 MG/DL (ref 0.2–1)
BUN SERPL-MCNC: 10 MG/DL (ref 5–25)
CALCIUM SERPL-MCNC: 9.5 MG/DL (ref 8.3–10.1)
CHLORIDE SERPL-SCNC: 107 MMOL/L (ref 100–108)
CO2 SERPL-SCNC: 28 MMOL/L (ref 21–32)
CREAT SERPL-MCNC: 0.92 MG/DL (ref 0.6–1.3)
EOSINOPHIL # BLD AUTO: 0.22 THOUSAND/ΜL (ref 0–0.61)
EOSINOPHIL NFR BLD AUTO: 2 % (ref 0–6)
ERYTHROCYTE [DISTWIDTH] IN BLOOD BY AUTOMATED COUNT: 12.3 % (ref 11.6–15.1)
GFR SERPL CREATININE-BSD FRML MDRD: 83 ML/MIN/1.73SQ M
GLUCOSE SERPL-MCNC: 102 MG/DL (ref 65–140)
HCT VFR BLD AUTO: 40.2 % (ref 34.8–46.1)
HGB BLD-MCNC: 13.4 G/DL (ref 11.5–15.4)
IMM GRANULOCYTES # BLD AUTO: 0.04 THOUSAND/UL (ref 0–0.2)
IMM GRANULOCYTES NFR BLD AUTO: 0 % (ref 0–2)
INR PPP: 1.04 (ref 0.84–1.19)
LIPASE SERPL-CCNC: 63 U/L (ref 73–393)
LYMPHOCYTES # BLD AUTO: 2.21 THOUSANDS/ΜL (ref 0.6–4.47)
LYMPHOCYTES NFR BLD AUTO: 22 % (ref 14–44)
MCH RBC QN AUTO: 31.1 PG (ref 26.8–34.3)
MCHC RBC AUTO-ENTMCNC: 33.3 G/DL (ref 31.4–37.4)
MCV RBC AUTO: 93 FL (ref 82–98)
MONOCYTES # BLD AUTO: 0.76 THOUSAND/ΜL (ref 0.17–1.22)
MONOCYTES NFR BLD AUTO: 8 % (ref 4–12)
NEUTROPHILS # BLD AUTO: 6.78 THOUSANDS/ΜL (ref 1.85–7.62)
NEUTS SEG NFR BLD AUTO: 68 % (ref 43–75)
NRBC BLD AUTO-RTO: 0 /100 WBCS
PLATELET # BLD AUTO: 276 THOUSANDS/UL (ref 149–390)
PMV BLD AUTO: 10.3 FL (ref 8.9–12.7)
POTASSIUM SERPL-SCNC: 4 MMOL/L (ref 3.5–5.3)
PROT SERPL-MCNC: 7.2 G/DL (ref 6.4–8.2)
PROTHROMBIN TIME: 13.7 SECONDS (ref 11.6–14.5)
RBC # BLD AUTO: 4.31 MILLION/UL (ref 3.81–5.12)
SODIUM SERPL-SCNC: 143 MMOL/L (ref 136–145)
WBC # BLD AUTO: 10.05 THOUSAND/UL (ref 4.31–10.16)

## 2021-06-15 PROCEDURE — 99285 EMERGENCY DEPT VISIT HI MDM: CPT | Performed by: PHYSICIAN ASSISTANT

## 2021-06-15 PROCEDURE — 81001 URINALYSIS AUTO W/SCOPE: CPT | Performed by: PHYSICIAN ASSISTANT

## 2021-06-15 PROCEDURE — 96375 TX/PRO/DX INJ NEW DRUG ADDON: CPT

## 2021-06-15 PROCEDURE — 80053 COMPREHEN METABOLIC PANEL: CPT | Performed by: PHYSICIAN ASSISTANT

## 2021-06-15 PROCEDURE — 74177 CT ABD & PELVIS W/CONTRAST: CPT

## 2021-06-15 PROCEDURE — 83690 ASSAY OF LIPASE: CPT | Performed by: PHYSICIAN ASSISTANT

## 2021-06-15 PROCEDURE — 85025 COMPLETE CBC W/AUTO DIFF WBC: CPT | Performed by: PHYSICIAN ASSISTANT

## 2021-06-15 PROCEDURE — 96361 HYDRATE IV INFUSION ADD-ON: CPT

## 2021-06-15 PROCEDURE — 85610 PROTHROMBIN TIME: CPT | Performed by: PHYSICIAN ASSISTANT

## 2021-06-15 PROCEDURE — 36415 COLL VENOUS BLD VENIPUNCTURE: CPT | Performed by: PHYSICIAN ASSISTANT

## 2021-06-15 PROCEDURE — 85730 THROMBOPLASTIN TIME PARTIAL: CPT | Performed by: PHYSICIAN ASSISTANT

## 2021-06-15 PROCEDURE — 84703 CHORIONIC GONADOTROPIN ASSAY: CPT | Performed by: PHYSICIAN ASSISTANT

## 2021-06-15 PROCEDURE — 99284 EMERGENCY DEPT VISIT MOD MDM: CPT

## 2021-06-15 PROCEDURE — 96374 THER/PROPH/DIAG INJ IV PUSH: CPT

## 2021-06-15 RX ORDER — HYDROMORPHONE HCL/PF 1 MG/ML
0.5 SYRINGE (ML) INJECTION ONCE
Status: COMPLETED | OUTPATIENT
Start: 2021-06-15 | End: 2021-06-15

## 2021-06-15 RX ORDER — DICYCLOMINE HCL 20 MG
20 TABLET ORAL ONCE
Status: COMPLETED | OUTPATIENT
Start: 2021-06-16 | End: 2021-06-16

## 2021-06-15 RX ORDER — DIPHENHYDRAMINE HYDROCHLORIDE 50 MG/ML
25 INJECTION INTRAMUSCULAR; INTRAVENOUS ONCE
Status: DISCONTINUED | OUTPATIENT
Start: 2021-06-15 | End: 2021-06-15

## 2021-06-15 RX ORDER — HYDROMORPHONE HCL/PF 1 MG/ML
0.5 SYRINGE (ML) INJECTION ONCE
Status: COMPLETED | OUTPATIENT
Start: 2021-06-16 | End: 2021-06-16

## 2021-06-15 RX ORDER — ACETAMINOPHEN 325 MG/1
650 TABLET ORAL ONCE
Status: COMPLETED | OUTPATIENT
Start: 2021-06-16 | End: 2021-06-16

## 2021-06-15 RX ORDER — KETOROLAC TROMETHAMINE 30 MG/ML
15 INJECTION, SOLUTION INTRAMUSCULAR; INTRAVENOUS ONCE
Status: DISCONTINUED | OUTPATIENT
Start: 2021-06-15 | End: 2021-06-15

## 2021-06-15 RX ORDER — METOCLOPRAMIDE HYDROCHLORIDE 5 MG/ML
10 INJECTION INTRAMUSCULAR; INTRAVENOUS ONCE
Status: COMPLETED | OUTPATIENT
Start: 2021-06-15 | End: 2021-06-15

## 2021-06-15 RX ADMIN — SODIUM CHLORIDE 1000 ML: 0.9 INJECTION, SOLUTION INTRAVENOUS at 23:01

## 2021-06-15 RX ADMIN — METOCLOPRAMIDE 10 MG: 5 INJECTION, SOLUTION INTRAMUSCULAR; INTRAVENOUS at 23:02

## 2021-06-15 RX ADMIN — HYDROMORPHONE HYDROCHLORIDE 0.5 MG: 1 INJECTION, SOLUTION INTRAMUSCULAR; INTRAVENOUS; SUBCUTANEOUS at 23:13

## 2021-06-16 LAB
BACTERIA UR QL AUTO: ABNORMAL /HPF
BILIRUB UR QL STRIP: NEGATIVE
CLARITY UR: CLEAR
COLOR UR: YELLOW
GLUCOSE UR STRIP-MCNC: NEGATIVE MG/DL
HCG SERPL QL: NEGATIVE
HGB UR QL STRIP.AUTO: ABNORMAL
KETONES UR STRIP-MCNC: NEGATIVE MG/DL
LEUKOCYTE ESTERASE UR QL STRIP: ABNORMAL
NITRITE UR QL STRIP: NEGATIVE
NON-SQ EPI CELLS URNS QL MICRO: ABNORMAL /HPF
PH UR STRIP.AUTO: 5.5 [PH]
PROT UR STRIP-MCNC: NEGATIVE MG/DL
RBC #/AREA URNS AUTO: ABNORMAL /HPF
SP GR UR STRIP.AUTO: <=1.005 (ref 1–1.03)
UROBILINOGEN UR QL STRIP.AUTO: 0.2 E.U./DL
WBC #/AREA URNS AUTO: ABNORMAL /HPF

## 2021-06-16 PROCEDURE — 96376 TX/PRO/DX INJ SAME DRUG ADON: CPT

## 2021-06-16 PROCEDURE — 96361 HYDRATE IV INFUSION ADD-ON: CPT

## 2021-06-16 RX ORDER — POLYETHYLENE GLYCOL 3350 17 G/17G
17 POWDER, FOR SOLUTION ORAL DAILY
Qty: 34 G | Refills: 0 | Status: SHIPPED | OUTPATIENT
Start: 2021-06-16 | End: 2021-07-04

## 2021-06-16 RX ORDER — POLYETHYLENE GLYCOL 3350 17 G/17G
17 POWDER, FOR SOLUTION ORAL ONCE
Status: COMPLETED | OUTPATIENT
Start: 2021-06-16 | End: 2021-06-16

## 2021-06-16 RX ORDER — ONDANSETRON 4 MG/1
4 TABLET, FILM COATED ORAL EVERY 6 HOURS
Qty: 12 TABLET | Refills: 0 | Status: SHIPPED | OUTPATIENT
Start: 2021-06-16 | End: 2021-07-04

## 2021-06-16 RX ADMIN — IOHEXOL 85 ML: 350 INJECTION, SOLUTION INTRAVENOUS at 00:27

## 2021-06-16 RX ADMIN — HYDROMORPHONE HYDROCHLORIDE 0.5 MG: 1 INJECTION, SOLUTION INTRAMUSCULAR; INTRAVENOUS; SUBCUTANEOUS at 00:02

## 2021-06-16 RX ADMIN — POLYETHYLENE GLYCOL 3350 17 G: 17 POWDER, FOR SOLUTION ORAL at 01:56

## 2021-06-16 RX ADMIN — ACETAMINOPHEN 650 MG: 325 TABLET, FILM COATED ORAL at 00:02

## 2021-06-16 RX ADMIN — DICYCLOMINE HYDROCHLORIDE 20 MG: 20 TABLET ORAL at 00:02

## 2021-06-16 NOTE — ED PROVIDER NOTES
History  Chief Complaint   Patient presents with    Pelvic Pain     Patient reports severe lower abd/pelvic pain starting yesterday  Currently on menstraul cycle, but reports this is the worst pain/cramping shes ever experienced  Normal vaginal bleeding per cycle  (+) nausea, no emesis  Denies diarrhea/constipation  Patient is a 68-year-old female with history of kidney stones GERD, and hernia repair that presents emergency department with gradual onset dull sharp persistent lower abdominal pain with no radiation for 1 day  Patient has associated symptomatology of nausea symptoms beginning the current ED presentation of lower abdominal pain  Patient states that she is currently on her menstrual cycle  Patient denies history of abdominal pain to present  Patient denies history of ovarian torsion  Patient denies history of ovarian cyst   Patient denies palliative factors with with provocative factors of pressure to lower abdomen  Patient denies not effective treatment  Patient denies fevers, chills, vomiting, diarrhea, constipation urinary symptoms  Patient denies vaginal bleeding vaginal discharge  Patient denies sick contacts or recent travel  Patient denies recent fall or recent trauma  Patient denies chest pain shortness of breath  History provided by:  Patient   used: No        Prior to Admission Medications   Prescriptions Last Dose Informant Patient Reported?  Taking?   ibuprofen (MOTRIN) 600 mg tablet   No Yes   Sig: Take 1 tablet (600 mg total) by mouth every 6 (six) hours as needed (Pain)   ondansetron (ZOFRAN-ODT) 4 mg disintegrating tablet   No Yes   Sig: Take 1 tablet (4 mg total) by mouth every 6 (six) hours as needed for nausea or vomiting   pantoprazole (PROTONIX) 40 mg tablet   No Yes   Sig: Take 1 tablet (40 mg total) by mouth daily      Facility-Administered Medications: None       Past Medical History:   Diagnosis Date    Asthma     GERD (gastroesophageal reflux disease)     Hernia, abdominal     Migraines     Psychiatric disorder     Anx, Depression    Renal disorder     kidney stones       Past Surgical History:   Procedure Laterality Date    FL RETROGRADE PYELOGRAM  8/28/2018    HERNIA REPAIR      NM CYSTO/URETERO W/LITHOTRIPSY &INDWELL STENT INSRT Right 8/28/2018    Procedure: CYSTOSCOPY URETEROSCOPY WITH RETROGRADE PYELOGRAM AND INSERTION STENT URETERAL;  Surgeon: Saintclair Lah, MD;  [facility];  Service: Urology    TOOTH EXTRACTION         Family History   Problem Relation Age of Onset    Diabetes Mother     Hyperlipidemia Mother     Stroke Mother     Heart disease Mother     Asthma Mother    [facility] Other Mother         Degen  of Intervertabral disc   Nephrolithiasis Father     Nephrolithiasis Brother      I have reviewed and agree with the history as documented  E-Cigarette/Vaping    E-Cigarette Use Never User      E-Cigarette/Vaping Substances     Social History     Tobacco Use    Smoking status: Current Every Day Smoker     Packs/day: 0 25     Years: 13 00     Pack years: 3 25     Types: Cigarettes    Smokeless tobacco: Never Used   Vaping Use    Vaping Use: Never used   Substance Use Topics    Alcohol use: No    Drug use: No       Review of Systems   Constitutional: Negative for activity change, appetite change, chills and fever  HENT: Negative for congestion, postnasal drip, rhinorrhea, sinus pressure, sinus pain, sore throat and tinnitus  Eyes: Negative for photophobia and visual disturbance  Respiratory: Negative for cough, chest tightness and shortness of breath  Cardiovascular: Negative for chest pain and palpitations  Gastrointestinal: Positive for abdominal pain and nausea  Negative for constipation, diarrhea and vomiting  Genitourinary: Negative for difficulty urinating, dysuria, flank pain, frequency and urgency  Musculoskeletal: Negative for back pain, gait problem, neck pain and neck stiffness  Skin: Negative for pallor and rash  Allergic/Immunologic: Negative for environmental allergies and food allergies  Neurological: Negative for dizziness, weakness, numbness and headaches  Psychiatric/Behavioral: Negative for confusion  All other systems reviewed and are negative  Physical Exam  Physical Exam  Vitals and nursing note reviewed  Constitutional:       General: She is awake  Appearance: Normal appearance  She is well-developed  She is not ill-appearing, toxic-appearing or diaphoretic  Comments: /79 (BP Location: Right arm)   Pulse (!) 54   Temp 98 4 °F (36 9 °C) (Oral)   Resp 20   Ht 5' (1 524 m)   Wt 54 4 kg (120 lb)   LMP 05/18/2021   SpO2 94%   BMI 23 44 kg/m²      HENT:      Head: Normocephalic and atraumatic  Right Ear: Hearing and external ear normal  No decreased hearing noted  No drainage, swelling or tenderness  No mastoid tenderness  Left Ear: Hearing and external ear normal  No decreased hearing noted  No drainage, swelling or tenderness  No mastoid tenderness  Nose: Nose normal       Mouth/Throat:      Lips: Pink  Mouth: Mucous membranes are moist       Pharynx: Oropharynx is clear  Uvula midline  Eyes:      General: Lids are normal  Vision grossly intact  Right eye: No discharge  Left eye: No discharge  Extraocular Movements: Extraocular movements intact  Conjunctiva/sclera: Conjunctivae normal       Pupils: Pupils are equal, round, and reactive to light  Neck:      Vascular: No JVD  Trachea: Trachea and phonation normal  No tracheal tenderness or tracheal deviation  Cardiovascular:      Rate and Rhythm: Normal rate and regular rhythm  Pulses: Normal pulses  Radial pulses are 2+ on the right side and 2+ on the left side  Posterior tibial pulses are 2+ on the right side and 2+ on the left side  Heart sounds: Normal heart sounds     Pulmonary:      Effort: Pulmonary effort is normal       Breath sounds: Normal breath sounds  No stridor  No decreased breath sounds, wheezing, rhonchi or rales  Chest:      Chest wall: No tenderness  Abdominal:      General: Abdomen is flat  Bowel sounds are normal  There is no distension  Palpations: Abdomen is soft  Abdomen is not rigid  Tenderness: There is abdominal tenderness in the right lower quadrant, suprapubic area and left lower quadrant  There is no right CVA tenderness, left CVA tenderness, guarding or rebound  Musculoskeletal:         General: Normal range of motion  Cervical back: Full passive range of motion without pain, normal range of motion and neck supple  No rigidity  No spinous process tenderness or muscular tenderness  Normal range of motion  Lymphadenopathy:      Head:      Right side of head: No submental, submandibular, tonsillar, preauricular, posterior auricular or occipital adenopathy  Left side of head: No submental, submandibular, tonsillar, preauricular, posterior auricular or occipital adenopathy  Cervical: No cervical adenopathy  Right cervical: No superficial, deep or posterior cervical adenopathy  Left cervical: No superficial, deep or posterior cervical adenopathy  Skin:     General: Skin is warm  Capillary Refill: Capillary refill takes less than 2 seconds  Neurological:      General: No focal deficit present  Mental Status: She is alert and oriented to person, place, and time  GCS: GCS eye subscore is 4  GCS verbal subscore is 5  GCS motor subscore is 6  Sensory: No sensory deficit  Deep Tendon Reflexes: Reflexes are normal and symmetric  Reflex Scores:       Patellar reflexes are 2+ on the right side and 2+ on the left side  Psychiatric:         Mood and Affect: Mood normal          Speech: Speech normal          Behavior: Behavior normal  Behavior is cooperative  Thought Content:  Thought content normal          Judgment: Judgment normal          Vital Signs  ED Triage Vitals [06/15/21 2233]   Temperature Pulse Respirations Blood Pressure SpO2   98 4 °F (36 9 °C) 98 20 145/78 98 %      Temp Source Heart Rate Source Patient Position - Orthostatic VS BP Location FiO2 (%)   Oral Monitor Sitting Right arm --      Pain Score       9           Vitals:    06/15/21 2233 06/15/21 2245 06/15/21 2300 06/15/21 2330   BP: 145/78 117/79 110/65    Pulse: 98 (!) 54 (!) 52 56   Patient Position - Orthostatic VS: Sitting Lying           Visual Acuity      ED Medications  Medications   sodium chloride 0 9 % bolus 1,000 mL (0 mL Intravenous Stopped 6/16/21 0158)   metoclopramide (REGLAN) injection 10 mg (10 mg Intravenous Given 6/15/21 2302)   HYDROmorphone (DILAUDID) injection 0 5 mg (0 5 mg Intravenous Given 6/15/21 2313)   HYDROmorphone (DILAUDID) injection 0 5 mg (0 5 mg Intravenous Given 6/16/21 0002)   acetaminophen (TYLENOL) tablet 650 mg (650 mg Oral Given 6/16/21 0002)   dicyclomine (BENTYL) tablet 20 mg (20 mg Oral Given 6/16/21 0002)   iohexol (OMNIPAQUE) 350 MG/ML injection (SINGLE-DOSE) 85 mL (85 mL Intravenous Given 6/16/21 0027)   polyethylene glycol (MIRALAX) packet 17 g (17 g Oral Given 6/16/21 0156)       Diagnostic Studies  Results Reviewed     Procedure Component Value Units Date/Time    UA w Reflex to Microscopic w Reflex to Culture [[id number]]  (Abnormal) Collected: 06/15/21 2344    Lab Status: Final result Specimen: Urine, Clean Catch Updated: 06/16/21 0019     Color, UA Yellow     Clarity, UA Clear     Specific Gravity, UA <=1 005     pH, UA 5 5     Leukocytes, UA Trace     Nitrite, UA Negative     Protein, UA Negative mg/dl      Glucose, UA Negative mg/dl      Ketones, UA Negative mg/dl      Urobilinogen, UA 0 2 E U /dl      Bilirubin, UA Negative     Blood, UA Large    Urine Microscopic [[id number]]  (Abnormal) Collected: 06/15/21 2344    Lab Status: Final result Specimen: Urine, Clean Catch Updated: 06/16/21 0019     RBC, UA 20-30 /hpf WBC, UA 2-4 /hpf      Epithelial Cells Occasional /hpf      Bacteria, UA Occasional /hpf     hCG, qualitative pregnancy [[id number]]  (Normal) Collected: 06/15/21 2245    Lab Status: Final result Specimen: Blood from Arm, Right Updated: 06/16/21 0007     Preg, Serum Negative    Lipase [[id number]]  (Abnormal) Collected: 06/15/21 2245    Lab Status: Final result Specimen: Blood from Arm, Right Updated: 06/15/21 2351     Lipase 63 u/L     Comprehensive metabolic panel [[id number]]  (Abnormal) Collected: 06/15/21 2245    Lab Status: Final result Specimen: Blood from Arm, Right Updated: 06/15/21 2337     Sodium 143 mmol/L      Potassium 4 0 mmol/L      Chloride 107 mmol/L      CO2 28 mmol/L      ANION GAP 8 mmol/L      BUN 10 mg/dL      Creatinine 0 92 mg/dL      Glucose 102 mg/dL      Calcium 9 5 mg/dL      AST 10 U/L      ALT 13 U/L      Alkaline Phosphatase 64 U/L      Total Protein 7 2 g/dL      Albumin 4 0 g/dL      Total Bilirubin 0 14 mg/dL      eGFR 83 ml/min/1 73sq m     Narrative:      [place] guidelines for Chronic Kidney Disease (CKD):     Stage 1 with normal or high GFR (GFR > 90 mL/min/1 73 square meters)    Stage 2 Mild CKD (GFR = 60-89 mL/min/1 73 square meters)    Stage 3A Moderate CKD (GFR = 45-59 mL/min/1 73 square meters)    Stage 3B Moderate CKD (GFR = 30-44 mL/min/1 73 square meters)    Stage 4 Severe CKD (GFR = 15-29 mL/min/1 73 square meters)    Stage 5 End Stage CKD (GFR <15 mL/min/1 73 square meters)  Note: GFR calculation is accurate only with a steady state creatinine    Protime-INR [[id number]]  (Normal) Collected: 06/15/21 2245    Lab Status: Final result Specimen: Blood from Arm, Right Updated: 06/15/21 2327     Protime 13 7 seconds      INR 1 04    APTT [[id number]]  (Normal) Collected: 06/15/21 2245    Lab Status: Final result Specimen: Blood from Arm, Right Updated: 06/15/21 2327     PTT 28 seconds     CBC and differential [[id number]] Collected: 06/15/21 2245    Lab Status: Final result Specimen: Blood from Arm, Right Updated: 06/15/21 2312     WBC 10 05 Thousand/uL      RBC 4 31 Million/uL      Hemoglobin 13 4 g/dL      Hematocrit 40 2 %      MCV 93 fL      MCH 31 1 pg      MCHC 33 3 g/dL      RDW 12 3 %      MPV 10 3 fL      Platelets 041 Thousands/uL      nRBC 0 /100 WBCs      Neutrophils Relative 68 %      Immat GRANS % 0 %      Lymphocytes Relative 22 %      Monocytes Relative 8 %      Eosinophils Relative 2 %      Basophils Relative 0 %      Neutrophils Absolute 6 78 Thousands/µL      Immature Grans Absolute 0 04 Thousand/uL      Lymphocytes Absolute 2 21 Thousands/µL      Monocytes Absolute 0 76 Thousand/µL      Eosinophils Absolute 0 22 Thousand/µL      Basophils Absolute 0 04 Thousands/µL                  CT abdomen pelvis with contrast   ED Interpretation by Dimitri Knox PA-C (06/16 0135)   Og Long MD  [phone] 6/16/2021     Narrative & Impression  CT ABDOMEN AND PELVIS WITH IV CONTRAST     INDICATION:   lower abdominal pain; nausea      COMPARISON:  CT of abdomen pelvis on May 30, 2021      TECHNIQUE:  CT examination of the abdomen and pelvis was performed  Axial, sagittal, and coronal 2D reformatted images were created from the source data and submitted for interpretation      Radiation dose length product (DLP) for this visit:  312 mGy-cm   This examination, like all CT scans performed in the [facility], was performed utilizing techniques to minimize radiation dose exposure, including the use of iterative   reconstruction and automated exposure control      IV Contrast:  85 mL of iohexol (OMNIPAQUE)  was administered intravenously without immediate adverse reaction    Enteric Contrast:  Enteric contrast was not administered      FINDINGS:     ABDOMEN     LOWER CHEST:  No clinically significant abnormality identified in the visualized lower c   hest      LIVER/BILIARY TREE:  Unremarkable      GALLBLADDER:  No calcified gallstones  No pericholecystic inflammatory change      SPLEEN:  Unremarkable      PANCREAS:  Unremarkable      ADRENAL GLANDS:  Unremarkable      KIDNEYS/URETERS:  No hydronephrosis or urinary tract calculus  One or more sharply circumscribed subcentimeter renal hypodensities are present, too small to accurately characterize, and statistically most likely benign findings  According to recent   literature (Radiology 2019) no further workup of these findings is recommended      STOMACH AND BOWEL:  No bowel obstruction  Stool-filled colon      APPENDIX:  A normal appendix was visualized      ABDOMINOPELVIC CAVITY:  No ascites  No pneumoperitoneum  No lymphadenopathy      VESSELS:  Unremarkable for patient's age      PELVIS     REPRODUCTIVE ORGANS:  Unremarkable for patient's age      URINARY BLADDER:  Unremarkable      ABDOMINAL WALL/INGUINAL REGIONS:  Unremarkable      OSSEOUS STRUCTURES:  No acute fracture    or destructive osseous lesion      IMPRESSION:     1  No acute inflammatory process identified within the abdomen or pelvis  2   Stool-filled colon, correlate for constipation            Workstation performed: [id number]           Final Result by Lux Ricketts MD (06/16 0113)      1  No acute inflammatory process identified within the abdomen or pelvis  2   Stool-filled colon, correlate for constipation  Workstation performed: [id number]                    Procedures  Procedures         ED Course  ED Course as of Jun 16 0209   Tue Noam 15, 2021   2309 Patient denied Toradol and Benadryl at this time; more pain medication was delivered  Wed Jun 16, 2021   0028 Patient reports that she is currently on her menstrual period      0135 CT abdomen pelvis with contrast-impression-"  1   No acute inflammatory process identified within the abdomen or pelvis  2   Stool-filled colon, correlate for constipation  "               0153 Patient had reported that her bowel patterns are "not where they should be  I drink a lot of apple juice "                                SBIRT 20yo+      Most Recent Value   SBIRT (22 yo +)   In order to provide better care to our patients, we are screening all of our patients for alcohol and drug use  Would it be okay to ask you these screening questions? Unable to answer at this time Filed at: 06/15/2021 2234                    MDM  Number of Diagnoses or Management Options  Constipation: new and does not require workup  Hematuria: new and does not require workup  Nausea: new and does not require workup     Amount and/or Complexity of Data Reviewed  Clinical lab tests: ordered and reviewed  Tests in the radiology section of CPT®: ordered and reviewed    Risk of Complications, Morbidity, and/or Mortality  Presenting problems: moderate  Diagnostic procedures: moderate  Management options: low    Patient Progress  Patient progress: stable       Patient is a 54-year-old female with history of kidney stones GERD, and hernia repair that presents emergency department with gradual onset dull sharp persistent lower abdominal pain with no radiation for 1 day  Patient has associated symptomatology of nausea symptoms beginning the current ED presentation of lower abdominal pain  Patient states that she is currently on her menstrual cycle  Patient hemodynamically stable and afebrile  CT abdomen pelvis with contrast-impression-"  1   No acute inflammatory process identified within the abdomen or pelvis  2   Stool-filled colon, correlate for constipation "  Delivered MiraLax  Patient has history of recent opioid use that may have contributed to current constipation symptomatology; patient also verbalizes consuming lots of apple juice "    Patient has hematuria; patient verbalized being currently on menstrual cycle  Normal PT INR; normal H&H, normal platelets; normal HCG qualitative pregnancy  No leukocytosis, no banding  Prescribed MiraLax, Zofran and counseled patient medication administration and side effects  Counseled patient on obtainin a Metamucil and using as directed  Follow-up with PCP  With emergency department symptoms persist or exacerbate  Patient demonstrates verbal understanding all clinical laboratory imaging findings, discharge instructions, follow-up and verbalized agreement patient current treatment plan  Disposition  Final diagnoses:   Constipation   Nausea   Hematuria - ptient reports current menstrual period     Time reflects when diagnosis was documented in both MDM as applicable and the Disposition within this note     Time User Action Codes Description Comment    6/16/2021  1:57 AM Greer Madhu Add [K59 00] Constipation     6/16/2021  1:57 AM Greer Madhu Add [R11 0] Nausea     6/16/2021  1:57 AM Greer Madhu Add [R31 9] Hematuria     6/16/2021  1:57 AM Greer Madhu Modify [R31 9] Hematuria ptient reports current menstrual period      ED Disposition     ED Disposition Condition Date/Time Comment    Discharge Stable Wed Jun 16, 2021  1:56 AM Vallorie Givens discharge to home/self care              Follow-up Information     Follow up With Specialties Details Why Contact Info Additional Information    Nievse Aguilar MD Family Medicine   [address] [facility]  [address]  [address] [place]  [phone]       [facility] Emergency Department Emergency Medicine   [address] Emergency Department, [address], [place], [place], [address]          Discharge Medication List as of 6/16/2021  2:00 AM      START taking these medications    Details   ondansetron (ZOFRAN) 4 mg tablet Take 1 tablet (4 mg total) by mouth every 6 (six) hours for 3 days, Starting Wed 6/16/2021, Until Sat 6/19/2021, Normal      polyethylene glycol (MIRALAX) 17 g packet Take 17 g by mouth daily for 2 days, Starting Wed 6/16/2021, Until Fri 6/18/2021, Normal         CONTINUE these medications which have NOT CHANGED    Details   ibuprofen (MOTRIN) 600 mg tablet Take 1 tablet (600 mg total) by mouth every 6 (six) hours as needed (Pain), Starting Wed 3/31/2021, Normal      ondansetron (ZOFRAN-ODT) 4 mg disintegrating tablet Take 1 tablet (4 mg total) by mouth every 6 (six) hours as needed for nausea or vomiting, Starting Fri 6/11/2021, Normal      pantoprazole (PROTONIX) 40 mg tablet Take 1 tablet (40 mg total) by mouth daily, Starting Tue 12/8/2020, Normal           No discharge procedures on file      PDMP Review       Value Time User    PDMP Reviewed  Yes 6/15/2021 10:52 PM Wing Garcia PA-C          ED Provider  Electronically Signed by           Wing Garcia PA-C  06/16/21 9622

## 2021-06-16 NOTE — DISCHARGE INSTRUCTIONS
Piotr Esquivel MD   [phone] 6/16/2021     Narrative & Impression   CT ABDOMEN AND PELVIS WITH IV CONTRAST       INDICATION:   lower abdominal pain; nausea  COMPARISON:  CT of abdomen pelvis on May 30, 2021  TECHNIQUE:  CT examination of the abdomen and pelvis was performed  Axial, sagittal, and coronal 2D reformatted images were created from the source data and submitted for interpretation  Radiation dose length product (DLP) for this visit:  312 mGy-cm   This examination, like all CT scans performed in the [facility], was performed utilizing techniques to minimize radiation dose exposure, including the use of iterative   reconstruction and automated exposure control  IV Contrast:  85 mL of iohexol (OMNIPAQUE)  was administered intravenously without immediate adverse reaction  Enteric Contrast:  Enteric contrast was not administered  FINDINGS:       ABDOMEN       LOWER CHEST:  No clinically significant abnormality identified in the visualized lower c   hest        LIVER/BILIARY TREE:  Unremarkable  GALLBLADDER:  No calcified gallstones  No pericholecystic inflammatory change  SPLEEN:  Unremarkable  PANCREAS:  Unremarkable  ADRENAL GLANDS:  Unremarkable  KIDNEYS/URETERS:  No hydronephrosis or urinary tract calculus  One or more sharply circumscribed subcentimeter renal hypodensities are present, too small to accurately characterize, and statistically most likely benign findings  According to recent   literature (Radiology 2019) no further workup of these findings is recommended  STOMACH AND BOWEL:  No bowel obstruction  Stool-filled colon  APPENDIX:  A normal appendix was visualized  ABDOMINOPELVIC CAVITY:  No ascites  No pneumoperitoneum  No lymphadenopathy  VESSELS:  Unremarkable for patient's age  PELVIS       REPRODUCTIVE ORGANS:  Unremarkable for patient's age         URINARY BLADDER: Unremarkable  ABDOMINAL WALL/INGUINAL REGIONS:  Unremarkable  OSSEOUS STRUCTURES:  No acute fracture    or destructive osseous lesion  IMPRESSION:       1  No acute inflammatory process identified within the abdomen or pelvis  2   Stool-filled colon, correlate for constipation                 Workstation performed: [id number]     Take Miralax and zofran as indicated  Obtain metamucil and use as directed  Follow up with pcp  Follow up with the emergency department if symptoms persist or exacerabate

## 2021-06-19 ENCOUNTER — HOSPITAL ENCOUNTER (EMERGENCY)
Facility: HOSPITAL | Age: 31
Discharge: HOME | End: 2021-06-19
Attending: EMERGENCY MEDICINE | Admitting: EMERGENCY MEDICINE
Payer: COMMERCIAL

## 2021-06-19 VITALS
TEMPERATURE: 98.1 F | RESPIRATION RATE: 20 BRPM | WEIGHT: 123 LBS | HEART RATE: 112 BPM | OXYGEN SATURATION: 97 % | BODY MASS INDEX: 24.15 KG/M2 | HEIGHT: 60 IN | SYSTOLIC BLOOD PRESSURE: 126 MMHG | DIASTOLIC BLOOD PRESSURE: 80 MMHG

## 2021-06-19 DIAGNOSIS — K08.89 PAIN, DENTAL: Primary | ICD-10-CM

## 2021-06-19 PROCEDURE — 99284 EMERGENCY DEPT VISIT MOD MDM: CPT | Performed by: EMERGENCY MEDICINE

## 2021-06-19 PROCEDURE — 99282 EMERGENCY DEPT VISIT SF MDM: CPT

## 2021-06-19 RX ORDER — OXYCODONE HYDROCHLORIDE 5 MG/1
5 TABLET ORAL EVERY 6 HOURS PRN
Qty: 5 TABLET | Refills: 0 | OUTPATIENT
Start: 2021-06-19 | End: 2021-06-22

## 2021-06-19 RX ORDER — AMOXICILLIN AND CLAVULANATE POTASSIUM 875; 125 MG/1; MG/1
1 TABLET, FILM COATED ORAL ONCE
Status: COMPLETED | OUTPATIENT
Start: 2021-06-19 | End: 2021-06-19

## 2021-06-19 RX ORDER — AMOXICILLIN AND CLAVULANATE POTASSIUM 875; 125 MG/1; MG/1
1 TABLET, FILM COATED ORAL EVERY 12 HOURS
Qty: 13 TABLET | Refills: 0 | Status: SHIPPED | OUTPATIENT
Start: 2021-06-19 | End: 2021-06-26

## 2021-06-19 RX ORDER — OXYCODONE HYDROCHLORIDE 5 MG/1
5 TABLET ORAL ONCE
Status: COMPLETED | OUTPATIENT
Start: 2021-06-19 | End: 2021-06-19

## 2021-06-19 RX ADMIN — AMOXICILLIN AND CLAVULANATE POTASSIUM 1 TABLET: 875; 125 TABLET, FILM COATED ORAL at 23:19

## 2021-06-19 RX ADMIN — OXYCODONE HYDROCHLORIDE 5 MG: 5 TABLET ORAL at 23:20

## 2021-06-20 ENCOUNTER — HOSPITAL ENCOUNTER (EMERGENCY)
Facility: HOSPITAL | Age: 31
Discharge: HOME | End: 2021-06-20
Attending: EMERGENCY MEDICINE | Admitting: EMERGENCY MEDICINE
Payer: COMMERCIAL

## 2021-06-20 VITALS
RESPIRATION RATE: 18 BRPM | TEMPERATURE: 98 F | DIASTOLIC BLOOD PRESSURE: 77 MMHG | HEART RATE: 84 BPM | OXYGEN SATURATION: 96 % | SYSTOLIC BLOOD PRESSURE: 116 MMHG

## 2021-06-20 DIAGNOSIS — R11.0 NAUSEA: Primary | ICD-10-CM

## 2021-06-20 PROCEDURE — 99283 EMERGENCY DEPT VISIT LOW MDM: CPT

## 2021-06-20 PROCEDURE — 99284 EMERGENCY DEPT VISIT MOD MDM: CPT | Performed by: EMERGENCY MEDICINE

## 2021-06-20 RX ORDER — ONDANSETRON 4 MG/1
4 TABLET, ORALLY DISINTEGRATING ORAL EVERY 8 HOURS PRN
Qty: 5 TABLET | Refills: 0 | Status: SHIPPED | OUTPATIENT
Start: 2021-06-20 | End: 2021-07-04

## 2021-06-20 RX ORDER — ONDANSETRON 4 MG/1
4 TABLET, ORALLY DISINTEGRATING ORAL ONCE
Status: COMPLETED | OUTPATIENT
Start: 2021-06-20 | End: 2021-06-20

## 2021-06-20 RX ADMIN — ONDANSETRON 4 MG: 4 TABLET, ORALLY DISINTEGRATING ORAL at 21:29

## 2021-06-20 NOTE — ED PROVIDER NOTES
History  Chief Complaint   Patient presents with    Dental Pain     Pt with 3 painful teeth on the R side, states an upper R tooth broke tonight while eating  Patient is a 28-year-old female seen in the emergency department with concern for right upper dental pain over approximately the past 2 days  Patient states that she is concerned that she might have cracked part of her right upper tooth earlier this evening while eating  Patient notes pain worse with eating/drinking  Patient states that she took Tylenol and ibuprofen prior to evaluation in the emergency department, with minimal improvement of symptoms  Patient notes no fever, chest pain, shortness of breath, headache, weakness  Patient states that she is scheduled to follow up with a dentist in approximately 1 week  Prior to Admission Medications   Prescriptions Last Dose Informant Patient Reported?  Taking?   ibuprofen (MOTRIN) 600 mg tablet   No No   Sig: Take 1 tablet (600 mg total) by mouth every 6 (six) hours as needed (Pain)   ondansetron (ZOFRAN) 4 mg tablet   No No   Sig: Take 1 tablet (4 mg total) by mouth every 6 (six) hours for 3 days   ondansetron (ZOFRAN-ODT) 4 mg disintegrating tablet   No No   Sig: Take 1 tablet (4 mg total) by mouth every 6 (six) hours as needed for nausea or vomiting   pantoprazole (PROTONIX) 40 mg tablet   No No   Sig: Take 1 tablet (40 mg total) by mouth daily   polyethylene glycol (MIRALAX) 17 g packet   No No   Sig: Take 17 g by mouth daily for 2 days      Facility-Administered Medications: None       Past Medical History:   Diagnosis Date    Asthma     GERD (gastroesophageal reflux disease)     Hernia, abdominal     Migraines     Psychiatric disorder     Anx, Depression    Renal disorder     kidney stones       Past Surgical History:   Procedure Laterality Date    [place] RETROGRADE PYELOGRAM  8/28/2018    HERNIA REPAIR      [place] CYSTO/URETERO W/LITHOTRIPSY &INDWELL STENT INSRT Right 8/28/2018 Procedure: CYSTOSCOPY URETEROSCOPY WITH RETROGRADE PYELOGRAM AND INSERTION STENT URETERAL;  Surgeon: Domenic Luque MD;  [facility];  Service: Urology    TOOTH EXTRACTION         Family History   Problem Relation Age of Onset    Diabetes Mother     Hyperlipidemia Mother     Stroke Mother     Heart disease Mother     Asthma Mother    Roshan [place] Other Mother         Degen  of Intervertabral disc   Nephrolithiasis Father     Nephrolithiasis Brother      I have reviewed and agree with the history as documented  E-Cigarette/Vaping    E-Cigarette Use Never User      E-Cigarette/Vaping Substances     Social History     Tobacco Use    Smoking status: Current Every Day Smoker     Packs/day: 0 25     Years: 13 00     Pack years: 3 25     Types: Cigarettes    Smokeless tobacco: Never Used   Vaping Use    Vaping Use: Never used   Substance Use Topics    Alcohol use: No    Drug use: No       Review of Systems   Constitutional: Negative for chills and fever  HENT: Positive for dental problem  Negative for ear pain and sore throat  Eyes: Negative for pain and visual disturbance  Respiratory: Negative for cough and shortness of breath  Cardiovascular: Negative for chest pain and palpitations  Gastrointestinal: Negative for abdominal pain and vomiting  Genitourinary: Negative for dysuria and hematuria  Musculoskeletal: Negative for arthralgias and back pain  Skin: Negative for color change and rash  Neurological: Negative for seizures and syncope  All other systems reviewed and are negative  Physical Exam  Physical Exam  Vitals and nursing note reviewed  Constitutional:       General: She is not in acute distress  Appearance: She is well-developed  HENT:      Head: Normocephalic and atraumatic        Right Ear: External ear normal       Left Ear: External ear normal       Nose: Nose normal       Mouth/Throat:      Mouth: Mucous membranes are moist       Dentition: Gingival swelling and dental caries present  Pharynx: Oropharynx is clear  Comments: Multiple dental caries noted diffusely; right upper gingival swelling; no obvious dental abscess or fracture noted  Eyes:      Conjunctiva/sclera: Conjunctivae normal    Cardiovascular:      Rate and Rhythm: Regular rhythm  Tachycardia present  Heart sounds: No murmur heard  Pulmonary:      Effort: Pulmonary effort is normal  No respiratory distress  Breath sounds: Normal breath sounds  Abdominal:      Palpations: Abdomen is soft  Tenderness: There is no abdominal tenderness  Musculoskeletal:      Cervical back: Normal range of motion and neck supple  Skin:     General: Skin is warm and dry  Neurological:      Mental Status: She is alert  Vital Signs  ED Triage Vitals   Temperature Pulse Respirations Blood Pressure SpO2   06/19/21 2310 06/19/21 2311 06/19/21 2311 06/19/21 2315 06/19/21 2311   98 1 °F (36 7 °C) (!) 112 20 126/80 97 %      Temp src Heart Rate Source Patient Position - Orthostatic VS BP Location FiO2 (%)   -- -- -- -- --             Pain Score       06/19/21 2320       Worst Possible Pain           Vitals:    06/19/21 2311 06/19/21 2315   BP:  126/80   Pulse: (!) 112          Visual Acuity      ED Medications  Medications   oxyCODONE (ROXICODONE) IR tablet 5 mg (5 mg Oral Given 6/19/21 2320)   amoxicillin-clavulanate (AUGMENTIN) 875-125 mg per tablet 1 tablet (1 tablet Oral Given 6/19/21 2319)       Diagnostic Studies  Results Reviewed     None                 No orders to display              Procedures  Procedures         ED Course                                           MDM  Number of Diagnoses or Management Options  Pain, dental  Diagnosis management comments: Patient is a 42-year-old female seen in the emergency department with concern for dental pain  Evaluation is consistent with multiple dental caries, but no obvious dental fracture or abscess    Plan to treat patient with course of oral antibiotics, and have patient follow up with dentist   Patient stable for discharge  Discharge instructions were reviewed with patient  Disposition  Final diagnoses:   Pain, dental     Time reflects when diagnosis was documented in both MDM as applicable and the Disposition within this note     Time User Action Codes Description Comment    6/19/2021 11:17 PM Lisa Sandoval Connor [K08 89] Pain, dental       ED Disposition     ED Disposition Condition Date/Time Comment    Discharge Stable Sat Jun 19, 2021 11:17 PM Mike Pérez discharge to home/self care              Follow-up Information     Follow up With Specialties Details Why Contact Info    Your dentist  Call in 1 day      Malena Salinas MD Family Medicine Call  As needed [address]  [address]  [phone]            Discharge Medication List as of 6/19/2021 11:19 PM      START taking these medications    Details   amoxicillin-clavulanate (AUGMENTIN) 875-125 mg per tablet Take 1 tablet by mouth every 12 (twelve) hours for 7 days, Starting Sat 6/19/2021, Until Sat 6/26/2021, Normal      oxyCODONE (ROXICODONE) 5 mg immediate release tablet Take 1 tablet (5 mg total) by mouth every 6 (six) hours as needed for moderate pain or severe pain for up to 3 daysMax Daily Amount: 20 mg, Starting Sat 6/19/2021, Until Tue 6/22/2021 at 2359, Normal         CONTINUE these medications which have NOT CHANGED    Details   ibuprofen (MOTRIN) 600 mg tablet Take 1 tablet (600 mg total) by mouth every 6 (six) hours as needed (Pain), Starting Wed 3/31/2021, Normal      ondansetron (ZOFRAN) 4 mg tablet Take 1 tablet (4 mg total) by mouth every 6 (six) hours for 3 days, Starting Wed 6/16/2021, Until Sat 6/19/2021, Normal      ondansetron (ZOFRAN-ODT) 4 mg disintegrating tablet Take 1 tablet (4 mg total) by mouth every 6 (six) hours as needed for nausea or vomiting, Starting Fri 6/11/2021, Normal      pantoprazole (PROTONIX) 40 mg tablet Take 1 tablet (40 mg total) by mouth daily, Starting Tue 12/8/2020, Normal      polyethylene glycol (MIRALAX) 17 g packet Take 17 g by mouth daily for 2 days, Starting Wed 6/16/2021, Until Fri 6/18/2021, Normal           No discharge procedures on file      PDMP Review       Value Time User    PDMP Reviewed  Yes 6/15/2021 10:52 PM Arabella Valentino PA-C          ED Provider  Electronically Signed by           Juan Sherman MD  06/19/21 7304       Juan Sherman MD  06/19/21 5528

## 2021-06-21 NOTE — ED PROVIDER NOTES
History  Chief Complaint   Patient presents with    Nausea     Nauseous from medication given for dental pain  Took nausea pill, no relief  Patient is a 70-year-old female seen in the emergency department with concern for nausea over the past day after taking Augmentin and oxycodone for symptom control at home  Review of records shows the patient was seen in the emergency department on 06/19/2021 for dental pain, and discharged home with a course of oral antibiotics and oxycodone(as needed)  Patient states that she felt nauseous after taking her medications at home, and tried a Zofran tablet at home, which did not help her symptoms  Patient states that she did not take a dissolvable tablet  Patient notes no fever, abdominal pain, diarrhea, weakness  Patient notes no other associated symptoms  Prior to Admission Medications   Prescriptions Last Dose Informant Patient Reported?  Taking?   amoxicillin-clavulanate (AUGMENTIN) 875-125 mg per tablet   No No   Sig: Take 1 tablet by mouth every 12 (twelve) hours for 7 days   ibuprofen (MOTRIN) 600 mg tablet   No No   Sig: Take 1 tablet (600 mg total) by mouth every 6 (six) hours as needed (Pain)   ondansetron (ZOFRAN) 4 mg tablet   No No   Sig: Take 1 tablet (4 mg total) by mouth every 6 (six) hours for 3 days   ondansetron (ZOFRAN-ODT) 4 mg disintegrating tablet   No No   Sig: Take 1 tablet (4 mg total) by mouth every 6 (six) hours as needed for nausea or vomiting   oxyCODONE (ROXICODONE) 5 mg immediate release tablet   No No   Sig: Take 1 tablet (5 mg total) by mouth every 6 (six) hours as needed for moderate pain or severe pain for up to 3 daysMax Daily Amount: 20 mg   pantoprazole (PROTONIX) 40 mg tablet   No No   Sig: Take 1 tablet (40 mg total) by mouth daily   polyethylene glycol (MIRALAX) 17 g packet   No No   Sig: Take 17 g by mouth daily for 2 days      Facility-Administered Medications: None       Past Medical History:   Diagnosis Date    Asthma     GERD (gastroesophageal reflux disease)     Hernia, abdominal     Migraines     Psychiatric disorder     Anx, Depression    Renal disorder     kidney stones       Past Surgical History:   Procedure Laterality Date    [place] RETROGRADE PYELOGRAM  8/28/2018    HERNIA REPAIR      [place] CYSTO/URETERO W/LITHOTRIPSY &INDWELL STENT INSRT Right 8/28/2018    Procedure: CYSTOSCOPY URETEROSCOPY WITH RETROGRADE PYELOGRAM AND INSERTION STENT URETERAL;  Surgeon: King Whitfield MD;  [facility];  Service: Urology    TOOTH EXTRACTION         Family History   Problem Relation Age of Onset    Diabetes Mother     Hyperlipidemia Mother     Stroke Mother     Heart disease Mother     Asthma Mother    Rose Pineda Other Mother         Degen  of Intervertabral disc   Nephrolithiasis Father     Nephrolithiasis Brother      I have reviewed and agree with the history as documented  E-Cigarette/Vaping    E-Cigarette Use Never User      E-Cigarette/Vaping Substances     Social History     Tobacco Use    Smoking status: Current Every Day Smoker     Packs/day: 0 25     Years: 13 00     Pack years: 3 25     Types: Cigarettes    Smokeless tobacco: Never Used   Vaping Use    Vaping Use: Never used   Substance Use Topics    Alcohol use: No    Drug use: No       Review of Systems   Constitutional: Negative for chills and fever  HENT: Negative for ear pain and sore throat  Eyes: Negative for pain and visual disturbance  Respiratory: Negative for cough and shortness of breath  Cardiovascular: Negative for chest pain and palpitations  Gastrointestinal: Positive for nausea  Negative for abdominal pain and vomiting  Genitourinary: Negative for dysuria and hematuria  Musculoskeletal: Negative for arthralgias and back pain  Skin: Negative for color change and rash  Neurological: Negative for seizures and syncope  All other systems reviewed and are negative        Physical Exam  Physical Exam  Vitals and nursing note reviewed  Constitutional:       General: She is not in acute distress  Appearance: She is well-developed  HENT:      Head: Normocephalic and atraumatic  Right Ear: External ear normal       Left Ear: External ear normal       Nose: Nose normal       Mouth/Throat:      Pharynx: Oropharynx is clear  Eyes:      General:         Right eye: No discharge  Left eye: No discharge  Conjunctiva/sclera: Conjunctivae normal    Cardiovascular:      Rate and Rhythm: Normal rate and regular rhythm  Heart sounds: No murmur heard  Pulmonary:      Effort: Pulmonary effort is normal  No respiratory distress  Breath sounds: Normal breath sounds  Abdominal:      Palpations: Abdomen is soft  Tenderness: There is no abdominal tenderness  Musculoskeletal:         General: No deformity or signs of injury  Cervical back: Normal range of motion and neck supple  Skin:     General: Skin is warm and dry  Neurological:      General: No focal deficit present  Mental Status: She is alert and oriented to person, place, and time  Cranial Nerves: No cranial nerve deficit  Sensory: No sensory deficit     Psychiatric:         Mood and Affect: Mood normal          Behavior: Behavior normal          Vital Signs  ED Triage Vitals [06/20/21 2117]   Temperature Pulse Respirations Blood Pressure SpO2   98 °F (36 7 °C) 84 18 116/77 96 %      Temp src Heart Rate Source Patient Position - Orthostatic VS BP Location FiO2 (%)   -- -- -- -- --      Pain Score       --           Vitals:    06/20/21 2117   BP: 116/77   Pulse: 84         Visual Acuity      ED Medications  Medications   ondansetron (ZOFRAN-ODT) dispersible tablet 4 mg (4 mg Oral Given 6/20/21 2129)       Diagnostic Studies  Results Reviewed     Procedure Component Value Units Date/Time    POCT pregnancy, urine [[id number]]     Lab Status: No result                  No orders to display Procedures  Procedures         ED Course                                           MDM  Number of Diagnoses or Management Options  Nausea  Diagnosis management comments: Patient is a 57-year-old female seen in the emergency department with concern for nausea in the setting of starting antibiotics and oxycodone at home  Patient is afebrile in the emergency department, appears well hydrated, and has not had any vomiting  Patient was treated with medication for symptom control  Patient will be provided a prescription for Zofran ODT as needed  Plan to have patient follow up with PCP/outpatient providers  Patient stable for discharge home  Discharge instructions were reviewed with patient  Disposition  Final diagnoses:   Nausea     Time reflects when diagnosis was documented in both MDM as applicable and the Disposition within this note     Time User Action Codes Description Comment    6/20/2021  9:25 PM Phineas Pilling Add [R11 0] Nausea       ED Disposition     ED Disposition Condition Date/Time Comment    Discharge Stable [place] Jun 20, 2021  9:25 PM Mike Pérez discharge to home/self care              Follow-up Information     Follow up With Specialties Details Why Contact Info    Denis Saleh MD Family Medicine Call in 1 day  [address] [facility] [address] [facility]  [phone]            Discharge Medication List as of 6/20/2021  9:28 PM      CONTINUE these medications which have CHANGED    Details   ondansetron (ZOFRAN-ODT) 4 mg disintegrating tablet Take 1 tablet (4 mg total) by mouth every 8 (eight) hours as needed for nausea for up to 3 days, Starting Sun 6/20/2021, Until Wed 6/23/2021 at 2359, Normal         CONTINUE these medications which have NOT CHANGED    Details   amoxicillin-clavulanate (AUGMENTIN) 875-125 mg per tablet Take 1 tablet by mouth every 12 (twelve) hours for 7 days, Starting Sat 6/19/2021, Until Sat 6/26/2021, Normal      ibuprofen (MOTRIN) 600 mg tablet Take 1 tablet (600 mg total) by mouth every 6 (six) hours as needed (Pain), Starting Wed 3/31/2021, Normal      ondansetron (ZOFRAN) 4 mg tablet Take 1 tablet (4 mg total) by mouth every 6 (six) hours for 3 days, Starting Wed 6/16/2021, Until Sat 6/19/2021, Normal      oxyCODONE (ROXICODONE) 5 mg immediate release tablet Take 1 tablet (5 mg total) by mouth every 6 (six) hours as needed for moderate pain or severe pain for up to 3 daysMax Daily Amount: 20 mg, Starting Sat 6/19/2021, Until Tue 6/22/2021 at 2359, Normal      pantoprazole (PROTONIX) 40 mg tablet Take 1 tablet (40 mg total) by mouth daily, Starting Tue 12/8/2020, Normal      polyethylene glycol (MIRALAX) 17 g packet Take 17 g by mouth daily for 2 days, Starting Wed 6/16/2021, Until Fri 6/18/2021, Normal           No discharge procedures on file      PDMP Review       Value Time User    PDMP Reviewed  Yes 6/15/2021 10:52 PM Ben Baird PA-C          ED Provider  Electronically Signed by           Roberto Cuevas MD  06/20/21 8035

## 2021-06-22 ENCOUNTER — HOSPITAL ENCOUNTER (EMERGENCY)
Facility: HOSPITAL | Age: 31
Discharge: HOME | End: 2021-06-22
Attending: EMERGENCY MEDICINE | Admitting: EMERGENCY MEDICINE
Payer: COMMERCIAL

## 2021-06-22 VITALS
DIASTOLIC BLOOD PRESSURE: 72 MMHG | TEMPERATURE: 98.3 F | BODY MASS INDEX: 23.42 KG/M2 | WEIGHT: 119.27 LBS | HEIGHT: 60 IN | SYSTOLIC BLOOD PRESSURE: 126 MMHG | HEART RATE: 105 BPM | OXYGEN SATURATION: 98 % | RESPIRATION RATE: 16 BRPM

## 2021-06-22 DIAGNOSIS — K08.89 DENTALGIA: Primary | ICD-10-CM

## 2021-06-22 PROCEDURE — 99282 EMERGENCY DEPT VISIT SF MDM: CPT

## 2021-06-22 PROCEDURE — 99284 EMERGENCY DEPT VISIT MOD MDM: CPT | Performed by: EMERGENCY MEDICINE

## 2021-06-22 RX ORDER — OXYCODONE HYDROCHLORIDE AND ACETAMINOPHEN 5; 325 MG/1; MG/1
1 TABLET ORAL ONCE
Status: COMPLETED | OUTPATIENT
Start: 2021-06-22 | End: 2021-06-22

## 2021-06-22 RX ORDER — OXYCODONE HYDROCHLORIDE AND ACETAMINOPHEN 5; 325 MG/1; MG/1
1 TABLET ORAL EVERY 6 HOURS PRN
Qty: 12 TABLET | Refills: 0 | Status: SHIPPED | OUTPATIENT
Start: 2021-06-22 | End: 2021-07-02

## 2021-06-22 RX ORDER — SACCHAROMYCES BOULARDII 250 MG
250 CAPSULE ORAL 2 TIMES DAILY
Qty: 14 CAPSULE | Refills: 0 | Status: SHIPPED | OUTPATIENT
Start: 2021-06-22 | End: 2021-06-29

## 2021-06-22 RX ORDER — SACCHAROMYCES BOULARDII 250 MG
250 CAPSULE ORAL ONCE
Status: COMPLETED | OUTPATIENT
Start: 2021-06-22 | End: 2021-06-22

## 2021-06-22 RX ADMIN — OXYCODONE HYDROCHLORIDE AND ACETAMINOPHEN 1 TABLET: 5; 325 TABLET ORAL at 23:28

## 2021-06-22 RX ADMIN — Medication 250 MG: at 23:28

## 2021-06-23 NOTE — ED PROVIDER NOTES
History  Chief Complaint   Patient presents with    Dental Pain     Pt reports R side upper/lower dental pain, states was seen at [facility] and prescribed med that upset her stomach, went back to try to get different script and was told she could not have different script, hx of multiple dental issues, scheduled for dentist next week      54-year-old female comes in for evaluation of dental pain  Patient states she was seen earlier in the week at [facility] she was placed on antibiotics and oxycodone  Patient states the antibiotics and oxycodone combination made her nauseous she tried to take Zofran without relief  Patient states she is now not able to keep down any medication  Patient states she does have an appointment with the dentist for Monday  Patient states she has taken Percocet that has both oxycodone and Tylenol in it without having any abdominal pain  History provided by:  Patient   used: No    Dental Pain  Location:  Upper and lower  Upper teeth location:  1/RU 3rd molar and 2/RU 2nd molar  Lower teeth location:  32/RL 3rd molar and 31/RL 2nd molar  Quality:  Pulsating and throbbing  Severity:  Severe  Onset quality:  Gradual  Timing:  Constant  Progression:  Worsening  Chronicity:  Recurrent  Context: dental caries, dental fracture and poor dentition    Previous work-up:  Dental exam  Ineffective treatments:  Ice (Oxycodone)  Associated symptoms: facial pain, facial swelling, gum swelling and trismus    Associated symptoms: no congestion, no fever and no headaches    Risk factors: periodontal disease    Risk factors: no alcohol problem and no immunosuppression        Prior to Admission Medications   Prescriptions Last Dose Informant Patient Reported?  Taking?   amoxicillin-clavulanate (AUGMENTIN) 875-125 mg per tablet   No No   Sig: Take 1 tablet by mouth every 12 (twelve) hours for 7 days   ibuprofen (MOTRIN) 600 mg tablet   No No   Sig: Take 1 tablet (600 mg total) by mouth every 6 (six) hours as needed (Pain)   ondansetron (ZOFRAN) 4 mg tablet   No No   Sig: Take 1 tablet (4 mg total) by mouth every 6 (six) hours for 3 days   ondansetron (ZOFRAN-ODT) 4 mg disintegrating tablet   No No   Sig: Take 1 tablet (4 mg total) by mouth every 8 (eight) hours as needed for nausea for up to 3 days   oxyCODONE (ROXICODONE) 5 mg immediate release tablet   No No   Sig: Take 1 tablet (5 mg total) by mouth every 6 (six) hours as needed for moderate pain or severe pain for up to 3 daysMax Daily Amount: 20 mg   pantoprazole (PROTONIX) 40 mg tablet   No No   Sig: Take 1 tablet (40 mg total) by mouth daily   polyethylene glycol (MIRALAX) 17 g packet   No No   Sig: Take 17 g by mouth daily for 2 days      Facility-Administered Medications: None       Past Medical History:   Diagnosis Date    Asthma     GERD (gastroesophageal reflux disease)     Hernia, abdominal     Migraines     Psychiatric disorder     Anx, Depression    Renal disorder     kidney stones       Past Surgical History:   Procedure Laterality Date    FL RETROGRADE PYELOGRAM  8/28/2018    HERNIA REPAIR      HI CYSTO/URETERO W/LITHOTRIPSY &INDWELL STENT INSRT Right 8/28/2018    Procedure: CYSTOSCOPY URETEROSCOPY WITH RETROGRADE PYELOGRAM AND INSERTION STENT URETERAL;  Surgeon: Laure Tubbs MD;  [facility];  Service: Urology    TOOTH EXTRACTION         Family History   Problem Relation Age of Onset    Diabetes Mother     Hyperlipidemia Mother     Stroke Mother     Heart disease Mother     Asthma Mother    [employer] Other Mother         Degen  of Intervertabral disc   Nephrolithiasis Father     Nephrolithiasis Brother      I have reviewed and agree with the history as documented      E-Cigarette/Vaping    E-Cigarette Use Never User      E-Cigarette/Vaping Substances     Social History     Tobacco Use    Smoking status: Current Every Day Smoker     Packs/day: 0 25     Years: 13 00 Pack years: 3 25     Types: Cigarettes    Smokeless tobacco: Never Used   Vaping Use    Vaping Use: Never used   Substance Use Topics    Alcohol use: No    Drug use: No       Review of Systems   Constitutional: Negative for fatigue and fever  HENT: Positive for facial swelling  Negative for congestion and ear pain  Eyes: Negative for discharge and redness  Respiratory: Negative for apnea, cough, shortness of breath and wheezing  Cardiovascular: Negative for chest pain  Gastrointestinal: Negative for abdominal pain and diarrhea  Endocrine: Negative for cold intolerance and polydipsia  Genitourinary: Negative for difficulty urinating and hematuria  Musculoskeletal: Negative for arthralgias and back pain  Skin: Negative for color change and rash  Allergic/Immunologic: Negative for environmental allergies and immunocompromised state  Neurological: Negative for numbness and headaches  Hematological: Negative for adenopathy  Does not bruise/bleed easily  Psychiatric/Behavioral: Negative for agitation and behavioral problems  Physical Exam  Physical Exam  Vitals and nursing note reviewed  Constitutional:       General: She is in acute distress  Appearance: Normal appearance  She is well-developed  She is not toxic-appearing  HENT:      Head: Normocephalic and atraumatic  Jaw: Trismus, tenderness, swelling and pain on movement present  Right Ear: Tympanic membrane and external ear normal       Left Ear: Tympanic membrane and external ear normal       Nose: Nose normal  No nasal deformity or rhinorrhea  Mouth/Throat:      Mouth: Mucous membranes are moist       Dentition: Abnormal dentition  Dental tenderness, gingival swelling and dental caries present  Pharynx: Uvula midline  Eyes:      General: Lids are normal          Right eye: No discharge  Left eye: No discharge        Conjunctiva/sclera: Conjunctivae normal       Pupils: Pupils are equal, round, and reactive to light  Neck:      Vascular: No carotid bruit or JVD  Trachea: Trachea normal    Cardiovascular:      Rate and Rhythm: Normal rate and regular rhythm  No extrasystoles are present  Chest Wall: PMI is not displaced  Pulses: Normal pulses  Pulmonary:      Effort: Pulmonary effort is normal  No accessory muscle usage or respiratory distress  Breath sounds: Normal breath sounds  No wheezing, rhonchi or rales  Abdominal:      General: Bowel sounds are normal       Palpations: Abdomen is soft  Abdomen is not rigid  There is no mass  Tenderness: There is no abdominal tenderness  There is no guarding or rebound  Musculoskeletal:      Right shoulder: No swelling, deformity or bony tenderness  Normal range of motion  Cervical back: Normal range of motion and neck supple  No deformity, tenderness or bony tenderness  Lymphadenopathy:      Cervical: No cervical adenopathy  Skin:     General: Skin is warm and dry  Findings: No rash  Neurological:      Mental Status: She is alert and oriented to person, place, and time  GCS: GCS eye subscore is 4  GCS verbal subscore is 5  GCS motor subscore is 6  Cranial Nerves: No cranial nerve deficit  Sensory: No sensory deficit  Deep Tendon Reflexes: Reflexes are normal and symmetric     Psychiatric:         Speech: Speech normal          Behavior: Behavior normal          Vital Signs  ED Triage Vitals [06/22/21 2311]   Temperature Pulse Respirations Blood Pressure SpO2   98 3 °F (36 8 °C) 105 16 126/72 98 %      Temp Source Heart Rate Source Patient Position - Orthostatic VS BP Location FiO2 (%)   Oral Monitor Sitting Right arm --      Pain Score       8           Vitals:    06/22/21 2311   BP: 126/72   Pulse: 105   Patient Position - Orthostatic VS: Sitting         Visual Acuity      ED Medications  Medications   saccharomyces boulardii (FLORASTOR) capsule 250 mg (has no administration in time range) oxyCODONE-acetaminophen (PERCOCET) 5-325 mg per tablet 1 tablet (has no administration in time range)       Diagnostic Studies  Results Reviewed     None                 No orders to display              Procedures  Procedures         ED Course                             SBIRT 22yo+      Most Recent Value   SBIRT (24 yo +)   In order to provide better care to our patients, we are screening all of our patients for alcohol and drug use  Would it be okay to ask you these screening questions? Unable to answer at this time Filed at: 06/22/2021 2312                    MDM  Number of Diagnoses or Management Options  Dentalgia: established and worsening  Risk of Complications, Morbidity, and/or Mortality  General comments: Discussed with patient she should finish this course of antibiotics I will change her pain medication around also put her on a pro day biotic it may be that the combination of for oxycodone and antibiotics because to GI irritation and she would benefit from a probiotic  I also gave her the number for both of the dental clinics in our local area once outside [place] and 1 in [facility] although she does have an appointment in Monday with a dentist it may be able to be seen sooner if she calls when the clinics    Patient Progress  Patient progress: improved      Disposition  Final diagnoses:   Bettina Willis     Time reflects when diagnosis was documented in both MDM as applicable and the Disposition within this note     Time User Action Codes Description Comment    6/22/2021 11:19 PM Adali Blake Add [K08 [id number]] Bettina Willis       ED Disposition     ED Disposition Condition Date/Time Comment    Discharge Stable Tue Jun 22, 2021 11:19 PM Mike Pérez discharge to home/self care              Follow-up Information     Follow up With Specialties Details Why Contact Info Additional [address] Dent[facility] Schedule an appointment as soon as possible for a visit   [address] [id number]  [address] [place] [address], [address] [place] [address], [phone], [facility] Schedule an appointment as soon as possible for a visit   [facility] [id number]  [address] [place] [address], [phone], [phone]          Patient's Medications   Discharge Prescriptions    OXYCODONE-ACETAMINOPHEN (PERCOCET) 5-325 MG PER TABLET    Take 1 tablet by mouth every 6 (six) hours as needed for severe pain for up to 10 daysMax Daily Amount: 4 tablets       Start Date: 6/22/2021 End Date: 7/2/2021       Order Dose: 1 tablet       Quantity: 12 tablet    Refills: 0    SACCHAROMYCES BOULARDII (FLORASTOR) 250 MG CAPSULE    Take 1 capsule (250 mg total) by mouth 2 (two) times a day for 7 days       Start Date: 6/22/2021 End Date: 6/29/2021       Order Dose: 250 mg       Quantity: 14 capsule    Refills: 0     No discharge procedures on file      PDMP Review       Value Time User    PDMP Reviewed  Yes 6/15/2021 10:52 PM Enedina Mcgee PA-C          ED Provider  Electronically Signed by           Dhara Vences DO  06/22/21 7443

## 2021-07-04 ENCOUNTER — HOSPITAL ENCOUNTER (EMERGENCY)
Facility: HOSPITAL | Age: 31
Discharge: HOME | End: 2021-07-04
Attending: EMERGENCY MEDICINE
Payer: COMMERCIAL

## 2021-07-04 VITALS
HEIGHT: 60 IN | SYSTOLIC BLOOD PRESSURE: 119 MMHG | TEMPERATURE: 98.6 F | DIASTOLIC BLOOD PRESSURE: 78 MMHG | WEIGHT: 120.59 LBS | BODY MASS INDEX: 23.68 KG/M2 | RESPIRATION RATE: 20 BRPM | OXYGEN SATURATION: 98 % | HEART RATE: 120 BPM

## 2021-07-04 DIAGNOSIS — G89.29 CHRONIC BACK PAIN: Primary | ICD-10-CM

## 2021-07-04 DIAGNOSIS — M54.9 CHRONIC BACK PAIN: Primary | ICD-10-CM

## 2021-07-04 DIAGNOSIS — R11.0 NAUSEA: ICD-10-CM

## 2021-07-04 LAB
BILIRUB UR QL STRIP: NEGATIVE
CLARITY UR: CLEAR
COLOR UR: YELLOW
EXT PREG TEST URINE: NEGATIVE
EXT. CONTROL ED NAV: NORMAL
GLUCOSE UR STRIP-MCNC: NEGATIVE MG/DL
HGB UR QL STRIP.AUTO: NEGATIVE
KETONES UR STRIP-MCNC: NEGATIVE MG/DL
LEUKOCYTE ESTERASE UR QL STRIP: NEGATIVE
NITRITE UR QL STRIP: NEGATIVE
PH UR STRIP.AUTO: 6.5 [PH] (ref 4.5–8)
PROT UR STRIP-MCNC: NEGATIVE MG/DL
SP GR UR STRIP.AUTO: 1.01 (ref 1–1.03)
UROBILINOGEN UR QL STRIP.AUTO: 0.2 E.U./DL

## 2021-07-04 PROCEDURE — 99283 EMERGENCY DEPT VISIT LOW MDM: CPT

## 2021-07-04 PROCEDURE — 81003 URINALYSIS AUTO W/O SCOPE: CPT

## 2021-07-04 PROCEDURE — 99284 EMERGENCY DEPT VISIT MOD MDM: CPT | Performed by: EMERGENCY MEDICINE

## 2021-07-04 PROCEDURE — 81025 URINE PREGNANCY TEST: CPT

## 2021-07-04 RX ORDER — ONDANSETRON 4 MG/1
4 TABLET, ORALLY DISINTEGRATING ORAL ONCE
Status: COMPLETED | OUTPATIENT
Start: 2021-07-04 | End: 2021-07-04

## 2021-07-04 RX ORDER — ONDANSETRON 4 MG/1
4 TABLET, ORALLY DISINTEGRATING ORAL EVERY 8 HOURS PRN
Qty: 15 TABLET | Refills: 0 | Status: SHIPPED | OUTPATIENT
Start: 2021-07-04 | End: 2021-07-17 | Stop reason: SDUPTHER

## 2021-07-04 RX ADMIN — ONDANSETRON 4 MG: 4 TABLET, ORALLY DISINTEGRATING ORAL at 01:01

## 2021-07-04 NOTE — ED ATTENDING ATTESTATION
7/4/2021  I, Ksenia Forman MD, saw and evaluated the patient  I have discussed the patient with the resident/non-physician practitioner and agree with the resident's/non-physician practitioner's findings, Plan of Care, and MDM as documented in the resident's/non-physician practitioner's note, except where noted  All available labs and Radiology studies were reviewed  I was present for key portions of any procedure(s) performed by the resident/non-physician practitioner and I was immediately available to provide assistance  At this point I agree with the current assessment done in the Emergency Department  I have conducted an independent evaluation of this patient a history and physical is as follows:  Patient is a 32year old female with worsening lower back pain since yesterday with radiation up back to shoulders  No trauma  Was last seen in this ED on 6/22/21 for toothache  No fever  No numbness or weakness or incontinence  (+) nausea  [facility] website checked on this patient and last Rx filled was on 6/23/21 for percocet for 3 day supply  (+) paravertebral lumbar tenderness  Lungs clear  Tachycardic  Abdomen nontender  No rashes noted  DDx including but not limited to: chronic low back pain, sciatica, herniated disc, arthritis, spinal stenosis, strain, sprain, UTI, doubt pyelonephritis or renal colic; doubt fracture, cauda equina syndrome, epidural abscess, AAA  Labs reviewed by me and urine dip d/w patient  Patient cannot take lidocaine patch, flexeril and declines tylenol and motrin here  Patient has had multiple Rxs for narcotic pain meds by multiple providers on  so I am uncomfortable prescribing narcotic pain medication to this patient  Will give patient spine and pain number for f/u       ED Course         Critical Care Time  Procedures

## 2021-07-04 NOTE — ED PROVIDER NOTES
History  Chief Complaint   Patient presents with    Back Pain     back pain starting in lower back radiating to shoulders begining yesterday, no relief with medications      Pt denies any loss of bowel or bladder control and denies any recent injury  Denies CP, vomiting, diarrhea, abdominal pain or any other Sx  No other concerns  Back Pain  Location:  Thoracic spine  Quality:  Stabbing  Radiates to:  R shoulder and L shoulder  Pain severity:  Moderate  Onset quality:  Unable to specify  Timing:  Constant  Chronicity:  Chronic  Worsened by:  Lying down  Ineffective treatments:  Ibuprofen and muscle relaxants (Reports flexeril, tylenol and advil has not relieved Sx )  Associated symptoms: no abdominal pain, no dysuria and no fever        Prior to Admission Medications   Prescriptions Last Dose Informant Patient Reported? Taking?   ibuprofen (MOTRIN) 600 mg tablet   No No   Sig: Take 1 tablet (600 mg total) by mouth every 6 (six) hours as needed (Pain)   pantoprazole (PROTONIX) 40 mg tablet   No No   Sig: Take 1 tablet (40 mg total) by mouth daily      Facility-Administered Medications: None       Past Medical History:   Diagnosis Date    Asthma     GERD (gastroesophageal reflux disease)     Hernia, abdominal     Migraines     Psychiatric disorder     Anx, Depression    Renal disorder     kidney stones       Past Surgical History:   Procedure Laterality Date    FL RETROGRADE PYELOGRAM  8/28/2018    HERNIA REPAIR      MI CYSTO/URETERO W/LITHOTRIPSY &INDWELL STENT INSRT Right 8/28/2018    Procedure: CYSTOSCOPY URETEROSCOPY WITH RETROGRADE PYELOGRAM AND INSERTION STENT URETERAL;  Surgeon: Kimberly Guerin MD;  [facility];  Service: Urology    TOOTH EXTRACTION         Family History   Problem Relation Age of Onset    Diabetes Mother     Hyperlipidemia Mother     Stroke Mother     Heart disease Mother     Asthma Mother    Le[employer] January Other Mother         Degen  of Intervertabral disc      Nephrolithiasis Father     Nephrolithiasis Brother      I have reviewed and agree with the history as documented  E-Cigarette/Vaping    E-Cigarette Use Never User      E-Cigarette/Vaping Substances     Social History     Tobacco Use    Smoking status: Current Every Day Smoker     Packs/day: 0 50     Years: 13 00     Pack years: 6 50     Types: Cigarettes    Smokeless tobacco: Never Used   Vaping Use    Vaping Use: Never used   Substance Use Topics    Alcohol use: Not Currently    Drug use: No        Review of Systems   Constitutional: Negative  Negative for fever  HENT: Negative  Eyes: Negative  Respiratory: Negative  Negative for chest tightness and shortness of breath  Gastrointestinal: Negative  Negative for abdominal pain  Denies loss of bowel control   Endocrine: Negative  Genitourinary: Negative  Negative for dysuria and enuresis  Denies bladder dysfunction, loss of control   Musculoskeletal: Positive for back pain  Skin: Negative  Allergic/Immunologic: Negative  Neurological: Negative  Psychiatric/Behavioral:        Tearful         Physical Exam  ED Triage Vitals [07/04/21 0035]   Temperature Pulse Respirations Blood Pressure SpO2   98 6 °F (37 °C) (!) 120 20 119/78 98 %      Temp Source Heart Rate Source Patient Position - Orthostatic VS BP Location FiO2 (%)   Oral Monitor Lying Right arm --      Pain Score       9             Orthostatic Vital Signs  Vitals:    07/04/21 0035   BP: 119/78   Pulse: (!) 120   Patient Position - Orthostatic VS: Lying       Physical Exam  Constitutional:       Appearance: Normal appearance  HENT:      Head: Normocephalic and atraumatic  Nose: Nose normal    Eyes:      Extraocular Movements: Extraocular movements intact  Conjunctiva/sclera: Conjunctivae normal    Cardiovascular:      Rate and Rhythm: Normal rate and regular rhythm  Pulses: Normal pulses  Heart sounds: Normal heart sounds  No murmur heard  Abdominal:      General: Abdomen is flat  There is no distension  Palpations: Abdomen is soft  Tenderness: There is no abdominal tenderness  There is no right CVA tenderness, left CVA tenderness or guarding  Musculoskeletal:         General: Normal range of motion  Cervical back: Normal range of motion  Comments: TTP mid thoracic Paraspinous area along with TTP of L and R scapular spines  No overlying bruising, redness or rashes  No point tenderness of spine  Skin:     General: Skin is warm and dry  Neurological:      Mental Status: She is alert and oriented to person, place, and time  Psychiatric:      Comments: Tearful           ED Medications  Medications   ondansetron (ZOFRAN-ODT) dispersible tablet 4 mg (4 mg Oral Given 7/4/21 0101)       Diagnostic Studies  Results Reviewed     Procedure Component Value Units Date/Time    POCT pregnancy, urine [[id number]]  (Normal) Resulted: 07/04/21 0105    Lab Status: Final result Updated: 07/04/21 0106     EXT PREG TEST UR (Ref: Negative) NEGATIVE     Control VALID    Urine Macroscopic, POC [[id number]] Collected: 07/04/21 0103    Lab Status: Final result Specimen: Urine Updated: 07/04/21 0105     Color, UA Yellow     Clarity, UA Clear     pH, UA 6 5     Leukocytes, UA Negative     Nitrite, UA Negative     Protein, UA Negative mg/dl      Glucose, UA Negative mg/dl      Ketones, UA Negative mg/dl      Urobilinogen, UA 0 2 E U /dl      Bilirubin, UA Negative     Blood, UA Negative     Specific Gravity, UA 1 010    Narrative:      CLINITEK RESULT                 No orders to display         Procedures  Procedures      ED Course                             SBIRT 20yo+      Most Recent Value   SBIRT (24 yo +)   In order to provide better care to our patients, we are screening all of our patients for alcohol and drug use  Would it be okay to ask you these screening questions?   Yes Filed at: 07/04/2021 0043   Initial Alcohol Screen: US AUDIT-C    1  How often do you have a drink containing alcohol?  0 Filed at: 07/04/2021 0043   2  How many drinks containing alcohol do you have on a typical day you are drinking? 0 Filed at: 07/04/2021 0043   3a  Male UNDER 65: How often do you have five or more drinks on one occasion? 0 Filed at: 07/04/2021 0043   3b  FEMALE Any Age, or MALE 65+: How often do you have 4 or more drinks on one occassion? 0 Filed at: 07/04/2021 0043   Audit-C Score  0 Filed at: 07/04/2021 1882   CRISTINO: How many times in the past year have you    Used an illegal drug or used a prescription medication for non-medical reasons? Never Filed at: 07/04/2021 0043                MDM  Number of Diagnoses or Management Options  Diagnosis management comments: DDx including but not limited to: herniated disc, arthritis, spinal stenosis, strain, sprain, PE, PTX, pneumonia, rhabdomyolysis; doubt fracture, epidural abscess, AAA, dissection or cardiac etiology  Pt was offered Tylenol/Ibuprofen and/or lidocaine patch which she reports are ineffective and causes skin irritation respectively  Pt was offered X-ray and refused  Pt's nausea was relieved with Zofran 40 mg and requested Rx Zofran  Was given f/u information for Spine pain management  No other concerns  Disposition  Final diagnoses:   Nausea     Time reflects when diagnosis was documented in both MDM as applicable and the Disposition within this note     Time User Action Codes Description Comment    7/4/2021  1:57 AM Lalo Barrow Add [R11 0] Nausea       ED Disposition     None      Follow-up Information    None         Patient's Medications   Discharge Prescriptions    ONDANSETRON (ZOFRAN ODT) 4 MG DISINTEGRATING TABLET    Take 1 tablet (4 mg total) by mouth every 8 (eight) hours as needed for nausea or vomiting for up to 5 days       Start Date: 7/4/2021  End Date: 7/9/2021       Order Dose: 4 mg       Quantity: 15 tablet    Refills: 0     No discharge procedures on file      PDMP Review Value Time User    PDMP Reviewed  Yes 7/4/2021 12:41 AM Ksenia Forman MD           ED Provider  Attending physically available and evaluated Mike Pérez I managed the patient along with the ED Attending      Electronically Signed by         James Velásquez MD  07/04/21 1973

## 2021-07-13 ENCOUNTER — HOSPITAL ENCOUNTER (EMERGENCY)
Facility: HOSPITAL | Age: 31
Discharge: HOME | End: 2021-07-14
Attending: EMERGENCY MEDICINE
Payer: COMMERCIAL

## 2021-07-13 VITALS
TEMPERATURE: 97.8 F | OXYGEN SATURATION: 99 % | SYSTOLIC BLOOD PRESSURE: 109 MMHG | HEART RATE: 76 BPM | RESPIRATION RATE: 16 BRPM | DIASTOLIC BLOOD PRESSURE: 76 MMHG

## 2021-07-13 DIAGNOSIS — K02.9 DENTAL CARIES: Primary | ICD-10-CM

## 2021-07-13 DIAGNOSIS — K08.89 DENTALGIA: ICD-10-CM

## 2021-07-13 PROCEDURE — 99284 EMERGENCY DEPT VISIT MOD MDM: CPT | Performed by: EMERGENCY MEDICINE

## 2021-07-13 PROCEDURE — 99283 EMERGENCY DEPT VISIT LOW MDM: CPT

## 2021-07-14 PROBLEM — K08.89 DENTALGIA: Status: ACTIVE | Noted: 2021-07-14

## 2021-07-14 PROBLEM — K02.9 DENTAL CARIES: Status: ACTIVE | Noted: 2021-07-14

## 2021-07-14 LAB
EXT PREG TEST URINE: NEGATIVE
EXT. CONTROL ED NAV: NORMAL

## 2021-07-14 PROCEDURE — 81025 URINE PREGNANCY TEST: CPT | Performed by: EMERGENCY MEDICINE

## 2021-07-14 RX ORDER — DOXYCYCLINE HYCLATE 100 MG/1
100 CAPSULE ORAL ONCE
Status: COMPLETED | OUTPATIENT
Start: 2021-07-14 | End: 2021-07-14

## 2021-07-14 RX ORDER — IBUPROFEN 600 MG/1
600 TABLET ORAL EVERY 6 HOURS PRN
Qty: 60 TABLET | Refills: 0 | Status: SHIPPED | OUTPATIENT
Start: 2021-07-14 | End: 2021-07-24

## 2021-07-14 RX ORDER — ONDANSETRON 4 MG/1
4 TABLET, ORALLY DISINTEGRATING ORAL ONCE
Status: COMPLETED | OUTPATIENT
Start: 2021-07-14 | End: 2021-07-14

## 2021-07-14 RX ORDER — DOXYCYCLINE HYCLATE 100 MG/1
100 CAPSULE ORAL 2 TIMES DAILY
Qty: 20 CAPSULE | Refills: 0 | Status: SHIPPED | OUTPATIENT
Start: 2021-07-14 | End: 2021-07-24

## 2021-07-14 RX ORDER — ONDANSETRON 4 MG/1
4 TABLET, FILM COATED ORAL EVERY 6 HOURS
Qty: 12 TABLET | Refills: 0 | Status: SHIPPED | OUTPATIENT
Start: 2021-07-14 | End: 2021-07-24

## 2021-07-14 RX ADMIN — ONDANSETRON 4 MG: 4 TABLET, ORALLY DISINTEGRATING ORAL at 00:37

## 2021-07-14 RX ADMIN — DOXYCYCLINE 100 MG: 100 CAPSULE ORAL at 00:37

## 2021-07-14 NOTE — DISCHARGE INSTRUCTIONS
We have provided you with a lost of local dental providers that are taking new patients and also accept your insurance - if you are not able to obtain an appointment with the dentist that you have seen - please call another dentist so that you may obtain the services that you require  AS we discussed and in accordance with the recommendations of the ADA, take ibuprofen 600 mg and Tylenol 650 mg together with caffiene every 6 hours for dental pain    Take the antibiotic as ordered until it is finished    Follow up with your dentist or oral surgeon

## 2021-07-14 NOTE — ED PROVIDER NOTES
History  Chief Complaint   Patient presents with    Dental Pain     This is a 32year old female with a hx of poor dental hygiene that has been seen at one the [facility] emergency departments 7 times since 2/25/21 for dental pain  She has had 9 additional emergency departments within this system during the same period of times for other reasons with no inpatient admissions    Pt reports that she has pain of tooth #17 and that it recently fractured and she has pain which is aggravating her underlying TMJ    Reports nausea but no fever or chills     A review of the PDMP shows that she was ordered oxycodone on 6/20/21 and 6/23/21 after ED visits    Discussed with pt the system policy on narcotics for chronic pain and that I will be in compliance with that policy and not be prescribing narcotics - also provided the patient with a copy of the Letter authored by the Chelsea Perez of Emergency medicine of [facility] regarding the system policy    Pt reports that she is allergic to Toradol however is able to take ibuprofen and does take it at home - last dose was 800 mg 5 hours ago    Discussed the ADA recommendations regarding pain control with use of acetaminophen and ibuprofen at simultaneously with a small amount of caffiene    Pt reports that was seen by a dentist and is waiting to hear back from them - she reports that they are contacting her insurance provider to determine "what they will pay for"          Prior to Admission Medications   Prescriptions Last Dose Informant Patient Reported?  Taking?   ibuprofen (MOTRIN) 600 mg tablet   No No   Sig: Take 1 tablet (600 mg total) by mouth every 6 (six) hours as needed (Pain)   ondansetron (Zofran ODT) 4 mg disintegrating tablet   No No   Sig: Take 1 tablet (4 mg total) by mouth every 8 (eight) hours as needed for nausea or vomiting for up to 5 days   pantoprazole (PROTONIX) 40 mg tablet   No No   Sig: Take 1 tablet (40 mg total) by mouth daily      Facility-Administered Medications: None       Past Medical History:   Diagnosis Date    Asthma     GERD (gastroesophageal reflux disease)     Hernia, abdominal     Migraines     Psychiatric disorder     Anx, Depression    Renal disorder     kidney stones       Past Surgical History:   Procedure Laterality Date    FL RETROGRADE PYELOGRAM  8/28/2018    HERNIA REPAIR      [place] CYSTO/URETERO W/LITHOTRIPSY &INDWELL STENT INSRT Right 8/28/2018    Procedure: CYSTOSCOPY URETEROSCOPY WITH RETROGRADE PYELOGRAM AND INSERTION STENT URETERAL;  Surgeon: Raul Carney MD;  [facility];  Service: Urology    TOOTH EXTRACTION         Family History   Problem Relation Age of Onset    Diabetes Mother     Hyperlipidemia Mother     Stroke Mother     Heart disease Mother     Asthma Mother    Melchor Solorzano Other Mother         Degen  of Intervertabral disc   Nephrolithiasis Father     Nephrolithiasis Brother      I have reviewed and agree with the history as documented  E-Cigarette/Vaping    E-Cigarette Use Never User      E-Cigarette/Vaping Substances     Social History     Tobacco Use    Smoking status: Current Every Day Smoker     Packs/day: 0 50     Years: 13 00     Pack years: 6 50     Types: Cigarettes    Smokeless tobacco: Never Used   Vaping Use    Vaping Use: Never used   Substance Use Topics    Alcohol use: Not Currently    Drug use: No       Review of Systems   Constitutional: Negative for activity change, appetite change, chills, diaphoresis, fatigue, fever and unexpected weight change  HENT: Positive for dental problem  Negative for congestion, ear discharge, ear pain, mouth sores, sinus pressure, sinus pain, sneezing, sore throat, trouble swallowing and voice change  Eyes: Negative for photophobia, pain, discharge, redness, itching and visual disturbance  Respiratory: Negative for cough, chest tightness and shortness of breath  Cardiovascular: Negative for chest pain, palpitations and leg swelling  Gastrointestinal: Negative for abdominal pain, constipation, nausea and vomiting  Endocrine: Negative for cold intolerance, heat intolerance, polydipsia, polyphagia and polyuria  Genitourinary: Negative for decreased urine volume, difficulty urinating, dysuria, frequency, hematuria and urgency  Musculoskeletal: Negative for arthralgias, back pain, gait problem, joint swelling, myalgias, neck pain and neck stiffness  Skin: Negative for color change and rash  Allergic/Immunologic: Negative for immunocompromised state  Neurological: Negative for dizziness, tremors, seizures, syncope, speech difficulty, weakness, light-headedness, numbness and headaches  Hematological: Does not bruise/bleed easily  Psychiatric/Behavioral: Negative for behavioral problems and suicidal ideas  Physical Exam  Physical Exam  Vitals and nursing note reviewed  Constitutional:       General: She is not in acute distress  Appearance: Normal appearance  She is well-developed and normal weight  She is not ill-appearing, toxic-appearing or diaphoretic  HENT:      Head: Normocephalic and atraumatic  Right Ear: Tympanic membrane, ear canal and external ear normal  There is no impacted cerumen  Left Ear: Tympanic membrane, ear canal and external ear normal  There is no impacted cerumen  Nose: Nose normal  No congestion or rhinorrhea  Mouth/Throat:      Mouth: Mucous membranes are moist       Pharynx: No oropharyngeal exudate or posterior oropharyngeal erythema  Comments: Poor dental hygiene - numerous missing teeth - remaining=teeth with fractures and cavitations    Tooth #17 fractured at 3 surfaces and has a large cavitation  Eyes:      General: No scleral icterus  Right eye: No discharge  Left eye: No discharge  Extraocular Movements: Extraocular movements intact  Conjunctiva/sclera: Conjunctivae normal       Pupils: Pupils are equal, round, and reactive to light  Neck:      Thyroid: No thyromegaly  Vascular: No hepatojugular reflux or JVD  Trachea: No tracheal deviation  Cardiovascular:      Rate and Rhythm: Normal rate and regular rhythm  Pulses: Normal pulses  Carotid pulses are 2+ on the right side and 2+ on the left side  Radial pulses are 2+ on the right side and 2+ on the left side  Dorsalis pedis pulses are 2+ on the right side and 2+ on the left side  Posterior tibial pulses are 2+ on the right side and 2+ on the left side  Heart sounds: Normal heart sounds  No murmur heard  Pulmonary:      Effort: Pulmonary effort is normal  No accessory muscle usage or respiratory distress  Breath sounds: Normal breath sounds  No wheezing or rales  Chest:      Chest wall: No mass, deformity, tenderness, crepitus or edema  Abdominal:      General: Bowel sounds are normal  There is no distension or abdominal bruit  Palpations: Abdomen is soft  There is no hepatomegaly, splenomegaly or mass  Tenderness: There is no abdominal tenderness  There is no right CVA tenderness, left CVA tenderness, guarding or rebound  Hernia: No hernia is present  Musculoskeletal:         General: No tenderness  Normal range of motion  Cervical back: Normal range of motion and neck supple  No rigidity  No muscular tenderness  Right lower leg: No tenderness  No edema  Left lower leg: No tenderness  No edema  Lymphadenopathy:      Cervical: No cervical adenopathy  Skin:     General: Skin is warm and dry  Capillary Refill: Capillary refill takes less than 2 seconds  Findings: No ecchymosis or rash  Neurological:      General: No focal deficit present  Mental Status: She is alert and oriented to person, place, and time  Cranial Nerves: No cranial nerve deficit  Sensory: No sensory deficit  Motor: No weakness or abnormal muscle tone        Deep Tendon Reflexes: Reflexes normal  Psychiatric:         Mood and Affect: Mood normal          Behavior: Behavior normal          Thought Content: Thought content normal          Judgment: Judgment normal          Vital Signs  ED Triage Vitals [07/13/21 2353]   Temperature Pulse Respirations Blood Pressure SpO2   97 8 °F (36 6 °C) 76 16 109/76 99 %      Temp Source Heart Rate Source Patient Position - Orthostatic VS BP Location FiO2 (%)   Oral Monitor Sitting Left arm --      Pain Score       --           Vitals:    07/13/21 2353   BP: 109/76   Pulse: 76   Patient Position - Orthostatic VS: Sitting         Visual Acuity      ED Medications  Medications   doxycycline hyclate (VIBRAMYCIN) capsule 100 mg (has no administration in time range)   ondansetron (ZOFRAN-ODT) dispersible tablet 4 mg (has no administration in time range)       Diagnostic Studies  Results Reviewed     Procedure Component Value Units Date/Time    POCT pregnancy, urine [[id number]]     Lab Status: No result                  No orders to display              Procedures  Procedures         ED Course                                           MDM  Number of Diagnoses or Management Options  Dental caries: established and worsening  Dentalgia: established and worsening  Diagnosis management comments: This is a 26-year-old female with a history of poor dental hygiene, numerous cavitations and fractured teeth  Patient has been seen here numerous times since February for dentalgia as well as other issues  Patient states that she just recently fractured tooth 17  Visual exam reveals that there is 3 services that are fractured  There is large cavitation  There is no evidence of an abscess  Patient reports she is allergic to aspirin ketorolac and Naprosyn however states that she does take ibuprofen at home  Her last ibuprofen dose was 5 hours ago    Discussed with her the ADA recommendations that she take ibuprofen 600 mg, which I will prescribe, as well as Tylenol 500-650 mg every 6 hours around the clock with a small amount of caffeine for maximum pain relief  He did have a discussion regarding the [facility] system policy on prescribing narcotics for chronic pain  I also provided patient with a copy of the letter that was authored by the [employer] of the emergency department regarding the same  Patient verbalized understanding  After negative pregnancy test patient will be treated with doxycycline  Emphasized importance of taking the doxycycline for the entire time  Also provided patient with a list of the dentists locally that are taking new patients any to accept her insurance  Patient reports that she has nausea secondary to the pain  She has had no vomiting  She is not febrile  There are no indications of sepsis  Patient given Zofran ODT and also ordered Zofran  Patient was reexamined at this time and informed of laboratory and/or imaging results and was found to be stable for discharge  Return to emergency department criteria was reviewed with the patient who verbalized understanding and was agreeable to discharge and the treatment plan at this time  Portions of the record may have been created with voice recognition software  Occasional wrong word or "sound a like" substitutions may have occurred due to the inherent limitations of voice recognition software  Read the chart carefully and recognize, using context, where substitutions have occurred         Amount and/or Complexity of Data Reviewed  Review and summarize past medical records: yes    Risk of Complications, Morbidity, and/or Mortality  Presenting problems: high  Diagnostic procedures: minimal  Management options: moderate    Patient Progress  Patient progress: stable      Disposition  Final diagnoses:   Dental caries   Dentalgia     Time reflects when diagnosis was documented in both MDM as applicable and the Disposition within this note     Time User Action Codes Description Comment 7/14/2021 12:01 AM Pili Okeefe Add [K02 9] Dental caries     7/14/2021 12:01 AM Pili Okeefe Add [K08 89] [address] [address]       ED Disposition     ED Disposition Condition Date/Time Comment    Discharge Stable Wed Jul 14, 2021 12:07 AM Desi Landibaldi discharge to home/self care  Follow-up Information     Follow up With Specialties Details Why Contact Info    Pete Shen MD Family Medicine Schedule an appointment as soon as possible for a visit in 3 days  [address]      dentisit  Call today  CAll the dentist that you have in the morning and let them know you are having increased problems with oyur teeth          Patient's Medications   Discharge Prescriptions    DOXYCYCLINE HYCLATE (VIBRAMYCIN) 100 MG CAPSULE    Take 1 capsule (100 mg total) by mouth 2 (two) times a day for 10 days       Start Date: 7/14/2021 End Date: 7/24/2021       Order Dose: 100 mg       Quantity: 20 capsule    Refills: 0    IBUPROFEN (MOTRIN) 600 MG TABLET    Take 1 tablet (600 mg total) by mouth every 6 (six) hours as needed for mild pain or moderate pain       Start Date: 7/14/2021 End Date: --       Order Dose: 600 mg       Quantity: 60 tablet    Refills: 0    ONDANSETRON (ZOFRAN) 4 MG TABLET    Take 1 tablet (4 mg total) by mouth every 6 (six) hours       Start Date: 7/14/2021 End Date: --       Order Dose: 4 mg       Quantity: 12 tablet    Refills: 0     No discharge procedures on file      PDMP Review       Value Time User    PDMP Reviewed  Yes 7/13/2021 11:53 PM Coty Aden MD          ED Provider  Electronically Signed by           Coty Aden MD  07/14/21 0576

## 2021-07-17 ENCOUNTER — HOSPITAL ENCOUNTER (EMERGENCY)
Facility: HOSPITAL | Age: 31
Discharge: HOME | End: 2021-07-17
Attending: EMERGENCY MEDICINE | Admitting: EMERGENCY MEDICINE
Payer: COMMERCIAL

## 2021-07-17 VITALS
SYSTOLIC BLOOD PRESSURE: 105 MMHG | TEMPERATURE: 98 F | DIASTOLIC BLOOD PRESSURE: 66 MMHG | RESPIRATION RATE: 18 BRPM | HEART RATE: 86 BPM | OXYGEN SATURATION: 100 %

## 2021-07-17 DIAGNOSIS — K02.9 DENTAL CARIES: Primary | ICD-10-CM

## 2021-07-17 DIAGNOSIS — R11.0 NAUSEA: ICD-10-CM

## 2021-07-17 DIAGNOSIS — K08.89 TOOTH PAIN: ICD-10-CM

## 2021-07-17 PROCEDURE — 99284 EMERGENCY DEPT VISIT MOD MDM: CPT | Performed by: STUDENT IN AN ORGANIZED HEALTH CARE EDUCATION/TRAINING PROGRAM

## 2021-07-17 PROCEDURE — 99282 EMERGENCY DEPT VISIT SF MDM: CPT

## 2021-07-17 RX ORDER — HYDROCODONE BITARTRATE AND ACETAMINOPHEN 5; 325 MG/1; MG/1
1 TABLET ORAL ONCE
Status: COMPLETED | OUTPATIENT
Start: 2021-07-17 | End: 2021-07-17

## 2021-07-17 RX ORDER — HYDROCODONE BITARTRATE AND ACETAMINOPHEN 5; 325 MG/1; MG/1
1 TABLET ORAL EVERY 6 HOURS PRN
Qty: 6 TABLET | Refills: 0 | Status: SHIPPED | OUTPATIENT
Start: 2021-07-17 | End: 2021-07-24

## 2021-07-17 RX ORDER — ONDANSETRON 4 MG/1
4 TABLET, ORALLY DISINTEGRATING ORAL EVERY 8 HOURS PRN
Qty: 15 TABLET | Refills: 0 | Status: SHIPPED | OUTPATIENT
Start: 2021-07-17 | End: 2021-07-24

## 2021-07-17 RX ORDER — ONDANSETRON 4 MG/1
4 TABLET, ORALLY DISINTEGRATING ORAL ONCE
Status: COMPLETED | OUTPATIENT
Start: 2021-07-17 | End: 2021-07-17

## 2021-07-17 RX ADMIN — HYDROCODONE BITARTRATE AND ACETAMINOPHEN 1 TABLET: 5; 325 TABLET ORAL at 01:37

## 2021-07-17 RX ADMIN — ONDANSETRON 4 MG: 4 TABLET, ORALLY DISINTEGRATING ORAL at 01:37

## 2021-07-17 NOTE — ED PROVIDER NOTES
History  Chief Complaint   Patient presents with    Dental Pain     left lower dental pain  pt taking her abx, last took motrin around 1700  has appt with dentist in 1 5 weeks  states part of tooth snapped off tonight  Patient 70-year-old female past medical history of asthma and GERD who presents to ED today with tooth pain times 1 week  Patient states she has been dealing with a constant tooth pain over the past week, was recently seen in the ED and placed on antibiotics with follow-up with the dentist for further evaluation  Patient states she was able to get a dentist appointment but not for week and a half  Currently complaining of dental pain rated 9/10 he has constant sharp does not radiate  Patient denies fever/chills, facial swelling, drooling, inability to handle oral secretions, trismus, swelling of the neck or throat, difficulty swallowing/breathing  Patient denies chest pain, shortness of breath, abdominal pain  Dental Pain  Location:  Lower  Lower teeth location:  17/LL 3rd molar  Quality:  Constant, sharp and shooting  Severity:  Severe  Onset quality:  Gradual  Duration:  1 week  Timing:  Constant  Progression:  Worsening  Chronicity:  Recurrent  Context: dental caries, dental fracture and poor dentition    Context: not abscess and not trauma    Relieved by:  Nothing  Worsened by:  Cold food/drink, hot food/drink, touching and jaw movement  Ineffective treatments:  NSAIDs and acetaminophen  Associated symptoms: facial swelling    Associated symptoms: no congestion, no difficulty swallowing, no drooling, no fever, no gum swelling, no neck pain, no oral lesions and no trismus    Risk factors: lack of dental care        Prior to Admission Medications   Prescriptions Last Dose Informant Patient Reported?  Taking?   doxycycline hyclate (VIBRAMYCIN) 100 mg capsule   No No   Sig: Take 1 capsule (100 mg total) by mouth 2 (two) times a day for 10 days   ibuprofen (MOTRIN) 600 mg tablet   No No Sig: Take 1 tablet (600 mg total) by mouth every 6 (six) hours as needed for mild pain or moderate pain   ondansetron (ZOFRAN) 4 mg tablet   No No   Sig: Take 1 tablet (4 mg total) by mouth every 6 (six) hours   ondansetron (Zofran ODT) 4 mg disintegrating tablet   No No   Sig: Take 1 tablet (4 mg total) by mouth every 8 (eight) hours as needed for nausea or vomiting for up to 5 days   pantoprazole (PROTONIX) 40 mg tablet   No No   Sig: Take 1 tablet (40 mg total) by mouth daily      Facility-Administered Medications: None       Past Medical History:   Diagnosis Date    Asthma     GERD (gastroesophageal reflux disease)     Hernia, abdominal     Migraines     Psychiatric disorder     Anx, Depression    Renal disorder     kidney stones       Past Surgical History:   Procedure Laterality Date    FL RETROGRADE PYELOGRAM  8/28/2018    HERNIA REPAIR      CO CYSTO/URETERO W/LITHOTRIPSY &INDWELL STENT INSRT Right 8/28/2018    Procedure: CYSTOSCOPY URETEROSCOPY WITH RETROGRADE PYELOGRAM AND INSERTION STENT URETERAL;  Surgeon: Mindy Ly MD;  [facility];  Service: Urology    TOOTH EXTRACTION         Family History   Problem Relation Age of Onset    Diabetes Mother     Hyperlipidemia Mother     Stroke Mother     Heart disease Mother     Asthma Mother    James Steven Other Mother         Degen  of Intervertabral disc   Nephrolithiasis Father     Nephrolithiasis Brother      I have reviewed and agree with the history as documented  E-Cigarette/Vaping    E-Cigarette Use Never User      E-Cigarette/Vaping Substances     Social History     Tobacco Use    Smoking status: Current Every Day Smoker     Packs/day: 0 50     Years: 13 00     Pack years: 6 50     Types: Cigarettes    Smokeless tobacco: Never Used   Vaping Use    Vaping Use: Never used   Substance Use Topics    Alcohol use: Not Currently    Drug use: No       Review of Systems   Constitutional: Negative for chills and fever     HENT: Positive for facial swelling  Negative for congestion, drooling, mouth sores, rhinorrhea, sinus pain, sore throat and trouble swallowing  Eyes: Negative  Respiratory: Negative for chest tightness and shortness of breath  Cardiovascular: Negative for chest pain and palpitations  Gastrointestinal: Negative for abdominal pain, constipation, diarrhea, nausea and vomiting  Endocrine: Negative  Genitourinary: Negative  Musculoskeletal: Negative for back pain and neck pain  Skin: Negative  Neurological: Negative for dizziness, weakness, light-headedness and numbness  Psychiatric/Behavioral: Negative  All other systems reviewed and are negative  Physical Exam  Physical Exam  Vitals and nursing note reviewed  Constitutional:       General: She is in acute distress  Appearance: Normal appearance  She is normal weight  She is not ill-appearing  HENT:      Head: Normocephalic  Right Ear: External ear normal       Left Ear: External ear normal       Nose: Nose normal       Mouth/Throat:      Lips: Pink  Mouth: Mucous membranes are moist       Dentition: Abnormal dentition  Dental tenderness and dental caries present  No gingival swelling or dental abscesses  Tongue: No lesions  Palate: No lesions  Pharynx: Oropharynx is clear  Uvula midline  No pharyngeal swelling or uvula swelling  Tonsils: No tonsillar abscesses  Comments: Patient has multiple missing teeth and very poor dentition  Eyes:      Conjunctiva/sclera: Conjunctivae normal    Cardiovascular:      Rate and Rhythm: Normal rate and regular rhythm  Heart sounds: Normal heart sounds  No murmur heard  Pulmonary:      Effort: Pulmonary effort is normal  No respiratory distress  Breath sounds: Normal breath sounds  Abdominal:      General: Bowel sounds are normal    Musculoskeletal:         General: Normal range of motion  Cervical back: Normal range of motion   No rigidity or tenderness  No pain with movement, spinous process tenderness or muscular tenderness  Lymphadenopathy:      Cervical: No cervical adenopathy  Skin:     General: Skin is warm  Neurological:      General: No focal deficit present  Mental Status: She is alert and oriented to person, place, and time  Psychiatric:         Attention and Perception: Attention normal          Mood and Affect: Mood normal          Speech: Speech normal          Behavior: Behavior normal          Thought Content: Thought content normal          Cognition and Memory: Cognition normal          Judgment: Judgment normal          Vital Signs  ED Triage Vitals [07/17/21 0101]   Temperature Pulse Respirations Blood Pressure SpO2   98 °F (36 7 °C) 86 18 105/66 100 %      Temp src Heart Rate Source Patient Position - Orthostatic VS BP Location FiO2 (%)   -- -- -- -- --      Pain Score       --           Vitals:    07/17/21 0101   BP: 105/66   Pulse: 86         Visual Acuity      ED Medications  Medications   HYDROcodone-acetaminophen (NORCO) 5-325 mg per tablet 1 tablet (1 tablet Oral Given 7/17/21 0137)   ondansetron (ZOFRAN-ODT) dispersible tablet 4 mg (4 mg Oral Given 7/17/21 0137)       Diagnostic Studies  Results Reviewed     None                 No orders to display              Procedures  Procedures         ED Course                                           MDM  Number of Diagnoses or Management Options  Dental caries  Tooth pain  Diagnosis management comments: Patient is a 3year-old female who presents ED today with a recurrent episode of dental pain x1 week  Examination shows tenderness to percussion of tooth 17 with severe breakdown as a result of a dental rossy; no swelling, erythema, signs of abscess    Patient was offered attempted a dental block was) resolution of pain  - block was refused by patient  Patient given 1 dose of Norco in the ED with good relief of pain  Patient was given 2 day supply of Norco until able to follow-up with dentist  - PDMP was reviewed  Patient advised to continue current antibiotic regimen  Patient advised to follow-up with dentist or go to dental clinic for further evaluation tooth pain  Advised to follow up with primary care as needed  Return to the ED with worsening symptoms as discussed with the patient  Patient stable on discharge      Patient Progress  Patient progress: stable      Disposition  Final diagnoses:   Dental caries   Tooth pain     Time reflects when diagnosis was documented in both MDM as applicable and the Disposition within this note     Time User Action Codes Description Comment    7/17/2021  1:57 AM Leyla Art Add [K02 9] Dental caries     7/17/2021  1:57 AM Leyla Art Add [K08 89] Tooth pain     7/17/2021  2:28 AM Leyla Art Add [R11 0] Nausea       ED Disposition     ED Disposition Condition Date/Time Comment    Discharge Stable Sat Jul 17, 2021  1:57 AM Danni Robert discharge to home/self care              Follow-up Information     Follow up With Specialties Details Why Contact Info Additional Information    [facility]  Call in 1 day for further evaluation [address] [facility] [address] [place] [address]     Angeline aVrgas MD Family Medicine Schedule an appointment as soon as possible for a visit  As needed [address] [facility] [phone]       [address] Emergency Department Emergency Medicine  As needed, If symptoms worsen [address] [facility],[id number]  [address] [facility] Emergency Department, [address], [address]          Discharge Medication List as of 7/17/2021  2:29 AM      START taking these medications    Details   HYDROcodone-acetaminophen (NORCO) 5-325 mg per tablet Take 1 tablet by mouth every 6 (six) hours as needed for pain for up to 6 dosesMax Daily Amount: 4 tablets, Starting Sat 7/17/2021, Normal         CONTINUE these medications which have CHANGED    Details   ondansetron (Zofran ODT) 4 mg disintegrating tablet Take 1 tablet (4 mg total) by mouth every 8 (eight) hours as needed for nausea or vomiting for up to 5 days, Starting Sat 7/17/2021, Until Thu 7/22/2021 at 2359, Normal         CONTINUE these medications which have NOT CHANGED    Details   doxycycline hyclate (VIBRAMYCIN) 100 mg capsule Take 1 capsule (100 mg total) by mouth 2 (two) times a day for 10 days, Starting Wed 7/14/2021, Until Sat 7/24/2021, Normal      ibuprofen (MOTRIN) 600 mg tablet Take 1 tablet (600 mg total) by mouth every 6 (six) hours as needed for mild pain or moderate pain, Starting Wed 7/14/2021, Normal      ondansetron (ZOFRAN) 4 mg tablet Take 1 tablet (4 mg total) by mouth every 6 (six) hours, Starting Wed 7/14/2021, Normal      pantoprazole (PROTONIX) 40 mg tablet Take 1 tablet (40 mg total) by mouth daily, Starting Tue 12/8/2020, Normal           No discharge procedures on file      PDMP Review       Value Time User    PDMP Reviewed  Yes 7/17/2021  2:17 AM Claudine Oropeza PA-C          ED Provider  Electronically Signed by           Claudine Oropeza PA-C  07/17/21 2213

## 2021-07-17 NOTE — DISCHARGE INSTRUCTIONS
Begin Norco every 6 hours as needed for severe pain  Use over-the-counter ibuprofen as needed for mild-to-moderate pain  Follow-up with dentist for further evaluation of tooth pain  Follow-up primary care as needed  Return to the ED with worsening signs symptoms including severe swelling of the face, development of fevers/chills, inability to open the mouth fully, inability to swallow/tightness in the throat, severe uncontrolled tooth pain

## 2021-07-24 ENCOUNTER — HOSPITAL ENCOUNTER (EMERGENCY)
Facility: HOSPITAL | Age: 31
Discharge: HOME | End: 2021-07-24
Attending: EMERGENCY MEDICINE | Admitting: EMERGENCY MEDICINE
Payer: COMMERCIAL

## 2021-07-24 ENCOUNTER — APPOINTMENT (EMERGENCY)
Dept: CT IMAGING | Facility: HOSPITAL | Age: 31
End: 2021-07-24
Payer: COMMERCIAL

## 2021-07-24 VITALS
BODY MASS INDEX: 23.56 KG/M2 | TEMPERATURE: 98.4 F | HEART RATE: 85 BPM | WEIGHT: 120 LBS | DIASTOLIC BLOOD PRESSURE: 59 MMHG | SYSTOLIC BLOOD PRESSURE: 115 MMHG | HEIGHT: 60 IN | RESPIRATION RATE: 17 BRPM | OXYGEN SATURATION: 100 %

## 2021-07-24 DIAGNOSIS — S09.90XA INJURY OF HEAD, INITIAL ENCOUNTER: Primary | ICD-10-CM

## 2021-07-24 PROCEDURE — 70450 CT HEAD/BRAIN W/O DYE: CPT

## 2021-07-24 PROCEDURE — 99284 EMERGENCY DEPT VISIT MOD MDM: CPT

## 2021-07-24 PROCEDURE — 99282 EMERGENCY DEPT VISIT SF MDM: CPT | Performed by: EMERGENCY MEDICINE

## 2021-07-24 RX ORDER — ACETAMINOPHEN 325 MG/1
650 TABLET ORAL ONCE
Status: COMPLETED | OUTPATIENT
Start: 2021-07-24 | End: 2021-07-24

## 2021-07-24 RX ADMIN — ACETAMINOPHEN 650 MG: 325 TABLET, FILM COATED ORAL at 01:43

## 2021-07-24 NOTE — ED PROVIDER NOTES
History  Chief Complaint   Patient presents with    Head Injury     pt presents to ed via walk in after she had a big bug scare her and she hit herself in the head with her phone on the left side and than turned and hit a pole on the same side of her head also with back pain      This is a 26-year-old female who ambulated emergency department accompanied by her significant other  Her significant other sitting in the chair snoring  Patient reports that she was on the porch smoking a cigarette about 8:00 p m  When a Beetle he near her and it scared her  She states she struck the right temple area with her cellphone and then that made her jump and she struck the left side of her head on a pole  She reports that she blacked out     She denies falling to the ground  She denies epistaxis or otorrhea  She is not on blood thinners  Patient reports she has an aspirin allergy as well as ketorolac and naproxyn - reports that she took ibuprofen 600 mg and tylenol at 810 pm as well as flexeril 10mg for pain of the left thoracic back that she experienced when she "pulled away from the bug"    This is patient's 18th visit to the emergency department since February 25th of this year  None of these emergency department visits resulted in admission  Her past medical history include asthma, GERD, anxiety, depression and migraine headaches  Patient states that the pain is a 10/10  She states she had no relief with the ibuprofen and Tylenol which she took at home  She denies any blurred vision  She denies any focal neurological deficits  I discussed with patient that narcotics are not indicated for her injury  Discussed with her that I will order additional Tylenol as it has been more than 4 hours she had a Tylenol however she is not able to have more ibuprofen as of yet  Prior to Admission Medications   Prescriptions Last Dose Informant Patient Reported? Taking?    pantoprazole (PROTONIX) 40 mg tablet 7/24/2021 at Unknown time  No Yes   Sig: Take 1 tablet (40 mg total) by mouth daily      Facility-Administered Medications: None       Past Medical History:   Diagnosis Date    Asthma     GERD (gastroesophageal reflux disease)     Hernia, abdominal     Migraines     Psychiatric disorder     Anx, Depression    Renal disorder     kidney stones       Past Surgical History:   Procedure Laterality Date    [place] RETROGRADE PYELOGRAM  8/28/2018    HERNIA REPAIR      [place] CYSTO/URETERO W/LITHOTRIPSY &INDWELL STENT INSRT Right 8/28/2018    Procedure: CYSTOSCOPY URETEROSCOPY WITH RETROGRADE PYELOGRAM AND INSERTION STENT URETERAL;  Surgeon: Ayden Chavez MD;  [facility];  Service: Urology    TOOTH EXTRACTION         Family History   Problem Relation Age of Onset    Diabetes Mother     Hyperlipidemia Mother     Stroke Mother     Heart disease Mother     Asthma Mother     Other Mother         Degen  of Intervertabral disc   Nephrolithiasis Father     Nephrolithiasis Brother      I have reviewed and agree with the history as documented  E-Cigarette/Vaping    E-Cigarette Use Never User      E-Cigarette/Vaping Substances     Social History     Tobacco Use    Smoking status: Current Every Day Smoker     Packs/day: 0 50     Years: 13 00     Pack years: 6 50     Types: Cigarettes    Smokeless tobacco: Never Used   Vaping Use    Vaping Use: Never used   Substance Use Topics    Alcohol use: Not Currently    Drug use: No       Review of Systems   Constitutional: Negative for activity change, appetite change, chills, diaphoresis, fatigue, fever and unexpected weight change  HENT: Negative for congestion, ear discharge, ear pain, mouth sores, sinus pressure, sinus pain, sneezing, sore throat, trouble swallowing and voice change  See HPI   Eyes: Negative for photophobia, pain, discharge, redness, itching and visual disturbance     Respiratory: Negative for cough, chest tightness and shortness of breath  Cardiovascular: Negative for chest pain, palpitations and leg swelling  Gastrointestinal: Negative for abdominal pain, constipation, nausea and vomiting  Endocrine: Negative for cold intolerance, heat intolerance, polydipsia, polyphagia and polyuria  Genitourinary: Negative for decreased urine volume, difficulty urinating, dysuria, frequency, hematuria and urgency  Musculoskeletal: Positive for back pain (see HPI)  Negative for arthralgias, gait problem, joint swelling, myalgias, neck pain and neck stiffness  Skin: Negative for color change and rash  Allergic/Immunologic: Negative for immunocompromised state  Neurological: Negative for dizziness, tremors, seizures, syncope, speech difficulty, weakness, light-headedness, numbness and headaches  Hematological: Does not bruise/bleed easily  Psychiatric/Behavioral: Negative for behavioral problems and suicidal ideas  The patient is nervous/anxious  Physical Exam  Physical Exam  Vitals and nursing note reviewed  Constitutional:       General: She is not in acute distress  Appearance: Normal appearance  She is well-developed and normal weight  She is not ill-appearing, toxic-appearing or diaphoretic  HENT:      Head: Normocephalic  Comments: No hematoma or ecchymosis preset - jumps when she is touched any place on the body     Right Ear: Tympanic membrane, ear canal and external ear normal  There is no impacted cerumen  Left Ear: Tympanic membrane, ear canal and external ear normal  There is no impacted cerumen  Nose: Nose normal  No congestion or rhinorrhea  Mouth/Throat:      Mouth: Mucous membranes are moist       Pharynx: Oropharynx is clear  No oropharyngeal exudate or posterior oropharyngeal erythema  Eyes:      General: No scleral icterus  Right eye: No discharge  Left eye: No discharge  Extraocular Movements: Extraocular movements intact        Conjunctiva/sclera: Conjunctivae normal       Pupils: Pupils are equal, round, and reactive to light  Neck:      Thyroid: No thyromegaly  Vascular: No hepatojugular reflux or JVD  Trachea: No tracheal deviation  Cardiovascular:      Rate and Rhythm: Normal rate and regular rhythm  Pulses: Normal pulses  Carotid pulses are 2+ on the right side and 2+ on the left side  Radial pulses are 2+ on the right side and 2+ on the left side  Dorsalis pedis pulses are 2+ on the right side and 2+ on the left side  Posterior tibial pulses are 2+ on the right side and 2+ on the left side  Heart sounds: Normal heart sounds  No murmur heard  Pulmonary:      Effort: Pulmonary effort is normal  No accessory muscle usage or respiratory distress  Breath sounds: Normal breath sounds  No wheezing or rales  Chest:      Chest wall: No mass, deformity, tenderness, crepitus or edema  Abdominal:      General: Bowel sounds are normal  There is no distension or abdominal bruit  Palpations: Abdomen is soft  There is no hepatomegaly, splenomegaly or mass  Tenderness: There is no abdominal tenderness  There is no right CVA tenderness, left CVA tenderness, guarding or rebound  Hernia: No hernia is present  Musculoskeletal:         General: No tenderness  Normal range of motion  Cervical back: Normal range of motion and neck supple  No rigidity  No muscular tenderness  Right lower leg: No tenderness  No edema  Left lower leg: No tenderness  No edema  Lymphadenopathy:      Cervical: No cervical adenopathy  Skin:     General: Skin is warm and dry  Capillary Refill: Capillary refill takes less than 2 seconds  Findings: No ecchymosis or rash  Neurological:      General: No focal deficit present  Mental Status: She is alert and oriented to person, place, and time  Cranial Nerves: No cranial nerve deficit  Sensory: No sensory deficit        Motor: No weakness or abnormal muscle tone  Deep Tendon Reflexes: Reflexes normal    Psychiatric:         Behavior: Behavior normal          Thought Content: Thought content normal          Judgment: Judgment normal       Comments: anxious         Vital Signs  ED Triage Vitals [07/24/21 0035]   Temperature Pulse Respirations Blood Pressure SpO2   98 4 °F (36 9 °C) 85 17 115/59 100 %      Temp Source Heart Rate Source Patient Position - Orthostatic VS BP Location FiO2 (%)   Oral Monitor Sitting Right arm --      Pain Score       8           Vitals:    07/24/21 0035   BP: 115/59   Pulse: 85   Patient Position - Orthostatic VS: Sitting         Visual Acuity      ED Medications  Medications   acetaminophen (TYLENOL) tablet 650 mg (650 mg Oral Given 7/24/21 0143)       Diagnostic Studies  Results Reviewed     None                 CT head without contrast   Final Result by Amanda Everett MD (07/24 0234)      No acute intracranial abnormality  Workstation performed: [id number]                    Procedures  Procedures         ED Course                             SBIRT 20yo+      Most Recent Value   SBIRT (22 yo +)   In order to provide better care to our patients, we are screening all of our patients for alcohol and drug use  Would it be okay to ask you these screening questions? No Filed at: 07/24/2021 0100                    MDM  Number of Diagnoses or Management Options  Injury of head, initial encounter: new and requires workup  Diagnosis management comments: This is a 32year old female who has been seen at an ED 18 times since Feb 25   She ambulated to the ED this evening reporting head pain after a bettle scared her and she struck the right side of her head with her cellphone and then struck the left side of her head on a pole on the porch - reports that she also pulled the left thoracic side of her back when she jumped - reports that she "blacked out" however denies falling to the floor - Deneis further headstrike  Reports pain 10/10 - reports she took ibuprofen  tylenol and flexeril at home shortly after the incident    Discussed with her that narcotics are not indicated for this injury and did order tylenol as it has been 5 hours since she took the tylenol at home    Concerned for ICH,contusion, concussion, thoracic sprain/strain,malingering and other concerns     Exam normal  No neuro deficits  CT ordered due to pain 10/10 - not on blood thinners - interpreted by radiologist as there being no acute findings    Pt admitted to pain relief on reexam    Patient was reexamined at this time and informed of laboratory and/or imaging results and was found to be stable for discharge  Return to emergency department criteria was reviewed with the patient who verbalized understanding and was agreeable to discharge and the treatment plan at this time  Portions of the record may have been created with voice recognition software  Occasional wrong word or "sound a like" substitutions may have occurred due to the inherent limitations of voice recognition software  Read the chart carefully and recognize, using context, where substitutions have occurred             Amount and/or Complexity of Data Reviewed  Tests in the radiology section of CPT®: ordered and reviewed  Review and summarize past medical records: yes    Risk of Complications, Morbidity, and/or Mortality  Presenting problems: high  Diagnostic procedures: moderate  Management options: low    Patient Progress  Patient progress: improved      Disposition  Final diagnoses:   Injury of head, initial encounter     Time reflects when diagnosis was documented in both MDM as applicable and the Disposition within this note     Time User Action Codes Description Comment    7/24/2021  4:09 AM Keyla Storey Add [S09 [id number]] Injury of head, initial encounter       ED Disposition     ED Disposition Condition Date/Time Comment    Discharge Stable Sat Jul 24, 2021  4:09 AM Christine NELY Fay discharge to home/self care  Follow-up Information     Follow up With Specialties Details Why Contact Info    Jenelle Sanders MD Family Medicine Schedule an appointment as soon as possible for a visit in 3 days  [address]            Discharge Medication List as of 7/24/2021  4:10 AM      CONTINUE these medications which have NOT CHANGED    Details   pantoprazole (PROTONIX) 40 mg tablet Take 1 tablet (40 mg total) by mouth daily, Starting Tue 12/8/2020, Normal           No discharge procedures on file      PDMP Review       Value Time User    PDMP Reviewed  Yes 7/24/2021  4:09 AM Nir Das MD          ED Provider  Electronically Signed by           Nir Das MD  07/24/21 0600

## 2021-08-13 ENCOUNTER — HOSPITAL ENCOUNTER (EMERGENCY)
Facility: HOSPITAL | Age: 31
Discharge: HOME | End: 2021-08-13
Attending: EMERGENCY MEDICINE
Payer: COMMERCIAL

## 2021-08-13 VITALS
HEART RATE: 92 BPM | OXYGEN SATURATION: 98 % | TEMPERATURE: 98.5 F | DIASTOLIC BLOOD PRESSURE: 70 MMHG | SYSTOLIC BLOOD PRESSURE: 109 MMHG | RESPIRATION RATE: 16 BRPM

## 2021-08-13 DIAGNOSIS — R11.0 NAUSEA: Primary | ICD-10-CM

## 2021-08-13 PROCEDURE — 99284 EMERGENCY DEPT VISIT MOD MDM: CPT | Performed by: PHYSICIAN ASSISTANT

## 2021-08-13 PROCEDURE — 99283 EMERGENCY DEPT VISIT LOW MDM: CPT

## 2021-08-13 RX ORDER — ONDANSETRON 4 MG/1
4 TABLET, ORALLY DISINTEGRATING ORAL ONCE
Status: COMPLETED | OUTPATIENT
Start: 2021-08-13 | End: 2021-08-13

## 2021-08-13 RX ORDER — ONDANSETRON 4 MG/1
4 TABLET, ORALLY DISINTEGRATING ORAL EVERY 6 HOURS PRN
Qty: 20 TABLET | Refills: 1 | OUTPATIENT
Start: 2021-08-13 | End: 2021-10-30

## 2021-08-13 RX ORDER — ESOMEPRAZOLE MAGNESIUM 40 MG/1
40 CAPSULE, DELAYED RELEASE ORAL DAILY
Qty: 30 CAPSULE | Refills: 0 | Status: SHIPPED | OUTPATIENT
Start: 2021-08-13 | End: 2021-08-27

## 2021-08-13 RX ORDER — FAMOTIDINE 20 MG/1
20 TABLET, FILM COATED ORAL 2 TIMES DAILY
Qty: 60 TABLET | Refills: 0 | Status: SHIPPED | OUTPATIENT
Start: 2021-08-13 | End: 2021-11-01

## 2021-08-13 RX ADMIN — ONDANSETRON 4 MG: 4 TABLET, ORALLY DISINTEGRATING ORAL at 19:33

## 2021-08-13 NOTE — ED PROVIDER NOTES
History  Chief Complaint   Patient presents with    Nausea     Pt c/o nausea x 1 month  Tried 2 OTC meds  No longer taking protonix  35-year-old patient who is well known to me comes in today complaining of her persistent nausea that is been worse over the past 1 month  She reports that she has not been able to fill her Protonix due to insurance reasons  She cannot pay the 36 dollars out of pocket for the prescription  She reports that her nausea has been much worse over the past 3 days or so since beginning her menses  She ran out of her Zofran and is here for a refill for her Zofran and to see if there is something else we can prescribe her aside from Protonix that may be covered by her insurance  She has not followed up with a primary care physician or gastroenterologist due to insurance reasons  None       Past Medical History:   Diagnosis Date    Asthma     GERD (gastroesophageal reflux disease)     Hernia, abdominal     Migraines     Psychiatric disorder     Anx, Depression    Renal disorder     kidney stones       Past Surgical History:   Procedure Laterality Date    [place] RETROGRADE PYELOGRAM  8/28/2018    HERNIA REPAIR      [place] CYSTO/URETERO W/LITHOTRIPSY &INDWELL STENT INSRT Right 8/28/2018    Procedure: CYSTOSCOPY URETEROSCOPY WITH RETROGRADE PYELOGRAM AND INSERTION STENT URETERAL;  Surgeon: King Whitfield MD;  [facility];  Service: Urology    TOOTH EXTRACTION         Family History   Problem Relation Age of Onset    Diabetes Mother     Hyperlipidemia Mother     Stroke Mother     Heart disease Mother     Asthma Mother    Rose Pineda Other Mother         Degen  of Intervertabral disc   Nephrolithiasis Father     Nephrolithiasis Brother      I have reviewed and agree with the history as documented      E-Cigarette/Vaping    E-Cigarette Use Never User      E-Cigarette/Vaping Substances     Social History     Tobacco Use    Smoking status: Current Every Day Smoker Packs/day: 0 50     Years: 13 00     Pack years: 6 50     Types: Cigarettes    Smokeless tobacco: Never Used   Vaping Use    Vaping Use: Never used   Substance Use Topics    Alcohol use: Not Currently    Drug use: No       Review of Systems   Constitutional: Negative for fever  HENT: Negative for nosebleeds  Eyes: Negative for redness  Respiratory: Negative for shortness of breath  Cardiovascular: Negative for chest pain  Gastrointestinal: Positive for nausea  Negative for blood in stool  Genitourinary: Negative for hematuria  Musculoskeletal: Negative for gait problem  Skin: Negative for rash  Neurological: Negative for seizures  Psychiatric/Behavioral: Negative for behavioral problems  Physical Exam  Physical Exam  Constitutional:       General: She is in acute distress (Mild)  Appearance: Normal appearance  She is not ill-appearing  HENT:      Head: Normocephalic and atraumatic  Right Ear: External ear normal       Left Ear: External ear normal    Eyes:      Extraocular Movements: Extraocular movements intact  Cardiovascular:      Rate and Rhythm: Normal rate and regular rhythm  Pulmonary:      Effort: Pulmonary effort is normal  No respiratory distress  Abdominal:      General: Abdomen is flat  Musculoskeletal:         General: Normal range of motion  Cervical back: Normal range of motion  Skin:     General: Skin is warm and dry  Neurological:      General: No focal deficit present  Mental Status: She is alert     Psychiatric:         Mood and Affect: Mood normal          Behavior: Behavior normal          Vital Signs  ED Triage Vitals [08/13/21 1920]   Temperature Pulse Respirations Blood Pressure SpO2   98 5 °F (36 9 °C) 92 16 109/70 98 %      Temp Source Heart Rate Source Patient Position - Orthostatic VS BP Location FiO2 (%)   Oral Monitor Lying Right arm --      Pain Score       --           Vitals:    08/13/21 1920   BP: 109/70   Pulse: 92 Patient Position - Orthostatic VS: Lying         Visual Acuity      ED Medications  Medications   ondansetron (ZOFRAN-ODT) dispersible tablet 4 mg (4 mg Oral Given 8/13/21 1933)       Diagnostic Studies  Results Reviewed     None                 No orders to display              Procedures  Procedures         ED Course                             SBIRT 22yo+      Most Recent Value   SBIRT (24 yo +)   In order to provide better care to our patients, we are screening all of our patients for alcohol and drug use  Would it be okay to ask you these screening questions? Yes Filed at: 08/13/2021 1933   Initial Alcohol Screen: US AUDIT-C    1  How often do you have a drink containing alcohol?  0 Filed at: 08/13/2021 1933   2  How many drinks containing alcohol do you have on a typical day you are drinking? 0 Filed at: 08/13/2021 1933   3a  Male UNDER 65: How often do you have five or more drinks on one occasion? 0 Filed at: 08/13/2021 1933   3b  FEMALE Any Age, or MALE 65+: How often do you have 4 or more drinks on one occassion? 0 Filed at: 08/13/2021 1933   Audit-C Score  0 Filed at: 08/13/2021 1933   CRISTINO: How many times in the past year have you    Used an illegal drug or used a prescription medication for non-medical reasons? Never Filed at: 08/13/2021 1933                    MDM    Disposition  Final diagnoses:   Nausea     Time reflects when diagnosis was documented in both MDM as applicable and the Disposition within this note     Time User Action Codes Description Comment    8/13/2021  7:20 PM Kristie Andrea Add [R11 0] Nausea       ED Disposition     ED Disposition Condition Date/Time Comment    Discharge Stable Fri Aug 13, 2021  7:19 PM Mike Browny discharge to home/self care              Follow-up Information     Follow up With Specialties Details Why Contact Info    [employer]  Call  As needed [phone]            Discharge Medication List as of 8/13/2021  7:25 PM      START taking these medications    Details   esomeprazole (NexIUM) 40 MG capsule Take 1 capsule (40 mg total) by mouth daily, Starting Fri 8/13/2021, Until Sun 9/12/2021, Normal      ondansetron (ZOFRAN-ODT) 4 mg disintegrating tablet Take 1 tablet (4 mg total) by mouth every 6 (six) hours as needed for nausea or vomiting, Starting Fri 8/13/2021, Normal           No discharge procedures on file      PDMP Review       Value Time User    PDMP Reviewed  Yes 7/24/2021  4:09 AM Coral Tang MD          ED Provider  Electronically Signed by           Chi Nicole PA-C  08/13/21 1952

## 2021-08-24 VITALS
OXYGEN SATURATION: 99 % | SYSTOLIC BLOOD PRESSURE: 112 MMHG | HEART RATE: 64 BPM | TEMPERATURE: 98.6 F | DIASTOLIC BLOOD PRESSURE: 70 MMHG | RESPIRATION RATE: 18 BRPM

## 2021-08-24 PROCEDURE — 99282 EMERGENCY DEPT VISIT SF MDM: CPT

## 2021-08-25 ENCOUNTER — HOSPITAL ENCOUNTER (EMERGENCY)
Facility: HOSPITAL | Age: 31
Discharge: HOME | End: 2021-08-25
Attending: EMERGENCY MEDICINE
Payer: COMMERCIAL

## 2021-08-25 DIAGNOSIS — K08.89 DENTALGIA: Primary | ICD-10-CM

## 2021-08-25 DIAGNOSIS — K02.9 DENTAL CARIES: ICD-10-CM

## 2021-08-25 PROCEDURE — 99284 EMERGENCY DEPT VISIT MOD MDM: CPT | Performed by: PHYSICIAN ASSISTANT

## 2021-08-25 RX ORDER — CHLORHEXIDINE GLUCONATE 0.12 MG/ML
15 RINSE ORAL 2 TIMES DAILY
Qty: 118 ML | Refills: 0 | Status: SHIPPED | OUTPATIENT
Start: 2021-08-25 | End: 2021-08-25 | Stop reason: CLARIF

## 2021-08-25 RX ORDER — OXYCODONE HYDROCHLORIDE AND ACETAMINOPHEN 5; 325 MG/1; MG/1
1 TABLET ORAL EVERY 6 HOURS PRN
Qty: 6 TABLET | Refills: 0 | OUTPATIENT
Start: 2021-08-25 | End: 2021-09-04

## 2021-08-25 RX ORDER — LIDOCAINE HYDROCHLORIDE 20 MG/ML
15 SOLUTION OROPHARYNGEAL ONCE
Status: COMPLETED | OUTPATIENT
Start: 2021-08-25 | End: 2021-08-25

## 2021-08-25 RX ORDER — OXYCODONE HYDROCHLORIDE AND ACETAMINOPHEN 5; 325 MG/1; MG/1
1 TABLET ORAL ONCE
Status: COMPLETED | OUTPATIENT
Start: 2021-08-25 | End: 2021-08-25

## 2021-08-25 RX ORDER — PENICILLIN V POTASSIUM 500 MG/1
500 TABLET ORAL 4 TIMES DAILY
Qty: 28 TABLET | Refills: 0 | Status: SHIPPED | OUTPATIENT
Start: 2021-08-25 | End: 2021-09-01

## 2021-08-25 RX ORDER — CHLORHEXIDINE GLUCONATE 0.12 MG/ML
15 RINSE ORAL 2 TIMES DAILY
Qty: 118 ML | Refills: 0 | Status: SHIPPED | OUTPATIENT
Start: 2021-08-25 | End: 2022-03-01 | Stop reason: ALTCHOICE

## 2021-08-25 RX ADMIN — LIDOCAINE HYDROCHLORIDE 15 ML: 20 SOLUTION ORAL; TOPICAL at 02:15

## 2021-08-25 RX ADMIN — OXYCODONE AND ACETAMINOPHEN 1 TABLET: 5; 325 TABLET ORAL at 02:14

## 2021-08-25 NOTE — ED PROVIDER NOTES
History  Chief Complaint   Patient presents with    Dental Pain     Pt reports having teeth pain  She has tooth problems one on the top two on the bottom  She thinks that this is setting off her TMJ  She has broken teeth as well  Pt has been taking tylenol, motrin, heat, ice and its not helping  Patient is a 33 y/o female presenting to the ED for evaluation of dental pain  Patient says she had an appointment scheduled with her dentist last week due to insurance issues they canceled it  Patient says the pain is in the front right tooth and bilateral lower teeth  She has been taking Tylenol/Motrin and applying ice with no relief  She denies fevers or chills  She says that the pain is causing her "TMJ" to flare up  Prior to Admission Medications   Prescriptions Last Dose Informant Patient Reported?  Taking?   esomeprazole (NexIUM) 40 MG capsule   No No   Sig: Take 1 capsule (40 mg total) by mouth daily   famotidine (PEPCID) 20 mg tablet   No No   Sig: Take 1 tablet (20 mg total) by mouth 2 (two) times a day   ondansetron (ZOFRAN-ODT) 4 mg disintegrating tablet   No No   Sig: Take 1 tablet (4 mg total) by mouth every 6 (six) hours as needed for nausea or vomiting      Facility-Administered Medications: None       Past Medical History:   Diagnosis Date    Asthma     GERD (gastroesophageal reflux disease)     Hernia, abdominal     Migraines     Psychiatric disorder     Anx, Depression    Renal disorder     kidney stones       Past Surgical History:   Procedure Laterality Date    [place] RETROGRADE PYELOGRAM  8/28/2018    HERNIA REPAIR      [place] CYSTO/URETERO W/LITHOTRIPSY &INDWELL STENT INSRT Right 8/28/2018    Procedure: CYSTOSCOPY URETEROSCOPY WITH RETROGRADE PYELOGRAM AND INSERTION STENT URETERAL;  Surgeon: Neyda Valle MD;  [facility];  Service: Urology    TOOTH EXTRACTION         Family History   Problem Relation Age of Onset    Diabetes Mother     Hyperlipidemia Mother    [facility] Stroke Mother     Heart disease Mother     Asthma Mother    [facility] Other Mother         Degen  of Intervertabral disc   Nephrolithiasis Father     Nephrolithiasis Brother      I have reviewed and agree with the history as documented  E-Cigarette/Vaping    E-Cigarette Use Never User      E-Cigarette/Vaping Substances     Social History     Tobacco Use    Smoking status: Current Every Day Smoker     Packs/day: 0 50     Years: 13 00     Pack years: 6 50     Types: Cigarettes    Smokeless tobacco: Never Used   Vaping Use    Vaping Use: Never used   Substance Use Topics    Alcohol use: Not Currently    Drug use: No       Review of Systems   Constitutional: Negative for appetite change, chills, fatigue and fever  HENT: Positive for dental problem  Negative for congestion, rhinorrhea, sinus pressure, sinus pain and sore throat  Eyes: Negative for photophobia and visual disturbance  Respiratory: Negative for cough, shortness of breath and wheezing  Cardiovascular: Negative for chest pain, palpitations and leg swelling  Gastrointestinal: Negative for abdominal pain, blood in stool, constipation, diarrhea, nausea and vomiting  Genitourinary: Negative for difficulty urinating, dysuria, flank pain, frequency, hematuria and urgency  Musculoskeletal: Negative for arthralgias, back pain, joint swelling, myalgias and neck pain  Skin: Negative for color change, pallor and rash  Neurological: Negative for dizziness, syncope, weakness, light-headedness and headaches  Psychiatric/Behavioral: Negative for confusion and sleep disturbance  All other systems reviewed and are negative  Physical Exam  Physical Exam  Vitals and nursing note reviewed  Constitutional:       General: She is awake  Appearance: Normal appearance  She is well-developed  She is not toxic-appearing or diaphoretic  HENT:      Head: Normocephalic and atraumatic        Right Ear: External ear normal       Left Ear: External ear normal       Nose: Nose normal       Mouth/Throat:      Lips: Pink  Mouth: Mucous membranes are moist       Dentition: Abnormal dentition  Dental caries and dental abscesses present  Pharynx: Oropharynx is clear  Uvula midline  No pharyngeal swelling or posterior oropharyngeal erythema  Tonsils: No tonsillar exudate  Comments: Multiple dental caries and missing teeth  No facial swelling or trismus  Eyes:      General: Lids are normal  No scleral icterus  Conjunctiva/sclera: Conjunctivae normal       Pupils: Pupils are equal, round, and reactive to light  Cardiovascular:      Rate and Rhythm: Normal rate and regular rhythm  Pulses: Normal pulses  Radial pulses are 2+ on the right side and 2+ on the left side  Heart sounds: Normal heart sounds, S1 normal and S2 normal    Pulmonary:      Effort: Pulmonary effort is normal  No accessory muscle usage  Breath sounds: Normal breath sounds  No stridor  No decreased breath sounds, wheezing, rhonchi or rales  Abdominal:      General: Abdomen is flat  Bowel sounds are normal  There is no distension  Palpations: Abdomen is soft  Tenderness: There is no abdominal tenderness  There is no right CVA tenderness, left CVA tenderness, guarding or rebound  Musculoskeletal:      Cervical back: Full passive range of motion without pain and neck supple  No signs of trauma  No pain with movement  Right lower leg: No edema  Left lower leg: No edema  Lymphadenopathy:      Cervical: No cervical adenopathy  Skin:     General: Skin is warm and dry  Capillary Refill: Capillary refill takes less than 2 seconds  Coloration: Skin is not cyanotic, jaundiced or pale  Neurological:      Mental Status: She is alert and oriented to person, place, and time  GCS: GCS eye subscore is 4  GCS verbal subscore is 5  GCS motor subscore is 6        Gait: Gait normal    Psychiatric:         Mood and Affect: Mood normal          Speech: Speech normal          Behavior: Behavior is cooperative  Vital Signs  ED Triage Vitals   Temperature Pulse Respirations Blood Pressure SpO2   08/24/21 2308 08/24/21 2307 08/24/21 2307 08/24/21 2307 08/24/21 2307   98 6 °F (37 °C) 64 18 112/70 99 %      Temp Source Heart Rate Source Patient Position - Orthostatic VS BP Location FiO2 (%)   08/24/21 2308 08/24/21 2307 08/24/21 2307 08/24/21 2307 --   Oral Monitor Sitting Left arm       Pain Score       --                  Vitals:    08/24/21 2307   BP: 112/70   Pulse: 64   Patient Position - Orthostatic VS: Sitting         Visual Acuity      ED Medications  Medications   oxyCODONE-acetaminophen (PERCOCET) 5-325 mg per tablet 1 tablet (1 tablet Oral Given 8/25/21 0214)   Lidocaine Viscous HCl (XYLOCAINE) 2 % mucosal solution 15 mL (15 mL Swish & Spit Given 8/25/21 0215)       Diagnostic Studies  Results Reviewed     None                 No orders to display              Procedures  Procedures         ED Course                                           MDM  Number of Diagnoses or Management Options  Dental caries  Dentalgia  Diagnosis management comments: Patient is a 33 y/o female presenting to the ED for evaluation of dental pain  Viscous lidocaine and pain medications provided with relief  Will send pain medications/antibiotics to patient's pharmacy and provide dental clinic information for follow up  PDMP reviewed  The management plan was discussed in detail with the patient at bedside and all questions were answered  Prior to discharge, verbal and written instructions provided  ED return precautions discussed in detail  The patient verbalized understanding of our discussion and plan of care, and agrees to return to the Emergency Department for concerns and progression of illness      Patient Progress  Patient progress: stable      Disposition  Final diagnoses:   [address] caries     Time reflects when diagnosis was documented in both MDM as applicable and the Disposition within this note     Time User Action Codes Description Comment    8/25/2021  1:42 AM Deisi Starr Add [O60 88] Dentalgia     8/25/2021  1:42 AM Deisi Starr Add [K02 9] Dental caries       ED Disposition     ED Disposition Condition Date/Time Comment    Discharge Stable Wed Aug 25, 2021  1:42 AM July Denismalis discharge to home/self care              Follow-up Information     Follow up With Specialties Details Why Contact Info Additional [address] Dentistry Schedule an appointment as soon as possible for a visit   [facility] [address] [facility], [address] [place] [address], [id number], [facility]    [address]  [facility]     [address]   [facility]  [phone]           Discharge Medication List as of 8/25/2021  1:49 AM      START taking these medications    Details   oxyCODONE-acetaminophen (PERCOCET) 5-325 mg per tablet Take 1 tablet by mouth every 6 (six) hours as needed for moderate painMax Daily Amount: 4 tablets, Starting Wed 8/25/2021, Normal      penicillin V potassium (VEETID) 500 mg tablet Take 1 tablet (500 mg total) by mouth 4 (four) times a day for 7 days, Starting Wed 8/25/2021, Until Wed 9/1/2021, Normal      chlorhexidine (PERIDEX) 0 12 % solution Apply 15 mL to the mouth or throat 2 (two) times a day, Starting Wed 8/25/2021, Print         CONTINUE these medications which have NOT CHANGED    Details   esomeprazole (NexIUM) 40 MG capsule Take 1 capsule (40 mg total) by mouth daily, Starting Fri 8/13/2021, Until Sun 9/12/2021, Normal      famotidine (PEPCID) 20 mg tablet Take 1 tablet (20 mg total) by mouth 2 (two) times a day, Starting Fri 8/13/2021, Until Sun 9/12/2021, Normal      ondansetron (ZOFRAN-ODT) 4 mg disintegrating tablet Take 1 tablet (4 mg total) by mouth every 6 (six) hours as needed for nausea or vomiting, Starting Fri 8/13/2021, Normal           No discharge procedures on file      PDMP Review       Value Time User    PDMP Reviewed  Yes 8/25/2021  1:43 AM Dee Dee Shane PA-C          ED Provider  Electronically Signed by           Dee Dee Shane PA-C  08/25/21 8915

## 2021-08-27 ENCOUNTER — HOSPITAL ENCOUNTER (EMERGENCY)
Facility: HOSPITAL | Age: 31
Discharge: HOME | End: 2021-08-27
Attending: EMERGENCY MEDICINE
Payer: COMMERCIAL

## 2021-08-27 VITALS
WEIGHT: 125 LBS | HEIGHT: 60 IN | DIASTOLIC BLOOD PRESSURE: 65 MMHG | RESPIRATION RATE: 17 BRPM | HEART RATE: 101 BPM | TEMPERATURE: 98.6 F | BODY MASS INDEX: 24.54 KG/M2 | SYSTOLIC BLOOD PRESSURE: 102 MMHG | OXYGEN SATURATION: 100 %

## 2021-08-27 DIAGNOSIS — K08.89 PAIN, DENTAL: Primary | ICD-10-CM

## 2021-08-27 PROCEDURE — 99283 EMERGENCY DEPT VISIT LOW MDM: CPT

## 2021-08-27 PROCEDURE — 99284 EMERGENCY DEPT VISIT MOD MDM: CPT | Performed by: EMERGENCY MEDICINE

## 2021-08-27 RX ORDER — OXYCODONE HYDROCHLORIDE 5 MG/1
5 TABLET ORAL ONCE
Status: COMPLETED | OUTPATIENT
Start: 2021-08-27 | End: 2021-08-27

## 2021-08-27 RX ORDER — LIDOCAINE HYDROCHLORIDE 20 MG/ML
15 SOLUTION OROPHARYNGEAL ONCE
Status: COMPLETED | OUTPATIENT
Start: 2021-08-27 | End: 2021-08-27

## 2021-08-27 RX ADMIN — OXYCODONE 5 MG: 5 TABLET ORAL at 22:24

## 2021-08-27 RX ADMIN — LIDOCAINE HYDROCHLORIDE 15 ML: 20 SOLUTION ORAL; TOPICAL at 22:24

## 2021-08-28 ENCOUNTER — HOSPITAL ENCOUNTER (EMERGENCY)
Facility: HOSPITAL | Age: 31
Discharge: HOME | End: 2021-08-28
Attending: EMERGENCY MEDICINE
Payer: COMMERCIAL

## 2021-08-28 VITALS
SYSTOLIC BLOOD PRESSURE: 119 MMHG | HEART RATE: 100 BPM | DIASTOLIC BLOOD PRESSURE: 71 MMHG | RESPIRATION RATE: 16 BRPM | TEMPERATURE: 98.6 F | OXYGEN SATURATION: 100 %

## 2021-08-28 DIAGNOSIS — K08.89 PAIN, DENTAL: Primary | ICD-10-CM

## 2021-08-28 PROCEDURE — 99284 EMERGENCY DEPT VISIT MOD MDM: CPT | Performed by: PHYSICIAN ASSISTANT

## 2021-08-28 PROCEDURE — 99282 EMERGENCY DEPT VISIT SF MDM: CPT

## 2021-08-28 RX ORDER — OXYCODONE HYDROCHLORIDE 5 MG/1
5 TABLET ORAL ONCE
Status: COMPLETED | OUTPATIENT
Start: 2021-08-28 | End: 2021-08-28

## 2021-08-28 RX ORDER — ONDANSETRON 4 MG/1
4 TABLET, ORALLY DISINTEGRATING ORAL ONCE
Status: COMPLETED | OUTPATIENT
Start: 2021-08-28 | End: 2021-08-28

## 2021-08-28 RX ADMIN — ONDANSETRON 4 MG: 4 TABLET, ORALLY DISINTEGRATING ORAL at 18:01

## 2021-08-28 RX ADMIN — OXYCODONE HYDROCHLORIDE 5 MG: 5 TABLET ORAL at 18:01

## 2021-08-28 NOTE — ED PROVIDER NOTES
History  Chief Complaint   Patient presents with    Dental Problem     pt presents to ed via walk in with complaint of dental pain has three teeth that are causing the problem was seen at [facility] for the same and given meds but are not working      Patient is a 49-year-old female seen in the emergency department with concern for dental pain over the past several weeks  Review of records shows the patient has had multiple emergency department visits for dental pain  Patient states that she has been taking oxycodone-acetaminophen at home in addition to Tylenol and ibuprofen, with minimal improvement of pain  Pain is apparently made worse with eating and drinking  Patient notes no fever, chest pain, shortness of breath, nausea, vomiting, weakness, numbness, tingling  Patient states that she has been having difficulty following up with a dentist due to insurance issues  Patient states that she is also taking penicillin at home  Prior to Admission Medications   Prescriptions Last Dose Informant Patient Reported?  Taking?   chlorhexidine (PERIDEX) 0 12 % solution 8/27/2021 at Unknown time  No Yes   Sig: Apply 15 mL to the mouth or throat 2 (two) times a day   famotidine (PEPCID) 20 mg tablet   No No   Sig: Take 1 tablet (20 mg total) by mouth 2 (two) times a day   ondansetron (ZOFRAN-ODT) 4 mg disintegrating tablet   No No   Sig: Take 1 tablet (4 mg total) by mouth every 6 (six) hours as needed for nausea or vomiting   oxyCODONE-acetaminophen (PERCOCET) 5-325 mg per tablet 8/27/2021 at Unknown time  No Yes   Sig: Take 1 tablet by mouth every 6 (six) hours as needed for moderate painMax Daily Amount: 4 tablets   penicillin V potassium (VEETID) 500 mg tablet 8/27/2021 at Unknown time  No Yes   Sig: Take 1 tablet (500 mg total) by mouth 4 (four) times a day for 7 days      Facility-Administered Medications: None       Past Medical History:   Diagnosis Date    Asthma     GERD (gastroesophageal reflux disease)  Hernia, abdominal     Migraines     Psychiatric disorder     Anx, Depression    Renal disorder     kidney stones       Past Surgical History:   Procedure Laterality Date    [place] RETROGRADE PYELOGRAM  8/28/2018    HERNIA REPAIR      [place] CYSTO/URETERO W/LITHOTRIPSY &INDWELL STENT INSRT Right 8/28/2018    Procedure: CYSTOSCOPY URETEROSCOPY WITH RETROGRADE PYELOGRAM AND INSERTION STENT URETERAL;  Surgeon: Raul Carney MD;  [facility];  Service: Urology    TOOTH EXTRACTION         Family History   Problem Relation Age of Onset    Diabetes Mother     Hyperlipidemia Mother     Stroke Mother     Heart disease Mother     Asthma Mother    Melchor Solorzano Other Mother         Degen  of Intervertabral disc   Nephrolithiasis Father     Nephrolithiasis Brother      I have reviewed and agree with the history as documented  E-Cigarette/Vaping    E-Cigarette Use Never User      E-Cigarette/Vaping Substances     Social History     Tobacco Use    Smoking status: Current Every Day Smoker     Packs/day: 0 50     Years: 13 00     Pack years: 6 50     Types: Cigarettes    Smokeless tobacco: Never Used   Vaping Use    Vaping Use: Never used   Substance Use Topics    Alcohol use: Not Currently    Drug use: No       Review of Systems   Constitutional: Negative for chills and fever  HENT: Positive for dental problem  Negative for ear pain and sore throat  Eyes: Negative for pain and visual disturbance  Respiratory: Negative for cough and shortness of breath  Cardiovascular: Negative for chest pain and palpitations  Gastrointestinal: Negative for abdominal pain and vomiting  Genitourinary: Negative for dysuria and hematuria  Musculoskeletal: Negative for arthralgias and back pain  Skin: Negative for color change and rash  Neurological: Negative for seizures and syncope  Physical Exam  Physical Exam  Vitals and nursing note reviewed     Constitutional:       General: She is not in acute distress  Appearance: She is well-developed  HENT:      Head: Normocephalic and atraumatic  Right Ear: External ear normal       Left Ear: External ear normal       Nose: Nose normal       Mouth/Throat:      Dentition: Dental caries present  No gingival swelling or dental abscesses  Pharynx: Oropharynx is clear  Comments: Multiple dental caries of upper and lower teeth, no significant gingival edema or evidence of periodontal abscess noted  Eyes:      General: No scleral icterus  Conjunctiva/sclera: Conjunctivae normal    Cardiovascular:      Rate and Rhythm: Regular rhythm  Tachycardia present  Heart sounds: No murmur heard  Pulmonary:      Effort: Pulmonary effort is normal  No respiratory distress  Breath sounds: Normal breath sounds  Abdominal:      General: There is no distension  Palpations: Abdomen is soft  Tenderness: There is no abdominal tenderness  Musculoskeletal:         General: No deformity or signs of injury  Cervical back: Normal range of motion and neck supple  Skin:     General: Skin is warm and dry  Neurological:      General: No focal deficit present  Mental Status: She is alert  Cranial Nerves: No cranial nerve deficit  Sensory: No sensory deficit  Psychiatric:         Mood and Affect: Mood normal          Thought Content:  Thought content normal          Vital Signs  ED Triage Vitals [08/27/21 2159]   Temperature Pulse Respirations Blood Pressure SpO2   98 6 °F (37 °C) 101 17 102/65 100 %      Temp Source Heart Rate Source Patient Position - Orthostatic VS BP Location FiO2 (%)   Oral Monitor Sitting Left arm --      Pain Score       8           Vitals:    08/27/21 2159   BP: 102/65   Pulse: 101   Patient Position - Orthostatic VS: Sitting         Visual Acuity      ED Medications  Medications   oxyCODONE (ROXICODONE) IR tablet 5 mg (5 mg Oral Given 8/27/21 2224)   Lidocaine Viscous HCl (XYLOCAINE) 2 % mucosal solution 15 mL (15 mL Swish & Spit Given 8/27/21 2223)       Diagnostic Studies  Results Reviewed     None                 No orders to display              Procedures  Procedures         ED Course                                           MDM  Number of Diagnoses or Management Options  Pain, dental  Diagnosis management comments: Patient is a 77-year-old female seen in the emergency department with concern for dental pain in the setting of multiple dental caries  There is no evidence of periodontal abscess on evaluation  Patient was treated with medication for pain control  Plan to have patient follow up with dentistry as an outpatient  Patient was provided dental list   Patient stable for discharge home  Discharge instructions were reviewed with patient  Disposition  Final diagnoses:   Pain, dental     Time reflects when diagnosis was documented in both MDM as applicable and the Disposition within this note     Time User Action Codes Description Comment    8/27/2021 10:15 PM Shelia Cabot Add [K08 89] Pain, dental       ED Disposition     ED Disposition Condition Date/Time Comment    Discharge Stable Fri Aug 27, 2021 10:15 PM Mike Browny discharge to home/self care  Follow-up Information     Follow up With Specialties Details Why Contact Info    Your dentist  Call in 1 day      [facility] [address]  Call  As needed [address]  [phone]          Patient's Medications   Discharge Prescriptions    BENZOCAINE (ORAJEL) 10 % MUCOSAL GEL    Apply 1 application to the mouth or throat every 8 (eight) hours as needed (dental pain) for up to 5 days       Start Date: 8/27/2021 End Date: 9/1/2021       Order Dose: 1 application       Quantity: 7 1 g    Refills: 0     No discharge procedures on file      PDMP Review       Value Time User    PDMP Reviewed  Yes 8/25/2021  1:43 AM Kt Manriquez PA-C          ED Provider  Electronically Signed by           Chase Keller MD  08/27/21 518 Que Gupta MD  08/27/21 0030

## 2021-08-29 NOTE — ED PROVIDER NOTES
History  Chief Complaint   Patient presents with    Dental Pain     Multiple dental complaints  RX medications, ineffective  No dental followup  The patient is a 26-year-old female with history of asthma on GERD who presents to the emergency department for evaluation of dental pain and a reported TMJ flare up  The patient states she has been having ongoing issues with dental pain for months  She states that she has tried to make an appointment with her dentist, however due to insurance reasons, she has had difficulty making an appointment  She states that she does have an appointment scheduled for 2 weeks  She reports recently being seen on 2 separate occasions for dental pain in the emergency department  She states that she was initially prescribed penicillin and some Percocet  She states that the Percocet was not working for her, and she ultimately flushed the rest of her pills down the toilet  She states that she then went to another emergency department where she was prescribed a different type of medication that also did not work  She states that she still is taking the penicillin  She reports that she gets nausea due to the pain  She reports taking 800 mg Motrin on 2 separate occasions today as well as Tylenol with no relief  She states that the pain is impacting her ability to function  She additionally reports some slight facial swelling  The patient denies fever, chills, sore throat, ear pain, headache, dizziness or lightheadedness  History provided by:  Patient   used: No    Dental Pain  Associated symptoms: facial swelling    Associated symptoms: no fever, no headaches and no neck pain        Prior to Admission Medications   Prescriptions Last Dose Informant Patient Reported?  Taking?   benzocaine (ORAJEL) 10 % mucosal gel   No No   Sig: Apply 1 application to the mouth or throat every 8 (eight) hours as needed (dental pain) for up to 5 days   chlorhexidine (PERIDEX) 0 12 % solution   No No   Sig: Apply 15 mL to the mouth or throat 2 (two) times a day   famotidine (PEPCID) 20 mg tablet   No No   Sig: Take 1 tablet (20 mg total) by mouth 2 (two) times a day   ondansetron (ZOFRAN-ODT) 4 mg disintegrating tablet   No No   Sig: Take 1 tablet (4 mg total) by mouth every 6 (six) hours as needed for nausea or vomiting   oxyCODONE-acetaminophen (PERCOCET) 5-325 mg per tablet   No No   Sig: Take 1 tablet by mouth every 6 (six) hours as needed for moderate painMax Daily Amount: 4 tablets   penicillin V potassium (VEETID) 500 mg tablet   No No   Sig: Take 1 tablet (500 mg total) by mouth 4 (four) times a day for 7 days      Facility-Administered Medications: None       Past Medical History:   Diagnosis Date    Asthma     GERD (gastroesophageal reflux disease)     Hernia, abdominal     Migraines     Psychiatric disorder     Anx, Depression    Renal disorder     kidney stones       Past Surgical History:   Procedure Laterality Date    FL RETROGRADE PYELOGRAM  8/28/2018    HERNIA REPAIR      [place] CYSTO/URETERO W/LITHOTRIPSY &INDWELL STENT INSRT Right 8/28/2018    Procedure: CYSTOSCOPY URETEROSCOPY WITH RETROGRADE PYELOGRAM AND INSERTION STENT URETERAL;  Surgeon: Marimar Castro MD;  [facility];  Service: Urology    TOOTH EXTRACTION         Family History   Problem Relation Age of Onset    Diabetes Mother     Hyperlipidemia Mother     Stroke Mother     Heart disease Mother     Asthma Mother    Yunior Voss Other Mother         Degen  of Intervertabral disc   Nephrolithiasis Father     Nephrolithiasis Brother      I have reviewed and agree with the history as documented      E-Cigarette/Vaping    E-Cigarette Use Never User      E-Cigarette/Vaping Substances     Social History     Tobacco Use    Smoking status: Current Every Day Smoker     Packs/day: 0 50     Years: 13 00     Pack years: 6 50     Types: Cigarettes    Smokeless tobacco: Never Used   Vaping Use    Vaping Use: Never used   Substance Use Topics    Alcohol use: Not Currently    Drug use: No       Review of Systems   Constitutional: Negative for chills and fever  HENT: Positive for dental problem and facial swelling  Negative for ear pain and sore throat  Eyes: Negative for redness and visual disturbance  Respiratory: Negative for cough and shortness of breath  Cardiovascular: Negative for chest pain  Gastrointestinal: Negative for abdominal pain, diarrhea, nausea and vomiting  Genitourinary: Negative for dysuria and hematuria  Musculoskeletal: Negative for back pain, neck pain and neck stiffness  Skin: Negative for color change and rash  Neurological: Negative for dizziness, light-headedness and headaches  All other systems reviewed and are negative  Physical Exam  Physical Exam  Constitutional:       Appearance: Normal appearance  HENT:      Head: Normocephalic and atraumatic  Nose: Nose normal       Mouth/Throat:      Mouth: Mucous membranes are moist       Dentition: Abnormal dentition  Dental tenderness and dental caries present  No gingival swelling or dental abscesses  Pharynx: Oropharynx is clear  Eyes:      Extraocular Movements: Extraocular movements intact  Conjunctiva/sclera: Conjunctivae normal    Pulmonary:      Effort: Pulmonary effort is normal  No respiratory distress  Musculoskeletal:         General: Normal range of motion  Cervical back: Normal range of motion and neck supple  Skin:     General: Skin is warm and dry  Neurological:      General: No focal deficit present  Mental Status: She is alert and oriented to person, place, and time           Vital Signs  ED Triage Vitals   Temperature Pulse Respirations Blood Pressure SpO2   08/28/21 1700 08/28/21 1700 08/28/21 1804 08/28/21 1700 08/28/21 1700   98 6 °F (37 °C) 100 16 119/71 100 %      Temp Source Heart Rate Source Patient Position - Orthostatic VS BP Location FiO2 (%) 08/28/21 1700 08/28/21 1700 08/28/21 1700 08/28/21 1700 --   Oral Monitor Sitting Left arm       Pain Score       08/28/21 1801       Worst Possible Pain           Vitals:    08/28/21 1700   BP: 119/71   Pulse: 100   Patient Position - Orthostatic VS: Sitting         Visual Acuity      ED Medications  Medications   oxyCODONE (ROXICODONE) IR tablet 5 mg (5 mg Oral Given 8/28/21 1801)   ondansetron (ZOFRAN-ODT) dispersible tablet 4 mg (4 mg Oral Given 8/28/21 1801)       Diagnostic Studies  Results Reviewed     None                 No orders to display              Procedures  Procedures         ED Course                                           MDM  Number of Diagnoses or Management Options  Pain, dental: new and requires workup  Diagnosis management comments: Patient presents for evaluation of dental pain and jaw pain  Of note, the patient has been seen multiple times previously going back into 2019 for similar symptoms  Most recently, she has been seen on 2 separate occasions in the ED  Review of  aware shows that the patient has had multiple narcotic prescriptions filled in the past   However, she states that they do not work for her, however she then said that what ever she was given last time she was here did work  Review of records shows that she got a dose of oxycodone  I did note some very mild facial swelling on exam today  I ultimately did recommend a full workup including labs and CT scan of facial bones to rule out abscess or other infections  The patient is adamantly refusing a workup stating that she does not want to have a needle  She states that she is currently on penicillin, and she will complete her course of penicillin  The patient was given a dose of Zofran and oxycodone while in the emergency department  I advised her that I will not prescribe a narcotic medication today, particularly as she states it does not work anyway    I do have some concern for possible drug-seeking behavior  I did offer to prescribe some Zofran for her nausea, however she states her insurance will not pay for it  I advised that she can continue the Tylenol and Motrin pain for the time being  Strongly encouraged that she keep her scheduled dental appointment in 2 weeks  I advised that if she gets any worsening facial swelling or fever, she should return to the emergency department for more extensive workup at that time  I advised that she continue and finish her course of penicillin  She acknowledged understanding  Patient is stable for discharge  Amount and/or Complexity of Data Reviewed  Decide to obtain previous medical records or to obtain history from someone other than the patient: yes  Review and summarize past medical records: yes    Risk of Complications, Morbidity, and/or Mortality  Presenting problems: low  Diagnostic procedures: low  Management options: low    Patient Progress  Patient progress: stable      Disposition  Final diagnoses:   Pain, dental     Time reflects when diagnosis was documented in both MDM as applicable and the Disposition within this note     Time User Action Codes Description Comment    8/28/2021  6:16 PM Vicky Berumen Add [K08 89] Pain, dental       ED Disposition     ED Disposition Condition Date/Time Comment    Discharge Stable Sat Aug 28, 2021  6:16 PM Mike Pérez discharge to home/self care              Follow-up Information     Follow up With Specialties Details Why Contact Info Additional Information    [facility]    [phone]  [address] [facility]    [address] [place] [address] [address] Emergency Department Emergency Medicine  If symptoms worsen [address] Emergency Department, [address] [place] [address] [place], [place], [address]          Discharge Medication List as of 8/28/2021  6:17 PM      CONTINUE these medications which have NOT CHANGED    Details   benzocaine (ORAJEL) 10 % mucosal gel Apply 1 application to the mouth or throat every 8 (eight) hours as needed (dental pain) for up to 5 days, Starting Fri 8/27/2021, Until Wed 9/1/2021 at 2359, Normal      chlorhexidine (PERIDEX) 0 12 % solution Apply 15 mL to the mouth or throat 2 (two) times a day, Starting Wed 8/25/2021, Normal      famotidine (PEPCID) 20 mg tablet Take 1 tablet (20 mg total) by mouth 2 (two) times a day, Starting Fri 8/13/2021, Until Sun 9/12/2021, Normal      ondansetron (ZOFRAN-ODT) 4 mg disintegrating tablet Take 1 tablet (4 mg total) by mouth every 6 (six) hours as needed for nausea or vomiting, Starting Fri 8/13/2021, Normal      oxyCODONE-acetaminophen (PERCOCET) 5-325 mg per tablet Take 1 tablet by mouth every 6 (six) hours as needed for moderate painMax Daily Amount: 4 tablets, Starting Wed 8/25/2021, Normal      penicillin V potassium (VEETID) 500 mg tablet Take 1 tablet (500 mg total) by mouth 4 (four) times a day for 7 days, Starting Wed 8/25/2021, Until Wed 9/1/2021, Normal           No discharge procedures on file      PDMP Review       Value Time User    PDMP Reviewed  Yes 8/25/2021  1:43 AM Margie Morgan PA-C          ED Provider  Electronically Signed by           Sujit Arnold PA-C  08/28/21 6703

## 2021-09-02 ENCOUNTER — APPOINTMENT (EMERGENCY)
Dept: CT IMAGING | Facility: HOSPITAL | Age: 31
End: 2021-09-02
Payer: COMMERCIAL

## 2021-09-02 ENCOUNTER — HOSPITAL ENCOUNTER (EMERGENCY)
Facility: HOSPITAL | Age: 31
Discharge: HOME | End: 2021-09-02
Attending: EMERGENCY MEDICINE | Admitting: EMERGENCY MEDICINE
Payer: COMMERCIAL

## 2021-09-02 VITALS
RESPIRATION RATE: 20 BRPM | HEART RATE: 105 BPM | WEIGHT: 123 LBS | BODY MASS INDEX: 24.15 KG/M2 | OXYGEN SATURATION: 100 % | DIASTOLIC BLOOD PRESSURE: 67 MMHG | TEMPERATURE: 98.2 F | HEIGHT: 60 IN | SYSTOLIC BLOOD PRESSURE: 102 MMHG

## 2021-09-02 DIAGNOSIS — R14.1 GAS PAIN: Primary | ICD-10-CM

## 2021-09-02 LAB
ALBUMIN SERPL BCP-MCNC: 4.3 G/DL (ref 3.4–4.8)
ALP SERPL-CCNC: 48.7 U/L (ref 35–140)
ALT SERPL W P-5'-P-CCNC: 6 U/L (ref 5–54)
ANION GAP SERPL CALCULATED.3IONS-SCNC: 5 MMOL/L (ref 4–13)
AST SERPL W P-5'-P-CCNC: 11 U/L (ref 15–41)
BACTERIA UR QL AUTO: ABNORMAL /HPF
BASOPHILS # BLD AUTO: 0.03 THOUSANDS/ΜL (ref 0–0.1)
BASOPHILS NFR BLD AUTO: 1 % (ref 0–1)
BILIRUB SERPL-MCNC: 0.25 MG/DL (ref 0.3–1.2)
BILIRUB UR QL STRIP: NEGATIVE
BUN SERPL-MCNC: 11 MG/DL (ref 6–20)
CALCIUM SERPL-MCNC: 9.7 MG/DL (ref 8.4–10.2)
CAOX CRY URNS QL MICRO: ABNORMAL /HPF
CHLORIDE SERPL-SCNC: 105 MMOL/L (ref 96–108)
CLARITY UR: CLEAR
CO2 SERPL-SCNC: 27 MMOL/L (ref 22–33)
COLOR UR: YELLOW
CREAT SERPL-MCNC: 0.73 MG/DL (ref 0.4–1.1)
EOSINOPHIL # BLD AUTO: 0.21 THOUSAND/ΜL (ref 0–0.61)
EOSINOPHIL NFR BLD AUTO: 3 % (ref 0–6)
ERYTHROCYTE [DISTWIDTH] IN BLOOD BY AUTOMATED COUNT: 12.6 % (ref 11.6–15.1)
EXT PREG TEST URINE: NEGATIVE
EXT. CONTROL ED NAV: NORMAL
GFR SERPL CREATININE-BSD FRML MDRD: 110 ML/MIN/1.73SQ M
GLUCOSE SERPL-MCNC: 81 MG/DL (ref 65–140)
GLUCOSE UR STRIP-MCNC: NEGATIVE MG/DL
HCT VFR BLD AUTO: 40 % (ref 34.8–46.1)
HGB BLD-MCNC: 13.8 G/DL (ref 11.5–15.4)
HGB UR QL STRIP.AUTO: NEGATIVE
IMM GRANULOCYTES # BLD AUTO: 0.01 THOUSAND/UL (ref 0–0.2)
IMM GRANULOCYTES NFR BLD AUTO: 0 % (ref 0–2)
KETONES UR STRIP-MCNC: ABNORMAL MG/DL
LEUKOCYTE ESTERASE UR QL STRIP: NEGATIVE
LIPASE SERPL-CCNC: 16 U/L (ref 13–60)
LYMPHOCYTES # BLD AUTO: 2.09 THOUSANDS/ΜL (ref 0.6–4.47)
LYMPHOCYTES NFR BLD AUTO: 34 % (ref 14–44)
MCH RBC QN AUTO: 30.7 PG (ref 26.8–34.3)
MCHC RBC AUTO-ENTMCNC: 34.5 G/DL (ref 31.4–37.4)
MCV RBC AUTO: 89 FL (ref 82–98)
MONOCYTES # BLD AUTO: 0.63 THOUSAND/ΜL (ref 0.17–1.22)
MONOCYTES NFR BLD AUTO: 10 % (ref 4–12)
MUCOUS THREADS UR QL AUTO: ABNORMAL
NEUTROPHILS # BLD AUTO: 3.23 THOUSANDS/ΜL (ref 1.85–7.62)
NEUTS SEG NFR BLD AUTO: 52 % (ref 43–75)
NITRITE UR QL STRIP: NEGATIVE
NON-SQ EPI CELLS URNS QL MICRO: ABNORMAL /HPF
PH UR STRIP.AUTO: 5.5 [PH]
PLATELET # BLD AUTO: 260 THOUSANDS/UL (ref 149–390)
PMV BLD AUTO: 10.6 FL (ref 8.9–12.7)
POTASSIUM SERPL-SCNC: 4.1 MMOL/L (ref 3.5–5)
PROT SERPL-MCNC: 6.8 G/DL (ref 6.4–8.3)
PROT UR STRIP-MCNC: NEGATIVE MG/DL
RBC # BLD AUTO: 4.5 MILLION/UL (ref 3.81–5.12)
RBC #/AREA URNS AUTO: ABNORMAL /HPF
SODIUM SERPL-SCNC: 137 MMOL/L (ref 133–145)
SP GR UR STRIP.AUTO: 1.02 (ref 1–1.03)
UROBILINOGEN UR QL STRIP.AUTO: 0.2 E.U./DL
WBC # BLD AUTO: 6.2 THOUSAND/UL (ref 4.31–10.16)
WBC #/AREA URNS AUTO: ABNORMAL /HPF

## 2021-09-02 PROCEDURE — 99284 EMERGENCY DEPT VISIT MOD MDM: CPT

## 2021-09-02 PROCEDURE — 36415 COLL VENOUS BLD VENIPUNCTURE: CPT | Performed by: EMERGENCY MEDICINE

## 2021-09-02 PROCEDURE — 96374 THER/PROPH/DIAG INJ IV PUSH: CPT

## 2021-09-02 PROCEDURE — 83690 ASSAY OF LIPASE: CPT | Performed by: EMERGENCY MEDICINE

## 2021-09-02 PROCEDURE — 80053 COMPREHEN METABOLIC PANEL: CPT | Performed by: EMERGENCY MEDICINE

## 2021-09-02 PROCEDURE — 81025 URINE PREGNANCY TEST: CPT | Performed by: EMERGENCY MEDICINE

## 2021-09-02 PROCEDURE — 81001 URINALYSIS AUTO W/SCOPE: CPT | Performed by: EMERGENCY MEDICINE

## 2021-09-02 PROCEDURE — 85025 COMPLETE CBC W/AUTO DIFF WBC: CPT | Performed by: EMERGENCY MEDICINE

## 2021-09-02 PROCEDURE — 99284 EMERGENCY DEPT VISIT MOD MDM: CPT | Performed by: EMERGENCY MEDICINE

## 2021-09-02 RX ORDER — METOCLOPRAMIDE HYDROCHLORIDE 5 MG/ML
10 INJECTION INTRAMUSCULAR; INTRAVENOUS ONCE
Status: COMPLETED | OUTPATIENT
Start: 2021-09-02 | End: 2021-09-02

## 2021-09-02 RX ADMIN — METOCLOPRAMIDE 10 MG: 5 INJECTION, SOLUTION INTRAMUSCULAR; INTRAVENOUS at 20:21

## 2021-09-02 NOTE — ED TRIAGE NOTES
Pt presents to the ED from home with complaint of medial abdominal pain which started Tuesday 28 Aug 2021 "after I left the other emergency department"; states "I had a hernia two years ago and now I have the pain in the same spot"; took Ibuprofen and Tylenol with not relief; admits to nausea w/no vomiting; denies fever and diarrhea; arrives ambulatory, in no acute distress, alert and oriented, able to answer all questions appropriately

## 2021-09-03 ENCOUNTER — HOSPITAL ENCOUNTER (EMERGENCY)
Facility: HOSPITAL | Age: 31
Discharge: HOME | End: 2021-09-04
Attending: EMERGENCY MEDICINE | Admitting: EMERGENCY MEDICINE
Payer: COMMERCIAL

## 2021-09-03 VITALS
HEART RATE: 97 BPM | TEMPERATURE: 98.2 F | OXYGEN SATURATION: 100 % | WEIGHT: 118 LBS | BODY MASS INDEX: 23.05 KG/M2 | SYSTOLIC BLOOD PRESSURE: 104 MMHG | RESPIRATION RATE: 18 BRPM | DIASTOLIC BLOOD PRESSURE: 58 MMHG

## 2021-09-03 DIAGNOSIS — K08.89 PAIN, DENTAL: Primary | ICD-10-CM

## 2021-09-03 PROCEDURE — 64450 NJX AA&/STRD OTHER PN/BRANCH: CPT | Performed by: PHYSICIAN ASSISTANT

## 2021-09-03 PROCEDURE — 99283 EMERGENCY DEPT VISIT LOW MDM: CPT

## 2021-09-03 PROCEDURE — 99284 EMERGENCY DEPT VISIT MOD MDM: CPT | Performed by: PHYSICIAN ASSISTANT

## 2021-09-03 RX ORDER — LIDOCAINE HYDROCHLORIDE 20 MG/ML
15 SOLUTION OROPHARYNGEAL ONCE
Status: COMPLETED | OUTPATIENT
Start: 2021-09-03 | End: 2021-09-03

## 2021-09-03 RX ORDER — CLINDAMYCIN HYDROCHLORIDE 150 MG/1
300 CAPSULE ORAL ONCE
Status: COMPLETED | OUTPATIENT
Start: 2021-09-03 | End: 2021-09-03

## 2021-09-03 RX ORDER — CHLORHEXIDINE GLUCONATE 0.12 MG/ML
15 RINSE ORAL 2 TIMES DAILY
Qty: 120 ML | Refills: 0 | Status: SHIPPED | OUTPATIENT
Start: 2021-09-03 | End: 2022-03-01 | Stop reason: ALTCHOICE

## 2021-09-03 RX ORDER — CLINDAMYCIN HYDROCHLORIDE 300 MG/1
300 CAPSULE ORAL EVERY 6 HOURS
Qty: 40 CAPSULE | Refills: 0 | Status: SHIPPED | OUTPATIENT
Start: 2021-09-03 | End: 2021-09-13

## 2021-09-03 RX ORDER — SENNOSIDES 8.6 MG
650 CAPSULE ORAL EVERY 8 HOURS PRN
Qty: 9 TABLET | Refills: 0 | Status: SHIPPED | OUTPATIENT
Start: 2021-09-03 | End: 2021-09-06

## 2021-09-03 RX ADMIN — LIDOCAINE HYDROCHLORIDE 15 ML: 20 SOLUTION ORAL; TOPICAL at 23:33

## 2021-09-03 RX ADMIN — CLINDAMYCIN HYDROCHLORIDE 300 MG: 150 CAPSULE ORAL at 23:33

## 2021-09-03 NOTE — ED PROVIDER NOTES
History  Chief Complaint   Patient presents with    Abdominal Pain     Pt presents to the ED from home with complaint of medial abdominal pain which started Tuesday 28 Aug 2021 "after I left the other emergency department"; states "I had a hernia two years ago and now I have the pain in the same spot"; took Ibuprofen and Tylenol with not relief; admits to nausea w/no vomiting; denies fever and diarrhea; arrives ambulatory, in no acute distress, alert and oriented, able to answer all questions appropriately     Presents with mid abdominal pain in the same location where she had hernia surgery several years ago that started about 5 days ago, she has been another emergency department since then but states she did not mention the pain because it was not bad  Got worse earlier today and patient took Tylenol and ibuprofen that did not help very much  Denies any fevers or chills, diarrhea or constipation  Patient did pass stool today, she has had nausea but no vomiting  Prior to Admission Medications   Prescriptions Last Dose Informant Patient Reported?  Taking?   benzocaine (ORAJEL) 10 % mucosal gel   No No   Sig: Apply 1 application to the mouth or throat every 8 (eight) hours as needed (dental pain) for up to 5 days   chlorhexidine (PERIDEX) 0 12 % solution   No No   Sig: Apply 15 mL to the mouth or throat 2 (two) times a day   famotidine (PEPCID) 20 mg tablet   No No   Sig: Take 1 tablet (20 mg total) by mouth 2 (two) times a day   ondansetron (ZOFRAN-ODT) 4 mg disintegrating tablet   No No   Sig: Take 1 tablet (4 mg total) by mouth every 6 (six) hours as needed for nausea or vomiting   oxyCODONE-acetaminophen (PERCOCET) 5-325 mg per tablet   No No   Sig: Take 1 tablet by mouth every 6 (six) hours as needed for moderate painMax Daily Amount: 4 tablets   penicillin V potassium (VEETID) 500 mg tablet   No No   Sig: Take 1 tablet (500 mg total) by mouth 4 (four) times a day for 7 days Facility-Administered Medications: None       Past Medical History:   Diagnosis Date    Asthma     GERD (gastroesophageal reflux disease)     Hernia, abdominal     Migraines     Psychiatric disorder     Anx, Depression    Renal disorder     kidney stones       Past Surgical History:   Procedure Laterality Date    FL RETROGRADE PYELOGRAM  8/28/2018    HERNIA REPAIR      [place] CYSTO/URETERO W/LITHOTRIPSY &INDWELL STENT INSRT Right 8/28/2018    Procedure: CYSTOSCOPY URETEROSCOPY WITH RETROGRADE PYELOGRAM AND INSERTION STENT URETERAL;  Surgeon: Merrill Masters MD;  [facility];  Service: Urology    TOOTH EXTRACTION         Family History   Problem Relation Age of Onset    Diabetes Mother     Hyperlipidemia Mother     Stroke Mother     Heart disease Mother     Asthma Mother    Sindhu Lee Other Mother         Degen  of Intervertabral disc   Nephrolithiasis Father     Nephrolithiasis Brother      I have reviewed and agree with the history as documented  E-Cigarette/Vaping    E-Cigarette Use Never User      E-Cigarette/Vaping Substances     Social History     Tobacco Use    Smoking status: Current Every Day Smoker     Packs/day: 0 50     Years: 13 00     Pack years: 6 50     Types: Cigarettes    Smokeless tobacco: Never Used   Vaping Use    Vaping Use: Never used   Substance Use Topics    Alcohol use: Not Currently    Drug use: No       Review of Systems   All other systems reviewed and are negative  Physical Exam  Physical Exam  Vitals and nursing note reviewed  Constitutional:       General: She is not in acute distress  Appearance: Normal appearance  HENT:      Head: Normocephalic and atraumatic  Nose: Nose normal       Mouth/Throat:      Mouth: Mucous membranes are moist    Eyes:      Extraocular Movements: Extraocular movements intact  Pupils: Pupils are equal, round, and reactive to light  Cardiovascular:      Rate and Rhythm: Normal rate and regular rhythm  Pulses: Normal pulses  Heart sounds: Normal heart sounds  Pulmonary:      Effort: Pulmonary effort is normal       Breath sounds: No wheezing, rhonchi or rales  Abdominal:      General: Abdomen is flat  There is no distension  Palpations: Abdomen is soft  Tenderness: There is no abdominal tenderness  There is no guarding or rebound  Hernia: There is no hernia in the umbilical area or ventral area  Musculoskeletal:      Right lower leg: No edema  Left lower leg: No edema  Skin:     General: Skin is warm and dry  Capillary Refill: Capillary refill takes less than 2 seconds  Neurological:      General: No focal deficit present  Mental Status: She is alert and oriented to person, place, and time  Sensory: No sensory deficit  Motor: No weakness  Coordination: Coordination normal    Psychiatric:         Mood and Affect: Mood is anxious           Vital Signs  ED Triage Vitals [09/02/21 1947]   Temperature Pulse Respirations Blood Pressure SpO2   98 2 °F (36 8 °C) 105 20 102/67 100 %      Temp Source Heart Rate Source Patient Position - Orthostatic VS BP Location FiO2 (%)   Oral Monitor -- -- --      Pain Score       Worst Possible Pain           Vitals:    09/02/21 1947   BP: 102/67   Pulse: 105         Visual Acuity      ED Medications  Medications   metoclopramide (REGLAN) injection 10 mg (10 mg Intravenous Given 9/2/21 2021)       Diagnostic Studies  Results Reviewed     Procedure Component Value Units Date/Time    Urinalysis with microscopic [[id number]]  (Abnormal) Collected: 09/02/21 2044    Lab Status: Final result Specimen: Urine, Clean Catch Updated: 09/02/21 2131     Clarity, UA Clear     Color, UA Yellow     Specific Gravity, UA 1 025     pH, UA 5 5     Glucose, UA Negative mg/dl      Ketones, UA Trace mg/dl      Blood, UA Negative     Protein, UA Negative mg/dl      Nitrite, UA Negative     Bilirubin, UA Negative     Urobilinogen, UA 0 2 E U /dl Leukocytes, UA Negative     WBC, UA 0-1 /hpf      RBC, UA None Seen /hpf      Bacteria, UA Occasional /hpf      Ca Oxalate Portia, UA Occasional /hpf      Epithelial Cells Occasional /hpf      MUCUS THREADS Moderate    Comprehensive metabolic panel [[id number]]  (Abnormal) Collected: 09/02/21 2024    Lab Status: Final result Specimen: Blood from Arm, Right Updated: 09/02/21 2050     Sodium 137 mmol/L      Potassium 4 1 mmol/L      Chloride 105 mmol/L      CO2 27 mmol/L      ANION GAP 5 mmol/L      BUN 11 mg/dL      Creatinine 0 73 mg/dL      Glucose 81 mg/dL      Calcium 9 7 mg/dL      AST 11 U/L      ALT 6 U/L      Alkaline Phosphatase 48 7 U/L      Total Protein 6 8 g/dL      Albumin 4 3 g/dL      Total Bilirubin 0 25 mg/dL      eGFR 110 ml/min/1 73sq m     Narrative:      [place] guidelines for Chronic Kidney Disease (CKD):     Stage 1 with normal or high GFR (GFR > 90 mL/min/1 73 square meters)    Stage 2 Mild CKD (GFR = 60-89 mL/min/1 73 square meters)    Stage 3A Moderate CKD (GFR = 45-59 mL/min/1 73 square meters)    Stage 3B Moderate CKD (GFR = 30-44 mL/min/1 73 square meters)    Stage 4 Severe CKD (GFR = 15-29 mL/min/1 73 square meters)    Stage 5 End Stage CKD (GFR <15 mL/min/1 73 square meters)  Note: GFR calculation is accurate only with a steady state creatinine    Lipase [[id number]]  (Normal) Collected: 09/02/21 2024    Lab Status: Final result Specimen: Blood from Arm, Right Updated: 09/02/21 2050     Lipase 16 u/L     POCT pregnancy, urine [[id number]]  (Normal) Resulted: 09/02/21 2042    Lab Status: Final result Updated: 09/02/21 2042     EXT PREG TEST UR (Ref: Negative) Negative     Control Valid    CBC and differential [[id number]]  (Normal) Collected: 09/02/21 2024    Lab Status: Final result Specimen: Blood from Arm, Right Updated: 09/02/21 2040     WBC 6 20 Thousand/uL      RBC 4 50 Million/uL      Hemoglobin 13 8 g/dL      Hematocrit 40 0 %      MCV 89 fL      MCH 30 7 pg      MCHC 34 5 g/dL      RDW 12 6 %      MPV 10 6 fL      Platelets 790 Thousands/uL      Neutrophils Relative 52 %      Immat GRANS % 0 %      Lymphocytes Relative 34 %      Monocytes Relative 10 %      Eosinophils Relative 3 %      Basophils Relative 1 %      Neutrophils Absolute 3 23 Thousands/µL      Immature Grans Absolute 0 01 Thousand/uL      Lymphocytes Absolute 2 09 Thousands/µL      Monocytes Absolute 0 63 Thousand/µL      Eosinophils Absolute 0 21 Thousand/µL      Basophils Absolute 0 03 Thousands/µL                  No orders to display              Procedures  Procedures         ED Course  ED Course as of Sep 02 2243   Thu Sep 02, 2021   2102 Blood work is normal in reassuring  2104 Patient says pain is fully resolved after she passed gas  She would not like to do a CT scan but instead would like to discharge home  Discussed food changes for low gas diet  SBIRT 20yo+      Most Recent Value   SBIRT (24 yo +)   In order to provide better care to our patients, we are screening all of our patients for alcohol and drug use  Would it be okay to ask you these screening questions? No Filed at: 09/02/2021 2026                    MDM    Disposition  Final diagnoses:   Gas pain     Time reflects when diagnosis was documented in both MDM as applicable and the Disposition within this note     Time User Action Codes Description Comment    9/2/2021  9:07 PM Sydni Mesa Add [R14 1] Gas pain       ED Disposition     ED Disposition Condition Date/Time Comment    Discharge Stable u Sep 2, 2021  9:05 PM Mike Pérez discharge to home/self care              Follow-up Information     Follow up With Specialties Details Why Contact Info    Malena Salinas MD Family Medicine Schedule an appointment as soon as possible for a visit in 2 days  [address] [facility] [place]  [phone]            Discharge Medication List as of 9/2/2021  9:08 PM CONTINUE these medications which have NOT CHANGED    Details   chlorhexidine (PERIDEX) 0 12 % solution Apply 15 mL to the mouth or throat 2 (two) times a day, Starting Wed 8/25/2021, Normal      famotidine (PEPCID) 20 mg tablet Take 1 tablet (20 mg total) by mouth 2 (two) times a day, Starting Fri 8/13/2021, Until Sun 9/12/2021, Normal      ondansetron (ZOFRAN-ODT) 4 mg disintegrating tablet Take 1 tablet (4 mg total) by mouth every 6 (six) hours as needed for nausea or vomiting, Starting Fri 8/13/2021, Normal      oxyCODONE-acetaminophen (PERCOCET) 5-325 mg per tablet Take 1 tablet by mouth every 6 (six) hours as needed for moderate painMax Daily Amount: 4 tablets, Starting Wed 8/25/2021, Normal         STOP taking these medications       benzocaine (ORAJEL) 10 % mucosal gel Comments:   Reason for Stopping:         penicillin V potassium (VEETID) 500 mg tablet Comments:   Reason for Stopping:             No discharge procedures on file      PDMP Review       Value Time User    PDMP Reviewed  Yes 8/25/2021  1:43 AM Nedra Mcqueen PA-C          ED Provider  Electronically Signed by           Ceci Melo DO  09/02/21 2363

## 2021-09-04 ENCOUNTER — HOSPITAL ENCOUNTER (EMERGENCY)
Facility: HOSPITAL | Age: 31
Discharge: HOME | End: 2021-09-04
Payer: COMMERCIAL

## 2021-09-04 ENCOUNTER — TELEPHONE (OUTPATIENT)
Dept: OTHER | Facility: OTHER | Age: 31
End: 2021-09-04

## 2021-09-04 VITALS
OXYGEN SATURATION: 100 % | SYSTOLIC BLOOD PRESSURE: 141 MMHG | HEART RATE: 100 BPM | RESPIRATION RATE: 19 BRPM | DIASTOLIC BLOOD PRESSURE: 67 MMHG | TEMPERATURE: 98.2 F

## 2021-09-04 DIAGNOSIS — K08.89 DENTALGIA: Primary | ICD-10-CM

## 2021-09-04 PROCEDURE — 99282 EMERGENCY DEPT VISIT SF MDM: CPT

## 2021-09-04 PROCEDURE — 99284 EMERGENCY DEPT VISIT MOD MDM: CPT

## 2021-09-04 RX ORDER — HYDROCODONE BITARTRATE AND ACETAMINOPHEN 5; 325 MG/1; MG/1
1 TABLET ORAL EVERY 6 HOURS PRN
Qty: 6 TABLET | Refills: 0 | Status: SHIPPED | OUTPATIENT
Start: 2021-09-04 | End: 2021-09-14

## 2021-09-04 RX ORDER — LIDOCAINE HYDROCHLORIDE 20 MG/ML
15 SOLUTION OROPHARYNGEAL ONCE
Status: COMPLETED | OUTPATIENT
Start: 2021-09-04 | End: 2021-09-04

## 2021-09-04 RX ADMIN — LIDOCAINE HYDROCHLORIDE 15 ML: 20 SOLUTION ORAL; TOPICAL at 00:20

## 2021-09-04 NOTE — ED PROVIDER NOTES
History  Chief Complaint   Patient presents with    Dental Pain     pt recently seen at [facility] for dental pain, had putty put in her right upper tooth, states she has tried to contact the dental clinics and they are not open, called [facility] because her pain is bad,they told her to come to the ED     35-year-old female history of behavior health issues recurrent dental problems here secondary to dental pain  Patient was just seen within the past 24 hours at [facility] Emergency Department for similar complaint  Patient states that when she went home another piece of a tooth her upper premolar on the right broke and she has been having significant pain and does not know what to do  Patient apparently has had multiple resources given to her for dental clinic she states she has been leaving multiple messages no wheeze getting back to her  Patient was given prescription for small amount of oxycodone she has been using topical anesthetic and she is currently on oral antibiotics for her condition  Patient has no fever she has no stridor no drooling patient demonstrates no difficulty breathing she does have some swelling noted on the right side of her face  Prior to Admission Medications   Prescriptions Last Dose Informant Patient Reported?  Taking?   acetaminophen (TYLENOL) 650 mg CR tablet   No No   Sig: Take 1 tablet (650 mg total) by mouth every 8 (eight) hours as needed for mild pain for up to 3 days   benzocaine (ORABASE-B) 20 % PSTE   No No   Sig: Apply 1 application to the mouth or throat 4 (four) times a day as needed for mucositis for up to 3 days   chlorhexidine (PERIDEX) 0 12 % solution   No No   Sig: Apply 15 mL to the mouth or throat 2 (two) times a day   chlorhexidine (PERIDEX) 0 12 % solution   No No   Sig: Apply 15 mL to the mouth or throat 2 (two) times a day   clindamycin (CLEOCIN) 300 MG capsule   No No   Sig: Take 1 capsule (300 mg total) by mouth every 6 (six) hours for 10 days famotidine (PEPCID) 20 mg tablet   No No   Sig: Take 1 tablet (20 mg total) by mouth 2 (two) times a day   ondansetron (ZOFRAN-ODT) 4 mg disintegrating tablet   No No   Sig: Take 1 tablet (4 mg total) by mouth every 6 (six) hours as needed for nausea or vomiting   oxyCODONE-acetaminophen (PERCOCET) 5-325 mg per tablet   No No   Sig: Take 1 tablet by mouth every 6 (six) hours as needed for moderate painMax Daily Amount: 4 tablets      Facility-Administered Medications: None       Past Medical History:   Diagnosis Date    Asthma     GERD (gastroesophageal reflux disease)     Hernia, abdominal     Migraines     Psychiatric disorder     Anx, Depression    Renal disorder     kidney stones       Past Surgical History:   Procedure Laterality Date    FL RETROGRADE PYELOGRAM  8/28/2018    HERNIA REPAIR      DE CYSTO/URETERO W/LITHOTRIPSY &INDWELL STENT INSRT Right 8/28/2018    Procedure: CYSTOSCOPY URETEROSCOPY WITH RETROGRADE PYELOGRAM AND INSERTION STENT URETERAL;  Surgeon: Angelika Montiel MD;  [facility];  Service: Urology    TOOTH EXTRACTION         Family History   Problem Relation Age of Onset    Diabetes Mother     Hyperlipidemia Mother     Stroke Mother     Heart disease Mother     Asthma Mother    Nilesh Ray Other Mother         Degen  of Intervertabral disc   Nephrolithiasis Father     Nephrolithiasis Brother      I have reviewed and agree with the history as documented  E-Cigarette/Vaping    E-Cigarette Use Never User      E-Cigarette/Vaping Substances     Social History     Tobacco Use    Smoking status: Current Every Day Smoker     Packs/day: 0 50     Years: 13 00     Pack years: 6 50     Types: Cigarettes    Smokeless tobacco: Never Used   Vaping Use    Vaping Use: Never used   Substance Use Topics    Alcohol use: Not Currently    Drug use: No       Review of Systems   Constitutional: Negative for chills and fever  HENT: Positive for dental problem and mouth sores   Negative for congestion  Eyes: Negative for visual disturbance  Respiratory: Negative for shortness of breath  Cardiovascular: Negative for chest pain  Gastrointestinal: Negative for abdominal pain  Endocrine: Negative for cold intolerance  Genitourinary: Negative for frequency  Musculoskeletal: Negative for gait problem  Skin: Negative for rash  Neurological: Negative for dizziness  Psychiatric/Behavioral: Negative for behavioral problems and confusion  Physical Exam  Physical Exam  Vitals and nursing note reviewed  Constitutional:       Appearance: She is well-developed  HENT:      Head: Normocephalic and atraumatic  Right Ear: Tympanic membrane normal       Left Ear: Tympanic membrane normal       Nose: Nose normal       Mouth/Throat:      Comments: Patient with mild swelling noted right facial region patient has multiple dental caries throughout her mouth patient has no signs of abscess palpation along gum lines nothing fluctuant that would require drainage, patient has had significant tenderness to palpation however over the right upper 6 premolar which is broke  Eyes:      Conjunctiva/sclera: Conjunctivae normal       Pupils: Pupils are equal, round, and reactive to light  Cardiovascular:      Rate and Rhythm: Normal rate and regular rhythm  Heart sounds: Normal heart sounds  Pulmonary:      Effort: Pulmonary effort is normal       Breath sounds: Normal breath sounds  Abdominal:      General: Bowel sounds are normal       Palpations: Abdomen is soft  Musculoskeletal:         General: Normal range of motion  Cervical back: Normal range of motion and neck supple  Skin:     General: Skin is warm and dry  Capillary Refill: Capillary refill takes less than 2 seconds  Neurological:      Mental Status: She is alert and oriented to person, place, and time     Psychiatric:         Behavior: Behavior normal          Vital Signs  ED Triage Vitals [09/04/21 0528] Temperature Pulse Respirations Blood Pressure SpO2   98 2 °F (36 8 °C) 100 19 141/67 100 %      Temp Source Heart Rate Source Patient Position - Orthostatic VS BP Location FiO2 (%)   Oral Monitor Sitting Right arm --      Pain Score       --           Vitals:    09/04/21 0717   BP: 141/67   Pulse: 100   Patient Position - Orthostatic VS: Sitting         Visual Acuity      ED Medications  Medications - No data to display    Diagnostic Studies  Results Reviewed     None                 No orders to display              Procedures  Procedures         ED Course                             SBIRT 20yo+      Most Recent Value   SBIRT (24 yo +)   In order to provide better care to our patients, we are screening all of our patients for alcohol and drug use  Would it be okay to ask you these screening questions? No Filed at: 09/04/2021 0720                    MDM  Number of Diagnoses or Management Options  Diagnosis management comments: Had long discussion with patient regarding her situation bite to patient coming to [facility] Emergency Department there are no resources here no dental people on-call oral surgeons on-call patient states that she flushed her Percocet down the toilet because it was not working  Patient is awake alert I explained to patient that in the event that she needs emergent care from a dental standpoint she should go to [facility] where that have on-call dental services available  Also explained to patient that she should come back to the hospital during working hours for  to assist her with this process  I told patient I will only give her 3 pain medications to get her through the next few days only to be used for severe pain otherwise she needs use Tylenol and ibuprofen and continue her antibiotics        Disposition  Final diagnoses:   Dentalgia     Time reflects when diagnosis was documented in both MDM as applicable and the Disposition within this note     Time User Action Codes Description Comment    9/4/2021  7:36 AM Ashley Duke Add [K08 [id number]] [address] [address]       ED Disposition     ED Disposition Condition Date/Time Comment    Discharge Stable Sat Sep 4, 2021  7:36 AM Nick Hodges discharge to home/self care  Follow-up Information     Follow up With Specialties Details Why Contact Info    Helen Brandt MD Family Medicine Schedule an appointment as soon as possible for a visit in 3 days  [address]  [phone]            Patient's Medications   Discharge Prescriptions    HYDROCODONE-ACETAMINOPHEN (NORCO) 5-325 MG PER TABLET    Take 1 tablet by mouth every 6 (six) hours as needed for pain for up to 10 daysMax Daily Amount: 4 tablets       Start Date: 9/4/2021  End Date: 9/14/2021       Order Dose: 1 tablet       Quantity: 6 tablet    Refills: 0     No discharge procedures on file      PDMP Review       Value Time User    PDMP Reviewed  Yes 8/25/2021  1:43 AM Meaghan iWlcox PA-C          ED Provider  Electronically Signed by           Mason Hendrickson MD  09/04/21 5004

## 2021-09-04 NOTE — TELEPHONE ENCOUNTER
Patient was referred by the ER to schedule a follow up appt with [facility] in [place]  Patient seeking a call to assist with scheduling

## 2021-09-04 NOTE — DISCHARGE INSTRUCTIONS
Take orabase b, peridex, tylenol, and clindamyacin as indicated  Follow-up with General Dentistry  Follow-up with dental clinic  Follow-up with PCP  Follow up emergency department symptoms persist exacerbate

## 2021-09-04 NOTE — ED PROVIDER NOTES
History  Chief Complaint   Patient presents with    Dental Problem     Patient states that "my tooth broke"  She states that she has a dental appointment scheduled for next week  Patient is a 59-year-old female with history of migraines, and dental caries that presents emergency department with sharp shooting dull persistent right-sided upper tooth pain for 3 days  Patient denies associated symptomatology  Patient states that she heard her tooth while chewing on food given the onset her symptoms  Patient denies palliative factors with provocative factors of pressure to right upper tooth and cold and hot food/beverages  Patient denies not effective treatment  Patient denies fevers, chills, nausea, vomiting, diarrhea, constipation, and urinary symptoms  Patient's recent fall recent trauma  Patient sick contacts recent travel  Patient denies chest pain, shortness of breath, abdominal pain  History provided by:  Patient   used: No        Prior to Admission Medications   Prescriptions Last Dose Informant Patient Reported?  Taking?   chlorhexidine (PERIDEX) 0 12 % solution   No No   Sig: Apply 15 mL to the mouth or throat 2 (two) times a day   famotidine (PEPCID) 20 mg tablet   No No   Sig: Take 1 tablet (20 mg total) by mouth 2 (two) times a day   ondansetron (ZOFRAN-ODT) 4 mg disintegrating tablet   No No   Sig: Take 1 tablet (4 mg total) by mouth every 6 (six) hours as needed for nausea or vomiting   oxyCODONE-acetaminophen (PERCOCET) 5-325 mg per tablet   No No   Sig: Take 1 tablet by mouth every 6 (six) hours as needed for moderate painMax Daily Amount: 4 tablets      Facility-Administered Medications: None       Past Medical History:   Diagnosis Date    Asthma     GERD (gastroesophageal reflux disease)     Hernia, abdominal     Migraines     Psychiatric disorder     Anx, Depression    Renal disorder     kidney stones       Past Surgical History:   Procedure Laterality Date  FL RETROGRADE PYELOGRAM  8/28/2018    HERNIA REPAIR      DC CYSTO/URETERO W/LITHOTRIPSY &INDWELL STENT INSRT Right 8/28/2018    Procedure: CYSTOSCOPY URETEROSCOPY WITH RETROGRADE PYELOGRAM AND INSERTION STENT URETERAL;  Surgeon: Kellie Zimmer MD;  [facility];  Service: Urology    TOOTH EXTRACTION         Family History   Problem Relation Age of Onset    Diabetes Mother     Hyperlipidemia Mother     Stroke Mother     Heart disease Mother     Asthma Mother    Claire Ford Other Mother         Degen  of Intervertabral disc   Nephrolithiasis Father     Nephrolithiasis Brother      I have reviewed and agree with the history as documented  E-Cigarette/Vaping    E-Cigarette Use Never User      E-Cigarette/Vaping Substances     Social History     Tobacco Use    Smoking status: Current Every Day Smoker     Packs/day: 0 50     Years: 13 00     Pack years: 6 50     Types: Cigarettes    Smokeless tobacco: Never Used   Vaping Use    Vaping Use: Never used   Substance Use Topics    Alcohol use: Not Currently    Drug use: No       Review of Systems   Constitutional: Negative for activity change, appetite change, chills and fever  HENT: Positive for dental problem  Negative for congestion, postnasal drip, rhinorrhea, sinus pressure, sinus pain, sore throat and tinnitus  Eyes: Negative for photophobia and visual disturbance  Respiratory: Negative for cough, chest tightness and shortness of breath  Cardiovascular: Negative for chest pain and palpitations  Gastrointestinal: Negative for abdominal pain, constipation, diarrhea, nausea and vomiting  Genitourinary: Negative for difficulty urinating, dysuria, flank pain, frequency and urgency  Musculoskeletal: Negative for back pain, gait problem, neck pain and neck stiffness  Skin: Negative for pallor and rash  Allergic/Immunologic: Negative for environmental allergies and food allergies     Neurological: Negative for dizziness, weakness, numbness and headaches  Psychiatric/Behavioral: Negative for confusion  All other systems reviewed and are negative  Physical Exam  Physical Exam  Vitals and nursing note reviewed  Constitutional:       General: She is awake  Appearance: Normal appearance  She is well-developed  She is not ill-appearing, toxic-appearing or diaphoretic  Comments: /58 (BP Location: Left arm)   Pulse 97   Temp 98 2 °F (36 8 °C) (Oral)   Resp 18   Wt 53 5 kg (118 lb)   LMP 08/10/2021   SpO2 100%   BMI 23 05 kg/m²      HENT:      Head: Normocephalic and atraumatic  Right Ear: Hearing, tympanic membrane, ear canal and external ear normal  No decreased hearing noted  No drainage, swelling or tenderness  No mastoid tenderness  Left Ear: Hearing, tympanic membrane, ear canal and external ear normal  No decreased hearing noted  No drainage, swelling or tenderness  No mastoid tenderness  Nose: Nose normal       Mouth/Throat:      Lips: Pink  Mouth: Mucous membranes are moist       Pharynx: Oropharynx is clear  Uvula midline  Eyes:      General: Lids are normal  Vision grossly intact  Right eye: No discharge  Left eye: No discharge  Extraocular Movements: Extraocular movements intact  Conjunctiva/sclera: Conjunctivae normal       Pupils: Pupils are equal, round, and reactive to light  Neck:      Vascular: No JVD  Trachea: Trachea and phonation normal  No tracheal tenderness or tracheal deviation  Cardiovascular:      Rate and Rhythm: Normal rate and regular rhythm  Pulses: Normal pulses  Radial pulses are 2+ on the right side and 2+ on the left side  Posterior tibial pulses are 2+ on the right side and 2+ on the left side  Heart sounds: Normal heart sounds  Pulmonary:      Effort: Pulmonary effort is normal       Breath sounds: Normal breath sounds  No stridor  No decreased breath sounds     Abdominal:      General: Abdomen is flat  Palpations: Abdomen is not rigid  Musculoskeletal:         General: Normal range of motion  Cervical back: Full passive range of motion without pain, normal range of motion and neck supple  No rigidity  No spinous process tenderness or muscular tenderness  Normal range of motion  Lymphadenopathy:      Head:      Right side of head: No submental, submandibular, tonsillar, preauricular, posterior auricular or occipital adenopathy  Left side of head: No submental, submandibular, tonsillar, preauricular, posterior auricular or occipital adenopathy  Cervical: No cervical adenopathy  Right cervical: No superficial, deep or posterior cervical adenopathy  Left cervical: No superficial, deep or posterior cervical adenopathy  Skin:     General: Skin is warm  Capillary Refill: Capillary refill takes less than 2 seconds  Neurological:      General: No focal deficit present  Mental Status: She is alert and oriented to person, place, and time  GCS: GCS eye subscore is 4  GCS verbal subscore is 5  GCS motor subscore is 6  Sensory: No sensory deficit  Deep Tendon Reflexes: Reflexes are normal and symmetric  Reflex Scores:       Patellar reflexes are 2+ on the right side and 2+ on the left side  Psychiatric:         Mood and Affect: Mood normal          Speech: Speech normal          Behavior: Behavior normal  Behavior is cooperative  Thought Content:  Thought content normal          Judgment: Judgment normal          Vital Signs  ED Triage Vitals [09/03/21 2155]   Temperature Pulse Respirations Blood Pressure SpO2   98 2 °F (36 8 °C) 97 18 104/58 100 %      Temp Source Heart Rate Source Patient Position - Orthostatic VS BP Location FiO2 (%)   Oral Monitor -- Left arm --      Pain Score       8           Vitals:    09/03/21 2155   BP: 104/58   Pulse: 97         Visual Acuity      ED Medications  Medications   clindamycin (CLEOCIN) capsule 300 mg (300 mg Oral Given 9/3/21 2333)   Lidocaine Viscous HCl (XYLOCAINE) 2 % mucosal solution 15 mL (15 mL Oral Given 9/3/21 2333)   Lidocaine Viscous HCl (XYLOCAINE) 2 % mucosal solution 15 mL (15 mL Swish & Spit Given 9/4/21 0020)       Diagnostic Studies  Results Reviewed     None                 No orders to display              Procedures  Procedures         ED Course  ED Course as of Sep 04 0646   Fri Sep 03, 2021   2323 Patient refuses offered pregnancy test       2355 Patient denies offered dental block; patient denies offered Tdap      Sat Sep 04, 2021   0032 Applied dental apoxy to right second maxillary tooth with patient verbalizes decreasing aforementioned tooth pain status post dental apoxy delivery                                              MDM  Number of Diagnoses or Management Options  Pain, dental: new and does not require workup     Amount and/or Complexity of Data Reviewed  Review and summarize past medical records: yes    Risk of Complications, Morbidity, and/or Mortality  Presenting problems: low  Diagnostic procedures: low  Management options: low    Patient Progress  Patient progress: stable    Patient is a 55-year-old female with history of migraines, and dental caries that presents emergency department with sharp shooting dull persistent right-sided upper tooth pain for 3 days     Patient hemodynamically stable apartment sign patient denied dental block that was offered in the ED  Viscous lidocaine on 2 x 2 and was placed right maxillary 2nd molar with patient verbalizing decrease in tooth pain symptoms status post medication delivery  Delivered clindamycin in the ED  Prescribed Tylenol, ora base B well Peridex, and clindamycin and counseled patient on medication administration and side effects  Also placed dental apoxy to maxillary 2nd right-sided tooth with patient verbalizing decreased pain as well  Follow-up with dental clinic, PCP, and emergency department symptoms persist or exacerbate  Patient demonstrates verbal understanding of all discharge instructions, follow-up and verbalized agreement with current treatment plan  Disposition  Final diagnoses:   Pain, dental     Time reflects when diagnosis was documented in both MDM as applicable and the Disposition within this note     Time User Action Codes Description Comment    9/3/2021 11:57 PM Amaris Ernst Add [K08 [id number]] Pain, dental       ED Disposition     ED Disposition Condition Date/Time Comment    Discharge Stable Fri Sep 3, 2021 11:56 PM Mike Pérez discharge to home/self care              Follow-up Information     Follow up With Specialties Details Why Contact Info Additional Information    Helen Brandt MD Family Medicine   [address]  [address]  [place] [address] [facility]  [phone]       [facility] Emergency Medicine   [address] Emergency Department, [address], [place], [place], [address] Adult and [address] [id number]  [address] [facility] [address] Dentistry   [address] [id number]  [address] [facility] [address], [address], [place], [place], [address], [phone]          Discharge Medication List as of 9/3/2021 11:59 PM      START taking these medications    Details   acetaminophen (TYLENOL) 650 mg CR tablet Take 1 tablet (650 mg total) by mouth every 8 (eight) hours as needed for mild pain for up to 3 days, Starting Fri 9/3/2021, Until Mon 9/6/2021 at 2359, Normal      benzocaine (ORABASE-B) 20 % PSTE Apply 1 application to the mouth or throat 4 (four) times a day as needed for mucositis for up to 3 days, Starting Fri 9/3/2021, Until Mon 9/6/2021 at 2359, Normal      !! chlorhexidine (PERIDEX) 0 12 % solution Apply 15 mL to the mouth or throat 2 (two) times a day, Starting Fri 9/3/2021, Normal      clindamycin (CLEOCIN) 300 MG capsule Take 1 capsule (300 mg total) by mouth every 6 (six) hours for 10 days, Starting Fri 9/3/2021, Until Mon 9/13/2021, Normal       !! - Potential duplicate medications found  Please discuss with provider  CONTINUE these medications which have NOT CHANGED    Details   !! chlorhexidine (PERIDEX) 0 12 % solution Apply 15 mL to the mouth or throat 2 (two) times a day, Starting Wed 8/25/2021, Normal      famotidine (PEPCID) 20 mg tablet Take 1 tablet (20 mg total) by mouth 2 (two) times a day, Starting Fri 8/13/2021, Until Sun 9/12/2021, Normal      ondansetron (ZOFRAN-ODT) 4 mg disintegrating tablet Take 1 tablet (4 mg total) by mouth every 6 (six) hours as needed for nausea or vomiting, Starting Fri 8/13/2021, Normal      oxyCODONE-acetaminophen (PERCOCET) 5-325 mg per tablet Take 1 tablet by mouth every 6 (six) hours as needed for moderate painMax Daily Amount: 4 tablets, Starting Wed 8/25/2021, Normal       !! - Potential duplicate medications found  Please discuss with provider  No discharge procedures on file      PDMP Review       Value Time User    PDMP Reviewed  Yes 8/25/2021  1:43 AM Mary Schmidt PA-C          ED Provider  Electronically Signed by           Arsenio Grady PA-C  09/04/21 0593

## 2021-09-08 NOTE — TELEPHONE ENCOUNTER
Patient was provided with first available appointment on Friday Sept 10, 2021 10:30AM at the [facility]       Dr Munira Veras  Pediatric Dentist

## 2021-09-10 ENCOUNTER — OFFICE VISIT (OUTPATIENT)
Dept: DENTISTRY | Facility: CLINIC | Age: 31
End: 2021-09-10

## 2021-09-10 VITALS — HEART RATE: 82 BPM | TEMPERATURE: 98.4 F | SYSTOLIC BLOOD PRESSURE: 107 MMHG | DIASTOLIC BLOOD PRESSURE: 75 MMHG

## 2021-09-10 DIAGNOSIS — Z01.20 ENCOUNTER FOR DENTAL EXAMINATION: Primary | ICD-10-CM

## 2021-09-10 PROCEDURE — D0140 LIMITED ORAL EVALUATION - PROBLEM FOCUSED: HCPCS | Performed by: DENTIST

## 2021-09-10 NOTE — PROGRESS NOTES
Emergency  NOTICE: PT has 2 charts  S: CC: "I have pain all throughout my mouth" I have trouble eating because of pain on both sides  I had a hole on tooth #3 and the doctor in the ER put cement on it  Discussed with pt to triage concern due to emergency appt  Pt selected tooth #3 due to pain and previous hx of treatment  O: PA taken of #3   #3: (+) Percussion, (-) Cold, (+) Palpation, (+) Pressure  Cement material extending over occlusal surface of #3 and 2  Unable to examine visually due to cement coverage  Radiograph shows extensive decay near furcation  A: #3: Chronic apical periodontitis, Caries into pulp     P: Ext #3 and further examination of #2 once cement is removed  Pt was prescribed clindamycin and Peridex rinse in ER 9/3/21 but has not started using either medication  Advised patient to take medication course while waiting for appt  Advised alternating 2 OTC Advil and 2 Tylenol every 3 hours for pain management  Advised patient to call if symptoms worsen or progress and go to ER if outside standard office hours  Pt has generalized calculus and caries  Advised pt that she will require comprehensive treatment and recommended being proactive and not only coming on emergency basis       NV: Ext #3

## 2021-09-16 ENCOUNTER — HOSPITAL ENCOUNTER (EMERGENCY)
Facility: HOSPITAL | Age: 31
Discharge: HOME | End: 2021-09-16
Attending: EMERGENCY MEDICINE
Payer: COMMERCIAL

## 2021-09-16 VITALS
BODY MASS INDEX: 22.78 KG/M2 | HEIGHT: 60 IN | DIASTOLIC BLOOD PRESSURE: 78 MMHG | OXYGEN SATURATION: 98 % | SYSTOLIC BLOOD PRESSURE: 100 MMHG | HEART RATE: 103 BPM | TEMPERATURE: 98.3 F | WEIGHT: 116 LBS | RESPIRATION RATE: 14 BRPM

## 2021-09-16 DIAGNOSIS — R11.0 CHRONIC NAUSEA: ICD-10-CM

## 2021-09-16 DIAGNOSIS — K02.9 PAIN DUE TO DENTAL CARIES: Primary | ICD-10-CM

## 2021-09-16 PROCEDURE — 99284 EMERGENCY DEPT VISIT MOD MDM: CPT | Performed by: PHYSICIAN ASSISTANT

## 2021-09-16 PROCEDURE — 99282 EMERGENCY DEPT VISIT SF MDM: CPT

## 2021-09-16 RX ORDER — ONDANSETRON HYDROCHLORIDE 8 MG/1
8 TABLET, FILM COATED ORAL EVERY 8 HOURS PRN
Qty: 20 TABLET | Refills: 0 | OUTPATIENT
Start: 2021-09-16 | End: 2021-10-30

## 2021-09-16 RX ORDER — ONDANSETRON 4 MG/1
4 TABLET, ORALLY DISINTEGRATING ORAL ONCE
Status: COMPLETED | OUTPATIENT
Start: 2021-09-16 | End: 2021-09-16

## 2021-09-16 RX ORDER — OXYCODONE HYDROCHLORIDE 5 MG/1
5 TABLET ORAL ONCE
Status: COMPLETED | OUTPATIENT
Start: 2021-09-16 | End: 2021-09-16

## 2021-09-16 RX ADMIN — ONDANSETRON 4 MG: 4 TABLET, ORALLY DISINTEGRATING ORAL at 20:31

## 2021-09-16 RX ADMIN — OXYCODONE HYDROCHLORIDE 5 MG: 5 TABLET ORAL at 20:30

## 2021-09-17 ENCOUNTER — HOSPITAL ENCOUNTER (EMERGENCY)
Facility: HOSPITAL | Age: 31
Discharge: HOME | End: 2021-09-17
Attending: EMERGENCY MEDICINE | Admitting: EMERGENCY MEDICINE
Payer: COMMERCIAL

## 2021-09-17 VITALS
WEIGHT: 115 LBS | OXYGEN SATURATION: 100 % | HEART RATE: 86 BPM | HEIGHT: 60 IN | DIASTOLIC BLOOD PRESSURE: 61 MMHG | BODY MASS INDEX: 22.58 KG/M2 | TEMPERATURE: 98.1 F | SYSTOLIC BLOOD PRESSURE: 103 MMHG | RESPIRATION RATE: 16 BRPM

## 2021-09-17 DIAGNOSIS — K08.89 PAIN, DENTAL: Primary | ICD-10-CM

## 2021-09-17 PROCEDURE — 99282 EMERGENCY DEPT VISIT SF MDM: CPT

## 2021-09-17 PROCEDURE — 99284 EMERGENCY DEPT VISIT MOD MDM: CPT | Performed by: EMERGENCY MEDICINE

## 2021-09-17 RX ORDER — OXYCODONE HYDROCHLORIDE 5 MG/1
5 TABLET ORAL ONCE
Status: COMPLETED | OUTPATIENT
Start: 2021-09-17 | End: 2021-09-17

## 2021-09-17 RX ADMIN — OXYCODONE HYDROCHLORIDE 5 MG: 5 TABLET ORAL at 19:57

## 2021-09-17 NOTE — ED PROVIDER NOTES
History  Chief Complaint   Patient presents with    Dental Pain     Pt states she saw oral surgeon 2 days ago and had putty placed in right upper tooth  States it fell out and she is having pain  26-year-old female who is well known to me comes in again today complaining of dental pain  She had putty placed in her tooth at the dental clinic, but she reports that part of it fell out a few days ago in the rest of fell out tonight  She is requesting that we replace it since she is now having increased pain  She reports that air hitting the open tooth is severely painful and she has not been able to eat or drink due to the pain  She is also requesting a refill of her Zofran  She is requesting that we write her for 8 mg tablets that she can cut in half so that insurance will cover it  Prior to Admission Medications   Prescriptions Last Dose Informant Patient Reported?  Taking?   chlorhexidine (PERIDEX) 0 12 % solution   No No   Sig: Apply 15 mL to the mouth or throat 2 (two) times a day   chlorhexidine (PERIDEX) 0 12 % solution   No No   Sig: Apply 15 mL to the mouth or throat 2 (two) times a day   famotidine (PEPCID) 20 mg tablet   No No   Sig: Take 1 tablet (20 mg total) by mouth 2 (two) times a day   ondansetron (ZOFRAN-ODT) 4 mg disintegrating tablet   No No   Sig: Take 1 tablet (4 mg total) by mouth every 6 (six) hours as needed for nausea or vomiting      Facility-Administered Medications: None       Past Medical History:   Diagnosis Date    Asthma     GERD (gastroesophageal reflux disease)     Hernia, abdominal     Migraines     Psychiatric disorder     Anx, Depression    Renal disorder     kidney stones       Past Surgical History:   Procedure Laterality Date    FL RETROGRADE PYELOGRAM  8/28/2018    HERNIA REPAIR      [place] CYSTO/URETERO W/LITHOTRIPSY &INDWELL STENT INSRT Right 8/28/2018    Procedure: CYSTOSCOPY URETEROSCOPY WITH RETROGRADE PYELOGRAM AND INSERTION STENT URETERAL; Surgeon: Carmen Benitez MD;  [facility];  Service: Urology    TOOTH EXTRACTION         Family History   Problem Relation Age of Onset    Diabetes Mother     Hyperlipidemia Mother     Stroke Mother     Heart disease Mother     Asthma Mother    [facility] Other Mother         Degen  of Intervertabral disc   Nephrolithiasis Father     Nephrolithiasis Brother      I have reviewed and agree with the history as documented  E-Cigarette/Vaping    E-Cigarette Use Never User      E-Cigarette/Vaping Substances     Social History     Tobacco Use    Smoking status: Current Every Day Smoker     Packs/day: 0 50     Years: 13 00     Pack years: 6 50     Types: Cigarettes    Smokeless tobacco: Never Used   Vaping Use    Vaping Use: Never used   Substance Use Topics    Alcohol use: Not Currently    Drug use: No       Review of Systems   Constitutional: Negative for fever  HENT: Positive for dental problem  Negative for nosebleeds  Eyes: Negative for redness  Respiratory: Negative for shortness of breath  Cardiovascular: Negative for chest pain  Gastrointestinal: Negative for blood in stool  Genitourinary: Negative for hematuria  Musculoskeletal: Negative for gait problem  Skin: Negative for rash  Neurological: Negative for seizures  Psychiatric/Behavioral: Negative for behavioral problems  Physical Exam  Physical Exam  Constitutional:       Appearance: Normal appearance  HENT:      Head: Normocephalic and atraumatic  Right Ear: External ear normal       Left Ear: External ear normal       Mouth/Throat:      Comments: Large fracture/dental caries at tooth #3  Eyes:      Extraocular Movements: Extraocular movements intact  Pulmonary:      Effort: Pulmonary effort is normal    Musculoskeletal:         General: Normal range of motion  Cervical back: Normal range of motion  Skin:     General: Skin is warm and dry  Neurological:      General: No focal deficit present  Mental Status: She is alert  Psychiatric:         Mood and Affect: Mood normal          Behavior: Behavior normal          Vital Signs  ED Triage Vitals [09/16/21 1955]   Temperature Pulse Respirations Blood Pressure SpO2   98 3 °F (36 8 °C) 103 14 100/78 98 %      Temp src Heart Rate Source Patient Position - Orthostatic VS BP Location FiO2 (%)   -- -- -- -- --      Pain Score       9           Vitals:    09/16/21 1955   BP: 100/78   Pulse: 103         Visual Acuity      ED Medications  Medications - No data to display    Diagnostic Studies  Results Reviewed     None                 No orders to display              Procedures  Procedures   temporary cement inserted in to tooth number 3 by me  Patient tolerated well  ED Course                                           MDM    Disposition  Final diagnoses:   Pain due to dental caries   Chronic nausea     Time reflects when diagnosis was documented in both MDM as applicable and the Disposition within this note     Time User Action Codes Description Comment    9/16/2021  8:05 PM Berna Lofts Add [K02 9] Pain due to dental caries     9/16/2021  8:05 PM Berna Lofts Add [R11 0] Chronic nausea       ED Disposition     ED Disposition Condition Date/Time Comment    Discharge Stable Thu Sep 16, 2021  8:05 PM Mike Pérez discharge to home/self care              Follow-up Information     Follow up With Specialties Details Why Contact Info Additional Information    Faye Edwards MD Family Medicine   [address] [facility]  [address]  [place] [phone]       [place] [address] Pediatrics Schedule an appointment as soon as possible for a visit   [facility]  [place] [phone]  [address], [facility], [facility] [place], [address] [place] [address] 100          Patient's Medications   Discharge Prescriptions    ONDANSETRON (ZOFRAN) 8 MG TABLET    Take 1 tablet (8 mg total) by mouth every 8 (eight) hours as needed for nausea or vomiting       Start Date: 9/16/2021 End Date: --       Order Dose: 8 mg       Quantity: 20 tablet    Refills: 0     No discharge procedures on file      PDMP Review       Value Time User    PDMP Reviewed  Yes 8/25/2021  1:43 AM Magda Valdovinos PA-C          ED Provider  Electronically Signed by           Uriel Pearce PA-C  09/16/21 2007

## 2021-09-17 NOTE — ED CARE HANDOFF
Emergency Department Sign Out Note        Sign out and transfer of care from [place], [place]  See Separate Emergency Department note  The patient, Nick Hodges, was evaluated by the previous provider for Dental pain  Workup Completed:  none    ED Course / Workup Pending (followup): None                                     Procedures  MDM  Number of Diagnoses or Management Options  Chronic nausea  Pain due to dental caries  Diagnosis management comments: Sign out received from Magnolia Mazariegos  Patient is a 19-year-old female presents ED today with complaint of dental pain  Patient was discharged home following cement placement in tooth, prior to discharge was complaining of pain and nausea  Patient states she has been taking Tylenol ibuprofen around the clock with most recent dose approximately 1 hour ago  Patient given 1 dose of oxycodone and Zofran in the ED and advised to continue over-the-counter Tylenol and ibuprofen as needed  - Pt states currently on her period and no chance of pregnancy, did not want pregnancy test   Advised follow-up with oral surgeon as soon as possible for evaluation and possible removal of tooth      Patient Progress  Patient progress: stable      Disposition  Final diagnoses:   Pain due to dental caries   Chronic nausea     Time reflects when diagnosis was documented in both MDM as applicable and the Disposition within this note     Time User Action Codes Description Comment    9/16/2021  8:05 PM Memory Socorro Add [K02 9] Pain due to dental caries     9/16/2021  8:05 PM Memory Socorro Add [R11 0] Chronic nausea       ED Disposition     ED Disposition Condition Date/Time Comment    Discharge Stable Thu Sep 16, 2021  8:05 PM Nick Hodges discharge to home/self care              Follow-up Information     Follow up With Specialties Details Why Contact Info Additional Information    Helen Brandt MD Family Medicine   [id number] [place] [address],[address]  [address]  [facility] [address] Pediatrics Schedule an appointment as soon as possible for a visit   [facility]  [place] [phone]  [address], [address], [facility], [address] [place] [address],[address]        Patient's Medications   Discharge Prescriptions    ONDANSETRON (ZOFRAN) 8 MG TABLET    Take 1 tablet (8 mg total) by mouth every 8 (eight) hours as needed for nausea or vomiting       Start Date: 9/16/2021 End Date: --       Order Dose: 8 mg       Quantity: 20 tablet    Refills: 0     No discharge procedures on file         ED Provider  Electronically Signed by     Valentino Pina, PA-C  09/16/21 2034

## 2021-09-18 NOTE — ED PROVIDER NOTES
History  Chief Complaint   Patient presents with    Dental Pain     reports being here yesterday for puddy packing for right upper molar, now having increasing pain on right and left upper mouth  Has taken tylenol and ibuprofen for pain, currently pain 8/10     Patient is a 27-year-old female seen in the emergency department with concern for generalized dental pain over approximately the past 1-2 weeks  Patient explains that she was seen in the emergency department and had some packing placed in the area of the right upper molar, which fell out at home  Patient notes minimal improvement with Tylenol and ibuprofen at home  Patient notes no fever  Pain is apparently made worse with eating and drinking  Patient states that she has a dentist, and is planning to follow up with a dental surgeon  Prior to Admission Medications   Prescriptions Last Dose Informant Patient Reported?  Taking?   chlorhexidine (PERIDEX) 0 12 % solution   No No   Sig: Apply 15 mL to the mouth or throat 2 (two) times a day   chlorhexidine (PERIDEX) 0 12 % solution   No No   Sig: Apply 15 mL to the mouth or throat 2 (two) times a day   famotidine (PEPCID) 20 mg tablet   No No   Sig: Take 1 tablet (20 mg total) by mouth 2 (two) times a day   ondansetron (ZOFRAN) 8 mg tablet   No No   Sig: Take 1 tablet (8 mg total) by mouth every 8 (eight) hours as needed for nausea or vomiting   ondansetron (ZOFRAN-ODT) 4 mg disintegrating tablet   No No   Sig: Take 1 tablet (4 mg total) by mouth every 6 (six) hours as needed for nausea or vomiting      Facility-Administered Medications: None       Past Medical History:   Diagnosis Date    Asthma     GERD (gastroesophageal reflux disease)     Hernia, abdominal     Migraines     Psychiatric disorder     Anx, Depression    Renal disorder     kidney stones       Past Surgical History:   Procedure Laterality Date    FL RETROGRADE PYELOGRAM  8/28/2018    HERNIA REPAIR      [place] CYSTO/URETERO W/LITHOTRIPSY &INDWELL STENT INSRT Right 8/28/2018    Procedure: CYSTOSCOPY URETEROSCOPY WITH RETROGRADE PYELOGRAM AND INSERTION STENT URETERAL;  Surgeon: Savanah Conde MD;  [facility];  Service: Urology    TOOTH EXTRACTION         Family History   Problem Relation Age of Onset    Diabetes Mother     Hyperlipidemia Mother     Stroke Mother     Heart disease Mother     Asthma Mother    Cecilia Lindsey Other Mother         Degen  of Intervertabral disc   Nephrolithiasis Father     Nephrolithiasis Brother      I have reviewed and agree with the history as documented  E-Cigarette/Vaping    E-Cigarette Use Never User      E-Cigarette/Vaping Substances     Social History     Tobacco Use    Smoking status: Current Every Day Smoker     Packs/day: 0 50     Years: 13 00     Pack years: 6 50     Types: Cigarettes    Smokeless tobacco: Never Used   Vaping Use    Vaping Use: Never used   Substance Use Topics    Alcohol use: Not Currently    Drug use: No       Review of Systems   Constitutional: Negative for chills and fever  HENT: Positive for dental problem  Negative for ear pain and sore throat  Eyes: Negative for pain and visual disturbance  Respiratory: Negative for cough and shortness of breath  Cardiovascular: Negative for chest pain and palpitations  Gastrointestinal: Negative for abdominal pain and vomiting  Musculoskeletal: Negative for arthralgias and back pain  Skin: Negative for color change and rash  Neurological: Negative for dizziness and weakness  Physical Exam  Physical Exam  Vitals and nursing note reviewed  Constitutional:       General: She is not in acute distress  Appearance: She is well-developed  HENT:      Head: Normocephalic and atraumatic  Right Ear: External ear normal       Left Ear: External ear normal       Nose: Nose normal       Mouth/Throat:      Pharynx: Oropharynx is clear          Comments: Multiple dental caries, with surrounding gingival edema noted; no evidence of periodontal abscess  Eyes:      General: No scleral icterus  Conjunctiva/sclera: Conjunctivae normal    Cardiovascular:      Rate and Rhythm: Normal rate and regular rhythm  Heart sounds: No murmur heard  Pulmonary:      Effort: Pulmonary effort is normal  No respiratory distress  Breath sounds: Normal breath sounds  Musculoskeletal:      Cervical back: Normal range of motion and neck supple  Skin:     General: Skin is warm and dry  Neurological:      Mental Status: She is alert  Cranial Nerves: No cranial nerve deficit  Sensory: No sensory deficit  Vital Signs  ED Triage Vitals [09/17/21 1905]   Temperature Pulse Respirations Blood Pressure SpO2   98 1 °F (36 7 °C) 86 16 103/61 100 %      Temp Source Heart Rate Source Patient Position - Orthostatic VS BP Location FiO2 (%)   Oral -- -- -- --      Pain Score       8           Vitals:    09/17/21 1905   BP: 103/61   Pulse: 86         Visual Acuity      ED Medications  Medications   oxyCODONE (ROXICODONE) IR tablet 5 mg (5 mg Oral Given 9/17/21 1957)       Diagnostic Studies  Results Reviewed     None                 No orders to display              Procedures  Procedures         ED Course                                           MDM  Number of Diagnoses or Management Options  Pain, dental  Diagnosis management comments: Patient is a 80-year-old female seen in the emergency department with concern for dental pain  Patient has followed up with a dentist, and plans to follow up with a dental surgeon  Patient was treated with medication for pain control  There is no evidence of periodontal abscess on evaluation  Plan to have patient follow up with dentist   Patient stable for discharge home  Discharge instructions were reviewed with patient        Disposition  Final diagnoses:   Pain, dental     Time reflects when diagnosis was documented in both MDM as applicable and the Disposition within this note     Time User Action Codes Description Comment    9/17/2021  7:41 PM Kristendinora Kong Add [K08 89] Pain, dental       ED Disposition     ED Disposition Condition Date/Time Comment    Discharge Stable Fri Sep 17, 2021  7:41 PM Mike Pérez discharge to home/self care  Follow-up Information     Follow up With Specialties Details Why Contact Info Additional Information    Your dentist  Call in 1 day       [address] [facility] Call  As needed [address] [facility], [address] [place] [address], [id number], [phone]          Patient's Medications   Discharge Prescriptions    No medications on file     No discharge procedures on file      PDMP Review       Value Time User    PDMP Reviewed  Yes 8/25/2021  1:43 AM Alyx Barahona PA-C          ED Provider  Electronically Signed by           Rosemarie Orozco MD  09/17/21 2006

## 2021-09-20 ENCOUNTER — HOSPITAL ENCOUNTER (EMERGENCY)
Facility: HOSPITAL | Age: 31
Discharge: HOME | End: 2021-09-21
Attending: EMERGENCY MEDICINE
Payer: COMMERCIAL

## 2021-09-20 DIAGNOSIS — R11.0 NAUSEA: ICD-10-CM

## 2021-09-20 DIAGNOSIS — R10.9 NONSPECIFIC ABDOMINAL PAIN: Primary | ICD-10-CM

## 2021-09-20 PROCEDURE — 83605 ASSAY OF LACTIC ACID: CPT | Performed by: PHYSICIAN ASSISTANT

## 2021-09-20 PROCEDURE — 36415 COLL VENOUS BLD VENIPUNCTURE: CPT | Performed by: PHYSICIAN ASSISTANT

## 2021-09-20 PROCEDURE — 99284 EMERGENCY DEPT VISIT MOD MDM: CPT

## 2021-09-20 PROCEDURE — 81025 URINE PREGNANCY TEST: CPT | Performed by: PHYSICIAN ASSISTANT

## 2021-09-20 PROCEDURE — 83690 ASSAY OF LIPASE: CPT | Performed by: PHYSICIAN ASSISTANT

## 2021-09-20 PROCEDURE — 85730 THROMBOPLASTIN TIME PARTIAL: CPT | Performed by: PHYSICIAN ASSISTANT

## 2021-09-20 PROCEDURE — 85610 PROTHROMBIN TIME: CPT | Performed by: PHYSICIAN ASSISTANT

## 2021-09-20 PROCEDURE — 96374 THER/PROPH/DIAG INJ IV PUSH: CPT

## 2021-09-20 PROCEDURE — 96375 TX/PRO/DX INJ NEW DRUG ADDON: CPT

## 2021-09-20 PROCEDURE — 85025 COMPLETE CBC W/AUTO DIFF WBC: CPT | Performed by: PHYSICIAN ASSISTANT

## 2021-09-20 PROCEDURE — 96361 HYDRATE IV INFUSION ADD-ON: CPT

## 2021-09-20 PROCEDURE — 99284 EMERGENCY DEPT VISIT MOD MDM: CPT | Performed by: PHYSICIAN ASSISTANT

## 2021-09-20 PROCEDURE — 80053 COMPREHEN METABOLIC PANEL: CPT | Performed by: PHYSICIAN ASSISTANT

## 2021-09-20 RX ORDER — DIPHENHYDRAMINE HYDROCHLORIDE 50 MG/ML
25 INJECTION INTRAMUSCULAR; INTRAVENOUS ONCE
Status: COMPLETED | OUTPATIENT
Start: 2021-09-20 | End: 2021-09-20

## 2021-09-20 RX ORDER — HYDROMORPHONE HCL/PF 1 MG/ML
0.5 SYRINGE (ML) INJECTION ONCE
Status: COMPLETED | OUTPATIENT
Start: 2021-09-20 | End: 2021-09-20

## 2021-09-20 RX ORDER — ONDANSETRON 2 MG/ML
4 INJECTION INTRAMUSCULAR; INTRAVENOUS ONCE
Status: COMPLETED | OUTPATIENT
Start: 2021-09-20 | End: 2021-09-20

## 2021-09-20 RX ADMIN — DIPHENHYDRAMINE HYDROCHLORIDE 25 MG: 50 INJECTION, SOLUTION INTRAMUSCULAR; INTRAVENOUS at 23:53

## 2021-09-20 RX ADMIN — ONDANSETRON 4 MG: 2 INJECTION INTRAMUSCULAR; INTRAVENOUS at 23:53

## 2021-09-20 RX ADMIN — HYDROMORPHONE HYDROCHLORIDE 0.5 MG: 1 INJECTION, SOLUTION INTRAMUSCULAR; INTRAVENOUS; SUBCUTANEOUS at 23:53

## 2021-09-20 RX ADMIN — SODIUM CHLORIDE 1000 ML: 0.9 INJECTION, SOLUTION INTRAVENOUS at 23:53

## 2021-09-20 NOTE — Clinical Note
Kasey Navarro was seen and treated in our emergency department on 9/20/2021  Diagnosis:     Christine    She may return on this date: 09/22/2021         If you have any questions or concerns, please don't hesitate to call        Fabi Hernandez PA-C    ______________________________           _______________          _______________  Hospital Representative                              Date                                Time

## 2021-09-21 ENCOUNTER — APPOINTMENT (EMERGENCY)
Dept: CT IMAGING | Facility: HOSPITAL | Age: 31
End: 2021-09-21
Payer: COMMERCIAL

## 2021-09-21 VITALS
DIASTOLIC BLOOD PRESSURE: 57 MMHG | TEMPERATURE: 98.8 F | RESPIRATION RATE: 16 BRPM | SYSTOLIC BLOOD PRESSURE: 90 MMHG | OXYGEN SATURATION: 96 % | HEART RATE: 70 BPM

## 2021-09-21 LAB
ALBUMIN SERPL BCP-MCNC: 3.9 G/DL (ref 3.5–5)
ALP SERPL-CCNC: 59 U/L (ref 46–116)
ALT SERPL W P-5'-P-CCNC: 18 U/L (ref 12–78)
ANION GAP SERPL CALCULATED.3IONS-SCNC: 8 MMOL/L (ref 4–13)
APTT PPP: 31 SECONDS (ref 23–37)
AST SERPL W P-5'-P-CCNC: 14 U/L (ref 5–45)
BASOPHILS # BLD AUTO: 0.03 THOUSANDS/ΜL (ref 0–0.1)
BASOPHILS NFR BLD AUTO: 1 % (ref 0–1)
BILIRUB SERPL-MCNC: 0.15 MG/DL (ref 0.2–1)
BILIRUB UR QL STRIP: NEGATIVE
BUN SERPL-MCNC: 9 MG/DL (ref 5–25)
CALCIUM SERPL-MCNC: 9.3 MG/DL (ref 8.3–10.1)
CHLORIDE SERPL-SCNC: 105 MMOL/L (ref 100–108)
CLARITY UR: NORMAL
CO2 SERPL-SCNC: 29 MMOL/L (ref 21–32)
COLOR UR: YELLOW
CREAT SERPL-MCNC: 0.86 MG/DL (ref 0.6–1.3)
EOSINOPHIL # BLD AUTO: 0.19 THOUSAND/ΜL (ref 0–0.61)
EOSINOPHIL NFR BLD AUTO: 3 % (ref 0–6)
ERYTHROCYTE [DISTWIDTH] IN BLOOD BY AUTOMATED COUNT: 12.9 % (ref 11.6–15.1)
EXT PREG TEST URINE: NEGATIVE
EXT. CONTROL ED NAV: NORMAL
GFR SERPL CREATININE-BSD FRML MDRD: 90 ML/MIN/1.73SQ M
GLUCOSE SERPL-MCNC: 92 MG/DL (ref 65–140)
GLUCOSE UR STRIP-MCNC: NEGATIVE MG/DL
HCT VFR BLD AUTO: 39.7 % (ref 34.8–46.1)
HGB BLD-MCNC: 13.2 G/DL (ref 11.5–15.4)
HGB UR QL STRIP.AUTO: NEGATIVE
IMM GRANULOCYTES # BLD AUTO: 0.02 THOUSAND/UL (ref 0–0.2)
IMM GRANULOCYTES NFR BLD AUTO: 0 % (ref 0–2)
INR PPP: 1.12 (ref 0.84–1.19)
KETONES UR STRIP-MCNC: NEGATIVE MG/DL
LACTATE SERPL-SCNC: 0.9 MMOL/L (ref 0.5–2)
LEUKOCYTE ESTERASE UR QL STRIP: NEGATIVE
LIPASE SERPL-CCNC: 67 U/L (ref 73–393)
LYMPHOCYTES # BLD AUTO: 1.95 THOUSANDS/ΜL (ref 0.6–4.47)
LYMPHOCYTES NFR BLD AUTO: 34 % (ref 14–44)
MCH RBC QN AUTO: 30.4 PG (ref 26.8–34.3)
MCHC RBC AUTO-ENTMCNC: 33.2 G/DL (ref 31.4–37.4)
MCV RBC AUTO: 92 FL (ref 82–98)
MONOCYTES # BLD AUTO: 0.42 THOUSAND/ΜL (ref 0.17–1.22)
MONOCYTES NFR BLD AUTO: 7 % (ref 4–12)
NEUTROPHILS # BLD AUTO: 3.13 THOUSANDS/ΜL (ref 1.85–7.62)
NEUTS SEG NFR BLD AUTO: 55 % (ref 43–75)
NITRITE UR QL STRIP: NEGATIVE
NRBC BLD AUTO-RTO: 0 /100 WBCS
PH UR STRIP.AUTO: 6 [PH]
PLATELET # BLD AUTO: 271 THOUSANDS/UL (ref 149–390)
PMV BLD AUTO: 10.2 FL (ref 8.9–12.7)
POTASSIUM SERPL-SCNC: 4.6 MMOL/L (ref 3.5–5.3)
PROT SERPL-MCNC: 6.5 G/DL (ref 6.4–8.2)
PROT UR STRIP-MCNC: NEGATIVE MG/DL
PROTHROMBIN TIME: 14.4 SECONDS (ref 11.6–14.5)
RBC # BLD AUTO: 4.34 MILLION/UL (ref 3.81–5.12)
SODIUM SERPL-SCNC: 142 MMOL/L (ref 136–145)
SP GR UR STRIP.AUTO: 1.02 (ref 1–1.03)
UROBILINOGEN UR QL STRIP.AUTO: 0.2 E.U./DL
WBC # BLD AUTO: 5.74 THOUSAND/UL (ref 4.31–10.16)

## 2021-09-21 PROCEDURE — 74177 CT ABD & PELVIS W/CONTRAST: CPT

## 2021-09-21 PROCEDURE — 81003 URINALYSIS AUTO W/O SCOPE: CPT | Performed by: PHYSICIAN ASSISTANT

## 2021-09-21 RX ORDER — ONDANSETRON 4 MG/1
4 TABLET, FILM COATED ORAL EVERY 6 HOURS
Qty: 8 TABLET | Refills: 0 | Status: SHIPPED | OUTPATIENT
Start: 2021-09-21 | End: 2021-10-30 | Stop reason: SDUPTHER

## 2021-09-21 RX ORDER — FAMOTIDINE 20 MG/1
20 TABLET, FILM COATED ORAL 2 TIMES DAILY
Qty: 4 TABLET | Refills: 0 | Status: SHIPPED | OUTPATIENT
Start: 2021-09-21 | End: 2021-11-01

## 2021-09-21 RX ORDER — DICYCLOMINE HCL 20 MG
20 TABLET ORAL 2 TIMES DAILY
Qty: 6 TABLET | Refills: 0 | Status: SHIPPED | OUTPATIENT
Start: 2021-09-21 | End: 2021-11-01

## 2021-09-21 RX ADMIN — IOHEXOL 100 ML: 350 INJECTION, SOLUTION INTRAVENOUS at 00:47

## 2021-09-21 NOTE — ED PROVIDER NOTES
History  Chief Complaint   Patient presents with    Abdominal Pain     pt c/o bilateral abd pain shooting into back that started this morning, tried ibuprofen and tylenol, took a muscle relaxer with no relief, +nausea, last BM today     Patient is a 25-year-old female with history of GERD, migraines, kidney stones with historic treatment of lithotripsy and indwelling stent the presents emergency department with abrupt onset sharp shooting and searing lower abdominal pain with radiation to bilateral flanks for 1 day  Patient has associated symptomatology of nausea symptoms beginning in presentation of lower abdominal pain  Patient denies history of abdominal pain to present  Patient denies vaginal bleeding vaginal discharge  Patient states that her menstrual cycle ended approximately 1 week prior to current ED presentation  Patient with recent antibiotic use  Patient denies palliative factors with provocative factors of pressure to lower abdomen  Patient denies not effective treatment  Patient denies fevers, chills, vomiting, diarrhea, constipation, and urinary symptoms  Patient denies recent fall or recent trauma  Patient denies sick contacts or recent travel  Patient denies chest pain and shortness of breath  History provided by:  Patient   used: No        Prior to Admission Medications   Prescriptions Last Dose Informant Patient Reported?  Taking?   chlorhexidine (PERIDEX) 0 12 % solution   No No   Sig: Apply 15 mL to the mouth or throat 2 (two) times a day   chlorhexidine (PERIDEX) 0 12 % solution   No No   Sig: Apply 15 mL to the mouth or throat 2 (two) times a day   famotidine (PEPCID) 20 mg tablet   No No   Sig: Take 1 tablet (20 mg total) by mouth 2 (two) times a day   ondansetron (ZOFRAN) 8 mg tablet   No No   Sig: Take 1 tablet (8 mg total) by mouth every 8 (eight) hours as needed for nausea or vomiting   ondansetron (ZOFRAN-ODT) 4 mg disintegrating tablet   No No   Sig: Take 1 tablet (4 mg total) by mouth every 6 (six) hours as needed for nausea or vomiting      Facility-Administered Medications: None       Past Medical History:   Diagnosis Date    Asthma     GERD (gastroesophageal reflux disease)     Hernia, abdominal     Migraines     Psychiatric disorder     Anx, Depression    Renal disorder     kidney stones       Past Surgical History:   Procedure Laterality Date    FL RETROGRADE PYELOGRAM  8/28/2018    HERNIA REPAIR      WI CYSTO/URETERO W/LITHOTRIPSY &INDWELL STENT INSRT Right 8/28/2018    Procedure: CYSTOSCOPY URETEROSCOPY WITH RETROGRADE PYELOGRAM AND INSERTION STENT URETERAL;  Surgeon: Rito Noyola MD;  [facility];  Service: Urology    TOOTH EXTRACTION         Family History   Problem Relation Age of Onset    Diabetes Mother     Hyperlipidemia Mother     Stroke Mother     Heart disease Mother     Asthma Mother     Other Mother         Degen  of Intervertabral disc   Nephrolithiasis Father     Nephrolithiasis Brother      I have reviewed and agree with the history as documented  E-Cigarette/Vaping    E-Cigarette Use Never User      E-Cigarette/Vaping Substances     Social History     Tobacco Use    Smoking status: Current Every Day Smoker     Packs/day: 0 50     Years: 13 00     Pack years: 6 50     Types: Cigarettes    Smokeless tobacco: Never Used   Vaping Use    Vaping Use: Never used   Substance Use Topics    Alcohol use: Not Currently    Drug use: No       Review of Systems   Constitutional: Negative for activity change, appetite change, chills and fever  HENT: Negative for congestion, postnasal drip, rhinorrhea, sinus pressure, sinus pain, sore throat and tinnitus  Eyes: Negative for photophobia and visual disturbance  Respiratory: Negative for cough, chest tightness and shortness of breath  Cardiovascular: Negative for chest pain and palpitations  Gastrointestinal: Positive for abdominal pain and nausea  Negative for constipation, diarrhea and vomiting  Genitourinary: Negative for difficulty urinating, dysuria, flank pain, frequency and urgency  Musculoskeletal: Negative for back pain, gait problem, neck pain and neck stiffness  Skin: Negative for pallor and rash  Allergic/Immunologic: Negative for environmental allergies and food allergies  Neurological: Negative for dizziness, weakness, numbness and headaches  Psychiatric/Behavioral: Negative for confusion  All other systems reviewed and are negative  Physical Exam  Physical Exam  Vitals and nursing note reviewed  Constitutional:       General: She is awake  Appearance: Normal appearance  She is well-developed  She is not ill-appearing, toxic-appearing or diaphoretic  Comments: BP 99/58 (BP Location: Left arm)   Pulse 94   Temp 98 8 °F (37 1 °C) (Oral)   Resp 22   LMP 09/09/2021   SpO2 100%      HENT:      Head: Normocephalic and atraumatic  Right Ear: Hearing and external ear normal  No decreased hearing noted  No drainage, swelling or tenderness  No mastoid tenderness  Left Ear: Hearing and external ear normal  No decreased hearing noted  No drainage, swelling or tenderness  No mastoid tenderness  Nose: Nose normal       Mouth/Throat:      Lips: Pink  Mouth: Mucous membranes are moist       Pharynx: Oropharynx is clear  Uvula midline  Eyes:      General: Lids are normal  Vision grossly intact  Right eye: No discharge  Left eye: No discharge  Extraocular Movements: Extraocular movements intact  Conjunctiva/sclera: Conjunctivae normal       Pupils: Pupils are equal, round, and reactive to light  Neck:      Vascular: No JVD  Trachea: Trachea and phonation normal  No tracheal tenderness or tracheal deviation  Cardiovascular:      Rate and Rhythm: Normal rate and regular rhythm  Pulses: Normal pulses             Radial pulses are 2+ on the right side and 2+ on the left side         Posterior tibial pulses are 2+ on the right side and 2+ on the left side  Heart sounds: Normal heart sounds  Pulmonary:      Effort: Pulmonary effort is normal       Breath sounds: Normal breath sounds  No stridor  No decreased breath sounds, wheezing, rhonchi or rales  Chest:      Chest wall: No tenderness  Abdominal:      General: Abdomen is flat  Bowel sounds are normal  There is no distension  Palpations: Abdomen is soft  Abdomen is not rigid  Tenderness: There is abdominal tenderness in the right lower quadrant, suprapubic area and left lower quadrant  There is right CVA tenderness and left CVA tenderness  There is no guarding or rebound  Musculoskeletal:         General: Normal range of motion  Cervical back: Full passive range of motion without pain, normal range of motion and neck supple  No rigidity  No spinous process tenderness or muscular tenderness  Normal range of motion  Lymphadenopathy:      Head:      Right side of head: No submental, submandibular, tonsillar, preauricular, posterior auricular or occipital adenopathy  Left side of head: No submental, submandibular, tonsillar, preauricular, posterior auricular or occipital adenopathy  Cervical: No cervical adenopathy  Right cervical: No superficial, deep or posterior cervical adenopathy  Left cervical: No superficial, deep or posterior cervical adenopathy  Skin:     General: Skin is warm  Capillary Refill: Capillary refill takes less than 2 seconds  Neurological:      General: No focal deficit present  Mental Status: She is alert and oriented to person, place, and time  GCS: GCS eye subscore is 4  GCS verbal subscore is 5  GCS motor subscore is 6  Sensory: No sensory deficit  Psychiatric:         Mood and Affect: Mood normal          Speech: Speech normal          Behavior: Behavior normal  Behavior is cooperative  Thought Content:  Thought content normal  Judgment: Judgment normal          Vital Signs  ED Triage Vitals   Temperature Pulse Respirations Blood Pressure SpO2   09/20/21 2152 09/20/21 2149 09/20/21 2149 09/20/21 2150 09/20/21 2149   98 8 °F (37 1 °C) 94 22 99/58 98 %      Temp Source Heart Rate Source Patient Position - Orthostatic VS BP Location FiO2 (%)   09/20/21 2152 09/20/21 2149 09/20/21 2150 09/20/21 2150 --   Oral Monitor Sitting Left arm       Pain Score       --                  Vitals:    09/20/21 2149 09/20/21 2150 09/21/21 0100   BP:  99/58 90/57   Pulse: 94  70   Patient Position - Orthostatic VS:  Sitting Lying         Visual Acuity      ED Medications  Medications   sodium chloride 0 9 % bolus 1,000 mL (0 mL Intravenous Stopped 9/21/21 0117)   HYDROmorphone (DILAUDID) injection 0 5 mg (0 5 mg Intravenous Given 9/20/21 2353)   diphenhydrAMINE (BENADRYL) injection 25 mg (25 mg Intravenous Given 9/20/21 2353)   ondansetron (ZOFRAN) injection 4 mg (4 mg Intravenous Given 9/20/21 2353)   iohexol (OMNIPAQUE) 350 MG/ML injection (SINGLE-DOSE) 100 mL (100 mL Intravenous Given 9/21/21 0047)       Diagnostic Studies  Results Reviewed     Procedure Component Value Units Date/Time    Protime-INR [[id number]]  (Normal) Collected: 09/20/21 2352    Lab Status: Final result Specimen: Blood from Arm, Right Updated: 09/21/21 0047     Protime 14 4 seconds      INR 1 12    APTT [[id number]]  (Normal) Collected: 09/20/21 2352    Lab Status: Final result Specimen: Blood from Arm, Right Updated: 09/21/21 0047     PTT 31 seconds     Lipase [[id number]]  (Abnormal) Collected: 09/20/21 2352    Lab Status: Final result Specimen: Blood from Arm, Right Updated: 09/21/21 0035     Lipase 67 u/L     Comprehensive metabolic panel [[id number]]  (Abnormal) Collected: 09/20/21 2352    Lab Status: Final result Specimen: Blood from Arm, Right Updated: 09/21/21 0035     Sodium 142 mmol/L      Potassium 4 6 mmol/L      Chloride 105 mmol/L      CO2 29 mmol/L      ANION GAP 8 mmol/L      BUN 9 mg/dL      Creatinine 0 86 mg/dL      Glucose 92 mg/dL      Calcium 9 3 mg/dL      AST 14 U/L      ALT 18 U/L      Alkaline Phosphatase 59 U/L      Total Protein 6 5 g/dL      Albumin 3 9 g/dL      Total Bilirubin 0 15 mg/dL      eGFR 90 ml/min/1 73sq m     Narrative:      [place] guidelines for Chronic Kidney Disease (CKD):     Stage 1 with normal or high GFR (GFR > 90 mL/min/1 73 square meters)    Stage 2 Mild CKD (GFR = 60-89 mL/min/1 73 square meters)    Stage 3A Moderate CKD (GFR = 45-59 mL/min/1 73 square meters)    Stage 3B Moderate CKD (GFR = 30-44 mL/min/1 73 square meters)    Stage 4 Severe CKD (GFR = 15-29 mL/min/1 73 square meters)    Stage 5 End Stage CKD (GFR <15 mL/min/1 73 square meters)  Note: GFR calculation is accurate only with a steady state creatinine    Lactic acid, plasma [[id number]]  (Normal) Collected: 09/20/21 2352    Lab Status: Final result Specimen: Blood from Arm, Right Updated: 09/21/21 0028     LACTIC ACID 0 9 mmol/L     Narrative:      Result may be elevated if tourniquet was used during collection      UA w Reflex to Microscopic w Reflex to Culture [[id number]] Collected: 09/21/21 0001    Lab Status: Final result Specimen: Urine, Clean Catch Updated: 09/21/21 0027     Color, UA Yellow     Clarity, UA Slightly Cloudy     Specific [place], UA 1 020     pH, UA 6 0     Leukocytes, UA Negative     Nitrite, UA Negative     Protein, UA Negative mg/dl      Glucose, UA Negative mg/dl      Ketones, UA Negative mg/dl      Urobilinogen, UA 0 2 E U /dl      Bilirubin, UA Negative     Blood, UA Negative    CBC and differential [[id number]] Collected: 09/20/21 2352    Lab Status: Final result Specimen: Blood from Arm, Right Updated: 09/21/21 0007     WBC 5 74 Thousand/uL      RBC 4 34 Million/uL      Hemoglobin 13 2 g/dL      Hematocrit 39 7 %      MCV 92 fL      MCH 30 4 pg      MCHC 33 2 g/dL      RDW 12 9 %      MPV 10 2 fL      Platelets 787 Thousands/uL      nRBC 0 /100 WBCs      Neutrophils Relative 55 %      Immat GRANS % 0 %      Lymphocytes Relative 34 %      Monocytes Relative 7 %      Eosinophils Relative 3 %      Basophils Relative 1 %      Neutrophils Absolute 3 13 Thousands/µL      Immature Grans Absolute 0 02 Thousand/uL      Lymphocytes Absolute 1 95 Thousands/µL      Monocytes Absolute 0 42 Thousand/µL      Eosinophils Absolute 0 19 Thousand/µL      Basophils Absolute 0 03 Thousands/µL     POCT pregnancy, urine [[id number]]  (Normal) Resulted: 09/21/21 0001    Lab Status: Final result Updated: 09/21/21 0001     EXT PREG TEST UR (Ref: Negative) negative     Control valid                 CT abdomen pelvis with contrast   ED Interpretation by Brody Oseguera PA-C (09/21 0113)   Kathy Moscoso MD  [phone] 9/21/2021     Narrative & Impression  CT ABDOMEN AND PELVIS WITH IV CONTRAST     INDICATION:   Lower abdominal pain; CVA tenderness bilaterally      COMPARISON:  CT abdomen/pelvis dated June 16, 2021      TECHNIQUE:  CT examination of the abdomen and pelvis was performed  Axial, sagittal, and coronal 2D reformatted images were created from the source data and submitted for interpretation      Radiation dose length product (DLP) for this visit:  321 mGy-cm   This examination, like all CT scans performed in the [facility], was performed utilizing techniques to minimize radiation dose exposure, including the use of iterative   reconstruction and automated exposure control      IV Contrast:  100 mL of iohexol (OMNIPAQUE)  Enteric Contrast:  Enteric contrast was not administered      FINDINGS:     ABDOMEN     LOWER CHEST:  No clinically significant abnormality identified in the visualized lower chest      LIVER/BILIARY TREE:  Unremarka   ble      GALLBLADDER:  No calcified gallstones   No pericholecystic inflammatory change      SPLEEN:  Unremarkable      PANCREAS:  Unremarkable      ADRENAL GLANDS: Unremarkable      KIDNEYS/URETERS:  Unremarkable  No hydronephrosis      STOMACH AND BOWEL:  Unremarkable      APPENDIX:  A normal appendix was visualized      ABDOMINOPELVIC CAVITY:  There is a small volume of free pelvic fluid, likely physiologic given the patient's age and gender  No pneumoperitoneum  No lymphadenopathy      VESSELS:  Unremarkable for patient's age      PELVIS     REPRODUCTIVE ORGANS:  Unremarkable for patient's age      URINARY BLADDER:  Unremarkable      ABDOMINAL WALL/INGUINAL REGIONS:  Unremarkable      OSSEOUS STRUCTURES:  No acute fracture or destructive osseous lesion      IMPRESSION:     No acute intra-abdominal abnormality      Small amount of pelvic free fluid likely physiologic in nature            Workstation performed: [id number]        Final Result by Lisa Napier MD (09/21 0110)      No acute intra-abdominal abnormality  Small amount of pelvic free fluid likely physiologic in nature  Workstation performed: [id number]                    Procedures  Procedures         ED Course                                           MDM  Number of Diagnoses or Management Options  Nausea: new and does not require workup  Nonspecific abdominal pain: new and does not require workup     Amount and/or Complexity of Data Reviewed  Clinical lab tests: ordered and reviewed  Tests in the radiology section of CPT®: ordered and reviewed  Review and summarize past medical records: yes    Risk of Complications, Morbidity, and/or Mortality  Presenting problems: moderate  Diagnostic procedures: moderate  Management options: low    Patient Progress  Patient progress: stable    Patient is a 19-year-old female with history of GERD, migraines, kidney stones with historic treatment of lithotripsy and indwelling stent the presents emergency department with abrupt onset sharp shooting and searing lower abdominal pain with radiation to bilateral flanks for 1 day     Hemodynamically stable afebrile  CT abdomen pelvis with contrast-impression-No acute intra-abdominal abnormality  Small amount of pelvic free fluid likely physiologic in nature "  Negative urine pregnancy; urinalysis not indicative of urinary tract infection  Normal electrolytes, normal kidney function, normal glucose  No leukocytosis, no banding, no lactic acidosis doubt systemic infection  Unremarkable lipase with acute pancreatitis not likely  Delivered Benadryl delivered Zofran with patient verbalized decrease in nausea and pain symptoms status post medication delivery  Prescribed Bentyl, Pepcid, Zofran and counseled patient medication administration and side effects  Follow-up with GI as needed  Follow-up with PCP  Follow up emergency department symptoms persist or exacerbate  Patient demonstrates verbal understanding of all clinical laboratory imaging findings, discharge instructions, follow-up verbalized agreement with patient current treatment plan  Disposition  Final diagnoses:   Nonspecific abdominal pain   Nausea     Time reflects when diagnosis was documented in both MDM as applicable and the Disposition within this note     Time User Action Codes Description Comment    9/21/2021  1:13 AM Francy Kurtz Add [R10 9] Nonspecific abdominal pain     9/21/2021  1:13 AM Francy Kurtz Add [R11 0] Nausea       ED Disposition     ED Disposition Condition Date/Time Comment    Discharge Stable Tue Sep 21, 2021  1:16 AM Christine Fay discharge to home/self care              Follow-up Information     Follow up With Specialties Details Why Contact Info Additional Information    Paulo Brower MD Family Medicine   [address] [facility] [address]  [address]  [address]  [phone]       [place] [address] Emergency Department Emergency Medicine   [address] [address] [facility], [address], [place], [place], [address] [facility]   [address] [place] [address] [facility] [place] [id number] [facility], [address] [place] [address] [id number] [place] [address], [phone]          Discharge Medication List as of 9/21/2021  1:16 AM      START taking these medications    Details   dicyclomine (BENTYL) 20 mg tablet Take 1 tablet (20 mg total) by mouth 2 (two) times a day for 3 days, Starting Tue 9/21/2021, Until Fri 9/24/2021, Normal      !! ondansetron (ZOFRAN) 4 mg tablet Take 1 tablet (4 mg total) by mouth every 6 (six) hours for 2 days, Starting Tue 9/21/2021, Until Thu 9/23/2021, Normal       !! - Potential duplicate medications found  Please discuss with provider  CONTINUE these medications which have CHANGED    Details   famotidine (PEPCID) 20 mg tablet Take 1 tablet (20 mg total) by mouth 2 (two) times a day for 2 days, Starting Tue 9/21/2021, Until Thu 9/23/2021, Normal         CONTINUE these medications which have NOT CHANGED    Details   !! chlorhexidine (PERIDEX) 0 12 % solution Apply 15 mL to the mouth or throat 2 (two) times a day, Starting Wed 8/25/2021, Normal      !! chlorhexidine (PERIDEX) 0 12 % solution Apply 15 mL to the mouth or throat 2 (two) times a day, Starting Fri 9/3/2021, Normal      !! ondansetron (ZOFRAN) 8 mg tablet Take 1 tablet (8 mg total) by mouth every 8 (eight) hours as needed for nausea or vomiting, Starting Thu 9/16/2021, Normal      ondansetron (ZOFRAN-ODT) 4 mg disintegrating tablet Take 1 tablet (4 mg total) by mouth every 6 (six) hours as needed for nausea or vomiting, Starting Fri 8/13/2021, Normal       !! - Potential duplicate medications found  Please discuss with provider  No discharge procedures on file      PDMP Review       Value Time User    PDMP Reviewed  Yes 9/20/2021 11:26 PM Arabella Valentino PA-C          ED Provider  Electronically Signed by           Arabella Valentino, LAMAR  09/21/21 2810 Nasrin Patel, LAMAR  09/21/21 6285

## 2021-09-21 NOTE — DISCHARGE INSTRUCTIONS
Bruce Solis MD  [phone] 9/21/2021       Narrative & Impression  CT ABDOMEN AND PELVIS WITH IV CONTRAST     INDICATION:   Lower abdominal pain; CVA tenderness bilaterally  COMPARISON:  CT abdomen/pelvis dated June 16, 2021  TECHNIQUE:  CT examination of the abdomen and pelvis was performed  Axial, sagittal, and coronal 2D reformatted images were created from the source data and submitted for interpretation  Radiation dose length product (DLP) for this visit:  321 mGy-cm   This examination, like all CT scans performed in the [facility], was performed utilizing techniques to minimize radiation dose exposure, including the use of iterative   reconstruction and automated exposure control  IV Contrast:  100 mL of iohexol (OMNIPAQUE)  Enteric Contrast:  Enteric contrast was not administered  FINDINGS:     ABDOMEN     LOWER CHEST:  No clinically significant abnormality identified in the visualized lower chest      LIVER/BILIARY TREE:  Unremarkable  GALLBLADDER:  No calcified gallstones  No pericholecystic inflammatory change  SPLEEN:  Unremarkable  PANCREAS:  Unremarkable  ADRENAL GLANDS:  Unremarkable  KIDNEYS/URETERS:  Unremarkable  No hydronephrosis  STOMACH AND BOWEL:  Unremarkable  APPENDIX:  A normal appendix was visualized  ABDOMINOPELVIC CAVITY:  There is a small volume of free pelvic fluid, likely physiologic given the patient's age and gender  No pneumoperitoneum  No lymphadenopathy  VESSELS:  Unremarkable for patient's age  PELVIS     REPRODUCTIVE ORGANS:  Unremarkable for patient's age  URINARY BLADDER:  Unremarkable  ABDOMINAL WALL/INGUINAL REGIONS:  Unremarkable  OSSEOUS STRUCTURES:  No acute fracture or destructive osseous lesion  IMPRESSION:     No acute intra-abdominal abnormality  Small amount of pelvic free fluid likely physiologic in nature             Workstation performed: [id number]    Take Bentyl, pepcid, and Zofran as indicated  Follow-up PCP  Follow-up with GI as needed  Follow-up with emergency department symptoms persist exacerbate

## 2021-09-30 ENCOUNTER — HOSPITAL ENCOUNTER (EMERGENCY)
Facility: HOSPITAL | Age: 31
Discharge: HOME | End: 2021-09-30
Attending: EMERGENCY MEDICINE
Payer: COMMERCIAL

## 2021-09-30 VITALS
BODY MASS INDEX: 22.58 KG/M2 | DIASTOLIC BLOOD PRESSURE: 60 MMHG | RESPIRATION RATE: 18 BRPM | HEART RATE: 88 BPM | HEIGHT: 60 IN | TEMPERATURE: 98.7 F | OXYGEN SATURATION: 100 % | SYSTOLIC BLOOD PRESSURE: 111 MMHG | WEIGHT: 115 LBS

## 2021-09-30 DIAGNOSIS — K08.89 PAIN, DENTAL: Primary | ICD-10-CM

## 2021-09-30 PROCEDURE — 99284 EMERGENCY DEPT VISIT MOD MDM: CPT | Performed by: EMERGENCY MEDICINE

## 2021-09-30 PROCEDURE — 99282 EMERGENCY DEPT VISIT SF MDM: CPT

## 2021-09-30 RX ORDER — HYDROCODONE BITARTRATE AND ACETAMINOPHEN 5; 325 MG/1; MG/1
1 TABLET ORAL ONCE
Status: COMPLETED | OUTPATIENT
Start: 2021-09-30 | End: 2021-09-30

## 2021-09-30 RX ADMIN — HYDROCODONE BITARTRATE AND ACETAMINOPHEN 1 TABLET: 5; 325 TABLET ORAL at 19:29

## 2021-10-02 VITALS
HEART RATE: 70 BPM | DIASTOLIC BLOOD PRESSURE: 59 MMHG | SYSTOLIC BLOOD PRESSURE: 96 MMHG | RESPIRATION RATE: 16 BRPM | OXYGEN SATURATION: 100 % | TEMPERATURE: 98.4 F

## 2021-10-02 PROCEDURE — 99282 EMERGENCY DEPT VISIT SF MDM: CPT

## 2021-10-03 ENCOUNTER — HOSPITAL ENCOUNTER (EMERGENCY)
Facility: HOSPITAL | Age: 31
Discharge: HOME | End: 2021-10-03
Attending: EMERGENCY MEDICINE
Payer: COMMERCIAL

## 2021-10-03 DIAGNOSIS — R11.0 NAUSEA: ICD-10-CM

## 2021-10-03 DIAGNOSIS — K08.89 PAIN, DENTAL: Primary | ICD-10-CM

## 2021-10-03 PROCEDURE — 99284 EMERGENCY DEPT VISIT MOD MDM: CPT | Performed by: STUDENT IN AN ORGANIZED HEALTH CARE EDUCATION/TRAINING PROGRAM

## 2021-10-03 RX ORDER — METOCLOPRAMIDE 10 MG/1
10 TABLET ORAL ONCE
Status: COMPLETED | OUTPATIENT
Start: 2021-10-03 | End: 2021-10-03

## 2021-10-03 RX ORDER — METOCLOPRAMIDE HYDROCHLORIDE 5 MG/ML
10 INJECTION INTRAMUSCULAR; INTRAVENOUS ONCE
Status: DISCONTINUED | OUTPATIENT
Start: 2021-10-03 | End: 2021-10-03

## 2021-10-03 RX ADMIN — METOCLOPRAMIDE 10 MG: 10 TABLET ORAL at 01:19

## 2021-10-08 ENCOUNTER — HOSPITAL ENCOUNTER (EMERGENCY)
Facility: HOSPITAL | Age: 31
Discharge: HOME | End: 2021-10-09
Attending: EMERGENCY MEDICINE
Payer: COMMERCIAL

## 2021-10-08 DIAGNOSIS — R10.9 ABDOMINAL PAIN: Primary | ICD-10-CM

## 2021-10-08 LAB
ANION GAP SERPL CALCULATED.3IONS-SCNC: 6 MMOL/L (ref 4–13)
BACTERIA UR QL AUTO: ABNORMAL /HPF
BASOPHILS # BLD AUTO: 0.03 THOUSANDS/ΜL (ref 0–0.1)
BASOPHILS NFR BLD AUTO: 0 % (ref 0–1)
BILIRUB UR QL STRIP: NEGATIVE
BUN SERPL-MCNC: 11 MG/DL (ref 6–20)
CALCIUM SERPL-MCNC: 9.7 MG/DL (ref 8.4–10.2)
CHLORIDE SERPL-SCNC: 106 MMOL/L (ref 96–108)
CLARITY UR: CLEAR
CO2 SERPL-SCNC: 26 MMOL/L (ref 22–33)
COLOR UR: YELLOW
CREAT SERPL-MCNC: 0.7 MG/DL (ref 0.4–1.1)
EOSINOPHIL # BLD AUTO: 0.26 THOUSAND/ΜL (ref 0–0.61)
EOSINOPHIL NFR BLD AUTO: 3 % (ref 0–6)
ERYTHROCYTE [DISTWIDTH] IN BLOOD BY AUTOMATED COUNT: 12.3 % (ref 11.6–15.1)
EXT PREG TEST URINE: NEGATIVE
EXT. CONTROL ED NAV: NORMAL
GFR SERPL CREATININE-BSD FRML MDRD: 116 ML/MIN/1.73SQ M
GLUCOSE SERPL-MCNC: 107 MG/DL (ref 65–140)
GLUCOSE UR STRIP-MCNC: NEGATIVE MG/DL
HCG SERPL QL: NEGATIVE
HCT VFR BLD AUTO: 38.5 % (ref 34.8–46.1)
HGB BLD-MCNC: 12.8 G/DL (ref 11.5–15.4)
HGB UR QL STRIP.AUTO: ABNORMAL
IMM GRANULOCYTES # BLD AUTO: 0.01 THOUSAND/UL (ref 0–0.2)
IMM GRANULOCYTES NFR BLD AUTO: 0 % (ref 0–2)
KETONES UR STRIP-MCNC: NEGATIVE MG/DL
LEUKOCYTE ESTERASE UR QL STRIP: NEGATIVE
LYMPHOCYTES # BLD AUTO: 2.45 THOUSANDS/ΜL (ref 0.6–4.47)
LYMPHOCYTES NFR BLD AUTO: 27 % (ref 14–44)
MCH RBC QN AUTO: 30 PG (ref 26.8–34.3)
MCHC RBC AUTO-ENTMCNC: 33.2 G/DL (ref 31.4–37.4)
MCV RBC AUTO: 90 FL (ref 82–98)
MONOCYTES # BLD AUTO: 0.74 THOUSAND/ΜL (ref 0.17–1.22)
MONOCYTES NFR BLD AUTO: 8 % (ref 4–12)
MUCOUS THREADS UR QL AUTO: ABNORMAL
NEUTROPHILS # BLD AUTO: 5.44 THOUSANDS/ΜL (ref 1.85–7.62)
NEUTS SEG NFR BLD AUTO: 62 % (ref 43–75)
NITRITE UR QL STRIP: NEGATIVE
NON-SQ EPI CELLS URNS QL MICRO: ABNORMAL /HPF
PH UR STRIP.AUTO: 6.5 [PH]
PLATELET # BLD AUTO: 255 THOUSANDS/UL (ref 149–390)
PMV BLD AUTO: 10.3 FL (ref 8.9–12.7)
POTASSIUM SERPL-SCNC: 3.8 MMOL/L (ref 3.5–5)
PROT UR STRIP-MCNC: NEGATIVE MG/DL
RBC # BLD AUTO: 4.27 MILLION/UL (ref 3.81–5.12)
RBC #/AREA URNS AUTO: ABNORMAL /HPF
SODIUM SERPL-SCNC: 138 MMOL/L (ref 133–145)
SP GR UR STRIP.AUTO: 1.01 (ref 1–1.03)
UROBILINOGEN UR QL STRIP.AUTO: 0.2 E.U./DL
WBC # BLD AUTO: 8.93 THOUSAND/UL (ref 4.31–10.16)
WBC #/AREA URNS AUTO: ABNORMAL /HPF

## 2021-10-08 PROCEDURE — 99284 EMERGENCY DEPT VISIT MOD MDM: CPT

## 2021-10-08 PROCEDURE — 81001 URINALYSIS AUTO W/SCOPE: CPT | Performed by: EMERGENCY MEDICINE

## 2021-10-08 PROCEDURE — 84703 CHORIONIC GONADOTROPIN ASSAY: CPT | Performed by: EMERGENCY MEDICINE

## 2021-10-08 PROCEDURE — 36415 COLL VENOUS BLD VENIPUNCTURE: CPT | Performed by: EMERGENCY MEDICINE

## 2021-10-08 PROCEDURE — 80048 BASIC METABOLIC PNL TOTAL CA: CPT | Performed by: EMERGENCY MEDICINE

## 2021-10-08 PROCEDURE — 99284 EMERGENCY DEPT VISIT MOD MDM: CPT | Performed by: EMERGENCY MEDICINE

## 2021-10-08 PROCEDURE — 81025 URINE PREGNANCY TEST: CPT | Performed by: EMERGENCY MEDICINE

## 2021-10-08 PROCEDURE — 85025 COMPLETE CBC W/AUTO DIFF WBC: CPT | Performed by: EMERGENCY MEDICINE

## 2021-10-08 PROCEDURE — 81003 URINALYSIS AUTO W/O SCOPE: CPT | Performed by: EMERGENCY MEDICINE

## 2021-10-08 RX ORDER — ONDANSETRON 4 MG/1
4 TABLET, ORALLY DISINTEGRATING ORAL ONCE
Status: COMPLETED | OUTPATIENT
Start: 2021-10-09 | End: 2021-10-08

## 2021-10-08 RX ADMIN — ONDANSETRON 4 MG: 4 TABLET, ORALLY DISINTEGRATING ORAL at 23:59

## 2021-10-09 ENCOUNTER — APPOINTMENT (EMERGENCY)
Dept: ULTRASOUND IMAGING | Facility: HOSPITAL | Age: 31
End: 2021-10-09
Payer: COMMERCIAL

## 2021-10-09 VITALS
OXYGEN SATURATION: 100 % | DIASTOLIC BLOOD PRESSURE: 64 MMHG | HEART RATE: 65 BPM | RESPIRATION RATE: 16 BRPM | TEMPERATURE: 97.8 F | SYSTOLIC BLOOD PRESSURE: 104 MMHG

## 2021-10-09 PROCEDURE — 76856 US EXAM PELVIC COMPLETE: CPT

## 2021-10-17 ENCOUNTER — APPOINTMENT (EMERGENCY)
Dept: RADIOLOGY | Facility: HOSPITAL | Age: 31
End: 2021-10-17
Payer: COMMERCIAL

## 2021-10-17 ENCOUNTER — HOSPITAL ENCOUNTER (EMERGENCY)
Facility: HOSPITAL | Age: 31
Discharge: HOME | End: 2021-10-17
Payer: COMMERCIAL

## 2021-10-17 VITALS
OXYGEN SATURATION: 98 % | HEART RATE: 90 BPM | TEMPERATURE: 98 F | SYSTOLIC BLOOD PRESSURE: 128 MMHG | DIASTOLIC BLOOD PRESSURE: 74 MMHG | RESPIRATION RATE: 18 BRPM

## 2021-10-17 DIAGNOSIS — J06.9 VIRAL UPPER RESPIRATORY TRACT INFECTION: Primary | ICD-10-CM

## 2021-10-17 LAB
FLUAV RNA RESP QL NAA+PROBE: NEGATIVE
FLUBV RNA RESP QL NAA+PROBE: NEGATIVE
RSV RNA RESP QL NAA+PROBE: NEGATIVE
S PYO DNA THROAT QL NAA+PROBE: NORMAL
SARS-COV-2 RNA RESP QL NAA+PROBE: NEGATIVE

## 2021-10-17 PROCEDURE — 99283 EMERGENCY DEPT VISIT LOW MDM: CPT

## 2021-10-17 PROCEDURE — 71045 X-RAY EXAM CHEST 1 VIEW: CPT

## 2021-10-17 PROCEDURE — 87651 STREP A DNA AMP PROBE: CPT | Performed by: PHYSICIAN ASSISTANT

## 2021-10-17 PROCEDURE — 0241U HB NFCT DS VIR RESP RNA 4 TRGT: CPT | Performed by: PHYSICIAN ASSISTANT

## 2021-10-17 PROCEDURE — 99285 EMERGENCY DEPT VISIT HI MDM: CPT | Performed by: PHYSICIAN ASSISTANT

## 2021-10-17 RX ORDER — PROMETHAZINE HYDROCHLORIDE 25 MG/1
25 TABLET ORAL EVERY 6 HOURS PRN
Qty: 12 TABLET | Refills: 0 | Status: SHIPPED | OUTPATIENT
Start: 2021-10-17 | End: 2021-11-01

## 2021-10-17 RX ORDER — ACETAMINOPHEN 325 MG/1
975 TABLET ORAL ONCE
Status: COMPLETED | OUTPATIENT
Start: 2021-10-17 | End: 2021-10-17

## 2021-10-17 RX ORDER — PROMETHAZINE HYDROCHLORIDE 25 MG/1
25 TABLET ORAL EVERY 6 HOURS PRN
Qty: 12 TABLET | Refills: 0 | Status: SHIPPED | OUTPATIENT
Start: 2021-10-17 | End: 2021-10-17 | Stop reason: SDUPTHER

## 2021-10-17 RX ORDER — ONDANSETRON 4 MG/1
4 TABLET, ORALLY DISINTEGRATING ORAL ONCE
Status: COMPLETED | OUTPATIENT
Start: 2021-10-17 | End: 2021-10-17

## 2021-10-17 RX ADMIN — ONDANSETRON 4 MG: 4 TABLET, ORALLY DISINTEGRATING ORAL at 15:45

## 2021-10-17 RX ADMIN — ACETAMINOPHEN 975 MG: 325 TABLET, FILM COATED ORAL at 15:45

## 2021-10-29 VITALS
OXYGEN SATURATION: 100 % | HEART RATE: 95 BPM | WEIGHT: 115 LBS | SYSTOLIC BLOOD PRESSURE: 104 MMHG | BODY MASS INDEX: 22.58 KG/M2 | RESPIRATION RATE: 16 BRPM | DIASTOLIC BLOOD PRESSURE: 70 MMHG | TEMPERATURE: 98 F | HEIGHT: 60 IN

## 2021-10-29 PROCEDURE — 99283 EMERGENCY DEPT VISIT LOW MDM: CPT

## 2021-10-30 ENCOUNTER — HOSPITAL ENCOUNTER (EMERGENCY)
Facility: HOSPITAL | Age: 31
Discharge: HOME | End: 2021-10-30
Attending: EMERGENCY MEDICINE
Payer: COMMERCIAL

## 2021-10-30 DIAGNOSIS — R68.84 JAW PAIN: Primary | ICD-10-CM

## 2021-10-30 DIAGNOSIS — R11.0 NAUSEA: ICD-10-CM

## 2021-10-30 PROCEDURE — 99284 EMERGENCY DEPT VISIT MOD MDM: CPT | Performed by: STUDENT IN AN ORGANIZED HEALTH CARE EDUCATION/TRAINING PROGRAM

## 2021-10-30 RX ORDER — ONDANSETRON 4 MG/1
4 TABLET, FILM COATED ORAL EVERY 6 HOURS
Qty: 8 TABLET | Refills: 0 | Status: SHIPPED | OUTPATIENT
Start: 2021-10-30 | End: 2022-03-01 | Stop reason: SDUPTHER

## 2021-10-30 RX ORDER — ONDANSETRON 4 MG/1
4 TABLET, ORALLY DISINTEGRATING ORAL ONCE
Status: COMPLETED | OUTPATIENT
Start: 2021-10-30 | End: 2021-10-30

## 2021-10-30 RX ADMIN — ONDANSETRON 4 MG: 4 TABLET, ORALLY DISINTEGRATING ORAL at 01:42

## 2021-11-01 ENCOUNTER — HOSPITAL ENCOUNTER (EMERGENCY)
Facility: HOSPITAL | Age: 31
Discharge: HOME | End: 2021-11-02
Attending: EMERGENCY MEDICINE
Payer: COMMERCIAL

## 2021-11-01 VITALS
BODY MASS INDEX: 22.58 KG/M2 | HEART RATE: 93 BPM | TEMPERATURE: 98 F | DIASTOLIC BLOOD PRESSURE: 73 MMHG | HEIGHT: 60 IN | WEIGHT: 115 LBS | SYSTOLIC BLOOD PRESSURE: 110 MMHG | RESPIRATION RATE: 18 BRPM | OXYGEN SATURATION: 100 %

## 2021-11-01 DIAGNOSIS — M26.623 BILATERAL TEMPOROMANDIBULAR JOINT PAIN: Primary | ICD-10-CM

## 2021-11-01 DIAGNOSIS — Z76.5 DRUG-SEEKING BEHAVIOR: ICD-10-CM

## 2021-11-01 PROCEDURE — 99283 EMERGENCY DEPT VISIT LOW MDM: CPT

## 2021-11-01 PROCEDURE — 99282 EMERGENCY DEPT VISIT SF MDM: CPT | Performed by: STUDENT IN AN ORGANIZED HEALTH CARE EDUCATION/TRAINING PROGRAM

## 2021-11-28 ENCOUNTER — HOSPITAL ENCOUNTER (EMERGENCY)
Facility: HOSPITAL | Age: 31
Discharge: HOME | End: 2021-11-29
Attending: EMERGENCY MEDICINE
Payer: COMMERCIAL

## 2021-11-28 VITALS
HEIGHT: 60 IN | HEART RATE: 92 BPM | BODY MASS INDEX: 22.78 KG/M2 | OXYGEN SATURATION: 99 % | DIASTOLIC BLOOD PRESSURE: 77 MMHG | TEMPERATURE: 98.7 F | WEIGHT: 116 LBS | RESPIRATION RATE: 18 BRPM | SYSTOLIC BLOOD PRESSURE: 128 MMHG

## 2021-11-28 DIAGNOSIS — K04.7 DENTAL INFECTION: ICD-10-CM

## 2021-11-28 DIAGNOSIS — K03.2 DENTAL EROSION, GENERALIZED: Primary | ICD-10-CM

## 2021-11-28 PROCEDURE — 99285 EMERGENCY DEPT VISIT HI MDM: CPT | Performed by: EMERGENCY MEDICINE

## 2021-11-28 PROCEDURE — 99283 EMERGENCY DEPT VISIT LOW MDM: CPT

## 2021-11-28 RX ORDER — AMOXICILLIN AND CLAVULANATE POTASSIUM 875; 125 MG/1; MG/1
1 TABLET, FILM COATED ORAL ONCE
Status: COMPLETED | OUTPATIENT
Start: 2021-11-28 | End: 2021-11-28

## 2021-11-28 RX ORDER — OXYCODONE HYDROCHLORIDE 5 MG/1
5 TABLET ORAL ONCE
Status: COMPLETED | OUTPATIENT
Start: 2021-11-28 | End: 2021-11-28

## 2021-11-28 RX ADMIN — AMOXICILLIN AND CLAVULANATE POTASSIUM 1 TABLET: 875; 125 TABLET, FILM COATED ORAL at 23:47

## 2021-11-28 RX ADMIN — OXYCODONE HYDROCHLORIDE 5 MG: 5 TABLET ORAL at 23:47

## 2021-11-29 RX ORDER — AMOXICILLIN AND CLAVULANATE POTASSIUM 875; 125 MG/1; MG/1
1 TABLET, FILM COATED ORAL EVERY 12 HOURS
Qty: 14 TABLET | Refills: 0 | Status: SHIPPED | OUTPATIENT
Start: 2021-11-29 | End: 2021-12-06

## 2021-12-06 ENCOUNTER — HOSPITAL ENCOUNTER (EMERGENCY)
Facility: HOSPITAL | Age: 31
Discharge: HOME | End: 2021-12-07
Attending: EMERGENCY MEDICINE | Admitting: EMERGENCY MEDICINE
Payer: COMMERCIAL

## 2021-12-06 VITALS
RESPIRATION RATE: 20 BRPM | HEART RATE: 90 BPM | DIASTOLIC BLOOD PRESSURE: 66 MMHG | OXYGEN SATURATION: 99 % | SYSTOLIC BLOOD PRESSURE: 109 MMHG | TEMPERATURE: 98.2 F

## 2021-12-06 DIAGNOSIS — R11.0 NAUSEA: Primary | ICD-10-CM

## 2021-12-06 PROCEDURE — 99283 EMERGENCY DEPT VISIT LOW MDM: CPT

## 2021-12-07 LAB
BILIRUB UR QL STRIP: NEGATIVE
CLARITY UR: ABNORMAL
COLOR UR: YELLOW
EXT PREG TEST URINE: NEGATIVE
EXT. CONTROL ED NAV: NORMAL
GLUCOSE UR STRIP-MCNC: NEGATIVE MG/DL
HGB UR QL STRIP.AUTO: NEGATIVE
KETONES UR STRIP-MCNC: NEGATIVE MG/DL
LEUKOCYTE ESTERASE UR QL STRIP: NEGATIVE
NITRITE UR QL STRIP: NEGATIVE
PH UR STRIP.AUTO: 5.5 [PH]
PROT UR STRIP-MCNC: NEGATIVE MG/DL
SARS-COV-2 RNA RESP QL NAA+PROBE: NEGATIVE
SP GR UR STRIP.AUTO: >=1.03 (ref 1–1.03)
UROBILINOGEN UR QL STRIP.AUTO: 0.2 E.U./DL

## 2021-12-07 PROCEDURE — 81003 URINALYSIS AUTO W/O SCOPE: CPT | Performed by: STUDENT IN AN ORGANIZED HEALTH CARE EDUCATION/TRAINING PROGRAM

## 2021-12-07 PROCEDURE — U0003 INFECTIOUS AGENT DETECTION BY NUCLEIC ACID (DNA OR RNA); SEVERE ACUTE RESPIRATORY SYNDROME CORONAVIRUS 2 (SARS-COV-2) (CORONAVIRUS DISEASE [COVID-19]), AMPLIFIED PROBE TECHNIQUE, MAKING USE OF HIGH THROUGHPUT TECHNOLOGIES AS DESCRIBED BY CMS-2020-01-R: HCPCS | Performed by: STUDENT IN AN ORGANIZED HEALTH CARE EDUCATION/TRAINING PROGRAM

## 2021-12-07 PROCEDURE — U0005 INFEC AGEN DETEC AMPLI PROBE: HCPCS | Performed by: STUDENT IN AN ORGANIZED HEALTH CARE EDUCATION/TRAINING PROGRAM

## 2021-12-07 PROCEDURE — 99284 EMERGENCY DEPT VISIT MOD MDM: CPT | Performed by: STUDENT IN AN ORGANIZED HEALTH CARE EDUCATION/TRAINING PROGRAM

## 2021-12-07 PROCEDURE — 81025 URINE PREGNANCY TEST: CPT | Performed by: STUDENT IN AN ORGANIZED HEALTH CARE EDUCATION/TRAINING PROGRAM

## 2021-12-07 RX ORDER — ONDANSETRON 4 MG/1
4 TABLET, ORALLY DISINTEGRATING ORAL EVERY 6 HOURS PRN
Qty: 7 TABLET | Refills: 0 | Status: SHIPPED | OUTPATIENT
Start: 2021-12-07 | End: 2022-03-01 | Stop reason: ALTCHOICE

## 2021-12-07 RX ORDER — ONDANSETRON 4 MG/1
4 TABLET, ORALLY DISINTEGRATING ORAL ONCE
Status: COMPLETED | OUTPATIENT
Start: 2021-12-07 | End: 2021-12-07

## 2021-12-07 RX ADMIN — ONDANSETRON 4 MG: 4 TABLET, ORALLY DISINTEGRATING ORAL at 00:55

## 2021-12-22 ENCOUNTER — HOSPITAL ENCOUNTER (EMERGENCY)
Facility: HOSPITAL | Age: 31
Discharge: HOME | End: 2021-12-22
Payer: COMMERCIAL

## 2021-12-22 VITALS
TEMPERATURE: 98.2 F | DIASTOLIC BLOOD PRESSURE: 64 MMHG | WEIGHT: 116 LBS | BODY MASS INDEX: 22.78 KG/M2 | RESPIRATION RATE: 16 BRPM | SYSTOLIC BLOOD PRESSURE: 94 MMHG | HEIGHT: 60 IN | OXYGEN SATURATION: 97 % | HEART RATE: 75 BPM

## 2021-12-22 DIAGNOSIS — R11.0 NAUSEA: Primary | ICD-10-CM

## 2021-12-22 PROCEDURE — 99283 EMERGENCY DEPT VISIT LOW MDM: CPT

## 2021-12-22 PROCEDURE — 99284 EMERGENCY DEPT VISIT MOD MDM: CPT

## 2021-12-22 RX ORDER — ONDANSETRON 4 MG/1
4 TABLET, ORALLY DISINTEGRATING ORAL ONCE
Status: COMPLETED | OUTPATIENT
Start: 2021-12-22 | End: 2021-12-22

## 2021-12-22 RX ORDER — ONDANSETRON 4 MG/1
4 TABLET, ORALLY DISINTEGRATING ORAL EVERY 6 HOURS PRN
Qty: 20 TABLET | Refills: 0 | Status: SHIPPED | OUTPATIENT
Start: 2021-12-22 | End: 2022-03-01 | Stop reason: ALTCHOICE

## 2021-12-22 RX ADMIN — ONDANSETRON 4 MG: 4 TABLET, ORALLY DISINTEGRATING ORAL at 18:25

## 2021-12-26 PROCEDURE — 99283 EMERGENCY DEPT VISIT LOW MDM: CPT

## 2021-12-26 PROCEDURE — 94640 AIRWAY INHALATION TREATMENT: CPT

## 2021-12-27 ENCOUNTER — HOSPITAL ENCOUNTER (EMERGENCY)
Facility: HOSPITAL | Age: 31
Discharge: HOME | End: 2021-12-27
Attending: EMERGENCY MEDICINE | Admitting: EMERGENCY MEDICINE
Payer: COMMERCIAL

## 2021-12-27 VITALS
TEMPERATURE: 98.3 F | RESPIRATION RATE: 18 BRPM | OXYGEN SATURATION: 99 % | HEART RATE: 74 BPM | DIASTOLIC BLOOD PRESSURE: 59 MMHG | SYSTOLIC BLOOD PRESSURE: 95 MMHG

## 2021-12-27 DIAGNOSIS — U07.1 COVID-19: Primary | ICD-10-CM

## 2021-12-27 LAB
FLUAV RNA RESP QL NAA+PROBE: NEGATIVE
FLUBV RNA RESP QL NAA+PROBE: NEGATIVE
RSV RNA RESP QL NAA+PROBE: NEGATIVE
SARS-COV-2 RNA RESP QL NAA+PROBE: POSITIVE

## 2021-12-27 PROCEDURE — 99284 EMERGENCY DEPT VISIT MOD MDM: CPT | Performed by: STUDENT IN AN ORGANIZED HEALTH CARE EDUCATION/TRAINING PROGRAM

## 2021-12-27 PROCEDURE — 96361 HYDRATE IV INFUSION ADD-ON: CPT

## 2021-12-27 PROCEDURE — 0241U HB NFCT DS VIR RESP RNA 4 TRGT: CPT | Performed by: STUDENT IN AN ORGANIZED HEALTH CARE EDUCATION/TRAINING PROGRAM

## 2021-12-27 PROCEDURE — 96374 THER/PROPH/DIAG INJ IV PUSH: CPT

## 2021-12-27 PROCEDURE — 96375 TX/PRO/DX INJ NEW DRUG ADDON: CPT

## 2021-12-27 RX ORDER — ACETAMINOPHEN 325 MG/1
650 TABLET ORAL ONCE
Status: COMPLETED | OUTPATIENT
Start: 2021-12-27 | End: 2021-12-27

## 2021-12-27 RX ORDER — IPRATROPIUM BROMIDE AND ALBUTEROL SULFATE 2.5; .5 MG/3ML; MG/3ML
3 SOLUTION RESPIRATORY (INHALATION)
Status: DISCONTINUED | OUTPATIENT
Start: 2021-12-27 | End: 2021-12-27 | Stop reason: HOSPADM

## 2021-12-27 RX ORDER — METOCLOPRAMIDE HYDROCHLORIDE 5 MG/ML
10 INJECTION INTRAMUSCULAR; INTRAVENOUS ONCE
Status: COMPLETED | OUTPATIENT
Start: 2021-12-27 | End: 2021-12-27

## 2021-12-27 RX ORDER — ALBUTEROL SULFATE 90 UG/1
1-2 AEROSOL, METERED RESPIRATORY (INHALATION) EVERY 6 HOURS PRN
Qty: 18 G | Refills: 0 | Status: SHIPPED | OUTPATIENT
Start: 2021-12-27 | End: 2022-03-23 | Stop reason: ALTCHOICE

## 2021-12-27 RX ORDER — DIPHENHYDRAMINE HYDROCHLORIDE 50 MG/ML
25 INJECTION INTRAMUSCULAR; INTRAVENOUS ONCE
Status: COMPLETED | OUTPATIENT
Start: 2021-12-27 | End: 2021-12-27

## 2021-12-27 RX ADMIN — SODIUM CHLORIDE 1000 ML: 0.9 INJECTION, SOLUTION INTRAVENOUS at 03:31

## 2021-12-27 RX ADMIN — IPRATROPIUM BROMIDE AND ALBUTEROL SULFATE 3 ML: 2.5; .5 SOLUTION RESPIRATORY (INHALATION) at 03:31

## 2021-12-27 RX ADMIN — DIPHENHYDRAMINE HYDROCHLORIDE 25 MG: 50 INJECTION, SOLUTION INTRAMUSCULAR; INTRAVENOUS at 03:25

## 2021-12-27 RX ADMIN — ACETAMINOPHEN 650 MG: 325 TABLET, FILM COATED ORAL at 03:30

## 2021-12-27 RX ADMIN — METOCLOPRAMIDE HYDROCHLORIDE 10 MG: 5 INJECTION INTRAMUSCULAR; INTRAVENOUS at 03:25

## 2021-12-27 NOTE — ED PROVIDER NOTES
History  Chief Complaint   Patient presents with    Flu Symptoms     Patient presents with body aches, fever and headache for the past two days  Took tylenol around 5pm today     Patient is a 19-year-old female presents ED today with complaint of flu-like symptoms  Patient states 1-2 days ago she began with severe headache, fever, whole body aches, chest tightness, sore throat, cough, nausea/vomiting and diarrhea  Patient states she has been taking Advil for symptomatic management with little relief  Patient denies any known COVID contacts and denies vaccination status  She denies chills, lightheadedness/dizziness, syncope, abdominal pain, dysuria, increased frequency or urgency  Prior to Admission Medications   Prescriptions Last Dose Informant Patient Reported?  Taking?   chlorhexidine (PERIDEX) 0 12 % solution Not Taking at Unknown time  No No   Sig: Apply 15 mL to the mouth or throat 2 (two) times a day   Patient not taking: Reported on 12/26/2021    chlorhexidine (PERIDEX) 0 12 % solution Not Taking at Unknown time  No No   Sig: Apply 15 mL to the mouth or throat 2 (two) times a day   Patient not taking: Reported on 12/26/2021    ondansetron (ZOFRAN) 4 mg tablet   No No   Sig: Take 1 tablet (4 mg total) by mouth every 6 (six) hours for 2 days   ondansetron (Zofran ODT) 4 mg disintegrating tablet Not Taking at Unknown time  No No   Sig: Take 1 tablet (4 mg total) by mouth every 6 (six) hours as needed for nausea or vomiting   Patient not taking: Reported on 12/26/2021    ondansetron (Zofran ODT) 4 mg disintegrating tablet Not Taking at Unknown time  No No   Sig: Take 1 tablet (4 mg total) by mouth every 6 (six) hours as needed for nausea or vomiting   Patient not taking: Reported on 12/26/2021       Facility-Administered Medications: None       Past Medical History:   Diagnosis Date    Anxiety     Asthma     Depression     GERD (gastroesophageal reflux disease)     Hernia, abdominal     Migraines     Muscle spasm     Psychiatric disorder     Anx, Depression    Renal disorder     kidney stones       Past Surgical History:   Procedure Laterality Date    CYSTOSCOPY      removal of kidney stone    FL RETROGRADE PYELOGRAM  8/28/2018    HERNIA REPAIR      [place] CYSTO/URETERO W/LITHOTRIPSY &INDWELL STENT INSRT Right 8/28/2018    Procedure: CYSTOSCOPY URETEROSCOPY WITH RETROGRADE PYELOGRAM AND INSERTION STENT URETERAL;  Surgeon: Keven Mc MD;  [facility];  Service: Urology    TOOTH EXTRACTION         Family History   Problem Relation Age of Onset    Diabetes Mother     Hyperlipidemia Mother     Stroke Mother     Heart disease Mother     Asthma Mother    Vasu Chu Other Mother         Degen  of Intervertabral disc   Nephrolithiasis Father     Nephrolithiasis Brother      I have reviewed and agree with the history as documented  E-Cigarette/Vaping    E-Cigarette Use Never User      E-Cigarette/Vaping Substances    Nicotine No     THC No     CBD No     Flavoring No     Other No     Unknown No      Social History     Tobacco Use    Smoking status: Current Every Day Smoker     Packs/day: 0 50     Years: 13 00     Pack years: 6 50     Types: Cigarettes    Smokeless tobacco: Never Used   Vaping Use    Vaping Use: Never used   Substance Use Topics    Alcohol use: Not Currently     Comment: Alcohol intake:   None  - As per Ghazala Amin Drug use: No     Comment: Illicit drugs:   none  - As per [place]        Review of Systems   Constitutional: Negative for chills and fever  HENT: Positive for congestion, rhinorrhea and sore throat  Respiratory: Positive for cough and chest tightness  Negative for shortness of breath  Cardiovascular: Negative for chest pain  Gastrointestinal: Positive for diarrhea, nausea and vomiting  Negative for abdominal pain  Genitourinary: Negative  Musculoskeletal: Positive for myalgias  Neurological: Positive for headaches   Negative for dizziness, weakness, light-headedness and numbness  All other systems reviewed and are negative  Physical Exam  Physical Exam  Vitals and nursing note reviewed  Constitutional:       General: She is not in acute distress  Appearance: She is well-developed  She is ill-appearing  HENT:      Head: Normocephalic and atraumatic  Nose: Congestion present  Mouth/Throat:      Mouth: Mucous membranes are moist       Pharynx: Oropharynx is clear  Posterior oropharyngeal erythema present  No oropharyngeal exudate  Tonsils: No tonsillar exudate or tonsillar abscesses  Eyes:      Conjunctiva/sclera: Conjunctivae normal       Pupils: Pupils are equal, round, and reactive to light  Cardiovascular:      Rate and Rhythm: Normal rate and regular rhythm  Heart sounds: No murmur heard  Pulmonary:      Effort: Pulmonary effort is normal  No respiratory distress  Breath sounds: Normal breath sounds  Abdominal:      General: Bowel sounds are normal  There is no distension  Palpations: Abdomen is soft  Tenderness: There is no abdominal tenderness  There is no guarding or rebound  Musculoskeletal:         General: Tenderness present  Cervical back: Neck supple  Tenderness present  Muscular tenderness present  No pain with movement or spinous process tenderness  Right lower leg: No edema  Left lower leg: No edema  Lymphadenopathy:      Cervical: No cervical adenopathy  Skin:     General: Skin is warm and dry  Neurological:      General: No focal deficit present  Mental Status: She is alert and oriented to person, place, and time  Sensory: Sensation is intact  Motor: Motor function is intact  Psychiatric:         Attention and Perception: Attention normal          Mood and Affect: Mood normal          Speech: Speech normal          Behavior: Behavior normal          Thought Content:  Thought content normal          Judgment: Judgment normal  Vital Signs  ED Triage Vitals [12/26/21 2224]   Temperature Pulse Respirations Blood Pressure SpO2   97 7 °F (36 5 °C) 86 18 118/78 100 %      Temp Source Heart Rate Source Patient Position - Orthostatic VS BP Location FiO2 (%)   Oral Monitor Sitting Left arm --      Pain Score       8           Vitals:    12/27/21 0224 12/27/21 0303 12/27/21 0352 12/27/21 0426   BP: 95/59 97/66 93/58 95/59   Pulse: 70 77 67 74   Patient Position - Orthostatic VS: Lying Lying Lying Sitting         Visual Acuity      ED Medications  Medications   ipratropium-albuterol (DUO-NEB) 0 5-2 5 mg/3 mL inhalation solution 3 mL (3 mL Nebulization Given 12/27/21 0331)   acetaminophen (TYLENOL) tablet 650 mg (650 mg Oral Given 12/27/21 0330)   sodium chloride 0 9 % bolus 1,000 mL (0 mL Intravenous Stopped 12/27/21 0431)   diphenhydrAMINE (BENADRYL) injection 25 mg (25 mg Intravenous Given 12/27/21 0325)   metoclopramide (REGLAN) injection 10 mg (10 mg Intravenous Given 12/27/21 0325)       Diagnostic Studies  Results Reviewed     Procedure Component Value Units Date/Time    COVID/FLU/RSV - 2 hour TAT [[id number]]  (Abnormal) Collected: 12/27/21 0302    Lab Status: Final result Specimen: Nasopharyngeal Swab Updated: 12/27/21 0352     SARS-CoV-2 Positive     INFLUENZA A PCR Negative     INFLUENZA B PCR Negative     RSV PCR Negative    Narrative:      FOR PEDIATRIC PATIENTS - copy/paste COVID Guidelines URL to browser: [email or web address]  Aylax     This test has been authorized by FDA under an EUA (Emergency Use Assay) for use by authorized laboratories  Clinical caution and judgement should be used with the interpretation of these results with consideration of the clinical impression and other laboratory testing  Testing reported as "Positive" or "Negative" has been proven to be accurate according to standard laboratory validation requirements    All testing is performed with control materials showing appropriate reactivity at standard intervals  No orders to display              Procedures  Procedures         ED Course  ED Course as of 12/27/21 0626   [facility] Dec 27, 2021   0411 Patient states chest tightness significant for following DuoNeb                                             [facility]  Number of Diagnoses or Management Options  COVID-19  Diagnosis management comments: Patient is a 59-year-old female presents ED today with complaint of flu-like symptoms  Examination was unremarkable, mild posterior oropharyngeal erythema and rhinorrhea, lungs are clear to auscultation bilaterally, heart was regular rate and rhythm  COVID testing was initiated and patient was given a migraine cocktail  Patient states migraine cocktail provided moderate relief  Patient also given a nebulizer treatment due to complaint of chest tightness with good relief  Patient was positive for COVID-19 and was given proper quarantine precautions  Patient advised to follow-up primary care within the next week for re-evaluation to return to the ED with worsening symptoms as discussed  Patient verbalized understanding and agreement to plan of care was stable on discharge  Disposition  Final diagnoses:   [id number]     Time reflects when diagnosis was documented in both MDM as applicable and the Disposition within this note     Time User Action Codes Description Comment    12/27/2021  4:12 AM Yaritza Knox Add [U07 1] COVID-19       ED Disposition     ED Disposition Condition Date/Time Comment    Discharge Stable Mon Dec 27, 2021  4:12 AM Can Hodgson discharge to home/self care              Follow-up Information     Follow up With Specialties Details Why Contact Info Additional Information    Marshall Rader MD Family Medicine   [address] [facility],[address]  [address]  [address]  [phone]       [facility] [place] [address] Emergency Department Emergency Medicine   [address] [facility],[address] [facility] Emergency Department, [address], [address]          Discharge Medication List as of 12/27/2021  4:14 AM      START taking these medications    Details   albuterol (Ventolin HFA) 90 mcg/act inhaler Inhale 1-2 puffs every 6 (six) hours as needed for wheezing or shortness of breath, Starting Mon 12/27/2021, Normal         CONTINUE these medications which have NOT CHANGED    Details   !! chlorhexidine (PERIDEX) 0 12 % solution Apply 15 mL to the mouth or throat 2 (two) times a day, Starting Wed 8/25/2021, Normal      !! chlorhexidine (PERIDEX) 0 12 % solution Apply 15 mL to the mouth or throat 2 (two) times a day, Starting Fri 9/3/2021, Normal      !! ondansetron (Zofran ODT) 4 mg disintegrating tablet Take 1 tablet (4 mg total) by mouth every 6 (six) hours as needed for nausea or vomiting, Starting Tue 12/7/2021, Normal      !! ondansetron (Zofran ODT) 4 mg disintegrating tablet Take 1 tablet (4 mg total) by mouth every 6 (six) hours as needed for nausea or vomiting, Starting Wed 12/22/2021, Normal      ondansetron (ZOFRAN) 4 mg tablet Take 1 tablet (4 mg total) by mouth every 6 (six) hours for 2 days, Starting Sat 10/30/2021, Until Mon 11/1/2021, Normal       !! - Potential duplicate medications found  Please discuss with provider  No discharge procedures on file      PDMP Review       Value Time User    PDMP Reviewed  Yes 11/28/2021 10:12 PM Amee Pastor MD          ED Provider  Electronically Signed by           Louis Craft PA-C  12/27/21 1243

## 2021-12-27 NOTE — DISCHARGE INSTRUCTIONS
Use albuterol 1-2 puffs every 6 hours as needed for chest tightness and shortness of breath  Continue over-the-counter Tylenol or ibuprofen every 6 hours as needed for pain and fever reduction  Follow-up with primary care within next week further evaluation  Return to the ED with worsening symptoms including development of fever/chills greater than 103° F that is not responding to medication, worsening chest pain or shortness of breath, lightheadedness/dizziness, passing out, vomiting blood or blood in the stool, altered mental status or confusion

## 2021-12-28 ENCOUNTER — NURSE TRIAGE (OUTPATIENT)
Dept: OTHER | Facility: OTHER | Age: 31
End: 2021-12-28

## 2021-12-28 DIAGNOSIS — R11.0 NAUSEA: Primary | ICD-10-CM

## 2021-12-28 RX ORDER — PROMETHAZINE HYDROCHLORIDE 12.5 MG/1
12.5 TABLET ORAL EVERY 6 HOURS PRN
Qty: 8 TABLET | Refills: 0 | Status: SHIPPED | OUTPATIENT
Start: 2021-12-28 | End: 2022-03-02 | Stop reason: ALTCHOICE

## 2022-01-22 ENCOUNTER — HOSPITAL ENCOUNTER (EMERGENCY)
Facility: HOSPITAL | Age: 32
Discharge: HOME | End: 2022-01-22
Attending: EMERGENCY MEDICINE
Payer: COMMERCIAL

## 2022-01-22 VITALS
WEIGHT: 115 LBS | HEART RATE: 99 BPM | DIASTOLIC BLOOD PRESSURE: 60 MMHG | HEIGHT: 60 IN | BODY MASS INDEX: 22.58 KG/M2 | TEMPERATURE: 98.3 F | OXYGEN SATURATION: 99 % | SYSTOLIC BLOOD PRESSURE: 98 MMHG | RESPIRATION RATE: 16 BRPM

## 2022-01-22 DIAGNOSIS — K04.7 DENTAL INFECTION: ICD-10-CM

## 2022-01-22 DIAGNOSIS — K02.9 DENTAL DECAY: Primary | ICD-10-CM

## 2022-01-22 PROCEDURE — 99284 EMERGENCY DEPT VISIT MOD MDM: CPT | Performed by: STUDENT IN AN ORGANIZED HEALTH CARE EDUCATION/TRAINING PROGRAM

## 2022-01-22 PROCEDURE — 99282 EMERGENCY DEPT VISIT SF MDM: CPT

## 2022-01-22 RX ORDER — AMOXICILLIN AND CLAVULANATE POTASSIUM 875; 125 MG/1; MG/1
1 TABLET, FILM COATED ORAL EVERY 12 HOURS
Qty: 14 TABLET | Refills: 0 | Status: SHIPPED | OUTPATIENT
Start: 2022-01-22 | End: 2022-01-29

## 2022-01-22 RX ORDER — ONDANSETRON 4 MG/1
4 TABLET, ORALLY DISINTEGRATING ORAL ONCE
Status: COMPLETED | OUTPATIENT
Start: 2022-01-22 | End: 2022-01-22

## 2022-01-22 RX ORDER — OXYCODONE HYDROCHLORIDE 5 MG/1
5 TABLET ORAL ONCE
Status: COMPLETED | OUTPATIENT
Start: 2022-01-22 | End: 2022-01-22

## 2022-01-22 RX ORDER — AMOXICILLIN AND CLAVULANATE POTASSIUM 875; 125 MG/1; MG/1
1 TABLET, FILM COATED ORAL ONCE
Status: COMPLETED | OUTPATIENT
Start: 2022-01-22 | End: 2022-01-22

## 2022-01-22 RX ADMIN — AMOXICILLIN AND CLAVULANATE POTASSIUM 1 TABLET: 875; 125 TABLET, FILM COATED ORAL at 21:34

## 2022-01-22 RX ADMIN — OXYCODONE HYDROCHLORIDE 5 MG: 5 TABLET ORAL at 21:34

## 2022-01-22 RX ADMIN — ONDANSETRON 4 MG: 4 TABLET, ORALLY DISINTEGRATING ORAL at 21:37

## 2022-01-23 NOTE — DISCHARGE INSTRUCTIONS
Begin Augmentin 2 times a day for next 7 days treatment of dental infection  Continue over-the-counter Tylenol or ibuprofen every 6 hours as needed for pain control  Follow-up with dentistry as soon as able for further evaluation and continued management  Return to the ED with worsening symptoms including development of fever/chills, swelling of the face/lips/tongue/throat, difficulty swallowing or breathing, severe uncontrolled pain despite pain medication, development of swelling or signs of abscess on the gums

## 2022-01-23 NOTE — ED PROVIDER NOTES
History  Chief Complaint   Patient presents with    Dental Pain     b/l back lower dental pain  began yesterday     Patient is a 70-year-old female presents to ED today with complaint of bilateral lower dental pain x2 days  Patient frequently seen in the emergency department for similar complaint pains  Today she stating she has pain in her bilateral lower molars that began gradually yesterday and has suddenly increased significantly overnight  Patient states she has been taking 800 mg of ibuprofen and extra-strength Tylenol every 6 hours with minimal pain control  She states she has been trying to get in to see a dentist on several different occasions but has been unable to get in touch with anyone  Patient currently denies any swelling of the face/lip/tongue/throat, fever/chills, discharge coming from the tooth or gums, bleeding from the gums, difficulty breathing, lightheadedness/dizziness  Prior to Admission Medications   Prescriptions Last Dose Informant Patient Reported? Taking?    albuterol (Ventolin HFA) 90 mcg/act inhaler   No No   Sig: Inhale 1-2 puffs every 6 (six) hours as needed for wheezing or shortness of breath   chlorhexidine (PERIDEX) 0 12 % solution   No No   Sig: Apply 15 mL to the mouth or throat 2 (two) times a day   Patient not taking: Reported on 12/26/2021    chlorhexidine (PERIDEX) 0 12 % solution   No No   Sig: Apply 15 mL to the mouth or throat 2 (two) times a day   Patient not taking: Reported on 12/26/2021    ondansetron (ZOFRAN) 4 mg tablet   No No   Sig: Take 1 tablet (4 mg total) by mouth every 6 (six) hours for 2 days   ondansetron (Zofran ODT) 4 mg disintegrating tablet   No No   Sig: Take 1 tablet (4 mg total) by mouth every 6 (six) hours as needed for nausea or vomiting   Patient not taking: Reported on 12/26/2021    ondansetron (Zofran ODT) 4 mg disintegrating tablet   No No   Sig: Take 1 tablet (4 mg total) by mouth every 6 (six) hours as needed for nausea or vomiting   Patient not taking: Reported on 12/26/2021    promethazine (PHENERGAN) 12 5 MG tablet   No No   Sig: Take 1 tablet (12 5 mg total) by mouth every 6 (six) hours as needed for nausea or vomiting (every 4-6 hrs as needed)      Facility-Administered Medications: None       Past Medical History:   Diagnosis Date    Anxiety     Asthma     Depression     GERD (gastroesophageal reflux disease)     Hernia, abdominal     Migraines     Muscle spasm     Psychiatric disorder     Anx, Depression    Renal disorder     kidney stones       Past Surgical History:   Procedure Laterality Date    CYSTOSCOPY      removal of kidney stone    FL RETROGRADE PYELOGRAM  8/28/2018    HERNIA REPAIR      [place] CYSTO/URETERO W/LITHOTRIPSY &INDWELL STENT INSRT Right 8/28/2018    Procedure: CYSTOSCOPY URETEROSCOPY WITH RETROGRADE PYELOGRAM AND INSERTION STENT URETERAL;  Surgeon: Samuel White MD;  [facility];  Service: Urology    TOOTH EXTRACTION         Family History   Problem Relation Age of Onset    Diabetes Mother     Hyperlipidemia Mother     Stroke Mother     Heart disease Mother     Asthma Mother    Wu Other Mother         Degen  of Intervertabral disc   Nephrolithiasis Father     Nephrolithiasis Brother      I have reviewed and agree with the history as documented      E-Cigarette/Vaping    E-Cigarette Use Never User      E-Cigarette/Vaping Substances    Nicotine No     THC No     CBD No     Flavoring No     Other No     Unknown No      Social History     Tobacco Use    Smoking status: Current Every Day Smoker     Packs/day: 0 50     Years: 13 00     Pack years: 6 50     Types: Cigarettes    Smokeless tobacco: Never Used   Vaping Use    Vaping Use: Never used   Substance Use Topics    Alcohol use: Not Currently     Comment: Alcohol intake:   None  - As per Deja Holbrook Drug use: No     Comment: Illicit drugs:   none  - As per [place]        Review of Systems   Constitutional: Negative for chills and fever  HENT: Positive for dental problem  Negative for drooling, facial swelling, sinus pain and sore throat  Respiratory: Negative for cough, chest tightness and shortness of breath  Cardiovascular: Negative for chest pain  Gastrointestinal: Positive for nausea  Negative for abdominal pain and vomiting  Musculoskeletal: Negative  Skin: Negative  Neurological: Negative for dizziness, light-headedness and headaches  All other systems reviewed and are negative  Physical Exam  Physical Exam  Vitals and nursing note reviewed  Constitutional:       General: She is not in acute distress  Appearance: Normal appearance  HENT:      Head:      Jaw: No trismus  Nose: Nose normal  No congestion or rhinorrhea  Mouth/Throat:      Mouth: Mucous membranes are moist  No oral lesions or angioedema  Dentition: Abnormal dentition  Dental tenderness, gingival swelling (minimal) and dental caries present  No dental abscesses or gum lesions  Comments: Severe decay of multiple teeth throughout the mouth  Minimal swelling and tenderness with slight surrounding erythema of tooth 31 and 32 with near complete decay of tooth 18  No visualized areas of dental abscess or fluid accumulation, no discharge coming from the tooth or gingiva  Eyes:      Extraocular Movements: Extraocular movements intact  Pupils: Pupils are equal, round, and reactive to light  Cardiovascular:      Rate and Rhythm: Normal rate and regular rhythm  Heart sounds: Normal heart sounds  No murmur heard  Pulmonary:      Effort: Pulmonary effort is normal  No respiratory distress  Breath sounds: Normal breath sounds  Musculoskeletal:      Cervical back: Neck supple  No pain with movement, spinous process tenderness or muscular tenderness  Lymphadenopathy:      Cervical: No cervical adenopathy  Skin:     General: Skin is warm and dry     Neurological:      General: No focal deficit present  Mental Status: She is alert and oriented to person, place, and time  Sensory: No sensory deficit  Motor: No weakness  Psychiatric:         Attention and Perception: Attention normal          Mood and Affect: Mood normal          Behavior: Behavior normal          Thought Content: Thought content normal          Judgment: Judgment normal          Vital Signs  ED Triage Vitals [01/22/22 2102]   Temperature Pulse Respirations Blood Pressure SpO2   98 3 °F (36 8 °C) 99 16 98/60 99 %      Temp Source Heart Rate Source Patient Position - Orthostatic VS BP Location FiO2 (%)   Oral -- -- -- --      Pain Score       --           Vitals:    01/22/22 2102   BP: 98/60   Pulse: 99         Visual Acuity      ED Medications  Medications   oxyCODONE (ROXICODONE) IR tablet 5 mg (5 mg Oral Given 1/22/22 2134)   amoxicillin-clavulanate (AUGMENTIN) 875-125 mg per tablet 1 tablet (1 tablet Oral Given 1/22/22 2134)   ondansetron (ZOFRAN-ODT) dispersible tablet 4 mg (4 mg Oral Given 1/22/22 2137)       Diagnostic Studies  Results Reviewed     None                 No orders to display              Procedures  Procedures         ED Course                               SBIRT 20yo+      Most Recent Value   SBIRT (25 yo +)    In order to provide better care to our patients, we are screening all of our patients for alcohol and drug use  Would it be okay to ask you these screening questions? No Filed at: 01/22/2022 2122                    MDM  Number of Diagnoses or Management Options  Dental decay  Dental infection  Diagnosis management comments: Patient is a 19-year-old female presents to the ED today with complaint of bilateral lower dental pain x2 days  Examination showed severe decay of numerous teeth in the mouth, minimal swelling and erythema surrounding tooth 31 and 32 with near complete decay of tooth 18  Patient was started on Augmentin for suspected dental infection surrounding tooth 31 and 32   Patient given 1 dose of oxycodone in the emergency department for temporary pain relief due to recently taking Tylenol and ibuprofen approximately 3-4 hours ago and a discussion was had that she will not be getting a prescription for at home and that she needs to follow up with her dentist for further evaluation and management  Patient was sent home with a prescription for Augmentin and advised to return to the emergency department with any worsening symptoms as discussed with the patient  Patient verbalized understanding and agreed to plan of care, all patient's questions were answered, patient was stable on discharge  Patient Progress  Patient progress: stable      Disposition  Final diagnoses:   Dental decay   Dental infection     Time reflects when diagnosis was documented in both MDM as applicable and the Disposition within this note     Time User Action Codes Description Comment    1/22/2022  9:24 PM Jeannene Chaz Add [K02 9] Dental decay     1/22/2022  9:24 PM Jeannene Chaz Add [K08 89] Pain, dental     1/22/2022  9:26 PM Jeannene Chaz Remove [K08 89] Pain, dental     1/22/2022  9:26 PM Jeannene Chaz Add [K04 7] Dental infection       ED Disposition     ED Disposition Condition Date/Time Comment    Discharge Stable Sat Jan 22, 2022 10:25 PM Mike Pérez discharge to home/self care              Follow-up Information     Follow up With Specialties Details Why Contact Info Additional Information    [facility]    [address] [facility]     Octavia Boyle MD Family Medicine   [id number] [place] [address],[address]  [address]  [address]  [phone]       [place] [address] Emergency Department Emergency Medicine   [address] [facility],[address] [id number]  [facility] Emergency Department, [address], [address]          Discharge Medication List as of 1/22/2022 10:27 PM      START taking these medications    Details   amoxicillin-clavulanate (AUGMENTIN) 875-125 mg per tablet Take 1 tablet by mouth every 12 (twelve) hours for 7 days, Starting Sat 1/22/2022, Until Sat 1/29/2022, Normal         CONTINUE these medications which have NOT CHANGED    Details   albuterol (Ventolin HFA) 90 mcg/act inhaler Inhale 1-2 puffs every 6 (six) hours as needed for wheezing or shortness of breath, Starting Mon 12/27/2021, Normal      !! chlorhexidine (PERIDEX) 0 12 % solution Apply 15 mL to the mouth or throat 2 (two) times a day, Starting Wed 8/25/2021, Normal      !! chlorhexidine (PERIDEX) 0 12 % solution Apply 15 mL to the mouth or throat 2 (two) times a day, Starting Fri 9/3/2021, Normal      !! ondansetron (Zofran ODT) 4 mg disintegrating tablet Take 1 tablet (4 mg total) by mouth every 6 (six) hours as needed for nausea or vomiting, Starting Tue 12/7/2021, Normal      !! ondansetron (Zofran ODT) 4 mg disintegrating tablet Take 1 tablet (4 mg total) by mouth every 6 (six) hours as needed for nausea or vomiting, Starting Wed 12/22/2021, Normal      ondansetron (ZOFRAN) 4 mg tablet Take 1 tablet (4 mg total) by mouth every 6 (six) hours for 2 days, Starting Sat 10/30/2021, Until Mon 11/1/2021, Normal      promethazine (PHENERGAN) 12 5 MG tablet Take 1 tablet (12 5 mg total) by mouth every 6 (six) hours as needed for nausea or vomiting (every 4-6 hrs as needed), Starting Tue 12/28/2021, Normal       !! - Potential duplicate medications found  Please discuss with provider                PDMP Review       Value Time User    PDMP Reviewed  Yes 11/28/2021 10:12 PM Rachelle Kendall MD          ED Provider  Electronically Signed by           Ute Mcneil PA-C  01/22/22 0686

## 2022-01-24 ENCOUNTER — HOSPITAL ENCOUNTER (EMERGENCY)
Facility: HOSPITAL | Age: 32
Discharge: HOME | End: 2022-01-24
Attending: EMERGENCY MEDICINE
Payer: COMMERCIAL

## 2022-01-24 VITALS
OXYGEN SATURATION: 98 % | BODY MASS INDEX: 22.58 KG/M2 | HEIGHT: 60 IN | HEART RATE: 88 BPM | SYSTOLIC BLOOD PRESSURE: 129 MMHG | WEIGHT: 115 LBS | DIASTOLIC BLOOD PRESSURE: 60 MMHG | RESPIRATION RATE: 18 BRPM | TEMPERATURE: 98.1 F

## 2022-01-24 DIAGNOSIS — K08.89 PAIN, DENTAL: ICD-10-CM

## 2022-01-24 DIAGNOSIS — K02.9 PAIN DUE TO DENTAL CARIES: Primary | ICD-10-CM

## 2022-01-24 PROCEDURE — 90715 TDAP VACCINE 7 YRS/> IM: CPT | Performed by: PHYSICIAN ASSISTANT

## 2022-01-24 PROCEDURE — 99284 EMERGENCY DEPT VISIT MOD MDM: CPT | Performed by: PHYSICIAN ASSISTANT

## 2022-01-24 PROCEDURE — 99282 EMERGENCY DEPT VISIT SF MDM: CPT

## 2022-01-24 PROCEDURE — 90471 IMMUNIZATION ADMIN: CPT

## 2022-01-24 RX ORDER — CHLORHEXIDINE GLUCONATE 0.12 MG/ML
15 RINSE ORAL 2 TIMES DAILY
Qty: 60 ML | Refills: 0 | Status: SHIPPED | OUTPATIENT
Start: 2022-01-24 | End: 2022-01-26

## 2022-01-24 RX ORDER — ONDANSETRON 4 MG/1
4 TABLET, FILM COATED ORAL EVERY 6 HOURS
Qty: 8 TABLET | Refills: 0 | Status: SHIPPED | OUTPATIENT
Start: 2022-01-24 | End: 2022-03-01 | Stop reason: SDUPTHER

## 2022-01-24 RX ORDER — LIDOCAINE HYDROCHLORIDE 20 MG/ML
15 SOLUTION OROPHARYNGEAL ONCE
Status: COMPLETED | OUTPATIENT
Start: 2022-01-24 | End: 2022-01-24

## 2022-01-24 RX ORDER — ONDANSETRON 4 MG/1
4 TABLET, ORALLY DISINTEGRATING ORAL ONCE
Status: COMPLETED | OUTPATIENT
Start: 2022-01-24 | End: 2022-01-24

## 2022-01-24 RX ORDER — SENNOSIDES 8.6 MG
650 CAPSULE ORAL EVERY 8 HOURS PRN
Qty: 9 TABLET | Refills: 0 | Status: SHIPPED | OUTPATIENT
Start: 2022-01-24 | End: 2022-01-27

## 2022-01-24 RX ORDER — ACETAMINOPHEN 325 MG/1
650 TABLET ORAL ONCE
Status: COMPLETED | OUTPATIENT
Start: 2022-01-24 | End: 2022-01-24

## 2022-01-24 RX ADMIN — LIDOCAINE HYDROCHLORIDE 15 ML: 20 SOLUTION ORAL; TOPICAL at 21:03

## 2022-01-24 RX ADMIN — ONDANSETRON 4 MG: 4 TABLET, ORALLY DISINTEGRATING ORAL at 21:02

## 2022-01-24 RX ADMIN — ACETAMINOPHEN 650 MG: 325 TABLET, FILM COATED ORAL at 21:02

## 2022-01-24 RX ADMIN — TETANUS TOXOID, REDUCED DIPHTHERIA TOXOID AND ACELLULAR PERTUSSIS VACCINE, ADSORBED 0.5 ML: 5; 2.5; 8; 8; 2.5 SUSPENSION INTRAMUSCULAR at 21:03

## 2022-01-24 RX ADMIN — LIDOCAINE HYDROCHLORIDE 15 ML: 20 SOLUTION ORAL; TOPICAL at 21:02

## 2022-01-25 NOTE — DISCHARGE INSTRUCTIONS
Continue taking Augmentin as prescribed by previous ED provider; 01/22/2020  Take Orabase b, Tylenol, and chlorhexidine  Follow-up with Dental Clinic, [facility], dentist, emergency department symptoms persist or exacerbate

## 2022-01-25 NOTE — ED PROVIDER NOTES
History  Chief Complaint   Patient presents with    Dental Pain     Patient comes in c/o dental pain  Reports being perscribed amoxicillin for infected tooth in back right side of mouth  Reports having pain x4 days  Rates pain 8/10     Patient is a 19-year-old female current everyday cigarette smoker the presents emergency department with dull aching persistent nonradiating right-sided lower jaw pain for 6 days  Patient denies associated symptomatology patient with recent visit to emergency department on 01/22/2022, for symptoms similar to current and treated dental pain with oxycodone, nausea control with Zofran, and given Augmentin 1st dose with follow-up  Patient returns emergency department given worsening dental pain at this time  Patient also affirms ability to open her mouth normally  Patient denies palliative factors with provocative factors of pressure to lower right-sided jaw  Patient denies not effective treatment  Patient denies fevers, chills,, diarrhea, constipation, urinary symptoms  Patient denies recent fall or recent trauma  Patient denies sick contacts or recent travel  Patient denies recent dental work  Patient denies chest pain, shortness breath, abdominal pain  History provided by:  Patient   used: No        Prior to Admission Medications   Prescriptions Last Dose Informant Patient Reported? Taking?    albuterol (Ventolin HFA) 90 mcg/act inhaler   No No   Sig: Inhale 1-2 puffs every 6 (six) hours as needed for wheezing or shortness of breath   amoxicillin-clavulanate (AUGMENTIN) 875-125 mg per tablet   No No   Sig: Take 1 tablet by mouth every 12 (twelve) hours for 7 days   chlorhexidine (PERIDEX) 0 12 % solution   No No   Sig: Apply 15 mL to the mouth or throat 2 (two) times a day   Patient not taking: Reported on 12/26/2021    chlorhexidine (PERIDEX) 0 12 % solution   No No   Sig: Apply 15 mL to the mouth or throat 2 (two) times a day   Patient not taking: Reported on 12/26/2021    ondansetron (ZOFRAN) 4 mg tablet   No No   Sig: Take 1 tablet (4 mg total) by mouth every 6 (six) hours for 2 days   ondansetron (Zofran ODT) 4 mg disintegrating tablet   No No   Sig: Take 1 tablet (4 mg total) by mouth every 6 (six) hours as needed for nausea or vomiting   Patient not taking: Reported on 12/26/2021    ondansetron (Zofran ODT) 4 mg disintegrating tablet   No No   Sig: Take 1 tablet (4 mg total) by mouth every 6 (six) hours as needed for nausea or vomiting   Patient not taking: Reported on 12/26/2021    promethazine (PHENERGAN) 12 5 MG tablet   No No   Sig: Take 1 tablet (12 5 mg total) by mouth every 6 (six) hours as needed for nausea or vomiting (every 4-6 hrs as needed)      Facility-Administered Medications: None       Past Medical History:   Diagnosis Date    Anxiety     Asthma     Depression     GERD (gastroesophageal reflux disease)     Hernia, abdominal     Migraines     Muscle spasm     Psychiatric disorder     Anx, Depression    Renal disorder     kidney stones       Past Surgical History:   Procedure Laterality Date    CYSTOSCOPY      removal of kidney stone    FL RETROGRADE PYELOGRAM  8/28/2018    HERNIA REPAIR      NC CYSTO/URETERO W/LITHOTRIPSY &INDWELL STENT INSRT Right 8/28/2018    Procedure: CYSTOSCOPY URETEROSCOPY WITH RETROGRADE PYELOGRAM AND INSERTION STENT URETERAL;  Surgeon: Annamarie Agustin MD;  Location[facility];  Service: Urology    TOOTH EXTRACTION         Family History   Problem Relation Age of Onset    Diabetes Mother     Hyperlipidemia Mother     Stroke Mother     Heart disease Mother     Asthma Mother    Yan Cordova Other Mother         Degen  of Intervertabral disc   Nephrolithiasis Father     Nephrolithiasis Brother      I have reviewed and agree with the history as documented      E-Cigarette/Vaping    E-Cigarette Use Never User      E-Cigarette/Vaping Substances    Nicotine No     THC No     CBD No     Flavoring No  Other No     Unknown No      Social History     Tobacco Use    Smoking status: Current Every Day Smoker     Packs/day: 0 50     Years: 13 00     Pack years: 6 50     Types: Cigarettes    Smokeless tobacco: Never Used   Vaping Use    Vaping Use: Never used   Substance Use Topics    Alcohol use: Not Currently     Comment: Alcohol intake:   None  - As per [place] Drug use: No     Comment: Illicit drugs:   none  - As per [place]        Review of Systems   Constitutional: Negative for activity change, appetite change, chills and fever  HENT: Positive for dental problem  Negative for congestion, postnasal drip, rhinorrhea, sinus pressure, sinus pain, sore throat and tinnitus  Eyes: Negative for photophobia and visual disturbance  Respiratory: Negative for cough, chest tightness and shortness of breath  Cardiovascular: Negative for chest pain and palpitations  Gastrointestinal: Negative for abdominal pain, constipation, diarrhea, nausea and vomiting  Genitourinary: Negative for difficulty urinating, dysuria, flank pain, frequency and urgency  Musculoskeletal: Negative for back pain, gait problem, neck pain and neck stiffness  Skin: Negative for pallor and rash  Allergic/Immunologic: Negative for environmental allergies and food allergies  Neurological: Negative for dizziness, weakness, numbness and headaches  Psychiatric/Behavioral: Negative for confusion  All other systems reviewed and are negative  Physical Exam  Physical Exam  Vitals and nursing note reviewed  Constitutional:       General: She is awake  Appearance: Normal appearance  She is well-developed  She is not ill-appearing, toxic-appearing or diaphoretic  Comments: /60 (BP Location: Right arm)   Pulse 88   Temp 98 1 °F (36 7 °C) (Oral)   Resp 18   Ht 5' (1 524 m)   Wt 52 2 kg (115 lb)   SpO2 98%   BMI 22 46 kg/m²      HENT:      Head: Normocephalic and atraumatic        Comments: No      Right Ear: Hearing, tympanic membrane, ear canal and external ear normal  No decreased hearing noted  No drainage, swelling or tenderness  No mastoid tenderness  Left Ear: Hearing, tympanic membrane, ear canal and external ear normal  No decreased hearing noted  No drainage, swelling or tenderness  No mastoid tenderness  Nose: Nose normal       Mouth/Throat:      Lips: Pink  Mouth: Mucous membranes are moist       Dentition: Abnormal dentition  Dental tenderness and dental caries present  No gingival swelling or dental abscesses  Pharynx: Oropharynx is clear  Uvula midline  Comments: No trismus    Tooth pain 31/32  Eyes:      General: Lids are normal  Vision grossly intact  Right eye: No discharge  Left eye: No discharge  Extraocular Movements: Extraocular movements intact  Conjunctiva/sclera: Conjunctivae normal       Pupils: Pupils are equal, round, and reactive to light  Neck:      Vascular: No JVD  Trachea: Trachea and phonation normal  No tracheal tenderness or tracheal deviation  Cardiovascular:      Rate and Rhythm: Normal rate and regular rhythm  Pulses: Normal pulses  Radial pulses are 2+ on the right side and 2+ on the left side  Heart sounds: Normal heart sounds  Pulmonary:      Effort: Pulmonary effort is normal       Breath sounds: Normal breath sounds  No stridor  No decreased breath sounds, wheezing, rhonchi or rales  Chest:      Chest wall: No tenderness  Abdominal:      Palpations: Abdomen is not rigid  Musculoskeletal:         General: Normal range of motion  Cervical back: Full passive range of motion without pain, normal range of motion and neck supple  No rigidity  No spinous process tenderness or muscular tenderness  Normal range of motion  Lymphadenopathy:      Head:      Right side of head: No submental, submandibular, tonsillar, preauricular, posterior auricular or occipital adenopathy        Left side of head: No submental, submandibular, tonsillar, preauricular, posterior auricular or occipital adenopathy  Cervical: No cervical adenopathy  Right cervical: No superficial, deep or posterior cervical adenopathy  Left cervical: No superficial, deep or posterior cervical adenopathy  Skin:     General: Skin is warm  Capillary Refill: Capillary refill takes less than 2 seconds  Neurological:      General: No focal deficit present  Mental Status: She is alert and oriented to person, place, and time  GCS: GCS eye subscore is 4  GCS verbal subscore is 5  GCS motor subscore is 6  Sensory: No sensory deficit  Psychiatric:         Mood and Affect: Mood normal          Speech: Speech normal          Behavior: Behavior normal  Behavior is cooperative  Thought Content:  Thought content normal          Judgment: Judgment normal          Vital Signs  ED Triage Vitals [01/24/22 1917]   Temperature Pulse Respirations Blood Pressure SpO2   98 1 °F (36 7 °C) 88 18 129/60 98 %      Temp Source Heart Rate Source Patient Position - Orthostatic VS BP Location FiO2 (%)   Oral Monitor Sitting Right arm --      Pain Score       8           Vitals:    01/24/22 1917   BP: 129/60   Pulse: 88   Patient Position - Orthostatic VS: Sitting         Visual Acuity      ED Medications  Medications   Lidocaine Viscous HCl (XYLOCAINE) 2 % mucosal solution 15 mL (15 mL Swish & Spit Given 1/24/22 2102)   tetanus-diphtheria-acellular pertussis (BOOSTRIX) IM injection 0 5 mL (0 5 mL Intramuscular Given 1/24/22 2103)   acetaminophen (TYLENOL) tablet 650 mg (650 mg Oral Given 1/24/22 2102)   ondansetron (ZOFRAN-ODT) dispersible tablet 4 mg (4 mg Oral Given 1/24/22 2102)   Lidocaine Viscous HCl (XYLOCAINE) 2 % mucosal solution 15 mL (15 mL Swish & Spit Given 1/24/22 2103)       Diagnostic Studies  Results Reviewed     None                 No orders to display              Procedures  Procedures         ED Course MDM  Number of Diagnoses or Management Options     Amount and/or Complexity of Data Reviewed  Review and summarize past medical records: yes    Risk of Complications, Morbidity, and/or Mortality  Presenting problems: low  Diagnostic procedures: low  Management options: low    Patient Progress  Patient progress: stable    Patient is a 79-year-old female current everyday cigarette smoker the presents emergency department with dull aching persistent nonradiating right-sided lower jaw pain for 6 days  Patient hemodynamically stable afebrile  No trismus   Delivered viscous lidocaine, and Tylenol in the emergency department  Patient verbalizes decrease in tooth pain status post medication delivery  Prescribed patient chlorhexidine rinse, Tylenol, zofran, and Orabase-B and counseled patient on medication administration and side effects  Patient denies abdominal pain at this time, patient denies offered pregnancy test; patient with request of Zofran prescription  Delivered patient work note  Follow-up with dental clinic  Follow-up with General Dentistry  Follow-up with PCP  Follow up with emergency department symptoms persist or exacerbate  Patient demonstrates verbal understanding of all discharge instructions, follow-up, and verbalized agreement patient current treatment plan      Disposition  Final diagnoses:   Pain due to dental caries   Pain, dental - Right mandibular molar; 31/32     Time reflects when diagnosis was documented in both MDM as applicable and the Disposition within this note     Time User Action Codes Description Comment    1/24/2022  8:31 PM Veto Brass Add [K02 9] Pain due to dental caries     1/24/2022  8:31 PM Veto Brass Add [K08 89] Pain, dental     1/24/2022  8:31 PM Veto Brass Modify [K08 89] Pain, dental Right mandibular molar; 31/32      ED Disposition     ED Disposition Condition Date/Time Comment    Discharge Stable Mon Jan 24, 2022  8:33 PM Mike Pérez discharge to home/self care  Follow-up Information     Follow up With Specialties Details Why Contact Info Additional Information    Teddy Charles MD Family Medicine   [address]  [address]  [address]  [phone]       [address] Emergency Department Emergency Medicine   [address] [facility] [address] [facility] Emergency Department, [address], [place], [place], [address] [facility]   [address] [facility], [address] [place] [address] [id number]  [address] [facility], [address] [place], [place], [address], [address] [address]  [address] [facility], [facility] [place], [place], [address], [address] Emergency Department Emergency Medicine   [address] [facility] [address] [facility] Emergency Department, [address], [place], [place], [address]          Discharge Medication List as of 1/24/2022  8:34 PM      START taking these medications    Details   acetaminophen (TYLENOL) 650 mg CR tablet Take 1 tablet (650 mg total) by mouth every 8 (eight) hours as needed for mild pain for up to 3 days, Starting Mon 1/24/2022, Until Thu 1/27/2022 at 2359, Normal      benzocaine (ORABASE-B) 20 % PSTE Apply 1 application to the mouth or throat 4 (four) times a day as needed for mucositis for up to 3 days, Starting Mon 1/24/2022, Until Thu 1/27/2022 at 2359, Normal      !! chlorhexidine (PERIDEX) 0 12 % solution Apply 15 mL to the mouth or throat 2 (two) times a day for 2 days, Starting Mon 1/24/2022, Until Wed 1/26/2022, Normal       !! - Potential duplicate medications found  Please discuss with provider        CONTINUE these medications which have NOT CHANGED    Details   albuterol (Ventolin HFA) 90 mcg/act inhaler Inhale 1-2 puffs every 6 (six) hours as needed for wheezing or shortness of breath, Starting Mon 12/27/2021, Normal      amoxicillin-clavulanate (AUGMENTIN) 875-125 mg per tablet Take 1 tablet by mouth every 12 (twelve) hours for 7 days, Starting Sat 1/22/2022, Until Sat 1/29/2022, Normal      !! chlorhexidine (PERIDEX) 0 12 % solution Apply 15 mL to the mouth or throat 2 (two) times a day, Starting Wed 8/25/2021, Normal      !! chlorhexidine (PERIDEX) 0 12 % solution Apply 15 mL to the mouth or throat 2 (two) times a day, Starting Fri 9/3/2021, Normal      !! ondansetron (Zofran ODT) 4 mg disintegrating tablet Take 1 tablet (4 mg total) by mouth every 6 (six) hours as needed for nausea or vomiting, Starting Tue 12/7/2021, Normal      !! ondansetron (Zofran ODT) 4 mg disintegrating tablet Take 1 tablet (4 mg total) by mouth every 6 (six) hours as needed for nausea or vomiting, Starting Wed 12/22/2021, Normal      ondansetron (ZOFRAN) 4 mg tablet Take 1 tablet (4 mg total) by mouth every 6 (six) hours for 2 days, Starting Sat 10/30/2021, Until Mon 11/1/2021, Normal      promethazine (PHENERGAN) 12 5 MG tablet Take 1 tablet (12 5 mg total) by mouth every 6 (six) hours as needed for nausea or vomiting (every 4-6 hrs as needed), Starting Tue 12/28/2021, Normal       !! - Potential duplicate medications found  Please discuss with provider  No discharge procedures on file      PDMP Review       Value Time User    PDMP Reviewed  Yes 11/28/2021 10:12 PM Amanda Francis MD          ED Provider  Electronically Signed by           Thais Chavez PA-C  01/24/22 2128       Thais Chavez PA-C  01/24/22 2129

## 2022-02-23 ENCOUNTER — HOSPITAL ENCOUNTER (EMERGENCY)
Facility: HOSPITAL | Age: 32
Discharge: HOME | End: 2022-02-24
Attending: EMERGENCY MEDICINE | Admitting: EMERGENCY MEDICINE
Payer: COMMERCIAL

## 2022-02-23 VITALS
RESPIRATION RATE: 16 BRPM | DIASTOLIC BLOOD PRESSURE: 78 MMHG | OXYGEN SATURATION: 100 % | BODY MASS INDEX: 22.58 KG/M2 | TEMPERATURE: 98.3 F | WEIGHT: 115 LBS | HEIGHT: 60 IN | SYSTOLIC BLOOD PRESSURE: 101 MMHG | HEART RATE: 98 BPM

## 2022-02-23 DIAGNOSIS — K02.9 DENTAL CARIES: ICD-10-CM

## 2022-02-23 DIAGNOSIS — R11.0 NAUSEA: ICD-10-CM

## 2022-02-23 DIAGNOSIS — K08.89 PAIN, DENTAL: Primary | ICD-10-CM

## 2022-02-23 PROCEDURE — 99282 EMERGENCY DEPT VISIT SF MDM: CPT

## 2022-02-23 RX ORDER — LIDOCAINE HYDROCHLORIDE 20 MG/ML
15 SOLUTION OROPHARYNGEAL ONCE
Status: DISCONTINUED | OUTPATIENT
Start: 2022-02-24 | End: 2022-02-24 | Stop reason: HOSPADM

## 2022-02-24 PROCEDURE — 99284 EMERGENCY DEPT VISIT MOD MDM: CPT | Performed by: STUDENT IN AN ORGANIZED HEALTH CARE EDUCATION/TRAINING PROGRAM

## 2022-02-24 RX ORDER — AMOXICILLIN AND CLAVULANATE POTASSIUM 875; 125 MG/1; MG/1
1 TABLET, FILM COATED ORAL ONCE
Status: COMPLETED | OUTPATIENT
Start: 2022-02-24 | End: 2022-02-24

## 2022-02-24 RX ORDER — AMOXICILLIN AND CLAVULANATE POTASSIUM 875; 125 MG/1; MG/1
1 TABLET, FILM COATED ORAL EVERY 12 HOURS
Qty: 14 TABLET | Refills: 0 | Status: SHIPPED | OUTPATIENT
Start: 2022-02-24 | End: 2022-03-03

## 2022-02-24 RX ORDER — ONDANSETRON 4 MG/1
4 TABLET, ORALLY DISINTEGRATING ORAL EVERY 6 HOURS PRN
Qty: 5 TABLET | Refills: 0 | Status: SHIPPED | OUTPATIENT
Start: 2022-02-24 | End: 2022-03-02 | Stop reason: SDUPTHER

## 2022-02-24 RX ORDER — METOCLOPRAMIDE 10 MG/1
10 TABLET ORAL ONCE
Status: DISCONTINUED | OUTPATIENT
Start: 2022-02-24 | End: 2022-02-24

## 2022-02-24 RX ORDER — ONDANSETRON 4 MG/1
4 TABLET, ORALLY DISINTEGRATING ORAL ONCE
Status: COMPLETED | OUTPATIENT
Start: 2022-02-24 | End: 2022-02-24

## 2022-02-24 RX ADMIN — ONDANSETRON 4 MG: 4 TABLET, ORALLY DISINTEGRATING ORAL at 00:21

## 2022-02-24 RX ADMIN — AMOXICILLIN AND CLAVULANATE POTASSIUM 1 TABLET: 875; 125 TABLET, FILM COATED ORAL at 00:21

## 2022-02-24 NOTE — DISCHARGE INSTRUCTIONS
Begin Augmentin 2 times daily for the next 7 days for treatment of suspected dental infection  May use Zofran every 6 hours as needed for nausea  Continue over-the-counter Tylenol or ibuprofen every 6 hours as needed for pain control  Follow-up with dentist or oral surgeon as soon as able for re-evaluation and continued management  Return to the emergency department with worsening symptoms including severe uncontrolled pain despite medication, severe swelling of the face or gums, discharge coming from the gums, development of fever/chills, lightheadedness/dizziness, tightness in the chest or throat, difficulty breathing

## 2022-02-24 NOTE — ED PROVIDER NOTES
History  Chief Complaint   Patient presents with    Dental Pain     pt stating her R lower and upper mouth is bothering her  Pain is so bad she is nauseous  Patient is a 59-year-old female presents emergency department today with complaint of dental pain x1 day  Patient states 1 day ago she noticed that 1 of the temporary fillings that she received in the emergency department several months ago fallen out of her tooth has been experiencing excruciating pain since occurrence  She also states a piece of her tooth came out with the filling  Patient states she has been taking over-the-counter Tylenol and ibuprofen for pain with little relief, most recent O2 are prior to arrival to emergency department  Patient states she took 600 mg of ibuprofen and 1000 mg of Tylenol  Patient denies swelling of the face/lips/gums, discharge from the gums, bleeding from the gums, tightness in the throat difficulty breathing, fever/chills  She denies any recent trauma or injury to the area  Prior to Admission Medications   Prescriptions Last Dose Informant Patient Reported? Taking?    albuterol (Ventolin HFA) 90 mcg/act inhaler   No No   Sig: Inhale 1-2 puffs every 6 (six) hours as needed for wheezing or shortness of breath   chlorhexidine (PERIDEX) 0 12 % solution   No No   Sig: Apply 15 mL to the mouth or throat 2 (two) times a day   Patient not taking: Reported on 12/26/2021    chlorhexidine (PERIDEX) 0 12 % solution   No No   Sig: Apply 15 mL to the mouth or throat 2 (two) times a day   Patient not taking: Reported on 12/26/2021    ondansetron (ZOFRAN) 4 mg tablet   No No   Sig: Take 1 tablet (4 mg total) by mouth every 6 (six) hours for 2 days   ondansetron (ZOFRAN) 4 mg tablet   No No   Sig: Take 1 tablet (4 mg total) by mouth every 6 (six) hours for 2 days   ondansetron (Zofran ODT) 4 mg disintegrating tablet   No No   Sig: Take 1 tablet (4 mg total) by mouth every 6 (six) hours as needed for nausea or vomiting Patient not taking: Reported on 12/26/2021    ondansetron (Zofran ODT) 4 mg disintegrating tablet   No No   Sig: Take 1 tablet (4 mg total) by mouth every 6 (six) hours as needed for nausea or vomiting   Patient not taking: Reported on 12/26/2021    promethazine (PHENERGAN) 12 5 MG tablet   No No   Sig: Take 1 tablet (12 5 mg total) by mouth every 6 (six) hours as needed for nausea or vomiting (every 4-6 hrs as needed)      Facility-Administered Medications: None       Past Medical History:   Diagnosis Date    Anxiety     Asthma     Depression     GERD (gastroesophageal reflux disease)     Hernia, abdominal     Migraines     Muscle spasm     Psychiatric disorder     Anx, Depression    Renal disorder     kidney stones       Past Surgical History:   Procedure Laterality Date    CYSTOSCOPY      removal of kidney stone    FL RETROGRADE PYELOGRAM  8/28/2018    HERNIA REPAIR      OK CYSTO/URETERO W/LITHOTRIPSY &INDWELL STENT INSRT Right 8/28/2018    Procedure: CYSTOSCOPY URETEROSCOPY WITH RETROGRADE PYELOGRAM AND INSERTION STENT URETERAL;  Surgeon: Giovanna Avitia MD;  [facility];  Service: Urology    TOOTH EXTRACTION         Family History   Problem Relation Age of Onset    Diabetes Mother     Hyperlipidemia Mother     Stroke Mother     Heart disease Mother     Asthma Mother    Mary Khan Other Mother         Degen  of Intervertabral disc   Nephrolithiasis Father     Nephrolithiasis Brother      I have reviewed and agree with the history as documented      E-Cigarette/Vaping    E-Cigarette Use Never User      E-Cigarette/Vaping Substances    Nicotine No     THC No     CBD No     Flavoring No     Other No     Unknown No      Social History     Tobacco Use    Smoking status: Current Every Day Smoker     Packs/day: 0 50     Years: 13 00     Pack years: 6 50     Types: Cigarettes    Smokeless tobacco: Never Used   Vaping Use    Vaping Use: Never used   Substance Use Topics    Alcohol use: Not Currently     Comment: Alcohol intake:   None  - As per [employer] Drug use: No     Comment: Illicit drugs:   none  - As per [employer]        Review of Systems   Constitutional: Negative for chills and fever  HENT: Positive for dental problem  Negative for facial swelling and sore throat  Respiratory: Negative for chest tightness and shortness of breath  Cardiovascular: Negative for chest pain  Gastrointestinal: Negative for abdominal distention, nausea and vomiting  Neurological: Negative for dizziness, weakness, light-headedness and headaches  All other systems reviewed and are negative  Physical Exam  Physical Exam  Vitals and nursing note reviewed  Constitutional:       General: She is in acute distress  Appearance: Normal appearance  HENT:      Head: Normocephalic and atraumatic  Nose: Nose normal       Mouth/Throat:      Mouth: Mucous membranes are moist       Dentition: Abnormal dentition  Dental tenderness and dental caries present  No gingival swelling or gum lesions  Pharynx: Oropharynx is clear  No oropharyngeal exudate or posterior oropharyngeal erythema  Tonsils: No tonsillar exudate or tonsillar abscesses  Comments: No sign of dental abscess  Eyes:      Extraocular Movements: Extraocular movements intact  Pupils: Pupils are equal, round, and reactive to light  Cardiovascular:      Rate and Rhythm: Normal rate and regular rhythm  Heart sounds: Normal heart sounds  No murmur heard  Pulmonary:      Effort: Pulmonary effort is normal  No respiratory distress  Breath sounds: Normal breath sounds  Abdominal:      Hernia: No hernia is present  Musculoskeletal:      Cervical back: Neck supple  No tenderness  Lymphadenopathy:      Cervical: No cervical adenopathy  Skin:     General: Skin is warm and dry  Neurological:      General: No focal deficit present        Mental Status: She is alert and oriented to person, place, and time    Psychiatric:         Mood and Affect: Mood normal          Behavior: Behavior normal          Thought Content: Thought content normal          Judgment: Judgment normal          Vital Signs  ED Triage Vitals [02/23/22 2344]   Temperature Pulse Respirations Blood Pressure SpO2   98 3 °F (36 8 °C) 98 16 101/78 100 %      Temp Source Heart Rate Source Patient Position - Orthostatic VS BP Location FiO2 (%)   Oral Monitor Lying Right arm --      Pain Score       --           Vitals:    02/23/22 2344   BP: 101/78   Pulse: 98   Patient Position - Orthostatic VS: Lying         Visual Acuity      ED Medications  Medications   Lidocaine Viscous HCl (XYLOCAINE) 2 % mucosal solution 15 mL (15 mL Swish & Spit Not Given 2/24/22 0004)   ondansetron (ZOFRAN-ODT) dispersible tablet 4 mg (4 mg Oral Given 2/24/22 0021)   amoxicillin-clavulanate (AUGMENTIN) 875-125 mg per tablet 1 tablet (1 tablet Oral Given 2/24/22 0021)       Diagnostic Studies  Results Reviewed     None                 No orders to display              Procedures  Procedures         ED Course  ED Course as of 02/24/22 0053   Thu Feb 24, 2022   0022 Unable to give patient Tylenol or ibuprofen due to recent at-home dose and of 800 mg ibuprofen and 1000 mg of Tylenol 2 hours prior to arrival   Patient offered dental block for which she refused, ordered viscous lidocaine mouthwash which patient were also refused  Will not give narcotics for pain control at this time                               SBIRT 20yo+      Most Recent Value   SBIRT (23 yo +)    In order to provide better care to our patients, we are screening all of our patients for alcohol and drug use  Would it be okay to ask you these screening questions?  No Filed at: 02/23/2022 2346                    MDM  Number of Diagnoses or Management Options  Dental caries  Nausea  Pain, dental  Diagnosis management comments: Patient 59-year-old female presents emergency doctor light of dental pain, examination shows a broken tooth 2 with severe dental decay and tenderness to percussion  Unable to get Tylenol/ibuprofen or additional NSAIDs at this time due to recently taking high doses of each prior to arrival   Patient was informed that narcotics are not indicated for this type of pain per ADA  Patient was offered lidocaine mouthwash for symptomatic relief which she refused as well as a dental block which she also refused  Patient was given Zofran for complaint of nausea and started on Augmentin for suspected superimposed infection and infection prevention  Affected tooth with missing filling was resubmitted in the emergency department at patient request   Patient tolerated dental cementing without issue  Patient was informed that dental cementing is a temporary fix in a she is to follow up with her dentist or oral surgeon as soon as able for further evaluation and continued management  Patient advised to continue Tylenol ibuprofen every 6 hours as needed pain and return emergency department worsening symptoms as discussed with the patient  Patient verbalized understanding plan of care, all patient's questions were answered, patient stable on discharge  Patient Progress  Patient progress: stable      Disposition  Final diagnoses:   Pain, dental   Dental caries   Nausea     Time reflects when diagnosis was documented in both MDM as applicable and the Disposition within this note     Time User Action Codes Description Comment    2/24/2022 12:23 AM Suzette Marie Add [K08 89] Pain, dental     2/24/2022 12:23 AM Suzette Marie Add [K02 9] Dental caries     2/24/2022 12:24 AM Suzette Marie Add [R11 0] Nausea       ED Disposition     ED Disposition Condition Date/Time Comment    Discharge Stable Thu Feb 24, 2022 12:23 AM Celeste Rodriguez discharge to home/self care              Follow-up Information     Follow up With Specialties Details Why Contact Info Additional Information    [facility]    [address] [facility]     Claribel Saleh MD Family Medicine   [address]  [address]  [address] [facility] [id number] [place] [address] Emergency Department Emergency Medicine   [address] [facility],[address] [id number]  [facility] Emergency Department, [address]          Discharge Medication List as of 2/24/2022 12:26 AM      START taking these medications    Details   amoxicillin-clavulanate (AUGMENTIN) 875-125 mg per tablet Take 1 tablet by mouth every 12 (twelve) hours for 7 days, Starting Thu 2/24/2022, Until Thu 3/3/2022, Normal      !! ondansetron (Zofran ODT) 4 mg disintegrating tablet Take 1 tablet (4 mg total) by mouth every 6 (six) hours as needed for nausea or vomiting for up to 5 doses, Starting Thu 2/24/2022, Normal       !! - Potential duplicate medications found  Please discuss with provider        CONTINUE these medications which have NOT CHANGED    Details   albuterol (Ventolin HFA) 90 mcg/act inhaler Inhale 1-2 puffs every 6 (six) hours as needed for wheezing or shortness of breath, Starting Mon 12/27/2021, Normal      !! chlorhexidine (PERIDEX) 0 12 % solution Apply 15 mL to the mouth or throat 2 (two) times a day, Starting Wed 8/25/2021, Normal      !! chlorhexidine (PERIDEX) 0 12 % solution Apply 15 mL to the mouth or throat 2 (two) times a day, Starting Fri 9/3/2021, Normal      !! ondansetron (Zofran ODT) 4 mg disintegrating tablet Take 1 tablet (4 mg total) by mouth every 6 (six) hours as needed for nausea or vomiting, Starting Tue 12/7/2021, Normal      !! ondansetron (Zofran ODT) 4 mg disintegrating tablet Take 1 tablet (4 mg total) by mouth every 6 (six) hours as needed for nausea or vomiting, Starting Wed 12/22/2021, Normal      ondansetron (ZOFRAN) 4 mg tablet Take 1 tablet (4 mg total) by mouth every 6 (six) hours for 2 days, Starting Sat 10/30/2021, Until Mon 11/1/2021, Normal      ondansetron (ZOFRAN) 4 mg tablet Take 1 tablet (4 mg total) by mouth every 6 (six) hours for 2 days, Starting Mon 1/24/2022, Until Wed 1/26/2022, Normal      promethazine (PHENERGAN) 12 5 MG tablet Take 1 tablet (12 5 mg total) by mouth every 6 (six) hours as needed for nausea or vomiting (every 4-6 hrs as needed), Starting Tue 12/28/2021, Normal       !! - Potential duplicate medications found  Please discuss with provider  No discharge procedures on file      PDMP Review       Value Time User    PDMP Reviewed  Yes 11/28/2021 10:12 PM Candance Abu, MD          ED Provider  Electronically Signed by           Ana Cooney PA-C  02/24/22 2473

## 2022-03-01 RX ORDER — SACCHAROMYCES BOULARDII 250 MG
250 CAPSULE ORAL 2 TIMES DAILY
COMMUNITY
Start: 2022-02-24 | End: 2022-03-06

## 2022-03-02 ENCOUNTER — TELEMEDICINE (OUTPATIENT)
Dept: FAMILY MEDICINE CLINIC | Facility: CLINIC | Age: 32
End: 2022-03-02
Payer: COMMERCIAL

## 2022-03-02 VITALS — WEIGHT: 115 LBS | BODY MASS INDEX: 22.58 KG/M2 | HEIGHT: 60 IN

## 2022-03-02 DIAGNOSIS — R11.0 NAUSEA: Primary | ICD-10-CM

## 2022-03-02 DIAGNOSIS — K02.9 DENTAL CARIES: ICD-10-CM

## 2022-03-02 PROCEDURE — 99213 OFFICE O/P EST LOW 20 MIN: CPT | Performed by: FAMILY MEDICINE

## 2022-03-02 RX ORDER — ONDANSETRON 4 MG/1
4 TABLET, ORALLY DISINTEGRATING ORAL EVERY 6 HOURS PRN
Qty: 30 TABLET | Refills: 0 | Status: SHIPPED | OUTPATIENT
Start: 2022-03-02 | End: 2022-03-18

## 2022-03-02 NOTE — PROGRESS NOTES
Virtual Regular Visit    Verification of patient location:    Patient is located in the following state in which I hold an active license [place]      Assessment/Plan: On augmentin   Needs a Covid test    Either athome or here    We can use predisone also         Problem List Items Addressed This Visit        Digestive    Dental caries    Relevant Medications    ondansetron (Zofran ODT) 4 mg disintegrating tablet      Other Visit Diagnoses     Nausea    -  Primary               Reason for visit is   Chief Complaint   Patient presents with    Medication Management     Discuss medications    Virtual Regular Visit        Encounter provider Kaci León MD    Provider located at [address] [place] [phone]      Recent Visits  Date Type Provider Dept   03/02/22 Telemedicine Kaci León MD [facility]   Showing recent visits within past 7 days and meeting all other requirements  Future Appointments  No visits were found meeting these conditions  Showing future appointments within next 150 days and meeting all other requirements       The patient was identified by name and date of birth  Deidre Salvage was informed that this is a telemedicine visit and that the visit is being conducted through [address] [place] [address] Now and patient was informed that this is a secure, HIPAA-compliant platform  She agrees to proceed     My office door was closed  No one else was in the room  She acknowledged consent and understanding of privacy and security of the video platform  The patient has agreed to participate and understands they can discontinue the visit at any time  Patient is aware this is a billable service  Lorna Andre is a 32 y o  female          [facility]     Alfa has covid     covid is in the house     Nausea HA  Pressure in the face    Nausea and coughing     Low grade yesterday   covid vacc refused         Past Medical History:   Diagnosis Date    Anxiety     Asthma     Depression     GERD (gastroesophageal reflux disease)     Hernia, abdominal     Migraines     Muscle spasm     Psychiatric disorder     Anx, Depression    Renal disorder     kidney stones       Past Surgical History:   Procedure Laterality Date    CYSTOSCOPY      removal of kidney stone    FL RETROGRADE PYELOGRAM  8/28/2018    HERNIA REPAIR      MD CYSTO/URETERO W/LITHOTRIPSY &INDWELL STENT INSRT Right 8/28/2018    Procedure: CYSTOSCOPY URETEROSCOPY WITH RETROGRADE PYELOGRAM AND INSERTION STENT URETERAL;  Surgeon: Odalis Rivas MD;  [facility];  Service: Urology    TOOTH EXTRACTION         Current Outpatient Medications   Medication Sig Dispense Refill    albuterol (Ventolin HFA) 90 mcg/act inhaler Inhale 1-2 puffs every 6 (six) hours as needed for wheezing or shortness of breath 18 g 0    amoxicillin-clavulanate (AUGMENTIN) 875-125 mg per tablet Take 1 tablet by mouth every 12 (twelve) hours for 7 days 14 tablet 0    ondansetron (Zofran ODT) 4 mg disintegrating tablet Take 1 tablet (4 mg total) by mouth every 6 (six) hours as needed for nausea or vomiting 30 tablet 0    saccharomyces boulardii (FLORASTOR) 250 mg capsule Take 250 mg by mouth 2 (two) times a day       No current facility-administered medications for this visit  Allergies   Allergen Reactions    Aspirin Anaphylaxis     Doesn't have epi pen  Okay to take ibuprofen per patient    Ketorolac     Naproxen Nausea Only    Prednisone     Toradol [Ketorolac Tromethamine]     Tramadol Itching       Review of Systems   All other systems reviewed and are negative  Video Exam    Vitals:    03/02/22 1341   Weight: 52 2 kg (115 lb)   Height: 5' (1 524 m)       Physical Exam  Constitutional:       Appearance: She is well-developed     HENT:      Head:      Comments: Poor dentition  Eyes:      Conjunctiva/sclera: Conjunctivae normal    Pulmonary:      Effort: Pulmonary effort is normal  Musculoskeletal:      Cervical back: Normal range of motion  Neurological:      Mental Status: She is alert and oriented to person, place, and time  Psychiatric:         Behavior: Behavior normal          Thought Content: Thought content normal          Judgment: Judgment normal           I spent 10 minutes directly with the patient during this visit    VIRTUAL VISIT [address] [facility] verbally agrees to participate in [employer]  Pt is aware that [employer] could be limited without vital signs or the ability to perform a full hands-on physical exam  Christine Fay understands she or the provider may request at any time to terminate the video visit and request the patient to seek care or treatment in person

## 2022-03-17 DIAGNOSIS — K02.9 DENTAL CARIES: ICD-10-CM

## 2022-03-18 RX ORDER — ONDANSETRON 4 MG/1
4 TABLET, ORALLY DISINTEGRATING ORAL EVERY 6 HOURS PRN
Qty: 30 TABLET | Refills: 0 | Status: SHIPPED | OUTPATIENT
Start: 2022-03-18 | End: 2022-04-04

## 2022-03-19 ENCOUNTER — HOSPITAL ENCOUNTER (EMERGENCY)
Facility: HOSPITAL | Age: 32
Discharge: HOME | End: 2022-03-19
Attending: EMERGENCY MEDICINE | Admitting: EMERGENCY MEDICINE
Payer: COMMERCIAL

## 2022-03-19 VITALS
BODY MASS INDEX: 23.56 KG/M2 | HEIGHT: 60 IN | DIASTOLIC BLOOD PRESSURE: 69 MMHG | TEMPERATURE: 98.1 F | HEART RATE: 82 BPM | RESPIRATION RATE: 18 BRPM | SYSTOLIC BLOOD PRESSURE: 101 MMHG | WEIGHT: 120 LBS | OXYGEN SATURATION: 100 %

## 2022-03-19 DIAGNOSIS — K08.89 PAIN, DENTAL: Primary | ICD-10-CM

## 2022-03-19 PROCEDURE — 99284 EMERGENCY DEPT VISIT MOD MDM: CPT | Performed by: EMERGENCY MEDICINE

## 2022-03-19 PROCEDURE — 99282 EMERGENCY DEPT VISIT SF MDM: CPT

## 2022-03-19 RX ORDER — OXYCODONE HYDROCHLORIDE 5 MG/1
5 TABLET ORAL ONCE
Status: COMPLETED | OUTPATIENT
Start: 2022-03-19 | End: 2022-03-19

## 2022-03-19 RX ADMIN — OXYCODONE HYDROCHLORIDE 5 MG: 5 TABLET ORAL at 20:12
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
## 2022-03-21 ENCOUNTER — HOSPITAL ENCOUNTER (EMERGENCY)
Facility: HOSPITAL | Age: 32
Discharge: HOME | End: 2022-03-21
Attending: EMERGENCY MEDICINE
Payer: COMMERCIAL

## 2022-03-21 ENCOUNTER — OFFICE VISIT (OUTPATIENT)
Dept: URGENT CARE | Facility: CLINIC | Age: 32
End: 2022-03-21
Payer: COMMERCIAL

## 2022-03-21 VITALS
RESPIRATION RATE: 16 BRPM | HEIGHT: 60 IN | WEIGHT: 119.49 LBS | SYSTOLIC BLOOD PRESSURE: 111 MMHG | TEMPERATURE: 98.2 F | HEART RATE: 87 BPM | BODY MASS INDEX: 23.46 KG/M2 | DIASTOLIC BLOOD PRESSURE: 73 MMHG | OXYGEN SATURATION: 99 %

## 2022-03-21 VITALS
SYSTOLIC BLOOD PRESSURE: 109 MMHG | WEIGHT: 120 LBS | TEMPERATURE: 98.7 F | OXYGEN SATURATION: 97 % | BODY MASS INDEX: 23.56 KG/M2 | DIASTOLIC BLOOD PRESSURE: 57 MMHG | HEIGHT: 60 IN | RESPIRATION RATE: 16 BRPM | HEART RATE: 90 BPM

## 2022-03-21 DIAGNOSIS — K08.9 CHRONIC DENTAL PAIN: Primary | ICD-10-CM

## 2022-03-21 DIAGNOSIS — G89.29 CHRONIC DENTAL PAIN: Primary | ICD-10-CM

## 2022-03-21 DIAGNOSIS — K08.89 PAIN, DENTAL: Primary | ICD-10-CM

## 2022-03-21 PROCEDURE — 99282 EMERGENCY DEPT VISIT SF MDM: CPT | Performed by: EMERGENCY MEDICINE

## 2022-03-21 PROCEDURE — 99213 OFFICE O/P EST LOW 20 MIN: CPT

## 2022-03-21 PROCEDURE — 99282 EMERGENCY DEPT VISIT SF MDM: CPT

## 2022-03-21 RX ORDER — CLINDAMYCIN HYDROCHLORIDE 150 MG/1
450 CAPSULE ORAL 3 TIMES DAILY
COMMUNITY
Start: 2022-03-19 | End: 2022-03-26

## 2022-03-21 RX ORDER — CLINDAMYCIN HYDROCHLORIDE 150 MG/1
CAPSULE ORAL
COMMUNITY
Start: 2022-03-19 | End: 2022-03-21
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
## 2022-03-21 NOTE — PATIENT INSTRUCTIONS
Dental Abscess   WHAT YOU NEED TO KNOW:   A dental abscess is a collection of pus in or around a tooth  A dental abscess is caused by bacteria  The bacteria can enter the tooth when the enamel (outer part of the tooth) is damaged by tooth decay  Bacteria can also enter the tooth through a chip in the tooth or a cut in the gum  Food particles that are stuck between the teeth for a long time may also lead to an abscess  DISCHARGE INSTRUCTIONS:   Return to the emergency department if:   · You have severe pain in your tooth or jaw  · You have trouble breathing because of pain or swelling  Call your doctor if:   · Your symptoms get worse, even after treatment  · Your mouth is bleeding  · You cannot eat or drink because of pain or swelling  · Your abscess returns  · You have an injury that causes a crack in your tooth  · You have questions or concerns about your condition or care  Medicines: You may  need any of the following:  · Antibiotics  help treat a bacterial infection  · NSAIDs , such as ibuprofen, help decrease swelling, pain, and fever  This medicine is available with or without a doctor's order  NSAIDs can cause stomach bleeding or kidney problems in certain people  If you take blood thinner medicine, always ask your healthcare provider if NSAIDs are safe for you  Always read the medicine label and follow directions  · Acetaminophen  decreases pain and fever  It is available without a doctor's order  Ask how much to take and how often to take it  Follow directions  Read the labels of all other medicines you are using to see if they also contain acetaminophen, or ask your doctor or pharmacist  Acetaminophen can cause liver damage if not taken correctly  Do not use more than 4 grams (4,000 milligrams) total of acetaminophen in one day  · Prescription pain medicine  may be given  Ask your healthcare provider how to take this medicine safely   Some prescription pain medicines contain acetaminophen  Do not take other medicines that contain acetaminophen without talking to your healthcare provider  Too much acetaminophen may cause liver damage  Prescription pain medicine may cause constipation  Ask your healthcare provider how to prevent or treat constipation  · Take your medicine as directed  Contact your healthcare provider if you think your medicine is not helping or if you have side effects  Tell him of her if you are allergic to any medicine  Keep a list of the medicines, vitamins, and herbs you take  Include the amounts, and when and why you take them  Bring the list or the pill bottles to follow-up visits  Carry your medicine list with you in case of an emergency  Self-care:   · Rinse your mouth every 2 hours with salt water  This will help keep the area clean  · Gently brush your teeth twice a day with a soft tooth brush  This will help keep the area clean  · Eat soft foods as directed  Soft foods may cause less pain  Examples include applesauce, yogurt, and cooked pasta  Ask your healthcare provider how long to follow this instruction  · Apply a warm compress to your tooth or gum  Use a cotton ball or gauze soaked in warm water  Remove the compress in 10 minutes or when it becomes cool  Repeat 3 times a day  Prevent another abscess:   · Brush your teeth at least 2 times a day  with fluoride toothpaste  · Use dental floss at least once a day  to clean between your teeth  · Rinse your mouth with water or mouthwash  after meals and snacks  Chew sugarless gum  · Avoid sugary and starchy food that can stick between your teeth  Limit drinks high in sugar, such as soda or fruit juice  · See your dentist every 6 months  for dental cleanings and oral exams  Follow up with your doctor or dentist in 24 hours, or as directed: Your healthcare provider will need to check your teeth and gums   Write down your questions so you remember to ask them during your visits  © Copyright [employer] 2022 Information is for End User's use only and may not be sold, redistributed or otherwise used for commercial purposes  All illustrations and images included in CareNotes® are the copyrighted property of A D A M [employer] Inc  or Eliot Miller  The above information is an  only  It is not intended as medical advice for individual conditions or treatments  Talk to your doctor, nurse or pharmacist before following any medical regimen to see if it is safe and effective for you

## 2022-03-22 ENCOUNTER — OFFICE VISIT (OUTPATIENT)
Dept: FAMILY MEDICINE CLINIC | Facility: CLINIC | Age: 32
End: 2022-03-22
Payer: COMMERCIAL

## 2022-03-22 VITALS
RESPIRATION RATE: 16 BRPM | DIASTOLIC BLOOD PRESSURE: 72 MMHG | OXYGEN SATURATION: 98 % | HEART RATE: 80 BPM | SYSTOLIC BLOOD PRESSURE: 112 MMHG | HEIGHT: 60 IN | BODY MASS INDEX: 23.56 KG/M2 | WEIGHT: 120 LBS

## 2022-03-22 DIAGNOSIS — K02.9 DENTAL CARIES: ICD-10-CM

## 2022-03-22 DIAGNOSIS — K04.7 DENTAL ABSCESS: Primary | ICD-10-CM

## 2022-03-22 PROCEDURE — 99214 OFFICE O/P EST MOD 30 MIN: CPT | Performed by: FAMILY MEDICINE

## 2022-03-22 NOTE — PROGRESS NOTES
Assessment/Plan:    Get to the oral surgeon   Also cont with clinda 450 tid   Cont with peridex mouth wash     I staff msged the oral surgeon   To see what the options are    In the mean time she neds to open to care that might not match what she is looking for  Please just take the motrin and no alcohol   She says she is kidding about it     1  Dental abscess  -     Ambulatory Referral to Oral Maxillofacial Surgery; Future  -     al mag oxide-diphenhydramine-lidocaine viscous (MAGIC MOUTHWASH) 1:1:1 suspension; Swish and spit 10 mL every 4 (four) hours as needed for mouth pain or discomfort    2  Dental caries  -     Ambulatory Referral to Oral Maxillofacial Surgery; Future  -     al mag oxide-diphenhydramine-lidocaine viscous (MAGIC MOUTHWASH) 1:1:1 suspension; Swish and spit 10 mL every 4 (four) hours as needed for mouth pain or discomfort         Subjective:      Patient ID: Miriam Castro is a 32 y o  female  HPI       Here for follow up on the 20+ ER visits for dental pain and TMJ  Felt that the oxycodone 5mg helped the most with pain   She would like to just take motrin and drink jack jackson for it   She has not been able to get a dentist or oral surgeon to remove her teeth       The following portions of the patient's history were reviewed and updated as appropriate: allergies, current medications, past family history, past medical history, past social history, past surgical history and problem list     Review of Systems   Constitutional: Negative for fever and unexpected weight change  HENT: Positive for dental problem  Negative for nosebleeds and trouble swallowing  Eyes: Negative for visual disturbance  Respiratory: Negative for chest tightness and shortness of breath  Cardiovascular: Negative for chest pain, palpitations and leg swelling  Gastrointestinal: Negative for abdominal pain, constipation, diarrhea and nausea  Endocrine: Negative for cold intolerance     Genitourinary: Negative for dysuria and urgency  Musculoskeletal: Negative for joint swelling and myalgias  Skin: Negative for rash  Neurological: Negative for tremors, seizures and syncope  Hematological: Does not bruise/bleed easily  Psychiatric/Behavioral: Negative for hallucinations and suicidal ideas  Objective:      /72 (BP Location: Left arm, Patient Position: Sitting, Cuff Size: Standard)   Pulse 80   Resp 16   Ht 5' (1 524 m)   Wt 54 4 kg (120 lb)   LMP 03/14/2022   SpO2 98%   BMI 23 44 kg/m²     No visits with results within 2 Week(s) from this visit  Latest known visit with results is:   Admission on 12/27/2021, Discharged on 12/27/2021   Component Date Value    SARS-CoV-2 12/27/2021 Positive*    INFLUENZA A PCR 12/27/2021 Negative     INFLUENZA B PCR 12/27/2021 Negative     RSV PCR 12/27/2021 Negative           Physical Exam  Constitutional:       Appearance: She is well-developed  HENT:      Mouth/Throat:      Dentition: Abnormal dentition  Dental tenderness, gingival swelling, dental caries and gum lesions present  Comments: Many dental carries and tooth decay   Eyes:      Conjunctiva/sclera: Conjunctivae normal    Pulmonary:      Effort: Pulmonary effort is normal    Musculoskeletal:      Cervical back: Normal range of motion  Neurological:      Mental Status: She is alert and oriented to person, place, and time  Psychiatric:         Behavior: Behavior normal          Thought Content:  Thought content normal          Judgment: Judgment normal              Brain MD Ariane  [place] [id number]

## 2022-03-22 NOTE — ED PROVIDER NOTES
History  Chief Complaint   Patient presents with    Dental Pain     referred by urgent care, jaw pain x 2 5 weeks, R lower dental pain, scheduled dentist appointment for mid April     Pt reports pain to molars of both right upper and right lower jaw, this is her 5th encounter in one week for this problem  She was started on Clindamycin 3 days ago, and then was given one dose of oral opioids on prior visit to this ER  No trismus/droolijng/stridor  No f/c/n/v/cp/sob  Says no dental appointment available until mid April  Prior to Admission Medications   Prescriptions Last Dose Informant Patient Reported? Taking?    albuterol (Ventolin HFA) 90 mcg/act inhaler More than a month at Unknown time  No No   Sig: Inhale 1-2 puffs every 6 (six) hours as needed for wheezing or shortness of breath   clindamycin (CLEOCIN) 150 mg capsule 3/21/2022 at Unknown time  Yes Yes   Sig: Take 450 mg by mouth Three times a day   ondansetron (ZOFRAN-ODT) 4 mg disintegrating tablet   No No   Sig: TAKE 1 TABLET (4 MG TOTAL) BY MOUTH EVERY 6 (SIX) HOURS AS NEEDED FOR NAUSEA OR VOMITING      Facility-Administered Medications: None       Past Medical History:   Diagnosis Date    Anxiety     Asthma     Depression     GERD (gastroesophageal reflux disease)     Hernia, abdominal     Migraines     Muscle spasm     Psychiatric disorder     Anx, Depression    Renal disorder     kidney stones       Past Surgical History:   Procedure Laterality Date    CYSTOSCOPY      removal of kidney stone    [place] RETROGRADE PYELOGRAM  8/28/2018    HERNIA REPAIR      [place] CYSTO/URETERO W/LITHOTRIPSY &INDWELL STENT INSRT Right 8/28/2018    Procedure: CYSTOSCOPY URETEROSCOPY WITH RETROGRADE PYELOGRAM AND INSERTION STENT URETERAL;  Surgeon: Harish Gallo MD;  [facility];  Service: Urology    TOOTH EXTRACTION         Family History   Problem Relation Age of Onset    Diabetes Mother     Hyperlipidemia Mother     Stroke Mother     Heart disease Mother     Asthma Mother    Sharlene Carranza Other Mother         Degen  of Intervertabral disc   Nephrolithiasis Father     Nephrolithiasis Brother      I have reviewed and agree with the history as documented  E-Cigarette/Vaping    E-Cigarette Use Never User      E-Cigarette/Vaping Substances    Nicotine No     THC No     CBD No     Flavoring No     Other No     Unknown No      Social History     Tobacco Use    Smoking status: Current Every Day Smoker     Packs/day: 0 50     Years: 13 00     Pack years: 6 50     Types: Cigarettes    Smokeless tobacco: Never Used   Vaping Use    Vaping Use: Never used   Substance Use Topics    Alcohol use: Not Currently     Comment: Alcohol intake:   None  - As per Maria Teresa Crooked Drug use: No     Comment: Illicit drugs:   none  - As per [place]        Review of Systems   All other systems reviewed and are negative  Physical Exam  Physical Exam  Constitutional:       General: She is not in acute distress  Appearance: Normal appearance  She is well-developed  She is not ill-appearing  HENT:      Head: Normocephalic and atraumatic  Right Ear: External ear normal       Left Ear: External ear normal       Nose: Nose normal  No rhinorrhea  Mouth/Throat:      Mouth: Mucous membranes are moist       Pharynx: No oropharyngeal exudate  Comments: No trismus/drooling/stridor  Multiple carious teeth  Tender gingivae upper and lower right molar areas but no fluctuance or significant edema  Eyes:      General: No scleral icterus  Conjunctiva/sclera: Conjunctivae normal    Skin:     General: Skin is warm and dry  Capillary Refill: Capillary refill takes less than 2 seconds  Neurological:      Mental Status: She is alert and oriented to person, place, and time  Mental status is at baseline  Psychiatric:         Mood and Affect: Mood normal          Behavior: Behavior normal          Thought Content:  Thought content normal          Judgment: Judgment normal          Vital Signs  ED Triage Vitals [03/21/22 2002]   Temperature Pulse Respirations Blood Pressure SpO2   98 2 °F (36 8 °C) 87 16 111/73 99 %      Temp Source Heart Rate Source Patient Position - Orthostatic VS BP Location FiO2 (%)   Oral Monitor Sitting Left arm --      Pain Score       8           Vitals:    03/21/22 2002   BP: 111/73   Pulse: 87   Patient Position - Orthostatic VS: Sitting         Visual Acuity      ED Medications  Medications - No data to display    Diagnostic Studies  Results Reviewed     None                 No orders to display              Procedures  Procedures         ED Course                                             MDM  Number of Diagnoses or Management Options  Pain, dental  Diagnosis management comments: We have little we can add, given already on Clinda, and despite pt allergy list is taking ibuprofen and tylenol  Given PDMP results with multiple opioid rx, will not rx further opioids  Provided f/u info for [facility]      Disposition  Final diagnoses:   Pain, dental     Time reflects when diagnosis was documented in both MDM as applicable and the Disposition within this note     Time User Action Codes Description Comment    3/21/2022  8:15 PM Angeles Spring Add [K08 89] Pain, dental       ED Disposition     ED Disposition Condition Date/Time Comment    Discharge Stable Mon Mar 21, 2022  8:15 PM Mike Pérez discharge to home/self care  Follow-up Information     Follow up With Specialties Details Why Contact Info Additional [address] [facility] [address] [facility] Schedule an appointment as soon as possible for a visit in 2 days  [phone]  [address] [place] [address], [phone], [phone]          Patient's Medications   Discharge Prescriptions    No medications on file       No discharge procedures on file      PDMP Review       Value Time User PDMP Reviewed  Yes 11/28/2021 10:12 PM Vickie Spatz, MD          ED Provider  Electronically Signed by           Sarah Ball MD  03/21/22 7995

## 2022-03-29 ENCOUNTER — TELEPHONE (OUTPATIENT)
Dept: FAMILY MEDICINE CLINIC | Facility: CLINIC | Age: 32
End: 2022-03-29

## 2022-03-29 NOTE — TELEPHONE ENCOUNTER
Pt called and states the mouth wash is over $100 and is requesting something else  Also she said she needs the protonix and I do see that you refused it that is discontinued  She said she still needs this

## 2022-03-30 DIAGNOSIS — K21.9 GASTROESOPHAGEAL REFLUX DISEASE WITHOUT ESOPHAGITIS: ICD-10-CM

## 2022-03-30 DIAGNOSIS — R11.0 NAUSEA: ICD-10-CM

## 2022-03-30 RX ORDER — PANTOPRAZOLE SODIUM 40 MG/1
40 TABLET, DELAYED RELEASE ORAL DAILY
Qty: 90 TABLET | Refills: 1 | Status: SHIPPED | OUTPATIENT
Start: 2022-03-30

## 2022-03-30 NOTE — TELEPHONE ENCOUNTER
Claribel Martínez so she can use what she has then   Also ill send over the protonix   And I did speak directly with [facility]   She said she was going to call her for an appt    Did she get any missed calls or voicemails?

## 2022-04-03 DIAGNOSIS — K02.9 DENTAL CARIES: ICD-10-CM

## 2022-04-04 RX ORDER — ONDANSETRON 4 MG/1
4 TABLET, ORALLY DISINTEGRATING ORAL EVERY 6 HOURS PRN
Qty: 30 TABLET | Refills: 0 | Status: SHIPPED | OUTPATIENT
Start: 2022-04-04 | End: 2022-04-21 | Stop reason: SDUPTHER

## 2022-04-20 ENCOUNTER — TELEPHONE (OUTPATIENT)
Dept: FAMILY MEDICINE CLINIC | Facility: CLINIC | Age: 32
End: 2022-04-20

## 2022-04-20 NOTE — TELEPHONE ENCOUNTER
Patient called and stated she was having such severe cramping from her period  She was looking for recommendations so I advised her to try Midol or Pamprin along with a heating pad  Patient in agreement and will try it  Date Of Previous Biopsy (Optional): 10/18/18

## 2022-04-21 DIAGNOSIS — K02.9 DENTAL CARIES: ICD-10-CM

## 2022-04-22 RX ORDER — ONDANSETRON 4 MG/1
4 TABLET, ORALLY DISINTEGRATING ORAL EVERY 6 HOURS PRN
Qty: 30 TABLET | Refills: 0 | Status: SHIPPED | OUTPATIENT
Start: 2022-04-22 | End: 2022-04-24 | Stop reason: SDUPTHER

## 2022-04-23 ENCOUNTER — HOSPITAL ENCOUNTER (EMERGENCY)
Facility: HOSPITAL | Age: 32
Discharge: HOME | End: 2022-04-24
Attending: EMERGENCY MEDICINE | Admitting: EMERGENCY MEDICINE
Payer: COMMERCIAL

## 2022-04-23 VITALS
HEART RATE: 81 BPM | DIASTOLIC BLOOD PRESSURE: 65 MMHG | WEIGHT: 121 LBS | BODY MASS INDEX: 23.75 KG/M2 | HEIGHT: 60 IN | TEMPERATURE: 98 F | OXYGEN SATURATION: 100 % | RESPIRATION RATE: 18 BRPM | SYSTOLIC BLOOD PRESSURE: 107 MMHG

## 2022-04-23 DIAGNOSIS — K02.9 DENTAL CARIES: ICD-10-CM

## 2022-04-23 DIAGNOSIS — R11.0 NAUSEA: Primary | ICD-10-CM

## 2022-04-23 PROCEDURE — 99283 EMERGENCY DEPT VISIT LOW MDM: CPT

## 2022-04-24 PROCEDURE — 99284 EMERGENCY DEPT VISIT MOD MDM: CPT | Performed by: EMERGENCY MEDICINE

## 2022-04-24 RX ORDER — ONDANSETRON 4 MG/1
4 TABLET, ORALLY DISINTEGRATING ORAL ONCE
Status: COMPLETED | OUTPATIENT
Start: 2022-04-24 | End: 2022-04-24

## 2022-04-24 RX ORDER — ONDANSETRON 4 MG/1
4 TABLET, ORALLY DISINTEGRATING ORAL EVERY 6 HOURS PRN
Qty: 30 TABLET | Refills: 0 | Status: SHIPPED | OUTPATIENT
Start: 2022-04-24 | End: 2022-05-06 | Stop reason: SDUPTHER

## 2022-04-24 RX ORDER — ONDANSETRON 4 MG/1
4 TABLET, ORALLY DISINTEGRATING ORAL EVERY 6 HOURS PRN
Qty: 20 TABLET | Refills: 0 | OUTPATIENT
Start: 2022-04-24 | End: 2022-06-10

## 2022-04-24 RX ADMIN — ONDANSETRON 4 MG: 4 TABLET, ORALLY DISINTEGRATING ORAL at 00:26

## 2022-04-24 NOTE — DISCHARGE INSTRUCTIONS
Take Zofran as directed  Follow-up with GI at the number provided  Return to the emergency department any time for any new or worsening issues

## 2022-04-24 NOTE — ED PROVIDER NOTES
History  Chief Complaint   Patient presents with    Nausea     PT "I have been feeling really nauseated since about 1600  I took a Zofran and I tried to eat around 1900, I ate chicken ramen  I have not left the house and I havent been exposed to [employer]  I am not pregnant" Pt denies CP,SOB, vomitting or diarrhea     Patient states that she has suffered from sporadic nausea since the birth of her son who is now 15years old  She had a prescription for oral Zofran and she took her last pill when her symptoms began but it did not help  She states that she has not ever seen a GI regarding her symptomatology  She denies any other associated systemic symptoms  History provided by:  Patient  Nausea  The primary symptoms include nausea  Primary symptoms do not include fever, abdominal pain, vomiting, diarrhea or dysuria  The illness began today  The onset was sudden  The problem has not changed since onset  Nausea began today  Associated with: Nothing  Exacerbated by: Nothing  The illness does not include chills, anorexia, bloating, tenesmus or back pain  Prior to Admission Medications   Prescriptions Last Dose Informant Patient Reported? Taking?    al mag oxide-diphenhydramine-lidocaine viscous (MAGIC MOUTHWASH) 1:1:1 suspension Not Taking at Unknown time  No No   Sig: Swish and spit 10 mL every 4 (four) hours as needed for mouth pain or discomfort   Patient not taking: Reported on 4/23/2022    ondansetron (ZOFRAN-ODT) 4 mg disintegrating tablet   No Yes   Sig: Take 1 tablet (4 mg total) by mouth every 6 (six) hours as needed for nausea or vomiting   ondansetron (ZOFRAN-ODT) 4 mg disintegrating tablet   No No   Sig: Take 1 tablet (4 mg total) by mouth every 6 (six) hours as needed for nausea or vomiting   pantoprazole (PROTONIX) 40 mg tablet   No Yes   Sig: Take 1 tablet (40 mg total) by mouth daily      Facility-Administered Medications: None       Past Medical History:   Diagnosis Date    Anxiety     Asthma     Depression     GERD (gastroesophageal reflux disease)     Hernia, abdominal     Migraines     Muscle spasm     Psychiatric disorder     Anx, Depression    Renal disorder     kidney stones       Past Surgical History:   Procedure Laterality Date    CYSTOSCOPY      removal of kidney stone    [place] RETROGRADE PYELOGRAM  8/28/2018    HERNIA REPAIR      [place] CYSTO/URETERO W/LITHOTRIPSY &INDWELL STENT INSRT Right 8/28/2018    Procedure: CYSTOSCOPY URETEROSCOPY WITH RETROGRADE PYELOGRAM AND INSERTION STENT URETERAL;  Surgeon: Amos Lu MD;  [facility];  Service: Urology    TOOTH EXTRACTION         Family History   Problem Relation Age of Onset    Diabetes Mother     Hyperlipidemia Mother     Stroke Mother     Heart disease Mother     Asthma Mother    Parminder Smith Other Mother         Degen  of Intervertabral disc   Nephrolithiasis Father     Nephrolithiasis Brother      I have reviewed and agree with the history as documented  E-Cigarette/Vaping    E-Cigarette Use Never User      E-Cigarette/Vaping Substances    Nicotine No     THC No     CBD No     Flavoring No     Other No     Unknown No      Social History     Tobacco Use    Smoking status: Current Every Day Smoker     Packs/day: 0 50     Years: 13 00     Pack years: 6 50     Types: Cigarettes    Smokeless tobacco: Never Used   Vaping Use    Vaping Use: Never used   Substance Use Topics    Alcohol use: Not Currently     Comment: Alcohol intake:   None  - As per Jose Ojeda Drug use: No     Comment: Illicit drugs:   none  - As per [place]        Review of Systems   Constitutional: Negative for chills, fever and unexpected weight change  Gastrointestinal: Positive for nausea  Negative for abdominal pain, anorexia, bloating, diarrhea and vomiting  Genitourinary: Negative for dysuria and vaginal bleeding  Musculoskeletal: Negative for back pain  Skin: Negative for color change     Neurological: Negative for light-headedness  Physical Exam  Physical Exam  Vitals and nursing note reviewed  Constitutional:       General: She is not in acute distress  Appearance: She is well-developed  She is not ill-appearing or toxic-appearing  HENT:      Head: Normocephalic and atraumatic  Mouth/Throat:      Mouth: Mucous membranes are moist    Eyes:      Conjunctiva/sclera: Conjunctivae normal    Cardiovascular:      Rate and Rhythm: Normal rate and regular rhythm  Heart sounds: No murmur heard  Pulmonary:      Effort: Pulmonary effort is normal  No respiratory distress  Breath sounds: Normal breath sounds  Abdominal:      General: Bowel sounds are normal       Palpations: Abdomen is soft  Tenderness: There is no abdominal tenderness  Musculoskeletal:      Cervical back: Neck supple  Skin:     General: Skin is warm and dry  Capillary Refill: Capillary refill takes less than 2 seconds  Neurological:      General: No focal deficit present  Mental Status: She is alert and oriented to person, place, and time  Motor: No weakness           Vital Signs  ED Triage Vitals [04/23/22 2354]   Temperature Pulse Respirations Blood Pressure SpO2   98 °F (36 7 °C) 81 18 107/65 100 %      Temp Source Heart Rate Source Patient Position - Orthostatic VS BP Location FiO2 (%)   Oral Monitor Sitting Left arm --      Pain Score       No Pain           Vitals:    04/23/22 2354   BP: 107/65   Pulse: 81   Patient Position - Orthostatic VS: Sitting         Visual Acuity      ED Medications  Medications   ondansetron (ZOFRAN-ODT) dispersible tablet 4 mg (4 mg Oral Given 4/24/22 0026)       Diagnostic Studies  Results Reviewed     None                 No orders to display              Procedures  Procedures         ED Course                                             MDM  Number of Diagnoses or Management Options     Amount and/or Complexity of Data Reviewed  Review and summarize past medical records: yes    Risk of Complications, Morbidity, and/or Mortality  Presenting problems: low  Diagnostic procedures: minimal  Management options: minimal  General comments: Patient is presenting to the emergency department for a re-evaluation of her chronic nausea  She is not vomiting and tolerating p o  She has no physical exam findings to suggest an intra-abdominal emergency at this time  Plan is to discharge her with antiemetics and also provide her the phone number for GI follow-up  Patient Progress  Patient progress: stable      Disposition  Final diagnoses:   Nausea     Time reflects when diagnosis was documented in both MDM as applicable and the Disposition within this note     Time User Action Codes Description Comment    4/24/2022 12:43 AM Saritha Benson Add [R11 0] Nausea     4/24/2022 12:45 AM Saritha Benson Add [K02 9] Dental caries     4/24/2022 12:45 AM Saritha Benson Modify [K02 9] Dental caries     4/24/2022 12:45 AM Saritha Benson Modify [K02 9] Dental caries       ED Disposition     ED Disposition Condition Date/Time Comment    Discharge Stable Sun Apr 24, 2022 12:43 AM Tempie Ray discharge to home/self care              Follow-up Information     Follow up With Specialties Details Why Contact Info Additional Information    Qiana Lancaster MD Family Medicine Call in 1 day  [address] [facility]  [phone]       [facility] Call in 1 day  [address] [facility] [phone] [facility] [place], [address] [place] [address] [id number] [place] [address], [address]          Discharge Medication List as of 4/24/2022 12:45 AM      CONTINUE these medications which have CHANGED    Details   !! ondansetron (Zofran ODT) 4 mg disintegrating tablet Take 1 tablet (4 mg total) by mouth every 6 (six) hours as needed for nausea or vomiting, Starting Sun 4/24/2022, Normal      !! ondansetron (ZOFRAN-ODT) 4 mg disintegrating tablet Take 1 tablet (4 mg total) by mouth every 6 (six) hours as needed for nausea or vomiting, Starting Sun 4/24/2022, Normal       !! - Potential duplicate medications found  Please discuss with provider  CONTINUE these medications which have NOT CHANGED    Details   al mag oxide-diphenhydramine-lidocaine viscous (MAGIC MOUTHWASH) 1:1:1 suspension Swish and spit 10 mL every 4 (four) hours as needed for mouth pain or discomfort, Starting Tue 3/22/2022, Normal      pantoprazole (PROTONIX) 40 mg tablet Take 1 tablet (40 mg total) by mouth daily, Starting Wed 3/30/2022, Normal             No discharge procedures on file      PDMP Review       Value Time User    PDMP Reviewed  Yes 3/22/2022  5:11 PM Nicolas Barker MD          ED Provider  Electronically Signed by           Maria Esther Marti MD  04/24/22 5127

## 2022-04-30 ENCOUNTER — APPOINTMENT (EMERGENCY)
Dept: RADIOLOGY | Facility: HOSPITAL | Age: 32
End: 2022-04-30
Payer: COMMERCIAL

## 2022-04-30 ENCOUNTER — HOSPITAL ENCOUNTER (EMERGENCY)
Facility: HOSPITAL | Age: 32
Discharge: HOME | End: 2022-04-30
Attending: EMERGENCY MEDICINE
Payer: COMMERCIAL

## 2022-04-30 VITALS
BODY MASS INDEX: 23.95 KG/M2 | WEIGHT: 122 LBS | OXYGEN SATURATION: 99 % | TEMPERATURE: 98.1 F | SYSTOLIC BLOOD PRESSURE: 105 MMHG | HEIGHT: 60 IN | RESPIRATION RATE: 18 BRPM | HEART RATE: 76 BPM | DIASTOLIC BLOOD PRESSURE: 68 MMHG

## 2022-04-30 DIAGNOSIS — M25.561 RIGHT KNEE PAIN: Primary | ICD-10-CM

## 2022-04-30 PROCEDURE — 99283 EMERGENCY DEPT VISIT LOW MDM: CPT

## 2022-04-30 PROCEDURE — 99284 EMERGENCY DEPT VISIT MOD MDM: CPT | Performed by: STUDENT IN AN ORGANIZED HEALTH CARE EDUCATION/TRAINING PROGRAM

## 2022-04-30 PROCEDURE — 73564 X-RAY EXAM KNEE 4 OR MORE: CPT

## 2022-04-30 RX ORDER — ONDANSETRON 4 MG/1
4 TABLET, ORALLY DISINTEGRATING ORAL ONCE
Status: COMPLETED | OUTPATIENT
Start: 2022-04-30 | End: 2022-04-30

## 2022-04-30 RX ADMIN — ONDANSETRON 4 MG: 4 TABLET, ORALLY DISINTEGRATING ORAL at 21:18

## 2022-04-30 RX ADMIN — DICLOFENAC SODIUM 2 G: 10 GEL TOPICAL at 21:56

## 2022-05-01 NOTE — ED PROVIDER NOTES
History  Chief Complaint   Patient presents with    Knee Pain     PT "I smacked my knee in the shower yesterday and its killing me  I actually hit my ankle but its my knee that hurts  I took pain medication like 6 hours ago" Pt not wanting to bear weight on the right leg     PMHx: anxiety, depression, asthma, GERD, migraines  PSH: hernia repair, cystoscopy for removal of kidney stone    Bhavin Moreno is a 32year old female who presents to the ED with right knee pain after patient hit knee into tub earlier today, worse with bearing weight and movement of knee, has been taking tylenol and motrin with minimal relief, last took about 6 hours PTA  Patient states she is able to ambulate but notes significant pain  Patient notes associated nausea but denies vomiting, states took zofran about 7 hours ago with improvement  Patient denies any other injuries or any other areas of pain  Patient denies head strike, HA, visual disturbance, neck pain, back pain, CP, SOB, abdominal pain, v/d, urinary symptoms, joint swelling, bruising, numbness/tingling, erythema, or any other concerns at this time  Patient denies twisting TOMMY, offered ibuprofen for pain, patient declines  History provided by:  Patient and medical records   used: No        Prior to Admission Medications   Prescriptions Last Dose Informant Patient Reported? Taking?    al mag oxide-diphenhydramine-lidocaine viscous (MAGIC MOUTHWASH) 1:1:1 suspension   No No   Sig: Swish and spit 10 mL every 4 (four) hours as needed for mouth pain or discomfort   Patient not taking: Reported on 4/23/2022    ondansetron (ZOFRAN-ODT) 4 mg disintegrating tablet   No No   Sig: Take 1 tablet (4 mg total) by mouth every 6 (six) hours as needed for nausea or vomiting   ondansetron (Zofran ODT) 4 mg disintegrating tablet   No No   Sig: Take 1 tablet (4 mg total) by mouth every 6 (six) hours as needed for nausea or vomiting   pantoprazole (PROTONIX) 40 mg tablet   No No Sig: Take 1 tablet (40 mg total) by mouth daily      Facility-Administered Medications: None       Past Medical History:   Diagnosis Date    Anxiety     Asthma     Depression     GERD (gastroesophageal reflux disease)     Hernia, abdominal     Migraines     Muscle spasm     Psychiatric disorder     Anx, Depression    Renal disorder     kidney stones       Past Surgical History:   Procedure Laterality Date    CYSTOSCOPY      removal of kidney stone    FL RETROGRADE PYELOGRAM  8/28/2018    HERNIA REPAIR      NH CYSTO/URETERO W/LITHOTRIPSY &INDWELL STENT INSRT Right 8/28/2018    Procedure: CYSTOSCOPY URETEROSCOPY WITH RETROGRADE PYELOGRAM AND INSERTION STENT URETERAL;  Surgeon: Keith Pfeiffer MD;  [facility];  Service: Urology    TOOTH EXTRACTION         Family History   Problem Relation Age of Onset    Diabetes Mother     Hyperlipidemia Mother     Stroke Mother     Heart disease Mother     Asthma Mother     Other Mother         Degen  of Intervertabral disc   Nephrolithiasis Father     Nephrolithiasis Brother      I have reviewed and agree with the history as documented  E-Cigarette/Vaping    E-Cigarette Use Never User      E-Cigarette/Vaping Substances    Nicotine No     THC No     CBD No     Flavoring No     Other No     Unknown No      Social History     Tobacco Use    Smoking status: Current Every Day Smoker     Packs/day: 0 50     Years: 13 00     Pack years: 6 50     Types: Cigarettes    Smokeless tobacco: Never Used   Vaping Use    Vaping Use: Never used   Substance Use Topics    Alcohol use: Not Currently     Comment: Alcohol intake:   None  - As per Delisa Ro Drug use: No     Comment: Illicit drugs:   none  - As per [place]        Review of Systems   Constitutional: Negative for chills and fever  HENT: Negative for ear pain, sore throat and trouble swallowing  Eyes: Negative for pain and visual disturbance     Respiratory: Negative for cough and shortness of breath  Cardiovascular: Negative for chest pain and palpitations  Gastrointestinal: Positive for nausea  Negative for abdominal pain, diarrhea and vomiting  Genitourinary: Negative for dysuria and frequency  Musculoskeletal: Positive for arthralgias and gait problem  Negative for back pain, joint swelling, myalgias, neck pain and neck stiffness  Skin: Negative for color change, rash and wound  Neurological: Negative for syncope, light-headedness, numbness and headaches  Physical Exam  Physical Exam  Vitals and nursing note reviewed  Constitutional:       General: She is not in acute distress  Appearance: Normal appearance  She is well-developed  She is not ill-appearing  Comments: Well appearing, speaking in full sentences, resting comfortably on stretcher, VSS   HENT:      Head: Normocephalic and atraumatic  Right Ear: External ear normal       Left Ear: External ear normal       Nose: Nose normal  No congestion or rhinorrhea  Mouth/Throat:      Mouth: Mucous membranes are moist    Eyes:      General:         Right eye: No discharge  Left eye: No discharge  Conjunctiva/sclera: Conjunctivae normal    Cardiovascular:      Rate and Rhythm: Normal rate and regular rhythm  Heart sounds: No murmur heard  No friction rub  No gallop  Pulmonary:      Effort: Pulmonary effort is normal  No respiratory distress  Breath sounds: Normal breath sounds  No stridor  No wheezing, rhonchi or rales  Genitourinary:     Comments: Deferred  Musculoskeletal:         General: Tenderness present  No swelling, deformity or signs of injury  Cervical back: Normal range of motion and neck supple  Comments: TTP diffusely over right anterior knee, remainder of knee and right LE diffusely non-tender  Knee held in extended position, flexion limited 2/2 pain, full ROM of remainder of right LE  Sensation intact, cap refill <2s, DP 2+   No joint swelling, erythema, increased warmth to touch, bruising, wounds, deformity  Assessment of joint laxity limited 2/2 pain with knee flexion  Skin:     General: Skin is warm and dry  Capillary Refill: Capillary refill takes less than 2 seconds  Findings: No bruising, erythema or rash  Neurological:      General: No focal deficit present  Mental Status: She is alert and oriented to person, place, and time  Psychiatric:         Mood and Affect: Mood normal          Vital Signs  ED Triage Vitals   Temperature Pulse Respirations Blood Pressure SpO2   04/30/22 2053 04/30/22 2053 04/30/22 2053 04/30/22 2053 04/30/22 2053   98 1 °F (36 7 °C) 76 18 105/68 99 %      Temp Source Heart Rate Source Patient Position - Orthostatic VS BP Location FiO2 (%)   04/30/22 2053 04/30/22 2053 04/30/22 2053 04/30/22 2053 --   Oral Monitor Sitting Right arm       Pain Score       04/30/22 2051       7           Vitals:    04/30/22 2053   BP: 105/68   Pulse: 76   Patient Position - Orthostatic VS: Sitting         Visual Acuity      ED Medications  Medications   ondansetron (ZOFRAN-ODT) dispersible tablet 4 mg (4 mg Oral Given 4/30/22 2118)       Diagnostic Studies  Results Reviewed     None                 XR knee 4+ views Right injury   ED Interpretation by Joey Peoples PA-C (04/30 2145)   No acute fx or dislocation  Procedures  Procedures         ED Course  ED Course as of 05/01/22 0110   Sat Apr 30, 2022 2152 Patient states able to ambulate appropriately, declines crutches  2152 Assessment of joint laxity limited secondary to significant pain with knee flexion, given mechanism of injury low suspicion for ligamentous tear  MDM  Number of Diagnoses or Management Options  Right knee pain  Diagnosis management comments: Patient is a 32year old female who presents to the ED with nausea and right knee pain after patient hit knee into tub earlier today   Exam with TTP diffusely over anterior aspect of knee, right UE remains n/v intact, no joint swelling, no evidence of septic joint  Assessment of joint laxity limited 2/2 significant pain with knee flexion, denies twisting TOMMY, ligamentous injury unlikely  XR with no acute fx or dislocation  Patient declines pain medication throughout ED course  ACE wrap applied, crutches offered, patient declines, states she is able to ambulate appropriately  Patient well appearing, VSS, stable for discharge      -tylenol/motrin PRN  -voltaren gel PRN  -zofran PRN  -RICE  -f/u with ortho  -strict ED return precautions discussed    Results discussed with patient, who verbalized understanding and agreement with the management plan  Strict ED return instructions were discussed at bedside and all questions were answered  Prior to discharge, I provided both verbal and written instructions of the management plan and the signs and symptoms that should prompt the patient to return to the ED  All questions were answered and the patient was comfortable with the plan of care and discharged home  The patient agrees to return to the Emergency Department for concerns and/or progression of illness  Amount and/or Complexity of Data Reviewed  Tests in the radiology section of CPT®: reviewed and ordered  Independent visualization of images, tracings, or specimens: yes (XR)    Patient Progress  Patient progress: stable      Disposition  Final diagnoses:   Right knee pain     Time reflects when diagnosis was documented in both MDM as applicable and the Disposition within this note     Time User Action Codes Description Comment    4/30/2022  9:53 PM Zhanna Parks Add [Y03 [id number]] Right knee pain       ED Disposition     ED Disposition Condition Date/Time Comment    Discharge Stable Sat Apr 30, 2022  9:52 PM Mike Pérez discharge to home/self care              Follow-up Information     Follow up With Specialties Details Why Contact Info Additional Information [address] [facility] Orthopedic Surgery Schedule an appointment as soon as possible for a visit in 3 days  [address] [address] [phone] [address] [facility] [address] [facility] [address], [address] [place] [phone]    [address] [place] [address] Emergency Department Emergency Medicine  If symptoms worsen [phone]  [address] [facility] Emergency Department, [address], [address]          Discharge Medication List as of 4/30/2022  9:54 PM      CONTINUE these medications which have NOT CHANGED    Details   al mag oxide-diphenhydramine-lidocaine viscous (MAGIC MOUTHWASH) 1:1:1 suspension Swish and spit 10 mL every 4 (four) hours as needed for mouth pain or discomfort, Starting Tue 3/22/2022, Normal      !! ondansetron (Zofran ODT) 4 mg disintegrating tablet Take 1 tablet (4 mg total) by mouth every 6 (six) hours as needed for nausea or vomiting, Starting Sun 4/24/2022, Normal      !! ondansetron (ZOFRAN-ODT) 4 mg disintegrating tablet Take 1 tablet (4 mg total) by mouth every 6 (six) hours as needed for nausea or vomiting, Starting Sun 4/24/2022, Normal      pantoprazole (PROTONIX) 40 mg tablet Take 1 tablet (40 mg total) by mouth daily, Starting Wed 3/30/2022, Normal       !! - Potential duplicate medications found  Please discuss with provider  No discharge procedures on file      PDMP Review       Value Time User    PDMP Reviewed  Yes 3/22/2022  5:11 PM Helene Darling MD          ED Provider  Electronically Signed by           William James PA-C  05/01/22 0110

## 2022-05-01 NOTE — DISCHARGE INSTRUCTIONS
Can take ibuprofen (600mg every 8 hours, take with food) and tylenol (every 6 hours) as needed for pain  Can rest, ice, heat, elevate, and apply ace wrap to assist with pain  Please call Monday to schedule an appointment with the orthopedist  Please follow up with your PCP to ensure resolution of symptoms  Please return to the ED with any new or worsening symptoms

## 2022-05-05 ENCOUNTER — TELEPHONE (OUTPATIENT)
Dept: FAMILY MEDICINE CLINIC | Facility: CLINIC | Age: 32
End: 2022-05-05

## 2022-05-05 NOTE — TELEPHONE ENCOUNTER
Patient called for a refill of Zofran  I called patient back and questioned if she was ill  She advised no she has had nausea for the past 11 years since having her son? I did advise patient she has not been seen in the office since 2020 by you for a follow up or physical  Last visit was for Dental issues  Should patient be referred to GI? Patient was not willing to schedule an appointment  I advised I would send you a message

## 2022-05-06 DIAGNOSIS — R11.0 NAUSEA: Primary | ICD-10-CM

## 2022-05-06 DIAGNOSIS — K02.9 DENTAL CARIES: ICD-10-CM

## 2022-05-06 DIAGNOSIS — K21.9 GASTROESOPHAGEAL REFLUX DISEASE WITHOUT ESOPHAGITIS: ICD-10-CM

## 2022-05-06 RX ORDER — ONDANSETRON 4 MG/1
4 TABLET, ORALLY DISINTEGRATING ORAL EVERY 6 HOURS PRN
Qty: 90 TABLET | Refills: 1 | OUTPATIENT
Start: 2022-05-06 | End: 2022-06-10

## 2022-05-06 NOTE — TELEPHONE ENCOUNTER
She should see GI for the chronic nausea   You can let her know that ill send a referal for the consult   Also that I will stop sending her medication if she doesn't see them as it is not safe to cont taking a bandaid which can hide more serious issues

## 2022-06-10 ENCOUNTER — HOSPITAL ENCOUNTER (EMERGENCY)
Facility: HOSPITAL | Age: 32
Discharge: HOME | End: 2022-06-10
Attending: EMERGENCY MEDICINE
Payer: COMMERCIAL

## 2022-06-10 VITALS
RESPIRATION RATE: 18 BRPM | SYSTOLIC BLOOD PRESSURE: 97 MMHG | HEART RATE: 82 BPM | TEMPERATURE: 98.1 F | DIASTOLIC BLOOD PRESSURE: 65 MMHG | OXYGEN SATURATION: 100 %

## 2022-06-10 DIAGNOSIS — R11.0 NAUSEA: Primary | ICD-10-CM

## 2022-06-10 PROCEDURE — 99283 EMERGENCY DEPT VISIT LOW MDM: CPT

## 2022-06-10 PROCEDURE — 99284 EMERGENCY DEPT VISIT MOD MDM: CPT | Performed by: EMERGENCY MEDICINE

## 2022-06-10 RX ORDER — ONDANSETRON 4 MG/1
4 TABLET, ORALLY DISINTEGRATING ORAL ONCE
Status: COMPLETED | OUTPATIENT
Start: 2022-06-10 | End: 2022-06-10

## 2022-06-10 RX ORDER — ONDANSETRON 4 MG/1
4 TABLET, ORALLY DISINTEGRATING ORAL EVERY 8 HOURS PRN
Qty: 5 TABLET | Refills: 0 | Status: SHIPPED | OUTPATIENT
Start: 2022-06-10 | End: 2022-07-12 | Stop reason: SDUPTHER

## 2022-06-10 RX ADMIN — ONDANSETRON 4 MG: 4 TABLET, ORALLY DISINTEGRATING ORAL at 19:18

## 2022-06-10 NOTE — DISCHARGE INSTRUCTIONS
Follow up with your primary doctor, and return to the emergency department for new or worsening symptoms  None acute

## 2022-06-10 NOTE — ED PROVIDER NOTES
History  Chief Complaint   Patient presents with    Nausea     Started yesterday nausea     Patient is a 58-year-old female seen in the emergency department with concern for nausea which began 1 day prior to evaluation  Patient notes no definite clear exacerbating or alleviating factors for her symptoms this time  Patient states that she has taken Zofran in the past, which has helped her symptoms  Patient notes no fever, vomiting, diarrhea, abdominal pain, chest pain, shortness of breath, or any new or unusual foods  Prior to Admission Medications   Prescriptions Last Dose Informant Patient Reported? Taking?    al mag oxide-diphenhydramine-lidocaine viscous (MAGIC MOUTHWASH) 1:1:1 suspension   No No   Sig: Swish and spit 10 mL every 4 (four) hours as needed for mouth pain or discomfort   Patient not taking: Reported on 4/23/2022    ondansetron (ZOFRAN-ODT) 4 mg disintegrating tablet   No No   Sig: Take 1 tablet (4 mg total) by mouth every 6 (six) hours as needed for nausea or vomiting   ondansetron (Zofran ODT) 4 mg disintegrating tablet   No No   Sig: Take 1 tablet (4 mg total) by mouth every 6 (six) hours as needed for nausea or vomiting   pantoprazole (PROTONIX) 40 mg tablet   No No   Sig: Take 1 tablet (40 mg total) by mouth daily      Facility-Administered Medications: None       Past Medical History:   Diagnosis Date    Anxiety     Asthma     Depression     GERD (gastroesophageal reflux disease)     Hernia, abdominal     Migraines     Muscle spasm     Psychiatric disorder     Anx, Depression    Renal disorder     kidney stones       Past Surgical History:   Procedure Laterality Date    CYSTOSCOPY      removal of kidney stone    FL RETROGRADE PYELOGRAM  8/28/2018    HERNIA REPAIR      NH CYSTO/URETERO W/LITHOTRIPSY &INDWELL STENT INSRT Right 8/28/2018    Procedure: CYSTOSCOPY URETEROSCOPY WITH RETROGRADE PYELOGRAM AND INSERTION STENT URETERAL;  Surgeon: Satya Rowley MD; [facility];  Service: Urology    TOOTH EXTRACTION         Family History   Problem Relation Age of Onset    Diabetes Mother     Hyperlipidemia Mother     Stroke Mother     Heart disease Mother     Asthma Mother    [address] Other Mother         Degen  of Intervertabral disc   Nephrolithiasis Father     Nephrolithiasis Brother      I have reviewed and agree with the history as documented  E-Cigarette/Vaping    E-Cigarette Use Never User      E-Cigarette/Vaping Substances    Nicotine No     THC No     CBD No     Flavoring No     Other No     Unknown No      Social History     Tobacco Use    Smoking status: Current Every Day Smoker     Packs/day: 0 50     Years: 13 00     Pack years: 6 50     Types: Cigarettes    Smokeless tobacco: Never Used   Vaping Use    Vaping Use: Never used   Substance Use Topics    Alcohol use: Not Currently     Comment: Alcohol intake:   None  - As per [place] [employer] Drug use: No     Comment: Illicit drugs:   none  - As per [place]        Review of Systems   Constitutional: Negative for chills and fever  HENT: Negative for ear pain and sore throat  Eyes: Negative for pain and visual disturbance  Respiratory: Negative for cough and shortness of breath  Cardiovascular: Negative for chest pain and palpitations  Gastrointestinal: Positive for nausea  Negative for abdominal pain, diarrhea and vomiting  Genitourinary: Negative for decreased urine volume and difficulty urinating  Musculoskeletal: Negative for arthralgias and back pain  Skin: Negative for color change and rash  Neurological: Negative for weakness and numbness  Psychiatric/Behavioral: Negative for agitation and confusion  Physical Exam  Physical Exam  Vitals and nursing note reviewed  Constitutional:       General: She is not in acute distress  Appearance: She is well-developed  HENT:      Head: Normocephalic and atraumatic        Right Ear: External ear normal       Left Ear: External ear normal       Nose: Nose normal       Mouth/Throat:      Pharynx: Oropharynx is clear  Eyes:      General: No scleral icterus  Conjunctiva/sclera: Conjunctivae normal    Cardiovascular:      Rate and Rhythm: Regular rhythm  Tachycardia present  Heart sounds: No murmur heard  Pulmonary:      Effort: Pulmonary effort is normal  No respiratory distress  Breath sounds: Normal breath sounds  Abdominal:      General: There is no distension  Palpations: Abdomen is soft  Tenderness: There is no abdominal tenderness  Musculoskeletal:         General: No deformity or signs of injury  Cervical back: Normal range of motion and neck supple  Skin:     General: Skin is warm and dry  Neurological:      General: No focal deficit present  Mental Status: She is alert  Cranial Nerves: No cranial nerve deficit  Sensory: No sensory deficit  Psychiatric:         Mood and Affect: Mood normal          Thought Content:  Thought content normal          Vital Signs  ED Triage Vitals [06/10/22 1904]   Temperature Pulse Respirations Blood Pressure SpO2   98 1 °F (36 7 °C) (!) 114 18 97/64 100 %      Temp Source Heart Rate Source Patient Position - Orthostatic VS BP Location FiO2 (%)   Oral Monitor Sitting Right arm --      Pain Score       --           Vitals:    06/10/22 1904 06/10/22 1955   BP: 97/64 97/65   Pulse: (!) 114 82   Patient Position - Orthostatic VS: Sitting Sitting         Visual Acuity      ED Medications  Medications   ondansetron (ZOFRAN-ODT) dispersible tablet 4 mg (4 mg Oral Given 6/10/22 1918)       Diagnostic Studies  Results Reviewed     None                 No orders to display              Procedures  Procedures         ED Course                                             MDM  Number of Diagnoses or Management Options  Nausea  Diagnosis management comments: Patient is a 80-year-old female seen in the emergency department with concern for nausea, which is apparently recurrent problem for the patient  Patient is afebrile in the emergency department, and appears well-hydrated, with a benign abdominal exam   Patient was treated with medication for symptom control  Patient is certain that there is no chance of pregnancy  No definite cause of the patient's symptoms was discovered  Evaluation is not consistent with acute appendicitis, pancreatitis, or bowel obstruction  Plan to have patient follow up with PCP  Patient stable for discharge home  Discharge instructions were reviewed with patient  Disposition  Final diagnoses:   Nausea     Time reflects when diagnosis was documented in both MDM as applicable and the Disposition within this note     Time User Action Codes Description Comment    6/10/2022  7:11 PM Mallory Prasadjoshua Add [R11 0] Nausea       ED Disposition     ED Disposition   Discharge    Condition   Stable    Date/Time   Fri Noam 10, 2022  8:01 PM    Comment   Daniel Arm discharge to home/self care  Follow-up Information     Follow up With Specialties Details Why Contact Info Additional Information    Giovanny Mir MD Family Medicine Call in 1 day  [address] [facility]  [phone]       [facility] Gastroenterology Specialists [facility] Gastroenterology Call  As needed [address]  [place] [phone]  [place] [address] Gastroenterology Specialists [place], [address], [place], [place], [address] [phone]          Patient's Medications   Discharge Prescriptions    ONDANSETRON (ZOFRAN ODT) 4 MG DISINTEGRATING TABLET    Take 1 tablet (4 mg total) by mouth every 8 (eight) hours as needed for nausea for up to 3 days       Start Date: 6/10/2022 End Date: 6/13/2022       Order Dose: 4 mg       Quantity: 5 tablet    Refills: 0       No discharge procedures on file      PDMP Review       Value Time User    PDMP Reviewed  Yes 3/22/2022  5:11 PM Giovanny Mir MD          ED Provider  Electronically Signed by           Roberto Carlos Barriga MD  06/10/22 2003

## 2022-07-12 DIAGNOSIS — R11.0 NAUSEA: ICD-10-CM

## 2022-07-12 RX ORDER — ONDANSETRON 4 MG/1
4 TABLET, ORALLY DISINTEGRATING ORAL EVERY 8 HOURS PRN
Qty: 45 TABLET | Refills: 0 | Status: SHIPPED | OUTPATIENT
Start: 2022-07-12 | End: 2022-08-03

## 2022-07-12 NOTE — TELEPHONE ENCOUNTER
Pt would like to know if Dr Mohamud Naidu can please refill her Zofran  She has no been able to make an apt w/ gastro a of yet and missed her last apt w/ Dr Efren Espinal due to a death in the family

## 2022-07-18 ENCOUNTER — VBI (OUTPATIENT)
Dept: ADMINISTRATIVE | Facility: OTHER | Age: 32
End: 2022-07-18

## 2022-07-19 ENCOUNTER — HOSPITAL ENCOUNTER (EMERGENCY)
Facility: HOSPITAL | Age: 32
Discharge: HOME | End: 2022-07-19
Attending: EMERGENCY MEDICINE | Admitting: EMERGENCY MEDICINE
Payer: COMMERCIAL

## 2022-07-19 VITALS
SYSTOLIC BLOOD PRESSURE: 105 MMHG | BODY MASS INDEX: 23.44 KG/M2 | OXYGEN SATURATION: 100 % | TEMPERATURE: 98.1 F | HEART RATE: 104 BPM | DIASTOLIC BLOOD PRESSURE: 63 MMHG | WEIGHT: 120 LBS | RESPIRATION RATE: 18 BRPM

## 2022-07-19 DIAGNOSIS — S02.5XXA FRACTURE, TOOTH: Primary | ICD-10-CM

## 2022-07-19 PROCEDURE — 99282 EMERGENCY DEPT VISIT SF MDM: CPT

## 2022-07-19 PROCEDURE — 99284 EMERGENCY DEPT VISIT MOD MDM: CPT | Performed by: EMERGENCY MEDICINE

## 2022-07-19 RX ORDER — ACETAMINOPHEN 500 MG
1000 TABLET ORAL EVERY 6 HOURS PRN
Qty: 30 TABLET | Refills: 0 | Status: SHIPPED | OUTPATIENT
Start: 2022-07-19

## 2022-07-19 RX ORDER — OXYCODONE HYDROCHLORIDE 5 MG/1
5 TABLET ORAL ONCE
Status: COMPLETED | OUTPATIENT
Start: 2022-07-19 | End: 2022-07-19

## 2022-07-19 RX ORDER — IBUPROFEN 800 MG/1
800 TABLET ORAL EVERY 6 HOURS PRN
Qty: 30 TABLET | Refills: 0 | Status: SHIPPED | OUTPATIENT
Start: 2022-07-19

## 2022-07-19 RX ADMIN — OXYCODONE HYDROCHLORIDE 5 MG: 5 TABLET ORAL at 15:15

## 2022-07-19 NOTE — ED PROVIDER NOTES
tHistory  Chief Complaint   Patient presents with    Dental Pain     Pt reports her breaking her tooth on the lower right side last when eating dinner last night, pt report tooth is infected, pt took ibuprofen 2hr ago       43-year-old female with history of poor dentition presents for fractured tooth  States that it was fractured recently when she was eating dinner  Notes a shocking sensation in her right jaw since this happened  No fevers, trismus, change in voice  Scheduled to see a dentist next Tuesday  Is taking clindamycin for this fractured tooth  Taking Tylenol and ibuprofen without relief of symptoms  Dental Pain  Quality:  Shooting  Severity:  Severe  Onset quality:  Sudden  Timing:  Constant  Progression:  Worsening  Chronicity:  New  Context: dental fracture    Relieved by:  Nothing  Worsened by:  Nothing  Ineffective treatments:  Acetaminophen and NSAIDs      Prior to Admission Medications   Prescriptions Last Dose Informant Patient Reported? Taking?    al mag oxide-diphenhydramine-lidocaine viscous (MAGIC MOUTHWASH) 1:1:1 suspension   No No   Sig: Swish and spit 10 mL every 4 (four) hours as needed for mouth pain or discomfort   Patient not taking: Reported on 4/23/2022    ondansetron (Zofran ODT) 4 mg disintegrating tablet   No No   Sig: Take 1 tablet (4 mg total) by mouth every 8 (eight) hours as needed for nausea   pantoprazole (PROTONIX) 40 mg tablet   No No   Sig: Take 1 tablet (40 mg total) by mouth daily      Facility-Administered Medications: None       Past Medical History:   Diagnosis Date    Anxiety     Asthma     Depression     GERD (gastroesophageal reflux disease)     Hernia, abdominal     Migraines     Muscle spasm     Psychiatric disorder     Anx, Depression    Renal disorder     kidney stones       Past Surgical History:   Procedure Laterality Date    CYSTOSCOPY      removal of kidney stone    FL RETROGRADE PYELOGRAM  8/28/2018    HERNIA REPAIR      NE CYSTO/URETERO W/LITHOTRIPSY &INDWELL STENT INSRT Right 8/28/2018    Procedure: CYSTOSCOPY URETEROSCOPY WITH RETROGRADE PYELOGRAM AND INSERTION STENT URETERAL;  Surgeon: Sascha Gonzalez MD;  [facility];  Service: Urology    TOOTH EXTRACTION         Family History   Problem Relation Age of Onset    Diabetes Mother     Hyperlipidemia Mother     Stroke Mother     Heart disease Mother     Asthma Mother    Lance Tee Other Mother         Degen  of Intervertabral disc   Nephrolithiasis Father     Nephrolithiasis Brother      I have reviewed and agree with the history as documented  E-Cigarette/Vaping    E-Cigarette Use Never User      E-Cigarette/Vaping Substances    Nicotine No     THC No     CBD No     Flavoring No     Other No     Unknown No      Social History     Tobacco Use    Smoking status: Current Every Day Smoker     Packs/day: 0 50     Years: 13 00     Pack years: 6 50     Types: Cigarettes    Smokeless tobacco: Never Used   Vaping Use    Vaping Use: Never used   Substance Use Topics    Alcohol use: Not Currently     Comment: Alcohol intake:   None  - As per Bryan Ponce Drug use: No     Comment: Illicit drugs:   none  - As per [place]        Review of Systems   HENT: Positive for dental problem  All other systems reviewed and are negative  Physical Exam  Physical Exam  Vitals and nursing note reviewed  Constitutional:       General: She is not in acute distress  Appearance: Normal appearance  She is not ill-appearing  HENT:      Head: Normocephalic and atraumatic  Right Ear: External ear normal       Left Ear: External ear normal       Nose: Nose normal       Mouth/Throat:      Mouth: Mucous membranes are moist       Comments: Right upper most posterior molar with a fractured cusp as well as missing the center portion of the tooth  No surrounding erythema, abscess or other signs of infection  Eyes:      General:         Right eye: No discharge           Left eye: No discharge  Conjunctiva/sclera: Conjunctivae normal    Cardiovascular:      Rate and Rhythm: Normal rate and regular rhythm  Pulses: Normal pulses  Heart sounds: No murmur heard  Pulmonary:      Effort: Pulmonary effort is normal       Breath sounds: Normal breath sounds  Abdominal:      General: Abdomen is flat  There is no distension  Tenderness: There is no abdominal tenderness  Musculoskeletal:         General: Normal range of motion  Cervical back: Normal range of motion  Skin:     General: Skin is warm  Capillary Refill: Capillary refill takes less than 2 seconds  Findings: No rash  Neurological:      General: No focal deficit present  Mental Status: She is alert  Mental status is at baseline  Psychiatric:         Mood and Affect: Mood normal          Behavior: Behavior normal          Vital Signs  ED Triage Vitals [07/19/22 1454]   Temperature Pulse Respirations Blood Pressure SpO2   98 1 °F (36 7 °C) 104 18 105/63 100 %      Temp Source Heart Rate Source Patient Position - Orthostatic VS BP Location FiO2 (%)   Oral Monitor Sitting Left arm --      Pain Score       7           Vitals:    07/19/22 1454   BP: 105/63   Pulse: 104   Patient Position - Orthostatic VS: Sitting         Visual Acuity      ED Medications  Medications   oxyCODONE (ROXICODONE) IR tablet 5 mg (has no administration in time range)       Diagnostic Studies  Results Reviewed     None                 No orders to display              Procedures  Procedures         ED Course                                             MDM  Number of Diagnoses or Management Options  Fracture, tooth: new and does not require workup  Diagnosis management comments: Patient with dental fracture  Already on antibiotics  Will send Tylenol and ibuprofen to the pharmacy  Follow-up with dentist   Return precautions given      Risk of Complications, Morbidity, and/or Mortality  Presenting problems: minimal  Diagnostic procedures: minimal  Management options: minimal        Disposition  Final diagnoses:   Fracture, tooth     Time reflects when diagnosis was documented in both MDM as applicable and the Disposition within this note     Time User Action Codes Description Comment    7/19/2022  3:05 PM Shay Ryan [S02  5XXA] Fracture, tooth       ED Disposition     ED Disposition   Discharge    Condition   Stable    Date/Time   Tue Jul 19, 2022  3:05 PM    Comment   Christine Fay discharge to home/self care  Follow-up Information     Follow up With Specialties Details Why Contact Info Additional [id number]  Emergency Department Emergency Medicine  If symptoms worsen [address] [facility],[address] [address]  [address] [facility] Emergency Department, [address], [place] [address]          Patient's Medications   Discharge Prescriptions    ACETAMINOPHEN (TYLENOL) 500 MG TABLET    Take 2 tablets (1,000 mg total) by mouth every 6 (six) hours as needed for mild pain for up to 15 doses       Start Date: 7/19/2022 End Date: --       Order Dose: 1,000 mg       Quantity: 30 tablet    Refills: 0    IBUPROFEN (MOTRIN) 800 MG TABLET    Take 1 tablet (800 mg total) by mouth every 6 (six) hours as needed for mild pain for up to 30 doses       Start Date: 7/19/2022 End Date: --       Order Dose: 800 mg       Quantity: 30 tablet    Refills: 0       No discharge procedures on file      PDMP Review       Value Time User    PDMP Reviewed  Yes 3/22/2022  5:11 PM Joslyn Montemayor MD          ED Provider  Electronically Signed by           Melia Allison DO  07/19/22 7039

## 2022-07-19 NOTE — DISCHARGE INSTRUCTIONS
Follow-up with dentist as soon as possible  Obtain dental putty such as dentemp or dentek to attempt to fill the hole in your tooth temporarily  Follow-up with your dentist as scheduled  Come back to emergency department if you have fevers inability to open your mouth, change in voice

## 2022-07-30 DIAGNOSIS — R11.0 NAUSEA: ICD-10-CM

## 2022-08-03 RX ORDER — ONDANSETRON 4 MG/1
TABLET, ORALLY DISINTEGRATING ORAL
Qty: 45 TABLET | Refills: 0 | Status: SHIPPED | OUTPATIENT
Start: 2022-08-03 | End: 2022-09-20 | Stop reason: SDUPTHER

## 2022-08-31 ENCOUNTER — HOSPITAL ENCOUNTER (EMERGENCY)
Facility: HOSPITAL | Age: 32
Discharge: HOME | End: 2022-09-01
Attending: EMERGENCY MEDICINE
Payer: COMMERCIAL

## 2022-08-31 DIAGNOSIS — R11.0 NAUSEA: ICD-10-CM

## 2022-08-31 DIAGNOSIS — N94.9 ADNEXAL CYST: Primary | ICD-10-CM

## 2022-08-31 LAB
ALBUMIN SERPL BCP-MCNC: 4.1 G/DL (ref 3.5–5)
ALP SERPL-CCNC: 44 U/L (ref 34–104)
ALT SERPL W P-5'-P-CCNC: 10 U/L (ref 7–52)
ANION GAP SERPL CALCULATED.3IONS-SCNC: 7 MMOL/L (ref 4–13)
AST SERPL W P-5'-P-CCNC: 12 U/L (ref 13–39)
BASOPHILS # BLD AUTO: 0.04 THOUSANDS/ΜL (ref 0–0.1)
BASOPHILS NFR BLD AUTO: 1 % (ref 0–1)
BILIRUB SERPL-MCNC: 0.26 MG/DL (ref 0.2–1)
BUN SERPL-MCNC: 10 MG/DL (ref 5–25)
CALCIUM SERPL-MCNC: 9.2 MG/DL (ref 8.4–10.2)
CHLORIDE SERPL-SCNC: 106 MMOL/L (ref 96–108)
CO2 SERPL-SCNC: 24 MMOL/L (ref 21–32)
CREAT SERPL-MCNC: 0.69 MG/DL (ref 0.6–1.3)
EOSINOPHIL # BLD AUTO: 0.16 THOUSAND/ΜL (ref 0–0.61)
EOSINOPHIL NFR BLD AUTO: 2 % (ref 0–6)
ERYTHROCYTE [DISTWIDTH] IN BLOOD BY AUTOMATED COUNT: 12.6 % (ref 11.6–15.1)
EXT PREG TEST URINE: NEGATIVE
EXT. CONTROL ED NAV: NORMAL
GFR SERPL CREATININE-BSD FRML MDRD: 115 ML/MIN/1.73SQ M
GLUCOSE SERPL-MCNC: 135 MG/DL (ref 65–140)
HCG SERPL QL: NEGATIVE
HCT VFR BLD AUTO: 38.3 % (ref 34.8–46.1)
HGB BLD-MCNC: 12.8 G/DL (ref 11.5–15.4)
IMM GRANULOCYTES # BLD AUTO: 0.01 THOUSAND/UL (ref 0–0.2)
IMM GRANULOCYTES NFR BLD AUTO: 0 % (ref 0–2)
LIPASE SERPL-CCNC: 15 U/L (ref 11–82)
LYMPHOCYTES # BLD AUTO: 2.41 THOUSANDS/ΜL (ref 0.6–4.47)
LYMPHOCYTES NFR BLD AUTO: 32 % (ref 14–44)
MAGNESIUM SERPL-MCNC: 1.9 MG/DL (ref 1.9–2.7)
MCH RBC QN AUTO: 30.5 PG (ref 26.8–34.3)
MCHC RBC AUTO-ENTMCNC: 33.4 G/DL (ref 31.4–37.4)
MCV RBC AUTO: 91 FL (ref 82–98)
MONOCYTES # BLD AUTO: 0.6 THOUSAND/ΜL (ref 0.17–1.22)
MONOCYTES NFR BLD AUTO: 8 % (ref 4–12)
NEUTROPHILS # BLD AUTO: 4.37 THOUSANDS/ΜL (ref 1.85–7.62)
NEUTS SEG NFR BLD AUTO: 57 % (ref 43–75)
NRBC BLD AUTO-RTO: 0 /100 WBCS
PLATELET # BLD AUTO: 249 THOUSANDS/UL (ref 149–390)
PMV BLD AUTO: 9.8 FL (ref 8.9–12.7)
POTASSIUM SERPL-SCNC: 3.6 MMOL/L (ref 3.5–5.3)
PROT SERPL-MCNC: 6.6 G/DL (ref 6.4–8.4)
RBC # BLD AUTO: 4.2 MILLION/UL (ref 3.81–5.12)
SODIUM SERPL-SCNC: 137 MMOL/L (ref 135–147)
WBC # BLD AUTO: 7.59 THOUSAND/UL (ref 4.31–10.16)

## 2022-08-31 PROCEDURE — 99284 EMERGENCY DEPT VISIT MOD MDM: CPT

## 2022-08-31 PROCEDURE — 99285 EMERGENCY DEPT VISIT HI MDM: CPT | Performed by: EMERGENCY MEDICINE

## 2022-08-31 PROCEDURE — 81025 URINE PREGNANCY TEST: CPT | Performed by: EMERGENCY MEDICINE

## 2022-08-31 PROCEDURE — 80053 COMPREHEN METABOLIC PANEL: CPT | Performed by: EMERGENCY MEDICINE

## 2022-08-31 PROCEDURE — 83735 ASSAY OF MAGNESIUM: CPT | Performed by: EMERGENCY MEDICINE

## 2022-08-31 PROCEDURE — 96375 TX/PRO/DX INJ NEW DRUG ADDON: CPT

## 2022-08-31 PROCEDURE — 96374 THER/PROPH/DIAG INJ IV PUSH: CPT

## 2022-08-31 PROCEDURE — 85025 COMPLETE CBC W/AUTO DIFF WBC: CPT | Performed by: EMERGENCY MEDICINE

## 2022-08-31 PROCEDURE — 84703 CHORIONIC GONADOTROPIN ASSAY: CPT | Performed by: EMERGENCY MEDICINE

## 2022-08-31 PROCEDURE — 96361 HYDRATE IV INFUSION ADD-ON: CPT

## 2022-08-31 PROCEDURE — 81003 URINALYSIS AUTO W/O SCOPE: CPT | Performed by: EMERGENCY MEDICINE

## 2022-08-31 PROCEDURE — 83690 ASSAY OF LIPASE: CPT | Performed by: EMERGENCY MEDICINE

## 2022-08-31 PROCEDURE — 36415 COLL VENOUS BLD VENIPUNCTURE: CPT | Performed by: EMERGENCY MEDICINE

## 2022-08-31 RX ORDER — MORPHINE SULFATE 4 MG/ML
4 INJECTION, SOLUTION INTRAMUSCULAR; INTRAVENOUS ONCE
Status: COMPLETED | OUTPATIENT
Start: 2022-08-31 | End: 2022-08-31

## 2022-08-31 RX ORDER — ONDANSETRON 2 MG/ML
4 INJECTION INTRAMUSCULAR; INTRAVENOUS ONCE
Status: COMPLETED | OUTPATIENT
Start: 2022-08-31 | End: 2022-08-31

## 2022-08-31 RX ADMIN — ONDANSETRON 4 MG: 2 INJECTION INTRAMUSCULAR; INTRAVENOUS at 23:39

## 2022-08-31 RX ADMIN — SODIUM CHLORIDE 1000 ML: 0.9 INJECTION, SOLUTION INTRAVENOUS at 23:39

## 2022-08-31 RX ADMIN — MORPHINE SULFATE 4 MG: 4 INJECTION INTRAVENOUS at 23:56

## 2022-09-01 ENCOUNTER — APPOINTMENT (EMERGENCY)
Dept: CT IMAGING | Facility: HOSPITAL | Age: 32
End: 2022-09-01
Payer: COMMERCIAL

## 2022-09-01 VITALS
SYSTOLIC BLOOD PRESSURE: 92 MMHG | OXYGEN SATURATION: 100 % | HEIGHT: 60 IN | DIASTOLIC BLOOD PRESSURE: 67 MMHG | BODY MASS INDEX: 23.56 KG/M2 | HEART RATE: 64 BPM | RESPIRATION RATE: 16 BRPM | WEIGHT: 120 LBS | TEMPERATURE: 98.6 F

## 2022-09-01 LAB
BILIRUB UR QL STRIP: NEGATIVE
CLARITY UR: CLEAR
COLOR UR: YELLOW
GLUCOSE UR STRIP-MCNC: NEGATIVE MG/DL
HGB UR QL STRIP.AUTO: NEGATIVE
KETONES UR STRIP-MCNC: NEGATIVE MG/DL
LEUKOCYTE ESTERASE UR QL STRIP: NEGATIVE
NITRITE UR QL STRIP: NEGATIVE
PH UR STRIP.AUTO: 6 [PH]
PROT UR STRIP-MCNC: NEGATIVE MG/DL
SP GR UR STRIP.AUTO: 1.01 (ref 1–1.03)
UROBILINOGEN UR QL STRIP.AUTO: 0.2 E.U./DL

## 2022-09-01 PROCEDURE — 96376 TX/PRO/DX INJ SAME DRUG ADON: CPT

## 2022-09-01 PROCEDURE — 96375 TX/PRO/DX INJ NEW DRUG ADDON: CPT

## 2022-09-01 PROCEDURE — 96361 HYDRATE IV INFUSION ADD-ON: CPT

## 2022-09-01 PROCEDURE — 74177 CT ABD & PELVIS W/CONTRAST: CPT

## 2022-09-01 RX ORDER — ONDANSETRON 2 MG/ML
4 INJECTION INTRAMUSCULAR; INTRAVENOUS ONCE
Status: COMPLETED | OUTPATIENT
Start: 2022-09-01 | End: 2022-09-01

## 2022-09-01 RX ORDER — ONDANSETRON 4 MG/1
4 TABLET, ORALLY DISINTEGRATING ORAL EVERY 8 HOURS PRN
Qty: 5 TABLET | Refills: 0 | Status: SHIPPED | OUTPATIENT
Start: 2022-09-01 | End: 2022-10-07 | Stop reason: ALTCHOICE

## 2022-09-01 RX ORDER — HYDROMORPHONE HCL/PF 1 MG/ML
0.5 SYRINGE (ML) INJECTION ONCE
Status: COMPLETED | OUTPATIENT
Start: 2022-09-01 | End: 2022-09-01

## 2022-09-01 RX ADMIN — HYDROMORPHONE HYDROCHLORIDE 0.5 MG: 1 INJECTION, SOLUTION INTRAMUSCULAR; INTRAVENOUS; SUBCUTANEOUS at 02:03

## 2022-09-01 RX ADMIN — IOHEXOL 70 ML: 350 INJECTION, SOLUTION INTRAVENOUS at 02:17

## 2022-09-01 RX ADMIN — ONDANSETRON 4 MG: 2 INJECTION INTRAMUSCULAR; INTRAVENOUS at 02:03

## 2022-09-01 NOTE — DISCHARGE INSTRUCTIONS
CT abdomen/pelvis showed a 4 8 cm rounded left adnexal cyst   CT also showed possible cystitis  Urinalysis did not show evidence of urinary tract infection  Follow up with your primary doctor and OBGYN, and return to the emergency department for new or worsening symptoms

## 2022-09-01 NOTE — ED NOTES
Discharge instructions reviewed with pt  Pt verbalized understanding  And has no further questions at this time  Pt ambulatory off unit with steady gait        Moo Cazares RN  09/01/22 0940

## 2022-09-01 NOTE — ED PROVIDER NOTES
History  Chief Complaint   Patient presents with    Abdominal Pain     Pt states she has been experiencing RLQ abdominal pain that radiates to her R flank  Pt is nauseous denies diarrhea and vomiting  Patient is a 60-year-old female seen in the emergency department with concern for right lower quadrant pain radiating to the right flank, which began on Sunday of this week  Patient notes persistent pain, apparently worse with palpation  Patient states that she took Tylenol and ibuprofen at home, without improvement of symptoms noted  Patient notes some associated nausea  Patient states that she has been stooling/voiding normally  Patient states that she has been eating normally at home as well  Patient notes the pain feels like she is being kicked  Prior to Admission Medications   Prescriptions Last Dose Informant Patient Reported?  Taking?   acetaminophen (TYLENOL) 500 mg tablet   No No   Sig: Take 2 tablets (1,000 mg total) by mouth every 6 (six) hours as needed for mild pain for up to 15 doses   al mag oxide-diphenhydramine-lidocaine viscous (MAGIC MOUTHWASH) 1:1:1 suspension   No No   Sig: Swish and spit 10 mL every 4 (four) hours as needed for mouth pain or discomfort   Patient not taking: Reported on 4/23/2022    ibuprofen (MOTRIN) 800 mg tablet   No No   Sig: Take 1 tablet (800 mg total) by mouth every 6 (six) hours as needed for mild pain for up to 30 doses   ondansetron (ZOFRAN-ODT) 4 mg disintegrating tablet   No No   Sig: TAKE 1 TABLET BY MOUTH EVERY 8 HOURS AS NEEDED FOR NAUSEA   pantoprazole (PROTONIX) 40 mg tablet   No No   Sig: Take 1 tablet (40 mg total) by mouth daily      Facility-Administered Medications: None       Past Medical History:   Diagnosis Date    Anxiety     Asthma     Depression     GERD (gastroesophageal reflux disease)     Hernia, abdominal     Migraines     Muscle spasm     Psychiatric disorder     Anx, Depression    Renal disorder     kidney stones Past Surgical History:   Procedure Laterality Date    CYSTOSCOPY      removal of kidney stone    FL RETROGRADE PYELOGRAM  8/28/2018    HERNIA REPAIR      MD CYSTO/URETERO W/LITHOTRIPSY &INDWELL STENT INSRT Right 8/28/2018    Procedure: CYSTOSCOPY URETEROSCOPY WITH RETROGRADE PYELOGRAM AND INSERTION STENT URETERAL;  Surgeon: Jose Valdivia MD;  [facility];  Service: Urology    TOOTH EXTRACTION         Family History   Problem Relation Age of Onset    Diabetes Mother     Hyperlipidemia Mother     Stroke Mother     Heart disease Mother     Asthma Mother    Travis Hunter Other Mother         Degen  of Intervertabral disc   Nephrolithiasis Father     Nephrolithiasis Brother      I have reviewed and agree with the history as documented  E-Cigarette/Vaping    E-Cigarette Use Never User      E-Cigarette/Vaping Substances    Nicotine No     THC No     CBD No     Flavoring No     Other No     Unknown No      Social History     Tobacco Use    Smoking status: Current Every Day Smoker     Packs/day: 0 50     Years: 13 00     Pack years: 6 50     Types: Cigarettes    Smokeless tobacco: Never Used   Vaping Use    Vaping Use: Never used   Substance Use Topics    Alcohol use: Not Currently     Comment: Alcohol intake:   None  - As per Angelique Toney Drug use: No     Comment: Illicit drugs:   none  - As per [place]        Review of Systems   Constitutional: Negative for chills and fever  HENT: Negative for ear pain and sore throat  Eyes: Negative for pain and visual disturbance  Respiratory: Negative for cough and shortness of breath  Cardiovascular: Negative for chest pain and palpitations  Gastrointestinal: Positive for abdominal pain and nausea  Negative for diarrhea and vomiting  Genitourinary: Positive for flank pain  Negative for decreased urine volume and dysuria  Musculoskeletal: Negative for arthralgias and back pain  Skin: Negative for color change and rash  Neurological: Negative for seizures and syncope  Psychiatric/Behavioral: Negative for agitation and confusion  All other systems reviewed and are negative  Physical Exam  Physical Exam  Vitals and nursing note reviewed  Constitutional:       General: She is not in acute distress  Appearance: She is well-developed  HENT:      Head: Normocephalic and atraumatic  Right Ear: External ear normal       Left Ear: External ear normal       Nose: Nose normal       Mouth/Throat:      Pharynx: Oropharynx is clear  Eyes:      General: No scleral icterus  Conjunctiva/sclera: Conjunctivae normal    Cardiovascular:      Rate and Rhythm: Normal rate and regular rhythm  Heart sounds: No murmur heard  Pulmonary:      Effort: Pulmonary effort is normal  No respiratory distress  Breath sounds: Normal breath sounds  Abdominal:      General: There is no distension  Palpations: Abdomen is soft  Tenderness: There is abdominal tenderness  There is no right CVA tenderness or left CVA tenderness  Comments: mild right lower quadrant tenderness   Musculoskeletal:         General: No deformity or signs of injury  Cervical back: Normal range of motion and neck supple  Skin:     General: Skin is warm and dry  Neurological:      General: No focal deficit present  Mental Status: She is alert  Cranial Nerves: No cranial nerve deficit  Sensory: No sensory deficit  Psychiatric:         Mood and Affect: Mood normal          Thought Content:  Thought content normal          Vital Signs  ED Triage Vitals   Temperature Pulse Respirations Blood Pressure SpO2   08/31/22 2300 08/31/22 2300 08/31/22 2300 08/31/22 2300 08/31/22 2300   98 6 °F (37 °C) 99 16 112/68 100 %      Temp Source Heart Rate Source Patient Position - Orthostatic VS BP Location FiO2 (%)   08/31/22 2300 09/01/22 0154 08/31/22 2300 08/31/22 2300 --   Oral Monitor Lying Right arm       Pain Score       08/31/22 2300       9           Vitals:    08/31/22 2300 09/01/22 0154   BP: 112/68 92/67   Pulse: 99 64   Patient Position - Orthostatic VS: Lying Lying         Visual Acuity      ED Medications  Medications   sodium chloride 0 9 % bolus 1,000 mL (0 mL Intravenous Stopped 9/1/22 0154)   ondansetron (ZOFRAN) injection 4 mg (4 mg Intravenous Given 8/31/22 2339)   morphine injection 4 mg (4 mg Intravenous Given 8/31/22 2356)   HYDROmorphone (DILAUDID) injection 0 5 mg (0 5 mg Intravenous Given 9/1/22 0203)   ondansetron (ZOFRAN) injection 4 mg (4 mg Intravenous Given 9/1/22 0203)   iohexol (OMNIPAQUE) 350 MG/ML injection (MULTI-DOSE) 100 mL (70 mL Intravenous Given 9/1/22 0217)       Diagnostic Studies  Results Reviewed     Procedure Component Value Units Date/Time    UA w Reflex to Microscopic w Reflex to Culture [[id number]]  (Normal) Collected: 08/31/22 2355    Lab Status: Final result Specimen: Urine, Clean Catch Updated: 09/01/22 0032     Color, UA Yellow     Clarity, UA Clear     Specific Gravity, UA 1 015     pH, UA 6 0     Leukocytes, UA Negative     Nitrite, UA Negative     Protein, UA Negative mg/dl      Glucose, UA Negative mg/dl      Ketones, UA Negative mg/dl      Urobilinogen, UA 0 2 E U /dl      Bilirubin, UA Negative     Occult Blood, UA Negative    POCT pregnancy, urine [[id number]]  (Normal) Resulted: 08/31/22 2356    Lab Status: Final result Updated: 08/31/22 2356     EXT PREG TEST UR (Ref: Negative) negative     Control valid    hCG, qualitative pregnancy [[id number]]  (Normal) Collected: 08/31/22 2306    Lab Status: Final result Specimen: Blood from Arm, Right Updated: 08/31/22 2348     Preg, Serum Negative    Comprehensive metabolic panel [[id number]]  (Abnormal) Collected: 08/31/22 2306    Lab Status: Final result Specimen: Blood from Arm, Right Updated: 08/31/22 2344     Sodium 137 mmol/L      Potassium 3 6 mmol/L      Chloride 106 mmol/L      CO2 24 mmol/L      ANION GAP 7 mmol/L      BUN 10 mg/dL Creatinine 0 69 mg/dL      Glucose 135 mg/dL      Calcium 9 2 mg/dL      AST 12 U/L      ALT 10 U/L      Alkaline Phosphatase 44 U/L      Total Protein 6 6 g/dL      Albumin 4 1 g/dL      Total Bilirubin 0 26 mg/dL      eGFR 115 ml/min/1 73sq m     Narrative:      National Kidney Disease Foundation guidelines for Chronic Kidney Disease (CKD):     Stage 1 with normal or high GFR (GFR > 90 mL/min/1 73 square meters)    Stage 2 Mild CKD (GFR = 60-89 mL/min/1 73 square meters)    Stage 3A Moderate CKD (GFR = 45-59 mL/min/1 73 square meters)    Stage 3B Moderate CKD (GFR = 30-44 mL/min/1 73 square meters)    Stage 4 Severe CKD (GFR = 15-29 mL/min/1 73 square meters)    Stage 5 End Stage CKD (GFR <15 mL/min/1 73 square meters)  Note: GFR calculation is accurate only with a steady state creatinine    Magnesium [[id number]]  (Normal) Collected: 08/31/22 2306    Lab Status: Final result Specimen: Blood from Arm, Right Updated: 08/31/22 2344     Magnesium 1 9 mg/dL     Lipase [[id number]]  (Normal) Collected: 08/31/22 2306    Lab Status: Final result Specimen: Blood from Arm, Right Updated: 08/31/22 2344     Lipase 15 u/L     CBC and differential [[id number]] Collected: 08/31/22 2306    Lab Status: Final result Specimen: Blood from Arm, Right Updated: 08/31/22 2331     WBC 7 59 Thousand/uL      RBC 4 20 Million/uL      Hemoglobin 12 8 g/dL      Hematocrit 38 3 %      MCV 91 fL      MCH 30 5 pg      MCHC 33 4 g/dL      RDW 12 6 %      MPV 9 8 fL      Platelets 172 Thousands/uL      nRBC 0 /100 WBCs      Neutrophils Relative 57 %      Immat GRANS % 0 %      Lymphocytes Relative 32 %      Monocytes Relative 8 %      Eosinophils Relative 2 %      Basophils Relative 1 %      Neutrophils Absolute 4 37 Thousands/µL      Immature Grans Absolute 0 01 Thousand/uL      Lymphocytes Absolute 2 41 Thousands/µL      Monocytes Absolute 0 60 Thousand/µL      Eosinophils Absolute 0 16 Thousand/µL      Basophils Absolute 0 04 Thousands/µL CT abdomen pelvis with contrast   Final Result by Dee Spring MD (09/01 0240)      No evidence of acute intra-abdominal or pelvic pathology            Workstation performed: [id number]                    Procedures  Procedures         ED Course                               SBIRT 20yo+    Flowsheet Row Most Recent Value   SBIRT (23 yo +)    In order to provide better care to our patients, we are screening all of our patients for alcohol and drug use  Would it be okay to ask you these screening questions? No Filed at: 08/31/2022 6988                    [facility]  Number of Diagnoses or Management Options  Adnexal cyst  Nausea  Diagnosis management comments: Patient is a 25-year-old female seen in the emergency department with concern for right-sided abdominal pain  Patient was treated with medication for symptom control, with good effect  Laboratory evaluation unremarkable  CT abdomen/pelvis was obtained to evaluate for appendicitis, diverticulitis, or other acute intra-abdominal abnormality  CT imaging showed a 4 8 cm well-marginated rounded left adnexal cystic mass  CT also showed possible cystitis  Urinalysis is not consistent with urinary tract infection  On re-evaluation, patient was feeling better and requesting discharge home  Patient's abdomen was soft, nontender, and nondistended  Patient declined pelvic ultrasound imaging in the emergency department for further evaluation  Plan to have patient follow up with PCP/OBGYN  Patient stable for discharge home  Discharge instructions were reviewed with patient         Amount and/or Complexity of Data Reviewed  Clinical lab tests: ordered and reviewed  Tests in the radiology section of CPT®: ordered and reviewed  Tests in the medicine section of CPT®: ordered and reviewed        Disposition  Final diagnoses:   Adnexal cyst   Nausea     Time reflects when diagnosis was documented in both MDM as applicable and the Disposition within this note Time User Action Codes Description Comment    9/1/2022  2:54 AM Forestine Bras Add [N94 9] Adnexal cyst     9/1/2022  2:58 AM Forestine Bras Add [R11 0] Nausea       ED Disposition     ED Disposition   Discharge    Condition   Stable    Date/Time   Thu Sep 1, 2022  2:54 AM    Comment   Tricia Park discharge to home/self care  Follow-up Information     Follow up With Specialties Details Why Contact Info Additional Information    Seng Myers MD Family Medicine Call in 1 day  [address] [facility]  [phone]       [address] [facility] Obstetrics and Gynecology Call in 1 day  [address] [facility] [address]  [facility], [address], [address],           Patient's Medications   Discharge Prescriptions    ONDANSETRON (ZOFRAN ODT) 4 MG DISINTEGRATING TABLET    Take 1 tablet (4 mg total) by mouth every 8 (eight) hours as needed for nausea for up to 3 days       Start Date: 9/1/2022  End Date: 9/4/2022       Order Dose: 4 mg       Quantity: 5 tablet    Refills: 0       No discharge procedures on file      PDMP Review       Value Time User    PDMP Reviewed  Yes 3/22/2022  5:11 PM Seng Myers MD          ED Provider  Electronically Signed by           Joseluis Humphries MD  09/01/22 0648

## 2022-09-03 ENCOUNTER — HOSPITAL ENCOUNTER (EMERGENCY)
Facility: HOSPITAL | Age: 32
Discharge: HOME | End: 2022-09-03
Attending: EMERGENCY MEDICINE
Payer: COMMERCIAL

## 2022-09-03 VITALS
DIASTOLIC BLOOD PRESSURE: 58 MMHG | SYSTOLIC BLOOD PRESSURE: 108 MMHG | RESPIRATION RATE: 20 BRPM | TEMPERATURE: 98.1 F | OXYGEN SATURATION: 100 % | HEART RATE: 74 BPM

## 2022-09-03 DIAGNOSIS — R10.2 PELVIC PAIN: Primary | ICD-10-CM

## 2022-09-03 PROCEDURE — 99282 EMERGENCY DEPT VISIT SF MDM: CPT | Performed by: EMERGENCY MEDICINE

## 2022-09-03 PROCEDURE — 99283 EMERGENCY DEPT VISIT LOW MDM: CPT

## 2022-09-03 NOTE — ED PROVIDER NOTES
History  Chief Complaint   Patient presents with    Abdominal Pain     Pt states having cyst on ovary causing her a lot of pain  Pt states cyst on left side but both sides are hurting  Nausea, denies vomiting, diarrhea  27-year-old female presents to the emergency department for evaluation of pelvic pain  The patient states that over the past week she has been having worsening pain  The pain initially started on the left side but now she is having at bilaterally  Per chart review the patient was recently seen here in the emergency department and diagnosed with an ovarian cyst   The patient states that she scheduled appointment with her PCP but has not been able to be seen yet  She has not scheduled a follow-up appointment with OBGYN  Patient has been treating her pain with Tylenol and Motrin with only minimal relief  She denies fevers, chills, nausea, vomiting, diarrhea, urinary symptoms and vaginal discharge  Prior to Admission Medications   Prescriptions Last Dose Informant Patient Reported?  Taking?   acetaminophen (TYLENOL) 500 mg tablet 9/3/2022 at Unknown time  No Yes   Sig: Take 2 tablets (1,000 mg total) by mouth every 6 (six) hours as needed for mild pain for up to 15 doses   al mag oxide-diphenhydramine-lidocaine viscous (MAGIC MOUTHWASH) 1:1:1 suspension   No No   Sig: Swish and spit 10 mL every 4 (four) hours as needed for mouth pain or discomfort   Patient not taking: Reported on 4/23/2022    ibuprofen (MOTRIN) 800 mg tablet 9/3/2022 at Unknown time  No Yes   Sig: Take 1 tablet (800 mg total) by mouth every 6 (six) hours as needed for mild pain for up to 30 doses   ondansetron (ZOFRAN-ODT) 4 mg disintegrating tablet 9/3/2022 at Unknown time  No Yes   Sig: TAKE 1 TABLET BY MOUTH EVERY 8 HOURS AS NEEDED FOR NAUSEA   ondansetron (Zofran ODT) 4 mg disintegrating tablet   No No   Sig: Take 1 tablet (4 mg total) by mouth every 8 (eight) hours as needed for nausea for up to 3 days pantoprazole (PROTONIX) 40 mg tablet 9/2/2022 at Unknown time  No Yes   Sig: Take 1 tablet (40 mg total) by mouth daily      Facility-Administered Medications: None       Past Medical History:   Diagnosis Date    Anxiety     Asthma     Depression     GERD (gastroesophageal reflux disease)     Hernia, abdominal     Migraines     Muscle spasm     Psychiatric disorder     Anx, Depression    Renal disorder     kidney stones       Past Surgical History:   Procedure Laterality Date    CYSTOSCOPY      removal of kidney stone    [place] RETROGRADE PYELOGRAM  8/28/2018    HERNIA REPAIR      [place] CYSTO/URETERO W/LITHOTRIPSY &INDWELL STENT INSRT Right 8/28/2018    Procedure: CYSTOSCOPY URETEROSCOPY WITH RETROGRADE PYELOGRAM AND INSERTION STENT URETERAL;  Surgeon: Madyson Mcdaniel MD;  [facility];  Service: Urology    TOOTH EXTRACTION         Family History   Problem Relation Age of Onset    Diabetes Mother     Hyperlipidemia Mother     Stroke Mother     Heart disease Mother     Asthma Mother     Other Mother         Degen  of Intervertabral disc   Nephrolithiasis Father     Nephrolithiasis Brother      I have reviewed and agree with the history as documented  E-Cigarette/Vaping    E-Cigarette Use Never User      E-Cigarette/Vaping Substances    Nicotine No     THC No     CBD No     Flavoring No     Other No     Unknown No      Social History     Tobacco Use    Smoking status: Current Every Day Smoker     Packs/day: 0 50     Years: 13 00     Pack years: 6 50     Types: Cigarettes    Smokeless tobacco: Never Used   Vaping Use    Vaping Use: Never used   Substance Use Topics    Alcohol use: Not Currently     Comment: Alcohol intake:   None  - As per [employer] Drug use: No     Comment: Illicit drugs:   none  - As per [place]        Review of Systems   Constitutional: Negative for chills and fever  HENT: Negative for ear pain and sore throat      Eyes: Negative for pain and visual disturbance  Respiratory: Negative for cough and shortness of breath  Cardiovascular: Negative for chest pain and palpitations  Gastrointestinal: Negative for abdominal pain and vomiting  Genitourinary: Positive for pelvic pain  Negative for dysuria, hematuria, vaginal bleeding and vaginal discharge  Musculoskeletal: Negative for arthralgias and back pain  Skin: Negative for color change and rash  Neurological: Negative for seizures and syncope  All other systems reviewed and are negative  Physical Exam  Physical Exam  Vitals and nursing note reviewed  Constitutional:       General: She is not in acute distress  Appearance: She is well-developed  HENT:      Head: Normocephalic and atraumatic  Eyes:      Conjunctiva/sclera: Conjunctivae normal    Cardiovascular:      Rate and Rhythm: Normal rate and regular rhythm  Heart sounds: No murmur heard  Pulmonary:      Effort: Pulmonary effort is normal  No respiratory distress  Breath sounds: Normal breath sounds  Abdominal:      Palpations: Abdomen is soft  Tenderness: There is no abdominal tenderness  Genitourinary:     Comments: declined  Musculoskeletal:      Cervical back: Neck supple  Skin:     General: Skin is warm and dry  Neurological:      Mental Status: She is alert           Vital Signs  ED Triage Vitals [09/03/22 1535]   Temperature Pulse Respirations Blood Pressure SpO2   98 1 °F (36 7 °C) 74 20 108/58 100 %      Temp Source Heart Rate Source Patient Position - Orthostatic VS BP Location FiO2 (%)   Oral Monitor Sitting Right arm --      Pain Score       --           Vitals:    09/03/22 1535   BP: 108/58   Pulse: 74   Patient Position - Orthostatic VS: Sitting         Visual Acuity      ED Medications  Medications - No data to display    Diagnostic Studies  Results Reviewed     None                 No orders to display              Procedures  Procedures         ED Course                       MDM  Number of Diagnoses or Management Options  Pelvic pain  Diagnosis management comments: 58-year-old female presented to the emergency department for evaluation of pelvic pain  On arrival the patient was awake, alert, oriented and in no acute distress  Initial vital signs within normal limits  On exam patient was resting comfortably on her stretcher and did not appear to be in any significant pain  Physical exam was otherwise unremarkable  Treatment options were discussed with the patient and recommendation was made for her to have a pelvic ultrasound  Patient declined stating that she only wanted pain medications  She was offered Tylenol and Toradol  Patient reported an allergy to Toradol  The patient was told that she was not going to be given any narcotic pain medication at which time she stated that she wanted to be discharged  Risks were discussed with the patient and she acknowledged understanding all of the risks and still wanted to leave against medical advice  The patient was informed that she could come back to the emergency department at any point for further evaluation  Disposition  Final diagnoses:   Pelvic pain     Time reflects when diagnosis was documented in both MDM as applicable and the Disposition within this note     Time User Action Codes Description Comment    9/3/2022  4:23 PM Young Overall Add [R10 2] Pelvic pain       ED Disposition     ED Disposition   AMA    Condition   --    Date/Time   Sat Sep 3, 2022  3:52 PM    Comment   Date: 9/3/2022  Patient: Karson Shultz  Admitted: 9/3/2022  3:29 PM  Attending Provider: Dorinda Lopez MD    Karson Shultz or her authorized caregiver has made the decision for the patient to leave the emergency department against the ad vice of her attending physician  She or her authorized caregiver has been informed and understands the inherent risks, including death, permanent disability and worsening condition    She or her authorized caregiver has decided to accept the responsib elyssa for this decision  Mike Pérez and all necessary parties have been advised that she may return for further evaluation or treatment  Her condition at time of discharge was stable  Mike Pérez had current vital signs as follows:  BP 10 8/58 (BP Location: Right arm)   Pulse 74   Temp 98 1 °F (36 7 °C) (Oral)   Resp 20   LMP 08/10/2022 (Approximate)            Follow-up Information    None         Discharge Medication List as of 9/3/2022  3:55 PM      CONTINUE these medications which have NOT CHANGED    Details   acetaminophen (TYLENOL) 500 mg tablet Take 2 tablets (1,000 mg total) by mouth every 6 (six) hours as needed for mild pain for up to 15 doses, Starting Tue 7/19/2022, Normal      ibuprofen (MOTRIN) 800 mg tablet Take 1 tablet (800 mg total) by mouth every 6 (six) hours as needed for mild pain for up to 30 doses, Starting Tue 7/19/2022, Normal      !! ondansetron (ZOFRAN-ODT) 4 mg disintegrating tablet TAKE 1 TABLET BY MOUTH EVERY 8 HOURS AS NEEDED FOR NAUSEA, Normal      pantoprazole (PROTONIX) 40 mg tablet Take 1 tablet (40 mg total) by mouth daily, Starting Wed 3/30/2022, Normal      al mag oxide-diphenhydramine-lidocaine viscous (MAGIC MOUTHWASH) 1:1:1 suspension Swish and spit 10 mL every 4 (four) hours as needed for mouth pain or discomfort, Starting Tue 3/22/2022, Normal      !! ondansetron (Zofran ODT) 4 mg disintegrating tablet Take 1 tablet (4 mg total) by mouth every 8 (eight) hours as needed for nausea for up to 3 days, Starting Thu 9/1/2022, Until Sun 9/4/2022 at 2359, Normal       !! - Potential duplicate medications found  Please discuss with provider  No discharge procedures on file      PDMP Review       Value Time User    PDMP Reviewed  Yes 3/22/2022  5:11 PM Jim Goode MD          ED Provider  Electronically Signed by           Minda Mike MD  09/03/22 3740

## 2022-09-08 ENCOUNTER — TELEMEDICINE (OUTPATIENT)
Dept: FAMILY MEDICINE CLINIC | Facility: CLINIC | Age: 32
End: 2022-09-08
Payer: COMMERCIAL

## 2022-09-08 VITALS — BODY MASS INDEX: 23.56 KG/M2 | WEIGHT: 120 LBS | HEIGHT: 60 IN

## 2022-09-08 DIAGNOSIS — R11.0 NAUSEA: ICD-10-CM

## 2022-09-08 DIAGNOSIS — N83.202 LEFT OVARIAN CYST: Primary | ICD-10-CM

## 2022-09-08 PROCEDURE — 99213 OFFICE O/P EST LOW 20 MIN: CPT | Performed by: FAMILY MEDICINE

## 2022-09-08 RX ORDER — METOCLOPRAMIDE 10 MG/1
10 TABLET ORAL 4 TIMES DAILY
Qty: 40 TABLET | Refills: 0 | Status: SHIPPED | OUTPATIENT
Start: 2022-09-08 | End: 2022-09-18

## 2022-09-08 NOTE — PROGRESS NOTES
Assessment/Plan:  Follow-up from ER visit she was found to have a left-sided 4 7 cm ovarian cyst that was causing her lower abdominal pelvic pain she has been taking some ibuprofen and Tylenol and was receiving some Zofran for the nausea tried Pepto-Bismol to really help I am happy to send over some Reglan for her and she can continue with the Motrin Tylenol this is a new cyst for her she has had multiple CT scans and even a pelvic ultrasound last year and the year prior that was completely clean    Plan is to just do the pelvic ultrasound now and monitor her symptoms    1  Left ovarian cyst  -     US pelvis complete non OB; Future; Expected date: 09/08/2022    2  Nausea  -     metoclopramide (Reglan) 10 mg tablet; Take 1 tablet (10 mg total) by mouth 4 (four) times a day for 10 days       Subjective:      Patient ID: Kahlil Larios is a 28 y o  female  HPI  ER follow up for pelivc US       The following portions of the patient's history were reviewed and updated as appropriate: allergies, current medications, past family history, past medical history, past social history, past surgical history and problem list     Review of Systems   Constitutional: Negative for fever and unexpected weight change  HENT: Negative for nosebleeds and trouble swallowing  Eyes: Negative for visual disturbance  Respiratory: Negative for chest tightness and shortness of breath  Cardiovascular: Negative for chest pain, palpitations and leg swelling  Gastrointestinal: Positive for abdominal pain  Negative for constipation, diarrhea and nausea  Endocrine: Negative for cold intolerance  Genitourinary: Positive for pelvic pain  Negative for dysuria and urgency  Musculoskeletal: Negative for joint swelling and myalgias  Skin: Negative for rash  Neurological: Negative for tremors, seizures and syncope  Hematological: Does not bruise/bleed easily     Psychiatric/Behavioral: Negative for hallucinations and suicidal ideas          Objective:      Ht 5' (1 524 m)   Wt 54 4 kg (120 lb)   LMP 08/10/2022 (Approximate)   BMI 23 44 kg/m²     Admission on 08/31/2022, Discharged on 09/01/2022   Component Date Value    EXT PREG TEST UR (Ref: N* 08/31/2022 negative     Control 08/31/2022 valid     Color, UA 08/31/2022 Yellow     Clarity, UA 08/31/2022 Clear     Specific Gravity, UA 08/31/2022 1 015     pH, UA 08/31/2022 6 0     Leukocytes, UA 08/31/2022 Negative     Nitrite, UA 08/31/2022 Negative     Protein, UA 08/31/2022 Negative     Glucose, UA 08/31/2022 Negative     Ketones, UA 08/31/2022 Negative     Urobilinogen, UA 08/31/2022 0 2     Bilirubin, UA 08/31/2022 Negative     Occult Blood, UA 08/31/2022 Negative     WBC 08/31/2022 7 59     RBC 08/31/2022 4 20     Hemoglobin 08/31/2022 12 8     Hematocrit 08/31/2022 38 3     MCV 08/31/2022 91     MCH 08/31/2022 30 5     MCHC 08/31/2022 33 4     RDW 08/31/2022 12 6     MPV 08/31/2022 9 8     Platelets 60/36/7325 249     nRBC 08/31/2022 0     Neutrophils Relative 08/31/2022 57     Immat GRANS % 08/31/2022 0     Lymphocytes Relative 08/31/2022 32     Monocytes Relative 08/31/2022 8     Eosinophils Relative 08/31/2022 2     Basophils Relative 08/31/2022 1     Neutrophils Absolute 08/31/2022 4 37     Immature Grans Absolute 08/31/2022 0 01     Lymphocytes Absolute 08/31/2022 2 41     Monocytes Absolute 08/31/2022 0 60     Eosinophils Absolute 08/31/2022 0 16     Basophils Absolute 08/31/2022 0 04     Sodium 08/31/2022 137     Potassium 08/31/2022 3 6     Chloride 08/31/2022 106     CO2 08/31/2022 24     ANION GAP 08/31/2022 7     BUN 08/31/2022 10     Creatinine 08/31/2022 0 69     Glucose 08/31/2022 135     Calcium 08/31/2022 9 2     AST 08/31/2022 12 (A)    ALT 08/31/2022 10     Alkaline Phosphatase 08/31/2022 44     Total Protein 08/31/2022 6 6     Albumin 08/31/2022 4 1     Total Bilirubin 08/31/2022 0 26     eGFR 08/31/2022 115  Magnesium 08/31/2022 1 9     Lipase 08/31/2022 15     Preg, Serum 08/31/2022 Negative           Physical Exam  Constitutional:       General: She is in acute distress (mild)  Appearance: She is well-developed  Eyes:      Conjunctiva/sclera: Conjunctivae normal    Pulmonary:      Effort: Pulmonary effort is normal    Musculoskeletal:      Cervical back: Normal range of motion  Neurological:      Mental Status: She is alert and oriented to person, place, and time  Psychiatric:         Behavior: Behavior normal          Thought Content:  Thought content normal          Judgment: Judgment normal              Mikey Petersen MD  [place] [id number]

## 2022-09-15 DIAGNOSIS — R11.0 NAUSEA: ICD-10-CM

## 2022-09-15 DIAGNOSIS — K21.9 GASTROESOPHAGEAL REFLUX DISEASE WITHOUT ESOPHAGITIS: ICD-10-CM

## 2022-09-15 RX ORDER — PANTOPRAZOLE SODIUM 40 MG/1
TABLET, DELAYED RELEASE ORAL
Qty: 30 TABLET | Refills: 5 | Status: SHIPPED | OUTPATIENT
Start: 2022-09-15

## 2022-09-20 DIAGNOSIS — R11.0 NAUSEA: ICD-10-CM

## 2022-09-21 ENCOUNTER — HOSPITAL ENCOUNTER (EMERGENCY)
Facility: HOSPITAL | Age: 32
Discharge: HOME | End: 2022-09-21
Attending: EMERGENCY MEDICINE
Payer: COMMERCIAL

## 2022-09-21 VITALS
TEMPERATURE: 98.9 F | OXYGEN SATURATION: 96 % | RESPIRATION RATE: 20 BRPM | BODY MASS INDEX: 24.15 KG/M2 | HEIGHT: 60 IN | WEIGHT: 123 LBS | HEART RATE: 97 BPM | DIASTOLIC BLOOD PRESSURE: 65 MMHG | SYSTOLIC BLOOD PRESSURE: 105 MMHG

## 2022-09-21 DIAGNOSIS — S02.5XXA CLOSED FRACTURE OF TOOTH, INITIAL ENCOUNTER: ICD-10-CM

## 2022-09-21 DIAGNOSIS — K08.89 DENTALGIA: Primary | ICD-10-CM

## 2022-09-21 PROCEDURE — 99283 EMERGENCY DEPT VISIT LOW MDM: CPT | Performed by: EMERGENCY MEDICINE

## 2022-09-21 PROCEDURE — 99282 EMERGENCY DEPT VISIT SF MDM: CPT

## 2022-09-21 RX ORDER — PENICILLIN V POTASSIUM 250 MG/1
250 TABLET ORAL 4 TIMES DAILY
Qty: 40 TABLET | Refills: 0 | Status: SHIPPED | OUTPATIENT
Start: 2022-09-21 | End: 2022-10-01

## 2022-09-21 RX ORDER — PENICILLIN V POTASSIUM 250 MG/1
500 TABLET ORAL ONCE
Status: COMPLETED | OUTPATIENT
Start: 2022-09-21 | End: 2022-09-21

## 2022-09-21 RX ORDER — OXYCODONE HYDROCHLORIDE 5 MG/1
5 TABLET ORAL ONCE
Status: COMPLETED | OUTPATIENT
Start: 2022-09-21 | End: 2022-09-21

## 2022-09-21 RX ORDER — OXYCODONE HYDROCHLORIDE 5 MG/1
5 TABLET ORAL EVERY 4 HOURS PRN
Qty: 10 TABLET | Refills: 0 | Status: SHIPPED | OUTPATIENT
Start: 2022-09-21 | End: 2022-10-01

## 2022-09-21 RX ADMIN — OXYCODONE HYDROCHLORIDE 5 MG: 5 TABLET ORAL at 22:33

## 2022-09-21 RX ADMIN — PENICILLIN V POTASSIUM 500 MG: 250 TABLET, FILM COATED ORAL at 22:34

## 2022-09-22 RX ORDER — ONDANSETRON 4 MG/1
4 TABLET, ORALLY DISINTEGRATING ORAL EVERY 8 HOURS PRN
Qty: 45 TABLET | Refills: 0 | Status: SHIPPED | OUTPATIENT
Start: 2022-09-22 | End: 2022-10-07

## 2022-09-22 NOTE — ED TRIAGE NOTES
[address] with complaint of broken tooth on bottom right; states broke tooth tonight while eating and now has an exposed root; complains of pain; states took tylenol and motrin without relief;

## 2022-09-22 NOTE — ED PROVIDER NOTES
History  Chief Complaint   Patient presents with    Dental Pain     [address] [place] [address] with complaint of broken tooth on bottom right; states broke tooth tonight while eating and now has an exposed root; complains of pain; states took tylenol and motrin without relief;     Patient states that she has a dentist that he follows up with as an outpatient  History provided by:  Patient   used: No    Dental Pain  Location:  Lower  Lower teeth location:  31/RL 2nd molar  Quality:  Sharp, pulsating and constant  Severity:  Moderate  Onset quality:  Gradual  Duration:  2 days  Timing:  Constant  Progression:  Worsening  Chronicity:  New  Context: dental fracture    Relieved by:  Nothing  Worsened by:  Pressure and touching  Ineffective treatments:  Acetaminophen and NSAIDs  Associated symptoms: gum swelling    Associated symptoms: no congestion, no difficulty swallowing, no drooling, no facial pain, no facial swelling, no fever and no neck pain    Risk factors: periodontal disease    Risk factors: no diabetes        Prior to Admission Medications   Prescriptions Last Dose Informant Patient Reported?  Taking?   acetaminophen (TYLENOL) 500 mg tablet   No No   Sig: Take 2 tablets (1,000 mg total) by mouth every 6 (six) hours as needed for mild pain for up to 15 doses   ibuprofen (MOTRIN) 800 mg tablet   No No   Sig: Take 1 tablet (800 mg total) by mouth every 6 (six) hours as needed for mild pain for up to 30 doses   ondansetron (ZOFRAN-ODT) 4 mg disintegrating tablet   No No   Sig: TAKE 1 TABLET BY MOUTH EVERY 8 HOURS AS NEEDED FOR NAUSEA   ondansetron (Zofran ODT) 4 mg disintegrating tablet   No No   Sig: Take 1 tablet (4 mg total) by mouth every 8 (eight) hours as needed for nausea for up to 3 days   pantoprazole (PROTONIX) 40 mg tablet   No No   Sig: TAKE 1 TABLET BY MOUTH EVERY DAY      Facility-Administered Medications: None       Past Medical History:   Diagnosis Date    Anxiety     Asthma     Depression  GERD (gastroesophageal reflux disease)     Hernia, abdominal     Migraines     Muscle spasm     Psychiatric disorder     Anx, Depression    Renal disorder     kidney stones       Past Surgical History:   Procedure Laterality Date    CYSTOSCOPY      removal of kidney stone    [place] RETROGRADE PYELOGRAM  8/28/2018    HERNIA REPAIR      [place] CYSTO/URETERO W/LITHOTRIPSY &INDWELL STENT INSRT Right 8/28/2018    Procedure: CYSTOSCOPY URETEROSCOPY WITH RETROGRADE PYELOGRAM AND INSERTION STENT URETERAL;  Surgeon: Samuel White MD;  [facility];  Service: Urology    TOOTH EXTRACTION         Family History   Problem Relation Age of Onset    Diabetes Mother     Hyperlipidemia Mother     Stroke Mother     Heart disease Mother     Asthma Mother    [facility] Other Mother         Degen  of Intervertabral disc   Nephrolithiasis Father     Nephrolithiasis Brother      I have reviewed and agree with the history as documented  E-Cigarette/Vaping    E-Cigarette Use Never User      E-Cigarette/Vaping Substances    Nicotine No     THC No     CBD No     Flavoring No     Other No     Unknown No      Social History     Tobacco Use    Smoking status: Current Every Day Smoker     Packs/day: 0 50     Years: 13 00     Pack years: 6 50     Types: Cigarettes    Smokeless tobacco: Never Used   Vaping Use    Vaping Use: Never used   Substance Use Topics    Alcohol use: Not Currently     Comment: Alcohol intake:   None  - As per [place] Drug use: No     Comment: Illicit drugs:   none  - As per [place]        Review of Systems   Constitutional: Negative for fever  HENT: Positive for dental problem  Negative for congestion, drooling, facial swelling, hearing loss, sore throat and trouble swallowing  Gastrointestinal: Negative for nausea and vomiting  Musculoskeletal: Negative for neck pain  Neurological: Negative for light-headedness  All other systems reviewed and are negative        Physical Exam  Physical Exam  Vitals and nursing note reviewed  Constitutional:       Appearance: She is not ill-appearing or toxic-appearing  HENT:      Head: Normocephalic and atraumatic  Nose: Nose normal       Mouth/Throat:      Mouth: Mucous membranes are moist  No oral lesions  Dentition: Gingival swelling and dental caries present  Pharynx: No pharyngeal swelling or posterior oropharyngeal erythema  Comments: Fracture of 2nd right lower molar  Neurological:      Mental Status: She is alert           Vital Signs  ED Triage Vitals [09/21/22 2156]   Temperature Pulse Respirations Blood Pressure SpO2   98 9 °F (37 2 °C) 97 20 105/65 96 %      Temp Source Heart Rate Source Patient Position - Orthostatic VS BP Location FiO2 (%)   Oral Monitor Sitting Right arm --      Pain Score       9           Vitals:    09/21/22 2156   BP: 105/65   Pulse: 97   Patient Position - Orthostatic VS: Sitting         Visual Acuity      ED Medications  Medications   oxyCODONE (ROXICODONE) IR tablet 5 mg (5 mg Oral Given 9/21/22 2233)   penicillin V potassium (VEETID) tablet 500 mg (500 mg Oral Given 9/21/22 2234)       Diagnostic Studies  Results Reviewed     None                 No orders to display              Procedures  Procedures         ED Course                                             MDM  Number of Diagnoses or Management Options     Amount and/or Complexity of Data Reviewed  Decide to obtain previous medical records or to obtain history from someone other than the patient: yes    Risk of Complications, Morbidity, and/or Mortality  Presenting problems: low  Diagnostic procedures: minimal  Management options: low        Disposition  Final diagnoses:   Dentalgia   Closed fracture of tooth, initial encounter     Time reflects when diagnosis was documented in both MDM as applicable and the Disposition within this note     Time User Action Codes Description Comment    9/21/2022 10:27 PM Irineo Ryan [K08 89] Dentalgia     9/21/2022 10:28 PM Ronald Hayes Add [S02  5XXA] Closed fracture of tooth, initial encounter       ED Disposition     ED Disposition   Discharge    Condition   Stable    Date/Time   Wed Sep 21, 2022 10:27 PM    Comment   Karson Odor discharge to home/self care  Follow-up Information     Follow up With Specialties Details Why Contact Info    your dentist  Call in 1 day            Patient's Medications   Discharge Prescriptions    OXYCODONE (ROXICODONE) 5 IMMEDIATE RELEASE TABLET    Take 1 tablet (5 mg total) by mouth every 4 (four) hours as needed for moderate pain for up to 10 days Max Daily Amount: 30 mg       Start Date: 9/21/2022 End Date: 10/1/2022       Order Dose: 5 mg       Quantity: 10 tablet    Refills: 0    PENICILLIN V POTASSIUM (VEETID) 250 MG TABLET    Take 1 tablet (250 mg total) by mouth 4 (four) times a day for 10 days       Start Date: 9/21/2022 End Date: 10/1/2022       Order Dose: 250 mg       Quantity: 40 tablet    Refills: 0       No discharge procedures on file      PDMP Review       Value Time User    PDMP Reviewed  Yes 3/22/2022  5:11 PM Christopher Jeronimo MD          ED Provider  Electronically Signed by           Godfrey Triana MD  09/21/22 7930

## 2022-10-06 ENCOUNTER — TELEPHONE (OUTPATIENT)
Dept: FAMILY MEDICINE CLINIC | Facility: CLINIC | Age: 32
End: 2022-10-06

## 2022-10-06 NOTE — TELEPHONE ENCOUNTER
Patient called and advised that the Zofran is not working and she would like to go back on the Reglan

## 2022-10-07 DIAGNOSIS — K21.9 GASTROESOPHAGEAL REFLUX DISEASE WITHOUT ESOPHAGITIS: ICD-10-CM

## 2022-10-07 DIAGNOSIS — R11.0 NAUSEA: Primary | ICD-10-CM

## 2022-10-07 DIAGNOSIS — K02.9 DENTAL CARIES: ICD-10-CM

## 2022-10-07 RX ORDER — METOCLOPRAMIDE 10 MG/1
10 TABLET ORAL 4 TIMES DAILY
Qty: 40 TABLET | Refills: 1 | Status: SHIPPED | OUTPATIENT
Start: 2022-10-07

## 2022-10-14 ENCOUNTER — HOSPITAL ENCOUNTER (EMERGENCY)
Facility: HOSPITAL | Age: 32
Discharge: HOME | End: 2022-10-15
Attending: EMERGENCY MEDICINE
Payer: COMMERCIAL

## 2022-10-14 VITALS
RESPIRATION RATE: 16 BRPM | TEMPERATURE: 98.6 F | HEART RATE: 76 BPM | OXYGEN SATURATION: 98 % | SYSTOLIC BLOOD PRESSURE: 103 MMHG | DIASTOLIC BLOOD PRESSURE: 69 MMHG

## 2022-10-14 DIAGNOSIS — G89.29 CHRONIC DENTAL PAIN: Primary | ICD-10-CM

## 2022-10-14 DIAGNOSIS — K08.9 CHRONIC DENTAL PAIN: Primary | ICD-10-CM

## 2022-10-14 DIAGNOSIS — S02.5XXA CLOSED FRACTURE OF TOOTH, INITIAL ENCOUNTER: ICD-10-CM

## 2022-10-14 PROCEDURE — 99283 EMERGENCY DEPT VISIT LOW MDM: CPT

## 2022-10-14 PROCEDURE — 99283 EMERGENCY DEPT VISIT LOW MDM: CPT | Performed by: EMERGENCY MEDICINE

## 2022-10-15 RX ORDER — OXYCODONE HYDROCHLORIDE AND ACETAMINOPHEN 5; 325 MG/1; MG/1
1 TABLET ORAL ONCE
Status: COMPLETED | OUTPATIENT
Start: 2022-10-15 | End: 2022-10-15

## 2022-10-15 RX ADMIN — OXYCODONE HYDROCHLORIDE AND ACETAMINOPHEN 1 TABLET: 5; 325 TABLET ORAL at 00:10

## 2022-10-15 NOTE — ED NOTES
Discharge reviewed with patient  Patient verbalized understanding and has no further questions at this time  Patient ambulatory off unit with steady gait        Peter Cano, [address] [facility]  10/15/22 7661

## 2022-10-15 NOTE — ED PROVIDER NOTES
History  Chief Complaint   Patient presents with   • Dental Pain     Pt presents to the ED with c/o a painful broken tooth  Patient has a history of chronic dental issues has been having some difficulty getting in with a dentist reportedly  She denies any other associated systemic symptoms  History provided by:  Patient   used: No    Dental Pain  Location:  Lower  Lower teeth location:  32/RL 3rd molar  Quality:  Sharp and pulsating  Severity:  Moderate  Onset quality:  Sudden  Duration:  2 months  Timing:  Intermittent  Progression:  Waxing and waning  Chronicity:  Recurrent  Context: dental fracture and poor dentition    Context: not abscess, not malocclusion and not trauma    Previous work-up:  Dental exam  Relieved by:  Nothing  Worsened by:  Cold food/drink, hot food/drink and jaw movement  Ineffective treatments:  NSAIDs  Associated symptoms: no difficulty swallowing, no facial swelling, no fever, no gum swelling, no oral bleeding and no oral lesions    Risk factors: no diabetes and no immunosuppression        Prior to Admission Medications   Prescriptions Last Dose Informant Patient Reported?  Taking?   acetaminophen (TYLENOL) 500 mg tablet   No No   Sig: Take 2 tablets (1,000 mg total) by mouth every 6 (six) hours as needed for mild pain for up to 15 doses   ibuprofen (MOTRIN) 800 mg tablet   No No   Sig: Take 1 tablet (800 mg total) by mouth every 6 (six) hours as needed for mild pain for up to 30 doses   metoclopramide (Reglan) 10 mg tablet   No No   Sig: Take 1 tablet (10 mg total) by mouth 4 (four) times a day   pantoprazole (PROTONIX) 40 mg tablet   No No   Sig: TAKE 1 TABLET BY MOUTH EVERY DAY      Facility-Administered Medications: None       Past Medical History:   Diagnosis Date   • Anxiety    • Asthma    • Depression    • GERD (gastroesophageal reflux disease)    • Hernia, abdominal    • Migraines    • Muscle spasm    • Psychiatric disorder     Anx, Depression   • Renal disorder     kidney stones       Past Surgical History:   Procedure Laterality Date   • CYSTOSCOPY      removal of kidney stone   • [place] RETROGRADE PYELOGRAM  8/28/2018   • HERNIA REPAIR     • [place] CYSTO/URETERO W/LITHOTRIPSY &INDWELL STENT INSRT Right 8/28/2018    Procedure: CYSTOSCOPY URETEROSCOPY WITH RETROGRADE PYELOGRAM AND INSERTION STENT URETERAL;  Surgeon: Tatyana Sam MD;  [facility];  Service: Urology   • TOOTH EXTRACTION         Family History   Problem Relation Age of Onset   • Diabetes Mother    • Hyperlipidemia Mother    • Stroke Mother    • Heart disease Mother    • Asthma Mother    • Other Mother         Degen  of Intervertabral disc  • Nephrolithiasis Father    • Nephrolithiasis Brother      I have reviewed and agree with the history as documented  E-Cigarette/Vaping   • E-Cigarette Use Never User      E-Cigarette/Vaping Substances   • Nicotine No    • THC No    • CBD No    • Flavoring No    • Other No    • Unknown No      Social History     Tobacco Use   • Smoking status: Current Every Day Smoker     Packs/day: 0 50     Years: 13 00     Pack years: 6 50     Types: Cigarettes   • Smokeless tobacco: Never Used   Vaping Use   • Vaping Use: Never used   Substance Use Topics   • Alcohol use: Not Currently     Comment: Alcohol intake:   None  - As per [place]    • Drug use: No     Comment: Illicit drugs:   none  - As per [place]        Review of Systems   Constitutional: Negative for fever  HENT: Negative for facial swelling and mouth sores  All other systems reviewed and are negative  Physical Exam  Physical Exam  Vitals and nursing note reviewed  Constitutional:       Appearance: She is not ill-appearing or toxic-appearing  HENT:      Mouth/Throat:      Mouth: Mucous membranes are moist  No oral lesions  Dentition: Dental tenderness and dental caries present  No gingival swelling or dental abscesses  Pharynx: Oropharynx is clear   No oropharyngeal exudate or posterior oropharyngeal erythema  Comments: Fracture of right lower rear 3rd molar  No surrounding gum swelling or purulent drainage  Pulmonary:      Effort: Pulmonary effort is normal    Skin:     General: Skin is warm and dry  Neurological:      General: No focal deficit present  Mental Status: She is alert  Mental status is at baseline  Vital Signs  ED Triage Vitals [10/14/22 2347]   Temperature Pulse Respirations Blood Pressure SpO2   98 6 °F (37 °C) 76 16 103/69 98 %      Temp Source Heart Rate Source Patient Position - Orthostatic VS BP Location FiO2 (%)   Oral Monitor Sitting Right arm --      Pain Score       8           Vitals:    10/14/22 2347   BP: 103/69   Pulse: 76   Patient Position - Orthostatic VS: Sitting         Visual Acuity      ED Medications  Medications   oxyCODONE-acetaminophen (PERCOCET) 5-325 mg per tablet 1 tablet (1 tablet Oral Given 10/15/22 0010)       Diagnostic Studies  Results Reviewed     None                 No orders to display              Procedures  Procedures         ED Course                                             MDM  Number of Diagnoses or Management Options     Amount and/or Complexity of Data Reviewed  Review and summarize past medical records: yes    Risk of Complications, Morbidity, and/or Mortality  Presenting problems: low  Diagnostic procedures: minimal  Management options: low        Disposition  Final diagnoses:   Chronic dental pain   Closed fracture of tooth, initial encounter     Time reflects when diagnosis was documented in both MDM as applicable and the Disposition within this note     Time User Action Codes Description Comment    10/15/2022 12:06 AM Frank Mathew Add [K08 89] Dentalgia     10/15/2022 12:06 AM Frank Mathew Remove [K08 89] Dentalgia     10/15/2022 12:06 AM Frank Mathew Add [K08 9,  G89 29] Chronic dental pain     10/15/2022 12:07 AM Frank Mathew Add [S02  5XXA] Closed fracture of tooth, initial encounter       ED Disposition     ED Disposition   Discharge    Condition   Stable    Date/Time   Sat Oct 15, 2022 12:06 AM    Comment   Gianni Rivas discharge to home/self care  Follow-up Information     Follow up With Specialties Details Why Contact Info    Your dentist  Schedule an appointment as soon as possible for a visit             Patient's Medications   Discharge Prescriptions    No medications on file       No discharge procedures on file      PDMP Review       Value Time User    PDMP Reviewed  Yes 3/22/2022  5:11 PM Angella Soria MD          ED Provider  Electronically Signed by           Von Groves MD  10/15/22 8629

## 2022-10-23 ENCOUNTER — HOSPITAL ENCOUNTER (EMERGENCY)
Facility: HOSPITAL | Age: 32
Discharge: HOME | End: 2022-10-23
Attending: EMERGENCY MEDICINE
Payer: COMMERCIAL

## 2022-10-23 VITALS
WEIGHT: 117.73 LBS | BODY MASS INDEX: 22.99 KG/M2 | SYSTOLIC BLOOD PRESSURE: 106 MMHG | TEMPERATURE: 98.2 F | OXYGEN SATURATION: 99 % | HEART RATE: 80 BPM | RESPIRATION RATE: 16 BRPM | DIASTOLIC BLOOD PRESSURE: 63 MMHG

## 2022-10-23 DIAGNOSIS — S02.5XXA TOOTH FRACTURE: Primary | ICD-10-CM

## 2022-10-23 PROCEDURE — 99284 EMERGENCY DEPT VISIT MOD MDM: CPT | Performed by: EMERGENCY MEDICINE

## 2022-10-23 PROCEDURE — 99282 EMERGENCY DEPT VISIT SF MDM: CPT

## 2022-10-23 RX ORDER — AMOXICILLIN AND CLAVULANATE POTASSIUM 875; 125 MG/1; MG/1
1 TABLET, FILM COATED ORAL EVERY 12 HOURS SCHEDULED
Qty: 20 TABLET | Refills: 0 | Status: SHIPPED | OUTPATIENT
Start: 2022-10-23 | End: 2022-11-02

## 2022-10-23 RX ORDER — AMOXICILLIN AND CLAVULANATE POTASSIUM 875; 125 MG/1; MG/1
1 TABLET, FILM COATED ORAL ONCE
Status: COMPLETED | OUTPATIENT
Start: 2022-10-23 | End: 2022-10-23

## 2022-10-23 RX ADMIN — AMOXICILLIN AND CLAVULANATE POTASSIUM 1 TABLET: 875; 125 TABLET, FILM COATED ORAL at 01:41

## 2022-10-23 NOTE — ED PROVIDER NOTES
History  Chief Complaint   Patient presents with   • Dental Pain     Pt reports eating a candy apple and having her front upper tooth break  Tylenol and motrin taken about an hour ago     35yo F presenting for dental pain  Earlier today while Pt was eating a candy apple, she bit down too hard sustaining a fracture of her right upper incisor  States the pain is worse with breathing and cold temperatures  Has not found relief with tylenol  Denies F/C, N/V, facial/mouth swelling  History provided by:  Patient      Prior to Admission Medications   Prescriptions Last Dose Informant Patient Reported?  Taking?   acetaminophen (TYLENOL) 500 mg tablet   No No   Sig: Take 2 tablets (1,000 mg total) by mouth every 6 (six) hours as needed for mild pain for up to 15 doses   ibuprofen (MOTRIN) 800 mg tablet   No No   Sig: Take 1 tablet (800 mg total) by mouth every 6 (six) hours as needed for mild pain for up to 30 doses   metoclopramide (Reglan) 10 mg tablet   No No   Sig: Take 1 tablet (10 mg total) by mouth 4 (four) times a day   pantoprazole (PROTONIX) 40 mg tablet   No No   Sig: TAKE 1 TABLET BY MOUTH EVERY DAY      Facility-Administered Medications: None       Past Medical History:   Diagnosis Date   • Anxiety    • Asthma    • Depression    • GERD (gastroesophageal reflux disease)    • Hernia, abdominal    • Migraines    • Muscle spasm    • Psychiatric disorder     Anx, Depression   • Renal disorder     kidney stones       Past Surgical History:   Procedure Laterality Date   • CYSTOSCOPY      removal of kidney stone   • [place] RETROGRADE PYELOGRAM  8/28/2018   • HERNIA REPAIR     • [place] CYSTO/URETERO W/LITHOTRIPSY &INDWELL STENT INSRT Right 8/28/2018    Procedure: CYSTOSCOPY URETEROSCOPY WITH RETROGRADE PYELOGRAM AND INSERTION STENT URETERAL;  Surgeon: Vernell Storey MD;  [facility];  Service: Urology   • TOOTH EXTRACTION         Family History   Problem Relation Age of Onset   • Diabetes Mother    • Hyperlipidemia Mother    • Stroke Mother    • Heart disease Mother    • Asthma Mother    • Other Mother         David Chavez  of Intervertabral disc  • Nephrolithiasis Father    • Nephrolithiasis Brother      I have reviewed and agree with the history as documented  E-Cigarette/Vaping   • E-Cigarette Use Never User      E-Cigarette/Vaping Substances   • Nicotine No    • THC No    • CBD No    • Flavoring No    • Other No    • Unknown No      Social History     Tobacco Use   • Smoking status: Current Every Day Smoker     Packs/day: 0 50     Years: 13 00     Pack years: 6 50     Types: Cigarettes   • Smokeless tobacco: Never Used   Vaping Use   • Vaping Use: Never used   Substance Use Topics   • Alcohol use: Not Currently     Comment: Alcohol intake:   None  - As per [place]    • Drug use: No     Comment: Illicit drugs:   none  - As per [place]         Review of Systems   Constitutional: Negative  HENT: Positive for dental problem  Respiratory: Negative  Cardiovascular: Negative  Gastrointestinal: Negative  Genitourinary: Negative  Musculoskeletal: Negative  Skin: Negative  Neurological: Negative  Psychiatric/Behavioral: Negative  Physical Exam  ED Triage Vitals [10/23/22 0043]   Temperature Pulse Respirations Blood Pressure SpO2   98 2 °F (36 8 °C) 80 16 106/63 99 %      Temp Source Heart Rate Source Patient Position - Orthostatic VS BP Location FiO2 (%)   Oral Monitor Sitting Left arm --      Pain Score       8             Orthostatic Vital Signs  Vitals:    10/23/22 0043   BP: 106/63   Pulse: 80   Patient Position - Orthostatic VS: Sitting       Physical Exam  Constitutional:       General: She is not in acute distress  Appearance: Normal appearance  HENT:      Head: Normocephalic and atraumatic  Right Ear: External ear normal       Left Ear: External ear normal       Nose: Nose normal  No rhinorrhea        Mouth/Throat:      Mouth: Mucous membranes are moist      Eyes: Extraocular Movements: Extraocular movements intact  Conjunctiva/sclera: Conjunctivae normal    Cardiovascular:      Rate and Rhythm: Normal rate and regular rhythm  Pulses: Normal pulses  Heart sounds: Normal heart sounds  Pulmonary:      Effort: Pulmonary effort is normal       Breath sounds: Normal breath sounds  Abdominal:      General: Abdomen is flat  Palpations: Abdomen is soft  Skin:     General: Skin is warm and dry  Capillary Refill: Capillary refill takes less than 2 seconds  Neurological:      General: No focal deficit present  Mental Status: She is alert and oriented to person, place, and time  Psychiatric:         Mood and Affect: Mood normal          Behavior: Behavior normal          ED Medications  Medications   amoxicillin-clavulanate (AUGMENTIN) 875-125 mg per tablet 1 tablet (1 tablet Oral Given 10/23/22 0141)       Diagnostic Studies  Results Reviewed     None                 No orders to display         Procedures  Procedures      ED Course                                       MDM  Number of Diagnoses or Management Options  Tooth fracture  Diagnosis management comments: Pt's sx improved with application of dermabond  Advised Pt to f/u with dentist immediately  Covering potential infection with Abx  Disposition  Final diagnoses:   Tooth fracture     Time reflects when diagnosis was documented in both MDM as applicable and the Disposition within this note     Time User Action Codes Description Comment    10/23/2022  1:33 AM Adalberto Rivas Add [S02  5XXA] Tooth fracture       ED Disposition     ED Disposition   Discharge    Condition   Stable    Date/Time   Sun Oct 23, 2022  1:33 AM    Comment   Amanda Dougherty discharge to home/self care                 Follow-up Information     Follow up With Specialties Details Why Contact Info Additional Information    [facility] Emergency Medicine Go to   [address] [place] [address] [facility] 160 [address] [facility], [address], [place], [place], [address] [facility] Schedule an appointment as soon as possible for a visit   Awilda Stoll [address] [phone] [address] [facility]ELAS[facility], [address], [place], [address], [phone]          Discharge Medication List as of 10/23/2022  1:36 AM      START taking these medications    Details   amoxicillin-clavulanate (AUGMENTIN) 875-125 mg per tablet Take 1 tablet by mouth every 12 (twelve) hours for 10 days, Starting Sun 10/23/2022, Until Wed 11/2/2022, Normal         CONTINUE these medications which have NOT CHANGED    Details   acetaminophen (TYLENOL) 500 mg tablet Take 2 tablets (1,000 mg total) by mouth every 6 (six) hours as needed for mild pain for up to 15 doses, Starting Tue 7/19/2022, Normal      ibuprofen (MOTRIN) 800 mg tablet Take 1 tablet (800 mg total) by mouth every 6 (six) hours as needed for mild pain for up to 30 doses, Starting Tue 7/19/2022, Normal      metoclopramide (Reglan) 10 mg tablet Take 1 tablet (10 mg total) by mouth 4 (four) times a day, Starting Fri 10/7/2022, Normal      pantoprazole (PROTONIX) 40 mg tablet TAKE 1 TABLET BY MOUTH EVERY DAY, Normal           No discharge procedures on file  PDMP Review       Value Time User    PDMP Reviewed  Yes 3/22/2022  5:11 PM Dianne Arroyo MD           ED Provider  Attending physically available and evaluated Mike Pérez I managed the patient along with the ED Attending      Electronically Signed by         Belia Nguyen MD  10/23/22 4691

## 2022-10-23 NOTE — DISCHARGE INSTRUCTIONS
Return to the ER if you develop:    Mouth swelling, Fever/chills, choking sensation, drooling, inability to open mouth

## 2022-10-23 NOTE — ED ATTENDING ATTESTATION
10/23/2022  IVarsha MD, saw and evaluated the patient  I have discussed the patient with the resident/non-physician practitioner and agree with the resident's/non-physician practitioner's findings, Plan of Care, and MDM as documented in the resident's/non-physician practitioner's note, except where noted  All available labs and Radiology studies were reviewed  I was present for key portions of any procedure(s) performed by the resident/non-physician practitioner and I was immediately available to provide assistance  At this point I agree with the current assessment done in the Emergency Department  I have conducted an independent evaluation of this patient a history and physical is as follows:    ED Course         Critical Care Time  Procedures    Patient is a pleasant 28 yof who presents to the ER with a dental fracture  No other complaints  Well appearing  NAD  MDM pleasant 28 yof, will treat as dental fracture

## 2022-10-24 ENCOUNTER — TELEPHONE (OUTPATIENT)
Dept: OTHER | Facility: OTHER | Age: 32
End: 2022-10-24

## 2022-10-25 NOTE — TELEPHONE ENCOUNTER
Pt called, requesting a refill for a medication that is not on the med list  Med name is Zofran 4mg

## 2022-10-25 NOTE — TELEPHONE ENCOUNTER
Sorry I dont want her to keep taking antinausea medicine     She is supposed to see SALINAS MANDELS     I wont fill her medication till I hear she has seen them   Sorry

## 2022-11-05 ENCOUNTER — HOSPITAL ENCOUNTER (EMERGENCY)
Facility: HOSPITAL | Age: 32
Discharge: HOME | End: 2022-11-05
Attending: EMERGENCY MEDICINE | Admitting: EMERGENCY MEDICINE

## 2022-11-05 VITALS
DIASTOLIC BLOOD PRESSURE: 63 MMHG | SYSTOLIC BLOOD PRESSURE: 110 MMHG | OXYGEN SATURATION: 99 % | HEART RATE: 112 BPM | RESPIRATION RATE: 20 BRPM

## 2022-11-05 DIAGNOSIS — K08.89 PAIN, DENTAL: Primary | ICD-10-CM

## 2022-11-06 NOTE — DISCHARGE INSTRUCTIONS
Finish all your antibiotics  Continue to use Tylenol 650mg every 4 hours or Ibuprofen 600mg every 6 hours; you can alternate the 2 medications taking something every 3 hours for pain  You can also try to buy Dental Cement to cover the tooth  MOST IMPORTANTLY, ou need to follow-up with your dentist for further evaluation and treatment    You can also try one of the dentist/oral surgeons that were given to you in the Emergency Department

## 2022-11-06 NOTE — ED PROVIDER NOTES
History  Chief Complaint   Patient presents with   • Dental Pain     Reports to broken top tooth with exposed nerve; unable to get appt with dentist     Past Medical History: Anxiety, Asthma, Depression, GERD, abdominal Hernia, Migraines, kidney stones, TMJ  Presents to ED c/o persistent right front tooth pain since it broke few days ago, after biting into a candy apple  Pt seen at another ED on 10/23, states they put glue on it and it felt better, now having increased pain  NO fever, no facial swelling, no sob, no rash  Pain is worse with breathing and cold temperatures, even when air hits it  Multiple visits to multiple EDs for same complaint  Pt states she's tried called her dentist multiple times to get referral to "[facility]", but unable to get through          Prior to Admission Medications   Prescriptions Last Dose Informant Patient Reported?  Taking?   acetaminophen (TYLENOL) 500 mg tablet   No No   Sig: Take 2 tablets (1,000 mg total) by mouth every 6 (six) hours as needed for mild pain for up to 15 doses   ibuprofen (MOTRIN) 800 mg tablet   No No   Sig: Take 1 tablet (800 mg total) by mouth every 6 (six) hours as needed for mild pain for up to 30 doses   metoclopramide (Reglan) 10 mg tablet   No No   Sig: Take 1 tablet (10 mg total) by mouth 4 (four) times a day   pantoprazole (PROTONIX) 40 mg tablet   No No   Sig: TAKE 1 TABLET BY MOUTH EVERY DAY      Facility-Administered Medications: None       Past Medical History:   Diagnosis Date   • Anxiety    • Asthma    • Depression    • GERD (gastroesophageal reflux disease)    • Hernia, abdominal    • Migraines    • Muscle spasm    • Psychiatric disorder     Anx, Depression   • Renal disorder     kidney stones       Past Surgical History:   Procedure Laterality Date   • CYSTOSCOPY      removal of kidney stone   • [place] RETROGRADE PYELOGRAM  8/28/2018   • HERNIA REPAIR     • [place] CYSTO/URETERO W/LITHOTRIPSY &INDWELL STENT INSRT Right 8/28/2018    Procedure: CYSTOSCOPY URETEROSCOPY WITH RETROGRADE PYELOGRAM AND INSERTION STENT URETERAL;  Surgeon: Gerard Angela MD;  [facility];  Service: Urology   • TOOTH EXTRACTION         Family History   Problem Relation Age of Onset   • Diabetes Mother    • Hyperlipidemia Mother    • Stroke Mother    • Heart disease Mother    • Asthma Mother    • Other Mother         Degen  of Intervertabral disc  • Nephrolithiasis Father    • Nephrolithiasis Brother      I have reviewed and agree with the history as documented  E-Cigarette/Vaping   • E-Cigarette Use Never User      E-Cigarette/Vaping Substances   • Nicotine No    • THC No    • CBD No    • Flavoring No    • Other No    • Unknown No      Social History     Tobacco Use   • Smoking status: Current Every Day Smoker     Packs/day: 0 50     Years: 13 00     Pack years: 6 50     Types: Cigarettes   • Smokeless tobacco: Never Used   Vaping Use   • Vaping Use: Never used   Substance Use Topics   • Alcohol use: Not Currently     Comment: Alcohol intake:   None  - As per [place]    • Drug use: No     Comment: Illicit drugs:   none  - As per [place]        Review of Systems   Constitutional: Negative for fever  HENT: Positive for dental problem  Negative for trouble swallowing  Respiratory: Negative for shortness of breath  Gastrointestinal: Negative for vomiting  Skin: Negative for rash  Neurological: Negative for headaches  All other systems reviewed and are negative  Physical Exam  Physical Exam  Vitals and nursing note reviewed  Constitutional:       General: She is not in acute distress  Appearance: She is well-developed  HENT:      Head: Normocephalic and atraumatic  Right Ear: External ear normal       Left Ear: External ear normal       Nose: Nose normal       Mouth/Throat:      Mouth: Mucous membranes are moist       Dentition: Dental tenderness and dental caries present  No gingival swelling or dental abscesses        Pharynx: Oropharynx is clear  Uvula midline  No pharyngeal swelling or uvula swelling  Eyes:      Conjunctiva/sclera: Conjunctivae normal    Cardiovascular:      Rate and Rhythm: Normal rate  Pulmonary:      Effort: Pulmonary effort is normal    Musculoskeletal:         General: Normal range of motion  Cervical back: Normal range of motion  Skin:     General: Skin is warm and dry  Neurological:      Mental Status: She is alert  Psychiatric:         Behavior: Behavior normal          Vital Signs  ED Triage Vitals [11/05/22 2002]   Temp Pulse Respirations Blood Pressure SpO2   -- (!) 112 20 110/63 99 %      Temp src Heart Rate Source Patient Position - Orthostatic VS BP Location FiO2 (%)   -- Monitor Sitting Left arm --      Pain Score       --           Vitals:    11/05/22 2002   BP: 110/63   Pulse: (!) 112   Patient Position - Orthostatic VS: Sitting         Visual Acuity      ED Medications  Medications - No data to display    Diagnostic Studies  Results Reviewed     None                 No orders to display              Procedures  Procedures         ED Course                               SBIRT 22yo+    Flowsheet Row Most Recent Value   SBIRT (25 yo +)    In order to provide better care to our patients, we are screening all of our patients for alcohol and drug use  Would it be okay to ask you these screening questions? No Filed at: 11/05/2022 2009                    [facility]  Number of Diagnoses or Management Options  Pain, dental  Diagnosis management comments: Pt already taking Augmentin  Took Tylenol and Motrin about 1 5 hrs pta, so too soon to given another dose  We unfortunately don't have liquid to mix dental cement here, but recommended this to pt     STRESSED the need to FU as outpt at DENTIST and referral list given      Disposition  Final diagnoses:   Pain, dental     Time reflects when diagnosis was documented in both MDM as applicable and the Disposition within this note     Time User Action Codes Description Comment    11/5/2022  8:13 PM Renu Newell Add [K08 89] Pain, dental       ED Disposition     ED Disposition   Discharge    Condition   Stable    Date/Time   Sat Nov 5, 2022  8:13 PM    Comment   Batsheva Rodriguez discharge to home/self care  Follow-up Information     Follow up With Specialties Details Why Contact Info Additional [address] Dentistry   [address], [address] [place] [address], [address], [address] [facility],[address] for Oral [facility] Maxillofacial [address] [place] [address]           Discharge Medication List as of 11/5/2022  8:19 PM      CONTINUE these medications which have NOT CHANGED    Details   acetaminophen (TYLENOL) 500 mg tablet Take 2 tablets (1,000 mg total) by mouth every 6 (six) hours as needed for mild pain for up to 15 doses, Starting Tue 7/19/2022, Normal      ibuprofen (MOTRIN) 800 mg tablet Take 1 tablet (800 mg total) by mouth every 6 (six) hours as needed for mild pain for up to 30 doses, Starting Tue 7/19/2022, Normal      metoclopramide (Reglan) 10 mg tablet Take 1 tablet (10 mg total) by mouth 4 (four) times a day, Starting Fri 10/7/2022, Normal      pantoprazole (PROTONIX) 40 mg tablet TAKE 1 TABLET BY MOUTH EVERY DAY, Normal             No discharge procedures on file      PDMP Review       Value Time User    PDMP Reviewed  Yes 3/22/2022  5:11 PM Jose Elias Thakkar MD          ED Provider  Electronically Signed by           Ismael Prather PA-C  11/05/22 2026

## 2022-11-13 ENCOUNTER — NURSE TRIAGE (OUTPATIENT)
Dept: OTHER | Facility: OTHER | Age: 32
End: 2022-11-13

## 2022-11-13 NOTE — TELEPHONE ENCOUNTER
Requesting Zofran medication instead of Reglan for nausea  States she has appointment 12/16 with [facility]  No additional questions

## 2022-11-13 NOTE — TELEPHONE ENCOUNTER
Reason for Disposition  • Caller wants to use a complementary or alternative medicine    Answer Assessment - Initial Assessment Questions  1  NAME of MEDICATION: "What medicine are you calling about?"     Zofran  2  QUESTION: "What is your question?" (e g , medication refill, side effect)     Will like Zofran instead of Reglan  3  PRESCRIBING HCP: "Who prescribed it?" Reason: if prescribed by specialist, call should be referred to that group       n/a  4  SYMPTOMS: "Do you have any symptoms?"      Nausea   5  SEVERITY: If symptoms are present, ask "Are they mild, moderate or severe?"     Moderate-Chronic   6   PREGNANCY:  "Is there any chance that you are pregnant?" "When was your last menstrual period?"  Dental    12/16  At 1300 via [facility]    Protocols used: MEDICATION QUESTION CALL-ADULT-

## 2022-11-13 NOTE — TELEPHONE ENCOUNTER
Regarding: nausea   ----- Message from Cherie Smallwood sent at 11/13/2022 12:23 AM EST -----  "he has me on reglan but its not helping at all with the nausea this time   i would like to know if it a script could be sent for zofran "

## 2022-11-16 ENCOUNTER — HOSPITAL ENCOUNTER (EMERGENCY)
Facility: HOSPITAL | Age: 32
Discharge: HOME | End: 2022-11-17
Attending: EMERGENCY MEDICINE

## 2022-11-16 DIAGNOSIS — K08.9 POOR DENTITION: Primary | ICD-10-CM

## 2022-11-16 DIAGNOSIS — G89.29 CHRONIC DENTAL PAIN: ICD-10-CM

## 2022-11-16 DIAGNOSIS — K08.9 CHRONIC DENTAL PAIN: ICD-10-CM

## 2022-11-17 ENCOUNTER — TELEPHONE (OUTPATIENT)
Dept: FAMILY MEDICINE CLINIC | Facility: CLINIC | Age: 32
End: 2022-11-17

## 2022-11-17 VITALS
RESPIRATION RATE: 16 BRPM | DIASTOLIC BLOOD PRESSURE: 59 MMHG | SYSTOLIC BLOOD PRESSURE: 117 MMHG | HEIGHT: 60 IN | TEMPERATURE: 98.3 F | HEART RATE: 103 BPM | OXYGEN SATURATION: 100 % | WEIGHT: 115 LBS | BODY MASS INDEX: 22.58 KG/M2

## 2022-11-17 RX ORDER — LIDOCAINE HYDROCHLORIDE 20 MG/ML
15 SOLUTION OROPHARYNGEAL ONCE
Status: COMPLETED | OUTPATIENT
Start: 2022-11-17 | End: 2022-11-17

## 2022-11-17 RX ORDER — ONDANSETRON 4 MG/1
4 TABLET, ORALLY DISINTEGRATING ORAL ONCE
Status: COMPLETED | OUTPATIENT
Start: 2022-11-17 | End: 2022-11-17

## 2022-11-17 RX ADMIN — LIDOCAINE HYDROCHLORIDE 15 ML: 20 SOLUTION ORAL; TOPICAL at 00:11

## 2022-11-17 RX ADMIN — ONDANSETRON 4 MG: 4 TABLET, ORALLY DISINTEGRATING ORAL at 00:11

## 2022-11-17 NOTE — ED PROVIDER NOTES
History  Chief Complaint   Patient presents with   • Dental Pain     Pt reports that the glue that was on her tooth fell off and now her infection is exposed and experiencing 10/10 right dental pain  Pt reports dentist appt scheduled in december     28year-old female presents to the emergency department for evaluation of dental pain  Per chart review this is the 5th time the patient is being seen at an emergency department since the beginning of last month for dental pain  The patient reports that she is still having pain to her top front teeth  She has been taking over-the-counter medications including Tylenol and Motrin with only minimal relief  She states that she has a dentist appointment scheduled for December but felt like the pain was getting worse so came to the emergency department for further evaluation  She denies fevers, chills, nausea, vomiting, facial swelling, jaw pain and difficulty swallowing  Prior to Admission Medications   Prescriptions Last Dose Informant Patient Reported?  Taking?   acetaminophen (TYLENOL) 500 mg tablet   No No   Sig: Take 2 tablets (1,000 mg total) by mouth every 6 (six) hours as needed for mild pain for up to 15 doses   ibuprofen (MOTRIN) 800 mg tablet   No No   Sig: Take 1 tablet (800 mg total) by mouth every 6 (six) hours as needed for mild pain for up to 30 doses   metoclopramide (Reglan) 10 mg tablet   No No   Sig: Take 1 tablet (10 mg total) by mouth 4 (four) times a day   pantoprazole (PROTONIX) 40 mg tablet   No No   Sig: TAKE 1 TABLET BY MOUTH EVERY DAY      Facility-Administered Medications: None       Past Medical History:   Diagnosis Date   • Anxiety    • Asthma    • Depression    • GERD (gastroesophageal reflux disease)    • Hernia, abdominal    • Migraines    • Muscle spasm    • Psychiatric disorder     Anx, Depression   • Renal disorder     kidney stones       Past Surgical History:   Procedure Laterality Date   • CYSTOSCOPY      removal of kidney stone   • FL RETROGRADE PYELOGRAM  8/28/2018   • HERNIA REPAIR     • SC CYSTO/URETERO W/LITHOTRIPSY &INDWELL STENT INSRT Right 8/28/2018    Procedure: CYSTOSCOPY URETEROSCOPY WITH RETROGRADE PYELOGRAM AND INSERTION STENT URETERAL;  Surgeon: Thelma Souza MD;  Location[facility];  Service: Urology   • TOOTH EXTRACTION         Family History   Problem Relation Age of Onset   • Diabetes Mother    • Hyperlipidemia Mother    • Stroke Mother    • Heart disease Mother    • Asthma Mother    • Other Mother         Degen  of Intervertabral disc  • Nephrolithiasis Father    • Nephrolithiasis Brother      I have reviewed and agree with the history as documented  E-Cigarette/Vaping   • E-Cigarette Use Never User      E-Cigarette/Vaping Substances   • Nicotine No    • THC No    • CBD No    • Flavoring No    • Other No    • Unknown No      Social History     Tobacco Use   • Smoking status: Every Day     Packs/day: 0 50     Years: 13 00     Pack years: 6 50     Types: Cigarettes   • Smokeless tobacco: Never   Vaping Use   • Vaping Use: Never used   Substance Use Topics   • Alcohol use: Not Currently     Comment: Alcohol intake:   None  - As per Sarah Ag    • Drug use: No     Comment: Illicit drugs:   none  - As per Waka        Review of Systems   Constitutional: Negative for chills and fever  HENT: Positive for dental problem  Negative for ear pain and sore throat  Eyes: Negative for pain and visual disturbance  Respiratory: Negative for cough and shortness of breath  Cardiovascular: Negative for chest pain and palpitations  Gastrointestinal: Positive for nausea  Negative for abdominal pain and vomiting  Genitourinary: Negative for dysuria and hematuria  Musculoskeletal: Negative for arthralgias and back pain  Skin: Negative for color change and rash  Neurological: Negative for seizures and syncope  All other systems reviewed and are negative        Physical Exam  Physical Exam  Vitals and nursing note reviewed  Constitutional:       General: She is not in acute distress  Appearance: She is well-developed  HENT:      Head: Normocephalic and atraumatic  Mouth/Throat:      Dentition: Abnormal dentition  Dental tenderness and dental caries present  No gingival swelling or dental abscesses  Eyes:      Conjunctiva/sclera: Conjunctivae normal    Cardiovascular:      Rate and Rhythm: Normal rate and regular rhythm  Heart sounds: No murmur heard  Pulmonary:      Effort: Pulmonary effort is normal  No respiratory distress  Breath sounds: Normal breath sounds  Abdominal:      Palpations: Abdomen is soft  Tenderness: There is no abdominal tenderness  Musculoskeletal:         General: No swelling  Cervical back: Neck supple  Skin:     General: Skin is warm and dry  Capillary Refill: Capillary refill takes less than 2 seconds  Neurological:      Mental Status: She is alert     Psychiatric:         Mood and Affect: Mood normal          Vital Signs  ED Triage Vitals   Temperature Pulse Respirations Blood Pressure SpO2   11/17/22 0005 11/17/22 0000 11/17/22 0000 11/17/22 0001 11/17/22 0000   98 3 °F (36 8 °C) 103 16 117/59 100 %      Temp Source Heart Rate Source Patient Position - Orthostatic VS BP Location FiO2 (%)   11/17/22 0005 -- -- -- --   Axillary          Pain Score       --                  Vitals:    11/17/22 0000 11/17/22 0001   BP:  117/59   Pulse: 103          Visual Acuity      ED Medications  Medications   ondansetron (ZOFRAN-ODT) dispersible tablet 4 mg (4 mg Oral Given 11/17/22 0011)   Lidocaine Viscous HCl (XYLOCAINE) 2 % mucosal solution 15 mL (15 mL Swish & Spit Given 11/17/22 0011)       Diagnostic Studies  Results Reviewed     None                 No orders to display              Procedures  Procedures         ED Course                 SBIRT 22yo+    Flowsheet Row Most Recent Value   SBIRT (25 yo +)    In order to provide better care to our patients, we are screening all of our patients for alcohol and drug use  Would it be okay to ask you these screening questions? Unable to answer at this time Filed at: 11/17/2022 0006                    [facility]  Number of Diagnoses or Management Options  Chronic dental pain  Poor dentition  Diagnosis management comments: 28-year-old female presented to the emergency department for evaluation of dental pain  On arrival the patient was awake, alert, oriented and in no acute distress  On exam the patient was noted to have poor dentition  Tenderness to palpation of her top front incisors  The patient was offered a dental block but she declined stating that she did not like needles  The patient was agreeable to viscous lidocaine and Zofran  The patient is appropriate for discharge at this time with recommendation to follow up with her dentist   The patient was also provided with contact information for a different dentist to see if she can schedule an earlier appointment  Return precautions were discussed  Patient agrees with the plan for discharge and feels comfortable to go home with proper f/u  Advised to return for worsening or additional problems  Diagnostic tests were reviewed and questions answered  Diagnosis, care plan and treatment options were discussed  The patient understands instructions and will follow up as directed  Disposition  Final diagnoses:   Poor dentition   Chronic dental pain     Time reflects when diagnosis was documented in both MDM as applicable and the Disposition within this note     Time User Action Codes Description Comment    11/17/2022 12:06 AM Janine Roger Add [K08 9] Poor dentition     11/17/2022 12:06 AM Janine Roger Add [K08 9,  G89 29] Chronic dental pain       ED Disposition     ED Disposition   Discharge    Condition   Stable    Date/Time   Thu Nov 17, 2022 12:06 AM    Comment   Gianni Rivas discharge to home/self care                 Follow-up Information Follow up With Specialties Details Why Contact Info Additional [address] Dentistry Schedule an appointment as soon as possible for a visit   [facility] [address], [address], [place], [place], [address], [address] [facility] Emergency Department Emergency Medicine Go to  If symptoms worsen [address] [id number]  [address] [facility] Emergency Department, [address], [address]          Discharge Medication List as of 11/17/2022 12:16 AM      CONTINUE these medications which have NOT CHANGED    Details   acetaminophen (TYLENOL) 500 mg tablet Take 2 tablets (1,000 mg total) by mouth every 6 (six) hours as needed for mild pain for up to 15 doses, Starting Tue 7/19/2022, Normal      ibuprofen (MOTRIN) 800 mg tablet Take 1 tablet (800 mg total) by mouth every 6 (six) hours as needed for mild pain for up to 30 doses, Starting Tue 7/19/2022, Normal      metoclopramide (Reglan) 10 mg tablet Take 1 tablet (10 mg total) by mouth 4 (four) times a day, Starting Fri 10/7/2022, Normal      pantoprazole (PROTONIX) 40 mg tablet TAKE 1 TABLET BY MOUTH EVERY DAY, Normal             No discharge procedures on file      PDMP Review       Value Time User    PDMP Reviewed  Yes 3/22/2022  5:11 PM Maryann Benavides MD          ED Provider  Electronically Signed by           Dilma Tai MD  11/17/22 1548

## 2022-11-17 NOTE — TELEPHONE ENCOUNTER
Message relayed to Dr Sumner Gilford and he advised that he will refill the medication after the patient sees the dentist tomorrow  Patient advised and will call after the appointment is done

## 2022-11-17 NOTE — ED NOTES
Patient reported no relief from medication, denied additional pain management offered by provider  Referral to dentis provided        Peter Whitley, [facility]  11/17/22 7031

## 2022-11-17 NOTE — TELEPHONE ENCOUNTER
Patient called and stated she was told that she could not refill ondansetron until she saw the dentist   She was seen in the ER yesterday and has an emergency appointment with the dentist tomorrow at 2pm   She would like to know if she can have a refill of the medication before then

## 2022-11-18 ENCOUNTER — TELEPHONE (OUTPATIENT)
Dept: FAMILY MEDICINE CLINIC | Facility: CLINIC | Age: 32
End: 2022-11-18

## 2022-11-18 DIAGNOSIS — R11.0 NAUSEA: Primary | ICD-10-CM

## 2022-11-18 DIAGNOSIS — K02.9 DENTAL CARIES: ICD-10-CM

## 2022-11-18 RX ORDER — ONDANSETRON 4 MG/1
4 TABLET, ORALLY DISINTEGRATING ORAL EVERY 6 HOURS PRN
Qty: 60 TABLET | Refills: 0 | Status: SHIPPED | OUTPATIENT
Start: 2022-11-18

## 2022-11-18 NOTE — TELEPHONE ENCOUNTER
Darline Rooney went to the [address] clinic at [facility]  Dr Binu Jerome said her tooth was too infected to pull, she was put on Augmentin and rescheduled for 12/6 for extraction  She is requesting her Zofran be sent over

## 2022-12-08 ENCOUNTER — TELEPHONE (OUTPATIENT)
Dept: FAMILY MEDICINE CLINIC | Facility: CLINIC | Age: 32
End: 2022-12-08

## 2022-12-08 NOTE — TELEPHONE ENCOUNTER
Patient called and stated she stopped taking the Zofran that was prescribed as it was making her dizzy and not helping her nausea  She would like to know if Reglan can be prescribed instead

## 2022-12-09 DIAGNOSIS — R11.0 NAUSEA: ICD-10-CM

## 2022-12-09 DIAGNOSIS — K02.9 DENTAL CARIES: ICD-10-CM

## 2022-12-09 DIAGNOSIS — K21.9 GASTROESOPHAGEAL REFLUX DISEASE WITHOUT ESOPHAGITIS: ICD-10-CM

## 2022-12-09 RX ORDER — METOCLOPRAMIDE 10 MG/1
10 TABLET ORAL 4 TIMES DAILY
Qty: 40 TABLET | Refills: 1 | Status: SHIPPED | OUTPATIENT
Start: 2022-12-09

## 2022-12-16 ENCOUNTER — VBI (OUTPATIENT)
Dept: ADMINISTRATIVE | Facility: OTHER | Age: 32
End: 2022-12-16

## 2023-01-06 ENCOUNTER — NURSE TRIAGE (OUTPATIENT)
Dept: OTHER | Facility: OTHER | Age: 33
End: 2023-01-06

## 2023-01-06 DIAGNOSIS — K02.9 DENTAL CARIES: ICD-10-CM

## 2023-01-06 DIAGNOSIS — R11.0 NAUSEA: ICD-10-CM

## 2023-01-06 RX ORDER — ONDANSETRON 4 MG/1
4 TABLET, ORALLY DISINTEGRATING ORAL EVERY 6 HOURS PRN
Qty: 28 TABLET | Refills: 0 | Status: SHIPPED | OUTPATIENT
Start: 2023-01-06

## 2023-01-06 NOTE — TELEPHONE ENCOUNTER
Reason for Disposition  • [1] Caller requesting a prescription renewal (no refills left), no triage required, AND [2] triager able to renew prescription per department policy    Answer Assessment - Initial Assessment Questions  1  NAME of MEDICATION: "What medicine are you calling about?"      Zofran     2  QUESTION: "What is your question?" (e g , medication refill, side effect)     "The pharmacy said the needs a doctors approval to fill medication"    3  PRESCRIBING HCP: "Who prescribed it?" Reason: if prescribed by specialist, call should be referred to that group  Dr Seng Myers     4  SYMPTOMS: "Do you have any symptoms?"      Nausea     5   SEVERITY: If symptoms are present, ask "Are they mild, moderate or severe?"     Mild to moderate at times    Protocols used: MEDICATION QUESTION CALL-ADULT-

## 2023-01-06 NOTE — TELEPHONE ENCOUNTER
Patient calling for a refill of her zofran  Looked through chart and there was mention of dizziness related to the zofran  Patient states she did not get dizziness from zofran

## 2023-01-06 NOTE — TELEPHONE ENCOUNTER
Regarding: Zofran needs approval  ----- Message from Jose Sanabria sent at 1/6/2023  6:02 PM EST -----  Pt called "[facility] left me a message saying my Zofran is ready to be filled but needs a doctor's approval "

## 2023-01-20 DIAGNOSIS — R11.0 NAUSEA: ICD-10-CM

## 2023-01-20 DIAGNOSIS — K02.9 DENTAL CARIES: ICD-10-CM

## 2023-01-22 RX ORDER — ONDANSETRON 4 MG/1
4 TABLET, ORALLY DISINTEGRATING ORAL EVERY 6 HOURS PRN
Qty: 28 TABLET | Refills: 0 | OUTPATIENT
Start: 2023-01-22

## 2023-01-27 ENCOUNTER — TELEPHONE (OUTPATIENT)
Dept: FAMILY MEDICINE CLINIC | Facility: CLINIC | Age: 33
End: 2023-01-27

## 2023-01-27 DIAGNOSIS — K02.9 DENTAL CARIES: ICD-10-CM

## 2023-01-27 DIAGNOSIS — R11.0 NAUSEA: ICD-10-CM

## 2023-01-27 RX ORDER — ONDANSETRON 4 MG/1
4 TABLET, ORALLY DISINTEGRATING ORAL EVERY 6 HOURS PRN
Qty: 30 TABLET | Refills: 0 | Status: SHIPPED | OUTPATIENT
Start: 2023-01-27

## 2023-01-27 RX ORDER — OXYCODONE HYDROCHLORIDE 5 MG/1
TABLET ORAL
COMMUNITY
Start: 2022-11-17

## 2023-01-27 RX ORDER — CHLORHEXIDINE GLUCONATE 0.12 MG/ML
RINSE ORAL
COMMUNITY
Start: 2022-11-17

## 2023-01-27 RX ORDER — CLINDAMYCIN HYDROCHLORIDE 300 MG/1
CAPSULE ORAL
COMMUNITY
Start: 2022-11-06

## 2023-01-27 RX ORDER — AMOXICILLIN AND CLAVULANATE POTASSIUM 875; 125 MG/1; MG/1
1 TABLET, FILM COATED ORAL 2 TIMES DAILY
COMMUNITY
Start: 2022-11-17 | End: 2023-02-04

## 2023-01-27 NOTE — TELEPHONE ENCOUNTER
Patient called for a refill of Reglan and or Ondansetron  Patient was left a detailed message for patient Dr Jacque Martins is declining to fill as she is in need of an office visit

## 2023-02-04 ENCOUNTER — HOSPITAL ENCOUNTER (EMERGENCY)
Facility: HOSPITAL | Age: 33
Discharge: HOME | End: 2023-02-04
Attending: INTERNAL MEDICINE | Admitting: INTERNAL MEDICINE

## 2023-02-04 ENCOUNTER — APPOINTMENT (EMERGENCY)
Dept: CT IMAGING | Facility: HOSPITAL | Age: 33
End: 2023-02-04

## 2023-02-04 VITALS
DIASTOLIC BLOOD PRESSURE: 78 MMHG | SYSTOLIC BLOOD PRESSURE: 101 MMHG | TEMPERATURE: 98.6 F | HEART RATE: 101 BPM | RESPIRATION RATE: 20 BRPM | OXYGEN SATURATION: 98 %

## 2023-02-04 DIAGNOSIS — K21.9 GASTROESOPHAGEAL REFLUX DISEASE WITHOUT ESOPHAGITIS: ICD-10-CM

## 2023-02-04 DIAGNOSIS — N39.0 URINARY TRACT INFECTION: Primary | ICD-10-CM

## 2023-02-04 DIAGNOSIS — K02.9 DENTAL CARIES: ICD-10-CM

## 2023-02-04 DIAGNOSIS — R11.0 NAUSEA: ICD-10-CM

## 2023-02-04 LAB
ALBUMIN SERPL BCP-MCNC: 4.4 G/DL (ref 3.5–5)
ALP SERPL-CCNC: 54 U/L (ref 34–104)
ALT SERPL W P-5'-P-CCNC: 6 U/L (ref 7–52)
ANION GAP SERPL CALCULATED.3IONS-SCNC: 7 MMOL/L (ref 4–13)
AST SERPL W P-5'-P-CCNC: 9 U/L (ref 13–39)
BACTERIA UR QL AUTO: ABNORMAL /HPF
BASOPHILS # BLD AUTO: 0.02 THOUSANDS/ÂΜL (ref 0–0.1)
BASOPHILS NFR BLD AUTO: 0 % (ref 0–1)
BILIRUB SERPL-MCNC: 0.44 MG/DL (ref 0.2–1)
BILIRUB UR QL STRIP: NEGATIVE
BUN SERPL-MCNC: 9 MG/DL (ref 5–25)
CALCIUM SERPL-MCNC: 9.7 MG/DL (ref 8.4–10.2)
CHLORIDE SERPL-SCNC: 102 MMOL/L (ref 96–108)
CLARITY UR: ABNORMAL
CO2 SERPL-SCNC: 28 MMOL/L (ref 21–32)
COLOR UR: YELLOW
CREAT SERPL-MCNC: 0.78 MG/DL (ref 0.6–1.3)
EOSINOPHIL # BLD AUTO: 0.03 THOUSAND/ÂΜL (ref 0–0.61)
EOSINOPHIL NFR BLD AUTO: 0 % (ref 0–6)
ERYTHROCYTE [DISTWIDTH] IN BLOOD BY AUTOMATED COUNT: 12.8 % (ref 11.6–15.1)
EXT PREGNANCY TEST URINE: NEGATIVE
EXT. CONTROL: NORMAL
GFR SERPL CREATININE-BSD FRML MDRD: 100 ML/MIN/1.73SQ M
GLUCOSE SERPL-MCNC: 111 MG/DL (ref 65–140)
GLUCOSE UR STRIP-MCNC: NEGATIVE MG/DL
HCT VFR BLD AUTO: 42 % (ref 34.8–46.1)
HGB BLD-MCNC: 14.1 G/DL (ref 11.5–15.4)
HGB UR QL STRIP.AUTO: ABNORMAL
IMM GRANULOCYTES # BLD AUTO: 0.03 THOUSAND/UL (ref 0–0.2)
IMM GRANULOCYTES NFR BLD AUTO: 0 % (ref 0–2)
KETONES UR STRIP-MCNC: NEGATIVE MG/DL
LEUKOCYTE ESTERASE UR QL STRIP: ABNORMAL
LIPASE SERPL-CCNC: 7 U/L (ref 11–82)
LYMPHOCYTES # BLD AUTO: 1.11 THOUSANDS/ÂΜL (ref 0.6–4.47)
LYMPHOCYTES NFR BLD AUTO: 12 % (ref 14–44)
MCH RBC QN AUTO: 30.2 PG (ref 26.8–34.3)
MCHC RBC AUTO-ENTMCNC: 33.6 G/DL (ref 31.4–37.4)
MCV RBC AUTO: 90 FL (ref 82–98)
MONOCYTES # BLD AUTO: 0.8 THOUSAND/ÂΜL (ref 0.17–1.22)
MONOCYTES NFR BLD AUTO: 8 % (ref 4–12)
NEUTROPHILS # BLD AUTO: 7.65 THOUSANDS/ÂΜL (ref 1.85–7.62)
NEUTS SEG NFR BLD AUTO: 80 % (ref 43–75)
NITRITE UR QL STRIP: NEGATIVE
NON-SQ EPI CELLS URNS QL MICRO: ABNORMAL /HPF
NRBC BLD AUTO-RTO: 0 /100 WBCS
PH UR STRIP.AUTO: 6.5 [PH]
PLATELET # BLD AUTO: 286 THOUSANDS/UL (ref 149–390)
PMV BLD AUTO: 9.5 FL (ref 8.9–12.7)
POTASSIUM SERPL-SCNC: 3.7 MMOL/L (ref 3.5–5.3)
PROT SERPL-MCNC: 7.3 G/DL (ref 6.4–8.4)
PROT UR STRIP-MCNC: NEGATIVE MG/DL
RBC # BLD AUTO: 4.67 MILLION/UL (ref 3.81–5.12)
RBC #/AREA URNS AUTO: ABNORMAL /HPF
SODIUM SERPL-SCNC: 137 MMOL/L (ref 135–147)
SP GR UR STRIP.AUTO: 1.01 (ref 1–1.03)
UROBILINOGEN UR QL STRIP.AUTO: 0.2 E.U./DL
WBC # BLD AUTO: 9.64 THOUSAND/UL (ref 4.31–10.16)
WBC #/AREA URNS AUTO: ABNORMAL /HPF

## 2023-02-04 RX ORDER — METOCLOPRAMIDE 10 MG/1
10 TABLET ORAL 4 TIMES DAILY
Qty: 40 TABLET | Refills: 0 | Status: SHIPPED | OUTPATIENT
Start: 2023-02-04

## 2023-02-04 RX ORDER — LIDOCAINE 50 MG/G
1 PATCH TOPICAL ONCE
Status: DISCONTINUED | OUTPATIENT
Start: 2023-02-04 | End: 2023-02-04 | Stop reason: HOSPADM

## 2023-02-04 RX ORDER — CEPHALEXIN 500 MG/1
500 CAPSULE ORAL EVERY 6 HOURS SCHEDULED
Qty: 28 CAPSULE | Refills: 0 | Status: SHIPPED | OUTPATIENT
Start: 2023-02-04 | End: 2023-02-04 | Stop reason: SDUPTHER

## 2023-02-04 RX ORDER — PHENAZOPYRIDINE HYDROCHLORIDE 200 MG/1
200 TABLET, FILM COATED ORAL 3 TIMES DAILY
Qty: 6 TABLET | Refills: 0 | Status: SHIPPED | OUTPATIENT
Start: 2023-02-04

## 2023-02-04 RX ORDER — ONDANSETRON 2 MG/ML
4 INJECTION INTRAMUSCULAR; INTRAVENOUS ONCE
Status: COMPLETED | OUTPATIENT
Start: 2023-02-04 | End: 2023-02-04

## 2023-02-04 RX ORDER — CEPHALEXIN 500 MG/1
500 CAPSULE ORAL EVERY 6 HOURS SCHEDULED
Qty: 40 CAPSULE | Refills: 0 | Status: SHIPPED | OUTPATIENT
Start: 2023-02-04 | End: 2023-02-14

## 2023-02-04 RX ORDER — PHENAZOPYRIDINE HYDROCHLORIDE 100 MG/1
200 TABLET, FILM COATED ORAL ONCE
Status: COMPLETED | OUTPATIENT
Start: 2023-02-04 | End: 2023-02-04

## 2023-02-04 RX ORDER — CEPHALEXIN 250 MG/1
500 CAPSULE ORAL ONCE
Status: COMPLETED | OUTPATIENT
Start: 2023-02-04 | End: 2023-02-04

## 2023-02-04 RX ADMIN — CEPHALEXIN 500 MG: 250 CAPSULE ORAL at 21:30

## 2023-02-04 RX ADMIN — PHENAZOPYRIDINE 200 MG: 100 TABLET ORAL at 21:30

## 2023-02-04 RX ADMIN — MORPHINE SULFATE 2 MG: 2 INJECTION, SOLUTION INTRAMUSCULAR; INTRAVENOUS at 19:39

## 2023-02-04 RX ADMIN — DICLOFENAC SODIUM TOPICAL GEL, 1%, 2 G: 10 GEL TOPICAL at 21:32

## 2023-02-04 RX ADMIN — ONDANSETRON 4 MG: 2 INJECTION INTRAMUSCULAR; INTRAVENOUS at 19:38

## 2023-02-05 NOTE — ED PROVIDER NOTES
History  Chief Complaint   Patient presents with   • Flank Pain     Pt presents to the ED with c/o L sided flank pain  Pt states "it feels like I have a kidney stone"     This is a 28-year-old female with past medical history significant for psychiatric disorder, muscle spasms, and nephrolithiasis presenting to the emergency department today for left-sided flank pain since yesterday  She notes it is located to her left flank and occasionally radiates to her left side  She denies any fever or chills  She notes dysuria without any hematuria or foul-smelling urine  She is currently on her menses  She denies any vaginal discharge  She denies any fever or chills  She notes this feels similar to prior nephrolithiasis in the past   She denies any diarrhea, constipation, or vomiting but she does note nausea  The patient denies other complaints at this time  History provided by:  Patient   used: No    Flank Pain  Pain location:  L flank  Pain quality: sharp    Pain radiation: left side  Pain severity:  Moderate  Onset quality:  Gradual  Duration:  1 day  Timing:  Constant  Progression:  Worsening  Chronicity:  New  Relieved by:  None tried  Worsened by:  Nothing  Ineffective treatments:  None tried  Associated symptoms: dysuria, nausea and vaginal bleeding (on menses)    Associated symptoms: no anorexia, no belching, no chest pain, no chills, no constipation, no cough, no diarrhea, no fatigue, no fever, no flatus, no hematuria, no melena, no shortness of breath, no sore throat, no vaginal discharge and no vomiting        Prior to Admission Medications   Prescriptions Last Dose Informant Patient Reported?  Taking?   acetaminophen (TYLENOL) 500 mg tablet   No No   Sig: Take 2 tablets (1,000 mg total) by mouth every 6 (six) hours as needed for mild pain for up to 15 doses   chlorhexidine (PERIDEX) 0 12 % solution   Yes No   Sig: APPLY 15 ML TO THE MOUTH OR THROAT TWICE A DAY FOR 7 DAYS clindamycin (CLEOCIN) 300 MG capsule   Yes No   Sig: TAKE 1 CAPSULE (300 MG TOTAL) BY MOUTH 3 (THREE) TIMES A DAY FOR 7 DAYS    ibuprofen (MOTRIN) 800 mg tablet   No No   Sig: Take 1 tablet (800 mg total) by mouth every 6 (six) hours as needed for mild pain for up to 30 doses   metoclopramide (Reglan) 10 mg tablet   No No   Sig: Take 1 tablet (10 mg total) by mouth 4 (four) times a day   metoclopramide (Reglan) 10 mg tablet   No Yes   Sig: Take 1 tablet (10 mg total) by mouth 4 (four) times a day   ondansetron (Zofran ODT) 4 mg disintegrating tablet   No No   Sig: Take 1 tablet (4 mg total) by mouth every 6 (six) hours as needed for nausea or vomiting   oxyCODONE (ROXICODONE) 5 immediate release tablet   Yes No   Sig: PLEASE SEE ATTACHED FOR DETAILED DIRECTIONS   pantoprazole (PROTONIX) 40 mg tablet   No No   Sig: TAKE 1 TABLET BY MOUTH EVERY DAY      Facility-Administered Medications: None       Past Medical History:   Diagnosis Date   • Anxiety    • Asthma    • Depression    • GERD (gastroesophageal reflux disease)    • Hernia, abdominal    • Migraines    • Muscle spasm    • Psychiatric disorder     Anx, Depression   • Renal disorder     kidney stones       Past Surgical History:   Procedure Laterality Date   • CYSTOSCOPY      removal of kidney stone   • [place] RETROGRADE PYELOGRAM  8/28/2018   • HERNIA REPAIR     • [place] CYSTO/URETERO W/LITHOTRIPSY &INDWELL STENT INSRT Right 8/28/2018    Procedure: CYSTOSCOPY URETEROSCOPY WITH RETROGRADE PYELOGRAM AND INSERTION STENT URETERAL;  Surgeon: Fatuma Douglas MD;  Location: [facility];  Service: Urology   • TOOTH EXTRACTION         Family History   Problem Relation Age of Onset   • Diabetes Mother    • Hyperlipidemia Mother    • Stroke Mother    • Heart disease Mother    • Asthma Mother    • Other Mother         Benedictoo Becky  of Intervertabral disc     • Nephrolithiasis Father    • Nephrolithiasis Brother      I have reviewed and agree with the history as documented  E-Cigarette/Vaping   • E-Cigarette Use Never User      E-Cigarette/Vaping Substances   • Nicotine No    • THC No    • CBD No    • Flavoring No    • Other No    • Unknown No      Social History     Tobacco Use   • Smoking status: Every Day     Packs/day: 0 50     Years: 13 00     Pack years: 6 50     Types: Cigarettes   • Smokeless tobacco: Never   Vaping Use   • Vaping Use: Never used   Substance Use Topics   • Alcohol use: Not Currently     Comment: Alcohol intake:   None  - As per [employer] [place]    • Drug use: No     Comment: Illicit drugs:   none  - As per [place]        Review of Systems   Constitutional: Negative for appetite change, chills, diaphoresis, fatigue and fever  HENT: Negative for sore throat  Eyes: Negative for visual disturbance  Respiratory: Negative for cough, chest tightness, shortness of breath and wheezing  Cardiovascular: Negative for chest pain, palpitations and leg swelling  Gastrointestinal: Positive for nausea  Negative for abdominal pain, anorexia, constipation, diarrhea, flatus, melena and vomiting  Genitourinary: Positive for dysuria, flank pain, frequency and vaginal bleeding (on menses)  Negative for difficulty urinating, hematuria, menstrual problem, pelvic pain, urgency and vaginal discharge  Musculoskeletal: Negative for neck pain and neck stiffness  Skin: Negative for rash and wound  Neurological: Negative for dizziness, light-headedness, numbness and headaches  Psychiatric/Behavioral: Negative for confusion  All other systems reviewed and are negative  Physical Exam  Physical Exam  Vitals and nursing note reviewed  Constitutional:       General: She is not in acute distress  Appearance: Normal appearance  She is normal weight  She is not ill-appearing, toxic-appearing or diaphoretic  HENT:      Head: Normocephalic and atraumatic  Nose: Nose normal  No congestion or rhinorrhea        Mouth/Throat:      Mouth: Mucous membranes are moist       Pharynx: No oropharyngeal exudate or posterior oropharyngeal erythema  Eyes:      General: No scleral icterus  Right eye: No discharge  Left eye: No discharge  Extraocular Movements: Extraocular movements intact  Pupils: Pupils are equal, round, and reactive to light  Cardiovascular:      Rate and Rhythm: Normal rate and regular rhythm  Pulses: Normal pulses  Heart sounds: Normal heart sounds  No murmur heard  No friction rub  No gallop  Pulmonary:      Effort: Pulmonary effort is normal  No respiratory distress  Breath sounds: Normal breath sounds  No stridor  No wheezing, rhonchi or rales  Chest:      Chest wall: No tenderness  Abdominal:      General: Abdomen is flat  There is no distension  Palpations: Abdomen is soft  Tenderness: There is no abdominal tenderness  There is left CVA tenderness  There is no right CVA tenderness, guarding or rebound  Comments: The patient has hyperalgesia to the left flank the positive left-sided CVA tenderness; no rebound, Rovsing, or McBurney's point tenderness; negative Abbott sign; nonperitoneal   Musculoskeletal:         General: Normal range of motion  Cervical back: Normal range of motion  No tenderness  Right lower leg: No edema  Left lower leg: No edema  Skin:     General: Skin is warm and dry  Capillary Refill: Capillary refill takes less than 2 seconds  Coloration: Skin is not jaundiced or pale  Neurological:      General: No focal deficit present  Mental Status: She is alert and oriented to person, place, and time  Mental status is at baseline     Psychiatric:         Mood and Affect: Mood normal          Behavior: Behavior normal          Vital Signs  ED Triage Vitals [02/04/23 1918]   Temperature Pulse Respirations Blood Pressure SpO2   98 6 °F (37 °C) (!) 126 18 107/97 99 %      Temp Source Heart Rate Source Patient Position - Orthostatic VS BP Location FiO2 (%)   Oral Monitor Sitting Right arm --      Pain Score       9           Vitals:    02/04/23 1918 02/04/23 2135   BP: 107/97 101/78   Pulse: (!) 126 101   Patient Position - Orthostatic VS: Sitting Sitting         Visual Acuity      ED Medications  Medications   morphine injection 2 mg (2 mg Intravenous Given 2/4/23 1939)   ondansetron (ZOFRAN) injection 4 mg (4 mg Intravenous Given 2/4/23 1938)   cephalexin (KEFLEX) capsule 500 mg (500 mg Oral Given 2/4/23 2130)   phenazopyridine (PYRIDIUM) tablet 200 mg (200 mg Oral Given 2/4/23 2130)       Diagnostic Studies  Results Reviewed     Procedure Component Value Units Date/Time    Urine Microscopic [[id number]]  (Abnormal) Collected: 02/04/23 1942    Lab Status: Final result Specimen: Urine, Other Updated: 02/04/23 2007     RBC, UA 30-50 /hpf      WBC, UA 4-10 /hpf      Epithelial Cells Innumerable /hpf      Bacteria, UA Innumerable /hpf     Comprehensive metabolic panel [[id number]]  (Abnormal) Collected: 02/04/23 1941    Lab Status: Final result Specimen: Blood from Arm, Right Updated: 02/04/23 2004     Sodium 137 mmol/L      Potassium 3 7 mmol/L      Chloride 102 mmol/L      CO2 28 mmol/L      ANION GAP 7 mmol/L      BUN 9 mg/dL      Creatinine 0 78 mg/dL      Glucose 111 mg/dL      Calcium 9 7 mg/dL      AST 9 U/L      ALT 6 U/L      Alkaline Phosphatase 54 U/L      Total Protein 7 3 g/dL      Albumin 4 4 g/dL      Total Bilirubin 0 44 mg/dL      eGFR 100 ml/min/1 73sq m     Narrative:      [place] guidelines for Chronic Kidney Disease (CKD):   •  Stage 1 with normal or high GFR (GFR > 90 mL/min/1 73 square meters)  •  Stage 2 Mild CKD (GFR = 60-89 mL/min/1 73 square meters)  •  Stage 3A Moderate CKD (GFR = 45-59 mL/min/1 73 square meters)  •  Stage 3B Moderate CKD (GFR = 30-44 mL/min/1 73 square meters)  •  Stage 4 Severe CKD (GFR = 15-29 mL/min/1 73 square meters)  •  Stage 5 End Stage CKD (GFR <15 mL/min/1 73 square meters)  Note: GFR calculation is accurate only with a steady state creatinine    Lipase [[id number]]  (Abnormal) Collected: 02/04/23 1941    Lab Status: Final result Specimen: Blood from Arm, Right Updated: 02/04/23 2004     Lipase 7 u/L     UA w Reflex to Microscopic w Reflex to Culture [[id number]]  (Abnormal) Collected: 02/04/23 1942    Lab Status: Final result Specimen: Urine, Other Updated: 02/04/23 1956     Color, UA Yellow     Clarity, UA Slightly Cloudy     Specific Gravity, UA 1 015     pH, UA 6 5     Leukocytes, UA Trace     Nitrite, UA Negative     Protein, UA Negative mg/dl      Glucose, UA Negative mg/dl      Ketones, UA Negative mg/dl      Urobilinogen, UA 0 2 E U /dl      Bilirubin, UA Negative     Occult Blood, UA 3+    Chlamydia/GC amplified DNA by PCR [[id number]] Collected: 02/04/23 1941    Lab Status:  In process Specimen: Urine, Other Updated: 02/04/23 1948    CBC and differential [[id number]]  (Abnormal) Collected: 02/04/23 1941    Lab Status: Final result Specimen: Blood from Arm, Right Updated: 02/04/23 1948     WBC 9 64 Thousand/uL      RBC 4 67 Million/uL      Hemoglobin 14 1 g/dL      Hematocrit 42 0 %      MCV 90 fL      MCH 30 2 pg      MCHC 33 6 g/dL      RDW 12 8 %      MPV 9 5 fL      Platelets 559 Thousands/uL      nRBC 0 /100 WBCs      Neutrophils Relative 80 %      Immat GRANS % 0 %      Lymphocytes Relative 12 %      Monocytes Relative 8 %      Eosinophils Relative 0 %      Basophils Relative 0 %      Neutrophils Absolute 7 65 Thousands/µL      Immature Grans Absolute 0 03 Thousand/uL      Lymphocytes Absolute 1 11 Thousands/µL      Monocytes Absolute 0 80 Thousand/µL      Eosinophils Absolute 0 03 Thousand/µL      Basophils Absolute 0 02 Thousands/µL     POCT pregnancy, urine [[id number]]  (Normal) Resulted: 02/04/23 1942    Lab Status: Final result Updated: 02/04/23 1947     EXT Preg Test, Ur Negative     Control Valid                 CT renal stone study abdomen pelvis wo contrast   Final Result by Arnulfo Arriaza MD (02/04 5829)      No nephroureterolithiasis  Minimal free fluid dependent within the pelvis, possible GYN in origin  Otherwise no acute abnormality identified  Workstation performed: [id number]                    Procedures  Procedures         ED Course                               SBIRT 20yo+    Flowsheet Row Most Recent Value   SBIRT (23 yo +)    In order to provide better care to our patients, we are screening all of our patients for alcohol and drug use  Would it be okay to ask you these screening questions? No Filed at: 02/04/2023 1925                    Medical Decision Making  This is a 57-year-old female presenting to the emergency department today for left flank pain associated with dysuria  Currently on her menses  No fever or chills at home  Vital signs initially show tachycardia  On physical examination, the patient has left flank hyperalgesia with a positive left-sided CVA tenderness  Anterior abdomen is soft, nontender, nondistended, without organomegaly, and nonperitoneal   Patient has bacteria and blood in her urine but as stated prior she is currently on her menses  No leukocytosis or VANESSA  No evidence of nephrolithiasis on CT renal stone protocol  The patient was given a dose of morphine in the emergency department secondary to being allergic to Toradol  She was also dosed with Pyridium and Keflex while here in the emergency department  The patient is stable for discharge at this time  Pyridium and Keflex sent to the patient's pharmacy  Follow-up with urology as needed  Return to the emergency department for fever, chills, or concerning signs that are worsening  Strict return precautions were given  Recommend PCP follow-up as soon as possible  The patient and/or patient's proxy verify their understanding and agree to the plan at this time  All questions answered to the patient and/or their proxy's satisfaction    All labs reviewed and utilized in the medical decision making process  All radiology studies independently viewed by me and interpreted by the radiologist  Portions of the record may have been created with voice recognition software   Occasional wrong word or "sound a like" substitutions may have occurred due to the inherent limitations of voice recognition software   Read the chart carefully and recognize, using context, where substitutions have occurred  Dental caries: self-limited or minor problem  Gastroesophageal reflux disease without esophagitis: self-limited or minor problem  Nausea: complicated acute illness or injury  Urinary tract infection: complicated acute illness or injury  Amount and/or Complexity of Data Reviewed  Labs: ordered  Decision-making details documented in ED Course  Radiology: ordered  Decision-making details documented in ED Course  Risk  Prescription drug management  Disposition  Final diagnoses:   Urinary tract infection   Nausea   Dental caries   Gastroesophageal reflux disease without esophagitis     Time reflects when diagnosis was documented in both MDM as applicable and the Disposition within this note     Time User Action Codes Description Comment    2/4/2023  9:00 PM Yocasta Sender Add [N39 0] Urinary tract infection     2/4/2023  9:37 PM Bayron Mccauley Add [R11 0] Nausea     2/4/2023  9:37 PM Bayron Mccauley Add [K02 9] Dental caries     2/4/2023  9:37 PM Bayron Mccauley Add [K21 9] Gastroesophageal reflux disease without esophagitis       ED Disposition     ED Disposition   Discharge    Condition   Stable    Date/Time   Sat Feb 4, 2023 2101    [address] discharge to home/self care                 Follow-up Information     Follow up With Specialties Details Why Contact Info Additional Information    Primo Mesa MD Family Medicine Schedule an appointment as soon as possible for a visit   [address] [place] [address] Orlando Barrios 7792 Emergency Department Emergency Medicine Go to  If symptoms worsen [address] [id number]  [address] [facility] Emergency Department, [address], [address]          Discharge Medication List as of 2/4/2023  9:06 PM      START taking these medications    Details   phenazopyridine (PYRIDIUM) 200 mg tablet Take 1 tablet (200 mg total) by mouth 3 (three) times a day, Starting Sat 2/4/2023, Normal         CONTINUE these medications which have CHANGED    Details   cephalexin (KEFLEX) 500 mg capsule Take 1 capsule (500 mg total) by mouth every 6 (six) hours for 10 days, Starting Sat 2/4/2023, Until Tue 2/14/2023, Normal         CONTINUE these medications which have NOT CHANGED    Details   ondansetron (Zofran ODT) 4 mg disintegrating tablet Take 1 tablet (4 mg total) by mouth every 6 (six) hours as needed for nausea or vomiting, Starting Fri 1/27/2023, Normal      acetaminophen (TYLENOL) 500 mg tablet Take 2 tablets (1,000 mg total) by mouth every 6 (six) hours as needed for mild pain for up to 15 doses, Starting Tue 7/19/2022, Normal      chlorhexidine (PERIDEX) 0 12 % solution APPLY 15 ML TO THE MOUTH OR THROAT TWICE A DAY FOR 7 DAYS, Historical Med      clindamycin (CLEOCIN) 300 MG capsule TAKE 1 CAPSULE (300 MG TOTAL) BY MOUTH 3 (THREE) TIMES A DAY FOR 7 DAYS , Historical Med      ibuprofen (MOTRIN) 800 mg tablet Take 1 tablet (800 mg total) by mouth every 6 (six) hours as needed for mild pain for up to 30 doses, Starting Tue 7/19/2022, Normal      oxyCODONE (ROXICODONE) 5 immediate release tablet PLEASE SEE ATTACHED FOR DETAILED DIRECTIONS, Historical Med      pantoprazole (PROTONIX) 40 mg tablet TAKE 1 TABLET BY MOUTH EVERY DAY, Normal      metoclopramide (Reglan) 10 mg tablet Take 1 tablet (10 mg total) by mouth 4 (four) times a day, Starting Fri 12/9/2022, Normal             No discharge procedures on file     PDMP Review       Value Time User    PDMP Reviewed  Yes 3/22/2022  5:11 PM Devorah Markham MD          ED Provider  Electronically Signed by           Lavell Napoles PA-C  02/05/23 1868

## 2023-02-05 NOTE — DISCHARGE INSTRUCTIONS
Please return to the emergency department for worsening symptoms including chest pain, shortness of breath, dizziness, lightheadedness, fever greater than 103, severe pain, inability to walk, fainting episodes, etc  Please follow-up with your family practice provider as soon as possible  I have sent medications over to the pharmacy for your symptoms  Please take as directed  Please return to the emergency department for fever, pain that is worsening, worsening urinary pain, or other signs of infection  I have sent antibiotics and a pain medication for your bladder over to the pharmacy  Please take as directed

## 2023-02-06 DIAGNOSIS — A74.9 CHLAMYDIA: Primary | ICD-10-CM

## 2023-02-06 LAB
C TRACH DNA SPEC QL NAA+PROBE: POSITIVE
N GONORRHOEA DNA SPEC QL NAA+PROBE: NEGATIVE

## 2023-02-06 RX ORDER — DOXYCYCLINE HYCLATE 100 MG/1
100 CAPSULE ORAL 2 TIMES DAILY
Qty: 14 CAPSULE | Refills: 0 | Status: SHIPPED | OUTPATIENT
Start: 2023-02-06 | End: 2023-02-13

## 2023-02-07 NOTE — RESULT ENCOUNTER NOTE
Informed patient of positive chlamydia results  Advised to abstain from sex for 7-10 days, inform partner so that they can be tested and treated as well, take antibiotics until completion  She is monogamous with one partner and was negative 1 year ago   Rx for doxy was sent to pharmacy

## 2023-02-15 DIAGNOSIS — R11.0 NAUSEA: ICD-10-CM

## 2023-02-15 DIAGNOSIS — K02.9 DENTAL CARIES: ICD-10-CM

## 2023-02-15 RX ORDER — ONDANSETRON 4 MG/1
4 TABLET, ORALLY DISINTEGRATING ORAL EVERY 6 HOURS PRN
Qty: 30 TABLET | Refills: 0 | Status: SHIPPED | OUTPATIENT
Start: 2023-02-15

## 2023-02-15 NOTE — TELEPHONE ENCOUNTER
Pt wants to know if she can have her Zofran  Her currant abx is making her nauseous  Pt has a VV on 2/23

## 2023-02-23 ENCOUNTER — TELEMEDICINE (OUTPATIENT)
Dept: FAMILY MEDICINE CLINIC | Facility: CLINIC | Age: 33
End: 2023-02-23

## 2023-02-23 VITALS — HEIGHT: 60 IN | BODY MASS INDEX: 23.56 KG/M2 | WEIGHT: 120 LBS

## 2023-02-23 DIAGNOSIS — Z86.19 HISTORY OF CHLAMYDIA: ICD-10-CM

## 2023-02-23 DIAGNOSIS — G47.01 INSOMNIA DUE TO MEDICAL CONDITION: ICD-10-CM

## 2023-02-23 DIAGNOSIS — K21.9 GASTROESOPHAGEAL REFLUX DISEASE WITHOUT ESOPHAGITIS: Primary | ICD-10-CM

## 2023-02-23 RX ORDER — FAMOTIDINE 40 MG/1
40 TABLET, FILM COATED ORAL
Qty: 90 TABLET | Refills: 1 | Status: SHIPPED | OUTPATIENT
Start: 2023-02-23

## 2023-02-23 RX ORDER — OMEPRAZOLE 40 MG/1
40 CAPSULE, DELAYED RELEASE ORAL
Qty: 90 CAPSULE | Refills: 3 | Status: SHIPPED | OUTPATIENT
Start: 2023-02-23

## 2023-02-23 NOTE — PROGRESS NOTES
Virtual Regular Visit    Verification of patient location:    Patient is located in the following state in which I hold an active license [place]      Assessment/Plan:  Move the meds back from 4am to 3am then 2am etc       Problem List Items Addressed This Visit        Digestive    GERD (gastroesophageal reflux disease) - Primary    Relevant Medications    famotidine (PEPCID) 40 MG tablet    omeprazole (PriLOSEC) 40 MG capsule    Other Relevant Orders    Ambulatory Referral to Gastroenterology   Other Visit Diagnoses     History of chlamydia        Insomnia due to medical condition            Glad that you and your  got tx   See gyn for further testing   And see GI for EGD          Reason for visit is   Chief Complaint   Patient presents with   • Medication Refill   • Follow-up   • Virtual Regular Visit        Encounter provider Onesimo Tillman MD    Provider located at [address] [address]      Recent Visits  Date Type Provider Dept   02/23/23 Telemedicine Onesimo Tillman MD [facility]   Showing recent visits within past 7 days and meeting all other requirements  Future Appointments  No visits were found meeting these conditions  Showing future appointments within next 150 days and meeting all other requirements       The patient was identified by name and date of birth  Calli Members was informed that this is a telemedicine visit and that the visit is being conducted through the [address] [employer] platform  She agrees to proceed     My office door was closed  No one else was in the room  She acknowledged consent and understanding of privacy and security of the video platform  The patient has agreed to participate and understands they can discontinue the visit at any time  Patient is aware this is a billable service  Lauren Taylor is a 28 y o  female          HPI       Sleeping at [address] her tired but she cannt sleep         Past Medical History:   Diagnosis Date   • Anxiety    • Asthma    • Depression    • GERD (gastroesophageal reflux disease)    • Hernia, abdominal    • Migraines    • Muscle spasm    • Psychiatric disorder     Anx, Depression   • Renal disorder     kidney stones       Past Surgical History:   Procedure Laterality Date   • CYSTOSCOPY      removal of kidney stone   • [place] RETROGRADE PYELOGRAM  8/28/2018   • HERNIA REPAIR     • [place] CYSTO/URETERO W/LITHOTRIPSY &INDWELL STENT INSRT Right 8/28/2018    Procedure: CYSTOSCOPY URETEROSCOPY WITH RETROGRADE PYELOGRAM AND INSERTION STENT URETERAL;  Surgeon: Kapil Cross MD;  [facility];  Service: Urology   • TOOTH EXTRACTION         Current Outpatient Medications   Medication Sig Dispense Refill   • acetaminophen (TYLENOL) 500 mg tablet Take 2 tablets (1,000 mg total) by mouth every 6 (six) hours as needed for mild pain for up to 15 doses 30 tablet 0   • famotidine (PEPCID) 40 MG tablet Take 1 tablet (40 mg total) by mouth daily before dinner 90 tablet 1   • ibuprofen (MOTRIN) 800 mg tablet Take 1 tablet (800 mg total) by mouth every 6 (six) hours as needed for mild pain for up to 30 doses 30 tablet 0   • metoclopramide (Reglan) 10 mg tablet Take 1 tablet (10 mg total) by mouth 4 (four) times a day 40 tablet 0   • omeprazole (PriLOSEC) 40 MG capsule Take 1 capsule (40 mg total) by mouth daily before breakfast 90 capsule 3   • ondansetron (Zofran ODT) 4 mg disintegrating tablet Take 1 tablet (4 mg total) by mouth every 6 (six) hours as needed for nausea or vomiting 30 tablet 0   • phenazopyridine (PYRIDIUM) 200 mg tablet Take 1 tablet (200 mg total) by mouth 3 (three) times a day 6 tablet 0   • chlorhexidine (PERIDEX) 0 12 % solution APPLY 15 ML TO THE MOUTH OR THROAT TWICE A DAY FOR 7 DAYS (Patient not taking: Reported on 2/23/2023)       No current facility-administered medications for this visit          Allergies   Allergen Reactions   • Aspirin Anaphylaxis     Doesn't have epi pen  Okay to take ibuprofen per patient   • Ketorolac Itching   • Naproxen Nausea Only   • Prednisone Itching     itchy throat     • Toradol [Ketorolac Tromethamine]    • Tramadol Itching       Review of Systems   Constitutional: Negative for fever and unexpected weight change  HENT: Positive for dental problem  Negative for nosebleeds and trouble swallowing  Eyes: Negative for visual disturbance  Respiratory: Negative for chest tightness and shortness of breath  Cardiovascular: Negative for chest pain, palpitations and leg swelling  Gastrointestinal: Negative for abdominal pain, constipation, diarrhea and nausea  Endocrine: Negative for cold intolerance  Genitourinary: Negative for dysuria, pelvic pain and urgency  Musculoskeletal: Negative for joint swelling and myalgias  Skin: Negative for rash  Neurological: Negative for tremors, seizures and syncope  Hematological: Does not bruise/bleed easily  Psychiatric/Behavioral: Negative for hallucinations and suicidal ideas  Video Exam    Vitals:    02/23/23 0921   Weight: 54 4 kg (120 lb)   Height: 5' (1 524 m)       Physical Exam  Constitutional:       Appearance: She is well-developed  Eyes:      Conjunctiva/sclera: Conjunctivae normal    Pulmonary:      Effort: Pulmonary effort is normal    Musculoskeletal:      Cervical back: Normal range of motion  Neurological:      Mental Status: She is alert and oriented to person, place, and time  Psychiatric:         Behavior: Behavior normal          Thought Content:  Thought content normal          Judgment: Judgment normal           I spent 15 minutes directly with the patient during this visit

## 2023-03-02 DIAGNOSIS — K02.9 DENTAL CARIES: ICD-10-CM

## 2023-03-02 DIAGNOSIS — R11.0 NAUSEA: ICD-10-CM

## 2023-03-02 DIAGNOSIS — K21.9 GASTROESOPHAGEAL REFLUX DISEASE WITHOUT ESOPHAGITIS: ICD-10-CM

## 2023-03-02 RX ORDER — METOCLOPRAMIDE 10 MG/1
10 TABLET ORAL 4 TIMES DAILY
Qty: 40 TABLET | Refills: 0 | Status: SHIPPED | OUTPATIENT
Start: 2023-03-02

## 2023-03-17 ENCOUNTER — NURSE TRIAGE (OUTPATIENT)
Dept: OTHER | Facility: OTHER | Age: 33
End: 2023-03-17

## 2023-03-17 DIAGNOSIS — K02.9 DENTAL CARIES: ICD-10-CM

## 2023-03-17 DIAGNOSIS — R11.0 NAUSEA: ICD-10-CM

## 2023-03-17 RX ORDER — ONDANSETRON 4 MG/1
4 TABLET, ORALLY DISINTEGRATING ORAL EVERY 6 HOURS PRN
Qty: 30 TABLET | Refills: 0 | Status: SHIPPED | OUTPATIENT
Start: 2023-03-17

## 2023-03-18 NOTE — TELEPHONE ENCOUNTER
Regarding: urgent med refill  ----- Message from Johann Rmoano sent at 3/17/2023 11:29 PM EDT -----  Medication Refill Request     Name ondansetron    Dose/Frequency 4mg  Quantity 30  Verified pharmacy   [facility]   Verified ordering Provider   dr Marialuisa Castro  Does patient have enough for the next 3 days?  no

## 2023-03-18 NOTE — TELEPHONE ENCOUNTER
Reason for Disposition  • [1] Caller requesting a prescription renewal (no refills left), no triage required, AND [2] triager able to renew prescription per department policy    Answer Assessment - Initial Assessment Questions  1  DRUG NAME: "What medicine do you need to have refilled?"      Zofran 4 mg disintegrating tablet every 6 hours as needed     2  REFILLS REMAINING: "How many refills are remaining?" (Note: The label on the medicine or pill bottle will show how many refills are remaining  If there are no refills remaining, then a renewal may be needed )      0    3  EXPIRATION DATE: "What is the expiration date?" (Note: The label states when the prescription will , and thus can no longer be refilled )      Unknown     4  PRESCRIBING HCP: "Who prescribed it?" Reason: If prescribed by specialist, call should be referred to that group        Edward Napoles    Protocols used: [facility]

## 2023-03-30 ENCOUNTER — NURSE TRIAGE (OUTPATIENT)
Dept: OTHER | Facility: OTHER | Age: 33
End: 2023-03-30

## 2023-03-30 DIAGNOSIS — K21.9 GASTROESOPHAGEAL REFLUX DISEASE WITHOUT ESOPHAGITIS: ICD-10-CM

## 2023-03-30 DIAGNOSIS — R11.0 NAUSEA: ICD-10-CM

## 2023-03-30 DIAGNOSIS — K02.9 DENTAL CARIES: ICD-10-CM

## 2023-03-30 RX ORDER — METOCLOPRAMIDE 10 MG/1
10 TABLET ORAL 4 TIMES DAILY
Qty: 40 TABLET | Refills: 0 | Status: SHIPPED | OUTPATIENT
Start: 2023-03-30

## 2023-03-30 NOTE — TELEPHONE ENCOUNTER
Regarding: urgent med refill  ----- Message from [facility] sent at 3/30/2023  6:12 AM EDT -----  Medication Refill Request     Name metoclopramide (Reglan) 10 mg tablet   Dose/Frequency Take 1 tablet (10 mg total) by mouth 4 (four) times a day  Quantity 40  Verified pharmacy   [x ][id number] [place] [facility] [address] [address], [facility]  [address]   Phone:  [phone]  Fax:  [phone]   Verified ordering Provider   [ x]  Does patient have enough for the next 3 days?  Yes [ ] No [ x]

## 2023-03-30 NOTE — TELEPHONE ENCOUNTER
"  Reason for Disposition  • [1] Caller has NON-URGENT medicine question about med that PCP prescribed AND [2] triager unable to answer question    Answer Assessment - Initial Assessment Questions  1  DRUG NAME: \"What medicine do you need to have refilled? \"      Lamictal refill request   2  REFILLS REMAINING: \"How many refills are remaining? \" (Note: The label on the medicine or pill bottle will show how many refills are remaining   If there are no refills remaining, then a renewal may be needed )      0      Rx refill request for Lamictal    Protocols used: MEDICATION REFILL AND RENEWAL CALL-ADULT-    "

## 2023-03-30 NOTE — TELEPHONE ENCOUNTER
Yeah I dont know either   So      no     She would need an appt to discuss when all that was since I dont see it

## 2023-04-08 ENCOUNTER — NURSE TRIAGE (OUTPATIENT)
Dept: OTHER | Facility: OTHER | Age: 33
End: 2023-04-08

## 2023-04-08 DIAGNOSIS — K02.9 DENTAL CARIES: ICD-10-CM

## 2023-04-08 DIAGNOSIS — R11.0 NAUSEA: ICD-10-CM

## 2023-04-08 RX ORDER — ONDANSETRON 4 MG/1
4 TABLET, ORALLY DISINTEGRATING ORAL EVERY 6 HOURS PRN
Qty: 28 TABLET | Refills: 0 | Status: SHIPPED | OUTPATIENT
Start: 2023-04-08 | End: 2023-04-15

## 2023-04-08 NOTE — TELEPHONE ENCOUNTER
"Regarding: Zofran refill  ----- Message from Kristopher Fish sent at 4/8/2023  7:31 PM EDT -----  Pt called \"I need a refill on my Zofran  \"    "

## 2023-04-08 NOTE — TELEPHONE ENCOUNTER
"  Reason for Disposition  • [1] Caller requesting a prescription renewal (no refills left), no triage required, AND [2] triager able to renew prescription per department policy    Answer Assessment - Initial Assessment Questions  1  DRUG NAME: \"What medicine do you need to have refilled? \"      Zofran 4mg ODT  2  REFILLS REMAINING: \"How many refills are remaining? \" (Note: The label on the medicine or pill bottle will show how many refills are remaining  If there are no refills remaining, then a renewal may be needed )      0  4  PRESCRIBING HCP: \"Who prescribed it? \" Reason: If prescribed by specialist, call should be referred to that group  Nicki Pizano  5  SYMPTOMS: \"Do you have any symptoms? \"      Nausea    Patient advised short supply could be called into pharmacy for her; patient agreeable  Verified pharmacy and rx sent      Protocols used: MEDICATION REFILL AND RENEWAL CALL-ADULT-    "

## 2023-04-21 ENCOUNTER — HOSPITAL ENCOUNTER (EMERGENCY)
Facility: HOSPITAL | Age: 33
Discharge: HOME | End: 2023-04-21
Attending: EMERGENCY MEDICINE

## 2023-04-21 VITALS
RESPIRATION RATE: 16 BRPM | HEART RATE: 88 BPM | SYSTOLIC BLOOD PRESSURE: 120 MMHG | TEMPERATURE: 98.3 F | OXYGEN SATURATION: 99 % | DIASTOLIC BLOOD PRESSURE: 74 MMHG

## 2023-04-21 DIAGNOSIS — K05.10 GINGIVITIS: ICD-10-CM

## 2023-04-21 DIAGNOSIS — K02.9 PAIN DUE TO DENTAL CARIES: Primary | ICD-10-CM

## 2023-04-21 RX ORDER — CLINDAMYCIN HYDROCHLORIDE 300 MG/1
300 CAPSULE ORAL EVERY 8 HOURS SCHEDULED
Qty: 30 CAPSULE | Refills: 0 | Status: SHIPPED | OUTPATIENT
Start: 2023-04-21 | End: 2023-05-01

## 2023-04-21 RX ORDER — CHLORHEXIDINE GLUCONATE 0.12 MG/ML
15 RINSE ORAL 2 TIMES DAILY
Qty: 120 ML | Refills: 0 | Status: SHIPPED | OUTPATIENT
Start: 2023-04-21

## 2023-04-21 NOTE — DISCHARGE INSTRUCTIONS
Use Tylenol every 4 hours or Motrin every 6 hours; you can alternate the 2 medications taking something every 3 hours for pain  Change your PCN to Clindamycin  Use Peridex to help with gum pain/gingivitis    Follow-up with YOUR Denist WITHOUT Fail

## 2023-04-21 NOTE — ED PROVIDER NOTES
[facility]  [facility] Complaint   Patient presents with   • Dental Pain     Pt presents to ED from home w/ dental pain for one week  Pt on abx  PMH: Anxiety, Asthma, Depression, GERD, abdominal Hernia, Migraines, kidney stones, TMJ    Presents to ED c/o persistent right front tooth pain for the past several months  Keeps getting better/worse, now worse again over the past several days  Pt states she did FU with dentist at [facility] and they were going to pull all top teeth, but then said they couldn't bc Chandra Keita has a hot tooth\"  Pt with multiple ED visits for dental complaints  Pain is worse with breathing and cold temperatures, even when air hits it  Multiple visits to multiple EDs for same complaint  Denies fevers  Denies swallowing issues  No shortness of breath  Tried calling dentist back but no answer  Pt currently on pcn from another ED x 3 days, taking Ibuprofen                 Prior to Admission Medications   Prescriptions Last Dose Informant Patient Reported?  Taking?   acetaminophen (TYLENOL) 500 mg tablet   No No   Sig: Take 2 tablets (1,000 mg total) by mouth every 6 (six) hours as needed for mild pain for up to 15 doses   chlorhexidine (PERIDEX) 0 12 % solution   Yes No   Sig: APPLY 15 ML TO THE MOUTH OR THROAT TWICE A DAY FOR 7 DAYS   Patient not taking: Reported on 2/23/2023   famotidine (PEPCID) 40 MG tablet   No No   Sig: Take 1 tablet (40 mg total) by mouth daily before dinner   ibuprofen (MOTRIN) 800 mg tablet   No No   Sig: Take 1 tablet (800 mg total) by mouth every 6 (six) hours as needed for mild pain for up to 30 doses   metoclopramide (Reglan) 10 mg tablet   No No   Sig: Take 1 tablet (10 mg total) by mouth 4 (four) times a day   omeprazole (PriLOSEC) 40 MG capsule   No No   Sig: Take 1 capsule (40 mg total) by mouth daily before breakfast   ondansetron (Zofran ODT) 4 mg disintegrating tablet   No No   Sig: Take 1 tablet (4 mg total) by mouth every 6 (six) hours as needed for " nausea or vomiting for up to 7 days   phenazopyridine (PYRIDIUM) 200 mg tablet   No No   Sig: Take 1 tablet (200 mg total) by mouth 3 (three) times a day      Facility-Administered Medications: None       Past Medical History:   Diagnosis Date   • Anxiety    • Asthma    • Depression    • GERD (gastroesophageal reflux disease)    • Hernia, abdominal    • Migraines    • Muscle spasm    • Psychiatric disorder     Anx, Depression   • Renal disorder     kidney stones       Past Surgical History:   Procedure Laterality Date   • CYSTOSCOPY      removal of kidney stone   • [place] RETROGRADE PYELOGRAM  8/28/2018   • HERNIA REPAIR     • [place] CYSTO/URETERO W/LITHOTRIPSY &INDWELL STENT INSRT Right 8/28/2018    Procedure: CYSTOSCOPY URETEROSCOPY WITH RETROGRADE PYELOGRAM AND INSERTION STENT URETERAL;  Surgeon: Cris Sim MD;  [facility];  Service: Urology   • TOOTH EXTRACTION         Family History   Problem Relation Age of Onset   • Diabetes Mother    • Hyperlipidemia Mother    • Stroke Mother    • Heart disease Mother    • Asthma Mother    • Other Mother         Tosha Valentine  of Intervertabral disc  • Nephrolithiasis Father    • Nephrolithiasis Brother      I have reviewed and agree with the history as documented  E-Cigarette/Vaping   • E-Cigarette Use Never User      E-Cigarette/Vaping Substances   • Nicotine No    • THC No    • CBD No    • Flavoring No    • Other No    • Unknown No      Social History     Tobacco Use   • Smoking status: Every Day     Packs/day: 0 50     Years: 13 00     Pack years: 6 50     Types: Cigarettes   • Smokeless tobacco: Never   Vaping Use   • Vaping Use: Never used   Substance Use Topics   • Alcohol use: Not Currently     Comment: Alcohol intake:   None  - As per Vincent Pearce    • Drug use: No     Comment: Illicit drugs:   none  - As per [place]        Review of Systems   Constitutional: Negative for fever  HENT: Positive for dental problem  Respiratory: Negative for shortness of breath  Cardiovascular: Negative for chest pain  Skin: Negative for wound  Neurological: Negative for headaches  All other systems reviewed and are negative  Physical Exam  Physical Exam  Vitals and nursing note reviewed  Constitutional:       General: She is not in acute distress  Appearance: Normal appearance  She is well-developed  HENT:      Head: Normocephalic and atraumatic  Right Ear: External ear normal       Left Ear: External ear normal       Nose: Nose normal       Mouth/Throat:      Mouth: Mucous membranes are moist       Pharynx: Oropharynx is clear  Uvula midline  No oropharyngeal exudate  Comments: Very poor dentition throughout, multiple, missing/decaying teeth, gums appear diseased/gingivitis  Pain to right side front teeth, no facial swelling, no obvious abscess  Eyes:      Conjunctiva/sclera: Conjunctivae normal    Cardiovascular:      Rate and Rhythm: Normal rate  Pulmonary:      Effort: Pulmonary effort is normal    Musculoskeletal:         General: Normal range of motion  Cervical back: Normal range of motion  Lymphadenopathy:      Cervical: No cervical adenopathy  Skin:     General: Skin is warm and dry  Findings: No erythema or rash  Neurological:      General: No focal deficit present  Mental Status: She is alert     Psychiatric:         Behavior: Behavior normal          Vital Signs  ED Triage Vitals [04/21/23 1637]   Temperature Pulse Respirations Blood Pressure SpO2   98 3 °F (36 8 °C) 88 16 120/74 99 %      Temp Source Heart Rate Source Patient Position - Orthostatic VS BP Location FiO2 (%)   Oral -- -- -- --      Pain Score       --           Vitals:    04/21/23 1637   BP: 120/74   Pulse: 88         Visual Acuity      ED Medications  Medications - No data to display    Diagnostic Studies  Results Reviewed     None                 No orders to display              Procedures  Procedures         ED Course                               SBIRT 22yo+    Flowsheet Row Most Recent Value   Initial Alcohol Screen: US AUDIT-C     1  How often do you have a drink containing alcohol? 0 Filed at: 04/21/2023 1640   2  How many drinks containing alcohol do you have on a typical day you are drinking? 0 Filed at: 04/21/2023 1640   3a  Male UNDER 65: How often do you have five or more drinks on one occasion? 0 Filed at: 04/21/2023 1640   3b  FEMALE Any Age, or MALE 65+: How often do you have 4 or more drinks on one occassion? 0 Filed at: 04/21/2023 1640   Audit-C Score 0 Filed at: 04/21/2023 1640   CRISTINO: How many times in the past year have you    Used an illegal drug or used a prescription medication for non-medical reasons? Never Filed at: 04/21/2023 1640                    Medical Decision Making  PT with multiple allergies, has been taking ibuprofen; no indication for opiates for chronic dental pain issues  Pt does have a dentist  Patient has been taking penicillin for the past 3 days without improvement of symptoms  Unfortunately patient has aspirin, Toradol, Naprosyn, Tramadol all listed as allergies so I instructed her to continue with the Tylenol and ibuprofen  Based on my history and physical examination, I considered the following dental pain: dwayne's angina, odontogenic abscess, ACS, and oral infection in immunocompromised state  She has no oral / neck abscess on my exam  No swelling of the oral cavity  Tongue not swollen or elevated,   Multiple ed visits and now has dentist she can FU with  Amount and/or Complexity of Data Reviewed  External Data Reviewed: notes            Disposition  Final diagnoses:   Pain due to dental caries   Gingivitis     Time reflects when diagnosis was documented in both MDM as applicable and the Disposition within this note     Time User Action Codes Description Comment    4/21/2023  5:00 PM Christiano Rodrigues [K02 9] Pain due to dental caries     4/21/2023  5:08 PM María Elena Ryan [I89 [id number]] Gingivitis       ED Disposition     ED Disposition   Discharge    Condition   Stable    Date/Time   Fri Apr 21, 2023  5:00 PM    [address] discharge to home/self care  Follow-up Information     Follow up With Specialties Details Why Contact Info    Your Dentist              Patient's Medications   Discharge Prescriptions    CHLORHEXIDINE (PERIDEX) 0 12 % SOLUTION    Apply 15 mL to the mouth or throat 2 (two) times a day       Start Date: 4/21/2023 End Date: --       Order Dose: 15 mL       Quantity: 120 mL    Refills: 0    CLINDAMYCIN (CLEOCIN) 300 MG CAPSULE    Take 1 capsule (300 mg total) by mouth every 8 (eight) hours for 10 days       Start Date: 4/21/2023 End Date: 5/1/2023       Order Dose: 300 mg       Quantity: 30 capsule    Refills: 0       No discharge procedures on file      PDMP Review       Value Time User    PDMP Reviewed  Yes 3/22/2022  5:11 PM Tad Bond MD          ED Provider  Electronically Signed by           Hilda aCrr PA-C  04/21/23 1713

## 2023-04-22 ENCOUNTER — HOSPITAL ENCOUNTER (EMERGENCY)
Facility: HOSPITAL | Age: 33
Discharge: HOME | End: 2023-04-22
Attending: EMERGENCY MEDICINE

## 2023-04-22 VITALS
RESPIRATION RATE: 18 BRPM | DIASTOLIC BLOOD PRESSURE: 64 MMHG | SYSTOLIC BLOOD PRESSURE: 108 MMHG | WEIGHT: 120 LBS | HEART RATE: 92 BPM | BODY MASS INDEX: 23.56 KG/M2 | OXYGEN SATURATION: 100 % | HEIGHT: 60 IN

## 2023-04-22 DIAGNOSIS — K02.9 PAIN DUE TO DENTAL CARIES: Primary | ICD-10-CM

## 2023-04-22 RX ORDER — OXYCODONE HYDROCHLORIDE 5 MG/1
5 TABLET ORAL ONCE
Status: COMPLETED | OUTPATIENT
Start: 2023-04-22 | End: 2023-04-22

## 2023-04-22 RX ADMIN — OXYCODONE HYDROCHLORIDE 5 MG: 5 TABLET ORAL at 18:53

## 2023-04-22 NOTE — ED PROVIDER NOTES
History  Chief Complaint   Patient presents with   • Dental Pain     Follow up from yesterday c/o of tooth pain radiating to head     PMH: Anxiety, Asthma, Depression, GERD, abdominal Hernia, Migraines, kidney stones, TMJ  Past Surgical History: CYSTOSCOPY-removal of kidney stone, HERNIA REPAIR, TOOTH EXTRACTION       Pt returns to ED c/o persistent dental pain, now to right lower teeth, pt seen here by me yesterday  Pt states did change oral antibiotics but pain worse today, so came back  Pt with multiple ED visits for dental complaints  Denies fevers  Denies swallowing issues  No shortness of breath                    Prior to Admission Medications   Prescriptions Last Dose Informant Patient Reported?  Taking?   acetaminophen (TYLENOL) 500 mg tablet   No No   Sig: Take 2 tablets (1,000 mg total) by mouth every 6 (six) hours as needed for mild pain for up to 15 doses   chlorhexidine (PERIDEX) 0 12 % solution   Yes No   Sig: APPLY 15 ML TO THE MOUTH OR THROAT TWICE A DAY FOR 7 DAYS   Patient not taking: Reported on 2/23/2023   chlorhexidine (PERIDEX) 0 12 % solution   No No   Sig: Apply 15 mL to the mouth or throat 2 (two) times a day   clindamycin (CLEOCIN) 300 MG capsule   No No   Sig: Take 1 capsule (300 mg total) by mouth every 8 (eight) hours for 10 days   famotidine (PEPCID) 40 MG tablet   No No   Sig: Take 1 tablet (40 mg total) by mouth daily before dinner   ibuprofen (MOTRIN) 800 mg tablet   No No   Sig: Take 1 tablet (800 mg total) by mouth every 6 (six) hours as needed for mild pain for up to 30 doses   metoclopramide (Reglan) 10 mg tablet   No No   Sig: Take 1 tablet (10 mg total) by mouth 4 (four) times a day   omeprazole (PriLOSEC) 40 MG capsule   No No   Sig: Take 1 capsule (40 mg total) by mouth daily before breakfast   ondansetron (Zofran ODT) 4 mg disintegrating tablet   No No   Sig: Take 1 tablet (4 mg total) by mouth every 6 (six) hours as needed for nausea or vomiting for up to 7 days phenazopyridine (PYRIDIUM) 200 mg tablet   No No   Sig: Take 1 tablet (200 mg total) by mouth 3 (three) times a day      Facility-Administered Medications: None       Past Medical History:   Diagnosis Date   • Anxiety    • Asthma    • Depression    • GERD (gastroesophageal reflux disease)    • Hernia, abdominal    • Migraines    • Muscle spasm    • Psychiatric disorder     Anx, Depression   • Renal disorder     kidney stones       Past Surgical History:   Procedure Laterality Date   • CYSTOSCOPY      removal of kidney stone   • [place] RETROGRADE PYELOGRAM  8/28/2018   • HERNIA REPAIR     • [place] CYSTO/URETERO W/LITHOTRIPSY &INDWELL STENT INSRT Right 8/28/2018    Procedure: CYSTOSCOPY URETEROSCOPY WITH RETROGRADE PYELOGRAM AND INSERTION STENT URETERAL;  Surgeon: Porsche Gaona MD;  Location: [facility];  Service: Urology   • TOOTH EXTRACTION         Family History   Problem Relation Age of Onset   • Diabetes Mother    • Hyperlipidemia Mother    • Stroke Mother    • Heart disease Mother    • Asthma Mother    • Other Mother         Zygmunt Juab  of Intervertabral disc  • Nephrolithiasis Father    • Nephrolithiasis Brother      I have reviewed and agree with the history as documented  E-Cigarette/Vaping   • E-Cigarette Use Never User      E-Cigarette/Vaping Substances   • Nicotine No    • THC No    • CBD No    • Flavoring No    • Other No    • Unknown No      Social History     Tobacco Use   • Smoking status: Every Day     Packs/day: 0 50     Years: 13 00     Pack years: 6 50     Types: Cigarettes   • Smokeless tobacco: Never   Vaping Use   • Vaping Use: Never used   Substance Use Topics   • Alcohol use: Not Currently     Comment: Alcohol intake:   None  - As per [place]    • Drug use: No     Comment: Illicit drugs:   none  - As per [place]        Review of Systems   Constitutional: Negative for fever  HENT: Positive for dental problem  Gastrointestinal: Negative for vomiting  Neurological: Negative for headaches  Physical Exam  Physical Exam  Vitals and nursing note reviewed  Constitutional:       General: She is in acute distress (mild)  Appearance: She is well-developed  HENT:      Head: Normocephalic and atraumatic  Right Ear: External ear normal       Left Ear: External ear normal       Nose: Nose normal       Mouth/Throat:      Mouth: Mucous membranes are moist       Dentition: Abnormal dentition  Dental tenderness, gingival swelling, dental caries and gum lesions present  No dental abscesses  Pharynx: Oropharynx is clear  Comments:  Very poor dentition throughout, multiple, missing/decaying teeth, gums appear diseased/gingivitis  Pain to right side front upper and lower teeth, no facial swelling, no obvious abscess  Eyes:      Conjunctiva/sclera: Conjunctivae normal    Cardiovascular:      Rate and Rhythm: Normal rate  Pulmonary:      Effort: Pulmonary effort is normal    Musculoskeletal:         General: Normal range of motion  Cervical back: Normal range of motion  Lymphadenopathy:      Cervical: No cervical adenopathy  Skin:     General: Skin is warm and dry  Findings: No rash  Neurological:      Mental Status: She is alert     Psychiatric:         Behavior: Behavior normal          Vital Signs  ED Triage Vitals [04/22/23 1833]   Temp Pulse Respirations Blood Pressure SpO2   -- 92 18 108/64 100 %      Temp src Heart Rate Source Patient Position - Orthostatic VS BP Location FiO2 (%)   -- Monitor Sitting Left arm --      Pain Score       10 - Worst Possible Pain           Vitals:    04/22/23 1833   BP: 108/64   Pulse: 92   Patient Position - Orthostatic VS: Sitting         Visual Acuity      ED Medications  Medications   oxyCODONE (ROXICODONE) IR tablet 5 mg (5 mg Oral Given 4/22/23 1853)       Diagnostic Studies  Results Reviewed     None                 No orders to display              Procedures  Procedures         ED Course Medical Decision Making  Instructed to continue antibiotics  Patient with multiple drug allergies we will give 1 dose of oxycodone in the emergency department ; stressed the patient that she needs follow-up with dentist soon as possible    Amount and/or Complexity of Data Reviewed  External Data Reviewed: notes  Risk  Prescription drug management  Disposition  Final diagnoses:   Pain due to dental caries - gingivitis     Time reflects when diagnosis was documented in both MDM as applicable and the Disposition within this note     Time User Action Codes Description Comment    4/22/2023  6:48 PM Rudy Navarrete Add [K02 9] Pain due to dental caries     4/22/2023  6:48 PM Rudy Navarrete Modify [K02 9] Pain due to dental caries gingivitis      ED Disposition     ED Disposition   Discharge    Condition   Stable    Date/Time   Sat Apr 22, 2023  6:48 PM    Comment   Kathia Ayers discharge to home/self care                 Follow-up Information     Follow up With Specialties Details Why Contact Info    YOUR DENTIST              Discharge Medication List as of 4/22/2023  6:50 PM      CONTINUE these medications which have NOT CHANGED    Details   !! chlorhexidine (PERIDEX) 0 12 % solution Apply 15 mL to the mouth or throat 2 (two) times a day, Starting Fri 4/21/2023, Normal      clindamycin (CLEOCIN) 300 MG capsule Take 1 capsule (300 mg total) by mouth every 8 (eight) hours for 10 days, Starting Fri 4/21/2023, Until Mon 5/1/2023, Normal      acetaminophen (TYLENOL) 500 mg tablet Take 2 tablets (1,000 mg total) by mouth every 6 (six) hours as needed for mild pain for up to 15 doses, Starting Tue 7/19/2022, Normal      !! chlorhexidine (PERIDEX) 0 12 % solution APPLY 15 ML TO THE MOUTH OR THROAT TWICE A DAY FOR 7 DAYS, Historical Med      famotidine (PEPCID) 40 MG tablet Take 1 tablet (40 mg total) by mouth daily before dinner, Starting Thu 2/23/2023, Normal ibuprofen (MOTRIN) 800 mg tablet Take 1 tablet (800 mg total) by mouth every 6 (six) hours as needed for mild pain for up to 30 doses, Starting Tue 7/19/2022, Normal      metoclopramide (Reglan) 10 mg tablet Take 1 tablet (10 mg total) by mouth 4 (four) times a day, Starting Thu 3/30/2023, Normal      omeprazole (PriLOSEC) 40 MG capsule Take 1 capsule (40 mg total) by mouth daily before breakfast, Starting Thu 2/23/2023, Normal      ondansetron (Zofran ODT) 4 mg disintegrating tablet Take 1 tablet (4 mg total) by mouth every 6 (six) hours as needed for nausea or vomiting for up to 7 days, Starting Sat 4/8/2023, Until Sat 4/15/2023 at 2359, Normal      phenazopyridine (PYRIDIUM) 200 mg tablet Take 1 tablet (200 mg total) by mouth 3 (three) times a day, Starting Sat 2/4/2023, Normal       !! - Potential duplicate medications found  Please discuss with provider  No discharge procedures on file      PDMP Review       Value Time User    PDMP Reviewed  Yes 3/22/2022  5:11 PM Tami Rodriguez MD          ED Provider  Electronically Signed by           Aly Bailey PA-C  04/22/23 2026

## 2023-04-22 NOTE — ED NOTES
Pt reports she does not have her license and is not driving, her  will take her home       [address], RN  04/22/23 3253

## 2023-04-24 DIAGNOSIS — K02.9 DENTAL CARIES: ICD-10-CM

## 2023-04-24 DIAGNOSIS — R11.0 NAUSEA: ICD-10-CM

## 2023-04-24 DIAGNOSIS — K21.9 GASTROESOPHAGEAL REFLUX DISEASE WITHOUT ESOPHAGITIS: ICD-10-CM

## 2023-04-25 RX ORDER — METOCLOPRAMIDE 10 MG/1
10 TABLET ORAL 4 TIMES DAILY
Qty: 40 TABLET | Refills: 0 | Status: SHIPPED | OUTPATIENT
Start: 2023-04-25

## 2023-05-05 ENCOUNTER — HOSPITAL ENCOUNTER (EMERGENCY)
Facility: HOSPITAL | Age: 33
Discharge: HOME | End: 2023-05-05
Attending: EMERGENCY MEDICINE | Admitting: EMERGENCY MEDICINE

## 2023-05-05 ENCOUNTER — APPOINTMENT (EMERGENCY)
Dept: RADIOLOGY | Facility: HOSPITAL | Age: 33
End: 2023-05-05

## 2023-05-05 VITALS
BODY MASS INDEX: 24.07 KG/M2 | OXYGEN SATURATION: 99 % | HEART RATE: 64 BPM | RESPIRATION RATE: 18 BRPM | TEMPERATURE: 97.4 F | WEIGHT: 123.24 LBS | SYSTOLIC BLOOD PRESSURE: 101 MMHG | DIASTOLIC BLOOD PRESSURE: 57 MMHG

## 2023-05-05 DIAGNOSIS — R07.9 CHEST PAIN: Primary | ICD-10-CM

## 2023-05-05 LAB
ALBUMIN SERPL BCP-MCNC: 4 G/DL (ref 3.5–5)
ALP SERPL-CCNC: 42 U/L (ref 34–104)
ALT SERPL W P-5'-P-CCNC: 9 U/L (ref 7–52)
ANION GAP SERPL CALCULATED.3IONS-SCNC: 5 MMOL/L (ref 4–13)
AST SERPL W P-5'-P-CCNC: 11 U/L (ref 13–39)
BILIRUB SERPL-MCNC: 0.32 MG/DL (ref 0.2–1)
BUN SERPL-MCNC: 8 MG/DL (ref 5–25)
CALCIUM SERPL-MCNC: 9 MG/DL (ref 8.4–10.2)
CARDIAC TROPONIN I PNL SERPL HS: <2 NG/L
CHLORIDE SERPL-SCNC: 104 MMOL/L (ref 96–108)
CO2 SERPL-SCNC: 28 MMOL/L (ref 21–32)
CREAT SERPL-MCNC: 0.66 MG/DL (ref 0.6–1.3)
ERYTHROCYTE [DISTWIDTH] IN BLOOD BY AUTOMATED COUNT: 12.7 % (ref 11.6–15.1)
GFR SERPL CREATININE-BSD FRML MDRD: 117 ML/MIN/1.73SQ M
GLUCOSE SERPL-MCNC: 95 MG/DL (ref 65–140)
HCT VFR BLD AUTO: 40.8 % (ref 34.8–46.1)
HGB BLD-MCNC: 13.3 G/DL (ref 11.5–15.4)
MCH RBC QN AUTO: 30 PG (ref 26.8–34.3)
MCHC RBC AUTO-ENTMCNC: 32.6 G/DL (ref 31.4–37.4)
MCV RBC AUTO: 92 FL (ref 82–98)
PLATELET # BLD AUTO: 256 THOUSANDS/UL (ref 149–390)
PMV BLD AUTO: 10 FL (ref 8.9–12.7)
POTASSIUM SERPL-SCNC: 3.8 MMOL/L (ref 3.5–5.3)
PROT SERPL-MCNC: 6.1 G/DL (ref 6.4–8.4)
RBC # BLD AUTO: 4.44 MILLION/UL (ref 3.81–5.12)
SODIUM SERPL-SCNC: 137 MMOL/L (ref 135–147)
WBC # BLD AUTO: 4.76 THOUSAND/UL (ref 4.31–10.16)

## 2023-05-05 RX ORDER — ONDANSETRON 2 MG/ML
4 INJECTION INTRAMUSCULAR; INTRAVENOUS ONCE
Status: COMPLETED | OUTPATIENT
Start: 2023-05-05 | End: 2023-05-05

## 2023-05-05 RX ADMIN — ONDANSETRON 4 MG: 2 INJECTION INTRAMUSCULAR; INTRAVENOUS at 15:45

## 2023-05-05 NOTE — ED PROVIDER NOTES
History  Chief Complaint   Patient presents with   • Chest Pain     Pt presents to the ED with c/o chest pain that started at 1200 today  Stabbing, burning  Pain started in back, moved to chest       HPI     20-year-old female with history of GERD presenting for evaluation of chest discomfort that started after 12:00 this afternoon  Patient describes a burning pain that started across her upper back and has since wrapped around to her chest   Pain is now present on the left side of the chest, described as sharp and burning  Denies that the pain radiates straight through to her chest from her back  Pain started while at rest   Not worse with eating, deep breathing, or exertion  Reports nausea without vomiting  No diarrhea, constipation, blood in her stool, or black tarry stools  No dysuria or frequency  No personal cardiac history or history of thromboembolism  Patient does have a history of GERD and takes Prilosec as well as Protonix daily  No recent cough, fevers, chills, or upper respiratory symptoms  Prior to Admission Medications   Prescriptions Last Dose Informant Patient Reported?  Taking?   acetaminophen (TYLENOL) 500 mg tablet   No No   Sig: Take 2 tablets (1,000 mg total) by mouth every 6 (six) hours as needed for mild pain for up to 15 doses   chlorhexidine (PERIDEX) 0 12 % solution   Yes No   Sig: APPLY 15 ML TO THE MOUTH OR THROAT TWICE A DAY FOR 7 DAYS   Patient not taking: Reported on 2/23/2023   chlorhexidine (PERIDEX) 0 12 % solution   No No   Sig: Apply 15 mL to the mouth or throat 2 (two) times a day   famotidine (PEPCID) 40 MG tablet   No No   Sig: Take 1 tablet (40 mg total) by mouth daily before dinner   ibuprofen (MOTRIN) 800 mg tablet   No No   Sig: Take 1 tablet (800 mg total) by mouth every 6 (six) hours as needed for mild pain for up to 30 doses   metoclopramide (Reglan) 10 mg tablet   No No   Sig: Take 1 tablet (10 mg total) by mouth 4 (four) times a day   omeprazole (PriLOSEC) 40 MG capsule   No No   Sig: Take 1 capsule (40 mg total) by mouth daily before breakfast   ondansetron (Zofran ODT) 4 mg disintegrating tablet   No No   Sig: Take 1 tablet (4 mg total) by mouth every 6 (six) hours as needed for nausea or vomiting for up to 7 days   phenazopyridine (PYRIDIUM) 200 mg tablet   No No   Sig: Take 1 tablet (200 mg total) by mouth 3 (three) times a day      Facility-Administered Medications: None       Past Medical History:   Diagnosis Date   • Anxiety    • Asthma    • Depression    • GERD (gastroesophageal reflux disease)    • Hernia, abdominal    • Migraines    • Muscle spasm    • Psychiatric disorder     Anx, Depression   • Renal disorder     kidney stones       Past Surgical History:   Procedure Laterality Date   • CYSTOSCOPY      removal of kidney stone   • [place] RETROGRADE PYELOGRAM  8/28/2018   • HERNIA REPAIR     • [place] CYSTO/URETERO W/LITHOTRIPSY &INDWELL STENT INSRT Right 8/28/2018    Procedure: CYSTOSCOPY URETEROSCOPY WITH RETROGRADE PYELOGRAM AND INSERTION STENT URETERAL;  Surgeon: Christina Keith MD;  Location: [facility];  Service: Urology   • TOOTH EXTRACTION         Family History   Problem Relation Age of Onset   • Diabetes Mother    • Hyperlipidemia Mother    • Stroke Mother    • Heart disease Mother    • Asthma Mother    • Other Mother         [place] Parrot  of Intervertabral disc  • Nephrolithiasis Father    • Nephrolithiasis Brother      I have reviewed and agree with the history as documented      E-Cigarette/Vaping   • E-Cigarette Use Never User      E-Cigarette/Vaping Substances   • Nicotine No    • THC No    • CBD No    • Flavoring No    • Other No    • Unknown No      Social History     Tobacco Use   • Smoking status: Every Day     Packs/day: 0 50     Years: 13 00     Pack years: 6 50     Types: Cigarettes   • Smokeless tobacco: Never   Vaping Use   • Vaping Use: Never used   Substance Use Topics   • Alcohol use: Not Currently     Comment: Alcohol intake:   None  - As per Norm Hartman    • Drug use: No     Comment: Illicit drugs:   none  - As per Afsaneh        Review of Systems   Constitutional: Negative for chills and fever  HENT: Negative for congestion  Eyes: Negative for visual disturbance  Respiratory: Negative for cough and shortness of breath  Cardiovascular: Positive for chest pain  Negative for leg swelling  Gastrointestinal: Positive for nausea  Negative for abdominal pain, diarrhea and vomiting  Genitourinary: Negative for dysuria and frequency  Musculoskeletal: Negative for arthralgias, back pain, neck pain and neck stiffness  Skin: Negative for rash  Neurological: Negative for weakness, numbness and headaches  Psychiatric/Behavioral: Negative for agitation, behavioral problems and confusion  All other systems reviewed and are negative  Physical Exam  Physical Exam  Constitutional:       General: She is not in acute distress  Appearance: She is well-developed  She is not diaphoretic  HENT:      Head: Normocephalic and atraumatic  Right Ear: External ear normal       Left Ear: External ear normal       Nose: Nose normal    Eyes:      Conjunctiva/sclera: Conjunctivae normal    Cardiovascular:      Rate and Rhythm: Normal rate and regular rhythm  Pulses: Normal pulses  Heart sounds: Normal heart sounds  No murmur heard  No friction rub  No gallop  Pulmonary:      Effort: Pulmonary effort is normal  No respiratory distress  Breath sounds: Normal breath sounds  No wheezing or rales  Abdominal:      General: Bowel sounds are normal  There is no distension  Palpations: Abdomen is soft  Tenderness: There is no abdominal tenderness  There is no guarding  Musculoskeletal:         General: No deformity  Normal range of motion  Cervical back: Normal range of motion and neck supple  Comments: No calf swelling or tenderness   Skin:     General: Skin is warm and dry     Neurological:      Mental Status: She is alert and oriented to person, place, and time  Motor: No abnormal muscle tone     Psychiatric:         Mood and Affect: Mood normal          Vital Signs  ED Triage Vitals [05/05/23 1453]   Temperature Pulse Respirations Blood Pressure SpO2   (!) 97 4 °F (36 3 °C) 83 18 117/66 98 %      Temp Source Heart Rate Source Patient Position - Orthostatic VS BP Location FiO2 (%)   Oral Monitor -- Right arm --      Pain Score       9           Vitals:    05/05/23 1453 05/05/23 1600   BP: 117/66 101/57   Pulse: 83 64         Visual Acuity      ED Medications  Medications   ondansetron (ZOFRAN) injection 4 mg (4 mg Intravenous Given 5/5/23 6924)       Diagnostic Studies  Results Reviewed     Procedure Component Value Units Date/Time    HS Troponin 0hr (reflex protocol) [[id number]]  (Normal) Collected: 05/05/23 1526    Lab Status: Final result Specimen: Blood from Arm, Right Updated: 05/05/23 1559     hs TnI 0hr <2 ng/L     Comprehensive metabolic panel [[id number]]  (Abnormal) Collected: 05/05/23 1526    Lab Status: Final result Specimen: Blood from Arm, Right Updated: 05/05/23 1551     Sodium 137 mmol/L      Potassium 3 8 mmol/L      Chloride 104 mmol/L      CO2 28 mmol/L      ANION GAP 5 mmol/L      BUN 8 mg/dL      Creatinine 0 66 mg/dL      Glucose 95 mg/dL      Calcium 9 0 mg/dL      AST 11 U/L      ALT 9 U/L      Alkaline Phosphatase 42 U/L      Total Protein 6 1 g/dL      Albumin 4 0 g/dL      Total Bilirubin 0 32 mg/dL      eGFR 117 ml/min/1 73sq m     Narrative:      [place] guidelines for Chronic Kidney Disease (CKD):   •  Stage 1 with normal or high GFR (GFR > 90 mL/min/1 73 square meters)  •  Stage 2 Mild CKD (GFR = 60-89 mL/min/1 73 square meters)  •  Stage 3A Moderate CKD (GFR = 45-59 mL/min/1 73 square meters)  •  Stage 3B Moderate CKD (GFR = 30-44 mL/min/1 73 square meters)  •  Stage 4 Severe CKD (GFR = 15-29 mL/min/1 73 square meters)  •  Stage 5 End Stage CKD (GFR <15 "mL/min/1 73 square meters)  Note: GFR calculation is accurate only with a steady state creatinine    CBC and Platelet [[id number]]  (Normal) Collected: 05/05/23 1526    Lab Status: Final result Specimen: Blood from Arm, Right Updated: 05/05/23 1539     WBC 4 76 Thousand/uL      RBC 4 44 Million/uL      Hemoglobin 13 3 g/dL      Hematocrit 40 8 %      MCV 92 fL      MCH 30 0 pg      MCHC 32 6 g/dL      RDW 12 7 %      Platelets 878 Thousands/uL      MPV 10 0 fL                  XR chest 2 views   Final Result by Jagdish Quinteros MD (05/05 1547)      No acute cardiopulmonary disease  Workstation performed: [id number]                    Procedures  Procedures         ED Course  ED Course as of 05/05/23 2057   Fri May 05, 2023   1524 Generally well-appearing  Nontoxic  Afebrile and hemodynamically stable  I personally interpreted the patient's EKG which reveals normal rate, normal sinus rhythm, normal axis, normal intervals, no ischemic changes  Pain started less than 3 hours ago so discussed patient need for delta troponins to rule out underlying cardiac etiology  Patient is PERC negative and low risk by Wells criteria, doubt PE  Patient does have a history of severe GERD so discussed with patient possibility of stomach ulcer or gastritis as the cause of her symptoms  Patient was offered GI cocktail but declines, stating that \"I know that its not my stomach, I just want you to make sure that its not my heart  \"  I had an extensive conversation with her at bedside regarding the fact stomach ulcer can cause symptoms similar to what she is having with patient continues to decline further treatment or work-up for possible gastric etiology  1527 Patient offered Toradol for analgesia, declines stating that it causes itching  Also declines Tylenol  1603 Patient denies possibility of pregnancy  Initial troponin is undetectable, currently awaiting delta  CBC and CMP unremarkable     1604 Chest " x-ray with no cardiopulmonary abnormalities  Mediastinum appears normal and description of symptoms and patient's exam are not consistent with aortic dissection  [phone] Patient is requesting to leave the emergency department prior to her second troponin  Discussed with her that as her pain has been present for less than 3 hours I am not able to definitively rule out ACS without a second troponin  Patient expressed understanding but despite my best attempts I am not able to convince her to stay  She has capacity to make her own decisions and understands to return to the emergency department immediately for worsening symptoms               HEART Risk Score    Flowsheet Row Most Recent Value   Heart Score Risk Calculator    History 0 Filed at: 05/05/2023 2055   ECG 0 Filed at: 05/05/2023 2055   Age 0 Filed at: 05/05/2023 2055   Risk Factors 0 Filed at: 05/05/2023 2055   Troponin 0 Filed at: 05/05/2023 2055   HEART Score 0 Filed at: 05/05/2023 2055                PERC Rule for PE    Flowsheet Row Most Recent Value   PERC Rule for PE    Age >=50 0 Filed at: 05/05/2023 1526   HR >=100 0 Filed at: 05/05/2023 1526   O2 Sat on room air < 95% 0 Filed at: 05/05/2023 1526   History of PE or DVT 0 Filed at: 05/05/2023 1526   Recent trauma or surgery 0 Filed at: 05/05/2023 1526   Hemoptysis 0 Filed at: 05/05/2023 1526   Exogenous estrogen 0 Filed at: 05/05/2023 1526   Unilateral leg swelling 0 Filed at: 05/05/2023 1526   PERC Rule for PE Results 0 Filed at: 05/05/2023 1526                  Wells' Criteria for PE    Flowsheet Row Most Recent Value   Wells' Criteria for PE    Clinical signs and symptoms of DVT 0 Filed at: 05/05/2023 1526   PE is primary diagnosis or equally likely 0 Filed at: 05/05/2023 1526   HR >100 0 Filed at: 05/05/2023 1526   Immobilization at least 3 days or Surgery in the previous 4 weeks 0 Filed at: 05/05/2023 1526   Previous, objectively diagnosed PE or DVT 0 Filed at: 05/05/2023 1526   Hemoptysis 0 Filed at: 05/05/2023 1526   Malignancy with treatment within 6 months or palliative 0 Filed at: 05/05/2023 1526   Wells' Criteria Total 0 Filed at: 05/05/2023 1526                Medical Decision Making  Please see ED course above for details of the Medical Decision Making  Chest pain: acute illness or injury  Amount and/or Complexity of Data Reviewed  Labs: ordered  Decision-making details documented in ED Course  Radiology: ordered and independent interpretation performed  Decision-making details documented in ED Course  ECG/medicine tests: ordered and independent interpretation performed  Decision-making details documented in ED Course  Risk  Prescription drug management  Disposition  Final diagnoses:   Chest pain     Time reflects when diagnosis was documented in both MDM as applicable and the Disposition within this note     Time User Action Codes Description Comment    5/5/2023  4:15 PM Carmine Hands Add [R07 89] Atypical chest pain     5/5/2023  4:16 PM Carmine Hands Remove [R07 89] Atypical chest pain     5/5/2023  4:16 PM Carmine Hands Add [R07 9] Chest pain       ED Disposition     ED Disposition   AMA    Condition   --    Date/Time   Fri May 5, 2023  4:17 PM    Comment   Date: 5/5/2023  Patient: Joan Agrawal  Admitted: 5/5/2023  2:49 PM  Attending Provider: Bekah Nunez MD    Joan Agrawal or her authorized caregiver has made the decision for the patient to leave the emergency department against the advic e of the emergency department staff  She or her authorized caregiver has been informed and understands the inherent risks, including death  She or her authorized caregiver has decided to accept the responsibility for this decision  Joan Agrawal  and all necessary parties have been advised that she may return for further evaluation or treatment  Her condition at time of discharge was good    Joan Agrawal had current vital signs as follows:  /57 Pulse 64   Temp (!) 97 4 °F (36 3 ° C) (Oral)   Resp 18   Wt 55 9 kg (123 lb 3 8 oz)   LMP 04/18/2023 (Exact Date)            Follow-up Information     Follow up With Specialties Details Why Contact Info Additional Information    [facility]  As we discussed, return to the Emergency Department at any time should you change your mind and want to continue your evaluation for chest pain   [address] [facility] [address] [facility], [address], [place], [place], [address]          Discharge Medication List as of 5/5/2023  4:17 PM      CONTINUE these medications which have NOT CHANGED    Details   acetaminophen (TYLENOL) 500 mg tablet Take 2 tablets (1,000 mg total) by mouth every 6 (six) hours as needed for mild pain for up to 15 doses, Starting Tue 7/19/2022, Normal      !! chlorhexidine (PERIDEX) 0 12 % solution APPLY 15 ML TO THE MOUTH OR THROAT TWICE A DAY FOR 7 DAYS, Historical Med      !! chlorhexidine (PERIDEX) 0 12 % solution Apply 15 mL to the mouth or throat 2 (two) times a day, Starting Fri 4/21/2023, Normal      famotidine (PEPCID) 40 MG tablet Take 1 tablet (40 mg total) by mouth daily before dinner, Starting Thu 2/23/2023, Normal      ibuprofen (MOTRIN) 800 mg tablet Take 1 tablet (800 mg total) by mouth every 6 (six) hours as needed for mild pain for up to 30 doses, Starting Tue 7/19/2022, Normal      metoclopramide (Reglan) 10 mg tablet Take 1 tablet (10 mg total) by mouth 4 (four) times a day, Starting Tue 4/25/2023, Normal      omeprazole (PriLOSEC) 40 MG capsule Take 1 capsule (40 mg total) by mouth daily before breakfast, Starting Thu 2/23/2023, Normal      ondansetron (Zofran ODT) 4 mg disintegrating tablet Take 1 tablet (4 mg total) by mouth every 6 (six) hours as needed for nausea or vomiting for up to 7 days, Starting Sat 4/8/2023, Until Sat 4/15/2023 at 2359, Normal      phenazopyridine (PYRIDIUM) 200 mg tablet Take 1 tablet (200 mg total) by mouth 3 (three) times a day, Starting Sat 2/4/2023, Normal       !! - Potential duplicate medications found  Please discuss with provider  No discharge procedures on file      PDMP Review       Value Time User    PDMP Reviewed  Yes 3/22/2022  5:11 PM Daren Blue MD          ED Provider  Electronically Signed by           Teddy Angela MD  05/05/23 7297

## 2023-05-06 LAB
ATRIAL RATE: 63 BPM
P AXIS: 78 DEGREES
PR INTERVAL: 124 MS
QRS AXIS: 85 DEGREES
QRSD INTERVAL: 90 MS
QT INTERVAL: 392 MS
QTC INTERVAL: 401 MS
T WAVE AXIS: 69 DEGREES
VENTRICULAR RATE: 63 BPM

## 2023-05-07 ENCOUNTER — NURSE TRIAGE (OUTPATIENT)
Dept: OTHER | Facility: OTHER | Age: 33
End: 2023-05-07

## 2023-05-08 DIAGNOSIS — K02.9 DENTAL CARIES: ICD-10-CM

## 2023-05-08 DIAGNOSIS — K21.9 GASTROESOPHAGEAL REFLUX DISEASE WITHOUT ESOPHAGITIS: ICD-10-CM

## 2023-05-08 DIAGNOSIS — R11.0 NAUSEA: ICD-10-CM

## 2023-05-08 RX ORDER — METOCLOPRAMIDE 10 MG/1
10 TABLET ORAL 4 TIMES DAILY
Qty: 40 TABLET | Refills: 0 | Status: SHIPPED | OUTPATIENT
Start: 2023-05-08

## 2023-05-08 NOTE — TELEPHONE ENCOUNTER
"Regarding: refill question  ----- Message from Austin Carlin sent at 5/7/2023  7:57 PM EDT -----  Pt called, \" I was prescribed reglan for my nausea nd my mom accidentally packed it  It is okay for me to ask for another script or refill? \"    "

## 2023-05-08 NOTE — TELEPHONE ENCOUNTER
"  Reason for Disposition  • [1] Caller has NON-URGENT medicine question about med that PCP prescribed AND [2] triager unable to answer question    Answer Assessment - Initial Assessment Questions  1  NAME of MEDICATION: \"What medicine are you calling about? \"      Reglan    2  QUESTION: Maeveleigh Ross is your question? \" (e g , medication refill, side effect)      The process of moving and someone already packed her Reglan that was in the medicine cabinet and took it to the new house  She went there to try to find it and she cannot find it anywhere  She is wondering if some tablets can be sent in the morning for her  3  PRESCRIBING HCP: \"Who prescribed it? \" Reason: if prescribed by specialist, call should be referred to that group  PCP    Patient's pharmacy is closed at this hour and she is not able to get to a 24-hour pharmacy in [facility]  Informed patient that I will send an urgent message to the office to see if he can send some Reglan in for her in the morning  Patient was appreciative      Protocols used: MEDICATION QUESTION CALL-ADULT-    "

## 2023-05-18 ENCOUNTER — HOSPITAL ENCOUNTER (EMERGENCY)
Facility: HOSPITAL | Age: 33
Discharge: HOME | End: 2023-05-18
Attending: INTERNAL MEDICINE

## 2023-05-18 VITALS
TEMPERATURE: 98 F | RESPIRATION RATE: 18 BRPM | OXYGEN SATURATION: 99 % | DIASTOLIC BLOOD PRESSURE: 64 MMHG | HEART RATE: 108 BPM | SYSTOLIC BLOOD PRESSURE: 117 MMHG

## 2023-05-18 DIAGNOSIS — K04.7 DENTAL INFECTION: ICD-10-CM

## 2023-05-18 DIAGNOSIS — S02.5XXA CLOSED FRACTURE OF TOOTH, INITIAL ENCOUNTER: Primary | ICD-10-CM

## 2023-05-18 RX ORDER — OXYCODONE HYDROCHLORIDE AND ACETAMINOPHEN 5; 325 MG/1; MG/1
1 TABLET ORAL ONCE
Status: COMPLETED | OUTPATIENT
Start: 2023-05-18 | End: 2023-05-18

## 2023-05-18 RX ORDER — OXYCODONE HYDROCHLORIDE AND ACETAMINOPHEN 5; 325 MG/1; MG/1
1 TABLET ORAL EVERY 4 HOURS PRN
Qty: 10 TABLET | Refills: 0 | Status: SHIPPED | OUTPATIENT
Start: 2023-05-18 | End: 2023-05-28

## 2023-05-18 RX ORDER — AMOXICILLIN 250 MG/1
500 CAPSULE ORAL ONCE
Status: COMPLETED | OUTPATIENT
Start: 2023-05-18 | End: 2023-05-18

## 2023-05-18 RX ORDER — AMOXICILLIN 500 MG/1
500 CAPSULE ORAL 3 TIMES DAILY
Qty: 21 CAPSULE | Refills: 0 | Status: SHIPPED | OUTPATIENT
Start: 2023-05-18 | End: 2023-05-25

## 2023-05-18 RX ORDER — ONDANSETRON 4 MG/1
4 TABLET, ORALLY DISINTEGRATING ORAL ONCE
Status: COMPLETED | OUTPATIENT
Start: 2023-05-18 | End: 2023-05-18

## 2023-05-18 RX ORDER — ONDANSETRON 4 MG/1
4 TABLET, ORALLY DISINTEGRATING ORAL EVERY 6 HOURS PRN
Qty: 20 TABLET | Refills: 0 | Status: SHIPPED | OUTPATIENT
Start: 2023-05-18 | End: 2023-05-25 | Stop reason: SDUPTHER

## 2023-05-18 RX ADMIN — ONDANSETRON 4 MG: 4 TABLET, ORALLY DISINTEGRATING ORAL at 18:03

## 2023-05-18 RX ADMIN — AMOXICILLIN 500 MG: 250 CAPSULE ORAL at 18:03

## 2023-05-18 RX ADMIN — OXYCODONE HYDROCHLORIDE AND ACETAMINOPHEN 1 TABLET: 5; 325 TABLET ORAL at 18:03

## 2023-05-18 NOTE — DISCHARGE INSTRUCTIONS
TAKE medications as prescribed  Follow up with your DENITIST   Gargle with warm salty water multiple times daily

## 2023-05-18 NOTE — ED PROVIDER NOTES
History  Chief Complaint   Patient presents with   • Dental Injury     Pt presents w/ left upper broken tooth while eating pork chops  This is a 28years old came for having broken tooth as she was eating pork chops  Patient has swelling of the left side of the face  Patient has severe dental caries and she is followed by dentist   Patient supposed to do extensive dental extraction of the upper teeth  Patient to keep taking ibuprofen 800 mg at home and she still have severe pain  Patient denies fever  Patient has nausea possible from severe pain  Patient stated that she cannot sleep at nighttime because of the pain  Prior to Admission Medications   Prescriptions Last Dose Informant Patient Reported?  Taking?   acetaminophen (TYLENOL) 500 mg tablet   No No   Sig: Take 2 tablets (1,000 mg total) by mouth every 6 (six) hours as needed for mild pain for up to 15 doses   chlorhexidine (PERIDEX) 0 12 % solution   Yes No   Sig: APPLY 15 ML TO THE MOUTH OR THROAT TWICE A DAY FOR 7 DAYS   Patient not taking: Reported on 2/23/2023   chlorhexidine (PERIDEX) 0 12 % solution   No No   Sig: Apply 15 mL to the mouth or throat 2 (two) times a day   famotidine (PEPCID) 40 MG tablet   No No   Sig: Take 1 tablet (40 mg total) by mouth daily before dinner   ibuprofen (MOTRIN) 800 mg tablet   No No   Sig: Take 1 tablet (800 mg total) by mouth every 6 (six) hours as needed for mild pain for up to 30 doses   metoclopramide (Reglan) 10 mg tablet   No No   Sig: Take 1 tablet (10 mg total) by mouth 4 (four) times a day   omeprazole (PriLOSEC) 40 MG capsule   No No   Sig: Take 1 capsule (40 mg total) by mouth daily before breakfast   ondansetron (Zofran ODT) 4 mg disintegrating tablet   No No   Sig: Take 1 tablet (4 mg total) by mouth every 6 (six) hours as needed for nausea or vomiting for up to 7 days   phenazopyridine (PYRIDIUM) 200 mg tablet   No No   Sig: Take 1 tablet (200 mg total) by mouth 3 (three) times a day Facility-Administered Medications: None       Past Medical History:   Diagnosis Date   • Anxiety    • Asthma    • Depression    • GERD (gastroesophageal reflux disease)    • Hernia, abdominal    • Migraines    • Muscle spasm    • Psychiatric disorder     Anx, Depression   • Renal disorder     kidney stones       Past Surgical History:   Procedure Laterality Date   • CYSTOSCOPY      removal of kidney stone   • FL RETROGRADE PYELOGRAM  8/28/2018   • HERNIA REPAIR     • DC CYSTO/URETERO W/LITHOTRIPSY &INDWELL STENT INSRT Right 8/28/2018    Procedure: CYSTOSCOPY URETEROSCOPY WITH RETROGRADE PYELOGRAM AND INSERTION STENT URETERAL;  Surgeon: Elias Mcelroy MD;  Location: [facility];  Service: Urology   • TOOTH EXTRACTION         Family History   Problem Relation Age of Onset   • Diabetes Mother    • Hyperlipidemia Mother    • Stroke Mother    • Heart disease Mother    • Asthma Mother    • Other Mother         Connie Mask  of Intervertabral disc  • Nephrolithiasis Father    • Nephrolithiasis Brother      I have reviewed and agree with the history as documented  E-Cigarette/Vaping   • E-Cigarette Use Never User      E-Cigarette/Vaping Substances   • Nicotine No    • THC No    • CBD No    • Flavoring No    • Other No    • Unknown No      Social History     Tobacco Use   • Smoking status: Every Day     Packs/day: 0 50     Years: 13 00     Pack years: 6 50     Types: Cigarettes   • Smokeless tobacco: Never   Vaping Use   • Vaping Use: Never used   Substance Use Topics   • Alcohol use: Not Currently     Comment: Alcohol intake:   None  - As per Jenny Zamora    • Drug use: No     Comment: Illicit drugs:   none  - As per Afsaneh        Review of Systems   Constitutional: Negative for fatigue and fever  HENT: Positive for dental problem and facial swelling  Negative for congestion, ear discharge, ear pain, sinus pressure, sinus pain, sneezing, sore throat, tinnitus and trouble swallowing      Respiratory: Negative for apnea, cough, shortness of breath and wheezing  Cardiovascular: Negative for chest pain and palpitations  Gastrointestinal: Negative for abdominal pain, diarrhea, nausea and vomiting  Endocrine: Negative for polydipsia, polyphagia and polyuria  Genitourinary: Negative for difficulty urinating, dysuria, flank pain and frequency  Musculoskeletal: Negative for back pain, myalgias, neck pain and neck stiffness  Skin: Negative for color change, pallor and rash  Neurological: Negative for dizziness, light-headedness and headaches  Psychiatric/Behavioral: Negative for agitation and behavioral problems  Physical Exam  Physical Exam  Vitals and nursing note reviewed  Constitutional:       General: She is not in acute distress  Appearance: She is well-developed  She is not ill-appearing, toxic-appearing or diaphoretic  HENT:      Head: Normocephalic and atraumatic  Right Ear: Ear canal normal       Left Ear: Ear canal normal       Mouth/Throat:      Mouth: Mucous membranes are moist       Dentition: Abnormal dentition  Dental tenderness, gingival swelling and dental caries present  Pharynx: Oropharynx is clear  Uvula midline  No oropharyngeal exudate, posterior oropharyngeal erythema or uvula swelling  Tonsils: No tonsillar exudate or tonsillar abscesses  0 on the right  0 on the left  Comments: Tooth #10, it is broken patient brought the half of the dose with half but there is extensive dental caries and the tooth is black irregular  Has swollen ggum  Cardiovascular:      Rate and Rhythm: Normal rate and regular rhythm  Heart sounds: Normal heart sounds  No murmur heard  No friction rub  No gallop  Pulmonary:      Effort: Pulmonary effort is normal  No respiratory distress  Breath sounds: Normal breath sounds  No wheezing  Abdominal:      General: Bowel sounds are normal       Palpations: Abdomen is soft     Musculoskeletal:         General: No tenderness or deformity  Normal range of motion  Cervical back: Normal range of motion and neck supple  Skin:     General: Skin is warm and dry  Neurological:      Mental Status: She is alert and oriented to person, place, and time  Psychiatric:         Behavior: Behavior normal          Vital Signs  ED Triage Vitals   Temperature Pulse Respirations Blood Pressure SpO2   05/18/23 1737 05/18/23 1737 05/18/23 1737 05/18/23 1737 05/18/23 1737   98 °F (36 7 °C) (!) 108 18 117/64 99 %      Temp Source Heart Rate Source Patient Position - Orthostatic VS BP Location FiO2 (%)   05/18/23 1737 05/18/23 1737 05/18/23 1737 05/18/23 1737 --   Oral Monitor Sitting Left arm       Pain Score       05/18/23 1803       7           Vitals:    05/18/23 1737   BP: 117/64   Pulse: (!) 108   Patient Position - Orthostatic VS: Sitting         Visual Acuity      ED Medications  Medications   amoxicillin (AMOXIL) capsule 500 mg (500 mg Oral Given 5/18/23 1803)   ondansetron (ZOFRAN-ODT) dispersible tablet 4 mg (4 mg Oral Given 5/18/23 1803)   oxyCODONE-acetaminophen (PERCOCET) 5-325 mg per tablet 1 tablet (1 tablet Oral Given 5/18/23 1803)       Diagnostic Studies  Results Reviewed     None                 No orders to display              Procedures  Procedures         ED Course                               SBIRT 20yo+    Flowsheet Row Most Recent Value   Initial Alcohol Screen: US AUDIT-C     1  How often do you have a drink containing alcohol? 0 Filed at: 05/18/2023 1737   2  How many drinks containing alcohol do you have on a typical day you are drinking? 0 Filed at: 05/18/2023 1737   3b  FEMALE Any Age, or MALE 65+: How often do you have 4 or more drinks on one occassion? 0 Filed at: 05/18/2023 1737   Audit-C Score 0 Filed at: 05/18/2023 1737   CRISTINO: How many times in the past year have you    Used an illegal drug or used a prescription medication for non-medical reasons?  Never Filed at: 05/18/2023 1737                    Medical Decision Making  This is a 28years old came for having severe dental pain and she broke her tooth #10, has gingivitis swelling and tenderness  Patient has swollen left side of the face  Patient need extensive work-up and she need dental extractions of upper teeth  Patient is followed by dentist   We will give the patient amoxicillin and Percocet as she has severe pain  Patient states that she take ibuprofen 800 mg every 6 hours  We will discharge patient home and follow-up with her dentist for teeth extractions  Risk  Prescription drug management  Disposition  Final diagnoses:   Closed fracture of tooth, initial encounter   Dental infection     Time reflects when diagnosis was documented in both MDM as applicable and the Disposition within this note     Time User Action Codes Description Comment    5/18/2023  6:02 PM Bayron Mccauley Add [S02  5XXA] Closed fracture of tooth, initial encounter     5/18/2023  6:02 PM Pipo Escobedo Add [K04 7] Dental infection       ED Disposition     ED Disposition   Discharge    Condition   Stable    Date/Time   u May 18, 2023  6:08 PM    Comment   Zulema Mccloud discharge to home/self care                 Follow-up Information     Follow up With Specialties Details Why Contact Info      In 3 days  follow up with your dentist ASAP          Discharge Medication List as of 5/18/2023  6:08 PM      START taking these medications    Details   amoxicillin (AMOXIL) 500 mg capsule Take 1 capsule (500 mg total) by mouth 3 (three) times a day for 7 days, Starting Thu 5/18/2023, Until Thu 5/25/2023, Normal      oxyCODONE-acetaminophen (Percocet) 5-325 mg per tablet Take 1 tablet by mouth every 4 (four) hours as needed for moderate pain for up to 10 days Max Daily Amount: 6 tablets, Starting Thu 5/18/2023, Until [place] 5/28/2023 at 2359, Normal         CONTINUE these medications which have CHANGED    Details   ondansetron (ZOFRAN-ODT) 4 mg disintegrating tablet Take 1 tablet (4 mg total) by mouth every 6 (six) hours as needed for nausea or vomiting, Starting Thu 5/18/2023, Normal         CONTINUE these medications which have NOT CHANGED    Details   acetaminophen (TYLENOL) 500 mg tablet Take 2 tablets (1,000 mg total) by mouth every 6 (six) hours as needed for mild pain for up to 15 doses, Starting Tue 7/19/2022, Normal      !! chlorhexidine (PERIDEX) 0 12 % solution APPLY 15 ML TO THE MOUTH OR THROAT TWICE A DAY FOR 7 DAYS, Historical Med      !! chlorhexidine (PERIDEX) 0 12 % solution Apply 15 mL to the mouth or throat 2 (two) times a day, Starting Fri 4/21/2023, Normal      famotidine (PEPCID) 40 MG tablet Take 1 tablet (40 mg total) by mouth daily before dinner, Starting Thu 2/23/2023, Normal      ibuprofen (MOTRIN) 800 mg tablet Take 1 tablet (800 mg total) by mouth every 6 (six) hours as needed for mild pain for up to 30 doses, Starting Tue 7/19/2022, Normal      metoclopramide (Reglan) 10 mg tablet Take 1 tablet (10 mg total) by mouth 4 (four) times a day, Starting Mon 5/8/2023, Normal      omeprazole (PriLOSEC) 40 MG capsule Take 1 capsule (40 mg total) by mouth daily before breakfast, Starting Thu 2/23/2023, Normal      phenazopyridine (PYRIDIUM) 200 mg tablet Take 1 tablet (200 mg total) by mouth 3 (three) times a day, Starting Sat 2/4/2023, Normal       !! - Potential duplicate medications found  Please discuss with provider  No discharge procedures on file      PDMP Review       Value Time User    PDMP Reviewed  Yes 3/22/2022  5:11 PM Stefanie Dixon MD          ED Provider  Electronically Signed by           Salvador Velazquez MD  05/19/23 2774

## 2023-05-25 DIAGNOSIS — K04.7 DENTAL INFECTION: ICD-10-CM

## 2023-05-25 RX ORDER — ONDANSETRON 4 MG/1
4 TABLET, ORALLY DISINTEGRATING ORAL EVERY 6 HOURS PRN
Qty: 20 TABLET | Refills: 0 | Status: SHIPPED | OUTPATIENT
Start: 2023-05-25

## 2023-05-25 NOTE — TELEPHONE ENCOUNTER
Hi, it's Kaylene Patino  I'm calling to see if I can get my ondanserton refilled  My birthday is 1990  My number should be on file  Thank you  It looks like she was given this at the ER not sure if you want to refill it?

## 2023-06-09 DIAGNOSIS — K04.7 DENTAL INFECTION: ICD-10-CM

## 2023-06-09 RX ORDER — ONDANSETRON 4 MG/1
4 TABLET, FILM COATED ORAL EVERY 8 HOURS PRN
Qty: 20 TABLET | Refills: 0 | Status: SHIPPED | OUTPATIENT
Start: 2023-06-09

## 2023-06-09 RX ORDER — ONDANSETRON 4 MG/1
4 TABLET, ORALLY DISINTEGRATING ORAL EVERY 6 HOURS PRN
Qty: 20 TABLET | Refills: 0 | Status: CANCELLED | OUTPATIENT
Start: 2023-06-09

## 2023-06-09 NOTE — TELEPHONE ENCOUNTER
"Patient does not want the dissolving ones \"they are not working\"     She would like to [facility] [employer]

## 2023-06-22 DIAGNOSIS — K02.9 DENTAL CARIES: ICD-10-CM

## 2023-06-22 DIAGNOSIS — K21.9 GASTROESOPHAGEAL REFLUX DISEASE WITHOUT ESOPHAGITIS: ICD-10-CM

## 2023-06-22 DIAGNOSIS — R11.0 NAUSEA: ICD-10-CM

## 2023-06-22 RX ORDER — METOCLOPRAMIDE 10 MG/1
10 TABLET ORAL 4 TIMES DAILY
Qty: 40 TABLET | Refills: 0 | Status: SHIPPED | OUTPATIENT
Start: 2023-06-22

## 2023-07-02 DIAGNOSIS — K04.7 DENTAL INFECTION: ICD-10-CM

## 2023-07-02 NOTE — TELEPHONE ENCOUNTER
Medication Refill Request     Name Zofran   Dose/Frequency 4 mg/ 1 tab by mouth every 8 hrs prn  Quantity 20 tab  Verified pharmacy   [x]  Verified ordering Provider   [x]  Does patient have enough for the next 3 days?  Yes [] No [x]

## 2023-07-03 RX ORDER — ONDANSETRON 4 MG/1
4 TABLET, FILM COATED ORAL EVERY 8 HOURS PRN
Qty: 20 TABLET | Refills: 0 | Status: SHIPPED | OUTPATIENT
Start: 2023-07-03

## 2023-07-13 ENCOUNTER — TELEPHONE (OUTPATIENT)
Dept: FAMILY MEDICINE CLINIC | Facility: CLINIC | Age: 33
End: 2023-07-13

## 2023-07-13 NOTE — TELEPHONE ENCOUNTER
My name is Kristofer Grimaldo. My birthday is May 26, 1990. I went to the hospital yesterday for tooth infection. They gave me penicillin. It's making me very sick. I was wondering if I could get the disintegrating magic pills, which is what they gave me yesterday when I took it and I seem to be fine but as of now it is making me very sick. Alright, thank you.  Bye.

## 2023-07-14 DIAGNOSIS — K02.9 DENTAL CARIES: ICD-10-CM

## 2023-07-14 DIAGNOSIS — R11.0 NAUSEA: Primary | ICD-10-CM

## 2023-07-14 DIAGNOSIS — K04.7 DENTAL INFECTION: ICD-10-CM

## 2023-07-14 RX ORDER — ONDANSETRON 4 MG/1
4 TABLET, ORALLY DISINTEGRATING ORAL EVERY 6 HOURS PRN
Qty: 90 TABLET | Refills: 0 | Status: SHIPPED | OUTPATIENT
Start: 2023-07-14

## 2023-07-26 ENCOUNTER — VBI (OUTPATIENT)
Dept: ADMINISTRATIVE | Facility: OTHER | Age: 33
End: 2023-07-26

## 2023-08-13 DIAGNOSIS — K21.9 GASTROESOPHAGEAL REFLUX DISEASE WITHOUT ESOPHAGITIS: ICD-10-CM

## 2023-08-13 DIAGNOSIS — K02.9 DENTAL CARIES: ICD-10-CM

## 2023-08-13 DIAGNOSIS — R11.0 NAUSEA: ICD-10-CM

## 2023-08-14 RX ORDER — METOCLOPRAMIDE 10 MG/1
10 TABLET ORAL 4 TIMES DAILY
Qty: 40 TABLET | Refills: 0 | Status: SHIPPED | OUTPATIENT
Start: 2023-08-14

## 2023-08-30 DIAGNOSIS — K04.7 DENTAL INFECTION: ICD-10-CM

## 2023-08-31 NOTE — TELEPHONE ENCOUNTER
Medication Refill Request     Name Zofran   Dose/Frequency 4 mg tablet  Quantity 20  Verified pharmacy   [x]  Verified ordering Provider   [x]  Does patient have enough for the next 3 days?  Yes [] No [x]

## 2023-09-01 ENCOUNTER — PATIENT OUTREACH (OUTPATIENT)
Dept: FAMILY MEDICINE CLINIC | Facility: CLINIC | Age: 33
End: 2023-09-01

## 2023-09-01 DIAGNOSIS — Z78.9 NEEDS ASSISTANCE WITH COMMUNITY RESOURCES: Primary | ICD-10-CM

## 2023-09-03 RX ORDER — ONDANSETRON 4 MG/1
4 TABLET, FILM COATED ORAL EVERY 8 HOURS PRN
Qty: 20 TABLET | Refills: 0 | Status: SHIPPED | OUTPATIENT
Start: 2023-09-03

## 2023-09-06 ENCOUNTER — PATIENT OUTREACH (OUTPATIENT)
Dept: FAMILY MEDICINE CLINIC | Facility: CLINIC | Age: 33
End: 2023-09-06

## 2023-09-06 NOTE — PROGRESS NOTES
Telephone call to patient, left message on machine with my name, number and request for return call.

## 2023-09-11 ENCOUNTER — PATIENT OUTREACH (OUTPATIENT)
Dept: FAMILY MEDICINE CLINIC | Facility: CLINIC | Age: 33
End: 2023-09-11

## 2023-09-11 NOTE — PROGRESS NOTES
Telephone call to patient, telephone number not accepting "calls at this time". Unable to reach letter mailed to patient. She was seen in ED 9/8/23 for dental pain, was treated with Percocet, and Penicillin. Documentation states pt has a dental appointment 9/21/23.

## 2023-09-11 NOTE — LETTER
Date: 09/11/23    Dear Nixon Perez,   My name is James Nichols; I am a registered nurse care manager working with [facility] [place]. I have not been able to reach you and would like to set a time that I can talk with you over the phone. My work is to help patients that have complex medical conditions get the care they need. This includes patients who may have been in the hospital or emergency room. I have enclosed information for you. Please call me with any questions you may have. I look forward to speaking with you.   Sincerely,  Juan Giles RN  [phone]  Outpatient Care Manager  Copy:  Esthela Vera MD

## 2023-09-22 DIAGNOSIS — K04.7 DENTAL INFECTION: ICD-10-CM

## 2023-09-22 DIAGNOSIS — R11.0 NAUSEA: ICD-10-CM

## 2023-09-22 DIAGNOSIS — K02.9 DENTAL CARIES: ICD-10-CM

## 2023-09-22 RX ORDER — ONDANSETRON 4 MG/1
4 TABLET, ORALLY DISINTEGRATING ORAL EVERY 6 HOURS PRN
Qty: 90 TABLET | Refills: 0 | Status: SHIPPED | OUTPATIENT
Start: 2023-09-22

## 2023-09-22 NOTE — TELEPHONE ENCOUNTER
Reason for call:   [x] Refill   [] Prior Auth  [] Other:     Office:   [x] PCP/Provider - Kristina Rivero  [] Speciality/Provider -     Medication: Ondansetron  Dose/Frequency: 4mg ODT QID PRN    Quantity: 90    Pharmacy: [address]    Does the patient have enough for 3 days?    [] Yes   [x] No - Send as HP to POD

## 2023-09-23 ENCOUNTER — HOSPITAL ENCOUNTER (EMERGENCY)
Facility: HOSPITAL | Age: 33
Discharge: HOME | End: 2023-09-23
Attending: EMERGENCY MEDICINE
Payer: COMMERCIAL

## 2023-09-23 ENCOUNTER — APPOINTMENT (EMERGENCY)
Dept: CT IMAGING | Facility: HOSPITAL | Age: 33
End: 2023-09-23
Payer: COMMERCIAL

## 2023-09-23 VITALS
HEART RATE: 72 BPM | OXYGEN SATURATION: 100 % | TEMPERATURE: 98 F | SYSTOLIC BLOOD PRESSURE: 106 MMHG | DIASTOLIC BLOOD PRESSURE: 69 MMHG | RESPIRATION RATE: 16 BRPM

## 2023-09-23 DIAGNOSIS — K04.7 DENTAL INFECTION: ICD-10-CM

## 2023-09-23 DIAGNOSIS — G89.29 CHRONIC DENTAL PAIN: Primary | ICD-10-CM

## 2023-09-23 DIAGNOSIS — K08.9 CHRONIC DENTAL PAIN: Primary | ICD-10-CM

## 2023-09-23 LAB
ALBUMIN SERPL BCP-MCNC: 4.3 G/DL (ref 3.5–5)
ALP SERPL-CCNC: 47 U/L (ref 34–104)
ALT SERPL W P-5'-P-CCNC: 11 U/L (ref 7–52)
ANION GAP SERPL CALCULATED.3IONS-SCNC: 5 MMOL/L
AST SERPL W P-5'-P-CCNC: 16 U/L (ref 13–39)
BASOPHILS # BLD AUTO: 0.05 THOUSANDS/ÂΜL (ref 0–0.1)
BASOPHILS NFR BLD AUTO: 1 % (ref 0–1)
BILIRUB SERPL-MCNC: 0.29 MG/DL (ref 0.2–1)
BUN SERPL-MCNC: 10 MG/DL (ref 5–25)
CALCIUM SERPL-MCNC: 9.4 MG/DL (ref 8.4–10.2)
CHLORIDE SERPL-SCNC: 106 MMOL/L (ref 96–108)
CO2 SERPL-SCNC: 27 MMOL/L (ref 21–32)
CREAT SERPL-MCNC: 0.79 MG/DL (ref 0.6–1.3)
EOSINOPHIL # BLD AUTO: 0.13 THOUSAND/ÂΜL (ref 0–0.61)
EOSINOPHIL NFR BLD AUTO: 2 % (ref 0–6)
ERYTHROCYTE [DISTWIDTH] IN BLOOD BY AUTOMATED COUNT: 12.6 % (ref 11.6–15.1)
GFR SERPL CREATININE-BSD FRML MDRD: 98 ML/MIN/1.73SQ M
GLUCOSE SERPL-MCNC: 105 MG/DL (ref 65–140)
HCT VFR BLD AUTO: 40 % (ref 34.8–46.1)
HGB BLD-MCNC: 13 G/DL (ref 11.5–15.4)
IMM GRANULOCYTES # BLD AUTO: 0.03 THOUSAND/UL (ref 0–0.2)
IMM GRANULOCYTES NFR BLD AUTO: 0 % (ref 0–2)
LYMPHOCYTES # BLD AUTO: 1.58 THOUSANDS/ÂΜL (ref 0.6–4.47)
LYMPHOCYTES NFR BLD AUTO: 23 % (ref 14–44)
MCH RBC QN AUTO: 29.5 PG (ref 26.8–34.3)
MCHC RBC AUTO-ENTMCNC: 32.5 G/DL (ref 31.4–37.4)
MCV RBC AUTO: 91 FL (ref 82–98)
MONOCYTES # BLD AUTO: 0.46 THOUSAND/ÂΜL (ref 0.17–1.22)
MONOCYTES NFR BLD AUTO: 7 % (ref 4–12)
NEUTROPHILS # BLD AUTO: 4.5 THOUSANDS/ÂΜL (ref 1.85–7.62)
NEUTS SEG NFR BLD AUTO: 67 % (ref 43–75)
NRBC BLD AUTO-RTO: 0 /100 WBCS
PLATELET # BLD AUTO: 258 THOUSANDS/UL (ref 149–390)
PMV BLD AUTO: 10 FL (ref 8.9–12.7)
POTASSIUM SERPL-SCNC: 3.8 MMOL/L (ref 3.5–5.3)
PROT SERPL-MCNC: 6.5 G/DL (ref 6.4–8.4)
RBC # BLD AUTO: 4.4 MILLION/UL (ref 3.81–5.12)
SODIUM SERPL-SCNC: 138 MMOL/L (ref 135–147)
WBC # BLD AUTO: 6.75 THOUSAND/UL (ref 4.31–10.16)

## 2023-09-23 PROCEDURE — 85025 COMPLETE CBC W/AUTO DIFF WBC: CPT

## 2023-09-23 PROCEDURE — 70487 CT MAXILLOFACIAL W/DYE: CPT

## 2023-09-23 PROCEDURE — 96374 THER/PROPH/DIAG INJ IV PUSH: CPT

## 2023-09-23 PROCEDURE — 36415 COLL VENOUS BLD VENIPUNCTURE: CPT

## 2023-09-23 PROCEDURE — 96376 TX/PRO/DX INJ SAME DRUG ADON: CPT

## 2023-09-23 PROCEDURE — 99285 EMERGENCY DEPT VISIT HI MDM: CPT | Performed by: EMERGENCY MEDICINE

## 2023-09-23 PROCEDURE — G1004 CDSM NDSC: HCPCS

## 2023-09-23 PROCEDURE — 80053 COMPREHEN METABOLIC PANEL: CPT

## 2023-09-23 PROCEDURE — 99283 EMERGENCY DEPT VISIT LOW MDM: CPT

## 2023-09-23 RX ORDER — ACETAMINOPHEN 325 MG/1
650 TABLET ORAL EVERY 6 HOURS PRN
Qty: 30 TABLET | Refills: 0 | Status: SHIPPED | OUTPATIENT
Start: 2023-09-23 | End: 2023-10-03

## 2023-09-23 RX ORDER — ONDANSETRON 2 MG/ML
4 INJECTION INTRAMUSCULAR; INTRAVENOUS ONCE
Status: COMPLETED | OUTPATIENT
Start: 2023-09-23 | End: 2023-09-23

## 2023-09-23 RX ORDER — ACETAMINOPHEN 325 MG/1
975 TABLET ORAL ONCE
Status: DISCONTINUED | OUTPATIENT
Start: 2023-09-23 | End: 2023-09-24 | Stop reason: HOSPADM

## 2023-09-23 RX ORDER — OXYCODONE HYDROCHLORIDE 5 MG/1
5 TABLET ORAL ONCE
Status: COMPLETED | OUTPATIENT
Start: 2023-09-23 | End: 2023-09-23

## 2023-09-23 RX ORDER — AMOXICILLIN AND CLAVULANATE POTASSIUM 875; 125 MG/1; MG/1
1 TABLET, FILM COATED ORAL ONCE
Status: DISCONTINUED | OUTPATIENT
Start: 2023-09-23 | End: 2023-09-24 | Stop reason: HOSPADM

## 2023-09-23 RX ORDER — AMOXICILLIN AND CLAVULANATE POTASSIUM 875; 125 MG/1; MG/1
1 TABLET, FILM COATED ORAL EVERY 12 HOURS
Qty: 14 TABLET | Refills: 0 | Status: SHIPPED | OUTPATIENT
Start: 2023-09-23 | End: 2023-09-30

## 2023-09-23 RX ORDER — CHLORHEXIDINE GLUCONATE ORAL RINSE 1.2 MG/ML
15 SOLUTION DENTAL 2 TIMES DAILY
Qty: 120 ML | Refills: 0 | Status: SHIPPED | OUTPATIENT
Start: 2023-09-23

## 2023-09-23 RX ADMIN — ONDANSETRON 4 MG: 2 INJECTION INTRAMUSCULAR; INTRAVENOUS at 22:14

## 2023-09-23 RX ADMIN — ONDANSETRON 4 MG: 2 INJECTION INTRAMUSCULAR; INTRAVENOUS at 20:33

## 2023-09-23 RX ADMIN — IOHEXOL 80 ML: 350 INJECTION, SOLUTION INTRAVENOUS at 21:24

## 2023-09-23 RX ADMIN — OXYCODONE HYDROCHLORIDE 5 MG: 5 TABLET ORAL at 20:35

## 2023-09-24 NOTE — ED PROVIDER NOTES
History  Chief Complaint   Patient presents with   • Dental Pain     Patient presents for pain in two of her right molars. Patient was put on macrobid for UTI which she was told would help with her tooth pain. 63-year-old female with history of chronic dental pain presents today with worsening right-sided upper and lower molar dental pain. Patient states she checked penicillin for suspected dental infection about 1 month ago. Patient states she is recently prescribed Macrobid for a UTI and has 2 days left of this course of antibiotics. States over the past 3 days she has had worsening right-sided dental pain. Reports associated nausea but denies any fevers, chills, vomiting or other systemic symptoms. Patient did have an appointment with her dentist but unfortunately had to cancel. Denies other concerns at this time. Denies any difficulty swallowing, shortness of breath or chest pain. Dental Pain  Associated symptoms: no fever        Prior to Admission Medications   Prescriptions Last Dose Informant Patient Reported?  Taking?   acetaminophen (TYLENOL) 500 mg tablet   No No   Sig: Take 2 tablets (1,000 mg total) by mouth every 6 (six) hours as needed for mild pain for up to 15 doses   chlorhexidine (PERIDEX) 0.12 % solution   Yes No   Sig: APPLY 15 ML TO THE MOUTH OR THROAT TWICE A DAY FOR 7 DAYS   Patient not taking: Reported on 2/23/2023   chlorhexidine (PERIDEX) 0.12 % solution   No No   Sig: Apply 15 mL to the mouth or throat 2 (two) times a day   famotidine (PEPCID) 40 MG tablet   No No   Sig: Take 1 tablet (40 mg total) by mouth daily before dinner   ibuprofen (MOTRIN) 800 mg tablet   No No   Sig: Take 1 tablet (800 mg total) by mouth every 6 (six) hours as needed for mild pain for up to 30 doses   metoclopramide (Reglan) 10 mg tablet   No No   Sig: Take 1 tablet (10 mg total) by mouth 4 (four) times a day   omeprazole (PriLOSEC) 40 MG capsule   No No   Sig: Take 1 capsule (40 mg total) by mouth daily before breakfast   ondansetron (ZOFRAN) 4 mg tablet   No No   Sig: Take 1 tablet (4 mg total) by mouth every 8 (eight) hours as needed for nausea or vomiting   ondansetron (ZOFRAN-ODT) 4 mg disintegrating tablet   No No   Sig: Take 1 tablet (4 mg total) by mouth every 6 (six) hours as needed for nausea or vomiting   phenazopyridine (PYRIDIUM) 200 mg tablet   No No   Sig: Take 1 tablet (200 mg total) by mouth 3 (three) times a day      Facility-Administered Medications: None       Past Medical History:   Diagnosis Date   • Anxiety    • Asthma    • Depression    • GERD (gastroesophageal reflux disease)    • Hernia, abdominal    • Migraines    • Muscle spasm    • Psychiatric disorder     Anx, Depression   • Renal disorder     kidney stones       Past Surgical History:   Procedure Laterality Date   • CYSTOSCOPY      removal of kidney stone   • FL RETROGRADE PYELOGRAM  8/28/2018   • HERNIA REPAIR     • NC CYSTO/URETERO W/LITHOTRIPSY &INDWELL STENT INSRT Right 8/28/2018    Procedure: CYSTOSCOPY URETEROSCOPY WITH RETROGRADE PYELOGRAM AND INSERTION STENT URETERAL;  Surgeon: Sea Calhoun MD;  Location: [facility];  Service: Urology   • TOOTH EXTRACTION         Family History   Problem Relation Age of Onset   • Diabetes Mother    • Hyperlipidemia Mother    • Stroke Mother    • Heart disease Mother    • Asthma Mother    • Other Mother         [place]. of Intervertabral disc. • Nephrolithiasis Father    • Nephrolithiasis Brother      I have reviewed and agree with the history as documented.     E-Cigarette/Vaping   • E-Cigarette Use Never User      E-Cigarette/Vaping Substances   • Nicotine No    • THC No    • CBD No    • Flavoring No    • Other No    • Unknown No      Social History     Tobacco Use   • Smoking status: Every Day     Packs/day: 0.50     Years: 13.00     Total pack years: 6.50     Types: Cigarettes   • Smokeless tobacco: Never   Vaping Use   • Vaping Use: Never used   Substance Use Topics   • Alcohol use: Not Currently     Comment: Alcohol intake:   None  - As per [facility]    • Drug use: No     Comment: Illicit drugs:   none  - As per [place]         Review of Systems   Constitutional: Negative for chills and fever. HENT: Positive for dental problem. Negative for ear pain and sore throat. Eyes: Negative for pain and visual disturbance. Respiratory: Negative for cough and shortness of breath. Cardiovascular: Negative for chest pain and palpitations. Gastrointestinal: Positive for nausea. Negative for abdominal pain and vomiting. Genitourinary: Negative for dysuria and hematuria. Musculoskeletal: Negative for arthralgias and back pain. Skin: Negative for color change and rash. Neurological: Negative for seizures and syncope. All other systems reviewed and are negative. Physical Exam  ED Triage Vitals   Temperature Pulse Respirations Blood Pressure SpO2   09/23/23 1940 09/23/23 1940 09/23/23 1940 09/23/23 1940 09/23/23 1940   98 °F (36.7 °C) 86 18 122/72 99 %      Temp Source Heart Rate Source Patient Position - Orthostatic VS BP Location FiO2 (%)   09/23/23 1940 09/23/23 1940 09/23/23 1940 09/23/23 1940 --   Oral Monitor Sitting Left arm       Pain Score       09/23/23 2035       8             Orthostatic Vital Signs  Vitals:    09/23/23 1940 09/23/23 2152   BP: 122/72 106/69   Pulse: 86 72   Patient Position - Orthostatic VS: Sitting Sitting       Physical Exam  Vitals and nursing note reviewed. Constitutional:       General: She is not in acute distress. Appearance: Normal appearance. She is well-developed. HENT:      Head: Normocephalic and atraumatic. Mouth/Throat:      Dentition: Abnormal dentition. Dental tenderness, gingival swelling and dental caries present. Pharynx: Oropharynx is clear.       Comments: Poor dentition, areas of cracked teeth, generalized gingival swelling and tenderness to upper and lower right-sided molars, no obvious dental abscess, no elevation of the tongue; no obvious peritonsillar abscess; no trismus, no drooling, tolerating secretions  Eyes:      Conjunctiva/sclera: Conjunctivae normal.   Neck:      Comments: Generalized tenderness and swelling to right submandibular region, full range of motion of the neck  Cardiovascular:      Rate and Rhythm: Normal rate and regular rhythm. Heart sounds: No murmur heard. Pulmonary:      Effort: Pulmonary effort is normal. No respiratory distress. Breath sounds: Normal breath sounds. Abdominal:      Palpations: Abdomen is soft. Tenderness: There is no abdominal tenderness. Musculoskeletal:         General: No swelling. Cervical back: Neck supple. Skin:     General: Skin is warm and dry. Capillary Refill: Capillary refill takes less than 2 seconds. Neurological:      Mental Status: She is alert.    Psychiatric:         Mood and Affect: Mood normal.         ED Medications  Medications   acetaminophen (TYLENOL) tablet 975 mg (975 mg Oral Not Given 9/23/23 2321)   amoxicillin-clavulanate (AUGMENTIN) 875-125 mg per tablet 1 tablet (1 tablet Oral Not Given 9/23/23 2322)   ondansetron (ZOFRAN) injection 4 mg (4 mg Intravenous Given 9/23/23 2033)   oxyCODONE (ROXICODONE) IR tablet 5 mg (5 mg Oral Given 9/23/23 2035)   iohexol (OMNIPAQUE) 350 MG/ML injection (MULTI-DOSE) 80 mL (80 mL Intravenous Given 9/23/23 2124)   ondansetron (ZOFRAN) injection 4 mg (4 mg Intravenous Given 9/23/23 2214)       Diagnostic Studies  Results Reviewed     Procedure Component Value Units Date/Time    Comprehensive metabolic panel [[id number]] Collected: 09/23/23 2031    Lab Status: Final result Specimen: Blood from Arm, Right Updated: 09/23/23 2103     Sodium 138 mmol/L      Potassium 3.8 mmol/L      Chloride 106 mmol/L      CO2 27 mmol/L      ANION GAP 5 mmol/L      BUN 10 mg/dL      Creatinine 0.79 mg/dL      Glucose 105 mg/dL      Calcium 9.4 mg/dL      AST 16 U/L      ALT 11 U/L      Alkaline Phosphatase 47 U/L Total Protein 6.5 g/dL      Albumin 4.3 g/dL      Total Bilirubin 0.29 mg/dL      eGFR 98 ml/min/1.73sq m     Narrative:      National Kidney Disease Foundation guidelines for Chronic Kidney Disease (CKD):   •  Stage 1 with normal or high GFR (GFR > 90 mL/min/1.73 square meters)  •  Stage 2 Mild CKD (GFR = 60-89 mL/min/1.73 square meters)  •  Stage 3A Moderate CKD (GFR = 45-59 mL/min/1.73 square meters)  •  Stage 3B Moderate CKD (GFR = 30-44 mL/min/1.73 square meters)  •  Stage 4 Severe CKD (GFR = 15-29 mL/min/1.73 square meters)  •  Stage 5 End Stage CKD (GFR <15 mL/min/1.73 square meters)  Note: GFR calculation is accurate only with a steady state creatinine    CBC and differential [[id number]] Collected: 09/23/23 2031    Lab Status: Final result Specimen: Blood from Arm, Right Updated: 09/23/23 2047     WBC 6.75 Thousand/uL      RBC 4.40 Million/uL      Hemoglobin 13.0 g/dL      Hematocrit 40.0 %      MCV 91 fL      MCH 29.5 pg      MCHC 32.5 g/dL      RDW 12.6 %      MPV 10.0 fL      Platelets 793 Thousands/uL      nRBC 0 /100 WBCs      Neutrophils Relative 67 %      Immat GRANS % 0 %      Lymphocytes Relative 23 %      Monocytes Relative 7 %      Eosinophils Relative 2 %      Basophils Relative 1 %      Neutrophils Absolute 4.50 Thousands/µL      Immature Grans Absolute 0.03 Thousand/uL      Lymphocytes Absolute 1.58 Thousands/µL      Monocytes Absolute 0.46 Thousand/µL      Eosinophils Absolute 0.13 Thousand/µL      Basophils Absolute 0.05 Thousands/µL                  CT facial bones with contrast   Final Result by Antonio Robertson MD (09/23 2556)      No enhancing soft tissue mass or rim-enhancing fluid collection/abscess visualized. Minimal mucoperiosteal thickening in right ethmoid and right inferior maxillary sinuses.       Workstation performed: [id number]               Procedures  Procedures      ED Course                                       Medical Decision Making  60-year-old female with history of chronic dental pain presents today with worsening right-sided upper and lower molar dental pain. DDx including but not limited to: dental rossy, dental infection, dental abscess, gingivitis. No signs of airway compromise. No tongue elevation to suggest dwayne's angina. No palatal elevation or posterior oropharyngeal swelling to suggest peritonsillar abscess. Normal voice, no trismus, no drooling. Full range of motion of the neck. Given right-sided submandibular swelling and tenderness, will obtain CT scan to rule out deep space infection. CT was negative for any abscess or infection. We will continue to treat with Augmentin given patient had a recent course of penicillin. Recommended Peridex rinses to use twice a day. Advised patient that the most important thing for her is to follow-up with the dental clinic. Reviewed return precautions. Stable for discharge home. Recommended Tylenol Motrin as needed for pain control. Amount and/or Complexity of Data Reviewed  Labs: ordered. Radiology: ordered. Risk  OTC drugs. Prescription drug management. Disposition  Final diagnoses:   Chronic dental pain   Dental infection     Time reflects when diagnosis was documented in both MDM as applicable and the Disposition within this note     Time User Action Codes Description Comment    9/23/2023 11:00 PM Jo Ann Dinh Add [K08.9,  G89.29] Chronic dental pain     9/23/2023 11:00 PM Jo Ann Dinh Add [K04.7] Dental infection       ED Disposition     ED Disposition   Discharge    Condition   Stable    Date/Time   Sat Sep 23, 2023 11:00 PM    [address] [place] [address] discharge to home/self care.                Follow-up Information     Follow up With Specialties Details Why Contact Info Additional [address] [facility] Schedule an appointment as soon as possible for a visit   [address] [facility]), [address] [place] [address], [place], [place], [address], [phone]          Discharge Medication List as of 9/23/2023 11:04 PM      START taking these medications    Details   !! acetaminophen (TYLENOL) 325 mg tablet Take 2 tablets (650 mg total) by mouth every 6 (six) hours as needed for mild pain for up to 10 days, Starting Sat 9/23/2023, Until Tue 10/3/2023 at 2359, Normal      amoxicillin-clavulanate (AUGMENTIN) 875-125 mg per tablet Take 1 tablet by mouth every 12 (twelve) hours for 7 days, Starting Sat 9/23/2023, Until Sat 9/30/2023, Normal      !! chlorhexidine (PERIDEX) 0.12 % solution Apply 15 mL to the mouth or throat 2 (two) times a day, Starting Sat 9/23/2023, Normal       !! - Potential duplicate medications found. Please discuss with provider.       CONTINUE these medications which have NOT CHANGED    Details   !! acetaminophen (TYLENOL) 500 mg tablet Take 2 tablets (1,000 mg total) by mouth every 6 (six) hours as needed for mild pain for up to 15 doses, Starting Tue 7/19/2022, Normal      !! chlorhexidine (PERIDEX) 0.12 % solution APPLY 15 ML TO THE MOUTH OR THROAT TWICE A DAY FOR 7 DAYS, Historical Med      !! chlorhexidine (PERIDEX) 0.12 % solution Apply 15 mL to the mouth or throat 2 (two) times a day, Starting Fri 4/21/2023, Normal      famotidine (PEPCID) 40 MG tablet Take 1 tablet (40 mg total) by mouth daily before dinner, Starting Thu 2/23/2023, Normal      ibuprofen (MOTRIN) 800 mg tablet Take 1 tablet (800 mg total) by mouth every 6 (six) hours as needed for mild pain for up to 30 doses, Starting Tue 7/19/2022, Normal      metoclopramide (Reglan) 10 mg tablet Take 1 tablet (10 mg total) by mouth 4 (four) times a day, Starting Mon 8/14/2023, Normal      omeprazole (PriLOSEC) 40 MG capsule Take 1 capsule (40 mg total) by mouth daily before breakfast, Starting Thu 2/23/2023, Normal      ondansetron (ZOFRAN) 4 mg tablet Take 1 tablet (4 mg total) by mouth every 8 (eight) hours as needed for nausea or vomiting, Starting Sun 9/3/2023, Normal      ondansetron (ZOFRAN-ODT) 4 mg disintegrating tablet Take 1 tablet (4 mg total) by mouth every 6 (six) hours as needed for nausea or vomiting, Starting Fri 9/22/2023, Normal      phenazopyridine (PYRIDIUM) 200 mg tablet Take 1 tablet (200 mg total) by mouth 3 (three) times a day, Starting Sat 2/4/2023, Normal       !! - Potential duplicate medications found. Please discuss with provider. No discharge procedures on file. PDMP Review       Value Time User    PDMP Reviewed  Yes 9/23/2023 10:19 PM Anabel Acosta MD           ED Provider  Attending physically available and evaluated [address]. I managed the patient along with the ED Attending.     Electronically Signed by         Jayant Mast MD  09/23/23 0664

## 2023-09-24 NOTE — ED ATTENDING ATTESTATION
9/23/2023  IBlake MD, saw and evaluated the patient. I have discussed the patient with the resident/non-physician practitioner and agree with the resident's/non-physician practitioner's findings, Plan of Care, and MDM as documented in the resident's/non-physician practitioner's note, except where noted. All available labs and Radiology studies were reviewed. I was present for key portions of any procedure(s) performed by the resident/non-physician practitioner and I was immediately available to provide assistance. At this point I agree with the current assessment done in the Emergency Department. I have conducted an independent evaluation of this patient a history and physical is as follows:    77-year-old female presenting for evaluation of right-sided dental pain. Patient states it has been ongoing for quite some time but worsened over the last several days. Was placed on Macrobid for UTI and stated this did help her dental pain. Recently had penicillin 1 month ago. Patient notes pain in the right mandibular region with radiation to her head and is worsening her TMJ. Has been taking Tylenol Motrin at home without significant improvement. Last doses were at 650 prior to arrival.  Has associated nausea but denies fever and other systemic symptoms. Denies difficulty swallowing. Had to cancel her appointment with a dentist.    Please see resident documentation for histories and review of systems. Exam: Vital signs and nursing notes reviewed  General: Awake, alert, uncomfortable appearing  HEENT: Normocephalic, atraumatic, mucous membranes moist, fractured tooth in the right posterior mandibular molars with tenderness to percussion and hyperemia to the gums without palpable buccal or lingual abscess. No base of the tongue elevation. Right-sided submandibular swelling and tenderness.   Neck: Supple  Heart: Regular rate and rhythm  Lungs: No stridor  Abdomen: Nondistended right mandibular tenderness to palpation    ED Course  ED Course as of 23 2300   Sat Sep 2023 CBC and differential  Grossly normal    Comprehensive metabolic panel  Grossly normal    CT facial bones with contrast  IMPRESSION:     No enhancing soft tissue mass or rim-enhancing fluid collection/abscess visualized. Minimal mucoperiosteal thickening in right ethmoid and right inferior maxillary sinuses. ED course/medical decision [age]-year-old female presenting for evaluation of right dental pain. Physical examination shows fractured teeth with poor dentition and tenderness to percussion, suspicious for dental abscess versus pulpitis. No evidence of palpable abscess in the right mandibular region. Low suspicion for Benjamin's angina given lack of base of the tongue elevation. No evidence of airway compromise. However, patient has submandibular tenderness and fullness, concerning for deep space infection. CT imaging was obtained in addition to basic laboratory studies. Laboratory studies are grossly normal.  CT is negative for evidence of a fluid collection or abscess. Patient has mild sinusitis. Patient treated supportively in the emergency department for her pain. Will initiate Augmentin, as patient has received penicillin within the last month. She will continue Tylenol and Motrin scheduled at home for pain control. PDMP queried, and patient has had several short-courses of opioids prescribed over the last several months. Do not feel prescribing opioids is appropriate at this time. Will initiate Peridex rinses as well given poor dentition and caries. Strongly encouraged to follow-up with dentist for further care. Return precautions discussed. Patient is in agreement and understanding of these instructions.     Diagnosis: Dentalgia, pulpitis, dental caries  Disposition: Discharge

## 2023-09-25 ENCOUNTER — PATIENT OUTREACH (OUTPATIENT)
Dept: FAMILY MEDICINE CLINIC | Facility: CLINIC | Age: 33
End: 2023-09-25

## 2023-09-25 NOTE — PROGRESS NOTES
Received ADT, pt presented to ED for complaints of dental pain. Was discharged to home to follow up with dental clinic. Multiple telephone attempts made to reach pt. Phone number not "accepting calls". Unable to reach letter mailed to patient. Will close to complex care management at this time.

## 2023-09-29 ENCOUNTER — NURSE TRIAGE (OUTPATIENT)
Dept: OTHER | Facility: OTHER | Age: 33
End: 2023-09-29

## 2023-09-29 DIAGNOSIS — K02.9 DENTAL CARIES: ICD-10-CM

## 2023-09-29 DIAGNOSIS — K21.9 GASTROESOPHAGEAL REFLUX DISEASE WITHOUT ESOPHAGITIS: ICD-10-CM

## 2023-09-29 DIAGNOSIS — R11.0 NAUSEA: ICD-10-CM

## 2023-09-30 ENCOUNTER — NURSE TRIAGE (OUTPATIENT)
Dept: FAMILY MEDICINE CLINIC | Facility: CLINIC | Age: 33
End: 2023-09-30

## 2023-09-30 DIAGNOSIS — R11.0 NAUSEA: ICD-10-CM

## 2023-09-30 DIAGNOSIS — K21.9 GASTROESOPHAGEAL REFLUX DISEASE WITHOUT ESOPHAGITIS: ICD-10-CM

## 2023-09-30 DIAGNOSIS — K02.9 DENTAL CARIES: ICD-10-CM

## 2023-09-30 RX ORDER — METOCLOPRAMIDE 10 MG/1
10 TABLET ORAL 4 TIMES DAILY
Qty: 5 TABLET | Refills: 0 | Status: SHIPPED | OUTPATIENT
Start: 2023-09-30 | End: 2023-10-02 | Stop reason: SDUPTHER

## 2023-09-30 NOTE — TELEPHONE ENCOUNTER
Reason for Disposition  • [1] Prescription refill request for ESSENTIAL medicine (i.e., likelihood of harm to patient if not taken) AND [2] triager unable to refill per department policy    Answer Assessment - Initial Assessment Questions  1. DRUG NAME: "What medicine do you need to have refilled?"      Reglan 10mg QID     2. REFILLS REMAINING: "How many refills are remaining?" (Note: The label on the medicine or pill bottle will show how many refills are remaining. If there are no refills remaining, then a renewal may be needed.)      0    3. EXPIRATION DATE: "What is the expiration date?" (Note: The label states when the prescription will , and thus can no longer be refilled.)      N/a     4. PRESCRIBING HCP: "Who prescribed it?" Reason: If prescribed by specialist, call should be referred to that group. PCP     5.  SYMPTOMS: "Do you have any symptoms?"      Chronic nausea    Protocols used: MEDICATION REFILL AND RENEWAL CALL-[facility]

## 2023-09-30 NOTE — TELEPHONE ENCOUNTER
C/o nausea (mild) (chronic condition) with mild shakiness. Denies emesis. Able to take in liquids. Denies signs of dehydration. No additional symptoms. Requesting Reglan refill. Request sent to PCP. Care advice given. Informed to call back if worsening/developing symptoms. Verbalized understanding. Agreeable with disposition. No further questions.

## 2023-09-30 NOTE — TELEPHONE ENCOUNTER
Reason for Disposition  • Nausea is a chronic symptom (recurrent or ongoing AND present > 4 weeks)    Answer Assessment - Initial Assessment Questions  1. NAUSEA SEVERITY: "How bad is the nausea?" (e.g., mild, moderate, severe; dehydration, weight loss)    - MILD: loss of appetite without change in eating habits    - MODERATE: decreased oral intake without significant weight loss, dehydration, or malnutrition    - SEVERE: inadequate caloric or fluid intake, significant weight loss, symptoms of dehydration     mild  2. ONSET: "When did the nausea begin?"    Chronic   3. VOMITING: "Any vomiting?" If Yes, ask: "How many times today?"    Denies   4. RECURRENT SYMPTOM: "Have you had nausea before?" If Yes, ask: "When was the last time?" "What happened that time?"      9/28  5. CAUSE: "What do you think is causing the nausea?"      Chronic   6.  PREGNANCY: "Is there any chance you are pregnant?" (e.g., unprotected intercourse, missed birth control pill, broken condom)  Denies    Protocols used: NAUSEA-ADULT-

## 2023-09-30 NOTE — TELEPHONE ENCOUNTER
Regarding: requesting reglan  ----- Message from [address] [place] [address] RN sent at 9/30/2023  6:03 PM EDT -----  "I called yesterday and requested Reglan, but it was never sent to the pharmacy"

## 2023-09-30 NOTE — TELEPHONE ENCOUNTER
Patient calling in requesting a refill on her Reglan prescription. Stated a prescription for Zofran was sent in on 9/22 but her insurance will not cover it and is asking for Reglan instead for her chronic nausea. On call provider contacted, stated to send in 5 tablets of Relgan 10mg to hold the patient over for the weekend. Stated patient should follow up with office on Monday to get full refill. Patient made aware and verbalized understanding.

## 2023-09-30 NOTE — TELEPHONE ENCOUNTER
Regarding: nausea  ----- Message from Cabrera Hernandez sent at 9/29/2023 10:38 PM EDT -----  Pt called, " I am really nauseous. I've been experiencing this feeling throughout the day.  I  would like to know if I can take anything to help alleviate."

## 2023-10-02 ENCOUNTER — TELEPHONE (OUTPATIENT)
Dept: FAMILY MEDICINE CLINIC | Facility: CLINIC | Age: 33
End: 2023-10-02

## 2023-10-02 DIAGNOSIS — K21.9 GASTROESOPHAGEAL REFLUX DISEASE WITHOUT ESOPHAGITIS: ICD-10-CM

## 2023-10-02 DIAGNOSIS — R11.0 NAUSEA: ICD-10-CM

## 2023-10-02 DIAGNOSIS — K02.9 DENTAL CARIES: ICD-10-CM

## 2023-10-02 RX ORDER — METOCLOPRAMIDE 10 MG/1
10 TABLET ORAL 4 TIMES DAILY
Qty: 30 TABLET | Refills: 0 | Status: SHIPPED | OUTPATIENT
Start: 2023-10-02 | End: 2023-10-04

## 2023-10-03 RX ORDER — METOCLOPRAMIDE 10 MG/1
10 TABLET ORAL 4 TIMES DAILY
Qty: 40 TABLET | Refills: 0 | Status: SHIPPED | OUTPATIENT
Start: 2023-10-03

## 2023-10-11 ENCOUNTER — APPOINTMENT (EMERGENCY)
Dept: ULTRASOUND IMAGING | Facility: HOSPITAL | Age: 33
End: 2023-10-11
Payer: COMMERCIAL

## 2023-10-11 ENCOUNTER — HOSPITAL ENCOUNTER (EMERGENCY)
Facility: HOSPITAL | Age: 33
Discharge: HOME | End: 2023-10-11
Attending: EMERGENCY MEDICINE
Payer: COMMERCIAL

## 2023-10-11 VITALS
DIASTOLIC BLOOD PRESSURE: 67 MMHG | TEMPERATURE: 98.7 F | SYSTOLIC BLOOD PRESSURE: 101 MMHG | HEART RATE: 98 BPM | OXYGEN SATURATION: 98 % | RESPIRATION RATE: 16 BRPM

## 2023-10-11 DIAGNOSIS — R10.2 PELVIC PAIN: Primary | ICD-10-CM

## 2023-10-11 DIAGNOSIS — N89.8 VAGINAL ITCHING: ICD-10-CM

## 2023-10-11 LAB
ALBUMIN SERPL BCP-MCNC: 4.3 G/DL (ref 3.5–5)
ALP SERPL-CCNC: 51 U/L (ref 34–104)
ALT SERPL W P-5'-P-CCNC: 9 U/L (ref 7–52)
ANION GAP SERPL CALCULATED.3IONS-SCNC: 7 MMOL/L
AST SERPL W P-5'-P-CCNC: 12 U/L (ref 13–39)
BASOPHILS # BLD AUTO: 0.02 THOUSANDS/ÂΜL (ref 0–0.1)
BASOPHILS NFR BLD AUTO: 0 % (ref 0–1)
BILIRUB SERPL-MCNC: 0.31 MG/DL (ref 0.2–1)
BILIRUB UR QL STRIP: NEGATIVE
BUN SERPL-MCNC: 12 MG/DL (ref 5–25)
CALCIUM SERPL-MCNC: 9.4 MG/DL (ref 8.4–10.2)
CHLORIDE SERPL-SCNC: 106 MMOL/L (ref 96–108)
CLARITY UR: CLEAR
CO2 SERPL-SCNC: 26 MMOL/L (ref 21–32)
COLOR UR: YELLOW
CREAT SERPL-MCNC: 0.76 MG/DL (ref 0.6–1.3)
EOSINOPHIL # BLD AUTO: 0.09 THOUSAND/ÂΜL (ref 0–0.61)
EOSINOPHIL NFR BLD AUTO: 1 % (ref 0–6)
ERYTHROCYTE [DISTWIDTH] IN BLOOD BY AUTOMATED COUNT: 12.6 % (ref 11.6–15.1)
EXT PREGNANCY TEST URINE: NEGATIVE
EXT. CONTROL: NORMAL
GFR SERPL CREATININE-BSD FRML MDRD: 103 ML/MIN/1.73SQ M
GLUCOSE SERPL-MCNC: 104 MG/DL (ref 65–140)
GLUCOSE UR STRIP-MCNC: NEGATIVE MG/DL
HCG SERPL QL: NEGATIVE
HCT VFR BLD AUTO: 40.3 % (ref 34.8–46.1)
HGB BLD-MCNC: 13.7 G/DL (ref 11.5–15.4)
HGB UR QL STRIP.AUTO: NEGATIVE
IMM GRANULOCYTES # BLD AUTO: 0.03 THOUSAND/UL (ref 0–0.2)
IMM GRANULOCYTES NFR BLD AUTO: 1 % (ref 0–2)
KETONES UR STRIP-MCNC: NEGATIVE MG/DL
LEUKOCYTE ESTERASE UR QL STRIP: NEGATIVE
LIPASE SERPL-CCNC: 10 U/L (ref 11–82)
LYMPHOCYTES # BLD AUTO: 1.49 THOUSANDS/ÂΜL (ref 0.6–4.47)
LYMPHOCYTES NFR BLD AUTO: 23 % (ref 14–44)
MAGNESIUM SERPL-MCNC: 2.1 MG/DL (ref 1.9–2.7)
MCH RBC QN AUTO: 29.7 PG (ref 26.8–34.3)
MCHC RBC AUTO-ENTMCNC: 34 G/DL (ref 31.4–37.4)
MCV RBC AUTO: 87 FL (ref 82–98)
MONOCYTES # BLD AUTO: 0.54 THOUSAND/ÂΜL (ref 0.17–1.22)
MONOCYTES NFR BLD AUTO: 8 % (ref 4–12)
NEUTROPHILS # BLD AUTO: 4.45 THOUSANDS/ÂΜL (ref 1.85–7.62)
NEUTS SEG NFR BLD AUTO: 67 % (ref 43–75)
NITRITE UR QL STRIP: NEGATIVE
NRBC BLD AUTO-RTO: 0 /100 WBCS
PH UR STRIP.AUTO: 8 [PH]
PLATELET # BLD AUTO: 358 THOUSANDS/UL (ref 149–390)
PMV BLD AUTO: 9.3 FL (ref 8.9–12.7)
POTASSIUM SERPL-SCNC: 3.9 MMOL/L (ref 3.5–5.3)
PROT SERPL-MCNC: 6.9 G/DL (ref 6.4–8.4)
PROT UR STRIP-MCNC: NEGATIVE MG/DL
RBC # BLD AUTO: 4.62 MILLION/UL (ref 3.81–5.12)
SODIUM SERPL-SCNC: 139 MMOL/L (ref 135–147)
SP GR UR STRIP.AUTO: 1.01 (ref 1–1.03)
UROBILINOGEN UR QL STRIP.AUTO: 0.2 E.U./DL
WBC # BLD AUTO: 6.62 THOUSAND/UL (ref 4.31–10.16)

## 2023-10-11 PROCEDURE — 36415 COLL VENOUS BLD VENIPUNCTURE: CPT | Performed by: EMERGENCY MEDICINE

## 2023-10-11 PROCEDURE — 87591 N.GONORRHOEAE DNA AMP PROB: CPT | Performed by: EMERGENCY MEDICINE

## 2023-10-11 PROCEDURE — 81003 URINALYSIS AUTO W/O SCOPE: CPT | Performed by: EMERGENCY MEDICINE

## 2023-10-11 PROCEDURE — 99284 EMERGENCY DEPT VISIT MOD MDM: CPT | Performed by: EMERGENCY MEDICINE

## 2023-10-11 PROCEDURE — 85025 COMPLETE CBC W/AUTO DIFF WBC: CPT | Performed by: EMERGENCY MEDICINE

## 2023-10-11 PROCEDURE — 83735 ASSAY OF MAGNESIUM: CPT | Performed by: EMERGENCY MEDICINE

## 2023-10-11 PROCEDURE — 96375 TX/PRO/DX INJ NEW DRUG ADDON: CPT

## 2023-10-11 PROCEDURE — 96361 HYDRATE IV INFUSION ADD-ON: CPT

## 2023-10-11 PROCEDURE — 76856 US EXAM PELVIC COMPLETE: CPT

## 2023-10-11 PROCEDURE — 87491 CHLMYD TRACH DNA AMP PROBE: CPT | Performed by: EMERGENCY MEDICINE

## 2023-10-11 PROCEDURE — 84703 CHORIONIC GONADOTROPIN ASSAY: CPT | Performed by: EMERGENCY MEDICINE

## 2023-10-11 PROCEDURE — 83690 ASSAY OF LIPASE: CPT | Performed by: EMERGENCY MEDICINE

## 2023-10-11 PROCEDURE — 96374 THER/PROPH/DIAG INJ IV PUSH: CPT

## 2023-10-11 PROCEDURE — 99284 EMERGENCY DEPT VISIT MOD MDM: CPT

## 2023-10-11 PROCEDURE — 80053 COMPREHEN METABOLIC PANEL: CPT | Performed by: EMERGENCY MEDICINE

## 2023-10-11 PROCEDURE — 81025 URINE PREGNANCY TEST: CPT | Performed by: EMERGENCY MEDICINE

## 2023-10-11 RX ORDER — FENTANYL CITRATE 50 UG/ML
25 INJECTION, SOLUTION INTRAMUSCULAR; INTRAVENOUS ONCE
Status: DISCONTINUED | OUTPATIENT
Start: 2023-10-11 | End: 2023-10-11

## 2023-10-11 RX ORDER — OXYCODONE HYDROCHLORIDE 5 MG/1
5 TABLET ORAL ONCE
Status: DISCONTINUED | OUTPATIENT
Start: 2023-10-11 | End: 2023-10-11

## 2023-10-11 RX ORDER — FLUCONAZOLE 150 MG/1
150 TABLET ORAL ONCE
Status: COMPLETED | OUTPATIENT
Start: 2023-10-11 | End: 2023-10-11

## 2023-10-11 RX ORDER — HYDROMORPHONE HCL/PF 1 MG/ML
0.5 SYRINGE (ML) INJECTION ONCE
Status: COMPLETED | OUTPATIENT
Start: 2023-10-11 | End: 2023-10-11

## 2023-10-11 RX ORDER — ONDANSETRON 2 MG/ML
4 INJECTION INTRAMUSCULAR; INTRAVENOUS ONCE
Status: COMPLETED | OUTPATIENT
Start: 2023-10-11 | End: 2023-10-11

## 2023-10-11 RX ORDER — OXYCODONE HYDROCHLORIDE 5 MG/1
2.5 TABLET ORAL ONCE
Status: COMPLETED | OUTPATIENT
Start: 2023-10-11 | End: 2023-10-11

## 2023-10-11 RX ADMIN — SODIUM CHLORIDE 1000 ML: 0.9 INJECTION, SOLUTION INTRAVENOUS at 19:28

## 2023-10-11 RX ADMIN — HYDROMORPHONE HYDROCHLORIDE 0.5 MG: 1 INJECTION, SOLUTION INTRAMUSCULAR; INTRAVENOUS; SUBCUTANEOUS at 20:12

## 2023-10-11 RX ADMIN — FLUCONAZOLE 150 MG: 150 TABLET ORAL at 21:50

## 2023-10-11 RX ADMIN — OXYCODONE HYDROCHLORIDE 2.5 MG: 5 TABLET ORAL at 21:50

## 2023-10-11 RX ADMIN — ONDANSETRON 4 MG: 2 INJECTION INTRAMUSCULAR; INTRAVENOUS at 19:25

## 2023-10-11 NOTE — ED PROVIDER NOTES
History  Chief Complaint   Patient presents with    Abdominal Pain     Reports lower abdominal pain x2 days and nausea. Also complaining of vaginal itching x 1 week     Patient is a 27-year-old female seen in the emergency department with concern for lower abdominal/pelvic discomfort over approximately the past two days. Patient notes some vaginal itching, but no vaginal discharge. Patient refuses a pelvic exam in the ED. Patient notes some fleeting burning with urination, now improved. Patient notes chronic nausea. Patient notes no fever, chest pain, vomiting, diarrhea, weakness, numbness, tingling. Patient notes no other definite clear exacerbating or alleviating factors for her symptoms. Prior to Admission Medications   Prescriptions Last Dose Informant Patient Reported?  Taking?   acetaminophen (TYLENOL) 500 mg tablet   No No   Sig: Take 2 tablets (1,000 mg total) by mouth every 6 (six) hours as needed for mild pain for up to 15 doses   chlorhexidine (PERIDEX) 0.12 % solution   Yes No   Sig: APPLY 15 ML TO THE MOUTH OR THROAT TWICE A DAY FOR 7 DAYS   Patient not taking: Reported on 2/23/2023   chlorhexidine (PERIDEX) 0.12 % solution   No No   Sig: Apply 15 mL to the mouth or throat 2 (two) times a day   chlorhexidine (PERIDEX) 0.12 % solution   No No   Sig: Apply 15 mL to the mouth or throat 2 (two) times a day   famotidine (PEPCID) 40 MG tablet   No No   Sig: Take 1 tablet (40 mg total) by mouth daily before dinner   ibuprofen (MOTRIN) 800 mg tablet   No No   Sig: Take 1 tablet (800 mg total) by mouth every 6 (six) hours as needed for mild pain for up to 30 doses   metoclopramide (Reglan) 10 mg tablet   No No   Sig: Take 1 tablet (10 mg total) by mouth 4 (four) times a day   omeprazole (PriLOSEC) 40 MG capsule   No No   Sig: Take 1 capsule (40 mg total) by mouth daily before breakfast   ondansetron (ZOFRAN) 4 mg tablet   No No   Sig: Take 1 tablet (4 mg total) by mouth every 8 (eight) hours as needed for nausea or vomiting   ondansetron (ZOFRAN-ODT) 4 mg disintegrating tablet   No No   Sig: Take 1 tablet (4 mg total) by mouth every 6 (six) hours as needed for nausea or vomiting   phenazopyridine (PYRIDIUM) 200 mg tablet   No No   Sig: Take 1 tablet (200 mg total) by mouth 3 (three) times a day      Facility-Administered Medications: None       Past Medical History:   Diagnosis Date    Anxiety     Asthma     Depression     GERD (gastroesophageal reflux disease)     Hernia, abdominal     Migraines     Muscle spasm     Psychiatric disorder     Anx, Depression    Renal disorder     kidney stones       Past Surgical History:   Procedure Laterality Date    CYSTOSCOPY      removal of kidney stone    FL RETROGRADE PYELOGRAM  8/28/2018    HERNIA REPAIR      DC CYSTO/URETERO W/LITHOTRIPSY &INDWELL STENT INSRT Right 8/28/2018    Procedure: CYSTOSCOPY URETEROSCOPY WITH RETROGRADE PYELOGRAM AND INSERTION STENT URETERAL;  Surgeon: Denis Strickland MD;  Location: [facility];  Service: Urology    TOOTH EXTRACTION         Family History   Problem Relation Age of Onset    Diabetes Mother     Hyperlipidemia Mother     Stroke Mother     Heart disease Mother     Asthma Mother     Other Mother         Debby Vidal. of Intervertabral disc. Nephrolithiasis Father     Nephrolithiasis Brother      I have reviewed and agree with the history as documented.     E-Cigarette/Vaping    E-Cigarette Use Never User      E-Cigarette/Vaping Substances    Nicotine No     THC No     CBD No     Flavoring No     Other No     Unknown No      Social History     Tobacco Use    Smoking status: Every Day     Packs/day: 0.50     Years: 13.00     Total pack years: 6.50     Types: Cigarettes    Smokeless tobacco: Never   Vaping Use    Vaping Use: Never used   Substance Use Topics    Alcohol use: Not Currently     Comment: Alcohol intake:   None  - As per Angelita Sánchez     Drug use: No     Comment: Illicit drugs:   none  - As per Angelita Sánchez        Review of Systems Constitutional:  Negative for chills and fever. HENT:  Negative for ear pain and sore throat. Eyes:  Negative for pain and visual disturbance. Respiratory:  Negative for cough and shortness of breath. Cardiovascular:  Negative for chest pain and palpitations. Gastrointestinal:  Positive for abdominal pain. Negative for vomiting. Genitourinary:  Positive for dysuria and pelvic pain. Negative for vaginal discharge. Vaginal itching   Musculoskeletal:  Negative for arthralgias and back pain. Skin:  Negative for color change and rash. Neurological:  Negative for seizures and syncope. Psychiatric/Behavioral:  Negative for agitation and confusion. All other systems reviewed and are negative. Physical Exam  Physical Exam  Vitals and nursing note reviewed. Constitutional:       General: She is not in acute distress. Appearance: She is well-developed. HENT:      Head: Normocephalic and atraumatic. Right Ear: External ear normal.      Left Ear: External ear normal.      Nose: Nose normal.      Mouth/Throat:      Pharynx: Oropharynx is clear. Eyes:      General: No scleral icterus. Conjunctiva/sclera: Conjunctivae normal.   Cardiovascular:      Rate and Rhythm: Normal rate. Comments: well-perfused extremities  Pulmonary:      Effort: Pulmonary effort is normal. No respiratory distress. Abdominal:      General: There is no distension. Tenderness: There is no abdominal tenderness. Musculoskeletal:         General: No deformity or signs of injury. Cervical back: Normal range of motion and neck supple. Skin:     General: Skin is warm and dry. Neurological:      Mental Status: She is alert. Cranial Nerves: No cranial nerve deficit. Sensory: No sensory deficit. Psychiatric:         Mood and Affect: Mood normal.         Thought Content:  Thought content normal.         Vital Signs  ED Triage Vitals   Temperature Pulse Respirations Blood Pressure SpO2   10/11/23 1909 10/11/23 1900 10/11/23 1900 10/11/23 1900 10/11/23 1900   98.7 °F (37.1 °C) (!) 108 18 124/66 99 %      Temp Source Heart Rate Source Patient Position - Orthostatic VS BP Location FiO2 (%)   10/11/23 1909 10/11/23 2141 10/11/23 1900 10/11/23 1900 --   Oral Monitor Lying Right arm       Pain Score       10/11/23 1900       9           Vitals:    10/11/23 1900 10/11/23 2141   BP: 124/66 101/67   Pulse: (!) 108 98   Patient Position - Orthostatic VS: Lying Lying         Visual Acuity      ED Medications  Medications   fluconazole (DIFLUCAN) tablet 150 mg (has no administration in time range)   oxyCODONE (ROXICODONE) IR tablet 2.5 mg (has no administration in time range)   ondansetron (ZOFRAN) injection 4 mg (4 mg Intravenous Given 10/11/23 1925)   sodium chloride 0.9 % bolus 1,000 mL (0 mL Intravenous Stopped 10/11/23 2043)   HYDROmorphone (DILAUDID) injection 0.5 mg (0.5 mg Intravenous Given 10/11/23 2012)       Diagnostic Studies  Results Reviewed       Procedure Component Value Units Date/Time    hCG, qualitative pregnancy [[id number]]  (Normal) Collected: 10/11/23 1917    Lab Status: Final result Specimen: Blood from Arm, Right Updated: 10/11/23 1945     Preg, Serum Negative    Comprehensive metabolic panel [[id number]]  (Abnormal) Collected: 10/11/23 1917    Lab Status: Final result Specimen: Blood from Arm, Right Updated: 10/11/23 1940     Sodium 139 mmol/L      Potassium 3.9 mmol/L      Chloride 106 mmol/L      CO2 26 mmol/L      ANION GAP 7 mmol/L      BUN 12 mg/dL      Creatinine 0.76 mg/dL      Glucose 104 mg/dL      Calcium 9.4 mg/dL      AST 12 U/L      ALT 9 U/L      Alkaline Phosphatase 51 U/L      Total Protein 6.9 g/dL      Albumin 4.3 g/dL      Total Bilirubin 0.31 mg/dL      eGFR 103 ml/min/1.73sq m     Narrative:      [place] guidelines for Chronic Kidney Disease (CKD):     Stage 1 with normal or high GFR (GFR > 90 mL/min/1.73 square meters)    Stage 2 Mild CKD (GFR = 60-89 mL/min/1.73 square meters)    Stage 3A Moderate CKD (GFR = 45-59 mL/min/1.73 square meters)    Stage 3B Moderate CKD (GFR = 30-44 mL/min/1.73 square meters)    Stage 4 Severe CKD (GFR = 15-29 mL/min/1.73 square meters)    Stage 5 End Stage CKD (GFR <15 mL/min/1.73 square meters)  Note: GFR calculation is accurate only with a steady state creatinine    Magnesium [[id number]]  (Normal) Collected: 10/11/23 1917    Lab Status: Final result Specimen: Blood from Arm, Right Updated: 10/11/23 1940     Magnesium 2.1 mg/dL     Lipase [[id number]]  (Abnormal) Collected: 10/11/23 1917    Lab Status: Final result Specimen: Blood from Arm, Right Updated: 10/11/23 1940     Lipase 10 u/L     POCT pregnancy, urine [[id number]]  (Normal) Resulted: 10/11/23 1924    Lab Status: Final result Updated: 10/11/23 1924     EXT Preg Test, Ur Negative     Control Valid    CBC and differential [[id number]] Collected: 10/11/23 1917    Lab Status: Final result Specimen: Blood from Arm, Right Updated: 10/11/23 1923     WBC 6.62 Thousand/uL      RBC 4.62 Million/uL      Hemoglobin 13.7 g/dL      Hematocrit 40.3 %      MCV 87 fL      MCH 29.7 pg      MCHC 34.0 g/dL      RDW 12.6 %      MPV 9.3 fL      Platelets 477 Thousands/uL      nRBC 0 /100 WBCs      Neutrophils Relative 67 %      Immat GRANS % 1 %      Lymphocytes Relative 23 %      Monocytes Relative 8 %      Eosinophils Relative 1 %      Basophils Relative 0 %      Neutrophils Absolute 4.45 Thousands/µL      Immature Grans Absolute 0.03 Thousand/uL      Lymphocytes Absolute 1.49 Thousands/µL      Monocytes Absolute 0.54 Thousand/µL      Eosinophils Absolute 0.09 Thousand/µL      Basophils Absolute 0.02 Thousands/µL     Chlamydia/GC amplified DNA by PCR [[id number]] Collected: 10/11/23 1918    Lab Status:  In process Specimen: Urine, Other Updated: 10/11/23 1920    UA w Reflex to Microscopic w Reflex to Culture [[id number]]  (Normal) Collected: 10/11/23 1912    Lab Status: Final result Specimen: Urine, Clean Catch Updated: 10/11/23 1918     Color, UA Yellow     Clarity, UA Clear     Specific Gravity, UA 1.010     pH, UA 8.0     Leukocytes, UA Negative     Nitrite, UA Negative     Protein, UA Negative mg/dl      Glucose, UA Negative mg/dl      Ketones, UA Negative mg/dl      Urobilinogen, UA 0.2 E.U./dl      Bilirubin, UA Negative     Occult Blood, UA Negative                   US pelvis transabdominal only   Final Result by Heber Dahl MD (10/11 2131)      Normal.                              Workstation performed: [id number]                    Procedures  Procedures         ED Course  ED Course as of 10/11/23 2152   Wed Oct 11, 2023   2135 PELVIC ULTRASOUND, COMPLETE     INDICATION:  The patient is 35years old. pelvic pain, evaluate for ovarian torsion. COMPARISON: Pelvic ultrasound October 9, 2021     TECHNIQUE:   Transabdominal pelvic ultrasound was performed in sagittal and transverse planes with a curvilinear transducer. Imaging included volumetric sweeps as well as traditional still imaging technique. FINDINGS:     UTERUS:  The uterus is anteverted in position, measuring 9.7 x 5.3 x 5.4 cm. The uterus has a normal contour and echotexture. The cervix appears within normal limits. ENDOMETRIUM:  The endometrial echo complex has an AP caliber of 9.0 mm. Its appearance is within normal limits for age and cycle and shows no filling defects. OVARIES/ADNEXA:  Right ovary:  2.7 x 2.2 x 1.2 cm. 3.6 mL. Left ovary:  2.2 x 2.2 x 1.3 cm. 3.2 mL. Ovarian Doppler flow is within normal limits. No suspicious ovarian or adnexal abnormality. OTHER:  Small amount of simple free fluid in the pelvis, likely physiologic in this premenopausal female. IMPRESSION:     Normal.                                       SBIRT 22yo+      Flowsheet Row Most Recent Value   Initial Alcohol Screen: US AUDIT-C     1.  How often do you have a drink containing alcohol? 0 Filed at: 10/11/2023 1859   2. How many drinks containing alcohol do you have on a typical day you are drinking? 0 Filed at: 10/11/2023 1859   3b. FEMALE Any Age, or MALE 65+: How often do you have 4 or more drinks on one occassion? 0 Filed at: 10/11/2023 1859   Audit-C Score 0 Filed at: 10/11/2023 1859   CRISTINO: How many times in the past year have you. .. Used an illegal drug or used a prescription medication for non-medical reasons? Never Filed at: 10/11/2023 [address] [place] [address] Dr Carmen Aguila  Patient is a 12-year-old female seen in the emergency department with concern for pelvic pain/abdominal pain. Patient was treating with medication for symptom control, with good effect. Urine pregnancy test was negative. Urinalysis was unremarkable, and showed no evidence of urinary tract infection. Laboratory evaluation is unremarkable. Pelvic ultrasound was obtained to evaluate for ovarian torsion or other acute abnormality. Pelvic ultrasound showed no evidence of ovarian torsion, no acute abnormality. No definite cause of the patient's symptoms was discovered. Patient requested treatment for possible yeast infection Plan to have patient follow up with PCP/outpatient providers. Patient stable for discharge home. Discharge instructions were reviewed with patient. Amount and/or Complexity of Data Reviewed  Labs: ordered. Decision-making details documented in ED Course. Radiology: ordered. Decision-making details documented in ED Course. ECG/medicine tests: ordered. Decision-making details documented in ED Course. Risk  Prescription drug management.              Disposition  Final diagnoses:   Pelvic pain   Vaginal itching     Time reflects when diagnosis was documented in both MDM as applicable and the Disposition within this note       Time User Action Codes Description Comment    10/11/2023  9:36 PM Raiza Mg Add [R10.2] Pelvic pain     10/11/2023  9:47 PM Raiza Mg Add [N89.8] Vaginal itching ED Disposition       ED Disposition   Discharge    Condition   Stable    Date/Time   Wed Oct 11, 2023 2147    Comment   [address] discharge to home/self care.                    Follow-up Information       Follow up With Specialties Details Why Contact Info Additional Information    Nyasia Chang MD Family Medicine Call in 1 day  [address]  [phone]       Your OB/GYN  Call in 1 day       [address] [facility]cology Call  As needed [facility] [address] [facility] [address]  [address] [place] [address], [address] [facility], [address] [place], [place], [address] [phone]            Patient's Medications   Discharge Prescriptions    No medications on file           PDMP Review         Value Time User    PDMP Reviewed  Yes 9/23/2023 10:19 PM Deepa Mcdaniel MD            ED Provider  Electronically Signed by             Parul Recio MD  10/11/23 4812

## 2023-10-11 NOTE — DISCHARGE INSTRUCTIONS
Follow up with your primary doctor/outpatient providers, and return to the emergency department for new or worsening symptoms. PELVIC ULTRASOUND, COMPLETE     INDICATION:  The patient is 35years old. pelvic pain, evaluate for ovarian torsion. COMPARISON: Pelvic ultrasound October 9, 2021     TECHNIQUE:   Transabdominal pelvic ultrasound was performed in sagittal and transverse planes with a curvilinear transducer. Imaging included volumetric sweeps as well as traditional still imaging technique. FINDINGS:     UTERUS:  The uterus is anteverted in position, measuring 9.7 x 5.3 x 5.4 cm. The uterus has a normal contour and echotexture. The cervix appears within normal limits. ENDOMETRIUM:  The endometrial echo complex has an AP caliber of 9.0 mm. Its appearance is within normal limits for age and cycle and shows no filling defects. OVARIES/ADNEXA:  Right ovary:  2.7 x 2.2 x 1.2 cm. 3.6 mL. Left ovary:  2.2 x 2.2 x 1.3 cm. 3.2 mL. Ovarian Doppler flow is within normal limits. No suspicious ovarian or adnexal abnormality. OTHER:  Small amount of simple free fluid in the pelvis, likely physiologic in this premenopausal female.         IMPRESSION:     Normal.

## 2023-10-12 LAB
C TRACH DNA SPEC QL NAA+PROBE: NEGATIVE
N GONORRHOEA DNA SPEC QL NAA+PROBE: NEGATIVE

## 2023-10-27 ENCOUNTER — NURSE TRIAGE (OUTPATIENT)
Dept: OTHER | Facility: OTHER | Age: 33
End: 2023-10-27

## 2023-10-28 NOTE — TELEPHONE ENCOUNTER
Reason for Disposition  • Last bowel movement (BM) > 4 days ago    Additional Information  • Constipation is main symptom (e.g., pain or discomfort caused by passage of hard BMs)    Answer Assessment - Initial Assessment Questions  1. SYMPTOM:  "What's the main symptom you're concerned about?" (e.g., pain, itching, swelling, rash)    Hemorrhoids   2. ONSET: "When did the it start?"    A couple days   3. RECTAL PAIN: "Do you have any pain around your rectum?" "How bad is the pain?"  (Scale 1-10; or mild, moderate, severe)   - MILD (1-3): doesn't interfere with normal activities    - MODERATE (4-7): interferes with normal activities or awakens from sleep, limping    - SEVERE (8-10): excruciating pain, unable to have a bowel movement   Mild pain/sore with wiping    4. RECTAL ITCHING: "Do you have any itching in this area?" "How bad is the itching?"  (Scale 1-10; or mild, moderate, severe)   - MILD - doesn't interfere with normal activities    - MODERATE-SEVERE: interferes with normal activities or awakens from sleep     Mild   5. CONSTIPATION: "Do you have constipation?" If Yes, ask: "How bad is it?"     LBM: 7 days ago   6. CAUSE: "What do you think is causing the anus symptoms?"      Hemorrhoids   7. OTHER SYMPTOMS: "Do you have any other symptoms?"  (e.g., rectal bleeding, abdominal pain, vomiting, fever)   Denies   8.  PREGNANCY: "Is there any chance you are pregnant?" "When was your last menstrual period?"    Denies    Protocols used: Rectal Symptoms-ADULT-AH, Constipation-ADULT-AH

## 2023-10-28 NOTE — TELEPHONE ENCOUNTER
Regarding: constipated/hemorrhoid/nausea/med questions  ----- Message from Sarah Judd sent at 10/27/2023 10:38 PM EDT -----  Pt stated, "I have not had a bowl movement in a week. I would like to speak to a nurse and get medication for this. I currently have a hemorrhoid and I tried to push it in twice and it keeps coming out. I have never had one before and I am not sure what to do.  I am also nauseas."

## 2023-10-28 NOTE — TELEPHONE ENCOUNTER
C/o hemorrhoids and constipation. Last BM 7 days ago. Suppository ineffective. Reports hemorrhoid pain only present while wiping, and mild itchiness. No additional symptoms reported. Care advice given. Informed to call back if worsening/developing symptoms. Verbalized understanding. Agreeable with disposition.  No further questions

## 2023-11-19 DIAGNOSIS — K21.9 GASTROESOPHAGEAL REFLUX DISEASE WITHOUT ESOPHAGITIS: ICD-10-CM

## 2023-11-20 RX ORDER — FAMOTIDINE 40 MG/1
40 TABLET, FILM COATED ORAL
Qty: 30 TABLET | Refills: 5 | Status: SHIPPED | OUTPATIENT
Start: 2023-11-20

## 2023-11-27 DIAGNOSIS — K04.7 DENTAL INFECTION: ICD-10-CM

## 2023-11-27 DIAGNOSIS — R11.0 NAUSEA: ICD-10-CM

## 2023-11-27 DIAGNOSIS — K02.9 DENTAL CARIES: ICD-10-CM

## 2023-11-27 RX ORDER — ONDANSETRON 4 MG/1
4 TABLET, ORALLY DISINTEGRATING ORAL EVERY 6 HOURS PRN
Qty: 90 TABLET | Refills: 0 | Status: SHIPPED | OUTPATIENT
Start: 2023-11-27

## 2023-11-27 NOTE — TELEPHONE ENCOUNTER
Reason for call:   [x] Refill   [] Prior Auth  [] Other:     Office:   [x] PCP/Provider -   [] Specialty/Provider -       Does the patient have enough for 3 days?    [x] Yes   [] No - Send as HP to POD       ondansetron (ZOFRAN-ODT) 4 mg disintegrating tablet [[id number]]  Order Details  Dose: 4 mg Route: Oral Frequency: Every 6 hours PRN for nausea, vomiting  Dispense Quantity: 90 tablet Refills: 0        Sig: Take 1 tablet (4 mg total) by mouth every 6 (six) hours as needed for nausea or vomiting          [address] [place] [address] [address], [address], [address] [place] [address] [address]  Phone: [phone]  Fax: [phone]

## 2023-12-05 DIAGNOSIS — K21.9 GASTROESOPHAGEAL REFLUX DISEASE WITHOUT ESOPHAGITIS: ICD-10-CM

## 2023-12-05 RX ORDER — FAMOTIDINE 40 MG/1
40 TABLET, FILM COATED ORAL
Qty: 90 TABLET | Refills: 0 | Status: SHIPPED | OUTPATIENT
Start: 2023-12-05

## 2023-12-15 DIAGNOSIS — K02.9 DENTAL CARIES: ICD-10-CM

## 2023-12-15 DIAGNOSIS — R11.0 NAUSEA: ICD-10-CM

## 2023-12-15 DIAGNOSIS — K21.9 GASTROESOPHAGEAL REFLUX DISEASE WITHOUT ESOPHAGITIS: ICD-10-CM

## 2023-12-15 NOTE — TELEPHONE ENCOUNTER
Reason for call:   [x] Refill   [] Prior Auth  [] Other:     Office:   [x] PCP/Provider -   [] Specialty/Provider -     Medication: reglan 10 mg 1 tablet orally 4 times daily    Quantity: 40    Pharmacy: [facility]/pharmacy #[id number] - [place] - [address] [facility] [phone]     Does the patient have enough for 3 days?   [] Yes   [x] No - Send as HP to POD

## 2023-12-18 RX ORDER — METOCLOPRAMIDE 10 MG/1
10 TABLET ORAL 4 TIMES DAILY
Qty: 40 TABLET | Refills: 0 | Status: SHIPPED | OUTPATIENT
Start: 2023-12-18

## 2024-01-12 DIAGNOSIS — K04.7 DENTAL INFECTION: ICD-10-CM

## 2024-01-12 DIAGNOSIS — K02.9 DENTAL CARIES: ICD-10-CM

## 2024-01-12 DIAGNOSIS — R11.0 NAUSEA: ICD-10-CM

## 2024-01-12 RX ORDER — ONDANSETRON 4 MG/1
4 TABLET, ORALLY DISINTEGRATING ORAL EVERY 6 HOURS PRN
Qty: 90 TABLET | Refills: 0 | Status: SHIPPED | OUTPATIENT
Start: 2024-01-12

## 2024-01-14 ENCOUNTER — HOSPITAL ENCOUNTER (EMERGENCY)
Facility: HOSPITAL | Age: 34
Discharge: HOME | End: 2024-01-14
Attending: EMERGENCY MEDICINE
Payer: COMMERCIAL

## 2024-01-14 VITALS
OXYGEN SATURATION: 100 % | TEMPERATURE: 97.4 F | DIASTOLIC BLOOD PRESSURE: 55 MMHG | HEART RATE: 103 BPM | SYSTOLIC BLOOD PRESSURE: 111 MMHG | RESPIRATION RATE: 16 BRPM

## 2024-01-14 DIAGNOSIS — M26.629 TMJ SYNDROME: Primary | ICD-10-CM

## 2024-01-14 PROCEDURE — 99283 EMERGENCY DEPT VISIT LOW MDM: CPT

## 2024-01-14 PROCEDURE — 99284 EMERGENCY DEPT VISIT MOD MDM: CPT | Performed by: EMERGENCY MEDICINE

## 2024-01-14 RX ORDER — OXYCODONE HYDROCHLORIDE AND ACETAMINOPHEN 5; 325 MG/1; MG/1
1 TABLET ORAL EVERY 4 HOURS PRN
Qty: 12 TABLET | Refills: 0 | Status: SHIPPED | OUTPATIENT
Start: 2024-01-14 | End: 2024-01-24

## 2024-01-14 RX ORDER — OXYCODONE HYDROCHLORIDE AND ACETAMINOPHEN 5; 325 MG/1; MG/1
1 TABLET ORAL ONCE
Status: COMPLETED | OUTPATIENT
Start: 2024-01-14 | End: 2024-01-14

## 2024-01-14 RX ADMIN — OXYCODONE HYDROCHLORIDE AND ACETAMINOPHEN 1 TABLET: 5; 325 TABLET ORAL at 17:55

## 2024-01-15 NOTE — ED PROVIDER NOTES
"History  Chief Complaint   Patient presents with    Dental Problem     Pt reports right side \"TMJ flare up\" x 3 days, pt has taken motrin and tylenol without relief earlier today, does not have dentist because \"most do not deal with TMJ\"     Patient reports that pain in her right TMJ joint.  She notes that she has had problems with this in the past.  She tried taking ibuprofen and Flexeril for without significant relief.  She notes that her right jaw sometimes clicks and pops when she chews.  She also notes that she has pain with jaw opening although she is still able to do it.  She notes that she has been very stressed recently because her parent recently passed away.  She also notes that she is on penicillin already for frontal dental infection.  Her current pain does not feel like her prior episodes of dental pain, it feels like her TMJ.  She notes that she has seen an oral surgeon and is going to have her teeth removed; however, does not yet scheduled.          Prior to Admission Medications   Prescriptions Last Dose Informant Patient Reported? Taking?   acetaminophen (TYLENOL) 500 mg tablet   No No   Sig: Take 2 tablets (1,000 mg total) by mouth every 6 (six) hours as needed for mild pain for up to 15 doses   chlorhexidine (PERIDEX) 0.12 % solution   Yes No   Sig: APPLY 15 ML TO THE MOUTH OR THROAT TWICE A DAY FOR 7 DAYS   Patient not taking: Reported on 2/23/2023   chlorhexidine (PERIDEX) 0.12 % solution   No No   Sig: Apply 15 mL to the mouth or throat 2 (two) times a day   chlorhexidine (PERIDEX) 0.12 % solution   No No   Sig: Apply 15 mL to the mouth or throat 2 (two) times a day   famotidine (PEPCID) 40 MG tablet   No No   Sig: TAKE 1 TABLET (40 MG TOTAL) BY MOUTH DAILY BEFORE DINNER   ibuprofen (MOTRIN) 800 mg tablet   No No   Sig: Take 1 tablet (800 mg total) by mouth every 6 (six) hours as needed for mild pain for up to 30 doses   metoclopramide (Reglan) 10 mg tablet   No No   Sig: Take 1 tablet (10 mg " total) by mouth 4 (four) times a day   omeprazole (PriLOSEC) 40 MG capsule   No No   Sig: Take 1 capsule (40 mg total) by mouth daily before breakfast   ondansetron (ZOFRAN) 4 mg tablet   No No   Sig: Take 1 tablet (4 mg total) by mouth every 8 (eight) hours as needed for nausea or vomiting   ondansetron (ZOFRAN-ODT) 4 mg disintegrating tablet   No No   Sig: TAKE 1 TABLET (4 MG TOTAL) BY MOUTH EVERY 6 (SIX) HOURS AS NEEDED FOR NAUSEA OR VOMITING   phenazopyridine (PYRIDIUM) 200 mg tablet   No No   Sig: Take 1 tablet (200 mg total) by mouth 3 (three) times a day      Facility-Administered Medications: None       Past Medical History:   Diagnosis Date    Anxiety     Asthma     Depression     GERD (gastroesophageal reflux disease)     Hernia, abdominal     Migraines     Muscle spasm     Psychiatric disorder     Anx, Depression    Renal disorder     kidney stones       Past Surgical History:   Procedure Laterality Date    CYSTOSCOPY      removal of kidney stone    [place] RETROGRADE PYELOGRAM  8/28/2018    HERNIA REPAIR      [place] CYSTO/URETERO W/LITHOTRIPSY &INDWELL STENT INSRT Right 8/28/2018    Procedure: CYSTOSCOPY URETEROSCOPY WITH RETROGRADE PYELOGRAM AND INSERTION STENT URETERAL;  Surgeon: North Arauz MD;  Location: [facility];  Service: Urology    TOOTH EXTRACTION         Family History   Problem Relation Age of Onset    Diabetes Mother     Hyperlipidemia Mother     Stroke Mother     Heart disease Mother     Asthma Mother     Other Mother         Degen. of Intervertabral disc.    Nephrolithiasis Father     Nephrolithiasis Brother      I have reviewed and agree with the history as documented.    E-Cigarette/Vaping    E-Cigarette Use Never User      E-Cigarette/Vaping Substances    Nicotine No     THC No     CBD No     Flavoring No     Other No     Unknown No      Social History     Tobacco Use    Smoking status: Every Day     Current packs/day: 0.50     Average packs/day: 0.5 packs/day for 13.0 years (6.5 ttl  pk-yrs)     Types: Cigarettes    Smokeless tobacco: Never   Vaping Use    Vaping status: Never Used   Substance Use Topics    Alcohol use: Not Currently     Comment: Alcohol intake:   None  - As per [place]     Drug use: No     Comment: Illicit drugs:   none  - As per [place]        Review of Systems   All other systems reviewed and are negative.      Physical Exam  Physical Exam  Vitals and nursing note reviewed.   Constitutional:       Appearance: She is normal weight.   HENT:      Head: Normocephalic and atraumatic.      Nose: Nose normal.      Mouth/Throat:      Mouth: Mucous membranes are moist.   Eyes:      Conjunctiva/sclera: Conjunctivae normal.   Pulmonary:      Effort: Pulmonary effort is normal.   Musculoskeletal:         General: Normal range of motion.      Cervical back: Normal range of motion.   Skin:     General: Skin is warm and dry.   Neurological:      General: No focal deficit present.      Mental Status: She is alert.   Psychiatric:         Mood and Affect: Mood normal.         Vital Signs  ED Triage Vitals   Temperature Pulse Respirations Blood Pressure SpO2   01/14/24 1735 01/14/24 1735 01/14/24 1735 01/14/24 1736 01/14/24 1735   (!) 97.4 °F (36.3 °C) 103 16 111/55 100 %      Temp Source Heart Rate Source Patient Position - Orthostatic VS BP Location FiO2 (%)   01/14/24 1735 -- -- -- --   Oral          Pain Score       01/14/24 1755       5           Vitals:    01/14/24 1735 01/14/24 1736   BP:  111/55   Pulse: 103          Visual Acuity      ED Medications  Medications   oxyCODONE-acetaminophen (PERCOCET) 5-325 mg per tablet 1 tablet (1 tablet Oral Given 1/14/24 1755)       Diagnostic Studies  Results Reviewed       None                   No orders to display              Procedures  Procedures         ED Course                                             Medical Decision Making  I evaluate patient.  Differential includes TMJ syndrome, dental infection.  Patient already on antibiotics,  segmental infection less likely and she already has established care with oral surgery and dental clinic.  I discussed need for continued follow-up, indications to return to the emergency department.  No evidence of deep space infection or Tk's at this time.    Risk  Prescription drug management.             Disposition  Final diagnoses:   TMJ syndrome     Time reflects when diagnosis was documented in both MDM as applicable and the Disposition within this note       Time User Action Codes Description Comment    1/14/2024  5:49 PM Ray Olmstead [M26.629] TMJ syndrome           ED Disposition       ED Disposition   Discharge    Condition   Stable    Date/Time   Sun Jan 14, 2024  5:48 PM    Comment   Christine Fay discharge to home/self care.                   Follow-up Information       Follow up With Specialties Details Why Contact Info    Dinesh Marie MD Family Medicine In 1 week  [address] [address]  [phone]              Discharge Medication List as of 1/14/2024  5:50 PM        START taking these medications    Details   oxyCODONE-acetaminophen (Percocet) 5-325 mg per tablet Take 1 tablet by mouth every 4 (four) hours as needed for moderate pain for up to 10 days Max Daily Amount: 6 tablets, Starting Sun 1/14/2024, Until Wed 1/24/2024 at 2359, Normal           CONTINUE these medications which have NOT CHANGED    Details   acetaminophen (TYLENOL) 500 mg tablet Take 2 tablets (1,000 mg total) by mouth every 6 (six) hours as needed for mild pain for up to 15 doses, Starting Tue 7/19/2022, Normal      !! chlorhexidine (PERIDEX) 0.12 % solution APPLY 15 ML TO THE MOUTH OR THROAT TWICE A DAY FOR 7 DAYS, Historical Med      !! chlorhexidine (PERIDEX) 0.12 % solution Apply 15 mL to the mouth or throat 2 (two) times a day, Starting Fri 4/21/2023, Normal      !! chlorhexidine (PERIDEX) 0.12 % solution Apply 15 mL to the mouth or throat 2 (two) times a day, Starting Sat  9/23/2023, Normal      famotidine (PEPCID) 40 MG tablet TAKE 1 TABLET (40 MG TOTAL) BY MOUTH DAILY BEFORE DINNER, Starting Tue 12/5/2023, Normal      ibuprofen (MOTRIN) 800 mg tablet Take 1 tablet (800 mg total) by mouth every 6 (six) hours as needed for mild pain for up to 30 doses, Starting Tue 7/19/2022, Normal      metoclopramide (Reglan) 10 mg tablet Take 1 tablet (10 mg total) by mouth 4 (four) times a day, Starting Mon 12/18/2023, Normal      omeprazole (PriLOSEC) 40 MG capsule Take 1 capsule (40 mg total) by mouth daily before breakfast, Starting Thu 2/23/2023, Normal      ondansetron (ZOFRAN) 4 mg tablet Take 1 tablet (4 mg total) by mouth every 8 (eight) hours as needed for nausea or vomiting, Starting Sun 9/3/2023, Normal      ondansetron (ZOFRAN-ODT) 4 mg disintegrating tablet TAKE 1 TABLET (4 MG TOTAL) BY MOUTH EVERY 6 (SIX) HOURS AS NEEDED FOR NAUSEA OR VOMITING, Starting Fri 1/12/2024, Normal      phenazopyridine (PYRIDIUM) 200 mg tablet Take 1 tablet (200 mg total) by mouth 3 (three) times a day, Starting Sat 2/4/2023, Normal       !! - Potential duplicate medications found. Please discuss with provider.          No discharge procedures on file.    PDMP Review         Value Time User    PDMP Reviewed  Yes 9/23/2023 10:19 PM Nely Reeves MD            ED Provider  Electronically Signed by             Ray Olmstead MD  01/14/24 1432

## 2024-01-22 ENCOUNTER — TELEPHONE (OUTPATIENT)
Dept: FAMILY MEDICINE CLINIC | Facility: CLINIC | Age: 34
End: 2024-01-22

## 2024-01-22 NOTE — TELEPHONE ENCOUNTER
Left message for patient to schedule an appointment. Patient hasn't been seen since 2/23/23 and requires office visit for future med refills.

## 2024-02-04 ENCOUNTER — APPOINTMENT (EMERGENCY)
Dept: CT IMAGING | Facility: HOSPITAL | Age: 34
End: 2024-02-04
Payer: COMMERCIAL

## 2024-02-04 ENCOUNTER — HOSPITAL ENCOUNTER (EMERGENCY)
Facility: HOSPITAL | Age: 34
Discharge: HOME | End: 2024-02-04
Attending: EMERGENCY MEDICINE
Payer: COMMERCIAL

## 2024-02-04 VITALS
HEART RATE: 82 BPM | SYSTOLIC BLOOD PRESSURE: 112 MMHG | RESPIRATION RATE: 18 BRPM | TEMPERATURE: 98.1 F | OXYGEN SATURATION: 99 % | DIASTOLIC BLOOD PRESSURE: 68 MMHG

## 2024-02-04 DIAGNOSIS — K59.00 CONSTIPATION: ICD-10-CM

## 2024-02-04 DIAGNOSIS — N39.0 UTI (URINARY TRACT INFECTION): Primary | ICD-10-CM

## 2024-02-04 DIAGNOSIS — R10.9 ABDOMINAL PAIN: ICD-10-CM

## 2024-02-04 LAB
ALBUMIN SERPL BCP-MCNC: 4.4 G/DL (ref 3.5–5)
ALP SERPL-CCNC: 47 U/L (ref 34–104)
ALT SERPL W P-5'-P-CCNC: 8 U/L (ref 7–52)
ANION GAP SERPL CALCULATED.3IONS-SCNC: 5 MMOL/L
AST SERPL W P-5'-P-CCNC: 10 U/L (ref 13–39)
BACTERIA UR QL AUTO: ABNORMAL /HPF
BASOPHILS # BLD AUTO: 0.05 THOUSANDS/ÂΜL (ref 0–0.1)
BASOPHILS NFR BLD AUTO: 1 % (ref 0–1)
BILIRUB SERPL-MCNC: 0.25 MG/DL (ref 0.2–1)
BILIRUB UR QL STRIP: NEGATIVE
BUN SERPL-MCNC: 10 MG/DL (ref 5–25)
CALCIUM SERPL-MCNC: 9.5 MG/DL (ref 8.4–10.2)
CHLORIDE SERPL-SCNC: 107 MMOL/L (ref 96–108)
CLARITY UR: CLEAR
CO2 SERPL-SCNC: 25 MMOL/L (ref 21–32)
COLOR UR: ABNORMAL
CREAT SERPL-MCNC: 0.79 MG/DL (ref 0.6–1.3)
EOSINOPHIL # BLD AUTO: 0.21 THOUSAND/ÂΜL (ref 0–0.61)
EOSINOPHIL NFR BLD AUTO: 3 % (ref 0–6)
ERYTHROCYTE [DISTWIDTH] IN BLOOD BY AUTOMATED COUNT: 12.6 % (ref 11.6–15.1)
EXT PREGNANCY TEST URINE: NEGATIVE
EXT. CONTROL: NORMAL
GFR SERPL CREATININE-BSD FRML MDRD: 98 ML/MIN/1.73SQ M
GLUCOSE SERPL-MCNC: 94 MG/DL (ref 65–140)
GLUCOSE UR STRIP-MCNC: NEGATIVE MG/DL
HCT VFR BLD AUTO: 40.7 % (ref 34.8–46.1)
HGB BLD-MCNC: 13.7 G/DL (ref 11.5–15.4)
HGB UR QL STRIP.AUTO: NEGATIVE
IMM GRANULOCYTES # BLD AUTO: 0.02 THOUSAND/UL (ref 0–0.2)
IMM GRANULOCYTES NFR BLD AUTO: 0 % (ref 0–2)
KETONES UR STRIP-MCNC: NEGATIVE MG/DL
LEUKOCYTE ESTERASE UR QL STRIP: ABNORMAL
LIPASE SERPL-CCNC: 11 U/L (ref 11–82)
LYMPHOCYTES # BLD AUTO: 2.23 THOUSANDS/ÂΜL (ref 0.6–4.47)
LYMPHOCYTES NFR BLD AUTO: 35 % (ref 14–44)
MCH RBC QN AUTO: 30.2 PG (ref 26.8–34.3)
MCHC RBC AUTO-ENTMCNC: 33.7 G/DL (ref 31.4–37.4)
MCV RBC AUTO: 90 FL (ref 82–98)
MONOCYTES # BLD AUTO: 0.51 THOUSAND/ÂΜL (ref 0.17–1.22)
MONOCYTES NFR BLD AUTO: 8 % (ref 4–12)
NEUTROPHILS # BLD AUTO: 3.44 THOUSANDS/ÂΜL (ref 1.85–7.62)
NEUTS SEG NFR BLD AUTO: 53 % (ref 43–75)
NITRITE UR QL STRIP: NEGATIVE
NON-SQ EPI CELLS URNS QL MICRO: ABNORMAL /HPF
NRBC BLD AUTO-RTO: 0 /100 WBCS
PH UR STRIP.AUTO: 5.5 [PH]
PLATELET # BLD AUTO: 278 THOUSANDS/UL (ref 149–390)
PMV BLD AUTO: 9.6 FL (ref 8.9–12.7)
POTASSIUM SERPL-SCNC: 4 MMOL/L (ref 3.5–5.3)
PROT SERPL-MCNC: 6.8 G/DL (ref 6.4–8.4)
PROT UR STRIP-MCNC: NEGATIVE MG/DL
RBC # BLD AUTO: 4.54 MILLION/UL (ref 3.81–5.12)
RBC #/AREA URNS AUTO: ABNORMAL /HPF
SODIUM SERPL-SCNC: 137 MMOL/L (ref 135–147)
SP GR UR STRIP.AUTO: 1.01 (ref 1–1.03)
UROBILINOGEN UR STRIP-ACNC: <2 MG/DL
WBC # BLD AUTO: 6.46 THOUSAND/UL (ref 4.31–10.16)
WBC #/AREA URNS AUTO: ABNORMAL /HPF

## 2024-02-04 PROCEDURE — 83690 ASSAY OF LIPASE: CPT | Performed by: EMERGENCY MEDICINE

## 2024-02-04 PROCEDURE — 96376 TX/PRO/DX INJ SAME DRUG ADON: CPT

## 2024-02-04 PROCEDURE — 99285 EMERGENCY DEPT VISIT HI MDM: CPT | Performed by: EMERGENCY MEDICINE

## 2024-02-04 PROCEDURE — 87086 URINE CULTURE/COLONY COUNT: CPT | Performed by: EMERGENCY MEDICINE

## 2024-02-04 PROCEDURE — 96374 THER/PROPH/DIAG INJ IV PUSH: CPT

## 2024-02-04 PROCEDURE — 36415 COLL VENOUS BLD VENIPUNCTURE: CPT | Performed by: EMERGENCY MEDICINE

## 2024-02-04 PROCEDURE — 80053 COMPREHEN METABOLIC PANEL: CPT | Performed by: EMERGENCY MEDICINE

## 2024-02-04 PROCEDURE — 99284 EMERGENCY DEPT VISIT MOD MDM: CPT

## 2024-02-04 PROCEDURE — 74177 CT ABD & PELVIS W/CONTRAST: CPT

## 2024-02-04 PROCEDURE — 81025 URINE PREGNANCY TEST: CPT | Performed by: EMERGENCY MEDICINE

## 2024-02-04 PROCEDURE — 81001 URINALYSIS AUTO W/SCOPE: CPT | Performed by: EMERGENCY MEDICINE

## 2024-02-04 PROCEDURE — 96375 TX/PRO/DX INJ NEW DRUG ADDON: CPT

## 2024-02-04 PROCEDURE — G1004 CDSM NDSC: HCPCS

## 2024-02-04 PROCEDURE — 87186 SC STD MICRODIL/AGAR DIL: CPT | Performed by: EMERGENCY MEDICINE

## 2024-02-04 PROCEDURE — 85025 COMPLETE CBC W/AUTO DIFF WBC: CPT | Performed by: EMERGENCY MEDICINE

## 2024-02-04 PROCEDURE — 87077 CULTURE AEROBIC IDENTIFY: CPT | Performed by: EMERGENCY MEDICINE

## 2024-02-04 RX ORDER — MORPHINE SULFATE 4 MG/ML
4 INJECTION, SOLUTION INTRAMUSCULAR; INTRAVENOUS ONCE
Status: COMPLETED | OUTPATIENT
Start: 2024-02-04 | End: 2024-02-04

## 2024-02-04 RX ORDER — LACTULOSE 10 G/15ML
20 SOLUTION ORAL 2 TIMES DAILY
Qty: 240 ML | Refills: 0 | Status: SHIPPED | OUTPATIENT
Start: 2024-02-04

## 2024-02-04 RX ORDER — POLYETHYLENE GLYCOL 3350 17 G/17G
17 POWDER, FOR SOLUTION ORAL DAILY
Qty: 225 G | Refills: 0 | Status: SHIPPED | OUTPATIENT
Start: 2024-02-04

## 2024-02-04 RX ORDER — SENNOSIDES 8.6 MG
1 TABLET ORAL ONCE
Status: COMPLETED | OUTPATIENT
Start: 2024-02-04 | End: 2024-02-04

## 2024-02-04 RX ORDER — MAGNESIUM CARB/ALUMINUM HYDROX 105-160MG
296 TABLET,CHEWABLE ORAL ONCE
Qty: 296 ML | Refills: 0 | Status: SHIPPED | OUTPATIENT
Start: 2024-02-04 | End: 2024-02-04

## 2024-02-04 RX ORDER — LACTULOSE 10 G/15ML
20 SOLUTION ORAL ONCE
Status: COMPLETED | OUTPATIENT
Start: 2024-02-04 | End: 2024-02-04

## 2024-02-04 RX ORDER — POLYETHYLENE GLYCOL 3350 17 G/17G
17 POWDER, FOR SOLUTION ORAL ONCE
Status: COMPLETED | OUTPATIENT
Start: 2024-02-04 | End: 2024-02-04

## 2024-02-04 RX ORDER — CEPHALEXIN 250 MG/1
500 CAPSULE ORAL ONCE
Status: COMPLETED | OUTPATIENT
Start: 2024-02-04 | End: 2024-02-04

## 2024-02-04 RX ORDER — ONDANSETRON 2 MG/ML
4 INJECTION INTRAMUSCULAR; INTRAVENOUS ONCE
Status: COMPLETED | OUTPATIENT
Start: 2024-02-04 | End: 2024-02-04

## 2024-02-04 RX ORDER — MAGNESIUM CARB/ALUMINUM HYDROX 105-160MG
296 TABLET,CHEWABLE ORAL ONCE
Status: COMPLETED | OUTPATIENT
Start: 2024-02-04 | End: 2024-02-04

## 2024-02-04 RX ORDER — CEPHALEXIN 500 MG/1
500 CAPSULE ORAL EVERY 6 HOURS SCHEDULED
Qty: 20 CAPSULE | Refills: 0 | Status: SHIPPED | OUTPATIENT
Start: 2024-02-04 | End: 2024-02-09

## 2024-02-04 RX ADMIN — ONDANSETRON 4 MG: 2 INJECTION INTRAMUSCULAR; INTRAVENOUS at 01:48

## 2024-02-04 RX ADMIN — POLYETHYLENE GLYCOL 3350 17 G: 17 POWDER, FOR SOLUTION ORAL at 03:25

## 2024-02-04 RX ADMIN — SENNOSIDES 8.6 MG: 8.6 TABLET, FILM COATED ORAL at 03:25

## 2024-02-04 RX ADMIN — MAGNESIUM CITRATE 296 ML: 1.75 LIQUID ORAL at 03:25

## 2024-02-04 RX ADMIN — CEPHALEXIN 500 MG: 250 CAPSULE ORAL at 03:25

## 2024-02-04 RX ADMIN — MORPHINE SULFATE 2 MG: 2 INJECTION, SOLUTION INTRAMUSCULAR; INTRAVENOUS at 03:41

## 2024-02-04 RX ADMIN — MORPHINE SULFATE 4 MG: 4 INJECTION INTRAVENOUS at 01:48

## 2024-02-04 RX ADMIN — IOHEXOL 80 ML: 350 INJECTION, SOLUTION INTRAVENOUS at 02:27

## 2024-02-04 RX ADMIN — LACTULOSE 20 G: 20 SOLUTION ORAL at 03:25

## 2024-02-04 NOTE — DISCHARGE INSTRUCTIONS
Today you were seen in the emergency department for constipation, pain/itching with urination. Your workup included labs, CT imaging. I believe your symptoms to be the result of opioid induced constipation and urinary tract infection at this time, there does not appear to be an emergent life threatening cause to explain your symptoms. You are stable for discharge home with outpatient follow up.     You have been prescribed medicines to help pass bowel movements.  Please take these as prescribed.  These include MiraLAX, lactulose and magnesium citrate.    For urinary tract infection please take Keflex 500 mg every 6 hours for the next 5 days.      Please follow up with your primary care provider in the next 2-3 days. Please review all results discussed today with your primary care provider.     Please return to the emergency department as soon as possible if you develop uncontrollable fevers (Temp >100.4), uncontrollable pain, worsening constipation, inability to eat or drink, inability to urinate, vomiting, chest pain, trouble breathing, or any other concerning symptoms.     Thank you for choosing [facility] for your care.

## 2024-02-04 NOTE — ED PROVIDER NOTES
History  Chief Complaint   Patient presents with    Constipation     Patient comes in reporting not having a normal bm for past 3 weeks. States she has been using suppository and enemas at home w/o relief. Patient reports cramping on B/L sides of abd radiating center. Rates pain 9/10    Possible UTI     Patient reports hx of UTI's states similar symptoms. Burning when urinating., increased frequency and itching.      33-year-old female with history of prior ESBL UTI, depression/anxiety, GERD presenting to the ED with complaints of persistent worsening 9/10 intermittent bilateral flank pain radiating down to her lower abdomen associated with dysuria for the past week.  Patient also complaining of intermittent constipation for the past 3 weeks with decreased bowel movements.  She states she has been having on and off abdominal pain for the past weeks due to her constipation, intermittently passing small hard stools without blood.  She has been taking over-the-counter stool softener, suppositories and enemas without relief.  She states she has also been taking ibuprofen and Tylenol without relief of her pain.  She started developing worsening pain in bilateral flanks over the past week, now with pain and itching with urination feeling prior UTI.  Also reports feeling nauseous today, without vomiting.  Pain unchanged with eating, cannot tolerate p.o.  She she reports recent use of opioids for TMJ pain.  Denies recent sick contacts.  Denies fever, chills, headache, syncope, numbness, tingling, cough, congestion, sore throat, chest pain, shortness of breath, diarrhea, bloody/tarry stools, hematuria, leg pain, leg swelling, rash, vaginal bleeding, vaginal discharge.        Prior to Admission Medications   Prescriptions Last Dose Informant Patient Reported? Taking?   acetaminophen (TYLENOL) 500 mg tablet   No No   Sig: Take 2 tablets (1,000 mg total) by mouth every 6 (six) hours as needed for mild pain for up to 15 doses    chlorhexidine (PERIDEX) 0.12 % solution   Yes No   Sig: APPLY 15 ML TO THE MOUTH OR THROAT TWICE A DAY FOR 7 DAYS   Patient not taking: Reported on 2/23/2023   chlorhexidine (PERIDEX) 0.12 % solution   No No   Sig: Apply 15 mL to the mouth or throat 2 (two) times a day   chlorhexidine (PERIDEX) 0.12 % solution   No No   Sig: Apply 15 mL to the mouth or throat 2 (two) times a day   famotidine (PEPCID) 40 MG tablet   No No   Sig: TAKE 1 TABLET (40 MG TOTAL) BY MOUTH DAILY BEFORE DINNER   ibuprofen (MOTRIN) 800 mg tablet   No No   Sig: Take 1 tablet (800 mg total) by mouth every 6 (six) hours as needed for mild pain for up to 30 doses   metoclopramide (Reglan) 10 mg tablet   No No   Sig: Take 1 tablet (10 mg total) by mouth 4 (four) times a day   omeprazole (PriLOSEC) 40 MG capsule   No No   Sig: Take 1 capsule (40 mg total) by mouth daily before breakfast   ondansetron (ZOFRAN) 4 mg tablet   No No   Sig: Take 1 tablet (4 mg total) by mouth every 8 (eight) hours as needed for nausea or vomiting   ondansetron (ZOFRAN-ODT) 4 mg disintegrating tablet   No No   Sig: TAKE 1 TABLET (4 MG TOTAL) BY MOUTH EVERY 6 (SIX) HOURS AS NEEDED FOR NAUSEA OR VOMITING   phenazopyridine (PYRIDIUM) 200 mg tablet   No No   Sig: Take 1 tablet (200 mg total) by mouth 3 (three) times a day      Facility-Administered Medications: None       Past Medical History:   Diagnosis Date    Anxiety     Asthma     Depression     GERD (gastroesophageal reflux disease)     Hernia, abdominal     Migraines     Muscle spasm     Psychiatric disorder     Anx, Depression    Renal disorder     kidney stones       Past Surgical History:   Procedure Laterality Date    CYSTOSCOPY      removal of kidney stone    FL RETROGRADE PYELOGRAM  8/28/2018    HERNIA REPAIR      AK CYSTO/URETERO W/LITHOTRIPSY &INDWELL STENT INSRT Right 8/28/2018    Procedure: CYSTOSCOPY URETEROSCOPY WITH RETROGRADE PYELOGRAM AND INSERTION STENT URETERAL;  Surgeon: North Arauz MD;   Location: [facility];  Service: Urology    TOOTH EXTRACTION         Family History   Problem Relation Age of Onset    Diabetes Mother     Hyperlipidemia Mother     Stroke Mother     Heart disease Mother     Asthma Mother     Other Mother         Degen. of Intervertabral disc.    Nephrolithiasis Father     Nephrolithiasis Brother      I have reviewed and agree with the history as documented.    E-Cigarette/Vaping    E-Cigarette Use Never User      E-Cigarette/Vaping Substances    Nicotine No     THC No     CBD No     Flavoring No     Other No     Unknown No      Social History     Tobacco Use    Smoking status: Every Day     Current packs/day: 0.50     Average packs/day: 0.5 packs/day for 13.0 years (6.5 ttl pk-yrs)     Types: Cigarettes    Smokeless tobacco: Never   Vaping Use    Vaping status: Never Used   Substance Use Topics    Alcohol use: Not Currently     Comment: Alcohol intake:   None  - As per Tyrone     Drug use: No     Comment: Illicit drugs:   none  - As per Afsaneh         Review of Systems   Constitutional:  Negative for chills and fever.   HENT:  Negative for congestion and sore throat.    Eyes:  Negative for photophobia, pain, redness and visual disturbance.   Respiratory:  Negative for cough, chest tightness and shortness of breath.    Cardiovascular:  Negative for chest pain and palpitations.   Gastrointestinal:  Positive for abdominal pain, constipation and nausea. Negative for blood in stool, diarrhea and vomiting.   Genitourinary:  Positive for dysuria, flank pain and frequency. Negative for difficulty urinating, hematuria, pelvic pain, urgency, vaginal bleeding, vaginal discharge and vaginal pain.   Musculoskeletal:  Positive for back pain. Negative for arthralgias, neck pain and neck stiffness.   Skin:  Negative for color change and rash.   Neurological:  Negative for dizziness, seizures, syncope, facial asymmetry, speech difficulty, weakness, light-headedness, numbness and headaches.   All  other systems reviewed and are negative.      Physical Exam  ED Triage Vitals   Temperature Pulse Respirations Blood Pressure SpO2   02/04/24 0102 02/04/24 0102 02/04/24 0102 02/04/24 0102 02/04/24 0102   98.1 °F (36.7 °C) 82 18 112/68 99 %      Temp Source Heart Rate Source Patient Position - Orthostatic VS BP Location FiO2 (%)   02/04/24 0102 02/04/24 0102 02/04/24 0102 02/04/24 0102 --   Oral Monitor Sitting Right arm       Pain Score       02/04/24 0148       9             Orthostatic Vital Signs  Vitals:    02/04/24 0102   BP: 112/68   Pulse: 82   Patient Position - Orthostatic VS: Sitting       Physical Exam  Vitals and nursing note reviewed.   Constitutional:       General: She is not in acute distress.     Appearance: She is well-developed. She is not ill-appearing or toxic-appearing.   HENT:      Head: Normocephalic and atraumatic.      Right Ear: External ear normal.      Left Ear: External ear normal.      Nose: Nose normal. No congestion.      Mouth/Throat:      Mouth: Mucous membranes are moist.      Pharynx: Oropharynx is clear. No oropharyngeal exudate or posterior oropharyngeal erythema.   Eyes:      Extraocular Movements: Extraocular movements intact.      Conjunctiva/sclera: Conjunctivae normal.      Pupils: Pupils are equal, round, and reactive to light.   Cardiovascular:      Rate and Rhythm: Normal rate and regular rhythm.      Pulses: Normal pulses.      Heart sounds: Normal heart sounds. No murmur heard.  Pulmonary:      Effort: Pulmonary effort is normal. No respiratory distress.      Breath sounds: Normal breath sounds. No stridor. No wheezing, rhonchi or rales.   Abdominal:      General: Abdomen is flat. Bowel sounds are normal.      Palpations: Abdomen is soft. There is no hepatomegaly or splenomegaly.      Tenderness: There is generalized abdominal tenderness. There is right CVA tenderness and left CVA tenderness. There is no guarding or rebound. Negative signs include Abbott's sign,  McBurney's sign and psoas sign.   Musculoskeletal:         General: No swelling, tenderness or deformity.      Cervical back: Normal range of motion and neck supple. No rigidity or tenderness.      Right lower leg: No edema.      Left lower leg: No edema.   Lymphadenopathy:      Cervical: No cervical adenopathy.   Skin:     General: Skin is warm and dry.      Capillary Refill: Capillary refill takes less than 2 seconds.      Coloration: Skin is not jaundiced or pale.      Findings: No bruising, erythema, lesion or rash.   Neurological:      General: No focal deficit present.      Mental Status: She is alert and oriented to person, place, and time. Mental status is at baseline.      GCS: GCS eye subscore is 4. GCS verbal subscore is 5. GCS motor subscore is 6.      Cranial Nerves: Cranial nerves 2-12 are intact. No cranial nerve deficit, dysarthria or facial asymmetry.      Sensory: Sensation is intact. No sensory deficit.      Motor: Motor function is intact. No weakness.      Coordination: Coordination is intact. Coordination normal.      Gait: Gait is intact.   Psychiatric:         Mood and Affect: Mood normal.         Behavior: Behavior normal.         Thought Content: Thought content normal.         ED Medications  Medications   ondansetron (ZOFRAN) injection 4 mg (4 mg Intravenous Given 2/4/24 0148)   morphine injection 4 mg (4 mg Intravenous Given 2/4/24 0148)   iohexol (OMNIPAQUE) 350 MG/ML injection (MULTI-DOSE) 80 mL (80 mL Intravenous Given 2/4/24 0227)   polyethylene glycol (MIRALAX) packet 17 g (17 g Oral Given 2/4/24 0325)   magnesium citrate (CITROMA) oral solution 296 mL (296 mL Oral Given 2/4/24 0325)   senna (SENOKOT) tablet 8.6 mg (8.6 mg Oral Given 2/4/24 0325)   lactulose (CHRONULAC) oral solution 20 g (20 g Oral Given 2/4/24 0325)   cephalexin (KEFLEX) capsule 500 mg (500 mg Oral Given 2/4/24 0325)   morphine injection 2 mg (2 mg Intravenous Given 2/4/24 0341)       Diagnostic Studies  Results  Reviewed       Procedure Component Value Units Date/Time    Comprehensive metabolic panel [[id number]]  (Abnormal) Collected: 02/04/24 0139    Lab Status: Final result Specimen: Blood from Arm, Right Updated: 02/04/24 0204     Sodium 137 mmol/L      Potassium 4.0 mmol/L      Chloride 107 mmol/L      CO2 25 mmol/L      ANION GAP 5 mmol/L      BUN 10 mg/dL      Creatinine 0.79 mg/dL      Glucose 94 mg/dL      Calcium 9.5 mg/dL      AST 10 U/L      ALT 8 U/L      Alkaline Phosphatase 47 U/L      Total Protein 6.8 g/dL      Albumin 4.4 g/dL      Total Bilirubin 0.25 mg/dL      eGFR 98 ml/min/1.73sq m     Narrative:      National Kidney Disease Foundation guidelines for Chronic Kidney Disease (CKD):     Stage 1 with normal or high GFR (GFR > 90 mL/min/1.73 square meters)    Stage 2 Mild CKD (GFR = 60-89 mL/min/1.73 square meters)    Stage 3A Moderate CKD (GFR = 45-59 mL/min/1.73 square meters)    Stage 3B Moderate CKD (GFR = 30-44 mL/min/1.73 square meters)    Stage 4 Severe CKD (GFR = 15-29 mL/min/1.73 square meters)    Stage 5 End Stage CKD (GFR <15 mL/min/1.73 square meters)  Note: GFR calculation is accurate only with a steady state creatinine    Lipase [[id number]]  (Normal) Collected: 02/04/24 0139    Lab Status: Final result Specimen: Blood from Arm, Right Updated: 02/04/24 0204     Lipase 11 u/L     Urine Microscopic [[id number]]  (Abnormal) Collected: 02/04/24 0140    Lab Status: Final result Specimen: Urine, Clean Catch Updated: 02/04/24 0155     RBC, UA 1-2 /hpf      WBC, UA 30-50 /hpf      Epithelial Cells Occasional /hpf      Bacteria, UA Occasional /hpf     Urine culture [[id number]] Collected: 02/04/24 0140    Lab Status: In process Specimen: Urine, Clean Catch Updated: 02/04/24 0154    UA w Reflex to Microscopic w Reflex to Culture [[id number]]  (Abnormal) Collected: 02/04/24 0140    Lab Status: Final result Specimen: Urine, Clean Catch Updated: 02/04/24 0154     Color, UA Light Yellow     Clarity, UA Clear      Specific Gravity, UA 1.010     pH, UA 5.5     Leukocytes, UA Moderate     Nitrite, UA Negative     Protein, UA Negative mg/dl      Glucose, UA Negative mg/dl      Ketones, UA Negative mg/dl      Urobilinogen, UA <2.0 mg/dl      Bilirubin, UA Negative     Occult Blood, UA Negative    CBC and differential [[id number]] Collected: 02/04/24 0139    Lab Status: Final result Specimen: Blood from Arm, Right Updated: 02/04/24 0149     WBC 6.46 Thousand/uL      RBC 4.54 Million/uL      Hemoglobin 13.7 g/dL      Hematocrit 40.7 %      MCV 90 fL      MCH 30.2 pg      MCHC 33.7 g/dL      RDW 12.6 %      MPV 9.6 fL      Platelets 278 Thousands/uL      nRBC 0 /100 WBCs      Neutrophils Relative 53 %      Immat GRANS % 0 %      Lymphocytes Relative 35 %      Monocytes Relative 8 %      Eosinophils Relative 3 %      Basophils Relative 1 %      Neutrophils Absolute 3.44 Thousands/µL      Immature Grans Absolute 0.02 Thousand/uL      Lymphocytes Absolute 2.23 Thousands/µL      Monocytes Absolute 0.51 Thousand/µL      Eosinophils Absolute 0.21 Thousand/µL      Basophils Absolute 0.05 Thousands/µL     POCT pregnancy, urine [[id number]]  (Normal) Resulted: 02/04/24 0145    Lab Status: Final result Updated: 02/04/24 0145     EXT Preg Test, Ur Negative     Control Valid                   CT abdomen pelvis with contrast   Final Result by Pranay Pitts MD (02/04 0306)      1.  Moderate to large colonic stool burden   2.  Bladder wall thickening, correlate for cystitis         Workstation performed: [id number]               Procedures  Procedures      ED Course  ED Course as of 02/04/24 0353   Sun Feb 04, 2024   0110 History of ESBL UTI September 2023.   0111 Pulse(!): 20   0210 CBC and differential  No leukocytosis. No anemia, Hgb 13.7. Platelets wnl.    0210 Comprehensive metabolic panel(!)  No acute electrolyte abnormality. Renal function at baseline. No elevated LFTs.    0211 Blood, UA: Negative   0211 Leukocytes, UA(!): Moderate   0211  Lipase: 11  WNL   0211 WBC, UA(!): 30-50   0211 Epithelial Cells: Occasional   0211 Bacteria, UA: Occasional   0211 PREGNANCY TEST URINE: Negative  Negative   0214 UA with +LE, neg Nitrites. Micro with significant pyuria 30-50 WBC, and occasional bacteria and epithelial cells. Suspect UTI, awaiting cx.     Reviewed urine cultures from 9/13/2023:   >100,000 CFU/ml  **ESCHERICHIA COLI ESBL ** This extended spectrum beta-lactamase (ESBL)  is resistant to all Penicillins, Cephalosporins and Aztreonam. Infection control precautions recommended.    Patient treated with Keflex for 5 days at that time.  Will again treat with keflex 500mg q6H for 5 days. Will give first dose in ED.    0233 PDMP, patient had Percocet prescription filled on 1/15 2024 for 12 pills.  And oxycodone prescription filled on 1/5/20/2024 for 7 pills.  Last narcotic prescription prior to both levels was in early November 2023 for [place].   0301 CT scan on my independent interpretation with significant stool burden in the large intestine.  Pain initially improved after morphine, however pain returned.  Suspect this to be secondary to opioid-induced constipation, will give lactulose, mag citrate, senna and MiraLAX.  May consider enema.  CT scan pending   0308 CT abdomen pelvis with contrast  1.  Moderate to large colonic stool burden  2.  Bladder wall thickening, correlate for cystitis   0353 Patient requesting additional pain medications.  2 mg morphine given.  Patient feels comfortable managing symptoms at home, reviewed strict return precautions with patient along with care instructions.  She is agreeable discharge plan.  Advise follow-up with her PMD or return to the ED for worsening symptoms.                             SBIRT 22yo+      Flowsheet Row Most Recent Value   Initial Alcohol Screen: US AUDIT-C     1. How often do you have a drink containing alcohol? 0 Filed at: 02/04/2024 0102   2. How many drinks containing alcohol do you  "have on a typical day you are drinking?  0 Filed at: 02/04/2024 0102   3a. Male UNDER 65: How often do you have five or more drinks on one occasion? 0 Filed at: 02/04/2024 0102   3b. FEMALE Any Age, or MALE 65+: How often do you have 4 or more drinks on one occassion? 0 Filed at: 02/04/2024 0102   Audit-C Score 0 Filed at: 02/04/2024 0102   CRISTINO: How many times in the past year have you...    Used an illegal drug or used a prescription medication for non-medical reasons? Never Filed at: 02/04/2024 0102                  Medical Decision Making  Patient with history as above presented to triage with CC of \" Patient presents with:  Constipation: Patient comes in reporting not having a normal bm for past 3 weeks. States she has been using suppository and enemas at home w/o relief. Patient reports cramping on B/L sides of abd radiating center. Rates pain 9/10  Possible UTI: Patient reports hx of UTI's states similar symptoms. Burning when urinating., increased frequency and itching.    \"    Hx obtained from pt    33-year-old female with history of anxiety/depression, recent opioid use for TMJ, GERD and history of ESBL UTI presenting to the ED with complaints of abdominal pain/constipation for 3 weeks, with decreased BMs along with lower abdominal pain/flank pain consistent with prior UTIs. Abdominal exam without peritoneal signs. No evidence of acute abdomen at this time. Low suspicion for acute hepatobiliary disease (includng acute cholecystitis), acute pancreatitis, PUD (including perforation), acute appendicitis, vascular catastrophe, bowel obstruction or viscus perforation. Presentation not consistent with other acute, emergent causes of abdominal pain at this time.  At this time patient's pain likely secondary to constipation versus cystitis.  Patient with mild bilateral CVA tenderness, however without systemic signs other than nausea.    Plan: labs, UA, CT AP, pain control, serial reassessment    Patient was " initially improvement in pain after morphine, repeat dose given. nausea resolved.  Labs significant for UA with significant pyuria, plus leukocyte esterase concerning for UTI.  Reviewed prior cultures, will treat with Keflex 500 mg every 6 hours for 5 days.  CBC, CMP, lipase unremarkable.  Urine Prag negative.  CT abdomen pelvis significant for large stool burden, and changes concerning for cystitis.  Suspect opioid-induced constipation at this time given recent opiate use for TMJ.  Will treat with MiraLAX, senna, mag citrate, and lactulose in the ED, and sent home with prescriptions for MiraLAX, mag citrate and lactulose to take at home.  Reviewed strict return precautions with patient along with care instructions at home.  She was advised to follow-up with her PCP, return for any worsening symptoms.  Patient agreeable discharge plan.    Patient was nontoxic appearing and stable. Exam as above. Ambulatory and Tolerating PO.     I have independently ordered, reviewed and interpreted the following: labs and/or imaging studies listed above.  Reviewed external records including notes, and prior labs/imaging results.    DDx including but not limited to: Constipation, obstipation, pseudo-obstruction, fecal impaction, bowel obstruction, ileus; doubt acute surgical intraabdominal process. DDx including but not limited to: UTI, interstitial cystitis, pyelonephritis, vaginitis; doubt acute surgical intraabdominal process or renal colic.     Consideration was given for admission, but the patient was stable for outpatient management.    Disposition: Discussed need for follow up with their primary doctor or specialist to review all results, including incidental findings as above. Patient discharged with explanation of ED workup and diagnosis, instructions on how to obtain outpatient follow up, care instructions at home, and strict return precautions if patient develops new or worsening symptoms. Patients questions answered and  agreeable with discharge plan.     See ED Course for further MDM.      PLEASE NOTE:  This encounter was completed utilizing the [employer] Direct Speech Voice Recognition Software. Grammatical errors, random word insertions, pronoun errors and incomplete sentences are occasional inherent consequences of the system due to software limitations, ambient noise and hardware issues.These may be missed by proof reading prior to affixing electronic signature. Any questions or concerns about the content, text or information contained within the body of this dictation should be directly addressed to the physician for clarification. Please do not hesitate to call me directly if you have any questions or concerns.      Amount and/or Complexity of Data Reviewed  External Data Reviewed: labs and notes.  Labs: ordered. Decision-making details documented in ED Course.  Radiology: ordered. Decision-making details documented in ED Course.    Risk  OTC drugs.  Prescription drug management.          Disposition  Final diagnoses:   UTI (urinary tract infection)   Constipation   Abdominal pain     Time reflects when diagnosis was documented in both MDM as applicable and the Disposition within this note       Time User Action Codes Description Comment    2/4/2024  3:03 AM Wiliam Bowden Add [N39.0] UTI (urinary tract infection)     2/4/2024  3:03 AM Wiliam Bowden Add [K59.00] Constipation     2/4/2024  3:03 AM Wiliam Bowden Add [R10.9] Abdominal pain           ED Disposition       ED Disposition   Discharge    Condition   Stable    Date/Time   Sun Feb 4, 2024 [id number]    Comment   Christine Fay discharge to home/self care.                   Follow-up Information       Follow up With Specialties Details Why Contact Info    Dinesh Marie MD Family Medicine  Please call tomorrow to schedule an appointment [address] [place] [address] [id number] [phone]              Discharge Medication List as of 2/4/2024  3:39 AM         START taking these medications    Details   cephalexin (KEFLEX) 500 mg capsule Take 1 capsule (500 mg total) by mouth every 6 (six) hours for 5 days, Starting Sun 2/4/2024, Until Fri 2/9/2024, Normal      lactulose (CHRONULAC) 10 g/15 mL solution Take 30 mL (20 g total) by mouth 2 (two) times a day, Starting Sun 2/4/2024, Normal      magnesium citrate (CITROMA) 1.745 g/30 mL oral solution Take 296 mL by mouth once for 1 dose Prn constipation, Starting Sun 2/4/2024, Print      polyethylene glycol (GLYCOLAX) 17 GM/SCOOP powder Take 17 g by mouth daily, Starting Sun 2/4/2024, Normal           CONTINUE these medications which have NOT CHANGED    Details   acetaminophen (TYLENOL) 500 mg tablet Take 2 tablets (1,000 mg total) by mouth every 6 (six) hours as needed for mild pain for up to 15 doses, Starting Tue 7/19/2022, Normal      !! chlorhexidine (PERIDEX) 0.12 % solution APPLY 15 ML TO THE MOUTH OR THROAT TWICE A DAY FOR 7 DAYS, Historical Med      !! chlorhexidine (PERIDEX) 0.12 % solution Apply 15 mL to the mouth or throat 2 (two) times a day, Starting Fri 4/21/2023, Normal      !! chlorhexidine (PERIDEX) 0.12 % solution Apply 15 mL to the mouth or throat 2 (two) times a day, Starting Sat 9/23/2023, Normal      famotidine (PEPCID) 40 MG tablet TAKE 1 TABLET (40 MG TOTAL) BY MOUTH DAILY BEFORE DINNER, Starting Tue 12/5/2023, Normal      ibuprofen (MOTRIN) 800 mg tablet Take 1 tablet (800 mg total) by mouth every 6 (six) hours as needed for mild pain for up to 30 doses, Starting Tue 7/19/2022, Normal      metoclopramide (Reglan) 10 mg tablet Take 1 tablet (10 mg total) by mouth 4 (four) times a day, Starting Mon 12/18/2023, Normal      omeprazole (PriLOSEC) 40 MG capsule Take 1 capsule (40 mg total) by mouth daily before breakfast, Starting Thu 2/23/2023, Normal      ondansetron (ZOFRAN) 4 mg tablet Take 1 tablet (4 mg total) by mouth every 8 (eight) hours as needed for nausea or vomiting, Starting Sun 9/3/2023,  Normal      ondansetron (ZOFRAN-ODT) 4 mg disintegrating tablet TAKE 1 TABLET (4 MG TOTAL) BY MOUTH EVERY 6 (SIX) HOURS AS NEEDED FOR NAUSEA OR VOMITING, Starting Fri 1/12/2024, Normal      phenazopyridine (PYRIDIUM) 200 mg tablet Take 1 tablet (200 mg total) by mouth 3 (three) times a day, Starting Sat 2/4/2023, Normal       !! - Potential duplicate medications found. Please discuss with provider.        No discharge procedures on file.    PDMP Review         Value Time User    PDMP Reviewed  Yes 9/23/2023 10:19 PM Nely Reeves MD             ED Provider  Attending physically available and evaluated Christine Fay. I managed the patient along with the ED Attending.    Electronically Signed by           Wiliam Bowden DO  02/04/24 0354

## 2024-02-05 ENCOUNTER — HOSPITAL ENCOUNTER (EMERGENCY)
Facility: HOSPITAL | Age: 34
Discharge: HOME | End: 2024-02-05
Attending: EMERGENCY MEDICINE | Admitting: EMERGENCY MEDICINE
Payer: COMMERCIAL

## 2024-02-05 VITALS
BODY MASS INDEX: 24.19 KG/M2 | HEIGHT: 60 IN | RESPIRATION RATE: 17 BRPM | HEART RATE: 78 BPM | OXYGEN SATURATION: 98 % | DIASTOLIC BLOOD PRESSURE: 63 MMHG | TEMPERATURE: 98.5 F | SYSTOLIC BLOOD PRESSURE: 106 MMHG | WEIGHT: 123.24 LBS

## 2024-02-05 DIAGNOSIS — K21.9 GASTROESOPHAGEAL REFLUX DISEASE WITHOUT ESOPHAGITIS: ICD-10-CM

## 2024-02-05 DIAGNOSIS — R11.0 NAUSEA: ICD-10-CM

## 2024-02-05 DIAGNOSIS — R07.9 CHEST PAIN: ICD-10-CM

## 2024-02-05 DIAGNOSIS — K02.9 DENTAL CARIES: ICD-10-CM

## 2024-02-05 DIAGNOSIS — K59.00 CONSTIPATION: ICD-10-CM

## 2024-02-05 DIAGNOSIS — R10.84 GENERALIZED ABDOMINAL PAIN: Primary | ICD-10-CM

## 2024-02-05 PROCEDURE — 99283 EMERGENCY DEPT VISIT LOW MDM: CPT

## 2024-02-05 PROCEDURE — 93005 ELECTROCARDIOGRAM TRACING: CPT

## 2024-02-05 PROCEDURE — 99284 EMERGENCY DEPT VISIT MOD MDM: CPT | Performed by: EMERGENCY MEDICINE

## 2024-02-05 RX ORDER — DICYCLOMINE HCL 20 MG
20 TABLET ORAL 2 TIMES DAILY
Qty: 20 TABLET | Refills: 0 | Status: SHIPPED | OUTPATIENT
Start: 2024-02-05

## 2024-02-05 RX ORDER — SIMETHICONE 80 MG
160 TABLET,CHEWABLE ORAL ONCE
Status: COMPLETED | OUTPATIENT
Start: 2024-02-05 | End: 2024-02-05

## 2024-02-05 RX ORDER — SIMETHICONE 125 MG
125 CAPSULE ORAL EVERY 6 HOURS PRN
Qty: 28 CAPSULE | Refills: 0 | Status: SHIPPED | OUTPATIENT
Start: 2024-02-05 | End: 2024-02-12

## 2024-02-05 RX ORDER — METOCLOPRAMIDE 10 MG/1
10 TABLET ORAL 4 TIMES DAILY
Qty: 40 TABLET | Refills: 0 | Status: SHIPPED | OUTPATIENT
Start: 2024-02-05

## 2024-02-05 RX ORDER — ONDANSETRON 4 MG/1
4 TABLET, ORALLY DISINTEGRATING ORAL ONCE
Status: COMPLETED | OUTPATIENT
Start: 2024-02-05 | End: 2024-02-05

## 2024-02-05 RX ADMIN — HYOSCYAMINE SULFATE 0.12 MG: 0.12 TABLET SUBLINGUAL at 23:39

## 2024-02-05 RX ADMIN — ONDANSETRON 4 MG: 4 TABLET, ORALLY DISINTEGRATING ORAL at 23:39

## 2024-02-05 RX ADMIN — SIMETHICONE 160 MG: 80 TABLET, CHEWABLE ORAL at 23:39

## 2024-02-06 LAB — BACTERIA UR CULT: ABNORMAL

## 2024-02-06 NOTE — ED PROVIDER NOTES
History  Chief Complaint   Patient presents with    Constipation     Patient was here Saturday for constipation and only went a little bit since. She is having increased pain on her side and her back. She is also having chest pain in the center of her chest      (Christine Fay) Christine Fay is a 33 y.o. female     They presented to the emergency department on February 5, 2024. Patient presents with:  Constipation: Patient was here Saturday for constipation and only went a little bit since. She is having increased pain on her side and her back. She is also having chest pain in the center of her chest .    The patient states that she was seen here recently for abdominal pain, had lab work as well as CT imaging at that time which showed concern for urinary tract infection as well as constipation.  Patient was given multiple medications including MiraLAX, lactulose, Reglan, Zofran, Keflex and has been taking Motrin and Tylenol for pain control.  Patient notes that her pain worsened acutely this evening, and now is experiencing epigastric chest discomfort that radiates to her left shoulder without extending into her left upper extremity. Patient denies known fevers, chills, difficulty breathing, rash, or any other complaint at this time.              Prior to Admission Medications   Prescriptions Last Dose Informant Patient Reported? Taking?   acetaminophen (TYLENOL) 500 mg tablet   No No   Sig: Take 2 tablets (1,000 mg total) by mouth every 6 (six) hours as needed for mild pain for up to 15 doses   cephalexin (KEFLEX) 500 mg capsule   No No   Sig: Take 1 capsule (500 mg total) by mouth every 6 (six) hours for 5 days   chlorhexidine (PERIDEX) 0.12 % solution   Yes No   Sig: APPLY 15 ML TO THE MOUTH OR THROAT TWICE A DAY FOR 7 DAYS   Patient not taking: Reported on 2/23/2023   chlorhexidine (PERIDEX) 0.12 % solution   No No   Sig: Apply 15 mL to the mouth or throat 2 (two) times a day   chlorhexidine (PERIDEX) 0.12  % solution   No No   Sig: Apply 15 mL to the mouth or throat 2 (two) times a day   famotidine (PEPCID) 40 MG tablet   No No   Sig: TAKE 1 TABLET (40 MG TOTAL) BY MOUTH DAILY BEFORE DINNER   ibuprofen (MOTRIN) 800 mg tablet   No No   Sig: Take 1 tablet (800 mg total) by mouth every 6 (six) hours as needed for mild pain for up to 30 doses   lactulose (CHRONULAC) 10 g/15 mL solution   No No   Sig: Take 30 mL (20 g total) by mouth 2 (two) times a day   magnesium citrate (CITROMA) 1.745 g/30 mL oral solution   No No   Sig: Take 296 mL by mouth once for 1 dose Prn constipation   metoclopramide (Reglan) 10 mg tablet   No No   Sig: Take 1 tablet (10 mg total) by mouth 4 (four) times a day   omeprazole (PriLOSEC) 40 MG capsule   No No   Sig: Take 1 capsule (40 mg total) by mouth daily before breakfast   ondansetron (ZOFRAN) 4 mg tablet   No No   Sig: Take 1 tablet (4 mg total) by mouth every 8 (eight) hours as needed for nausea or vomiting   ondansetron (ZOFRAN-ODT) 4 mg disintegrating tablet   No No   Sig: TAKE 1 TABLET (4 MG TOTAL) BY MOUTH EVERY 6 (SIX) HOURS AS NEEDED FOR NAUSEA OR VOMITING   phenazopyridine (PYRIDIUM) 200 mg tablet   No No   Sig: Take 1 tablet (200 mg total) by mouth 3 (three) times a day   polyethylene glycol (GLYCOLAX) 17 GM/SCOOP powder   No No   Sig: Take 17 g by mouth daily      Facility-Administered Medications: None       Past Medical History:   Diagnosis Date    Anxiety     Asthma     Depression     GERD (gastroesophageal reflux disease)     Hernia, abdominal     Migraines     Muscle spasm     Psychiatric disorder     Anx, Depression    Renal disorder     kidney stones       Past Surgical History:   Procedure Laterality Date    CYSTOSCOPY      removal of kidney stone    FL RETROGRADE PYELOGRAM  8/28/2018    HERNIA REPAIR      AK CYSTO/URETERO W/LITHOTRIPSY &INDWELL STENT INSRT Right 8/28/2018    Procedure: CYSTOSCOPY URETEROSCOPY WITH RETROGRADE PYELOGRAM AND INSERTION STENT URETERAL;  Surgeon:  North Arauz MD;  Location: [facility];  Service: Urology    TOOTH EXTRACTION         Family History   Problem Relation Age of Onset    Diabetes Mother     Hyperlipidemia Mother     Stroke Mother     Heart disease Mother     Asthma Mother     Other Mother         Degen. of Intervertabral disc.    Nephrolithiasis Father     Nephrolithiasis Brother      I have reviewed and agree with the history as documented.    E-Cigarette/Vaping    E-Cigarette Use Never User      E-Cigarette/Vaping Substances    Nicotine No     THC No     CBD No     Flavoring No     Other No     Unknown No      Social History     Tobacco Use    Smoking status: Every Day     Current packs/day: 0.50     Average packs/day: 0.5 packs/day for 13.0 years (6.5 ttl pk-yrs)     Types: Cigarettes    Smokeless tobacco: Never   Vaping Use    Vaping status: Never Used   Substance Use Topics    Alcohol use: Not Currently     Comment: Alcohol intake:   None  - As per [place]     Drug use: No     Comment: Illicit drugs:   none  - As per [place]         Review of Systems   Constitutional:  Negative for chills and fever.   HENT:  Negative for ear pain and sore throat.    Eyes:  Negative for pain and visual disturbance.   Respiratory:  Negative for cough and shortness of breath.    Cardiovascular:  Positive for chest pain. Negative for palpitations.   Gastrointestinal:  Positive for abdominal pain, constipation and nausea. Negative for vomiting.   Genitourinary:  Negative for dysuria and hematuria.   Musculoskeletal:  Negative for arthralgias and back pain.   Skin:  Negative for color change and rash.   Neurological:  Negative for seizures and syncope.   All other systems reviewed and are negative.      Physical Exam  ED Triage Vitals [02/05/24 2026]   Temperature Pulse Respirations Blood Pressure SpO2   98.5 °F (36.9 °C) 101 18 114/66 99 %      Temp Source Heart Rate Source Patient Position - Orthostatic VS BP Location FiO2 (%)   Oral Monitor Sitting Right arm  --      Pain Score       10 - Worst Possible Pain             Orthostatic Vital Signs  Vitals:    02/05/24 2026 02/05/24 2319   BP: 114/66 106/63   Pulse: 101 78   Patient Position - Orthostatic VS: Sitting Lying       Physical Exam  Vitals and nursing note reviewed.   Constitutional:       General: She is not in acute distress.     Appearance: Normal appearance.   HENT:      Head: Normocephalic and atraumatic.      Right Ear: External ear normal.      Left Ear: External ear normal.      Nose: Nose normal.      Mouth/Throat:      Mouth: Mucous membranes are moist.   Eyes:      Conjunctiva/sclera: Conjunctivae normal.   Cardiovascular:      Rate and Rhythm: Normal rate and regular rhythm.   Pulmonary:      Effort: Pulmonary effort is normal. No respiratory distress.      Breath sounds: Normal breath sounds.   Abdominal:      General: Abdomen is flat. Bowel sounds are normal.      Tenderness: There is abdominal tenderness (mild). There is no guarding or rebound.   Musculoskeletal:         General: Normal range of motion.      Cervical back: Normal range of motion.   Skin:     General: Skin is warm and dry.      Capillary Refill: Capillary refill takes less than 2 seconds.   Neurological:      Mental Status: She is alert. Mental status is at baseline.   Psychiatric:         Mood and Affect: Mood normal.         ED Medications  Medications   ondansetron (ZOFRAN-ODT) dispersible tablet 4 mg (has no administration in time range)   hyoscyamine (LEVSIN/SL) SL tablet 0.125 mg (has no administration in time range)   simethicone (MYLICON) chewable tablet 160 mg (has no administration in time range)       Diagnostic Studies  Results Reviewed       None                   No orders to display         Procedures  ECG 12 Lead Documentation Only    Date/Time: 2/5/2024 11:26 PM    Performed by: Jonh Doherty MD  Authorized by: Jonh Doherty MD    Indications / Diagnosis:  Chest pain  ECG reviewed by me, the ED Provider:  yes    Patient location:  ED  Previous ECG:     Previous ECG:  Compared to current    Similarity:  No change  Interpretation:     Interpretation: normal    Rate:     ECG rate:  86    ECG rate assessment: normal    Rhythm:     Rhythm: sinus rhythm    Ectopy:     Ectopy: none    QRS:     QRS axis:  Normal    QRS intervals:  Normal  Conduction:     Conduction: normal    ST segments:     ST segments:  Normal  T waves:     T waves: normal    Comments:      Normal Sinus rhythm at 86 BPM, no PAC, no PVC, no acute ischemic changes.        ED Course                             SBIRT 22yo+      Flowsheet Row Most Recent Value   Initial Alcohol Screen: US AUDIT-C     1. How often do you have a drink containing alcohol? 0 Filed at: 02/05/2024 2028   2. How many drinks containing alcohol do you have on a typical day you are drinking?  0 Filed at: 02/05/2024 2028   3b. FEMALE Any Age, or MALE 65+: How often do you have 4 or more drinks on one occassion? 0 Filed at: 02/05/2024 2028   Audit-C Score 0 Filed at: 02/05/2024 2028   CRISTINO: How many times in the past year have you...    Used an illegal drug or used a prescription medication for non-medical reasons? Never Filed at: 02/05/2024 2028                  Medical Decision Making  33-year-old female seen and examined yesterday for complaint of abdominal pain complete with blood work as well as radiographic imaging of the abdomen showing concern for urinary tract infection as well as constipation.  Returning for similar complaint as well as new associated chest pain.  Seen and examined noted to have mild tenderness of the abdomen.  Vitals within normal limits.  EKG obtained as documented as above.  Patient treated with Zofran, simethicone, as well as Levsin for further antispasmodic control.  Advised patient on increasing dose of MiraLAX as well as lactulose.  Discussed possibility of disimpaction, however patient declined.  I agree with not proceeding with disimpaction at this time  as per review of patient's CT scan there is a very minimal stool burden within the sigmoid colon.  Prescription as provided as below.  Patient appears well, nontoxic, agrees with plan of care at this time.  Answered all questions.  In light of this, patient would benefit from outpatient follow-up.    Risk  OTC drugs.  Prescription drug management.          Disposition  Final diagnoses:   Generalized abdominal pain   Constipation   Chest pain     Time reflects when diagnosis was documented in both MDM as applicable and the Disposition within this note       Time User Action Codes Description Comment    2/5/2024 11:30 PM Jonh Doherty Add [R10.84] Generalized abdominal pain     2/5/2024 11:30 PM Jonh Doherty Add [K59.00] Constipation     2/5/2024 11:30 PM Jonh Doherty Add [R07.9] Chest pain           ED Disposition       ED Disposition   Discharge    Condition   Stable    Date/Time   Mon Feb 5, 2024 2332    Comment   Christine Fay discharge to home/self care.                   Follow-up Information       Follow up With Specialties Details Why Contact Info    Dinesh Marie MD Family Medicine Call in 1 day  [address] [facility]  [address]  [facility] [address]  [phone]              Patient's Medications   Discharge Prescriptions    DICYCLOMINE (BENTYL) 20 MG TABLET    Take 1 tablet (20 mg total) by mouth 2 (two) times a day       Start Date: 2/5/2024  End Date: --       Order Dose: 20 mg       Quantity: 20 tablet    Refills: 0    SIMETHICONE (MYLICON,GAS-X) 125 MG CAPS    Take 1 capsule (125 mg total) by mouth every 6 (six) hours as needed for flatulence for up to 7 days       Start Date: 2/5/2024  End Date: 2/12/2024       Order Dose: 125 mg       Quantity: 28 capsule    Refills: 0     No discharge procedures on file.    PDMP Review         Value Time User    PDMP Reviewed  Yes 9/23/2023 10:19 PM Nely Reeves MD             ED Provider  Attending physically available and evaluated  Christine Fay. I managed the patient along with the ED Attending.    Electronically Signed by           Jonh Doherty MD  02/05/24 [id number]

## 2024-02-06 NOTE — ED ATTENDING ATTESTATION
2/5/2024  I, Ander Velazquez DO, saw and evaluated the patient. I have discussed the patient with the resident/non-physician practitioner and agree with the resident's/non-physician practitioner's findings, Plan of Care, and MDM as documented in the resident's/non-physician practitioner's note, except where noted. All available labs and Radiology studies were reviewed.  I was present for key portions of any procedure(s) performed by the resident/non-physician practitioner and I was immediately available to provide assistance.       At this point I agree with the current assessment done in the Emergency Department.  I have conducted an independent evaluation of this patient a history and physical is as follows:    ED Course         Critical Care Time  Procedures          33-year-old female presents with abdominal pain.,  She has been constipated for the past couple of weeks.,  Seen here yesterday had a resource intensive workup including blood work and CT scan which showed increased stool burden was DC'd with lactulose, and MiraLAX, patient took 1 dose of each but has not had a large bowel movement, has passed some small bowel movements.,  Presents due to intermittent waves of discomfort.,  Says she has struggled with constipation on and off her entire life.  On exam her abdomen is nontender nondistended, but does feel to have some stool burden particularly on the left side.,  Personally reviewed patient's CAT scan from yesterday.,  She did not have a lot of stool in the rectal vault, did not appear to be fecally impacted, discussed fecal disimpaction with her but I do not feel she be a good candidate she does not want this procedure.,  Advised her to increase her dose of MiraLAX, will add Bentyl, she says she was having some intermittent epigastric pains will check an EKG for completion

## 2024-02-08 LAB
ATRIAL RATE: 86 BPM
P AXIS: 74 DEGREES
PR INTERVAL: 128 MS
QRS AXIS: 81 DEGREES
QRSD INTERVAL: 82 MS
QT INTERVAL: 356 MS
QTC INTERVAL: 426 MS
T WAVE AXIS: 61 DEGREES
VENTRICULAR RATE: 86 BPM

## 2024-02-16 ENCOUNTER — HOSPITAL ENCOUNTER (EMERGENCY)
Facility: HOSPITAL | Age: 34
Discharge: HOME | End: 2024-02-16
Attending: EMERGENCY MEDICINE
Payer: COMMERCIAL

## 2024-02-16 VITALS
SYSTOLIC BLOOD PRESSURE: 119 MMHG | RESPIRATION RATE: 18 BRPM | DIASTOLIC BLOOD PRESSURE: 65 MMHG | HEART RATE: 79 BPM | TEMPERATURE: 97.8 F | OXYGEN SATURATION: 99 %

## 2024-02-16 DIAGNOSIS — Z87.39 HISTORY OF TMJ DISORDER: ICD-10-CM

## 2024-02-16 DIAGNOSIS — K02.9 DENTAL CARIES: Primary | ICD-10-CM

## 2024-02-16 DIAGNOSIS — K02.9 PAIN DUE TO DENTAL CARIES: ICD-10-CM

## 2024-02-16 PROCEDURE — 99284 EMERGENCY DEPT VISIT MOD MDM: CPT | Performed by: EMERGENCY MEDICINE

## 2024-02-16 PROCEDURE — 99282 EMERGENCY DEPT VISIT SF MDM: CPT

## 2024-02-16 RX ORDER — AMOXICILLIN AND CLAVULANATE POTASSIUM 875; 125 MG/1; MG/1
1 TABLET, FILM COATED ORAL ONCE
Status: COMPLETED | OUTPATIENT
Start: 2024-02-16 | End: 2024-02-16

## 2024-02-16 RX ORDER — AMOXICILLIN AND CLAVULANATE POTASSIUM 875; 125 MG/1; MG/1
1 TABLET, FILM COATED ORAL EVERY 12 HOURS SCHEDULED
Qty: 14 TABLET | Refills: 0 | Status: SHIPPED | OUTPATIENT
Start: 2024-02-16 | End: 2024-02-23

## 2024-02-16 RX ORDER — OXYCODONE HYDROCHLORIDE 5 MG/1
5 TABLET ORAL ONCE
Status: DISCONTINUED | OUTPATIENT
Start: 2024-02-16 | End: 2024-02-16

## 2024-02-16 RX ORDER — AMOXICILLIN 250 MG/1
500 CAPSULE ORAL ONCE
Status: DISCONTINUED | OUTPATIENT
Start: 2024-02-16 | End: 2024-02-16

## 2024-02-16 RX ADMIN — AMOXICILLIN AND CLAVULANATE POTASSIUM 1 TABLET: 875; 125 TABLET, FILM COATED ORAL at 23:39

## 2024-02-17 NOTE — DISCHARGE INSTRUCTIONS
Please take Tylenol for pain every 4 hours as needed not to exceed 4000 mg or 4 g per day. For example you may take 500 mg every 4 hours or 1000 mg every 8 hours for pain.    Please take Ibuprofen as needed for pain, up to 3.2 g per day. For example you may take 600-800 mg every 6 hours alternating with Tylenol as needed. Take with food and no more than for 3 days continuously.

## 2024-02-17 NOTE — ED ATTENDING ATTESTATION
2/16/2024  I, Joey Barron MD, saw and evaluated the patient. I have discussed the patient with the resident/non-physician practitioner and agree with the resident's/non-physician practitioner's findings, Plan of Care, and MDM as documented in the resident's/non-physician practitioner's note, except where noted. All available labs and Radiology studies were reviewed.  I was present for key portions of any procedure(s) performed by the resident/non-physician practitioner and I was immediately available to provide assistance.       At this point I agree with the current assessment done in the Emergency Department.  I have conducted an independent evaluation of this patient a history and physical is as follows: Patient is a 33 year old female with worsening upper dental pain since yesterday. No fever. No vomiting. Has nausea. Has been taking tylenol and ibuprofen. Was last seen at [facility] ED for same today. Was last seen in this ED on 2/5/24 for abdominal pain. [employer] [facility] [employer] [facility] checked on this patient and no Rx found. Has been to this ED as well as other EDs multiple times for similar complaints and other complaints of pain. Declines dental nerve block. NCAT. (+) maxillary central incisor tooth erosions with dental caries with tenderness. No significant gingival swelling. No rash noted. Will order abx and antiemetic. Will need follow up with dentist. I explained that narcotic pain medication will not be given.     ED Course         Critical Care Time  Procedures

## 2024-02-17 NOTE — ED PROVIDER NOTES
"History  Chief Complaint   Patient presents with    Dental Pain     Pt states she has decaying teeth that is causing her mouth pain. Was seen at [facility] for same issue and was given mouthwash with no relief.      33-year-old female past medical history of asthma, anxiety, depression, migraine headaches presents for evaluation of dental pain.  Patient states her front 2 teeth have been bothering her more over the past 1 to 2 days and she believes that a part of her right front incisor broke off contributing to worsening pain.  Denies associated symptoms of fever/chills, sore throat, difficulty swallowing, facial swelling, vision changes, shortness of breath or any other symptoms.  Patient states she has not been able to get into her dentist for referral to an oral surgeon for dental extraction however is aware that she requires this.  Patient also states that her \"TMJ pain\" has gotten worse over the past 1 to 2 days.  No other concerns.        Prior to Admission Medications   Prescriptions Last Dose Informant Patient Reported? Taking?   acetaminophen (TYLENOL) 500 mg tablet   No No   Sig: Take 2 tablets (1,000 mg total) by mouth every 6 (six) hours as needed for mild pain for up to 15 doses   chlorhexidine (PERIDEX) 0.12 % solution   Yes No   Sig: APPLY 15 ML TO THE MOUTH OR THROAT TWICE A DAY FOR 7 DAYS   Patient not taking: Reported on 2/23/2023   chlorhexidine (PERIDEX) 0.12 % solution   No No   Sig: Apply 15 mL to the mouth or throat 2 (two) times a day   chlorhexidine (PERIDEX) 0.12 % solution   No No   Sig: Apply 15 mL to the mouth or throat 2 (two) times a day   dicyclomine (BENTYL) 20 mg tablet   No No   Sig: Take 1 tablet (20 mg total) by mouth 2 (two) times a day   famotidine (PEPCID) 40 MG tablet   No No   Sig: TAKE 1 TABLET (40 MG TOTAL) BY MOUTH DAILY BEFORE DINNER   ibuprofen (MOTRIN) 800 mg tablet   No No   Sig: Take 1 tablet (800 mg total) by mouth every 6 (six) hours as needed for mild pain for " up to 30 doses   lactulose (CHRONULAC) 10 g/15 mL solution   No No   Sig: Take 30 mL (20 g total) by mouth 2 (two) times a day   magnesium citrate (CITROMA) 1.745 g/30 mL oral solution   No No   Sig: Take 296 mL by mouth once for 1 dose Prn constipation   metoclopramide (Reglan) 10 mg tablet   No No   Sig: Take 1 tablet (10 mg total) by mouth 4 (four) times a day   omeprazole (PriLOSEC) 40 MG capsule   No No   Sig: Take 1 capsule (40 mg total) by mouth daily before breakfast   ondansetron (ZOFRAN) 4 mg tablet   No No   Sig: Take 1 tablet (4 mg total) by mouth every 8 (eight) hours as needed for nausea or vomiting   ondansetron (ZOFRAN-ODT) 4 mg disintegrating tablet   No No   Sig: TAKE 1 TABLET (4 MG TOTAL) BY MOUTH EVERY 6 (SIX) HOURS AS NEEDED FOR NAUSEA OR VOMITING   phenazopyridine (PYRIDIUM) 200 mg tablet   No No   Sig: Take 1 tablet (200 mg total) by mouth 3 (three) times a day   polyethylene glycol (GLYCOLAX) 17 GM/SCOOP powder   No No   Sig: Take 17 g by mouth daily      Facility-Administered Medications: None       Past Medical History:   Diagnosis Date    Anxiety     Asthma     Depression     GERD (gastroesophageal reflux disease)     Hernia, abdominal     Migraines     Muscle spasm     Psychiatric disorder     Anx, Depression    Renal disorder     kidney stones       Past Surgical History:   Procedure Laterality Date    CYSTOSCOPY      removal of kidney stone    [place] RETROGRADE PYELOGRAM  8/28/2018    HERNIA REPAIR      [place] CYSTO/URETERO W/LITHOTRIPSY &INDWELL STENT INSRT Right 8/28/2018    Procedure: CYSTOSCOPY URETEROSCOPY WITH RETROGRADE PYELOGRAM AND INSERTION STENT URETERAL;  Surgeon: North Arauz MD;  [facility];  Service: Urology    TOOTH EXTRACTION         Family History   Problem Relation Age of Onset    Diabetes Mother     Hyperlipidemia Mother     Stroke Mother     Heart disease Mother     Asthma Mother     Other Mother         Degen. of Intervertabral disc.    Nephrolithiasis  Father     Nephrolithiasis Brother      I have reviewed and agree with the history as documented.    E-Cigarette/Vaping    E-Cigarette Use Never User      E-Cigarette/Vaping Substances    Nicotine No     THC No     CBD No     Flavoring No     Other No     Unknown No      Social History     Tobacco Use    Smoking status: Every Day     Current packs/day: 0.50     Average packs/day: 0.5 packs/day for 13.0 years (6.5 ttl pk-yrs)     Types: Cigarettes    Smokeless tobacco: Never   Vaping Use    Vaping status: Never Used   Substance Use Topics    Alcohol use: Not Currently     Comment: Alcohol intake:   None  - As per [place]     Drug use: No     Comment: Illicit drugs:   none  - As per [place]         Review of Systems   Constitutional:  Negative for chills and fever.   HENT:  Positive for dental problem. Negative for ear pain and sore throat.         TMJ pain   Eyes:  Negative for pain and visual disturbance.   Respiratory:  Negative for cough and shortness of breath.    Cardiovascular:  Negative for chest pain and palpitations.   Gastrointestinal:  Negative for abdominal pain and vomiting.   Genitourinary:  Negative for dysuria and hematuria.   Musculoskeletal:  Negative for arthralgias and back pain.   Skin:  Negative for color change and rash.   Neurological:  Negative for seizures and syncope.   All other systems reviewed and are negative.      Physical Exam  ED Triage Vitals [02/16/24 2253]   Temperature Pulse Respirations Blood Pressure SpO2   97.8 °F (36.6 °C) 79 18 119/65 99 %      Temp Source Heart Rate Source Patient Position - Orthostatic VS BP Location FiO2 (%)   Oral Monitor Lying Right arm --      Pain Score       --             Orthostatic Vital Signs  Vitals:    02/16/24 2253   BP: 119/65   Pulse: 79   Patient Position - Orthostatic VS: Lying       Physical Exam  Vitals and nursing note reviewed.   Constitutional:       General: She is not in acute distress.     Appearance: Normal appearance. She is  "well-developed. She is not ill-appearing, toxic-appearing or diaphoretic.   HENT:      Head: Normocephalic and atraumatic.      Right Ear: External ear normal.      Left Ear: External ear normal.      Nose: Nose normal.      Mouth/Throat:      Mouth: Mucous membranes are moist.      Pharynx: Oropharynx is clear.      Comments: No posterior OP erythema or edema.  Uvula midline.  No focal areas of edema or fluctuance throughout the mouth.  The floor the mouth is not edematous.  Significant, multiple dental caries with several broken\".  No active purulence or bleeding from dentition.  No focal areas of fluctuance or induration.    Mild evidence of clicking in bilateral TMJ with opening and closing of mouth however no trismus.  Eyes:      Extraocular Movements: Extraocular movements intact.      Conjunctiva/sclera: Conjunctivae normal.      Pupils: Pupils are equal, round, and reactive to light.   Musculoskeletal:         General: No swelling.      Cervical back: Normal range of motion and neck supple. No rigidity or tenderness.   Lymphadenopathy:      Cervical: No cervical adenopathy.   Skin:     General: Skin is warm and dry.      Capillary Refill: Capillary refill takes less than 2 seconds.   Neurological:      Mental Status: She is alert and oriented to person, place, and time. Mental status is at baseline.   Psychiatric:         Mood and Affect: Mood normal.         Behavior: Behavior normal.         ED Medications  Medications   amoxicillin-clavulanate (AUGMENTIN) 875-125 mg per tablet 1 tablet (1 tablet Oral Given 2/16/24 0840)       Diagnostic Studies  Results Reviewed       None                   No orders to display         Procedures  Procedures      ED Course                                       Medical Decision Making  Patient remained stable throughout ED course.  Given appearance of chronic dental caries with known poor dentition, patient was urged to follow-up with her dentist however she stated that " she was unable to get through to them.  Patient was offered follow-up information to multiple area dental clinics as well as to [facility] with an ambulatory referral to OM.  Given poor mentation, patient was provided her first dose of Augmentin with a 7-day home course.  Return to ER precautions discussed at length.  Doubt deep tissue abscess, Benjamin's, cavernous sinus thrombus or any other significant infectious etiology today.  Patient was also provided follow-up information to physical therapy for TMJ.  She was instructed regarding limitation for Tylenol/ibuprofen dosages for pain control.  Stable for discharge home. Pt understands and agreed with plan. All questions answered. No other concerns.        Risk  Prescription drug management.          Disposition  Final diagnoses:   Dental caries   Pain due to dental caries   History of TMJ disorder     Time reflects when diagnosis was documented in both MDM as applicable and the Disposition within this note       Time User Action Codes Description Comment    2/16/2024 11:06 PM Bradni Quinteros Add [K02.9] Dental caries     2/16/2024 11:06 PM Brandi Quinteros Add [K02.9] Pain due to dental caries     2/16/2024 11:31 PM Brandi Quinteros Add [S03.00XA] Dislocation of temporomandibular joint, initial encounter     2/17/2024 12:36 AM Brandi Quinteros Remove [S03.00XA] Dislocation of temporomandibular joint, initial encounter     2/17/2024 12:36 AM Brandi Quinteros Add [Z87.39] History of TMJ disorder           ED Disposition       ED Disposition   Discharge    Condition   Stable    Date/Time   Fri Feb 16, 2024 11:06 PM    Comment   Christine Fay discharge to home/self care.                   Follow-up Information       Follow up With Specialties Details Why Contact Info Additional Information    Dinesh Marie MD Family Medicine Call  As needed [address] [facility] [address]  [facility] [address] [phone]       [facility] Emergency Medicine Go to   As needed, If symptoms worsen such as high fevers, significant swelling in your face, vision changes, difficulty swallowing or any other new or worsening concerns. [address] [facility] [address]  [phone] [facility] Emergency Department, [address] [place], [place], [address]    [facility]  Call  As needed; or present in the a.m. [address] [place] [address]  [phone]     [facility]  Call  As needed or present in the  [address]  [facility] [address]  [phone]     [facility] [place]  Call  As needed [address] [place] [address] [address]  [facility] [address]  [phone]     Physical Therapy at [address] Physical Therapy Schedule an appointment as soon as possible for a visit  As needed [address] [facility] [id number] [phone] Physical Therapy at [address], [address] [facility], [place], [place], [id number] [phone]            Discharge Medication List as of 2/16/2024 11:32 PM        START taking these medications    Details   amoxicillin-clavulanate (AUGMENTIN) 875-125 mg per tablet Take 1 tablet by mouth every 12 (twelve) hours for 7 days, Starting Fri 2/16/2024, Until Fri 2/23/2024, Normal           CONTINUE these medications which have NOT CHANGED    Details   acetaminophen (TYLENOL) 500 mg tablet Take 2 tablets (1,000 mg total) by mouth every 6 (six) hours as needed for mild pain for up to 15 doses, Starting Tue 7/19/2022, Normal      !! chlorhexidine (PERIDEX) 0.12 % solution APPLY 15 ML TO THE MOUTH OR THROAT TWICE A DAY FOR 7 DAYS, Historical Med      !! chlorhexidine (PERIDEX) 0.12 % solution Apply 15 mL to the mouth or throat 2 (two) times a day, Starting Fri 4/21/2023, Normal      !! chlorhexidine (PERIDEX) 0.12 % solution Apply 15 mL to  the mouth or throat 2 (two) times a day, Starting Sat 9/23/2023, Normal      dicyclomine (BENTYL) 20 mg tablet Take 1 tablet (20 mg total) by mouth 2 (two) times a day, Starting Mon 2/5/2024, Normal      famotidine (PEPCID) 40 MG tablet TAKE 1 TABLET (40 MG TOTAL) BY MOUTH DAILY BEFORE DINNER, Starting Tue 12/5/2023, Normal      ibuprofen (MOTRIN) 800 mg tablet Take 1 tablet (800 mg total) by mouth every 6 (six) hours as needed for mild pain for up to 30 doses, Starting Tue 7/19/2022, Normal      lactulose (CHRONULAC) 10 g/15 mL solution Take 30 mL (20 g total) by mouth 2 (two) times a day, Starting Sun 2/4/2024, Normal      magnesium citrate (CITROMA) 1.745 g/30 mL oral solution Take 296 mL by mouth once for 1 dose Prn constipation, Starting Sun 2/4/2024, Print      metoclopramide (Reglan) 10 mg tablet Take 1 tablet (10 mg total) by mouth 4 (four) times a day, Starting Mon 2/5/2024, Normal      omeprazole (PriLOSEC) 40 MG capsule Take 1 capsule (40 mg total) by mouth daily before breakfast, Starting Thu 2/23/2023, Normal      ondansetron (ZOFRAN) 4 mg tablet Take 1 tablet (4 mg total) by mouth every 8 (eight) hours as needed for nausea or vomiting, Starting Sun 9/3/2023, Normal      ondansetron (ZOFRAN-ODT) 4 mg disintegrating tablet TAKE 1 TABLET (4 MG TOTAL) BY MOUTH EVERY 6 (SIX) HOURS AS NEEDED FOR NAUSEA OR VOMITING, Starting Fri 1/12/2024, Normal      phenazopyridine (PYRIDIUM) 200 mg tablet Take 1 tablet (200 mg total) by mouth 3 (three) times a day, Starting Sat 2/4/2023, Normal      polyethylene glycol (GLYCOLAX) 17 GM/SCOOP powder Take 17 g by mouth daily, Starting Sun 2/4/2024, Normal       !! - Potential duplicate medications found. Please discuss with provider.            PDMP Review         Value Time User    PDMP Reviewed  Yes 2/16/2024 10:59 PM Joey Barron MD             ED Provider  Attending physically available and evaluated Christine NELY Fay. I managed the patient along with the ED  Attending.    Electronically Signed by           Brandi Quinteros MD  02/16/24 2346       Brandi Quinteros MD  02/16/24 2347       Brandi Quinteros MD  02/17/24 0036

## 2024-02-22 ENCOUNTER — NURSE TRIAGE (OUTPATIENT)
Dept: OTHER | Facility: OTHER | Age: 34
End: 2024-02-22

## 2024-02-22 NOTE — TELEPHONE ENCOUNTER
"UTI with blood when wiping.  Reason for Disposition  • [1] Pain or burning with passing urine AND [2] side (flank) or back pain present    Answer Assessment - Initial Assessment Questions  1. COLOR of URINE: \"Describe the color of the urine.\"  (e.g., tea-colored, pink, red, blood clots, bloody)      Urine is normal color but there is blood when wiping  2. ONSET: \"When did the bleeding start?\"       That started today.  3. EPISODES: \"How many times has there been blood in the urine?\" or \"How many times today?\"      Two times today.  4. PAIN with URINATION: \"Is there any pain with passing your urine?\" If Yes, ask: \"How bad is the pain?\"  (Scale 1-10; or mild, moderate, severe)     - MILD - complains slightly about urination hurting     - MODERATE - interferes with normal activities       - SEVERE - excruciating, unwilling or unable to urinate because of the pain       Burning and itching with urination  5. FEVER: \"Do you have a fever?\" If Yes, ask: \"What is your temperature, how was it measured, and when did it start?\"      none  6. ASSOCIATED SYMPTOMS: \"Are you passing urine more frequently than usual?\"      Passing urine more frequently  7. OTHER SYMPTOMS: \"Do you have any other symptoms?\" (e.g., back/flank pain, abdominal pain, vomiting)      Lower back, side, abdomen are painful 6/10  8. PREGNANCY: \"Is there any chance you are pregnant?\" \"When was your last menstrual period?\"     No  Was on keflex for Uti took all 20 tabs finished at least two days ago.  on Started Augmentin for a tooth infection.    Protocols used: Urine - Blood In-ADULT-    "

## 2024-03-21 DIAGNOSIS — K21.9 GASTROESOPHAGEAL REFLUX DISEASE WITHOUT ESOPHAGITIS: ICD-10-CM

## 2024-03-22 RX ORDER — OMEPRAZOLE 40 MG/1
40 CAPSULE, DELAYED RELEASE ORAL
Qty: 30 CAPSULE | Refills: 0 | Status: SHIPPED | OUTPATIENT
Start: 2024-03-22

## 2024-03-28 ENCOUNTER — TELEPHONE (OUTPATIENT)
Age: 34
End: 2024-03-28

## 2024-03-28 NOTE — TELEPHONE ENCOUNTER
The patient called she wanted to set up a virtual appointment with Dr Monson  she used to take depression medication and she would like to go back on it    ---it was Seroquel she does not know the dosage   is there any way she can be put on a list for dr monson or would another provider do a virtual meeting with her?  Please call her thank you

## 2024-04-01 RX ORDER — OXYCODONE HYDROCHLORIDE 5 MG/1
TABLET ORAL
COMMUNITY
Start: 2024-02-10 | End: 2024-04-02

## 2024-04-01 RX ORDER — PENICILLIN V POTASSIUM 500 MG/1
TABLET ORAL
COMMUNITY
Start: 2024-01-05 | End: 2024-04-02

## 2024-04-01 RX ORDER — NITROFURANTOIN 25; 75 MG/1; MG/1
CAPSULE ORAL
COMMUNITY
Start: 2024-02-22 | End: 2024-04-02

## 2024-04-02 ENCOUNTER — OFFICE VISIT (OUTPATIENT)
Dept: FAMILY MEDICINE CLINIC | Facility: CLINIC | Age: 34
End: 2024-04-02
Payer: COMMERCIAL

## 2024-04-02 VITALS — HEIGHT: 60 IN | BODY MASS INDEX: 24.07 KG/M2 | RESPIRATION RATE: 16 BRPM

## 2024-04-02 DIAGNOSIS — G44.52 NDPH (NEW DAILY PERSISTENT HEADACHE): ICD-10-CM

## 2024-04-02 DIAGNOSIS — K08.9 CHRONIC DENTAL PAIN: ICD-10-CM

## 2024-04-02 DIAGNOSIS — R11.0 NAUSEA: ICD-10-CM

## 2024-04-02 DIAGNOSIS — F41.9 ANXIETY AND DEPRESSION: Primary | ICD-10-CM

## 2024-04-02 DIAGNOSIS — G89.29 CHRONIC DENTAL PAIN: ICD-10-CM

## 2024-04-02 DIAGNOSIS — M26.623 BILATERAL TEMPOROMANDIBULAR JOINT PAIN: ICD-10-CM

## 2024-04-02 DIAGNOSIS — F32.A ANXIETY AND DEPRESSION: Primary | ICD-10-CM

## 2024-04-02 DIAGNOSIS — K02.9 DENTAL CARIES: ICD-10-CM

## 2024-04-02 PROCEDURE — 99214 OFFICE O/P EST MOD 30 MIN: CPT | Performed by: FAMILY MEDICINE

## 2024-04-02 RX ORDER — ONDANSETRON 4 MG/1
4 TABLET, ORALLY DISINTEGRATING ORAL EVERY 6 HOURS PRN
Qty: 90 TABLET | Refills: 0 | Status: SHIPPED | OUTPATIENT
Start: 2024-04-02

## 2024-04-02 RX ORDER — CLONIDINE HYDROCHLORIDE 0.1 MG/1
TABLET ORAL
Qty: 30 TABLET | Refills: 0 | Status: SHIPPED | OUTPATIENT
Start: 2024-04-02 | End: 2024-04-09

## 2024-04-02 RX ORDER — CHLORHEXIDINE GLUCONATE ORAL RINSE 1.2 MG/ML
15 SOLUTION DENTAL 2 TIMES DAILY
Qty: 473 ML | Refills: 11 | Status: SHIPPED | OUTPATIENT
Start: 2024-04-02

## 2024-04-02 RX ORDER — RIZATRIPTAN BENZOATE 5 MG/1
5 TABLET, ORALLY DISINTEGRATING ORAL ONCE AS NEEDED
Qty: 10 TABLET | Refills: 5 | Status: SHIPPED | OUTPATIENT
Start: 2024-04-02

## 2024-04-02 RX ORDER — QUETIAPINE FUMARATE 25 MG/1
25 TABLET, FILM COATED ORAL
Qty: 30 TABLET | Refills: 1 | Status: SHIPPED | OUTPATIENT
Start: 2024-04-02

## 2024-04-02 NOTE — PROGRESS NOTES
Virtual Regular Visit    Verification of patient location:    Patient is located at Home in the following state in which I hold an active license [place]      Assessment/Plan:    Problem List Items Addressed This Visit       Dental caries    Relevant Medications    ondansetron (ZOFRAN-ODT) 4 mg disintegrating tablet     Other Visit Diagnoses       Anxiety and depression    -  Primary    Relevant Medications    QUEtiapine (SEROquel) 25 mg tablet    cloNIDine (CATAPRES) 0.1 mg tablet    Nausea        Relevant Medications    ondansetron (ZOFRAN-ODT) 4 mg disintegrating tablet    Bilateral temporomandibular joint pain        Relevant Medications    Diclofenac Sodium (VOLTAREN) 1 %    NDPH (new daily persistent headache)        Relevant Medications    rizatriptan (MAXALT-MLT) 5 mg disintegrating tablet    Chronic dental pain        Relevant Medications    chlorhexidine (PERIDEX) 0.12 % solution                 Reason for visit is   Chief Complaint   Patient presents with    Virtual Regular Visit    Medication Management    Virtual Regular Visit          Encounter provider Dinesh Marie MD    Provider located at [address] [place] [id number]      Recent Visits  No visits were found meeting these conditions.  Showing recent visits within past 7 days and meeting all other requirements  Today's Visits  Date Type Provider Dept   04/02/24 Office Visit Dinesh Marie MD [facility]   Showing today's visits and meeting all other requirements  Future Appointments  No visits were found meeting these conditions.  Showing future appointments within next 150 days and meeting all other requirements       The patient was identified by name and date of birth. Christine MCKAY Yordanjoe was informed that this is a telemedicine visit and that the visit is being conducted through the [employer] platform. She agrees to proceed..  My office door was closed. No one else was in the room.  She  acknowledged consent and understanding of privacy and security of the video platform. The patient has agreed to participate and understands they can discontinue the visit at any time.    Patient is aware this is a billable service.     Subjective  Christine Fay is a 33 y.o. female  .      HPI   Anxiety and depression had been on zoloft blunted affect    and seroquel - did help   Other meds made her angry     Feels panic symptom but unsure why  Feels  is speeding but he is not   Sob and cp and heart thumping but not racing      Also caling a bunch if dentists   Still with terrible carries   Nasuea is bad     TMJ is bad bilateral   Using flexeril         Past Medical History:   Diagnosis Date    Anxiety     Asthma     Depression     GERD (gastroesophageal reflux disease)     Hernia, abdominal     Migraines     Muscle spasm     Psychiatric disorder     Anx, Depression    Renal disorder     kidney stones       Past Surgical History:   Procedure Laterality Date    CYSTOSCOPY      removal of kidney stone    [place] RETROGRADE PYELOGRAM  8/28/2018    HERNIA REPAIR      [place] CYSTO/URETERO W/LITHOTRIPSY &INDWELL STENT INSRT Right 8/28/2018    Procedure: CYSTOSCOPY URETEROSCOPY WITH RETROGRADE PYELOGRAM AND INSERTION STENT URETERAL;  Surgeon: North Arauz MD;  [facility];  Service: Urology    TOOTH EXTRACTION         Current Outpatient Medications   Medication Sig Dispense Refill    chlorhexidine (PERIDEX) 0.12 % solution Apply 15 mL to the mouth or throat 2 (two) times a day 473 mL 11    cloNIDine (CATAPRES) 0.1 mg tablet Take 1 tablet upto 2 x a day as needed for panic and anxiety 30 tablet 0    Diclofenac Sodium (VOLTAREN) 1 % Apply 2 g topically 4 (four) times a day 100 g 0    famotidine (PEPCID) 40 MG tablet TAKE 1 TABLET (40 MG TOTAL) BY MOUTH DAILY BEFORE DINNER 90 tablet 0    omeprazole (PriLOSEC) 40 MG capsule TAKE 1 CAPSULE BY MOUTH EVERY DAY BEFORE BREAKFAST 30 capsule 0    ondansetron  (ZOFRAN-ODT) 4 mg disintegrating tablet Take 1 tablet (4 mg total) by mouth every 6 (six) hours as needed for nausea or vomiting 90 tablet 0    QUEtiapine (SEROquel) 25 mg tablet Take 1 tablet (25 mg total) by mouth daily at bedtime 30 tablet 1    rizatriptan (MAXALT-MLT) 5 mg disintegrating tablet Take 1 tablet (5 mg total) by mouth once as needed for migraine for up to 1 dose may repeat in 2 hours if necessary 10 tablet 5     No current facility-administered medications for this visit.        Allergies   Allergen Reactions    Aspirin Anaphylaxis     Doesn't have epi pen. Okay to take ibuprofen per patient    Ketorolac Itching    Naproxen Nausea Only    Prednisone Itching     itchy throat      Toradol [Ketorolac Tromethamine]     Tramadol Itching       Review of Systems   Constitutional:  Negative for fever and unexpected weight change.   HENT:  Positive for dental problem. Negative for nosebleeds and trouble swallowing.    Eyes:  Negative for visual disturbance.   Respiratory:  Negative for chest tightness and shortness of breath.    Cardiovascular:  Negative for chest pain, palpitations and leg swelling.   Gastrointestinal:  Positive for nausea. Negative for abdominal pain, constipation and diarrhea.   Endocrine: Negative for cold intolerance.   Genitourinary:  Negative for dysuria, pelvic pain and urgency.   Musculoskeletal:  Negative for joint swelling and myalgias.   Skin:  Negative for rash.   Neurological:  Positive for headaches. Negative for tremors, seizures and syncope.   Hematological:  Does not bruise/bleed easily.   Psychiatric/Behavioral:  Positive for sleep disturbance. Negative for hallucinations and suicidal ideas. The patient is nervous/anxious.        Video Exam    Vitals:    04/02/24 0948   Resp: 16   Height: 5' (1.524 m)       Physical Exam  Constitutional:       Appearance: She is well-developed.   Eyes:      Conjunctiva/sclera: Conjunctivae normal.   Pulmonary:      Effort: Pulmonary effort  is normal.   Musculoskeletal:      Cervical back: Normal range of motion.   Neurological:      Mental Status: She is alert and oriented to person, place, and time.   Psychiatric:         Behavior: Behavior normal.         Thought Content: Thought content normal.         Judgment: Judgment normal.          Visit Time  Total Visit Duration: 20

## 2024-04-09 DIAGNOSIS — F41.9 ANXIETY AND DEPRESSION: ICD-10-CM

## 2024-04-09 DIAGNOSIS — F32.A ANXIETY AND DEPRESSION: ICD-10-CM

## 2024-04-09 RX ORDER — CLONIDINE HYDROCHLORIDE 0.1 MG/1
TABLET ORAL
Qty: 180 TABLET | Refills: 1 | Status: SHIPPED | OUTPATIENT
Start: 2024-04-09

## 2024-04-10 ENCOUNTER — VBI (OUTPATIENT)
Dept: ADMINISTRATIVE | Facility: OTHER | Age: 34
End: 2024-04-10

## 2024-04-23 ENCOUNTER — HOSPITAL ENCOUNTER (EMERGENCY)
Facility: HOSPITAL | Age: 34
Discharge: HOME | End: 2024-04-23
Attending: EMERGENCY MEDICINE
Payer: COMMERCIAL

## 2024-04-23 ENCOUNTER — NURSE TRIAGE (OUTPATIENT)
Dept: OTHER | Facility: OTHER | Age: 34
End: 2024-04-23

## 2024-04-23 VITALS
WEIGHT: 124 LBS | RESPIRATION RATE: 20 BRPM | TEMPERATURE: 98.4 F | BODY MASS INDEX: 24.35 KG/M2 | OXYGEN SATURATION: 98 % | HEART RATE: 98 BPM | SYSTOLIC BLOOD PRESSURE: 129 MMHG | DIASTOLIC BLOOD PRESSURE: 71 MMHG | HEIGHT: 60 IN

## 2024-04-23 DIAGNOSIS — M26.629 TMJ PAIN DYSFUNCTION SYNDROME: ICD-10-CM

## 2024-04-23 DIAGNOSIS — K08.89 PAIN, DENTAL: Primary | ICD-10-CM

## 2024-04-23 PROCEDURE — 99282 EMERGENCY DEPT VISIT SF MDM: CPT

## 2024-04-23 PROCEDURE — 99284 EMERGENCY DEPT VISIT MOD MDM: CPT | Performed by: PHYSICIAN ASSISTANT

## 2024-04-23 NOTE — TELEPHONE ENCOUNTER
"Reason for Disposition  • Prescription refill request for a CONTROLLED substance (e.g., narcotics, ADHD medicines)    Answer Assessment - Initial Assessment Questions  1. NAME of MEDICATION: \"What medicine are you calling about?\"      Percocet   2. QUESTION: \"What is your question?\" (e.g., medication refill, side effect)      The doctor at [facility] thought I had 12 tablets still, but I only have 4.  I need more Percocet because this pain is terrible  3. PRESCRIBING HCP: \"Who prescribed it?\" Reason: if prescribed by specialist, call should be referred to that group.      ER physician   4. SYMPTOMS: \"Do you have any symptoms?\"      TMJ pain   5. SEVERITY: If symptoms are present, ask \"Are they mild, moderate or severe?\"      Pain is severe    Protocols used: Medication Question Call-ADULT-    "

## 2024-04-23 NOTE — TELEPHONE ENCOUNTER
"Regarding: TMJ/  medication  ----- Message from Catalina Yost sent at 4/23/2024  5:59 PM EDT -----  Patient called, \" I just left the ED of [facility] for TMJ, and they gave medication, but it is enough. I was wondering if I can get it filled to help with my pain.\" Patient mentioned that he medication she received was percocet. Patient was informed that due to the type of medication it's not guaranteed for med to be filled. Patient would like  to speak to nurse for guidance.    "

## 2024-04-23 NOTE — TELEPHONE ENCOUNTER
Pt called stating she has ongoing TMJ pain.  Pt states she was seen at [facility] and ER physician misunderstood her and thought she has 12 Percocet tablets remaining from prescription she received from [facility] ER, bt pt states she meant to communicate that she took 12 of the tablets and only has 4 remaining.  Advice offered per protocol and pt states she will call office tomorrow to request additional medication.

## 2024-04-23 NOTE — ED PROVIDER NOTES
"History  Chief Complaint   Patient presents with    Facial Pain     Pt reports her TMJ is bothering her, right sided facial pain radiating into her ear. Pain has been increasing unrelieved with medication and abx course she has been taking.      Patient is a 32 yo wf with history of anxiety, asthma, depression who reports onset 2 weeks ago of R mandibular gum and tooth pain. Seen at [facility] on 4/13/24 and 4/19/24. She was prescribed penicillin first visit, then augmentin and percocet (#15) on 2nd visit. Advised to follow up with her dentist. States she has yet to call the dentist as \"they will not see me until the infection clears\".  Pt reports she now has pain at TMJ joint. Took tylenol and motrin 1 hour prior to arrival. States she has 12 tablets of percocet left at home. No fever. She reports R mandibular gum pain and facial swelling are improving. No other complaints.         Prior to Admission Medications   Prescriptions Last Dose Informant Patient Reported? Taking?   Diclofenac Sodium (VOLTAREN) 1 %   No No   Sig: Apply 2 g topically 4 (four) times a day   QUEtiapine (SEROquel) 25 mg tablet   No No   Sig: Take 1 tablet (25 mg total) by mouth daily at bedtime   chlorhexidine (PERIDEX) 0.12 % solution   No No   Sig: Apply 15 mL to the mouth or throat 2 (two) times a day   cloNIDine (CATAPRES) 0.1 mg tablet   No No   Sig: TAKE 1 TABLET UPTO 2 X A DAY AS NEEDED FOR PANIC AND ANXIETY   famotidine (PEPCID) 40 MG tablet   No No   Sig: TAKE 1 TABLET (40 MG TOTAL) BY MOUTH DAILY BEFORE DINNER   omeprazole (PriLOSEC) 40 MG capsule   No No   Sig: TAKE 1 CAPSULE BY MOUTH EVERY DAY BEFORE BREAKFAST   ondansetron (ZOFRAN-ODT) 4 mg disintegrating tablet   No No   Sig: Take 1 tablet (4 mg total) by mouth every 6 (six) hours as needed for nausea or vomiting   rizatriptan (MAXALT-MLT) 5 mg disintegrating tablet   No No   Sig: Take 1 tablet (5 mg total) by mouth once as needed for migraine for up to 1 dose may repeat " 79 y.o. WHITE/NON- female who presents for reevaluation    No cardiovascular complaints. Still doing up to 14k steps on her counter, also does weights and planks/toning. Sees Dr Ruthy Degroot and the stockings continues to control the edema    No GI or  issues. Reports she had colo w Dr Hank Pollack last year, will get records    Denies polyuria, polydipsia, nocturia, vision change. Not checking sugars at this time. Not doing IF currently, weight is up    Vitals 3/28/2022 3/25/2021 3/18/2020 3/13/2019 3/12/2018   Weight 164 lb 154 lb 143 lb 150 lb 165 lb     Dr Ronnell Rivera is treating the glaucoma    *LAST MEDICARE WELLNESS EXAM: 3/12/18, 3/13/19 3/18/20, 3/25/21, 3/28/22    IF 3/18    Past Medical History:   Diagnosis Date    Actinic keratosis     Agatston CAC score, <100 04/2021    ca score ZERO    Allergic rhinitis     Colon polyp     Dr Sheri Marroquin 2006; Dr Hank Pollack 10/16    Diverticulosis 2006    Dr Sheri Marroquin; and polyps    Dyslipidemia     calculated risk score was 3.9% (12/13); 4.3% (7/15); 6.5% (3/17); 5.7% (3/18); 5.6% (3/19); 8.6% (3/20); Ca score ZERO so no statin    GERD (gastroesophageal reflux disease)     Glaucoma 2020    Dr Jennifer Cmaarillo IFG (impaired fasting glucose) 3/12/2018    Osteopenia     DEXA t score -1.2 hip (10/09); -0.6 spine, -1.0 hip w FRAX 7.0/0.4 (11/11); -0.7 spine, -1.4 hip FRAX 8.2/0.7 (7/15); -0.1 spine, -1.3 hpi (5/20)    Overweight (BMI 25.0-29. 9)     IF 3/18    PVCs (premature ventricular contractions)     Venous insufficiency     and reflux; s/p vein closure procedure Dr Ruthy Degroot 2017     Past Surgical History:   Procedure Laterality Date   [facility]      Dr. Sheri Marroquin 2006, 2011; Dr Hank Pollack 10/16 polyp    HX OOPHORECTOMY Left     HX TONSILLECTOMY       Social History     Socioeconomic History    Marital status:      Spouse name: Not on file    Number of children: 1    Years of education: Not on file    Highest education level: Not on file   Occupational History    Occupation: [employer]   Tobacco Use    Smoking status: Never Smoker    Smokeless tobacco: Never Used   Substance and Sexual Activity    Alcohol use: No    Drug use: No    Sexual activity: Not on file   Other Topics Concern    Not on file   Social History Narrative    Not on file     Social Determinants of Health     Financial Resource Strain:     Difficulty of Paying Living Expenses: Not on file   Food Insecurity:     Worried About Running Out of Food in the Last Year: Not on file    Malinda of Food in the Last Year: Not on file   Transportation Needs:     Lack of Transportation (Medical): Not on file    Lack of Transportation (Non-Medical): Not on file   Physical Activity:     Days of Exercise per Week: Not on file    Minutes of Exercise per Session: Not on file   Stress:     Feeling of Stress : Not on file   Social Connections:     Frequency of Communication with Friends and Family: Not on file    Frequency of Social Gatherings with Friends and Family: Not on file    Attends Catholic Services: Not on file    Active Member of [address] [place] [address] or Organizations: Not on file    Attends Club or Organization Meetings: Not on file    Marital Status: Not on file   Intimate Partner Violence:     Fear of Current or Ex-Partner: Not on file    Emotionally Abused: Not on file    Physically Abused: Not on file    Sexually Abused: Not on file   Housing Stability:     Unable to Pay for Housing in the Last Year: Not on file    Number of [place] in the Last Year: Not on file    Unstable Housing in the Last Year: Not on file     No Known Allergies     Current Outpatient Medications   Medication Sig    ascorbic acid, vitamin C, (VITAMIN C) 500 mg tablet Take 2,000 mg by mouth daily.  cholecalciferol (VITAMIN D3) 1,000 unit cap Take 2,000 Units by mouth daily.  fluticasone (FLONASE) 50 mcg/actuation nasal spray 2 Sprays by Both Nostrils route daily.     CALCIUM CARBONATE/VITAMIN D3 (CALCIUM + D in 2 hours if necessary      Facility-Administered Medications: None       Past Medical History:   Diagnosis Date    Anxiety     Asthma     Depression     GERD (gastroesophageal reflux disease)     Hernia, abdominal     Migraines     Muscle spasm     Psychiatric disorder     Anx, Depression    Renal disorder     kidney stones       Past Surgical History:   Procedure Laterality Date    CYSTOSCOPY      removal of kidney stone    FL RETROGRADE PYELOGRAM  8/28/2018    HERNIA REPAIR      TX CYSTO/URETERO W/LITHOTRIPSY &INDWELL STENT INSRT Right 8/28/2018    Procedure: CYSTOSCOPY URETEROSCOPY WITH RETROGRADE PYELOGRAM AND INSERTION STENT URETERAL;  Surgeon: North Arauz MD;  Location: [facility];  Service: Urology    TOOTH EXTRACTION         Family History   Problem Relation Age of Onset    Diabetes Mother     Hyperlipidemia Mother     Stroke Mother     Heart disease Mother     Asthma Mother     Other Mother         Degen. of Intervertabral disc.    Nephrolithiasis Father     Nephrolithiasis Brother      I have reviewed and agree with the history as documented.    E-Cigarette/Vaping    E-Cigarette Use Never User      E-Cigarette/Vaping Substances    Nicotine No     THC No     CBD No     Flavoring No     Other No     Unknown No      Social History     Tobacco Use    Smoking status: Every Day     Current packs/day: 0.50     Average packs/day: 0.5 packs/day for 13.0 years (6.5 ttl pk-yrs)     Types: Cigarettes    Smokeless tobacco: Never   Vaping Use    Vaping status: Never Used   Substance Use Topics    Alcohol use: Not Currently     Comment: Alcohol intake:   None  - As per [place]     Drug use: No     Comment: Illicit drugs:   none  - As per [place]        Review of Systems   Constitutional:  Negative for chills and fever.   HENT:  Positive for dental problem. Negative for drooling and sore throat.    Respiratory:  Negative for shortness of breath.    Cardiovascular:  Negative for chest pain.   Gastrointestinal:   Negative for abdominal pain.   Musculoskeletal:  Negative for back pain and neck pain.   Skin:  Negative for color change.   Neurological:  Negative for headaches.       Physical Exam  Physical Exam  Vitals and nursing note reviewed.   Constitutional:       General: She is not in acute distress.     Appearance: Normal appearance. She is not ill-appearing, toxic-appearing or diaphoretic.   HENT:      Head: Normocephalic and atraumatic.      Right Ear: Tympanic membrane, ear canal and external ear normal.      Left Ear: Tympanic membrane, ear canal and external ear normal.      Nose: Nose normal.      Mouth/Throat:      Mouth: Mucous membranes are moist.      Pharynx: Oropharynx is clear.      Comments: Poor overall dentition. Cracked R mandibular molar tooth. Several missing R mandibular teeth. No gingival abscess noted. No facial redness. Mild R mandibular facial swelling.   Eyes:      Extraocular Movements: Extraocular movements intact.      Conjunctiva/sclera: Conjunctivae normal.      Pupils: Pupils are equal, round, and reactive to light.   Cardiovascular:      Rate and Rhythm: Normal rate and regular rhythm.      Pulses: Normal pulses.      Heart sounds: Normal heart sounds.   Pulmonary:      Effort: Pulmonary effort is normal.      Breath sounds: Normal breath sounds.   Abdominal:      General: Abdomen is flat. Bowel sounds are normal.      Palpations: Abdomen is soft.   Musculoskeletal:      Cervical back: Normal range of motion and neck supple.   Lymphadenopathy:      Cervical: No cervical adenopathy.   Skin:     General: Skin is warm and dry.      Capillary Refill: Capillary refill takes less than 2 seconds.   Neurological:      General: No focal deficit present.      Mental Status: She is alert and oriented to person, place, and time. Mental status is at baseline.         Vital Signs  ED Triage Vitals   Temperature Pulse Respirations Blood Pressure SpO2   04/23/24 1647 04/23/24 1645 04/23/24 1645 04/23/24  PO) Take 1,200 Units by mouth.  MULTIVITAMIN PO Take  by mouth. No current facility-administered medications for this visit. REVIEW OF SYSTEMS: Dr. Evelyn Garcia, [address] 5/19, colo 10/16 Dr Kevin Mckinney, [facility] 5/20  Ophtho  no vision change or eye pain  Oral  no mouth pain, tongue or tooth problems  Ears  no hearing loss, ear pain, fullness, no swallowing problems  Cardiac  no CP, PND, orthopnea, edema, palpitations or syncope  Chest  no breast masses  Resp  no wheezing, chronic coughing, dyspnea  GI  no heartburn, nausea, vomiting, change in bowel habits, bleeding, hemorrhoids  Urinary  no dysuria, hematuria, flank pain, urgency, frequency  Ortho  no swelling, dec ROM, myalgias    Visit Vitals  BP (!) 120/50   Pulse 77   Temp 97.5 °F (36.4 °C) (Temporal)   Resp 16   Ht 5' (1.524 m)   Wt 164 lb (74.4 kg)   SpO2 97%   BMI 32.03 kg/m²     Affect is appropriate. Mood stable  No apparent distress  HEENT --Anicteric sclerae, tympanic membranes normal,  ear canals normal.  PERRL, EOMI, conjunctiva and lids normal.  Disks were sharp  Sinuses were nontender, boggy mucosa noted, hearing normal.  Oropharynx without  erythema, normal tongue, oral mucosa and tonsils. No thyromegaly, JVD, or bruits. Lungs --Clear to auscultation and percussion, normal percussion. Heart --Regular rate and rhythm, no murmurs, rubs, gallops, or clicks. Chest wall --Nontender to palpation. PMI normal.  Abdomen -- Soft and nontender, no hepatosplenomegaly or masses. Extremities -- Without cyanosis, clubbing, edema. 2+ pulses equally and bilaterally.     LABS  From 10/09 showed gluc 97, cr 0.90,   alt 27,         chol 231, tg 105, hdl 52, ldl-c 158, wbc 5.0, hb 12.3, plt 265, tsh 1.81, ua neg  From 5/12 showed                 ldl-p 1892, chol 232, tg 236, hdl 51, ldl-c 134  From 10/12 showed gluc 93, cr 1.01, gfr 60,  alt 26, ldl-p 1696, chol 224, tg 223, hdl 49, ldl-c 130  From 12/13 showed gluc 92, cr 0.85, gfr 74,  alt 26, 1645 04/23/24 1645   98.4 °F (36.9 °C) 98 20 129/71 98 %      Temp Source Heart Rate Source Patient Position - Orthostatic VS BP Location FiO2 (%)   04/23/24 1647 04/23/24 1645 04/23/24 1645 04/23/24 1645 --   Axillary Monitor Sitting Right arm       Pain Score       04/23/24 1645       9           Vitals:    04/23/24 1645   BP: 129/71   Pulse: 98   Patient Position - Orthostatic VS: Sitting         Visual Acuity      ED Medications  Medications - No data to display    Diagnostic Studies  Results Reviewed       None                   No orders to display              Procedures  Procedures         ED Course  ED Course as of 04/23/24 1727   Tue Apr 23, 2024   1708 I reviewed patient in PDMP review. She was prescribed percocet (#15) on April 19th, 2023 by [facility]. There is history of 13 opiate prescriptions since 9/22.                                SBIRT 22yo+      Flowsheet Row Most Recent Value   Initial Alcohol Screen: US AUDIT-C     1. How often do you have a drink containing alcohol? 0 Filed at: 04/23/2024 1645   Audit-C Score 0 Filed at: 04/23/2024 1645   CRISTINO: How many times in the past year have you...    Used an illegal drug or used a prescription medication for non-medical reasons? Never Filed at: 04/23/2024 1645                      Medical Decision Making  Differential diagnosis includes dental pain, dental abscess, TMJ pain.  No indication of infection or cellulitis at this time.  Patient took Tylenol and Motrin 1 hour prior to arrival.  She was prescribed  Percocet #15 at [facility] ED 4 days ago.  She states she still has 12 of these tablets remaining.  She was advised she may take 1 to 2 tablets every 4-6 hours as needed for moderate to severe pain not helped by Motrin.  I also strongly encourage patient follow-up with her dentist or with [facility].  She was given a list of local dental clinics as well.  She was advised to contact specialist tomorrow  chol 230, tg 124, hdl 68, ldl-c 137, wbc 6.0, hb 12.2, plt 309,            ua neg  From 7/15 showed   gluc 97, cr 0.91, gfr>60, alt 14,        chol 192, tg 93,   hdl 48, ldl-c 125, wbc 5.0, hb 13.0, plt 286, tsh 1.15, ua neg  From 2/17 showed   gluc 95, cr 0.95, gfr>60, alt 38,        chol 237, tg 243, hdl 48, ldl-c 140, wbc 6.5, hb 13.6, plt 297, tsh 1.32, ua neg, hep c neg  From 3/18 showed   glu 100, cr 0.99, gfr 56,  alt 40,         chol 215, tg 165, hdl 54, ldl-c 128, wbc 6.2, hb 13.5, plt 286, tsh 2.64  From 3/19 showed   gluc 98, cr 1.06, gfr 52,  alt 28,         chol 217, tg 67,   hdl 68, ldl-c 136, wbc 6.3, hb 12.8, plt 262  From 3/20 showed   gluc 90, cr 0.91, gfr>60, alt 34,        chol 203, tg 73,   hdl 58, ldl-c 130, wbc 5.2, hb 12.7, plt 289  From 3/21 showed   gluc 92, cr 0.96, gfr 58,  alt 32, hba1c 6.0, chol 254, tg 138, hdl 61, ldl-c 165, wbc 6.1, hb 12.9, plt 281    Results for orders placed or performed during the hospital encounter of 07/58/67   METABOLIC PANEL, COMPREHENSIVE   Result Value Ref Range    Sodium 140 136 - 145 mmol/L    Potassium 4.3 3.5 - 5.5 mmol/L    Chloride 108 100 - 111 mmol/L    CO2 25 21 - 32 mmol/L    Anion gap 7 3.0 - 18 mmol/L    Glucose 87 74 - 99 mg/dL    BUN 24 (H) 7.0 - 18 MG/DL    Creatinine 0.91 0.6 - 1.3 MG/DL    BUN/Creatinine ratio 26 (H) 12 - 20      GFR est AA >60 >60 ml/min/1.73m2    GFR est non-AA >60 >60 ml/min/1.73m2    Calcium 9.6 8.5 - 10.1 MG/DL    Bilirubin, total 0.4 0.2 - 1.0 MG/DL    ALT (SGPT) 36 13 - 56 U/L    AST (SGOT) 24 10 - 38 U/L    Alk.  phosphatase 79 45 - 117 U/L    Protein, total 7.2 6.4 - 8.2 g/dL    Albumin 4.0 3.4 - 5.0 g/dL    Globulin 3.2 2.0 - 4.0 g/dL    A-G Ratio 1.3 0.8 - 1.7     LIPID PANEL   Result Value Ref Range    LIPID PROFILE          Cholesterol, total 241 (H) <200 MG/DL    Triglyceride 139 <150 MG/DL    HDL Cholesterol 59 40 - 60 MG/DL    LDL, calculated 154.2 (H) 0 - 100 MG/DL    VLDL, calculated 27.8 MG/DL    CHOL/HDL Ratio 4.1 0 - 5.0     CBC W/O DIFF   Result Value Ref Range    WBC 6.5 4.6 - 13.2 K/uL    RBC 4.23 4.20 - 5.30 M/uL    HGB 13.0 12.0 - 16.0 g/dL    HCT 40.9 35.0 - 45.0 %    MCV 96.7 78.0 - 100.0 FL    MCH 30.7 24.0 - 34.0 PG    MCHC 31.8 31.0 - 37.0 g/dL    RDW 13.2 11.6 - 14.5 %    PLATELET 875 160 - 730 K/uL    MPV 10.8 9.2 - 11.8 FL    NRBC 0.0 0  WBC    ABSOLUTE NRBC 0.00 0.00 - 0.01 K/uL   HEMOGLOBIN A1C W/O EAG   Result Value Ref Range    Hemoglobin A1c 5.8 (H) 4.2 - 5.6 %     We reviewed the patient's labs from the last several visits to point out trends in the numbers      Patient Active Problem List   Diagnosis Code    Diverticulosis and polyps Dr. Jericho Harley 2006 K57.90    Dyslipidemia E78.5    Osteopenia M85.80    Gastroesophageal reflux disease without esophagitis K21.9    IFG (impaired fasting glucose) R73.01    Overweight (BMI 25.0-29. 9) E66.3    Venous insufficiency I87.2    Obesity (BMI 30.0-34. 9) E66.9    BMI 32.0-32.9,adult Z68.32     Assessment and plan:  1. Allergic rhinitis. Continue current regimen. 2. Diverticulosis and polyps. Follow up colo 2026, fiber  3. Dyslipidemia. Lifestyle and dietary measures. 4. Osteopenia. Ca/D and weight bearing exercise reiterated  5. IFG. Lifestyle and dietary measures. Portion control reiterated, wt loss  6. Overweight. Lifestyle and dietary measures. As above  7. Venous insufficiency. Stockings, f/u Dr Abhi Valero      RTC 3/23      Above conditions discussed at length and patient vocalized understanding. All questions answered to patient satisfaction        ICD-10-CM ICD-9-CM    1. Medicare annual wellness visit, subsequent  Z00.00 V70.0    2. Encounter for immunization  Z23 V03.89 PNEUMOCOCCAL POLYSACCHARIDE VACCINE, 23-VALENT, ADULT OR IMMUNOSUPPRESSED PT DOSE,      ADMIN PNEUMOCOCCAL VACCINE   3. Venous insufficiency  I87.2 459.81    4. Diverticulosis and polyps Dr. Jericho Harley 2006  K57.90 562.10    5.  IFG (impaired fasting glucose)  R73.01 for follow-up.  Return precautions reviewed including fever, facial redness, worsening symptoms             Disposition  Final diagnoses:   Pain, dental   TMJ pain dysfunction syndrome     Time reflects when diagnosis was documented in both MDM as applicable and the Disposition within this note       Time User Action Codes Description Comment    4/23/2024  5:13 PM Luis M So [K08.89] Pain, dental     4/23/2024  5:15 PM Luis M So [M26.629] TMJ pain dysfunction syndrome           ED Disposition       ED Disposition   Discharge    Condition   Stable    Date/Time   Tue Apr 23, 2024 1710    Comment   Christine Fay discharge to home/self care.                   Follow-up Information       Follow up With Specialties Details Why Contact Info    [facility] [place] [address]  [phone]            Patient's Medications   Discharge Prescriptions    No medications on file       No discharge procedures on file.    PDMP Review         Value Time User    PDMP Reviewed  Yes 2/16/2024 10:59 PM Joey Barron MD            ED Provider  Electronically Signed by             Luis M So PA-C  04/23/24 6762     790.21 HEMOGLOBIN A1C WITH EAG   6. Dyslipidemia  E78.5 272.4 CBC W/O DIFF      METABOLIC PANEL, COMPREHENSIVE      LIPID PANEL   7. Screen for colon cancer  Z12.11 V76.51    8.  Encounter for screening mammogram for malignant neoplasm of breast  Z12.31 V76.12 [facility] MAMMO BI SCREENING INCL CAD

## 2024-04-23 NOTE — DISCHARGE INSTRUCTIONS
Motrin 400 mg every 6 hours with food for pain    Percocet may be taken 1-2 tabs every 4-6 hours as needed for moderate to severe pain     Follow up with your dentist, [facility]. Call tomorrow for appointment. We have also provided list of dental clinics    Return to ED for fever, increased pain

## 2024-04-24 DIAGNOSIS — F32.A ANXIETY AND DEPRESSION: ICD-10-CM

## 2024-04-24 DIAGNOSIS — F41.9 ANXIETY AND DEPRESSION: ICD-10-CM

## 2024-04-24 DIAGNOSIS — K02.9 DENTAL CARIES: ICD-10-CM

## 2024-04-24 DIAGNOSIS — R11.0 NAUSEA: ICD-10-CM

## 2024-04-24 DIAGNOSIS — K21.9 GASTROESOPHAGEAL REFLUX DISEASE WITHOUT ESOPHAGITIS: ICD-10-CM

## 2024-04-24 RX ORDER — FAMOTIDINE 40 MG/1
40 TABLET, FILM COATED ORAL
Qty: 30 TABLET | Refills: 5 | Status: SHIPPED | OUTPATIENT
Start: 2024-04-24

## 2024-04-24 RX ORDER — QUETIAPINE FUMARATE 50 MG/1
50 TABLET, FILM COATED ORAL
Qty: 90 TABLET | Refills: 0 | Status: SHIPPED | OUTPATIENT
Start: 2024-04-24

## 2024-04-24 RX ORDER — ONDANSETRON 4 MG/1
4 TABLET, ORALLY DISINTEGRATING ORAL EVERY 6 HOURS PRN
Qty: 90 TABLET | Refills: 1 | Status: SHIPPED | OUTPATIENT
Start: 2024-04-24

## 2024-04-24 NOTE — TELEPHONE ENCOUNTER
Pt was calling in stating that Quetiapine (Seroquel) 25 mg has not been working for her and was advised by Dr. Marie to take an extra tablet. Pt stated she does not have enough tablets to last her until the end of the month. Pt is requesting a refill on this medication and would like to discuss with Dr. Marie at her next visit upping the dose of this prescription. Please advise with the pt if needed thank you.

## 2024-04-27 ENCOUNTER — HOSPITAL ENCOUNTER (EMERGENCY)
Facility: HOSPITAL | Age: 34
Discharge: HOME | End: 2024-04-27
Attending: EMERGENCY MEDICINE | Admitting: EMERGENCY MEDICINE
Payer: COMMERCIAL

## 2024-04-27 VITALS
HEART RATE: 101 BPM | HEIGHT: 60 IN | OXYGEN SATURATION: 100 % | BODY MASS INDEX: 23.56 KG/M2 | RESPIRATION RATE: 18 BRPM | DIASTOLIC BLOOD PRESSURE: 75 MMHG | SYSTOLIC BLOOD PRESSURE: 111 MMHG | WEIGHT: 120 LBS | TEMPERATURE: 98 F

## 2024-04-27 DIAGNOSIS — M26.629 TMJ PAIN DYSFUNCTION SYNDROME: Primary | ICD-10-CM

## 2024-04-27 DIAGNOSIS — B37.31 VULVOVAGINAL CANDIDIASIS: ICD-10-CM

## 2024-04-27 LAB
BACTERIA UR QL AUTO: ABNORMAL /HPF
BILIRUB UR QL STRIP: NEGATIVE
CLARITY UR: CLEAR
COLOR UR: YELLOW
GLUCOSE UR STRIP-MCNC: NEGATIVE MG/DL
HGB UR QL STRIP.AUTO: NEGATIVE
KETONES UR STRIP-MCNC: NEGATIVE MG/DL
LEUKOCYTE ESTERASE UR QL STRIP: ABNORMAL
NITRITE UR QL STRIP: NEGATIVE
NON-SQ EPI CELLS URNS QL MICRO: ABNORMAL /HPF
PH UR STRIP.AUTO: 6 [PH]
PROT UR STRIP-MCNC: NEGATIVE MG/DL
RBC #/AREA URNS AUTO: ABNORMAL /HPF
SP GR UR STRIP.AUTO: 1.02 (ref 1–1.03)
UROBILINOGEN UR QL STRIP.AUTO: 0.2 E.U./DL
WBC #/AREA URNS AUTO: ABNORMAL /HPF

## 2024-04-27 PROCEDURE — 81003 URINALYSIS AUTO W/O SCOPE: CPT | Performed by: PHYSICIAN ASSISTANT

## 2024-04-27 PROCEDURE — 99284 EMERGENCY DEPT VISIT MOD MDM: CPT | Performed by: EMERGENCY MEDICINE

## 2024-04-27 PROCEDURE — 99283 EMERGENCY DEPT VISIT LOW MDM: CPT

## 2024-04-27 PROCEDURE — 81514 NFCT DS BV&VAGINITIS DNA ALG: CPT | Performed by: PHYSICIAN ASSISTANT

## 2024-04-27 PROCEDURE — 81001 URINALYSIS AUTO W/SCOPE: CPT | Performed by: PHYSICIAN ASSISTANT

## 2024-04-27 NOTE — ED PROVIDER NOTES
"History  Chief Complaint   Patient presents with    Temporomandibular Joint Pain    Possible UTI     PT \"My TMJ started on my right side and now it is going over to the other side. I have been seeing [facility] for it but they told me I had to see [facility] in [place] but they have not returned my calls since Thursday. And I feel like I have ha UTI due to the burning sensation when I pee\"      Patient is a 34 yo wf with history of anxiety, depression, asthma, GERD, migraine headaches  seen here 4 days ago for pain at R TMJ joint. States pain has now \"gone to the other side\". States she took motrin 3 hours ago and tylenol 3 1/2 hours ago. Pt also reports 3 day history of urinary frequency and burning. States there is itching.  Thinks she may have a UTI. No fever or chills. No nausea/vomiting. No abdominal pain.        Prior to Admission Medications   Prescriptions Last Dose Informant Patient Reported? Taking?   Diclofenac Sodium (VOLTAREN) 1 %   No No   Sig: Apply 2 g topically 4 (four) times a day   QUEtiapine (SEROquel) 50 mg tablet   No Yes   Sig: Take 1 tablet (50 mg total) by mouth daily at bedtime   chlorhexidine (PERIDEX) 0.12 % solution   No No   Sig: Apply 15 mL to the mouth or throat 2 (two) times a day   cloNIDine (CATAPRES) 0.1 mg tablet   No Yes   Sig: TAKE 1 TABLET UPTO 2 X A DAY AS NEEDED FOR PANIC AND ANXIETY   famotidine (PEPCID) 40 MG tablet   No Yes   Sig: TAKE 1 TABLET (40 MG TOTAL) BY MOUTH DAILY BEFORE DINNER   omeprazole (PriLOSEC) 40 MG capsule   No Yes   Sig: TAKE 1 CAPSULE BY MOUTH EVERY DAY BEFORE BREAKFAST   ondansetron (ZOFRAN-ODT) 4 mg disintegrating tablet 4/27/2024  No Yes   Sig: TAKE 1 TABLET (4 MG TOTAL) BY MOUTH EVERY 6 (SIX) HOURS AS NEEDED FOR NAUSEA OR VOMITING   rizatriptan (MAXALT-MLT) 5 mg disintegrating tablet More than a month  No No   Sig: Take 1 tablet (5 mg total) by mouth once as needed for migraine for up to 1 dose may repeat in 2 hours if necessary    "   Facility-Administered Medications: None       Past Medical History:   Diagnosis Date    Anxiety     Asthma     Depression     GERD (gastroesophageal reflux disease)     Hernia, abdominal     Migraines     Muscle spasm     Psychiatric disorder     Anx, Depression    Renal disorder     kidney stones       Past Surgical History:   Procedure Laterality Date    CYSTOSCOPY      removal of kidney stone    FL RETROGRADE PYELOGRAM  8/28/2018    HERNIA REPAIR      ME CYSTO/URETERO W/LITHOTRIPSY &INDWELL STENT INSRT Right 8/28/2018    Procedure: CYSTOSCOPY URETEROSCOPY WITH RETROGRADE PYELOGRAM AND INSERTION STENT URETERAL;  Surgeon: North Arauz MD;  [facility];  Service: Urology    TOOTH EXTRACTION         Family History   Problem Relation Age of Onset    Diabetes Mother     Hyperlipidemia Mother     Stroke Mother     Heart disease Mother     Asthma Mother     Other Mother         Degen. of Intervertabral disc.    Nephrolithiasis Father     Nephrolithiasis Brother      I have reviewed and agree with the history as documented.    E-Cigarette/Vaping    E-Cigarette Use Never User      E-Cigarette/Vaping Substances    Nicotine No     THC No     CBD No     Flavoring No     Other No     Unknown No      Social History     Tobacco Use    Smoking status: Every Day     Current packs/day: 0.50     Average packs/day: 0.5 packs/day for 13.0 years (6.5 ttl pk-yrs)     Types: Cigarettes    Smokeless tobacco: Never   Vaping Use    Vaping status: Never Used   Substance Use Topics    Alcohol use: Not Currently     Comment: Alcohol intake:   None  - As per [place]     Drug use: No     Comment: Illicit drugs:   none  - As per [place]        Review of Systems   Constitutional:  Negative for chills and fever.   Respiratory:  Negative for shortness of breath.    Cardiovascular:  Negative for chest pain.   Gastrointestinal:  Negative for abdominal pain, diarrhea, nausea and vomiting.   Genitourinary:  Positive for dysuria and  frequency. Negative for flank pain and hematuria.   Neurological:  Negative for headaches.       Physical Exam  Physical Exam  Vitals and nursing note reviewed.   Constitutional:       General: She is not in acute distress.     Appearance: Normal appearance. She is not ill-appearing, toxic-appearing or diaphoretic.   HENT:      Head: Normocephalic and atraumatic.      Right Ear: Tympanic membrane, ear canal and external ear normal.      Left Ear: Tympanic membrane, ear canal and external ear normal.      Nose: Nose normal.      Mouth/Throat:      Mouth: Mucous membranes are moist.      Pharynx: Oropharynx is clear.      Comments: Poor overall dentition. No gingival abscess   Eyes:      Extraocular Movements: Extraocular movements intact.      Conjunctiva/sclera: Conjunctivae normal.      Pupils: Pupils are equal, round, and reactive to light.   Cardiovascular:      Rate and Rhythm: Normal rate and regular rhythm.      Pulses: Normal pulses.      Heart sounds: Normal heart sounds.   Pulmonary:      Effort: Pulmonary effort is normal.      Breath sounds: Normal breath sounds.   Abdominal:      General: Abdomen is flat. Bowel sounds are normal. There is no distension.      Palpations: Abdomen is soft.      Tenderness: There is no abdominal tenderness. There is no right CVA tenderness, left CVA tenderness, guarding or rebound.      Hernia: No hernia is present.   Musculoskeletal:         General: Normal range of motion.      Cervical back: Normal range of motion and neck supple.   Skin:     General: Skin is warm and dry.      Capillary Refill: Capillary refill takes less than 2 seconds.   Neurological:      General: No focal deficit present.      Mental Status: She is alert and oriented to person, place, and time. Mental status is at baseline.         Vital Signs  ED Triage Vitals [04/27/24 1525]   Temperature Pulse Respirations Blood Pressure SpO2   98 °F (36.7 °C) 101 18 111/75 100 %      Temp Source Heart Rate Source  Patient Position - Orthostatic VS BP Location FiO2 (%)   Oral Monitor Sitting Left arm --      Pain Score       10 - Worst Possible Pain           Vitals:    04/27/24 1525   BP: 111/75   Pulse: 101   Patient Position - Orthostatic VS: Sitting         Visual Acuity      ED Medications  Medications - No data to display    Diagnostic Studies  Results Reviewed       Procedure Component Value Units Date/Time    Molecular Vaginal Panel [[id number]] Collected: 04/27/24 1715    Lab Status: In process Specimen: Genital from Vaginal Updated: 04/27/24 1719    Urine Microscopic [[id number]]  (Abnormal) Collected: 04/27/24 1601    Lab Status: Final result Specimen: Urine, Clean Catch Updated: 04/27/24 1643     RBC, UA None Seen /hpf      WBC, UA 0-5 /hpf      Epithelial Cells Moderate /hpf      Bacteria, UA Moderate /hpf     UA w Reflex to Microscopic w Reflex to Culture [[id number]]  (Abnormal) Collected: 04/27/24 1601    Lab Status: Final result Specimen: Urine, Clean Catch Updated: 04/27/24 1606     Color, UA Yellow     Clarity, UA Clear     Specific Gravity, UA 1.020     pH, UA 6.0     Leukocytes, UA 2+     Nitrite, UA Negative     Protein, UA Negative mg/dl      Glucose, UA Negative mg/dl      Ketones, UA Negative mg/dl      Urobilinogen, UA 0.2 E.U./dl      Bilirubin, UA Negative     Occult Blood, UA Negative                   No orders to display              Procedures  Procedures         ED Course                                             Medical Decision Making  I reviewed patient in prescription monitoring program. She has had 13 opiate prescriptions since 9/22. She went to [facility] ED after being seen in this ED 4 days ago. She was given Rx oxycodone. She was also prescribed oxycodone on 4/13/24 and 4/19/24 when seen at [facility]. I asked patient if she feels she has problem with opiate pain medication and she replied she does not. I advised that opiate is not the appropriate treatment for TMJ  syndrome. She did not contact [facility] group as advised last ED visit here. States she contacted  oral surgery group but no one returned call. I advised we will not be administering or prescribing more opiate pain medication today. I also advised patient of my concern for drug seeking behavior.   Differential diagnosis includes UTI, yeast infection, trichomonas, BV. U/A shows no wbc or rbc, mod epith cells. This is not consistent with UTI.  Molecular vaginal panel sent and to be followed  Pt advised to f/u with pcp this week  Pt advised to f/u with [facility] group this week by calling Monday for appointment  Retunrn precautions reviewed     Amount and/or Complexity of Data Reviewed  Labs: ordered.             Disposition  Final diagnoses:   TMJ pain dysfunction syndrome     Time reflects when diagnosis was documented in both MDM as applicable and the Disposition within this note       Time User Action Codes Description Comment    4/27/2024  4:56 PM Luis M So Add [M26.629] TMJ pain dysfunction syndrome           ED Disposition       ED Disposition   Discharge    Condition   Stable    Date/Time   Sat Apr 27, 2024  4:56 PM    Comment   Christine Fay discharge to home/self care.                   Follow-up Information       Follow up With Specialties Details Why Contact Info    [facility] [place] [address]  [phone]            Discharge Medication List as of 4/27/2024  4:58 PM        CONTINUE these medications which have NOT CHANGED    Details   cloNIDine (CATAPRES) 0.1 mg tablet TAKE 1 TABLET UPTO 2 X A DAY AS NEEDED FOR PANIC AND ANXIETY, Normal      famotidine (PEPCID) 40 MG tablet TAKE 1 TABLET (40 MG TOTAL) BY MOUTH DAILY BEFORE DINNER, Starting Wed 4/24/2024, Normal      omeprazole (PriLOSEC) 40 MG capsule TAKE 1 CAPSULE BY MOUTH EVERY DAY BEFORE BREAKFAST, Starting Thu 4/25/2024, Normal       ondansetron (ZOFRAN-ODT) 4 mg disintegrating tablet TAKE 1 TABLET (4 MG TOTAL) BY MOUTH EVERY 6 (SIX) HOURS AS NEEDED FOR NAUSEA OR VOMITING, Starting Wed 4/24/2024, Normal      QUEtiapine (SEROquel) 50 mg tablet Take 1 tablet (50 mg total) by mouth daily at bedtime, Starting Wed 4/24/2024, Normal      chlorhexidine (PERIDEX) 0.12 % solution Apply 15 mL to the mouth or throat 2 (two) times a day, Starting Tue 4/2/2024, Normal      Diclofenac Sodium (VOLTAREN) 1 % Apply 2 g topically 4 (four) times a day, Starting Tue 4/2/2024, Normal      rizatriptan (MAXALT-MLT) 5 mg disintegrating tablet Take 1 tablet (5 mg total) by mouth once as needed for migraine for up to 1 dose may repeat in 2 hours if necessary, Starting Tue 4/2/2024, Normal             No discharge procedures on file.    PDMP Review         Value Time User    PDMP Reviewed  Yes 2/16/2024 10:59 PM Joey Barron MD            ED Provider  Electronically Signed by             Luis M So PA-C  04/27/24 1363       Luis M So PA-C  04/27/24 7296

## 2024-04-27 NOTE — ED ATTENDING ATTESTATION
4/27/2024  I, Ray Bai DO, saw and evaluated the patient. I have discussed the patient with the resident/non-physician practitioner and agree with the resident's/non-physician practitioner's findings, Plan of Care, and MDM as documented in the resident's/non-physician practitioner's note, except where noted. All available labs and Radiology studies were reviewed.  I was present for key portions of any procedure(s) performed by the resident/non-physician practitioner and I was immediately available to provide assistance.       At this point I agree with the current assessment done in the Emergency Department.  I have conducted an independent evaluation of this patient a history and physical is as follows:  33-year-old female presents to the emergency department with chronic TMJ pain and vaginal itching and bleeding.  The patient states that she has had chronic TMJ pain that is gotten worse and is out of her narcotic pain medication.  She also states that she has had vaginal itching and burning with bleeding she refuses a vaginal exam.  Otherwise she is well-appearing on exam.  She has multiple visits to the emergency department for narcotic medications.  At this point I do not feel comfortable providing another narcotic medication prescription.  She had a UA that was unremarkable.  She will self swab for a molecular vaginal panel and be discharged pending those results.  ED Course         Critical Care Time  Procedures

## 2024-04-27 NOTE — DISCHARGE INSTRUCTIONS
Tylenol or ibuprofen (with food) for pain if needed    [facility] as previously advised    Return to ED for fever, increased pain, worsening symptoms

## 2024-04-28 RX ORDER — CLOTRIMAZOLE 1 %
1 CREAM WITH APPLICATOR VAGINAL
Qty: 45 G | Refills: 0 | Status: SHIPPED | OUTPATIENT
Start: 2024-04-28 | End: 2024-05-05

## 2024-04-30 ENCOUNTER — TELEMEDICINE (OUTPATIENT)
Dept: FAMILY MEDICINE CLINIC | Facility: CLINIC | Age: 34
End: 2024-04-30
Payer: COMMERCIAL

## 2024-04-30 VITALS — WEIGHT: 120 LBS | BODY MASS INDEX: 23.44 KG/M2

## 2024-04-30 DIAGNOSIS — G89.29 CHRONIC DENTAL PAIN: ICD-10-CM

## 2024-04-30 DIAGNOSIS — G44.52 NDPH (NEW DAILY PERSISTENT HEADACHE): ICD-10-CM

## 2024-04-30 DIAGNOSIS — F32.A ANXIETY AND DEPRESSION: Primary | ICD-10-CM

## 2024-04-30 DIAGNOSIS — M26.623 BILATERAL TEMPOROMANDIBULAR JOINT PAIN: ICD-10-CM

## 2024-04-30 DIAGNOSIS — F41.9 ANXIETY AND DEPRESSION: Primary | ICD-10-CM

## 2024-04-30 DIAGNOSIS — K08.9 CHRONIC DENTAL PAIN: ICD-10-CM

## 2024-04-30 PROCEDURE — 99214 OFFICE O/P EST MOD 30 MIN: CPT | Performed by: FAMILY MEDICINE

## 2024-04-30 RX ORDER — CYCLOBENZAPRINE HCL 10 MG
10 TABLET ORAL 3 TIMES DAILY PRN
Qty: 45 TABLET | Refills: 0 | Status: SHIPPED | OUTPATIENT
Start: 2024-04-30

## 2024-04-30 RX ORDER — OXYCODONE HYDROCHLORIDE AND ACETAMINOPHEN 5; 325 MG/1; MG/1
TABLET ORAL
COMMUNITY

## 2024-04-30 RX ORDER — ONDANSETRON 4 MG/1
TABLET, FILM COATED ORAL
COMMUNITY
Start: 2024-04-24

## 2024-04-30 RX ORDER — PENICILLIN V POTASSIUM 500 MG/1
TABLET ORAL
COMMUNITY
Start: 2024-04-14

## 2024-04-30 RX ORDER — AMOXICILLIN AND CLAVULANATE POTASSIUM 875; 125 MG/1; MG/1
1 TABLET, FILM COATED ORAL 2 TIMES DAILY
COMMUNITY
Start: 2024-04-19

## 2024-04-30 RX ORDER — HYDROXYZINE PAMOATE 25 MG/1
25 CAPSULE ORAL 3 TIMES DAILY PRN
Qty: 30 CAPSULE | Refills: 0 | Status: SHIPPED | OUTPATIENT
Start: 2024-04-30

## 2024-04-30 NOTE — PROGRESS NOTES
Virtual Regular Visit    Verification of patient location:    Patient is located at Home in the following state in which I hold an active license [place]      Assessment/Plan:    Problem List Items Addressed This Visit    None  Visit Diagnoses       Anxiety and depression    -  Primary    Relevant Medications    hydrOXYzine pamoate (VISTARIL) 25 mg capsule    Bilateral temporomandibular joint pain        Relevant Medications    cyclobenzaprine (FLEXERIL) 10 mg tablet    NDPH (new daily persistent headache)        Relevant Medications    oxyCODONE-acetaminophen (PERCOCET) 5-325 mg per tablet    cyclobenzaprine (FLEXERIL) 10 mg tablet    Chronic dental pain              Continue with the Seroquel 50 mg at night it is helping her  [facility]  Will prescribe some Flexeril  Clonidine did not help with the panic attacks we will move onto the hydroxyzine we can even try propranolol  No pain medicines at this point  Can still use Voltaren to the TMJ  Continue to use the chlorhexidine  And still has pills left on the Maxalt which is helping when she gets her migraines       Reason for visit is   Chief Complaint   Patient presents with    Virtual Regular Visit     4 month follow up    Virtual Regular Visit          Encounter provider Dinesh Marie MD      Recent Visits  No visits were found meeting these conditions.  Showing recent visits within past 7 days and meeting all other requirements  Today's Visits  Date Type Provider Dept   04/30/24 Telemedicine Dinesh Marie MD [facility]   Showing today's visits and meeting all other requirements  Future Appointments  No visits were found meeting these conditions.  Showing future appointments within next 150 days and meeting all other requirements       The patient was identified by name and date of birth. Christine Fay was informed that this is a telemedicine visit and that the visit is being conducted through the [facility]  platform. She agrees to proceed..  My office door was closed. No one else was in the room.  She acknowledged consent and understanding of privacy and security of the video platform. The patient has agreed to participate and understands they can discontinue the visit at any time.    Patient is aware this is a billable service.     Subjective  Christine Fay is a 33 y.o. female  .      HPI     Still with panic attacks can't get into a car still without panic   Clonidine made her tired but didn't calm her     Seroquel helping some can take   Maxalt helping  took 4 tablets in 1 month   Took one and if it came back took motrin afterwards and that cured it         Past Medical History:   Diagnosis Date    Anxiety     Asthma     Depression     GERD (gastroesophageal reflux disease)     Hernia, abdominal     Migraines     Muscle spasm     Psychiatric disorder     Anx, Depression    Renal disorder     kidney stones       Past Surgical History:   Procedure Laterality Date    CYSTOSCOPY      removal of kidney stone    FL RETROGRADE PYELOGRAM  8/28/2018    HERNIA REPAIR      [place] CYSTO/URETERO W/LITHOTRIPSY &INDWELL STENT INSRT Right 8/28/2018    Procedure: CYSTOSCOPY URETEROSCOPY WITH RETROGRADE PYELOGRAM AND INSERTION STENT URETERAL;  Surgeon: [place] Arauz MD;  [facility];  Service: Urology    TOOTH EXTRACTION         Current Outpatient Medications   Medication Sig Dispense Refill    amoxicillin-clavulanate (AUGMENTIN) 875-125 mg per tablet Take 1 tablet by mouth 2 (two) times a day      chlorhexidine (PERIDEX) 0.12 % solution Apply 15 mL to the mouth or throat 2 (two) times a day 473 mL 11    clotrimazole (GYNE-LOTRIMIN) 1 % vaginal cream Insert 1 applicator into the vagina daily at bedtime for 7 days 45 g 0    cyclobenzaprine (FLEXERIL) 10 mg tablet Take 1 tablet (10 mg total) by mouth 3 (three) times a day as needed for muscle spasms 45 tablet 0    Diclofenac Sodium (VOLTAREN) 1 % Apply 2 g topically 4  (four) times a day 100 g 0    famotidine (PEPCID) 40 MG tablet TAKE 1 TABLET (40 MG TOTAL) BY MOUTH DAILY BEFORE DINNER 30 tablet 5    hydrOXYzine pamoate (VISTARIL) 25 mg capsule Take 1 capsule (25 mg total) by mouth 3 (three) times a day as needed for anxiety 30 capsule 0    omeprazole (PriLOSEC) 40 MG capsule TAKE 1 CAPSULE BY MOUTH EVERY DAY BEFORE BREAKFAST 90 capsule 1    ondansetron (ZOFRAN) 4 mg tablet take 1 tablet by mouth every 8 hours as needed for nausea and vomiting      ondansetron (ZOFRAN-ODT) 4 mg disintegrating tablet TAKE 1 TABLET (4 MG TOTAL) BY MOUTH EVERY 6 (SIX) HOURS AS NEEDED FOR NAUSEA OR VOMITING 90 tablet 1    oxyCODONE-acetaminophen (PERCOCET) 5-325 mg per tablet PLEASE SEE ATTACHED FOR DETAILED DIRECTIONS      QUEtiapine (SEROquel) 50 mg tablet Take 1 tablet (50 mg total) by mouth daily at bedtime 90 tablet 0    rizatriptan (MAXALT-MLT) 5 mg disintegrating tablet Take 1 tablet (5 mg total) by mouth once as needed for migraine for up to 1 dose may repeat in 2 hours if necessary 10 tablet 5    penicillin V potassium (VEETID) 500 mg tablet TAKE 1 TABLET BY MOUTH 4 TIMES A DAY FOR 7 DAYS. (Patient not taking: Reported on 4/30/2024)       No current facility-administered medications for this visit.        Allergies   Allergen Reactions    Aspirin Anaphylaxis     Doesn't have epi pen. Okay to take ibuprofen per patient    Ketorolac Itching    Naproxen Nausea Only    Prednisone Itching     itchy throat      Toradol [Ketorolac Tromethamine]     Tramadol Itching       Review of Systems   HENT:  Positive for dental problem.    Neurological:  Positive for headaches.       Video Exam    Vitals:    04/30/24 1558   Weight: 54.4 kg (120 lb)       Physical Exam  Constitutional:       Appearance: She is well-developed.   Eyes:      Conjunctiva/sclera: Conjunctivae normal.   Pulmonary:      Effort: Pulmonary effort is normal.   Musculoskeletal:      Cervical back: Normal range of motion.   Neurological:       Mental Status: She is alert and oriented to person, place, and time.   Psychiatric:         Behavior: Behavior normal.         Thought Content: Thought content normal.         Judgment: Judgment normal.          Visit Time  Total Visit Duration: 15

## 2024-05-13 DIAGNOSIS — M26.623 BILATERAL TEMPOROMANDIBULAR JOINT PAIN: ICD-10-CM

## 2024-05-15 RX ORDER — CYCLOBENZAPRINE HCL 10 MG
10 TABLET ORAL 3 TIMES DAILY PRN
Qty: 45 TABLET | Refills: 0 | Status: SHIPPED | OUTPATIENT
Start: 2024-05-15

## 2024-05-27 DIAGNOSIS — M26.623 BILATERAL TEMPOROMANDIBULAR JOINT PAIN: ICD-10-CM

## 2024-05-28 RX ORDER — CYCLOBENZAPRINE HCL 10 MG
10 TABLET ORAL 3 TIMES DAILY PRN
Qty: 45 TABLET | Refills: 0 | Status: SHIPPED | OUTPATIENT
Start: 2024-05-28

## 2024-06-15 DIAGNOSIS — M26.623 BILATERAL TEMPOROMANDIBULAR JOINT PAIN: ICD-10-CM

## 2024-06-23 RX ORDER — CYCLOBENZAPRINE HCL 10 MG
10 TABLET ORAL 3 TIMES DAILY PRN
Qty: 45 TABLET | Refills: 0 | Status: SHIPPED | OUTPATIENT
Start: 2024-06-23

## 2024-07-02 DIAGNOSIS — R11.0 NAUSEA: ICD-10-CM

## 2024-07-02 DIAGNOSIS — K02.9 DENTAL CARIES: ICD-10-CM

## 2024-07-02 RX ORDER — ONDANSETRON 4 MG/1
4 TABLET, ORALLY DISINTEGRATING ORAL EVERY 6 HOURS PRN
Qty: 90 TABLET | Refills: 1 | Status: SHIPPED | OUTPATIENT
Start: 2024-07-02 | End: 2024-07-03

## 2024-07-02 NOTE — TELEPHONE ENCOUNTER
Patient called and stated that her pharmacy will not cover the medication as it was sent in for 90 tablets for a 20 day supply.  They will only cover if sent 90 tablets for 30 day supply. Must say 1 tablet 3x/day    Please resend to [facility] in [place].

## 2024-07-02 NOTE — TELEPHONE ENCOUNTER
Reason for call:   [x] Refill   [] Prior Auth  [] Other:     Office:   [x] PCP/Provider - Dinesh Marie MD  PCP - General  [] Specialty/Provider -     Medication:   ondansetron (ZOFRAN-ODT) 4 mg disintegrating tablet 4 mg, Every 6 hours PRN       Pharmacy: [facility] #[id number] - [place] [address]      Does the patient have enough for 3 days?   [] Yes   [x] No - Send as HP to POD

## 2024-07-03 RX ORDER — ONDANSETRON 4 MG/1
4 TABLET, ORALLY DISINTEGRATING ORAL 3 TIMES DAILY
Qty: 90 TABLET | Refills: 1 | Status: SHIPPED | OUTPATIENT
Start: 2024-07-03

## 2024-07-06 DIAGNOSIS — M26.623 BILATERAL TEMPOROMANDIBULAR JOINT PAIN: ICD-10-CM

## 2024-07-09 RX ORDER — CYCLOBENZAPRINE HCL 10 MG
10 TABLET ORAL 3 TIMES DAILY PRN
Qty: 45 TABLET | Refills: 0 | Status: SHIPPED | OUTPATIENT
Start: 2024-07-09

## 2024-07-21 DIAGNOSIS — F41.9 ANXIETY AND DEPRESSION: ICD-10-CM

## 2024-07-21 DIAGNOSIS — F32.A ANXIETY AND DEPRESSION: ICD-10-CM

## 2024-07-22 RX ORDER — QUETIAPINE FUMARATE 50 MG/1
50 TABLET, FILM COATED ORAL
Qty: 90 TABLET | Refills: 1 | Status: SHIPPED | OUTPATIENT
Start: 2024-07-22

## 2024-07-26 DIAGNOSIS — K02.9 DENTAL CARIES: ICD-10-CM

## 2024-07-26 DIAGNOSIS — R11.0 NAUSEA: ICD-10-CM

## 2024-07-26 NOTE — TELEPHONE ENCOUNTER
Medication Refill Request     Name Zofran   Dose/Frequency 4mg 3x daily   Quantity 90  Verified pharmacy   [x]  Verified ordering Provider   [x]  Does patient have enough for the next 3 days? Yes [x] No []

## 2024-07-29 RX ORDER — ONDANSETRON 4 MG/1
4 TABLET, ORALLY DISINTEGRATING ORAL 3 TIMES DAILY
Qty: 90 TABLET | Refills: 0 | Status: SHIPPED | OUTPATIENT
Start: 2024-07-29

## 2024-08-07 DIAGNOSIS — M26.623 BILATERAL TEMPOROMANDIBULAR JOINT PAIN: ICD-10-CM

## 2024-08-11 RX ORDER — CYCLOBENZAPRINE HCL 10 MG
10 TABLET ORAL 3 TIMES DAILY PRN
Qty: 45 TABLET | Refills: 0 | Status: SHIPPED | OUTPATIENT
Start: 2024-08-11

## 2024-09-05 DIAGNOSIS — G44.52 NDPH (NEW DAILY PERSISTENT HEADACHE): ICD-10-CM

## 2024-09-06 RX ORDER — RIZATRIPTAN BENZOATE 5 MG/1
TABLET, ORALLY DISINTEGRATING ORAL
Qty: 12 TABLET | Refills: 5 | Status: SHIPPED | OUTPATIENT
Start: 2024-09-06

## 2024-10-10 ENCOUNTER — DOCUMENTATION (OUTPATIENT)
Dept: FAMILY MEDICINE CLINIC | Facility: CLINIC | Age: 34
End: 2024-10-10

## 2024-10-10 ENCOUNTER — NURSE TRIAGE (OUTPATIENT)
Dept: OTHER | Facility: OTHER | Age: 34
End: 2024-10-10

## 2024-10-10 DIAGNOSIS — R11.0 NAUSEA: Primary | ICD-10-CM

## 2024-10-10 DIAGNOSIS — K02.9 DENTAL CARIES: ICD-10-CM

## 2024-10-10 RX ORDER — ONDANSETRON 4 MG/1
4 TABLET, ORALLY DISINTEGRATING ORAL EVERY 8 HOURS PRN
Qty: 6 TABLET | Refills: 0 | Status: SHIPPED | OUTPATIENT
Start: 2024-10-10

## 2024-10-10 NOTE — TELEPHONE ENCOUNTER
ESC to on call : Pt is calling for request for reglan for her nausea not responding to zofran .She is nauseous from URI post nasal drip.

## 2024-10-10 NOTE — TELEPHONE ENCOUNTER
"Regarding: phlegm / nausea / requests Reglan rx  ----- Message from Hattie ENRIQUEZ sent at 10/10/2024  6:47 PM EDT -----  \"The hospital said I have an upper respiratory infection and I am choking on phlegm and want a refill of the reglan for nausea, the zofran isn't helping\"    "

## 2024-10-10 NOTE — TELEPHONE ENCOUNTER
Reason for Disposition  • Common cold with no complications    Protocols used: Common Cold-Adult-AH

## 2024-10-10 NOTE — TELEPHONE ENCOUNTER
"Answer Assessment - Initial Assessment Questions  1. ONSET: \"When did the nasal discharge start?\"       Two weeks  2. AMOUNT: \"How much discharge is there?\"       Large amount and running yellowish green color  3. COUGH: \"Do you have a cough?\" If Yes, ask: \"Describe the color of your mucus.\" (e.g., clear, white, yellow, green)      Yes congestive cough  4. RESPIRATORY DISTRESS: \"Describe your breathing.\"       none  5. FEVER: \"Do you have a fever?\" If Yes, ask: \"What is your temperature, how was it measured, and when did it start?\"      none  6. SEVERITY: \"Overall, how bad are you feeling right now?\" (e.g., doesn't interfere with normal activities, staying home from school/work, staying in bed)       Nausea from the post nasal drip. Zofran is not helping . She gags on the phlem  7. OTHER SYMPTOMS: \"Do you have any other symptoms?\" (e.g., earache, mouth sores, sore throat, wheezing)      Cough, nasal discharge, throat lozenges  8. PREGNANCY: \"Is there any chance you are pregnant?\" \"When was your last menstrual period?\"      No    Reglan works well .    Protocols used: Common Cold-Adult-AH    "

## 2024-10-11 ENCOUNTER — NURSE TRIAGE (OUTPATIENT)
Dept: OTHER | Facility: OTHER | Age: 34
End: 2024-10-11

## 2024-10-11 DIAGNOSIS — R11.0 NAUSEA: Primary | ICD-10-CM

## 2024-10-11 RX ORDER — METOCLOPRAMIDE 5 MG/1
5 TABLET ORAL 3 TIMES DAILY PRN
Qty: 20 TABLET | Refills: 0 | Status: SHIPPED | OUTPATIENT
Start: 2024-10-11

## 2024-10-11 NOTE — TELEPHONE ENCOUNTER
"Regarding: medication request  ----- Message from Mira MCKAY sent at 10/11/2024  6:45 PM EDT -----  \" My doctor sent over ondansetron but I am requesting Reglan and they said they could do it but nothing was resent for me. \"    "

## 2024-10-11 NOTE — TELEPHONE ENCOUNTER
"Answer Assessment - Initial Assessment Questions  1. NAME of MEDICINE: \"What medicine(s) are you calling about?\"      reglan  2. QUESTION: \"What is your question?\" (e.g., double dose of medicine, side effect)   Pt called because Reglan was supposed to be called in for her because zofran hasn't been helping. Reglan was never called in and pt is trying to see if it can be for her.    Protocols used: Medication Question Call-Adult-    "

## 2024-10-12 DIAGNOSIS — K21.9 GASTROESOPHAGEAL REFLUX DISEASE WITHOUT ESOPHAGITIS: ICD-10-CM

## 2024-10-12 DIAGNOSIS — M26.623 BILATERAL TEMPOROMANDIBULAR JOINT PAIN: Primary | ICD-10-CM

## 2024-10-12 RX ORDER — METHOCARBAMOL 500 MG/1
500 TABLET, FILM COATED ORAL 3 TIMES DAILY PRN
Qty: 45 TABLET | Refills: 0 | Status: SHIPPED | OUTPATIENT
Start: 2024-10-12

## 2024-10-12 RX ORDER — OMEPRAZOLE 40 MG/1
40 CAPSULE, DELAYED RELEASE ORAL
Qty: 30 CAPSULE | Refills: 5 | Status: SHIPPED | OUTPATIENT
Start: 2024-10-12

## 2024-10-23 ENCOUNTER — HOSPITAL ENCOUNTER (EMERGENCY)
Facility: HOSPITAL | Age: 34
Discharge: HOME | End: 2024-10-23
Attending: EMERGENCY MEDICINE
Payer: COMMERCIAL

## 2024-10-23 VITALS
HEART RATE: 107 BPM | WEIGHT: 126 LBS | BODY MASS INDEX: 24.74 KG/M2 | OXYGEN SATURATION: 98 % | DIASTOLIC BLOOD PRESSURE: 74 MMHG | SYSTOLIC BLOOD PRESSURE: 131 MMHG | TEMPERATURE: 98.3 F | RESPIRATION RATE: 18 BRPM | HEIGHT: 60 IN

## 2024-10-23 DIAGNOSIS — K08.89 PAIN, DENTAL: Primary | ICD-10-CM

## 2024-10-23 DIAGNOSIS — K02.9 PAIN DUE TO DENTAL CARIES: ICD-10-CM

## 2024-10-23 PROCEDURE — 99284 EMERGENCY DEPT VISIT MOD MDM: CPT | Performed by: EMERGENCY MEDICINE

## 2024-10-23 PROCEDURE — 99282 EMERGENCY DEPT VISIT SF MDM: CPT

## 2024-10-23 RX ORDER — OXYCODONE AND ACETAMINOPHEN 5; 325 MG/1; MG/1
1 TABLET ORAL EVERY 4 HOURS PRN
Qty: 12 TABLET | Refills: 0 | Status: SHIPPED | OUTPATIENT
Start: 2024-10-23 | End: 2024-11-02

## 2024-10-23 RX ORDER — OXYCODONE AND ACETAMINOPHEN 5; 325 MG/1; MG/1
1 TABLET ORAL ONCE
Status: COMPLETED | OUTPATIENT
Start: 2024-10-23 | End: 2024-10-23

## 2024-10-23 RX ADMIN — AMOXICILLIN AND CLAVULANATE POTASSIUM 1 TABLET: 875; 125 TABLET, FILM COATED ORAL at 20:08

## 2024-10-23 RX ADMIN — OXYCODONE HYDROCHLORIDE AND ACETAMINOPHEN 1 TABLET: 5; 325 TABLET ORAL at 20:08

## 2024-10-24 NOTE — ED PROVIDER NOTES
Time reflects when diagnosis was documented in both MDM as applicable and the Disposition within this note       Time User Action Codes Description Comment    10/23/2024  8:03 PM Ray Olmstead [K08.89] Pain, dental     10/23/2024  8:03 PM Ray Olmstead [K02.9] Pain due to dental caries           ED Disposition       ED Disposition   Discharge    Condition   Stable    Date/Time   Wed Oct 23, 2024  8:03 PM    Comment   Christine Fay discharge to home/self care.                   Assessment & Plan       Medical Decision Making  Dental pain: Will treat with antibiotics and opiates.  Patient has failed failed more conservative medications for pain and has obvious source of severe pain.  She does have appropriate follow-up scheduled.  Will escalate antibiotics to Augmentin which she is reports has helped her more previously but no evidence of deep space abscess on my evaluation currently.    Risk  Prescription drug management.  Diagnosis or treatment significantly limited by social determinants of health.        ED Course as of 10/24/24 0033   Wed Oct 23, 2024   2002 Patient reports that she has an appointment to see an oral surgeon on November 12.  She has obvious poor dentition but no trismus, no definite fracture.  She has been prescribed opiates in the past for this, but given obvious severe dental decay, and reasonable plan to get treated, will provide patient with opiates at this time.       Medications   amoxicillin-clavulanate (AUGMENTIN) 875-125 mg per tablet 1 tablet (1 tablet Oral Given 10/23/24 2008)   oxyCODONE-acetaminophen (PERCOCET) 5-325 mg per tablet 1 tablet (1 tablet Oral Given 10/23/24 2008)       ED Risk Strat Scores                           SBIRT 22yo+      Flowsheet Row Most Recent Value   Initial Alcohol Screen: US AUDIT-C     1. How often do you have a drink containing alcohol? 0 Filed at: 10/23/2024 1949   Audit-C Score 0 Filed at: 10/23/2024 1949   CRISTINO: How many times in the  past year have you...    Used an illegal drug or used a prescription medication for non-medical reasons? Never Filed at: 10/23/2024 1949                            History of Present Illness       Chief Complaint   Patient presents with    Dental Pain     Here for eval of upper and lower dental pain, pt reports she has an appointment with [facility] scheduled for 11/12/24 and will likely need tooth extractions in the near future. Has been taking ibuprofen and tylenol at home w/o significant relief.        Past Medical History:   Diagnosis Date    Anxiety     Asthma     Depression     GERD (gastroesophageal reflux disease)     Hernia, abdominal     Migraines     Muscle spasm     Psychiatric disorder     Anx, Depression    Renal disorder     kidney stones      Past Surgical History:   Procedure Laterality Date    CYSTOSCOPY      removal of kidney stone    [place] RETROGRADE PYELOGRAM  8/28/2018    HERNIA REPAIR      [place] CYSTO/URETERO W/LITHOTRIPSY &INDWELL STENT INSRT Right 8/28/2018    Procedure: CYSTOSCOPY URETEROSCOPY WITH RETROGRADE PYELOGRAM AND INSERTION STENT URETERAL;  Surgeon: North Arauz MD;  Location: [facility];  Service: Urology    TOOTH EXTRACTION        Family History   Problem Relation Age of Onset    Diabetes Mother     Hyperlipidemia Mother     Stroke Mother     Heart disease Mother     Asthma Mother     Other Mother         Degen. of Intervertabral disc.    Nephrolithiasis Father     Nephrolithiasis Brother       Social History     Tobacco Use    Smoking status: Every Day     Current packs/day: 0.50     Average packs/day: 0.5 packs/day for 13.0 years (6.5 ttl pk-yrs)     Types: Cigarettes    Smokeless tobacco: Never   Vaping Use    Vaping status: Never Used   Substance Use Topics    Alcohol use: Not Currently     Comment: Alcohol intake:   None  - As per Afsaneh     Drug use: No     Comment: Illicit drugs:   none  - As per Afsaneh       E-Cigarette/Vaping    E-Cigarette Use Never User        E-Cigarette/Vaping Substances    Nicotine No     THC No     CBD No     Flavoring No     Other No     Unknown No       I have reviewed and agree with the history as documented.     Patient reports tooth pain and bilateral lower molars as well as all of her upper front teeth that has been worsening for the past 2 days.  She notes that she has chronic dental pain but she finally has an appointment to see an oral surgeon in mid November.  She tells me she has recently been on amoxicillin but it did not help.  She tells me Augmentin normally helps her with the pain more.  She still able to eat and drink.  She still able to open and close her mouth.  She does not have any facial swelling.  She denies any fevers.      Dental Pain      Review of Systems   All other systems reviewed and are negative.          Objective       ED Triage Vitals [10/23/24 1947]   Temperature Pulse Blood Pressure Respirations SpO2 Patient Position - Orthostatic VS   98.3 °F (36.8 °C) (!) 107 131/74 18 98 % Sitting      Temp Source Heart Rate Source BP Location FiO2 (%) Pain Score    Oral Monitor Right arm -- 9      Vitals      Date and Time Temp Pulse SpO2 Resp BP Pain Score FACES Pain Rating User   10/23/24 2008 -- -- -- -- -- 9 -- KLB   10/23/24 1947 98.3 °F (36.8 °C) 107 98 % 18 131/74 9 -- KLB            Physical Exam  Vitals and nursing note reviewed.   Constitutional:       General: She is not in acute distress.     Appearance: She is well-developed.   HENT:      Head: Normocephalic and atraumatic.      Mouth/Throat:      Comments: Generalized poor dentition with numerous deep dental caries and fractured teeth.  No facial swelling.  No trismus.  Eyes:      Conjunctiva/sclera: Conjunctivae normal.   Cardiovascular:      Rate and Rhythm: Normal rate and regular rhythm.      Heart sounds: No murmur heard.  Pulmonary:      Effort: Pulmonary effort is normal. No respiratory distress.      Breath sounds: Normal breath sounds.   Abdominal:       Palpations: Abdomen is soft.      Tenderness: There is no abdominal tenderness.   Musculoskeletal:         General: No swelling.      Cervical back: Neck supple.   Skin:     General: Skin is warm and dry.      Capillary Refill: Capillary refill takes less than 2 seconds.   Neurological:      Mental Status: She is alert.   Psychiatric:         Mood and Affect: Mood normal.         Results Reviewed       None            No orders to display       Procedures    ED Medication and Procedure Management   Prior to Admission Medications   Prescriptions Last Dose Informant Patient Reported? Taking?   Diclofenac Sodium (VOLTAREN) 1 %   No No   Sig: Apply 2 g topically 4 (four) times a day   QUEtiapine (SEROquel) 50 mg tablet   No No   Sig: TAKE 1 TABLET BY MOUTH DAILY AT BEDTIME   amoxicillin-clavulanate (AUGMENTIN) 875-125 mg per tablet   Yes No   Sig: Take 1 tablet by mouth 2 (two) times a day   chlorhexidine (PERIDEX) 0.12 % solution   No No   Sig: Apply 15 mL to the mouth or throat 2 (two) times a day   famotidine (PEPCID) 40 MG tablet   No No   Sig: TAKE 1 TABLET (40 MG TOTAL) BY MOUTH DAILY BEFORE DINNER   hydrOXYzine pamoate (VISTARIL) 25 mg capsule   No No   Sig: Take 1 capsule (25 mg total) by mouth 3 (three) times a day as needed for anxiety   methocarbamol (ROBAXIN) 500 mg tablet   No No   Sig: Take 1 tablet (500 mg total) by mouth 3 (three) times a day as needed for muscle spasms   metoclopramide (REGLAN) 5 mg tablet   No No   Sig: Take 1 tablet (5 mg total) by mouth 3 (three) times a day as needed (q8 hours as needed)   omeprazole (PriLOSEC) 40 MG capsule   No No   Sig: TAKE 1 CAPSULE BY MOUTH EVERY DAY BEFORE BREAKFAST   ondansetron (ZOFRAN) 4 mg tablet   Yes No   Sig: take 1 tablet by mouth every 8 hours as needed for nausea and vomiting   ondansetron (ZOFRAN-ODT) 4 mg disintegrating tablet   No No   Sig: Take 1 tablet (4 mg total) by mouth every 8 (eight) hours as needed for nausea or vomiting    oxyCODONE-acetaminophen (PERCOCET) 5-325 mg per tablet   Yes No   Sig: PLEASE SEE ATTACHED FOR DETAILED DIRECTIONS   penicillin V potassium (VEETID) 500 mg tablet   Yes No   Sig: TAKE 1 TABLET BY MOUTH 4 TIMES A DAY FOR 7 DAYS.   Patient not taking: Reported on 4/30/2024   rizatriptan (MAXALT-MLT) 5 mg disintegrating tablet   No No   Sig: TAKE 1TAB BY MOUTH ONCE AS NEEDED FOR MIGRAINE FOR UP TO 1 DOSE MAY REPEAT IN 2 HOURS IF NECESSARY      Facility-Administered Medications: None     Discharge Medication List as of 10/23/2024  8:06 PM        CONTINUE these medications which have CHANGED    Details   amoxicillin-clavulanate (AUGMENTIN) 875-125 mg per tablet Take 1 tablet by mouth every 12 (twelve) hours for 7 days, Starting Wed 10/23/2024, Until Wed 10/30/2024, Normal      oxyCODONE-acetaminophen (Percocet) 5-325 mg per tablet Take 1 tablet by mouth every 4 (four) hours as needed for moderate pain for up to 10 days Max Daily Amount: 6 tablets, Starting Wed 10/23/2024, Until Sat 11/2/2024 at 2359, Normal           CONTINUE these medications which have NOT CHANGED    Details   chlorhexidine (PERIDEX) 0.12 % solution Apply 15 mL to the mouth or throat 2 (two) times a day, Starting Tue 4/2/2024, Normal      Diclofenac Sodium (VOLTAREN) 1 % Apply 2 g topically 4 (four) times a day, Starting Tue 4/2/2024, Normal      famotidine (PEPCID) 40 MG tablet TAKE 1 TABLET (40 MG TOTAL) BY MOUTH DAILY BEFORE DINNER, Starting Wed 4/24/2024, Normal      hydrOXYzine pamoate (VISTARIL) 25 mg capsule Take 1 capsule (25 mg total) by mouth 3 (three) times a day as needed for anxiety, Starting Tue 4/30/2024, Normal      methocarbamol (ROBAXIN) 500 mg tablet Take 1 tablet (500 mg total) by mouth 3 (three) times a day as needed for muscle spasms, Starting Sat 10/12/2024, Normal      metoclopramide (REGLAN) 5 mg tablet Take 1 tablet (5 mg total) by mouth 3 (three) times a day as needed (q8 hours as needed), Starting Fri 10/11/2024, Normal       omeprazole (PriLOSEC) 40 MG capsule TAKE 1 CAPSULE BY MOUTH EVERY DAY BEFORE BREAKFAST, Starting Sat 10/12/2024, Normal      ondansetron (ZOFRAN) 4 mg tablet take 1 tablet by mouth every 8 hours as needed for nausea and vomiting, Historical Med      ondansetron (ZOFRAN-ODT) 4 mg disintegrating tablet Take 1 tablet (4 mg total) by mouth every 8 (eight) hours as needed for nausea or vomiting, Starting Thu 10/10/2024, Normal      QUEtiapine (SEROquel) 50 mg tablet TAKE 1 TABLET BY MOUTH DAILY AT BEDTIME, Starting Mon 7/22/2024, Normal      rizatriptan (MAXALT-MLT) 5 mg disintegrating tablet TAKE 1TAB BY MOUTH ONCE AS NEEDED FOR MIGRAINE FOR UP TO 1 DOSE MAY REPEAT IN 2 HOURS IF NECESSARY, Normal           STOP taking these medications       penicillin V potassium (VEETID) 500 mg tablet Comments:   Reason for Stopping:             No discharge procedures on file.  ED SEPSIS DOCUMENTATION   Time reflects when diagnosis was documented in both MDM as applicable and the Disposition within this note       Time User Action Codes Description Comment    10/23/2024  8:03 PM Ray Olmstead [K08.89] Pain, dental     10/23/2024  8:03 PM Ray Olmstead [K02.9] Pain due to dental caries                  Ray Olmstead MD  10/24/24 0033

## 2024-10-25 DIAGNOSIS — R11.0 NAUSEA: ICD-10-CM

## 2024-10-25 NOTE — TELEPHONE ENCOUNTER
Reason for call:   [x] Refill   [] Prior Auth  [] Other:     Office:   [x] PCP/Provider - Dr. Marie    [] Specialty/Provider -     Medication: metoclopramide (REGLAN) 5 mg    Dose/Frequency:  Take 1 tablet (5 mg total) by mouth 3 (three) times a day as needed (q8 hours as needed)     Quantity: 20    Pharmacy: [facility]/pharmacy #[id number] - [place] - [address].     Does the patient have enough for 3 days?   [] Yes   [x] No - Send as HP to POD

## 2024-10-27 RX ORDER — METOCLOPRAMIDE 5 MG/1
5 TABLET ORAL 3 TIMES DAILY PRN
Qty: 20 TABLET | Refills: 0 | Status: SHIPPED | OUTPATIENT
Start: 2024-10-27

## 2024-10-31 DIAGNOSIS — K21.9 GASTROESOPHAGEAL REFLUX DISEASE WITHOUT ESOPHAGITIS: ICD-10-CM

## 2024-11-01 RX ORDER — FAMOTIDINE 40 MG/1
40 TABLET, FILM COATED ORAL
Qty: 90 TABLET | Refills: 1 | Status: SHIPPED | OUTPATIENT
Start: 2024-11-01

## 2024-11-09 ENCOUNTER — HOSPITAL ENCOUNTER (EMERGENCY)
Facility: HOSPITAL | Age: 34
Discharge: HOME | End: 2024-11-10
Attending: EMERGENCY MEDICINE
Payer: COMMERCIAL

## 2024-11-09 VITALS
SYSTOLIC BLOOD PRESSURE: 117 MMHG | TEMPERATURE: 98.2 F | RESPIRATION RATE: 20 BRPM | OXYGEN SATURATION: 98 % | DIASTOLIC BLOOD PRESSURE: 78 MMHG | HEART RATE: 78 BPM

## 2024-11-09 DIAGNOSIS — K02.9 DENTAL CARIES: ICD-10-CM

## 2024-11-09 DIAGNOSIS — R11.0 NAUSEA: ICD-10-CM

## 2024-11-09 DIAGNOSIS — K08.89 DENTALGIA: Primary | ICD-10-CM

## 2024-11-09 PROCEDURE — 99282 EMERGENCY DEPT VISIT SF MDM: CPT

## 2024-11-09 PROCEDURE — 99284 EMERGENCY DEPT VISIT MOD MDM: CPT | Performed by: EMERGENCY MEDICINE

## 2024-11-09 RX ORDER — ONDANSETRON 4 MG/1
4 TABLET, ORALLY DISINTEGRATING ORAL ONCE
Status: COMPLETED | OUTPATIENT
Start: 2024-11-09 | End: 2024-11-10

## 2024-11-09 RX ORDER — ONDANSETRON 4 MG/1
4 TABLET, FILM COATED ORAL EVERY 8 HOURS PRN
Qty: 15 TABLET | Refills: 0 | Status: SHIPPED | OUTPATIENT
Start: 2024-11-09 | End: 2024-11-14

## 2024-11-10 VITALS
TEMPERATURE: 98.1 F | RESPIRATION RATE: 18 BRPM | OXYGEN SATURATION: 99 % | DIASTOLIC BLOOD PRESSURE: 68 MMHG | HEART RATE: 92 BPM | SYSTOLIC BLOOD PRESSURE: 127 MMHG

## 2024-11-10 DIAGNOSIS — K08.89 TOOTH PAIN: Primary | ICD-10-CM

## 2024-11-10 PROCEDURE — 99283 EMERGENCY DEPT VISIT LOW MDM: CPT | Performed by: EMERGENCY MEDICINE

## 2024-11-10 PROCEDURE — 99282 EMERGENCY DEPT VISIT SF MDM: CPT

## 2024-11-10 RX ADMIN — ONDANSETRON 4 MG: 4 TABLET, ORALLY DISINTEGRATING ORAL at 00:03

## 2024-11-10 NOTE — DISCHARGE INSTRUCTIONS
Follow-up with dentistry.  Take the prescribed medications as directed.  Please return to the emergency department if you develop worsening symptoms, severe pain, difficulty swallowing, or anything else concerning to you.

## 2024-11-10 NOTE — ED PROVIDER NOTES
"Time reflects when diagnosis was documented in both MDM as applicable and the Disposition within this note       Time User Action Codes Description Comment    11/9/2024 11:40 PM Ludy Gambino [K08.89] Dentalgia     11/9/2024 11:41 PM Ludy Gambino [R11.0] Nausea     11/9/2024 11:41 PM Ludy Gambino [K02.9] Dental caries           ED Disposition       ED Disposition   Discharge    Condition   Stable    Date/Time   Sat Nov 9, 2024 11:40 PM    Comment   Christine Fay discharge to home/self care.                   Assessment & Plan       Medical Decision Making  34-year-old female presenting for evaluation of dentalgia.  Patient likely with the start of a dental infection.  No abscess noted.  No red flag signs or symptoms.  Patient already using appropriate symptomatic treatment at home.  She also already took a dose of antibiotics tonight.  Prescription for the remainder of antibiotic course sent to patient's pharmacy.  She is requesting something for nausea, dosed with Zofran and provided with a prescription.  Advised follow-up with dentistry.  Return precautions discussed.    Problems Addressed:  Dentalgia: acute illness or injury    Risk  Prescription drug management.             Medications   ondansetron (ZOFRAN-ODT) dispersible tablet 4 mg (has no administration in time range)       ED Risk Strat Scores                                               History of Present Illness       Chief Complaint   Patient presents with    Dental Pain     Patient arrives to the Ed with complaint of tooth ache and worsening TMJ \"it feels tight\". Patient took motrin, tylenol and a dose of Augmentin one hour ago.        Past Medical History:   Diagnosis Date    Anxiety     Asthma     Depression     GERD (gastroesophageal reflux disease)     Hernia, abdominal     Migraines     Muscle spasm     Psychiatric disorder     Anx, Depression    Renal disorder     kidney stones      Past Surgical History:   Procedure " Laterality Date    CYSTOSCOPY      removal of kidney stone    [place] RETROGRADE PYELOGRAM  8/28/2018    HERNIA REPAIR      [place] CYSTO/URETERO W/LITHOTRIPSY &INDWELL STENT INSRT Right 8/28/2018    Procedure: CYSTOSCOPY URETEROSCOPY WITH RETROGRADE PYELOGRAM AND INSERTION STENT URETERAL;  Surgeon: North Arauz MD;  Location: [facility];  Service: Urology    TOOTH EXTRACTION        Family History   Problem Relation Age of Onset    Diabetes Mother     Hyperlipidemia Mother     Stroke Mother     Heart disease Mother     Asthma Mother     Other Mother         Degen. of Intervertabral disc.    Nephrolithiasis Father     Nephrolithiasis Brother       Social History     Tobacco Use    Smoking status: Every Day     Current packs/day: 0.50     Average packs/day: 0.5 packs/day for 13.0 years (6.5 ttl pk-yrs)     Types: Cigarettes    Smokeless tobacco: Never   Vaping Use    Vaping status: Never Used   Substance Use Topics    Alcohol use: Not Currently     Comment: Alcohol intake:   None  - As per Afsaneh     Drug use: No     Comment: Illicit drugs:   none  - As per [place]       E-Cigarette/Vaping    E-Cigarette Use Never User       E-Cigarette/Vaping Substances    Nicotine No     THC No     CBD No     Flavoring No     Other No     Unknown No       I have reviewed and agree with the history as documented.     34-year-old female presenting for evaluation of dental pain.  Patient reports onset 3 days ago.  She has left lower dental pain which has been progressively worsening.  She has had similar symptoms in the past.  She previously had a dentist appointment scheduled but was unable to make it.  She has a new appointment scheduled for 1120.  No difficulty swallowing.  She has had some nausea but no vomiting.  She has been taking Tylenol and ibuprofen as well as using topical medications for pain relief.  She did take 1 dose of Augmentin tonight which was leftover from a previous prescription.        Review of Systems    Constitutional:  Negative for chills and fever.   HENT:  Positive for dental problem. Negative for trouble swallowing.    Respiratory:  Negative for shortness of breath.    Cardiovascular:  Negative for chest pain.   Gastrointestinal:  Positive for nausea. Negative for abdominal pain and vomiting.   Genitourinary:  Negative for flank pain.   Musculoskeletal:  Negative for gait problem.   Skin:  Negative for rash.   Neurological:  Negative for weakness and light-headedness.   All other systems reviewed and are negative.          Objective       ED Triage Vitals [11/09/24 2323]   Temperature Pulse Blood Pressure Respirations SpO2 Patient Position - Orthostatic VS   98.2 °F (36.8 °C) 78 117/78 20 98 % --      Temp Source Heart Rate Source BP Location FiO2 (%) Pain Score    Oral Monitor -- -- 10 - Worst Possible Pain      Vitals      Date and Time Temp Pulse SpO2 Resp BP Pain Score FACES Pain Rating User   11/09/24 2323 98.2 °F (36.8 °C) 78 98 % 20 117/78 10 - Worst Possible Pain -- TRINO            Physical Exam  Vitals and nursing note reviewed.   Constitutional:       General: She is not in acute distress.     Appearance: She is well-developed. She is not ill-appearing.   HENT:      Head: Normocephalic and atraumatic.      Comments: Overall poor dentition.     Nose: Nose normal.      Mouth/Throat:      Mouth: Mucous membranes are moist.     Eyes:      Conjunctiva/sclera: Conjunctivae normal.   Cardiovascular:      Rate and Rhythm: Normal rate.   Pulmonary:      Effort: Pulmonary effort is normal.   Abdominal:      General: There is no distension.   Musculoskeletal:         General: Normal range of motion.      Cervical back: Normal range of motion and neck supple.   Skin:     General: Skin is warm and dry.      Findings: No rash.   Neurological:      General: No focal deficit present.      Mental Status: She is alert and oriented to person, place, and time.   Psychiatric:         Behavior: Behavior normal.          Results Reviewed       None            No orders to display       Procedures    ED Medication and Procedure Management   Prior to Admission Medications   Prescriptions Last Dose Informant Patient Reported? Taking?   Diclofenac Sodium (VOLTAREN) 1 %   No No   Sig: Apply 2 g topically 4 (four) times a day   QUEtiapine (SEROquel) 50 mg tablet   No No   Sig: TAKE 1 TABLET BY MOUTH DAILY AT BEDTIME   chlorhexidine (PERIDEX) 0.12 % solution   No No   Sig: Apply 15 mL to the mouth or throat 2 (two) times a day   famotidine (PEPCID) 40 MG tablet   No No   Sig: TAKE 1 TABLET (40 MG TOTAL) BY MOUTH DAILY BEFORE DINNER   hydrOXYzine pamoate (VISTARIL) 25 mg capsule   No No   Sig: Take 1 capsule (25 mg total) by mouth 3 (three) times a day as needed for anxiety   methocarbamol (ROBAXIN) 500 mg tablet   No No   Sig: Take 1 tablet (500 mg total) by mouth 3 (three) times a day as needed for muscle spasms   metoclopramide (REGLAN) 5 mg tablet   No No   Sig: Take 1 tablet (5 mg total) by mouth 3 (three) times a day as needed (q8 hours as needed)   omeprazole (PriLOSEC) 40 MG capsule   No No   Sig: TAKE 1 CAPSULE BY MOUTH EVERY DAY BEFORE BREAKFAST   ondansetron (ZOFRAN) 4 mg tablet   Yes No   Sig: take 1 tablet by mouth every 8 hours as needed for nausea and vomiting   ondansetron (ZOFRAN-ODT) 4 mg disintegrating tablet   No No   Sig: Take 1 tablet (4 mg total) by mouth every 8 (eight) hours as needed for nausea or vomiting   rizatriptan (MAXALT-MLT) 5 mg disintegrating tablet   No No   Sig: TAKE 1TAB BY MOUTH ONCE AS NEEDED FOR MIGRAINE FOR UP TO 1 DOSE MAY REPEAT IN 2 HOURS IF NECESSARY      Facility-Administered Medications: None     Patient's Medications   Discharge Prescriptions    AMOXICILLIN-CLAVULANATE (AUGMENTIN) 875-125 MG PER TABLET    Take 1 tablet by mouth every 12 (twelve) hours for 7 days       Start Date: 11/9/2024 End Date: 11/16/2024       Order Dose: 1 tablet       Quantity: 14 tablet    Refills: 0     ONDANSETRON (ZOFRAN) 4 MG TABLET    Take 1 tablet (4 mg total) by mouth every 8 (eight) hours as needed for nausea or vomiting for up to 5 days       Start Date: 11/9/2024 End Date: 11/14/2024       Order Dose: 4 mg       Quantity: 15 tablet    Refills: 0     No discharge procedures on file.  ED SEPSIS DOCUMENTATION   Time reflects when diagnosis was documented in both MDM as applicable and the Disposition within this note       Time User Action Codes Description Comment    11/9/2024 11:40 PM Ludy Gambino [K08.89] Dentalgia     11/9/2024 11:41 PM Ludy Gambino [R11.0] Nausea     11/9/2024 11:41 PM Ludy Gambino [K02.9] Dental caries                  Ludy Gambino MD  11/09/24 0324

## 2024-11-12 NOTE — ED PROVIDER NOTES
Time reflects when diagnosis was documented in both MDM as applicable and the Disposition within this note       Time User Action Codes Description Comment    11/10/2024  6:18 PM Ray Bai Add [K08.89] Tooth pain           ED Disposition       ED Disposition   Discharge    Condition   Stable    Date/Time   Sun Nov 10, 2024  6:18 PM    Comment   Christine Fay discharge to home/self care.                   Assessment & Plan       Medical Decision Making  This is a 34-year-old female presents to the emergency department complaining of dental pain.  I considered dental infection, dental fracture, dental pain. These and other diagnoses were considered.     The patient was well-appearing on exam.  She had no trismus.  She has no signs of fluctuance or abscess.  She was started on antibiotics yesterday.  Exam appears similar to prior evaluation by former physician and from my recollection of patient in the past.  She will be discharged with follow-up to her primary care physician and given information on dental clinics.             Medications - No data to display    ED Risk Strat Scores                           SBIRT 20yo+      Flowsheet Row Most Recent Value   Initial Alcohol Screen: US AUDIT-C     1. How often do you have a drink containing alcohol? 0 Filed at: 11/10/2024 1742   2. How many drinks containing alcohol do you have on a typical day you are drinking?  0 Filed at: 11/10/2024 1742   3a. Male UNDER 65: How often do you have five or more drinks on one occasion? 0 Filed at: 11/10/2024 1742   3b. FEMALE Any Age, or MALE 65+: How often do you have 4 or more drinks on one occassion? 0 Filed at: 11/10/2024 1742   Audit-C Score 0 Filed at: 11/10/2024 1742   CRISTINO: How many times in the past year have you...    Used an illegal drug or used a prescription medication for non-medical reasons? Never Filed at: 11/10/2024 1742                            History of Present Illness       Chief Complaint   Patient  "presents with    Dental Pain     Pt states that she was here yesterday and told to come back if her symptoms got worse, state \"Her tmj is flaring up and she has two broke teeth on the bottom right and one front middle\"       Past Medical History:   Diagnosis Date    Anxiety     Asthma     Depression     GERD (gastroesophageal reflux disease)     Hernia, abdominal     Migraines     Muscle spasm     Psychiatric disorder     Anx, Depression    Renal disorder     kidney stones      Past Surgical History:   Procedure Laterality Date    CYSTOSCOPY      removal of kidney stone    [place] RETROGRADE PYELOGRAM  8/28/2018    HERNIA REPAIR      [place] CYSTO/URETERO W/LITHOTRIPSY &INDWELL STENT INSRT Right 8/28/2018    Procedure: CYSTOSCOPY URETEROSCOPY WITH RETROGRADE PYELOGRAM AND INSERTION STENT URETERAL;  Surgeon: North Arauz MD;  Location: [facility];  Service: Urology    TOOTH EXTRACTION        Family History   Problem Relation Age of Onset    Diabetes Mother     Hyperlipidemia Mother     Stroke Mother     Heart disease Mother     Asthma Mother     Other Mother         Degen. of Intervertabral disc.    Nephrolithiasis Father     Nephrolithiasis Brother       Social History     Tobacco Use    Smoking status: Every Day     Current packs/day: 0.50     Average packs/day: 0.5 packs/day for 13.0 years (6.5 ttl pk-yrs)     Types: Cigarettes    Smokeless tobacco: Never   Vaping Use    Vaping status: Never Used   Substance Use Topics    Alcohol use: Not Currently     Comment: Alcohol intake:   None  - As per Afsaneh     Drug use: No     Comment: Illicit drugs:   none  - As per Afsaneh       E-Cigarette/Vaping    E-Cigarette Use Never User       E-Cigarette/Vaping Substances    Nicotine No     THC No     CBD No     Flavoring No     Other No     Unknown No       I have reviewed and agree with the history as documented.     This is a 34-year-old female who presents to the emergency department complaining of dental pain.  She states " that she has had no fevers.  She states that the pain is sharp and severe.  Is worse with eating.  She is tolerating liquids but it does hurt.  She denies nausea or vomiting.  She denies chest pain or trouble breathing.        Review of Systems   All other systems reviewed and are negative.          Objective       ED Triage Vitals   Temperature Pulse Blood Pressure Respirations SpO2 Patient Position - Orthostatic VS   11/10/24 1742 11/10/24 1741 11/10/24 1741 11/10/24 1741 11/10/24 1741 11/10/24 1741   98.1 °F (36.7 °C) 92 127/68 18 99 % Lying      Temp src Heart Rate Source BP Location FiO2 (%) Pain Score    -- 11/10/24 1741 11/10/24 1741 -- 11/10/24 1741     Monitor Left arm  9      Vitals      Date and Time Temp Pulse SpO2 Resp BP Pain Score FACES Pain Rating User   11/10/24 1742 98.1 °F (36.7 °C) -- -- -- -- -- -- DB   11/10/24 1741 -- 92 99 % 18 127/68 9 -- DB            Physical Exam  Constitutional: Vital signs reviewed, patient well-appearing, nontoxic  Eyes: Extraocular movements intact   HEENT: Trachea midline, no JVD, moist mucous membranes, extremely poor dentition, multiple fractured teeth, no acute change compared to prior exam visits, no trismus, uvula midline, no pharyngeal exudates  Respiratory: Equal chest expansion   Cardiovascular: Well perfused   Abdomen: No distension   Extremities: No edema   Neuro: awake, alert, oriented, no focal weakness   Skin: warm, dry, intact, no rashes noted     Results Reviewed       None            No orders to display       Procedures    ED Medication and Procedure Management   Prior to Admission Medications   Prescriptions Last Dose Informant Patient Reported? Taking?   Diclofenac Sodium (VOLTAREN) 1 %   No No   Sig: Apply 2 g topically 4 (four) times a day   QUEtiapine (SEROquel) 50 mg tablet   No No   Sig: TAKE 1 TABLET BY MOUTH DAILY AT BEDTIME   amoxicillin-clavulanate (AUGMENTIN) 875-125 mg per tablet   No No   Sig: Take 1 tablet by mouth every 12 (twelve)  hours for 7 days   chlorhexidine (PERIDEX) 0.12 % solution   No No   Sig: Apply 15 mL to the mouth or throat 2 (two) times a day   famotidine (PEPCID) 40 MG tablet   No No   Sig: TAKE 1 TABLET (40 MG TOTAL) BY MOUTH DAILY BEFORE DINNER   hydrOXYzine pamoate (VISTARIL) 25 mg capsule   No No   Sig: Take 1 capsule (25 mg total) by mouth 3 (three) times a day as needed for anxiety   methocarbamol (ROBAXIN) 500 mg tablet   No No   Sig: Take 1 tablet (500 mg total) by mouth 3 (three) times a day as needed for muscle spasms   metoclopramide (REGLAN) 5 mg tablet   No No   Sig: Take 1 tablet (5 mg total) by mouth 3 (three) times a day as needed (q8 hours as needed)   omeprazole (PriLOSEC) 40 MG capsule   No No   Sig: TAKE 1 CAPSULE BY MOUTH EVERY DAY BEFORE BREAKFAST   ondansetron (ZOFRAN) 4 mg tablet   No No   Sig: Take 1 tablet (4 mg total) by mouth every 8 (eight) hours as needed for nausea or vomiting for up to 5 days   rizatriptan (MAXALT-MLT) 5 mg disintegrating tablet   No No   Sig: TAKE 1TAB BY MOUTH ONCE AS NEEDED FOR MIGRAINE FOR UP TO 1 DOSE MAY REPEAT IN 2 HOURS IF NECESSARY      Facility-Administered Medications: None     Discharge Medication List as of 11/10/2024  6:19 PM        CONTINUE these medications which have NOT CHANGED    Details   amoxicillin-clavulanate (AUGMENTIN) 875-125 mg per tablet Take 1 tablet by mouth every 12 (twelve) hours for 7 days, Starting Sat 11/9/2024, Until Sat 11/16/2024, Normal      chlorhexidine (PERIDEX) 0.12 % solution Apply 15 mL to the mouth or throat 2 (two) times a day, Starting Tue 4/2/2024, Normal      Diclofenac Sodium (VOLTAREN) 1 % Apply 2 g topically 4 (four) times a day, Starting Tue 4/2/2024, Normal      famotidine (PEPCID) 40 MG tablet TAKE 1 TABLET (40 MG TOTAL) BY MOUTH DAILY BEFORE DINNER, Starting Fri 11/1/2024, Normal      hydrOXYzine pamoate (VISTARIL) 25 mg capsule Take 1 capsule (25 mg total) by mouth 3 (three) times a day as needed for anxiety, Starting Tue  4/30/2024, Normal      methocarbamol (ROBAXIN) 500 mg tablet Take 1 tablet (500 mg total) by mouth 3 (three) times a day as needed for muscle spasms, Starting Sat 10/12/2024, Normal      metoclopramide (REGLAN) 5 mg tablet Take 1 tablet (5 mg total) by mouth 3 (three) times a day as needed (q8 hours as needed), Starting Sun 10/27/2024, Normal      omeprazole (PriLOSEC) 40 MG capsule TAKE 1 CAPSULE BY MOUTH EVERY DAY BEFORE BREAKFAST, Starting Sat 10/12/2024, Normal      ondansetron (ZOFRAN) 4 mg tablet Take 1 tablet (4 mg total) by mouth every 8 (eight) hours as needed for nausea or vomiting for up to 5 days, Starting Sat 11/9/2024, Until Thu 11/14/2024 at 2359, Normal      QUEtiapine (SEROquel) 50 mg tablet TAKE 1 TABLET BY MOUTH DAILY AT BEDTIME, Starting Mon 7/22/2024, Normal      rizatriptan (MAXALT-MLT) 5 mg disintegrating tablet TAKE 1TAB BY MOUTH ONCE AS NEEDED FOR MIGRAINE FOR UP TO 1 DOSE MAY REPEAT IN 2 HOURS IF NECESSARY, Normal           No discharge procedures on file.  ED SEPSIS DOCUMENTATION   Time reflects when diagnosis was documented in both MDM as applicable and the Disposition within this note       Time User Action Codes Description Comment    11/10/2024  6:18 PM Ray Bai Add [K08.89] Tooth pain                  Ray Bai, DO  11/12/24 1045

## 2024-11-21 ENCOUNTER — HOSPITAL ENCOUNTER (EMERGENCY)
Facility: HOSPITAL | Age: 34
Discharge: HOME | End: 2024-11-21
Attending: EMERGENCY MEDICINE
Payer: COMMERCIAL

## 2024-11-21 VITALS
HEART RATE: 85 BPM | SYSTOLIC BLOOD PRESSURE: 105 MMHG | OXYGEN SATURATION: 98 % | RESPIRATION RATE: 18 BRPM | DIASTOLIC BLOOD PRESSURE: 66 MMHG | TEMPERATURE: 98.5 F

## 2024-11-21 DIAGNOSIS — M26.629 TMJPDS (TEMPOROMANDIBULAR JOINT PAIN DYSFUNCTION SYNDROME): ICD-10-CM

## 2024-11-21 DIAGNOSIS — M26.623 BILATERAL TEMPOROMANDIBULAR JOINT PAIN: ICD-10-CM

## 2024-11-21 DIAGNOSIS — Z76.5 DRUG-SEEKING BEHAVIOR: Primary | ICD-10-CM

## 2024-11-21 PROCEDURE — 99283 EMERGENCY DEPT VISIT LOW MDM: CPT

## 2024-11-21 PROCEDURE — 99284 EMERGENCY DEPT VISIT MOD MDM: CPT | Performed by: EMERGENCY MEDICINE

## 2024-11-21 RX ORDER — METHOCARBAMOL 500 MG/1
TABLET, FILM COATED ORAL
Qty: 45 TABLET | Refills: 0 | Status: SHIPPED | OUTPATIENT
Start: 2024-11-21

## 2024-11-22 NOTE — ED PROVIDER NOTES
Time reflects when diagnosis was documented in both MDM as applicable and the Disposition within this note       Time User Action Codes Description Comment    11/21/2024  8:37 PM Rony Benítez Add [M26.629] TMJPDS (temporomandibular joint pain dysfunction syndrome)     11/21/2024  8:39 PM Rony Benítez Add [Z76.5] Drug-seeking behavior     11/21/2024  8:39 PM Rony Benítez Modify [M26.629] TMJPDS (temporomandibular joint pain dysfunction syndrome)     11/21/2024  8:39 PM Rony Benítez Modify [Z76.5] Drug-seeking behavior           ED Disposition       ED Disposition   Discharge    Condition   Stable    Date/Time   Thu Nov 21, 2024  8:37 PM    Comment   Christine Fay discharge to home/self care.                   Assessment & Plan       Medical Decision Making  34-year-old female presented to the emergency department for evaluation of jaw pain.  On arrival patient awake, alert, oriented and in no acute distress.  Initial vital signs within normal limits.  Patient already treated her symptoms with Motrin and Tylenol.  Patient received medications from a different emergency department just prior to arrival.  No indication for narcotic medications.  Patient offered an ice pack which she declined.  Recommendation was made that the patient follow-up with oral and maxillofacial surgery as previously recommended.  Return precautions discussed.    This was a difficult patient encounter.  The patient's expectations regarding management were not able to be met given the guidelines from the hospital and the patient's personal care guidelines.  I discussed at length with the patient my concerns and offered treatment options that were in keeping with our policies and procedures.  Unfortunately this did not alleviate the patient's concerns.  At all times, myself and the staff were respectful of the patient, attempted to solve their issues within our constraints and treated the patient with all due courtesy.   Unfortunately the patient left disappointed with the care she received.  I encouraged her to follow up for further treatment and to return to the ED if they had any other symptoms or concerns.        Amount and/or Complexity of Data Reviewed  External Data Reviewed: notes.             Medications - No data to display    ED Risk Strat Scores                           SBIRT 22yo+      Flowsheet Row Most Recent Value   Initial Alcohol Screen: US AUDIT-C     1. How often do you have a drink containing alcohol? 0 Filed at: 11/21/2024 2026   Audit-C Score 0 Filed at: 11/21/2024 2026   CRISTINO: How many times in the past year have you...    Used an illegal drug or used a prescription medication for non-medical reasons? Never Filed at: 11/21/2024 2026                            History of Present Illness       Chief Complaint   Patient presents with    Jaw Pain     Here for R sided jaw pain starting 1.5-2 hours pta after pt reports she yawned which caused her TMJ pain to flare. States she took 800 mg ibuprofen and also extra strength tylenol pta. Reports she was seen at an [facility] ED prior to this.       Past Medical History:   Diagnosis Date    Anxiety     Asthma     Depression     GERD (gastroesophageal reflux disease)     Hernia, abdominal     Migraines     Muscle spasm     Psychiatric disorder     Anx, Depression    Renal disorder     kidney stones      Past Surgical History:   Procedure Laterality Date    CYSTOSCOPY      removal of kidney stone    [place] RETROGRADE PYELOGRAM  8/28/2018    HERNIA REPAIR      [place] CYSTO/URETERO W/LITHOTRIPSY &INDWELL STENT INSRT Right 8/28/2018    Procedure: CYSTOSCOPY URETEROSCOPY WITH RETROGRADE PYELOGRAM AND INSERTION STENT URETERAL;  Surgeon: North Arauz MD;  Location: [facility];  Service: Urology    TOOTH EXTRACTION        Family History   Problem Relation Age of Onset    Diabetes Mother     Hyperlipidemia Mother     Stroke Mother     Heart disease Mother     Asthma Mother     Other  Mother         Degen. of Intervertabral disc.    Nephrolithiasis Father     Nephrolithiasis Brother       Social History     Tobacco Use    Smoking status: Every Day     Current packs/day: 0.50     Average packs/day: 0.5 packs/day for 13.0 years (6.5 ttl pk-yrs)     Types: Cigarettes    Smokeless tobacco: Never   Vaping Use    Vaping status: Never Used   Substance Use Topics    Alcohol use: Not Currently     Comment: Alcohol intake:   None  - As per Afsaneh     Drug use: No     Comment: Illicit drugs:   none  - As per Afsaneh       E-Cigarette/Vaping    E-Cigarette Use Never User       E-Cigarette/Vaping Substances    Nicotine No     THC No     CBD No     Flavoring No     Other No     Unknown No       I have reviewed and agree with the history as documented.     34-year-old female presented to the emergency department for evaluation of jaw pain.  Patient just seen at a different emergency department for the same concerns and received Decadron, Robaxin and Reglan.  An ambulatory referral to oral and maxillofacial surgery placed.  The patient reports that the previous emergency department did nothing to help her and is requesting stronger pain medicine.  Per chart review patient has been seen numerous times for similar concerns.  Patient with flag in chart regarding narcotic medications.  Per PDMP review patient has multiple prescriptions for narcotic medications over the past few months from multiple providers.  Patient reports history of TMJ dysfunction and reports having a flare which she states is causing severe pain to her jaw.  Patient reports that she took Tylenol and Motrin prior to arrival with only minimal relief.  No fevers, chills, nausea, vomiting, diarrhea or sick contacts.  No shortness of breath or trouble swallowing.  No face, lip, tongue or neck swelling.        Review of Systems   Constitutional:  Negative for chills and fever.   HENT:  Positive for dental problem. Negative for ear pain and sore  throat.    Eyes:  Negative for pain and visual disturbance.   Respiratory:  Negative for cough and shortness of breath.    Cardiovascular:  Negative for chest pain and palpitations.   Gastrointestinal:  Negative for abdominal pain and vomiting.   Genitourinary:  Negative for dysuria and hematuria.   Musculoskeletal:  Negative for arthralgias and back pain.   Skin:  Negative for color change and rash.   Neurological:  Negative for seizures and syncope.   All other systems reviewed and are negative.          Objective       ED Triage Vitals [11/21/24 2025]   Temperature Pulse Blood Pressure Respirations SpO2 Patient Position - Orthostatic VS   98.5 °F (36.9 °C) 85 105/66 18 98 % Sitting      Temp Source Heart Rate Source BP Location FiO2 (%) Pain Score    Axillary Monitor Left arm -- 10 - Worst Possible Pain      Vitals      Date and Time Temp Pulse SpO2 Resp BP Pain Score FACES Pain Rating User   11/21/24 2025 98.5 °F (36.9 °C) 85 98 % 18 105/66 10 - Worst Possible Pain -- KLB            Physical Exam  Vitals and nursing note reviewed.   Constitutional:       General: She is not in acute distress.     Appearance: She is well-developed.   HENT:      Head: Normocephalic and atraumatic.      Jaw: Tenderness and pain on movement present. No trismus, swelling or malocclusion.      Mouth/Throat:      Dentition: Abnormal dentition. No dental abscesses.   Eyes:      Conjunctiva/sclera: Conjunctivae normal.   Cardiovascular:      Rate and Rhythm: Normal rate and regular rhythm.      Heart sounds: No murmur heard.  Pulmonary:      Effort: Pulmonary effort is normal. No respiratory distress.      Breath sounds: Normal breath sounds.   Abdominal:      Palpations: Abdomen is soft.      Tenderness: There is no abdominal tenderness.   Musculoskeletal:         General: No swelling.      Cervical back: Neck supple.   Skin:     General: Skin is warm and dry.      Capillary Refill: Capillary refill takes less than 2 seconds.    Neurological:      Mental Status: She is alert.   Psychiatric:         Mood and Affect: Mood normal.         Results Reviewed       None            No orders to display       Procedures    ED Medication and Procedure Management   Prior to Admission Medications   Prescriptions Last Dose Informant Patient Reported? Taking?   Diclofenac Sodium (VOLTAREN) 1 %   No No   Sig: Apply 2 g topically 4 (four) times a day   QUEtiapine (SEROquel) 50 mg tablet   No No   Sig: TAKE 1 TABLET BY MOUTH DAILY AT BEDTIME   chlorhexidine (PERIDEX) 0.12 % solution   No No   Sig: Apply 15 mL to the mouth or throat 2 (two) times a day   famotidine (PEPCID) 40 MG tablet   No No   Sig: TAKE 1 TABLET (40 MG TOTAL) BY MOUTH DAILY BEFORE DINNER   hydrOXYzine pamoate (VISTARIL) 25 mg capsule   No No   Sig: Take 1 capsule (25 mg total) by mouth 3 (three) times a day as needed for anxiety   methocarbamol (ROBAXIN) 500 mg tablet   No No   Sig: TAKE 1 TABLET BY MOUTH 3 TIMES A DAY AS NEEDED FOR MUSCLE SPASMS.   metoclopramide (REGLAN) 5 mg tablet   No No   Sig: Take 1 tablet (5 mg total) by mouth 3 (three) times a day as needed (q8 hours as needed)   omeprazole (PriLOSEC) 40 MG capsule   No No   Sig: TAKE 1 CAPSULE BY MOUTH EVERY DAY BEFORE BREAKFAST   ondansetron (ZOFRAN) 4 mg tablet   No No   Sig: Take 1 tablet (4 mg total) by mouth every 8 (eight) hours as needed for nausea or vomiting for up to 5 days   rizatriptan (MAXALT-MLT) 5 mg disintegrating tablet   No No   Sig: TAKE 1TAB BY MOUTH ONCE AS NEEDED FOR MIGRAINE FOR UP TO 1 DOSE MAY REPEAT IN 2 HOURS IF NECESSARY      Facility-Administered Medications: None     Discharge Medication List as of 11/21/2024  8:39 PM        CONTINUE these medications which have NOT CHANGED    Details   chlorhexidine (PERIDEX) 0.12 % solution Apply 15 mL to the mouth or throat 2 (two) times a day, Starting Tue 4/2/2024, Normal      Diclofenac Sodium (VOLTAREN) 1 % Apply 2 g topically 4 (four) times a day, Starting  Tue 4/2/2024, Normal      famotidine (PEPCID) 40 MG tablet TAKE 1 TABLET (40 MG TOTAL) BY MOUTH DAILY BEFORE DINNER, Starting Fri 11/1/2024, Normal      hydrOXYzine pamoate (VISTARIL) 25 mg capsule Take 1 capsule (25 mg total) by mouth 3 (three) times a day as needed for anxiety, Starting Tue 4/30/2024, Normal      methocarbamol (ROBAXIN) 500 mg tablet TAKE 1 TABLET BY MOUTH 3 TIMES A DAY AS NEEDED FOR MUSCLE SPASMS., Normal      metoclopramide (REGLAN) 5 mg tablet Take 1 tablet (5 mg total) by mouth 3 (three) times a day as needed (q8 hours as needed), Starting Sun 10/27/2024, Normal      omeprazole (PriLOSEC) 40 MG capsule TAKE 1 CAPSULE BY MOUTH EVERY DAY BEFORE BREAKFAST, Starting Sat 10/12/2024, Normal      ondansetron (ZOFRAN) 4 mg tablet Take 1 tablet (4 mg total) by mouth every 8 (eight) hours as needed for nausea or vomiting for up to 5 days, Starting Sat 11/9/2024, Until Thu 11/14/2024 at 2359, Normal      QUEtiapine (SEROquel) 50 mg tablet TAKE 1 TABLET BY MOUTH DAILY AT BEDTIME, Starting Mon 7/22/2024, Normal      rizatriptan (MAXALT-MLT) 5 mg disintegrating tablet TAKE 1TAB BY MOUTH ONCE AS NEEDED FOR MIGRAINE FOR UP TO 1 DOSE MAY REPEAT IN 2 HOURS IF NECESSARY, Normal           No discharge procedures on file.  ED SEPSIS DOCUMENTATION   Time reflects when diagnosis was documented in both MDM as applicable and the Disposition within this note       Time User Action Codes Description Comment    11/21/2024  8:37 PM Royn Benítez Add [M26.629] TMJPDS (temporomandibular joint pain dysfunction syndrome)     11/21/2024  8:39 PM Benítez, Rony Add [Z76.5] Drug-seeking behavior     11/21/2024  8:39 PM Rony Benítez Modify [M26.629] TMJPDS (temporomandibular joint pain dysfunction syndrome)     11/21/2024  8:39 PM Rony Benítez Modify [Z76.5] Drug-seeking behavior                  Rony Benítez MD  11/21/24 9446

## 2024-12-08 DIAGNOSIS — M26.623 BILATERAL TEMPOROMANDIBULAR JOINT PAIN: ICD-10-CM

## 2024-12-10 RX ORDER — METHOCARBAMOL 500 MG/1
TABLET, FILM COATED ORAL
Qty: 45 TABLET | Refills: 0 | Status: SHIPPED | OUTPATIENT
Start: 2024-12-10

## 2024-12-18 ENCOUNTER — HOSPITAL ENCOUNTER (EMERGENCY)
Facility: HOSPITAL | Age: 34
Discharge: HOME | End: 2024-12-18
Attending: EMERGENCY MEDICINE
Payer: COMMERCIAL

## 2024-12-18 VITALS
HEIGHT: 60 IN | BODY MASS INDEX: 25.52 KG/M2 | HEART RATE: 88 BPM | OXYGEN SATURATION: 99 % | SYSTOLIC BLOOD PRESSURE: 119 MMHG | DIASTOLIC BLOOD PRESSURE: 58 MMHG | TEMPERATURE: 98.4 F | RESPIRATION RATE: 18 BRPM | WEIGHT: 130 LBS

## 2024-12-18 DIAGNOSIS — K08.9 CHRONIC DENTAL PAIN: Primary | ICD-10-CM

## 2024-12-18 DIAGNOSIS — G89.29 CHRONIC DENTAL PAIN: Primary | ICD-10-CM

## 2024-12-18 DIAGNOSIS — Z76.5 DRUG-SEEKING BEHAVIOR: ICD-10-CM

## 2024-12-18 PROCEDURE — 99283 EMERGENCY DEPT VISIT LOW MDM: CPT

## 2024-12-18 PROCEDURE — 99284 EMERGENCY DEPT VISIT MOD MDM: CPT | Performed by: EMERGENCY MEDICINE

## 2024-12-19 NOTE — ED PROVIDER NOTES
Time reflects when diagnosis was documented in both MDM as applicable and the Disposition within this note       Time User Action Codes Description Comment    12/18/2024  4:19 PM Rony Benítez [K08.9,  G89.29] Chronic dental pain     12/18/2024  4:20 PM Rony Benítez [Z76.5] Drug-seeking behavior           ED Disposition       ED Disposition   Discharge    Condition   Stable    Date/Time   Wed Dec 18, 2024  4:19 PM    Comment   Christine Fay discharge to home/self care.                   Assessment & Plan       Medical Decision Making  34-year-old female presented to the emergency department for evaluation of dental pain.  On arrival patient awake, alert and in no acute distress.  Treatment options were discussed and offered to the patient.  Patient declined Toradol stating that she was allergic to the medication.  Patient also offered Tylenol and Motrin but she states that she just took this medication.  Patient offered Magic mouthwash and dental blocks but the patient declined.  Patient offered Robaxin but the patient declined.  Patient requesting narcotic medication.  Discussed with the patient that there is no indication for narcotic medication at this time.  Recommended that patient take the course of antibiotics that were prescribed to her and follow-up with her dentist and [facility] for continued symptoms.  Return precautions discussed.    This was a difficult patient encounter.  The patient's expectations regarding management were not able to be met given the guidelines from the hospital and the patient's personal care guidelines.  I discussed at length with the patient my concerns and offered treatment options that were in keeping with our policies and procedures.  Unfortunately this did not alleviate the patient's concerns.  At all times, myself and the staff were respectful of the patient, attempted to solve their issues within our constraints and treated the patient with all due courtesy.   Unfortunately the patient left disappointed with the care she received.  I encouraged her to follow up for further treatment and to return to the ED if they had any other symptoms or concerns.                 Medications - No data to display    ED Risk Strat Scores              SBIRT 22yo+      Flowsheet Row Most Recent Value   Initial Alcohol Screen: US AUDIT-C     1. How often do you have a drink containing alcohol? 0 Filed at: 12/18/2024 1610   2. How many drinks containing alcohol do you have on a typical day you are drinking?  0 Filed at: 12/18/2024 1610   3a. Male UNDER 65: How often do you have five or more drinks on one occasion? 0 Filed at: 12/18/2024 1610   3b. FEMALE Any Age, or MALE 65+: How often do you have 4 or more drinks on one occassion? 0 Filed at: 12/18/2024 1610   Audit-C Score 0 Filed at: 12/18/2024 1610   CRISTINO: How many times in the past year have you...    Used an illegal drug or used a prescription medication for non-medical reasons? Never Filed at: 12/18/2024 1610                            History of Present Illness       Chief Complaint   Patient presents with    Dental Problem     Pt presents to ed via walk in with lower left and right upper dental pain has teeth that needs to be removed already on antibiotics from the dentist        Past Medical History:   Diagnosis Date    Anxiety     Asthma     Depression     GERD (gastroesophageal reflux disease)     Hernia, abdominal     Migraines     Muscle spasm     Psychiatric disorder     Anx, Depression    Renal disorder     kidney stones      Past Surgical History:   Procedure Laterality Date    CYSTOSCOPY      removal of kidney stone    [place] RETROGRADE PYELOGRAM  8/28/2018    HERNIA REPAIR      SD CYSTO/URETERO W/LITHOTRIPSY &INDWELL STENT INSRT Right 8/28/2018    Procedure: CYSTOSCOPY URETEROSCOPY WITH RETROGRADE PYELOGRAM AND INSERTION STENT URETERAL;  Surgeon: North Arauz MD;  Location: [facility];  Service: Urology    TOOTH  EXTRACTION        Family History   Problem Relation Age of Onset    Diabetes Mother     Hyperlipidemia Mother     Stroke Mother     Heart disease Mother     Asthma Mother     Other Mother         Degen. of Intervertabral disc.    Nephrolithiasis Father     Nephrolithiasis Brother       Social History     Tobacco Use    Smoking status: Every Day     Current packs/day: 0.50     Average packs/day: 0.5 packs/day for 13.0 years (6.5 ttl pk-yrs)     Types: Cigarettes    Smokeless tobacco: Never   Vaping Use    Vaping status: Never Used   Substance Use Topics    Alcohol use: Not Currently     Comment: Alcohol intake:   None  - As per Afsaneh     Drug use: No     Comment: Illicit drugs:   none  - As per [place]       E-Cigarette/Vaping    E-Cigarette Use Never User       E-Cigarette/Vaping Substances    Nicotine No     THC No     CBD No     Flavoring No     Other No     Unknown No       I have reviewed and agree with the history as documented.     34-year-old female presents to the emergency department for evaluation of dental pain.  Per chart review patient has been seen numerous times for similar concerns.  Patient states that she has been unable to follow-up with [facility] but is scheduled for an appointment in January.  Reports continued pain to her right upper and lower teeth as well as her lower left teeth.  States that she was just put on a prescription for Augmentin by her dentist.  States that she has been taking Tylenol and Motrin but continues to have pain.  No face, tongue, lip or neck swelling.  No shortness of breath.  No difficulty swallowing.  No fever, chills, nausea, vomiting, diarrhea or sick contacts.  No headaches.        Review of Systems   Constitutional:  Negative for chills and fever.   HENT:  Positive for dental problem. Negative for ear pain and sore throat.    Eyes:  Negative for pain and visual disturbance.   Respiratory:  Negative for cough and shortness of breath.    Cardiovascular:  Negative for  chest pain and palpitations.   Gastrointestinal:  Negative for abdominal pain and vomiting.   Genitourinary:  Negative for dysuria and hematuria.   Musculoskeletal:  Negative for arthralgias and back pain.   Skin:  Negative for color change and rash.   Neurological:  Negative for seizures and syncope.   All other systems reviewed and are negative.          Objective       ED Triage Vitals [12/18/24 1608]   Temperature Pulse Blood Pressure Respirations SpO2 Patient Position - Orthostatic VS   98.4 °F (36.9 °C) 88 119/58 18 99 % Sitting      Temp Source Heart Rate Source BP Location FiO2 (%) Pain Score    Oral Monitor Left arm -- 8      Vitals      Date and Time Temp Pulse SpO2 Resp BP Pain Score FACES Pain Rating User   12/18/24 1608 98.4 °F (36.9 °C) 88 99 % 18 119/58 8 -- NP            Physical Exam  Vitals and nursing note reviewed.   Constitutional:       General: She is not in acute distress.     Appearance: She is well-developed.   HENT:      Head: Normocephalic and atraumatic.      Jaw: No trismus.      Mouth/Throat:      Dentition: Abnormal dentition. Dental tenderness and dental caries present. No dental abscesses.      Pharynx: Oropharynx is clear.   Eyes:      Conjunctiva/sclera: Conjunctivae normal.   Cardiovascular:      Rate and Rhythm: Normal rate and regular rhythm.      Heart sounds: No murmur heard.  Pulmonary:      Effort: Pulmonary effort is normal. No respiratory distress.      Breath sounds: Normal breath sounds.   Abdominal:      Palpations: Abdomen is soft.      Tenderness: There is no abdominal tenderness.   Musculoskeletal:         General: No swelling.      Cervical back: Neck supple.   Skin:     General: Skin is warm and dry.      Capillary Refill: Capillary refill takes less than 2 seconds.   Neurological:      Mental Status: She is alert.   Psychiatric:         Mood and Affect: Mood normal.         Results Reviewed       None            No orders to display       Procedures    ED  Medication and Procedure Management   Prior to Admission Medications   Prescriptions Last Dose Informant Patient Reported? Taking?   Diclofenac Sodium (VOLTAREN) 1 %   No No   Sig: Apply 2 g topically 4 (four) times a day   QUEtiapine (SEROquel) 50 mg tablet   No No   Sig: TAKE 1 TABLET BY MOUTH DAILY AT BEDTIME   chlorhexidine (PERIDEX) 0.12 % solution   No No   Sig: Apply 15 mL to the mouth or throat 2 (two) times a day   famotidine (PEPCID) 40 MG tablet   No No   Sig: TAKE 1 TABLET (40 MG TOTAL) BY MOUTH DAILY BEFORE DINNER   hydrOXYzine pamoate (VISTARIL) 25 mg capsule   No No   Sig: Take 1 capsule (25 mg total) by mouth 3 (three) times a day as needed for anxiety   methocarbamol (ROBAXIN) 500 mg tablet   No No   Sig: TAKE 1 TABLET BY MOUTH 3 TIMES A DAY AS NEEDED FOR MUSCLE SPASMS.   metoclopramide (REGLAN) 5 mg tablet   No No   Sig: Take 1 tablet (5 mg total) by mouth 3 (three) times a day as needed (q8 hours as needed)   omeprazole (PriLOSEC) 40 MG capsule   No No   Sig: TAKE 1 CAPSULE BY MOUTH EVERY DAY BEFORE BREAKFAST   ondansetron (ZOFRAN) 4 mg tablet   No No   Sig: Take 1 tablet (4 mg total) by mouth every 8 (eight) hours as needed for nausea or vomiting for up to 5 days   rizatriptan (MAXALT-MLT) 5 mg disintegrating tablet   No No   Sig: TAKE 1TAB BY MOUTH ONCE AS NEEDED FOR MIGRAINE FOR UP TO 1 DOSE MAY REPEAT IN 2 HOURS IF NECESSARY      Facility-Administered Medications: None     Discharge Medication List as of 12/18/2024  4:20 PM        CONTINUE these medications which have NOT CHANGED    Details   chlorhexidine (PERIDEX) 0.12 % solution Apply 15 mL to the mouth or throat 2 (two) times a day, Starting Tue 4/2/2024, Normal      Diclofenac Sodium (VOLTAREN) 1 % Apply 2 g topically 4 (four) times a day, Starting Tue 4/2/2024, Normal      famotidine (PEPCID) 40 MG tablet TAKE 1 TABLET (40 MG TOTAL) BY MOUTH DAILY BEFORE DINNER, Starting Fri 11/1/2024, Normal      hydrOXYzine pamoate (VISTARIL) 25 mg  capsule Take 1 capsule (25 mg total) by mouth 3 (three) times a day as needed for anxiety, Starting Tue 4/30/2024, Normal      methocarbamol (ROBAXIN) 500 mg tablet TAKE 1 TABLET BY MOUTH 3 TIMES A DAY AS NEEDED FOR MUSCLE SPASMS., Normal      metoclopramide (REGLAN) 5 mg tablet Take 1 tablet (5 mg total) by mouth 3 (three) times a day as needed (q8 hours as needed), Starting Sun 10/27/2024, Normal      omeprazole (PriLOSEC) 40 MG capsule TAKE 1 CAPSULE BY MOUTH EVERY DAY BEFORE BREAKFAST, Starting Sat 10/12/2024, Normal      ondansetron (ZOFRAN) 4 mg tablet Take 1 tablet (4 mg total) by mouth every 8 (eight) hours as needed for nausea or vomiting for up to 5 days, Starting Sat 11/9/2024, Until Thu 11/14/2024 at 2359, Normal      QUEtiapine (SEROquel) 50 mg tablet TAKE 1 TABLET BY MOUTH DAILY AT BEDTIME, Starting Mon 7/22/2024, Normal      rizatriptan (MAXALT-MLT) 5 mg disintegrating tablet TAKE 1TAB BY MOUTH ONCE AS NEEDED FOR MIGRAINE FOR UP TO 1 DOSE MAY REPEAT IN 2 HOURS IF NECESSARY, Normal           No discharge procedures on file.  ED SEPSIS DOCUMENTATION   Time reflects when diagnosis was documented in both MDM as applicable and the Disposition within this note       Time User Action Codes Description Comment    12/18/2024  4:19 PM Rony Benítez [K08.9,  G89.29] Chronic dental pain     12/18/2024  4:20 PM Rony Benítez [Z76.5] Drug-seeking behavior                  Rony Benítez MD  12/18/24 2045

## 2024-12-25 ENCOUNTER — NURSE TRIAGE (OUTPATIENT)
Dept: OTHER | Facility: OTHER | Age: 34
End: 2024-12-25

## 2024-12-25 DIAGNOSIS — R11.0 NAUSEA: ICD-10-CM

## 2024-12-25 RX ORDER — METOCLOPRAMIDE 5 MG/1
5 TABLET ORAL 3 TIMES DAILY PRN
Qty: 20 TABLET | Refills: 0 | Status: SHIPPED | OUTPATIENT
Start: 2024-12-25 | End: 2024-12-26 | Stop reason: SDUPTHER

## 2024-12-25 NOTE — TELEPHONE ENCOUNTER
Regarding: reglan refill  ----- Message from Brigitte JOSE sent at 12/25/2024  4:07 PM EST -----  Name: metoclopramide (REGLAN) 5 mg tablet    Dose/Frequency: 5 mg / 3 times daily PRN for q8 hours as needed  Quantity: 20 tablets  Verified pharmacy   [x]  Verified ordering Provider   [x]  Does patient have enough for the next 3 days? Yes [ ] No [x]

## 2024-12-25 NOTE — TELEPHONE ENCOUNTER
"  Reason for Disposition   [1] Caller requesting a prescription renewal (no refills left), no triage required, AND [2] triager able to renew prescription per department policy    Answer Assessment - Initial Assessment Questions  1. DRUG NAME: \"What medicine do you need to have refilled?\"      Reglan    Patient reports nausea first noticed 2-3 days ago, worse today.  No active vomiting.  Is able to drink and has been urinating.    Protocols used: Medication Refill and Renewal Call-Adult-AH      Refilled per 7 day urgent refill protocol.  "

## 2024-12-26 DIAGNOSIS — R11.0 NAUSEA: ICD-10-CM

## 2024-12-26 RX ORDER — ONDANSETRON 4 MG/1
4 TABLET, FILM COATED ORAL EVERY 8 HOURS PRN
Qty: 15 TABLET | Refills: 2 | Status: SHIPPED | OUTPATIENT
Start: 2024-12-26 | End: 2024-12-31

## 2024-12-26 RX ORDER — METOCLOPRAMIDE 5 MG/1
5 TABLET ORAL 3 TIMES DAILY PRN
Qty: 60 TABLET | Refills: 0 | Status: SHIPPED | OUTPATIENT
Start: 2024-12-26

## 2024-12-26 NOTE — TELEPHONE ENCOUNTER
"Pt states pharmacy told her Reglan is not covered by insurance. She is requesting ondansetron \"hard tablets\" NOT disintegrating be called in. Please determine if appropriate    Reason for call:   [x] Refill   [] Prior Auth  [] Other:     Office:   [x] PCP/Provider - [facility]   [] Specialty/Provider -     Medication:   ~ ondansetron (ZOFRAN) 4 mg tablet - Take 1 tablet (4 mg total) by mouth every 8 (eight) hours as needed for nausea or vomiting for up to 5 days     Pharmacy:   [id number] - [place] - [address].     Does the patient have enough for 3 days?   [] Yes   [x] No - Send as HP to POD  "

## 2024-12-29 DIAGNOSIS — M26.623 BILATERAL TEMPOROMANDIBULAR JOINT PAIN: ICD-10-CM

## 2024-12-30 RX ORDER — METHOCARBAMOL 500 MG/1
TABLET, FILM COATED ORAL
Qty: 45 TABLET | Refills: 0 | Status: SHIPPED | OUTPATIENT
Start: 2024-12-30 | End: 2025-01-07

## 2025-01-02 ENCOUNTER — NURSE TRIAGE (OUTPATIENT)
Dept: OTHER | Facility: OTHER | Age: 35
End: 2025-01-02

## 2025-01-02 DIAGNOSIS — F32.A ANXIETY AND DEPRESSION: ICD-10-CM

## 2025-01-02 DIAGNOSIS — R11.0 NAUSEA: ICD-10-CM

## 2025-01-02 DIAGNOSIS — R11.0 NAUSEA: Primary | ICD-10-CM

## 2025-01-02 DIAGNOSIS — F41.9 ANXIETY AND DEPRESSION: ICD-10-CM

## 2025-01-02 RX ORDER — ONDANSETRON 4 MG/1
4 TABLET, ORALLY DISINTEGRATING ORAL EVERY 6 HOURS PRN
Qty: 10 TABLET | Refills: 0 | Status: SHIPPED | OUTPATIENT
Start: 2025-01-02 | End: 2025-01-10

## 2025-01-02 NOTE — TELEPHONE ENCOUNTER
"Regarding: medication issue  ----- Message from Hattie ENRIQUEZ sent at 1/2/2025  5:58 PM EST -----  \"I'm calling to see if my wife's medicine has been called in, the ondansetron. The pharmacy said the doctor has to call in to release them\"    #pt's  Luis M was also on call, she gave permission to speak w/him--she is having trouble with vomiting. Pt is unsure why the pharmacy is not allow pickup of medication.    "

## 2025-01-02 NOTE — TELEPHONE ENCOUNTER
"Reason for Disposition  • [1] Pharmacy calling with prescription question AND [2] triager unable to answer question    Answer Assessment - Initial Assessment Questions  1. NAME of MEDICINE: \"What medicine(s) are you calling about?\"      Zofran    2. QUESTION: \"What is your question?\" (e.g., double dose of medicine, side effect)      Patient is unable to swallow the prescribed Zofran tablets, can patient be prescribed the Zofran ODT medication instead    Protocols used: Medication Question Call-Adult-    "

## 2025-01-03 RX ORDER — QUETIAPINE FUMARATE 50 MG/1
50 TABLET, FILM COATED ORAL
Qty: 90 TABLET | Refills: 1 | Status: SHIPPED | OUTPATIENT
Start: 2025-01-03

## 2025-01-03 RX ORDER — ONDANSETRON 4 MG/1
4 TABLET, FILM COATED ORAL EVERY 8 HOURS PRN
Qty: 15 TABLET | Refills: 2 | Status: SHIPPED | OUTPATIENT
Start: 2025-01-03 | End: 2025-01-04

## 2025-01-04 ENCOUNTER — HOSPITAL ENCOUNTER (EMERGENCY)
Facility: HOSPITAL | Age: 35
Discharge: HOME | End: 2025-01-04
Attending: EMERGENCY MEDICINE
Payer: COMMERCIAL

## 2025-01-04 VITALS
HEART RATE: 68 BPM | TEMPERATURE: 97.8 F | DIASTOLIC BLOOD PRESSURE: 65 MMHG | OXYGEN SATURATION: 98 % | RESPIRATION RATE: 20 BRPM | SYSTOLIC BLOOD PRESSURE: 101 MMHG

## 2025-01-04 DIAGNOSIS — R11.0 NAUSEA: Primary | ICD-10-CM

## 2025-01-04 PROCEDURE — 99282 EMERGENCY DEPT VISIT SF MDM: CPT

## 2025-01-04 PROCEDURE — 99284 EMERGENCY DEPT VISIT MOD MDM: CPT | Performed by: EMERGENCY MEDICINE

## 2025-01-04 RX ORDER — ONDANSETRON 4 MG/1
4 TABLET, FILM COATED ORAL EVERY 6 HOURS
Qty: 12 TABLET | Refills: 0 | Status: SHIPPED | OUTPATIENT
Start: 2025-01-04 | End: 2025-01-10

## 2025-01-04 RX ORDER — ONDANSETRON 4 MG/1
4 TABLET, ORALLY DISINTEGRATING ORAL EVERY 6 HOURS PRN
Qty: 30 TABLET | Refills: 0 | Status: SHIPPED | OUTPATIENT
Start: 2025-01-04 | End: 2025-01-04

## 2025-01-04 RX ORDER — ONDANSETRON 4 MG/1
4 TABLET, ORALLY DISINTEGRATING ORAL ONCE
Status: COMPLETED | OUTPATIENT
Start: 2025-01-04 | End: 2025-01-04

## 2025-01-04 RX ADMIN — ONDANSETRON 4 MG: 4 TABLET, ORALLY DISINTEGRATING ORAL at 15:02

## 2025-01-04 NOTE — DISCHARGE INSTRUCTIONS
Please return to ED if you develop new or worsening symptoms.    Please continue to stay hydrated, and take medications as prescribed.    You may take Tylenol or ibuprofen for abdominal pain.

## 2025-01-04 NOTE — ED PROVIDER NOTES
"Time reflects when diagnosis was documented in both MDM as applicable and the Disposition within this note       Time User Action Codes Description Comment    1/4/2025  2:50 PM BjornPaulion Add [R11.0] Nausea           ED Disposition       ED Disposition   Discharge    Condition   Stable    Date/Time   Sat Jan 4, 2025  2:53 PM    Comment   Christine Fay discharge to home/self care.                   Assessment & Plan       Medical Decision Making  34-year-old female presents for evaluation of nausea.    On initial evaluation, patient in no acute distress, resting comfortably in bed.  Differentials include but not limited to viral gastroenteritis enteritis, colitis, food poisoning.  Vital signs stable, afebrile in ED.  Physical exam unremarkable.  Patient likely has viral gastroenteritis.  Will treat with Zofran and prescribe Zofran for use at home. Recommend follow-up with PCP.  Patient expresses understanding and is in agreement with this plan.      Risk  Prescription drug management.             Medications   ondansetron (ZOFRAN-ODT) dispersible tablet 4 mg (4 mg Oral Given 1/4/25 1502)       ED Risk Strat Scores                                              History of Present Illness       Chief Complaint   Patient presents with    Nausea     Patient arrives to the Ed with complaint of nausea for the last 3 days. Patient states that everyone at home is also sick. \" I think I might have a virus or something\". Patient states she was taken to [facility] via ems, she took her own Reglan and began to feel much better and is able to drink water without vomiting, however the wait was too long and decided to come here.        Past Medical History:   Diagnosis Date    Anxiety     Asthma     Depression     GERD (gastroesophageal reflux disease)     Hernia, abdominal     Migraines     Muscle spasm     Psychiatric disorder     Anx, Depression    Renal disorder     kidney stones      Past Surgical History: "   Procedure Laterality Date    CYSTOSCOPY      removal of kidney stone    FL RETROGRADE PYELOGRAM  8/28/2018    HERNIA REPAIR      DE CYSTO/URETERO W/LITHOTRIPSY &INDWELL STENT INSRT Right 8/28/2018    Procedure: CYSTOSCOPY URETEROSCOPY WITH RETROGRADE PYELOGRAM AND INSERTION STENT URETERAL;  Surgeon: North Arauz MD;  Location[facility];  Service: Urology    TOOTH EXTRACTION        Family History   Problem Relation Age of Onset    Diabetes Mother     Hyperlipidemia Mother     Stroke Mother     Heart disease Mother     Asthma Mother     Other Mother         Degen. of Intervertabral disc.    Nephrolithiasis Father     Nephrolithiasis Brother       Social History     Tobacco Use    Smoking status: Every Day     Current packs/day: 0.50     Average packs/day: 0.5 packs/day for 13.0 years (6.5 ttl pk-yrs)     Types: Cigarettes    Smokeless tobacco: Never   Vaping Use    Vaping status: Never Used   Substance Use Topics    Alcohol use: Not Currently     Comment: Alcohol intake:   None  - As per [place]     Drug use: No     Comment: Illicit drugs:   none  - As per Afsaneh       E-Cigarette/Vaping    E-Cigarette Use Never User       E-Cigarette/Vaping Substances    Nicotine No     THC No     CBD No     Flavoring No     Other No     Unknown No       I have reviewed and agree with the history as documented.     34-year-old female presents for evaluation of nausea. She reports 3 days of nausea. She reports family members are ill with same symptoms. She was vomiting initially, but has now resolved. She is able to tolerate PO. Denies diarrhea, chest pain, SOB.           Review of Systems   Constitutional:  Negative for fever.   HENT:  Negative for sore throat.    Respiratory:  Negative for shortness of breath.    Cardiovascular:  Negative for chest pain and palpitations.   Gastrointestinal:  Positive for nausea. Negative for abdominal pain, diarrhea and vomiting.   Genitourinary:  Negative for dysuria and hematuria.    Musculoskeletal:  Negative for arthralgias and back pain.   Skin:  Negative for color change and rash.   Neurological:  Negative for syncope.   All other systems reviewed and are negative.          Objective       ED Triage Vitals [01/04/25 1438]   Temperature Pulse Blood Pressure Respirations SpO2 Patient Position - Orthostatic VS   97.8 °F (36.6 °C) 68 101/65 20 98 % Lying      Temp Source Heart Rate Source BP Location FiO2 (%) Pain Score    Oral Monitor Left arm -- No Pain      Vitals      Date and Time Temp Pulse SpO2 Resp BP Pain Score FACES Pain Rating User   01/04/25 1438 97.8 °F (36.6 °C) 68 98 % 20 101/65 No Pain -- TRINO            Physical Exam  Vitals and nursing note reviewed.   Constitutional:       General: She is not in acute distress.     Appearance: She is well-developed.   HENT:      Head: Normocephalic and atraumatic.   Eyes:      Pupils: Pupils are equal, round, and reactive to light.   Cardiovascular:      Rate and Rhythm: Normal rate and regular rhythm.   Pulmonary:      Effort: Pulmonary effort is normal. No respiratory distress.      Breath sounds: Normal breath sounds.   Abdominal:      Palpations: Abdomen is soft.      Tenderness: There is no abdominal tenderness.   Musculoskeletal:      Cervical back: Neck supple.   Skin:     General: Skin is warm and dry.      Capillary Refill: Capillary refill takes less than 2 seconds.   Neurological:      Mental Status: She is alert.         Results Reviewed       None            No orders to display       Procedures    ED Medication and Procedure Management   Prior to Admission Medications   Prescriptions Last Dose Informant Patient Reported? Taking?   Diclofenac Sodium (VOLTAREN) 1 %   No No   Sig: Apply 2 g topically 4 (four) times a day   QUEtiapine (SEROquel) 50 mg tablet   No No   Sig: Take 1 tablet (50 mg total) by mouth daily at bedtime   chlorhexidine (PERIDEX) 0.12 % solution   No No   Sig: Apply 15 mL to the mouth or throat 2 (two) times a  day   famotidine (PEPCID) 40 MG tablet   No No   Sig: TAKE 1 TABLET (40 MG TOTAL) BY MOUTH DAILY BEFORE DINNER   hydrOXYzine pamoate (VISTARIL) 25 mg capsule   No No   Sig: Take 1 capsule (25 mg total) by mouth 3 (three) times a day as needed for anxiety   methocarbamol (ROBAXIN) 500 mg tablet   No No   Sig: TAKE 1 TABLET BY MOUTH 3 TIMES A DAY AS NEEDED FOR MUSCLE SPASMS.   metoclopramide (REGLAN) 5 mg tablet   No No   Sig: Take 1 tablet (5 mg total) by mouth 3 (three) times a day as needed (q8 hours as needed)   omeprazole (PriLOSEC) 40 MG capsule   No No   Sig: TAKE 1 CAPSULE BY MOUTH EVERY DAY BEFORE BREAKFAST   ondansetron (ZOFRAN) 4 mg tablet   No No   Sig: Take 1 tablet (4 mg total) by mouth every 8 (eight) hours as needed for nausea or vomiting for up to 5 days   ondansetron (ZOFRAN-ODT) 4 mg disintegrating tablet   No No   Sig: Take 1 tablet (4 mg total) by mouth every 6 (six) hours as needed for nausea or vomiting   rizatriptan (MAXALT-MLT) 5 mg disintegrating tablet   No No   Sig: TAKE 1TAB BY MOUTH ONCE AS NEEDED FOR MIGRAINE FOR UP TO 1 DOSE MAY REPEAT IN 2 HOURS IF NECESSARY      Facility-Administered Medications: None     Discharge Medication List as of 1/4/2025  2:54 PM        START taking these medications    Details   !! ondansetron (ZOFRAN-ODT) 4 mg disintegrating tablet Take 1 tablet (4 mg total) by mouth every 6 (six) hours as needed for nausea or vomiting, Starting Sat 1/4/2025, Normal       !! - Potential duplicate medications found. Please discuss with provider.        CONTINUE these medications which have NOT CHANGED    Details   chlorhexidine (PERIDEX) 0.12 % solution Apply 15 mL to the mouth or throat 2 (two) times a day, Starting Tue 4/2/2024, Normal      Diclofenac Sodium (VOLTAREN) 1 % Apply 2 g topically 4 (four) times a day, Starting Tue 4/2/2024, Normal      famotidine (PEPCID) 40 MG tablet TAKE 1 TABLET (40 MG TOTAL) BY MOUTH DAILY BEFORE DINNER, Starting Fri 11/1/2024, Normal       hydrOXYzine pamoate (VISTARIL) 25 mg capsule Take 1 capsule (25 mg total) by mouth 3 (three) times a day as needed for anxiety, Starting Tue 4/30/2024, Normal      methocarbamol (ROBAXIN) 500 mg tablet TAKE 1 TABLET BY MOUTH 3 TIMES A DAY AS NEEDED FOR MUSCLE SPASMS., Normal      metoclopramide (REGLAN) 5 mg tablet Take 1 tablet (5 mg total) by mouth 3 (three) times a day as needed (q8 hours as needed), Starting Thu 12/26/2024, Normal      omeprazole (PriLOSEC) 40 MG capsule TAKE 1 CAPSULE BY MOUTH EVERY DAY BEFORE BREAKFAST, Starting Sat 10/12/2024, Normal      !! ondansetron (ZOFRAN-ODT) 4 mg disintegrating tablet Take 1 tablet (4 mg total) by mouth every 6 (six) hours as needed for nausea or vomiting, Starting Thu 1/2/2025, Normal      QUEtiapine (SEROquel) 50 mg tablet Take 1 tablet (50 mg total) by mouth daily at bedtime, Starting Fri 1/3/2025, Normal      rizatriptan (MAXALT-MLT) 5 mg disintegrating tablet TAKE 1TAB BY MOUTH ONCE AS NEEDED FOR MIGRAINE FOR UP TO 1 DOSE MAY REPEAT IN 2 HOURS IF NECESSARY, Normal      ondansetron (ZOFRAN) 4 mg tablet Take 1 tablet (4 mg total) by mouth every 8 (eight) hours as needed for nausea or vomiting for up to 5 days, Starting Fri 1/3/2025, Until Wed 1/8/2025 at 2359, Normal       !! - Potential duplicate medications found. Please discuss with provider.        No discharge procedures on file.  ED SEPSIS DOCUMENTATION   Time reflects when diagnosis was documented in both MDM as applicable and the Disposition within this note       Time User Action Codes Description Comment    1/4/2025  2:50 PM Jim Milan Add [R11.0] Nausea                  Jim Milan MD  01/04/25 2386

## 2025-01-05 ENCOUNTER — HOSPITAL ENCOUNTER (EMERGENCY)
Facility: HOSPITAL | Age: 35
Discharge: HOME | End: 2025-01-05
Attending: EMERGENCY MEDICINE | Admitting: EMERGENCY MEDICINE
Payer: COMMERCIAL

## 2025-01-05 VITALS
SYSTOLIC BLOOD PRESSURE: 106 MMHG | TEMPERATURE: 98.4 F | DIASTOLIC BLOOD PRESSURE: 67 MMHG | HEART RATE: 86 BPM | OXYGEN SATURATION: 98 % | RESPIRATION RATE: 20 BRPM

## 2025-01-05 DIAGNOSIS — M26.623 BILATERAL TEMPOROMANDIBULAR JOINT PAIN: ICD-10-CM

## 2025-01-05 DIAGNOSIS — R11.0 NAUSEA: Primary | ICD-10-CM

## 2025-01-05 PROCEDURE — 99284 EMERGENCY DEPT VISIT MOD MDM: CPT | Performed by: EMERGENCY MEDICINE

## 2025-01-05 PROCEDURE — 99283 EMERGENCY DEPT VISIT LOW MDM: CPT

## 2025-01-05 RX ORDER — ONDANSETRON 4 MG/1
4 TABLET, ORALLY DISINTEGRATING ORAL ONCE
Status: COMPLETED | OUTPATIENT
Start: 2025-01-05 | End: 2025-01-05

## 2025-01-05 RX ADMIN — ONDANSETRON 4 MG: 4 TABLET, ORALLY DISINTEGRATING ORAL at 22:36

## 2025-01-06 NOTE — ED PROVIDER NOTES
"Time reflects when diagnosis was documented in both MDM as applicable and the Disposition within this note       Time User Action Codes Description Comment    1/5/2025 10:34 PM Jim Milan Add [R11.0] Nausea           ED Disposition       ED Disposition   Discharge    Condition   Stable    Date/Time   Sun Jan 5, 2025 10:34 PM    Comment   Christine Fay discharge to home/self care.                   Assessment & Plan       Medical Decision Making  34-year-old female presents for evaluation of nausea.    On initial evaluation, patient in no acute distress.  Physical exam unremarkable.  Vital signs stable.  Patient was seen by this provider yesterday for similar symptoms.  There is no change in her symptoms today, she just reports that nausea continues.  She is still able to tolerate p.o. intake.  On further discussion, explained to patient that viral illnesses can last for a week or even longer.  Reiterated importance of continued fluid intake, and Zofran as needed for vomiting.  She is not actively vomiting, having diarrhea.  There is no concern for dehydration or electrolyte abnormality.  Labs deferred at this time.  Offered further reassurance for patient, prescribed Zofran in ED.  Patient stable for dispo.    Risk  Prescription drug management.             Medications   ondansetron (ZOFRAN-ODT) dispersible tablet 4 mg (4 mg Oral Given 1/5/25 2236)       ED Risk Strat Scores                                              History of Present Illness       Chief Complaint   Patient presents with    Nausea     Patient arrives to the Ed with complaint of nausea for the past 5 days. No vomit today  but dry heaving. \" My face literally lives hali  garbage pale\". Patient states she has only been able to hold down some water and some saltines.        Past Medical History:   Diagnosis Date    Anxiety     Asthma     Depression     GERD (gastroesophageal reflux disease)     Hernia, abdominal     Migraines     Muscle " spasm     Psychiatric disorder     Anx, Depression    Renal disorder     kidney stones      Past Surgical History:   Procedure Laterality Date    CYSTOSCOPY      removal of kidney stone    FL RETROGRADE PYELOGRAM  8/28/2018    HERNIA REPAIR      [facility] CYSTO/URETERO W/LITHOTRIPSY &INDWELL STENT INSRT Right 8/28/2018    Procedure: CYSTOSCOPY URETEROSCOPY WITH RETROGRADE PYELOGRAM AND INSERTION STENT URETERAL;  Surgeon: North Arauz MD;  Location: [facility];  Service: Urology    TOOTH EXTRACTION        Family History   Problem Relation Age of Onset    Diabetes Mother     Hyperlipidemia Mother     Stroke Mother     Heart disease Mother     Asthma Mother     Other Mother         Degen. of Intervertabral disc.    Nephrolithiasis Father     Nephrolithiasis Brother       Social History     Tobacco Use    Smoking status: Every Day     Current packs/day: 0.50     Average packs/day: 0.5 packs/day for 13.0 years (6.5 ttl pk-yrs)     Types: Cigarettes    Smokeless tobacco: Never   Vaping Use    Vaping status: Never Used   Substance Use Topics    Alcohol use: Not Currently     Comment: Alcohol intake:   None  - As per [place]     Drug use: No     Comment: Illicit drugs:   none  - As per [place]       E-Cigarette/Vaping    E-Cigarette Use Never User       E-Cigarette/Vaping Substances    Nicotine No     THC No     CBD No     Flavoring No     Other No     Unknown No       I have reviewed and agree with the history as documented.     34-year-old female presents for evaluation of nausea.  She was seen yesterday for similar symptoms.  She returns today as she still has some gagging and dry heaving.  She is not vomiting, and has no diarrhea.  Zofran improved symptoms intermittently, but nausea returns.  She is able to tolerate p.o. intake and has been able to keep water and crackers down.  She has no other complaints.          Review of Systems   Constitutional:  Negative for fever.   Respiratory:  Negative for shortness of breath.     Cardiovascular:  Negative for chest pain.   Gastrointestinal:  Positive for nausea. Negative for abdominal pain, diarrhea and vomiting.   Genitourinary:  Negative for dysuria.           Objective       ED Triage Vitals   Temperature Pulse Blood Pressure Respirations SpO2 Patient Position - Orthostatic VS   01/05/25 2223 01/05/25 2219 01/05/25 2219 01/05/25 2219 01/05/25 2219 --   98.4 °F (36.9 °C) 86 106/67 20 98 %       Temp Source Heart Rate Source BP Location FiO2 (%) Pain Score    01/05/25 2223 01/05/25 2219 01/05/25 2219 -- 01/05/25 2219    Oral Monitor Left arm  4      Vitals      Date and Time Temp Pulse SpO2 Resp BP Pain Score FACES Pain Rating User   01/05/25 2223 98.4 °F (36.9 °C) -- -- -- -- -- -- TRINO   01/05/25 2219 -- 86 98 % 20 106/67 4 -- TRINO            Physical Exam  Vitals and nursing note reviewed.   Constitutional:       General: She is not in acute distress.     Appearance: She is well-developed.   HENT:      Head: Normocephalic and atraumatic.   Cardiovascular:      Rate and Rhythm: Normal rate and regular rhythm.   Pulmonary:      Effort: Pulmonary effort is normal. No respiratory distress.      Breath sounds: Normal breath sounds.   Abdominal:      Palpations: Abdomen is soft.      Tenderness: There is no abdominal tenderness.   Skin:     General: Skin is warm and dry.      Capillary Refill: Capillary refill takes less than 2 seconds.   Neurological:      General: No focal deficit present.      Mental Status: She is alert.         Results Reviewed       None            No orders to display       Procedures    ED Medication and Procedure Management   Prior to Admission Medications   Prescriptions Last Dose Informant Patient Reported? Taking?   Diclofenac Sodium (VOLTAREN) 1 %   No No   Sig: Apply 2 g topically 4 (four) times a day   QUEtiapine (SEROquel) 50 mg tablet   No No   Sig: Take 1 tablet (50 mg total) by mouth daily at bedtime   chlorhexidine (PERIDEX) 0.12 % solution   No No   Sig:  Apply 15 mL to the mouth or throat 2 (two) times a day   famotidine (PEPCID) 40 MG tablet   No No   Sig: TAKE 1 TABLET (40 MG TOTAL) BY MOUTH DAILY BEFORE DINNER   hydrOXYzine pamoate (VISTARIL) 25 mg capsule   No No   Sig: Take 1 capsule (25 mg total) by mouth 3 (three) times a day as needed for anxiety   methocarbamol (ROBAXIN) 500 mg tablet   No No   Sig: TAKE 1 TABLET BY MOUTH 3 TIMES A DAY AS NEEDED FOR MUSCLE SPASMS.   metoclopramide (REGLAN) 5 mg tablet   No No   Sig: Take 1 tablet (5 mg total) by mouth 3 (three) times a day as needed (q8 hours as needed)   omeprazole (PriLOSEC) 40 MG capsule   No No   Sig: TAKE 1 CAPSULE BY MOUTH EVERY DAY BEFORE BREAKFAST   ondansetron (ZOFRAN) 4 mg tablet   No No   Sig: Take 1 tablet (4 mg total) by mouth every 6 (six) hours   ondansetron (ZOFRAN-ODT) 4 mg disintegrating tablet   No No   Sig: Take 1 tablet (4 mg total) by mouth every 6 (six) hours as needed for nausea or vomiting   rizatriptan (MAXALT-MLT) 5 mg disintegrating tablet   No No   Sig: TAKE 1TAB BY MOUTH ONCE AS NEEDED FOR MIGRAINE FOR UP TO 1 DOSE MAY REPEAT IN 2 HOURS IF NECESSARY      Facility-Administered Medications: None     Discharge Medication List as of 1/5/2025 10:36 PM        CONTINUE these medications which have NOT CHANGED    Details   chlorhexidine (PERIDEX) 0.12 % solution Apply 15 mL to the mouth or throat 2 (two) times a day, Starting Tue 4/2/2024, Normal      Diclofenac Sodium (VOLTAREN) 1 % Apply 2 g topically 4 (four) times a day, Starting Tue 4/2/2024, Normal      famotidine (PEPCID) 40 MG tablet TAKE 1 TABLET (40 MG TOTAL) BY MOUTH DAILY BEFORE DINNER, Starting Fri 11/1/2024, Normal      hydrOXYzine pamoate (VISTARIL) 25 mg capsule Take 1 capsule (25 mg total) by mouth 3 (three) times a day as needed for anxiety, Starting Tue 4/30/2024, Normal      methocarbamol (ROBAXIN) 500 mg tablet TAKE 1 TABLET BY MOUTH 3 TIMES A DAY AS NEEDED FOR MUSCLE SPASMS., Normal      metoclopramide (REGLAN) 5  mg tablet Take 1 tablet (5 mg total) by mouth 3 (three) times a day as needed (q8 hours as needed), Starting Thu 12/26/2024, Normal      omeprazole (PriLOSEC) 40 MG capsule TAKE 1 CAPSULE BY MOUTH EVERY DAY BEFORE BREAKFAST, Starting Sat 10/12/2024, Normal      ondansetron (ZOFRAN) 4 mg tablet Take 1 tablet (4 mg total) by mouth every 6 (six) hours, Starting Sat 1/4/2025, Normal      ondansetron (ZOFRAN-ODT) 4 mg disintegrating tablet Take 1 tablet (4 mg total) by mouth every 6 (six) hours as needed for nausea or vomiting, Starting Thu 1/2/2025, Normal      QUEtiapine (SEROquel) 50 mg tablet Take 1 tablet (50 mg total) by mouth daily at bedtime, Starting Fri 1/3/2025, Normal      rizatriptan (MAXALT-MLT) 5 mg disintegrating tablet TAKE 1TAB BY MOUTH ONCE AS NEEDED FOR MIGRAINE FOR UP TO 1 DOSE MAY REPEAT IN 2 HOURS IF NECESSARY, Normal           No discharge procedures on file.  ED SEPSIS DOCUMENTATION   Time reflects when diagnosis was documented in both MDM as applicable and the Disposition within this note       Time User Action Codes Description Comment    1/5/2025 10:34 PM Jim Milan Add [R11.0] Nausea                  Jim Milan MD  01/06/25 0200       Jim Milan MD  01/06/25 0203

## 2025-01-06 NOTE — DISCHARGE INSTRUCTIONS
Please continue to take Zofran for nausea and Tylenol/Ibuprofen for pain.     Please follow up with your PCP as soon as possible for a visit.

## 2025-01-07 RX ORDER — METHOCARBAMOL 500 MG/1
TABLET, FILM COATED ORAL
Qty: 45 TABLET | Refills: 0 | Status: SHIPPED | OUTPATIENT
Start: 2025-01-07

## 2025-01-10 ENCOUNTER — HOSPITAL ENCOUNTER (EMERGENCY)
Facility: HOSPITAL | Age: 35
Discharge: HOME | End: 2025-01-10
Attending: INTERNAL MEDICINE
Payer: COMMERCIAL

## 2025-01-10 ENCOUNTER — TELEPHONE (OUTPATIENT)
Age: 35
End: 2025-01-10

## 2025-01-10 VITALS
RESPIRATION RATE: 18 BRPM | OXYGEN SATURATION: 99 % | TEMPERATURE: 99 F | HEART RATE: 95 BPM | SYSTOLIC BLOOD PRESSURE: 96 MMHG | DIASTOLIC BLOOD PRESSURE: 63 MMHG

## 2025-01-10 DIAGNOSIS — R11.0 NAUSEA: Primary | ICD-10-CM

## 2025-01-10 DIAGNOSIS — K52.9 GASTROENTERITIS: Primary | ICD-10-CM

## 2025-01-10 LAB
ALBUMIN SERPL BCG-MCNC: 4.4 G/DL (ref 3.5–5)
ALP SERPL-CCNC: 45 U/L (ref 34–104)
ALT SERPL W P-5'-P-CCNC: 7 U/L (ref 7–52)
ANION GAP SERPL CALCULATED.3IONS-SCNC: 9 MMOL/L (ref 4–13)
AST SERPL W P-5'-P-CCNC: 10 U/L (ref 13–39)
BASOPHILS # BLD AUTO: 0.03 THOUSANDS/ΜL (ref 0–0.1)
BASOPHILS NFR BLD AUTO: 1 % (ref 0–1)
BILIRUB SERPL-MCNC: 0.4 MG/DL (ref 0.2–1)
BUN SERPL-MCNC: 11 MG/DL (ref 5–25)
CALCIUM SERPL-MCNC: 10.7 MG/DL (ref 8.4–10.2)
CHLORIDE SERPL-SCNC: 105 MMOL/L (ref 96–108)
CO2 SERPL-SCNC: 27 MMOL/L (ref 21–32)
CREAT SERPL-MCNC: 0.74 MG/DL (ref 0.6–1.3)
EOSINOPHIL # BLD AUTO: 0.12 THOUSAND/ΜL (ref 0–0.61)
EOSINOPHIL NFR BLD AUTO: 2 % (ref 0–6)
ERYTHROCYTE [DISTWIDTH] IN BLOOD BY AUTOMATED COUNT: 12.9 % (ref 11.6–15.1)
GFR SERPL CREATININE-BSD FRML MDRD: 106 ML/MIN/1.73SQ M
GLUCOSE SERPL-MCNC: 95 MG/DL (ref 65–140)
HCG SERPL QL: NEGATIVE
HCT VFR BLD AUTO: 41.6 % (ref 34.8–46.1)
HGB BLD-MCNC: 13.8 G/DL (ref 11.5–15.4)
IMM GRANULOCYTES # BLD AUTO: 0.02 THOUSAND/UL (ref 0–0.2)
IMM GRANULOCYTES NFR BLD AUTO: 0 % (ref 0–2)
LIPASE SERPL-CCNC: 11 U/L (ref 11–82)
LYMPHOCYTES # BLD AUTO: 1.39 THOUSANDS/ΜL (ref 0.6–4.47)
LYMPHOCYTES NFR BLD AUTO: 22 % (ref 14–44)
MCH RBC QN AUTO: 28.8 PG (ref 26.8–34.3)
MCHC RBC AUTO-ENTMCNC: 33.2 G/DL (ref 31.4–37.4)
MCV RBC AUTO: 87 FL (ref 82–98)
MONOCYTES # BLD AUTO: 0.43 THOUSAND/ΜL (ref 0.17–1.22)
MONOCYTES NFR BLD AUTO: 7 % (ref 4–12)
NEUTROPHILS # BLD AUTO: 4.42 THOUSANDS/ΜL (ref 1.85–7.62)
NEUTS SEG NFR BLD AUTO: 68 % (ref 43–75)
NRBC BLD AUTO-RTO: 0 /100 WBCS
PLATELET # BLD AUTO: 354 THOUSANDS/UL (ref 149–390)
PMV BLD AUTO: 9.7 FL (ref 8.9–12.7)
POTASSIUM SERPL-SCNC: 4 MMOL/L (ref 3.5–5.3)
PROT SERPL-MCNC: 6.9 G/DL (ref 6.4–8.4)
RBC # BLD AUTO: 4.79 MILLION/UL (ref 3.81–5.12)
SODIUM SERPL-SCNC: 141 MMOL/L (ref 135–147)
WBC # BLD AUTO: 6.41 THOUSAND/UL (ref 4.31–10.16)

## 2025-01-10 PROCEDURE — 83690 ASSAY OF LIPASE: CPT | Performed by: PHYSICIAN ASSISTANT

## 2025-01-10 PROCEDURE — 84703 CHORIONIC GONADOTROPIN ASSAY: CPT | Performed by: PHYSICIAN ASSISTANT

## 2025-01-10 PROCEDURE — 80053 COMPREHEN METABOLIC PANEL: CPT | Performed by: PHYSICIAN ASSISTANT

## 2025-01-10 PROCEDURE — 85025 COMPLETE CBC W/AUTO DIFF WBC: CPT | Performed by: PHYSICIAN ASSISTANT

## 2025-01-10 PROCEDURE — 96374 THER/PROPH/DIAG INJ IV PUSH: CPT

## 2025-01-10 PROCEDURE — 99284 EMERGENCY DEPT VISIT MOD MDM: CPT

## 2025-01-10 PROCEDURE — 99284 EMERGENCY DEPT VISIT MOD MDM: CPT | Performed by: PHYSICIAN ASSISTANT

## 2025-01-10 PROCEDURE — 96375 TX/PRO/DX INJ NEW DRUG ADDON: CPT

## 2025-01-10 PROCEDURE — 96361 HYDRATE IV INFUSION ADD-ON: CPT

## 2025-01-10 PROCEDURE — 36415 COLL VENOUS BLD VENIPUNCTURE: CPT | Performed by: PHYSICIAN ASSISTANT

## 2025-01-10 RX ORDER — SUCRALFATE ORAL 1 G/10ML
1 SUSPENSION ORAL 4 TIMES DAILY
Qty: 280 ML | Refills: 0 | Status: SHIPPED | OUTPATIENT
Start: 2025-01-10 | End: 2025-01-17

## 2025-01-10 RX ORDER — ONDANSETRON 4 MG/1
4 TABLET, FILM COATED ORAL EVERY 6 HOURS
Qty: 12 TABLET | Refills: 0 | Status: SHIPPED | OUTPATIENT
Start: 2025-01-10

## 2025-01-10 RX ORDER — ONDANSETRON 2 MG/ML
4 INJECTION INTRAMUSCULAR; INTRAVENOUS ONCE
Status: COMPLETED | OUTPATIENT
Start: 2025-01-10 | End: 2025-01-10

## 2025-01-10 RX ORDER — DROPERIDOL 2.5 MG/ML
0.62 INJECTION, SOLUTION INTRAMUSCULAR; INTRAVENOUS ONCE
Status: COMPLETED | OUTPATIENT
Start: 2025-01-10 | End: 2025-01-10

## 2025-01-10 RX ADMIN — DROPERIDOL 0.62 MG: 2.5 INJECTION, SOLUTION INTRAMUSCULAR; INTRAVENOUS at 15:24

## 2025-01-10 RX ADMIN — SODIUM CHLORIDE 1000 ML: 0.9 INJECTION, SOLUTION INTRAVENOUS at 14:35

## 2025-01-10 RX ADMIN — ONDANSETRON 4 MG: 2 INJECTION INTRAMUSCULAR; INTRAVENOUS at 14:37

## 2025-01-10 NOTE — TELEPHONE ENCOUNTER
Patient is calling in stating she is still vomiting and has severe nausea and wants to know if there is something else provider can prescribe. She states this has been ongoing for 9 days and has not gotten any relief    Symptoms are vomiting, hot/cold flashes  Pharmacy is [facility]    Please advise

## 2025-01-10 NOTE — TELEPHONE ENCOUNTER
Patient has been having nausea, vomiting  for 9 days. Today Dr. Marie prescribed Carefate. Patient went to ER later , she was confirmed with the norovirus. Patient would like to know if she needs to take Carefate. Please advise the patient. Dr. Garces on call was notified.

## 2025-01-10 NOTE — ED PROVIDER NOTES
ED Disposition       None          Assessment & Plan   {Hyperlinks  Risk Stratification - NIHSS - HEART SCORE - Fill out sepsis note and make sure you call [phone] if severe or septic shock:[id number]}    Medical Decision Making  Amount and/or Complexity of Data Reviewed  Labs: ordered.    Risk  Prescription drug management.             Medications   sodium chloride 0.9 % bolus 1,000 mL (0 mL Intravenous Stopped 1/10/25 1526)   ondansetron (ZOFRAN) injection 4 mg (4 mg Intravenous Given 1/10/25 1437)   droperidol (INAPSINE) injection 0.625 mg (0.625 mg Intravenous Given 1/10/25 1524)       ED Risk Strat Scores                          SBIRT 20yo+      Flowsheet Row Most Recent Value   Initial Alcohol Screen: US AUDIT-C     1. How often do you have a drink containing alcohol? 0 Filed at: 01/10/2025 1421   2. How many drinks containing alcohol do you have on a typical day you are drinking?  0 Filed at: 01/10/2025 1421   3b. FEMALE Any Age, or MALE 65+: How often do you have 4 or more drinks on one occassion? 0 Filed at: 01/10/2025 1421   Audit-C Score 0 Filed at: 01/10/2025 1421   CRISTINO: How many times in the past year have you...    Used an illegal drug or used a prescription medication for non-medical reasons? Never Filed at: 01/10/2025 1421                            History of Present Illness   {Hyperlinks  History (Med, Surg, Fam, Social) - Current Medications - Allergies  :[id number]}    Chief Complaint   Patient presents with    Flu Symptoms     Patient c/o generalized weakness, fatigue, fevers, vomiting, and cough for 9 days. Last Ibuprofen around 0800.       Past Medical History:   Diagnosis Date    Anxiety     Asthma     Depression     GERD (gastroesophageal reflux disease)     Hernia, abdominal     Migraines     Muscle spasm     Psychiatric disorder     Anx, Depression    Renal disorder     kidney stones      Past Surgical History:   Procedure Laterality Date    CYSTOSCOPY      removal of kidney stone    [place]  RETROGRADE PYELOGRAM  8/28/2018    HERNIA REPAIR      [place] CYSTO/URETERO W/LITHOTRIPSY &INDWELL STENT INSRT Right 8/28/2018    Procedure: CYSTOSCOPY URETEROSCOPY WITH RETROGRADE PYELOGRAM AND INSERTION STENT URETERAL;  Surgeon: North Arauz MD;  Location: [facility];  Service: Urology    TOOTH EXTRACTION        Family History   Problem Relation Age of Onset    Diabetes Mother     Hyperlipidemia Mother     Stroke Mother     Heart disease Mother     Asthma Mother     Other Mother         Degen. of Intervertabral disc.    Nephrolithiasis Father     Nephrolithiasis Brother       Social History     Tobacco Use    Smoking status: Every Day     Current packs/day: 0.50     Average packs/day: 0.5 packs/day for 13.0 years (6.5 ttl pk-yrs)     Types: Cigarettes    Smokeless tobacco: Never   Vaping Use    Vaping status: Never Used   Substance Use Topics    Alcohol use: Not Currently     Comment: Alcohol intake:   None  - As per Afsaneh     Drug use: No     Comment: Illicit drugs:   none  - As per Afsaneh       E-Cigarette/Vaping    E-Cigarette Use Never User       E-Cigarette/Vaping Substances    Nicotine No     THC No     CBD No     Flavoring No     Other No     Unknown No       I have reviewed and agree with the history as documented.     This is a 34-year-old female presenting to the emergency department today for nausea associated with numerous episodes of vomiting.  The patient notes that she has been sick for approximately 9 days.  She notes that she has had nasal congestion associated with nausea.  Episodes of vomiting are more recent and began approximately 3 to 4 days ago.  The patient notes that she is starving herself because the only thing that she can eat is crackers.  She denies any fevers or chills.  She has no sore throat.  She has no chest pain or shortness of breath.  She has no neck pain.  She denies any diarrhea or constipation.  She denies any suspicious food intake.  The patient denies other complaints  at this time.      History provided by:  Patient   used: No    Nausea  The primary symptoms include fatigue, nausea and vomiting. Primary symptoms do not include fever, abdominal pain, diarrhea, melena, jaundice, dysuria, myalgias or rash. The illness began more than 7 days ago. The onset was gradual.   The illness does not include chills or constipation.       Review of Systems   Constitutional:  Positive for appetite change and fatigue. Negative for chills, diaphoresis and fever.   Eyes:  Negative for visual disturbance.   Respiratory:  Negative for cough, chest tightness, shortness of breath and wheezing.    Cardiovascular:  Negative for chest pain, palpitations and leg swelling.   Gastrointestinal:  Positive for nausea and vomiting. Negative for abdominal pain, constipation, diarrhea, jaundice and melena.   Genitourinary:  Negative for dysuria.   Musculoskeletal:  Negative for myalgias, neck pain and neck stiffness.   Skin:  Negative for rash and wound.   Neurological:  Negative for dizziness, seizures, syncope, weakness, numbness and headaches.   Psychiatric/Behavioral:  Negative for confusion.    All other systems reviewed and are negative.          Objective   {Hyperlinks  Historical Vitals - Historical Labs - Chart Review/Microbiology - Last Echo - Code Status  :[id number]}    ED Triage Vitals [01/10/25 1419]   Temperature Pulse Blood Pressure Respirations SpO2 Patient Position - Orthostatic VS   99 °F (37.2 °C) 95 96/63 18 99 % Sitting      Temp Source Heart Rate Source BP Location FiO2 (%) Pain Score    Oral Monitor Left arm -- No Pain      Vitals      Date and Time Temp Pulse SpO2 Resp BP Pain Score FACES Pain Rating User   01/10/25 1419 99 °F (37.2 °C) 95 99 % 18 96/63 No Pain -- DG            Physical Exam  Vitals and nursing note reviewed.   Constitutional:       General: She is not in acute distress.     Appearance: Normal appearance. She is normal weight. She is not  ill-appearing, toxic-appearing or diaphoretic.   HENT:      Head: Normocephalic and atraumatic.      Nose: Nose normal. No congestion or rhinorrhea.      Mouth/Throat:      Mouth: Mucous membranes are moist.      Pharynx: No oropharyngeal exudate or posterior oropharyngeal erythema.   Eyes:      General: No scleral icterus.        Right eye: No discharge.         Left eye: No discharge.      Extraocular Movements: Extraocular movements intact.      Pupils: Pupils are equal, round, and reactive to light.   Cardiovascular:      Rate and Rhythm: Normal rate and regular rhythm.      Pulses: Normal pulses.      Heart sounds: Normal heart sounds. No murmur heard.     No friction rub. No gallop.   Pulmonary:      Effort: Pulmonary effort is normal. No respiratory distress.      Breath sounds: Normal breath sounds. No stridor. No wheezing, rhonchi or rales.   Chest:      Chest wall: No tenderness.   Abdominal:      General: Abdomen is flat. There is no distension.      Palpations: Abdomen is soft.      Tenderness: There is no abdominal tenderness. There is no right CVA tenderness, left CVA tenderness, guarding or rebound.   Musculoskeletal:         General: Normal range of motion.      Cervical back: Normal range of motion. No tenderness.      Right lower leg: No edema.      Left lower leg: No edema.   Skin:     General: Skin is warm and dry.      Capillary Refill: Capillary refill takes less than 2 seconds.      Coloration: Skin is not jaundiced or pale.   Neurological:      General: No focal deficit present.      Mental Status: She is alert and oriented to person, place, and time. Mental status is at baseline.   Psychiatric:         Mood and Affect: Mood normal.         Behavior: Behavior normal.         Results Reviewed       Procedure Component Value Units Date/Time    CBC and differential [[id number]] Collected: 01/10/25 1435    Lab Status: Final result Specimen: Blood from Arm, Right Updated: 01/10/25 1443     WBC 6.41  Thousand/uL      RBC 4.79 Million/uL      Hemoglobin 13.8 g/dL      Hematocrit 41.6 %      MCV 87 fL      MCH 28.8 pg      MCHC 33.2 g/dL      RDW 12.9 %      MPV 9.7 fL      Platelets 354 Thousands/uL      nRBC 0 /100 WBCs      Segmented % 68 %      Immature Grans % 0 %      Lymphocytes % 22 %      Monocytes % 7 %      Eosinophils Relative 2 %      Basophils Relative 1 %      Absolute Neutrophils 4.42 Thousands/µL      Absolute Immature Grans 0.02 Thousand/uL      Absolute Lymphocytes 1.39 Thousands/µL      Absolute Monocytes 0.43 Thousand/µL      Eosinophils Absolute 0.12 Thousand/µL      Basophils Absolute 0.03 Thousands/µL     hCG, qualitative pregnancy [[id number]] Collected: 01/10/25 1435    Lab Status: In process Specimen: Blood from Arm, Right Updated: 01/10/25 1439    Comprehensive metabolic panel [[id number]] Collected: 01/10/25 1435    Lab Status: In process Specimen: Blood from Arm, Right Updated: 01/10/25 1439    Lipase [[id number]] Collected: 01/10/25 1435    Lab Status: In process Specimen: Blood from Arm, Right Updated: 01/10/25 1439            No orders to display       Procedures    ED Medication and Procedure Management   Prior to Admission Medications   Prescriptions Last Dose Informant Patient Reported? Taking?   Diclofenac Sodium (VOLTAREN) 1 %   No No   Sig: Apply 2 g topically 4 (four) times a day   QUEtiapine (SEROquel) 50 mg tablet   No No   Sig: Take 1 tablet (50 mg total) by mouth daily at bedtime   chlorhexidine (PERIDEX) 0.12 % solution   No No   Sig: Apply 15 mL to the mouth or throat 2 (two) times a day   famotidine (PEPCID) 40 MG tablet   No No   Sig: TAKE 1 TABLET (40 MG TOTAL) BY MOUTH DAILY BEFORE DINNER   hydrOXYzine pamoate (VISTARIL) 25 mg capsule   No No   Sig: Take 1 capsule (25 mg total) by mouth 3 (three) times a day as needed for anxiety   methocarbamol (ROBAXIN) 500 mg tablet   No No   Sig: TAKE 1 TABLET BY MOUTH 3 TIMES A DAY AS NEEDED FOR MUSCLE SPASMS.   metoclopramide  (REGLAN) 5 mg tablet   No No   Sig: Take 1 tablet (5 mg total) by mouth 3 (three) times a day as needed (q8 hours as needed)   omeprazole (PriLOSEC) 40 MG capsule   No No   Sig: TAKE 1 CAPSULE BY MOUTH EVERY DAY BEFORE BREAKFAST   ondansetron (ZOFRAN) 4 mg tablet   No No   Sig: Take 1 tablet (4 mg total) by mouth every 6 (six) hours   ondansetron (ZOFRAN-ODT) 4 mg disintegrating tablet   No No   Sig: Take 1 tablet (4 mg total) by mouth every 6 (six) hours as needed for nausea or vomiting   rizatriptan (MAXALT-MLT) 5 mg disintegrating tablet   No No   Sig: TAKE 1TAB BY MOUTH ONCE AS NEEDED FOR MIGRAINE FOR UP TO 1 DOSE MAY REPEAT IN 2 HOURS IF NECESSARY   sucralfate (CARAFATE) 1 g/10 mL suspension   No No   Sig: Take 10 mL (1 g total) by mouth 4 (four) times a day for 7 days      Facility-Administered Medications: None     Patient's Medications   Discharge Prescriptions    No medications on file     No discharge procedures on file.  ED SEPSIS DOCUMENTATION            Prescriptions Last Dose Informant Patient Reported? Taking?   Diclofenac Sodium (VOLTAREN) 1 %   No No   Sig: Apply 2 g topically 4 (four) times a day   QUEtiapine (SEROquel) 50 mg tablet   No No   Sig: Take 1 tablet (50 mg total) by mouth daily at bedtime   chlorhexidine (PERIDEX) 0.12 % solution   No No   Sig: Apply 15 mL to the mouth or throat 2 (two) times a day   famotidine (PEPCID) 40 MG tablet   No No   Sig: TAKE 1 TABLET (40 MG TOTAL) BY MOUTH DAILY BEFORE DINNER   hydrOXYzine pamoate (VISTARIL) 25 mg capsule   No No   Sig: Take 1 capsule (25 mg total) by mouth 3 (three) times a day as needed for anxiety   methocarbamol (ROBAXIN) 500 mg tablet   No No   Sig: TAKE 1 TABLET BY MOUTH 3 TIMES A DAY AS NEEDED FOR MUSCLE SPASMS.   metoclopramide (REGLAN) 5 mg tablet   No No   Sig: Take 1 tablet (5 mg total) by mouth 3 (three) times a day as needed (q8 hours as needed)   omeprazole (PriLOSEC) 40 MG capsule   No No   Sig: TAKE 1 CAPSULE BY MOUTH EVERY DAY BEFORE BREAKFAST   ondansetron (ZOFRAN) 4 mg tablet   No No   Sig: Take 1 tablet (4 mg total) by mouth every 6 (six) hours   ondansetron (ZOFRAN-ODT) 4 mg disintegrating tablet   No No   Sig: Take 1 tablet (4 mg total) by mouth every 6 (six) hours as needed for nausea or vomiting   rizatriptan (MAXALT-MLT) 5 mg disintegrating tablet   No No   Sig: TAKE 1TAB BY MOUTH ONCE AS NEEDED FOR MIGRAINE FOR UP TO 1 DOSE MAY REPEAT IN 2 HOURS IF NECESSARY   sucralfate (CARAFATE) 1 g/10 mL suspension   No No   Sig: Take 10 mL (1 g total) by mouth 4 (four) times a day for 7 days      Facility-Administered Medications: None     Discharge Medication List as of 1/10/2025  3:49 PM        CONTINUE these medications which have CHANGED    Details   ondansetron (ZOFRAN) 4 mg tablet Take 1 tablet (4 mg total) by mouth every 6 (six) hours, Starting Fri 1/10/2025, Normal           CONTINUE these medications which have NOT CHANGED    Details   chlorhexidine (PERIDEX) 0.12 % solution  Apply 15 mL to the mouth or throat 2 (two) times a day, Starting Tue 4/2/2024, Normal      Diclofenac Sodium (VOLTAREN) 1 % Apply 2 g topically 4 (four) times a day, Starting Tue 4/2/2024, Normal      famotidine (PEPCID) 40 MG tablet TAKE 1 TABLET (40 MG TOTAL) BY MOUTH DAILY BEFORE DINNER, Starting Fri 11/1/2024, Normal      hydrOXYzine pamoate (VISTARIL) 25 mg capsule Take 1 capsule (25 mg total) by mouth 3 (three) times a day as needed for anxiety, Starting Tue 4/30/2024, Normal      methocarbamol (ROBAXIN) 500 mg tablet TAKE 1 TABLET BY MOUTH 3 TIMES A DAY AS NEEDED FOR MUSCLE SPASMS., Normal      metoclopramide (REGLAN) 5 mg tablet Take 1 tablet (5 mg total) by mouth 3 (three) times a day as needed (q8 hours as needed), Starting Thu 12/26/2024, Normal      omeprazole (PriLOSEC) 40 MG capsule TAKE 1 CAPSULE BY MOUTH EVERY DAY BEFORE BREAKFAST, Starting Sat 10/12/2024, Normal      QUEtiapine (SEROquel) 50 mg tablet Take 1 tablet (50 mg total) by mouth daily at bedtime, Starting Fri 1/3/2025, Normal      rizatriptan (MAXALT-MLT) 5 mg disintegrating tablet TAKE 1TAB BY MOUTH ONCE AS NEEDED FOR MIGRAINE FOR UP TO 1 DOSE MAY REPEAT IN 2 HOURS IF NECESSARY, Normal      sucralfate (CARAFATE) 1 g/10 mL suspension Take 10 mL (1 g total) by mouth 4 (four) times a day for 7 days, Starting Fri 1/10/2025, Until Fri 1/17/2025, Normal           STOP taking these medications       ondansetron (ZOFRAN-ODT) 4 mg disintegrating tablet Comments:   Reason for Stopping:               ED SEPSIS DOCUMENTATION   Time reflects when diagnosis was documented in both MDM as applicable and the Disposition within this note       Time User Action Codes Description Comment    1/10/2025  3:47 PM Kojo Barrios Add [K52.9] Gastroenteritis                  Kojo Barrios PA-C  01/13/25 0910

## 2025-01-10 NOTE — DISCHARGE INSTRUCTIONS
Please return to the emergency department for worsening symptoms including chest pain, shortness of breath, dizziness, lightheadedness, fever greater than 103, severe pain, inability to walk, fainting episodes, etc..  Please follow-up with your family practice provider as soon as possible.  I have sent medications over to the pharmacy for your symptoms.  Please take as directed.  Recommended a bland diet and advancing as tolerated.  A bland diet includes bananas, rice, applesauce, and toast.  If you tolerate this foods, you may advance your diet to more substantial foods.  Follow-up outpatient with gastroenterology.  I have given you a referral.  Follow-up with them if your symptoms do not improve.

## 2025-01-10 NOTE — TELEPHONE ENCOUNTER
Per Dr. Segovia, patient was confirmed ok to take Carefate for symptoms, to stop if it doesn't help.

## 2025-01-14 ENCOUNTER — HOSPITAL ENCOUNTER (EMERGENCY)
Facility: HOSPITAL | Age: 35
Discharge: HOME | End: 2025-01-14
Attending: EMERGENCY MEDICINE
Payer: COMMERCIAL

## 2025-01-14 ENCOUNTER — APPOINTMENT (EMERGENCY)
Dept: CT IMAGING | Facility: HOSPITAL | Age: 35
End: 2025-01-14
Payer: COMMERCIAL

## 2025-01-14 VITALS
RESPIRATION RATE: 15 BRPM | DIASTOLIC BLOOD PRESSURE: 58 MMHG | OXYGEN SATURATION: 97 % | TEMPERATURE: 98.2 F | SYSTOLIC BLOOD PRESSURE: 93 MMHG | HEART RATE: 90 BPM

## 2025-01-14 DIAGNOSIS — R11.0 CHRONIC NAUSEA: ICD-10-CM

## 2025-01-14 DIAGNOSIS — K52.9 COLITIS: Primary | ICD-10-CM

## 2025-01-14 DIAGNOSIS — R11.0 NAUSEA: ICD-10-CM

## 2025-01-14 LAB
ALBUMIN SERPL BCG-MCNC: 4.6 G/DL (ref 3.5–5)
ALP SERPL-CCNC: 45 U/L (ref 34–104)
ALT SERPL W P-5'-P-CCNC: 7 U/L (ref 7–52)
AMPHETAMINES SERPL QL SCN: NEGATIVE
ANION GAP SERPL CALCULATED.3IONS-SCNC: 13 MMOL/L (ref 4–13)
AST SERPL W P-5'-P-CCNC: 10 U/L (ref 13–39)
BARBITURATES UR QL: NEGATIVE
BASOPHILS # BLD AUTO: 0.04 THOUSANDS/ΜL (ref 0–0.1)
BASOPHILS NFR BLD AUTO: 1 % (ref 0–1)
BENZODIAZ UR QL: NEGATIVE
BILIRUB SERPL-MCNC: 0.42 MG/DL (ref 0.2–1)
BILIRUB UR QL STRIP: ABNORMAL
BUN SERPL-MCNC: 12 MG/DL (ref 5–25)
CALCIUM SERPL-MCNC: 10 MG/DL (ref 8.4–10.2)
CHLORIDE SERPL-SCNC: 105 MMOL/L (ref 96–108)
CLARITY UR: CLEAR
CO2 SERPL-SCNC: 22 MMOL/L (ref 21–32)
COCAINE UR QL: NEGATIVE
COLOR UR: YELLOW
CREAT SERPL-MCNC: 0.68 MG/DL (ref 0.6–1.3)
EOSINOPHIL # BLD AUTO: 0.02 THOUSAND/ΜL (ref 0–0.61)
EOSINOPHIL NFR BLD AUTO: 0 % (ref 0–6)
ERYTHROCYTE [DISTWIDTH] IN BLOOD BY AUTOMATED COUNT: 13.2 % (ref 11.6–15.1)
FENTANYL UR QL SCN: NEGATIVE
GAS + CO PNL BLDA: 9.1 % (ref 0–1.5)
GFR SERPL CREATININE-BSD FRML MDRD: 114 ML/MIN/1.73SQ M
GLUCOSE SERPL-MCNC: 93 MG/DL (ref 65–140)
GLUCOSE UR STRIP-MCNC: NEGATIVE MG/DL
HCG SERPL QL: NEGATIVE
HCT VFR BLD AUTO: 43 % (ref 34.8–46.1)
HGB BLD-MCNC: 14.4 G/DL (ref 11.5–15.4)
HGB UR QL STRIP.AUTO: NEGATIVE
HYDROCODONE UR QL SCN: NEGATIVE
IMM GRANULOCYTES # BLD AUTO: 0.03 THOUSAND/UL (ref 0–0.2)
IMM GRANULOCYTES NFR BLD AUTO: 0 % (ref 0–2)
KETONES UR STRIP-MCNC: ABNORMAL MG/DL
LACTATE SERPL-SCNC: 1 MMOL/L (ref 0.5–2)
LEUKOCYTE ESTERASE UR QL STRIP: NEGATIVE
LIPASE SERPL-CCNC: 8 U/L (ref 11–82)
LYMPHOCYTES # BLD AUTO: 1.34 THOUSANDS/ΜL (ref 0.6–4.47)
LYMPHOCYTES NFR BLD AUTO: 16 % (ref 14–44)
MAGNESIUM SERPL-MCNC: 1.9 MG/DL (ref 1.9–2.7)
MCH RBC QN AUTO: 29.1 PG (ref 26.8–34.3)
MCHC RBC AUTO-ENTMCNC: 33.5 G/DL (ref 31.4–37.4)
MCV RBC AUTO: 87 FL (ref 82–98)
METHADONE UR QL: NEGATIVE
MONOCYTES # BLD AUTO: 0.43 THOUSAND/ΜL (ref 0.17–1.22)
MONOCYTES NFR BLD AUTO: 5 % (ref 4–12)
NEUTROPHILS # BLD AUTO: 6.29 THOUSANDS/ΜL (ref 1.85–7.62)
NEUTS SEG NFR BLD AUTO: 78 % (ref 43–75)
NITRITE UR QL STRIP: NEGATIVE
NRBC BLD AUTO-RTO: 0 /100 WBCS
OPIATES UR QL SCN: NEGATIVE
OXYCODONE+OXYMORPHONE UR QL SCN: POSITIVE
PCP UR QL: NEGATIVE
PH UR STRIP.AUTO: 7 [PH]
PLATELET # BLD AUTO: 344 THOUSANDS/UL (ref 149–390)
PMV BLD AUTO: 10.2 FL (ref 8.9–12.7)
POTASSIUM SERPL-SCNC: 4.1 MMOL/L (ref 3.5–5.3)
PROCALCITONIN SERPL-MCNC: <0.05 NG/ML
PROT SERPL-MCNC: 7.3 G/DL (ref 6.4–8.4)
PROT UR STRIP-MCNC: NEGATIVE MG/DL
RBC # BLD AUTO: 4.94 MILLION/UL (ref 3.81–5.12)
SODIUM SERPL-SCNC: 140 MMOL/L (ref 135–147)
SP GR UR STRIP.AUTO: 1.02 (ref 1–1.03)
THC UR QL: NEGATIVE
UROBILINOGEN UR QL STRIP.AUTO: 0.2 E.U./DL
WBC # BLD AUTO: 8.15 THOUSAND/UL (ref 4.31–10.16)

## 2025-01-14 PROCEDURE — 36415 COLL VENOUS BLD VENIPUNCTURE: CPT | Performed by: PHYSICIAN ASSISTANT

## 2025-01-14 PROCEDURE — 83605 ASSAY OF LACTIC ACID: CPT | Performed by: PHYSICIAN ASSISTANT

## 2025-01-14 PROCEDURE — 82375 ASSAY CARBOXYHB QUANT: CPT | Performed by: PHYSICIAN ASSISTANT

## 2025-01-14 PROCEDURE — 80053 COMPREHEN METABOLIC PANEL: CPT | Performed by: PHYSICIAN ASSISTANT

## 2025-01-14 PROCEDURE — 74177 CT ABD & PELVIS W/CONTRAST: CPT

## 2025-01-14 PROCEDURE — 84145 PROCALCITONIN (PCT): CPT | Performed by: PHYSICIAN ASSISTANT

## 2025-01-14 PROCEDURE — 99285 EMERGENCY DEPT VISIT HI MDM: CPT | Performed by: PHYSICIAN ASSISTANT

## 2025-01-14 PROCEDURE — 80307 DRUG TEST PRSMV CHEM ANLYZR: CPT | Performed by: PHYSICIAN ASSISTANT

## 2025-01-14 PROCEDURE — 84703 CHORIONIC GONADOTROPIN ASSAY: CPT | Performed by: PHYSICIAN ASSISTANT

## 2025-01-14 PROCEDURE — 81003 URINALYSIS AUTO W/O SCOPE: CPT | Performed by: PHYSICIAN ASSISTANT

## 2025-01-14 PROCEDURE — 96361 HYDRATE IV INFUSION ADD-ON: CPT

## 2025-01-14 PROCEDURE — 96375 TX/PRO/DX INJ NEW DRUG ADDON: CPT

## 2025-01-14 PROCEDURE — 83690 ASSAY OF LIPASE: CPT | Performed by: PHYSICIAN ASSISTANT

## 2025-01-14 PROCEDURE — 99284 EMERGENCY DEPT VISIT MOD MDM: CPT

## 2025-01-14 PROCEDURE — 85025 COMPLETE CBC W/AUTO DIFF WBC: CPT | Performed by: PHYSICIAN ASSISTANT

## 2025-01-14 PROCEDURE — 83735 ASSAY OF MAGNESIUM: CPT | Performed by: PHYSICIAN ASSISTANT

## 2025-01-14 PROCEDURE — 96365 THER/PROPH/DIAG IV INF INIT: CPT

## 2025-01-14 RX ORDER — METOCLOPRAMIDE 5 MG/1
10 TABLET ORAL 3 TIMES DAILY PRN
Qty: 60 TABLET | Refills: 0 | Status: SHIPPED | OUTPATIENT
Start: 2025-01-14 | End: 2025-02-05 | Stop reason: SDUPTHER

## 2025-01-14 RX ORDER — ACETAMINOPHEN 10 MG/ML
1000 INJECTION, SOLUTION INTRAVENOUS ONCE
Status: COMPLETED | OUTPATIENT
Start: 2025-01-14 | End: 2025-01-14

## 2025-01-14 RX ORDER — FAMOTIDINE 10 MG/ML
20 INJECTION, SOLUTION INTRAVENOUS ONCE
Status: COMPLETED | OUTPATIENT
Start: 2025-01-14 | End: 2025-01-14

## 2025-01-14 RX ORDER — DROPERIDOL 2.5 MG/ML
0.62 INJECTION, SOLUTION INTRAMUSCULAR; INTRAVENOUS ONCE
Status: COMPLETED | OUTPATIENT
Start: 2025-01-14 | End: 2025-01-14

## 2025-01-14 RX ADMIN — DROPERIDOL 0.62 MG: 2.5 INJECTION, SOLUTION INTRAMUSCULAR; INTRAVENOUS at 19:13

## 2025-01-14 RX ADMIN — ACETAMINOPHEN 1000 MG: 10 INJECTION, SOLUTION INTRAVENOUS at 20:07

## 2025-01-14 RX ADMIN — IOHEXOL 100 ML: 350 INJECTION, SOLUTION INTRAVENOUS at 20:05

## 2025-01-14 RX ADMIN — FAMOTIDINE 20 MG: 10 INJECTION, SOLUTION INTRAVENOUS at 20:07

## 2025-01-14 RX ADMIN — SODIUM CHLORIDE 1000 ML: 0.9 INJECTION, SOLUTION INTRAVENOUS at 19:16

## 2025-01-15 NOTE — ED CARE HANDOFF
Emergency Department Sign Out Note        Sign out and transfer of care from Kojo Barrios PA-C. See Separate Emergency Department note.     The patient, Christine Fay, was evaluated by the previous provider for chronic nausea.    Workup Completed:  Initial evaluation, labs, symptomatic treatment, CT abdomen/pelvis    ED Course / Workup Pending (followup):                                    ED Course as of 01/14/25 2142 Tue Jan 14, 2025 2013 SO: 33 y/o female. Chronic nausea. 4 recent ER visits. Persistent nausea. Now with generalized abdominal pain. No diarrhea or constipation. CT ordered and pending.   2103 CT abdomen pelvis with contrast  1. Mild wall thickening and pericolonic stranding involving the distal colon which may reflect mild nonspecific colitis. No intestinal obstruction.  2. Additional findings as noted.     Procedures  Medical Decision Making  Amount and/or Complexity of Data Reviewed  Labs: ordered.  Radiology: ordered. Decision-making details documented in ED Course.    Risk  Prescription drug management.            Disposition  Final diagnoses:   Colitis   Chronic nausea     Time reflects when diagnosis was documented in both MDM as applicable and the Disposition within this note       Time User Action Codes Description Comment    1/14/2025  9:35 PM Meng Lane [K52.9] Colitis     1/14/2025  9:35 PM Meng Lane Add [R11.0] Chronic nausea     1/14/2025  9:37 PM Meng Lane Add [R11.0] Nausea           ED Disposition       ED Disposition   Discharge    Condition   Stable    Date/Time   Tue Jan 14, 2025  9:37 PM    Comment   Christine Fay discharge to home/self care.                   Follow-up Information       Follow up With Specialties Details Why Contact Info Additional Information    [facility] Call in 1 week For follow-up [address]  [address]  [facility] [address] [phone] [facility] [place], [address], [address], [place], [place], [address] [phone]    Dinesh Marie MD Family Medicine Call in 1 week  [address] [address]  [phone]             Current Discharge Medication List        CONTINUE these medications which have CHANGED    Details   metoclopramide (REGLAN) 5 mg tablet Take 2 tablets (10 mg total) by mouth 3 (three) times a day as needed (q8 hours as needed)  Qty: 60 tablet, Refills: 0    Associated Diagnoses: Nausea           CONTINUE these medications which have NOT CHANGED    Details   chlorhexidine (PERIDEX) 0.12 % solution Apply 15 mL to the mouth or throat 2 (two) times a day  Qty: 473 mL, Refills: 11    Associated Diagnoses: Chronic dental pain      Diclofenac Sodium (VOLTAREN) 1 % Apply 2 g topically 4 (four) times a day  Qty: 100 g, Refills: 0    Associated Diagnoses: Bilateral temporomandibular joint pain      famotidine (PEPCID) 40 MG tablet TAKE 1 TABLET (40 MG TOTAL) BY MOUTH DAILY BEFORE DINNER  Qty: 90 tablet, Refills: 1    Associated Diagnoses: Gastroesophageal reflux disease without esophagitis      hydrOXYzine pamoate (VISTARIL) 25 mg capsule Take 1 capsule (25 mg total) by mouth 3 (three) times a day as needed for anxiety  Qty: 30 capsule, Refills: 0    Associated Diagnoses: Anxiety and depression      methocarbamol (ROBAXIN) 500 mg tablet TAKE 1 TABLET BY MOUTH 3 TIMES A DAY AS NEEDED FOR MUSCLE SPASMS.  Qty: 45 tablet, Refills: 0    Associated Diagnoses: Bilateral temporomandibular joint pain      omeprazole (PriLOSEC) 40 MG capsule TAKE 1 CAPSULE BY MOUTH EVERY DAY BEFORE BREAKFAST  Qty: 30 capsule, Refills: 5    Associated Diagnoses: Gastroesophageal reflux disease without esophagitis      ondansetron (ZOFRAN) 4 mg tablet Take 1 tablet (4 mg total) by mouth every 6 (six) hours  Qty: 12 tablet, Refills: 0    Associated Diagnoses: Gastroenteritis      QUEtiapine (SEROquel) 50 mg tablet  Take 1 tablet (50 mg total) by mouth daily at bedtime  Qty: 90 tablet, Refills: 1    Associated Diagnoses: Anxiety and depression      rizatriptan (MAXALT-MLT) 5 mg disintegrating tablet TAKE 1TAB BY MOUTH ONCE AS NEEDED FOR MIGRAINE FOR UP TO 1 DOSE MAY REPEAT IN 2 HOURS IF NECESSARY  Qty: 12 tablet, Refills: 5    Associated Diagnoses: NDPH (new daily persistent headache)      sucralfate (CARAFATE) 1 g/10 mL suspension Take 10 mL (1 g total) by mouth 4 (four) times a day for 7 days  Qty: 280 mL, Refills: 0    Associated Diagnoses: Nausea           No discharge procedures on file.       ED Provider  Electronically Signed by     Meng Lane MD  01/14/25 4661

## 2025-01-16 DIAGNOSIS — R11.0 NAUSEA: ICD-10-CM

## 2025-01-17 RX ORDER — METOCLOPRAMIDE 5 MG/1
TABLET ORAL
Qty: 60 TABLET | Refills: 0 | OUTPATIENT
Start: 2025-01-17

## 2025-01-17 NOTE — ED PROVIDER NOTES
Time reflects when diagnosis was documented in both MDM as applicable and the Disposition within this note       Time User Action Codes Description Comment    1/14/2025  9:35 PM Meng Lane Connor [K52.9] Colitis     1/14/2025  9:35 PM Meng Lane Add [R11.0] Chronic nausea     1/14/2025  9:37 PM Meng Lane Add [R11.0] Nausea           ED Disposition       ED Disposition   Discharge    Condition   Stable    Date/Time   Tue Jan 14, 2025  9:37 PM    Comment   Christine Fay discharge to home/self care.                   Assessment & Plan       Medical Decision Making  34-year-old female presenting to the emergency department today for continued nausea.  Now has abdominal pain and diarrhea which is new from last visit to the emergency department.  The patient is tachycardic.  On physical examination, the patient has generalized abdominal tenderness to palpation without areas of focality.  She is nonperitoneal.  Carboxyhemoglobin level is 9.1 but the patient is a chronic everyday smoker.  She tested positive for oxycodone in her urine.  She does not have a urinary tract infection.  No leukocytosis.  Negative procalcitonin.  Negative lipase.  CMP is reassuring.  No lactic acidosis.  CT scan performed as this is the patient's fourth time in the emergency department for similar symptoms.  She was given intravenous fluids in addition to droperidol, Pepcid, and Ofirmev while here in the emergency department.  At this time, the patient was signed out to Dr. Lane pending CT results and patient's reevaluation.  Please see his individual emergency department signoff note for further information about medical decision making, imaging findings, lab findings, and final medical disposition.  Strict return precautions were given.  Recommend PCP follow-up as soon as possible. The patient and/or patient's proxy verify their understanding and agree to the plan at this time.  All questions answered to the patient and/or their  "proxy's satisfaction.  All labs reviewed and utilized in the medical decision making process (if labs were ordered).  Portions of the record may have been created with voice recognition software.  Occasional wrong word or \"sound a like\" substitutions may have occurred due to the inherent limitations of voice recognition software.  Read the chart carefully and recognize, using context, where substitutions have occurred.    I reviewed prior notes.  I reviewed prior labs.    Problems Addressed:  Chronic nausea: chronic illness or injury  Colitis: acute illness or injury    Amount and/or Complexity of Data Reviewed  External Data Reviewed: labs and notes.  Labs: ordered. Decision-making details documented in ED Course.  Radiology: ordered. Decision-making details documented in ED Course.  Discussion of management or test interpretation with external provider(s): Dr. Ariana Beltre  Prescription drug management.        ED Course as of 01/17/25 1829 Tue Jan 14, 2025   1930 Carbon Monoxide, Blood(!): 9.1  The patient notes she is an everyday smoker.   2020 Signed out to Dr. Lane pending CT read and re-evaluation.       Medications   sodium chloride 0.9 % bolus 1,000 mL (0 mL Intravenous Stopped 1/14/25 2131)   droperidol (INAPSINE) injection 0.625 mg (0.625 mg Intravenous Given 1/14/25 1913)   Famotidine (PF) (PEPCID) injection 20 mg (20 mg Intravenous Given 1/14/25 2007)   acetaminophen (Ofirmev) injection 1,000 mg (0 mg Intravenous Stopped 1/14/25 2026)   iohexol (OMNIPAQUE) 350 MG/ML injection (SINGLE-DOSE) 100 mL (100 mL Intravenous Given 1/14/25 2005)       ED Risk Strat Scores                                              History of Present Illness       Chief Complaint   Patient presents with    Nausea     N+V x 2 weeks with 7/10 abd pain       Past Medical History:   Diagnosis Date    Anxiety     Asthma     Depression     GERD (gastroesophageal reflux disease)     Hernia, abdominal     Migraines     Muscle spasm  "    Psychiatric disorder     Anx, Depression    Renal disorder     kidney stones      Past Surgical History:   Procedure Laterality Date    CYSTOSCOPY      removal of kidney stone    FL RETROGRADE PYELOGRAM  8/28/2018    HERNIA REPAIR      AR CYSTO/URETERO W/LITHOTRIPSY &INDWELL STENT INSRT Right 8/28/2018    Procedure: CYSTOSCOPY URETEROSCOPY WITH RETROGRADE PYELOGRAM AND INSERTION STENT URETERAL;  Surgeon: North Arauz MD;  Location: [facility];  Service: Urology    TOOTH EXTRACTION        Family History   Problem Relation Age of Onset    Diabetes Mother     Hyperlipidemia Mother     Stroke Mother     Heart disease Mother     Asthma Mother     Other Mother         Degen. of Intervertabral disc.    Nephrolithiasis Father     Nephrolithiasis Brother       Social History     Tobacco Use    Smoking status: Every Day     Current packs/day: 0.50     Average packs/day: 0.5 packs/day for 13.0 years (6.5 ttl pk-yrs)     Types: Cigarettes    Smokeless tobacco: Never   Vaping Use    Vaping status: Never Used   Substance Use Topics    Alcohol use: Not Currently     Comment: Alcohol intake:   None  - As per [place]     Drug use: No     Comment: Illicit drugs:   none  - As per [place]       E-Cigarette/Vaping    E-Cigarette Use Never User       E-Cigarette/Vaping Substances    Nicotine No     THC No     CBD No     Flavoring No     Other No     Unknown No       I have reviewed and agree with the history as documented.     34-year-old female presenting to the emergency department today for continued nausea.  This is the fourth time the patient has been seen for similar symptoms.  She was seen 4 days ago by me and had an overall negative workup and was feeling better after droperidol therapy.  She notes that symptoms were improving until earlier today when she started experiencing generalized abdominal cramping associated with nonbloody diarrhea.  She continues to have nausea and notes dry heaving without any episodes of  vomiting.  She has no chest pain or shortness of breath.  She has no dysuria, hematuria, or foul-smelling urine.  No fevers or chills.  No upper respiratory symptoms.  No suspicious food intake.  No other complaints at this time.      History provided by:  Patient   used: No    Nausea  The primary symptoms include fatigue, abdominal pain, nausea and diarrhea. Primary symptoms do not include fever, vomiting, melena, dysuria, myalgias, arthralgias or rash. The illness began more than 7 days ago.   The illness does not include chills, anorexia, dysphagia, odynophagia, bloating, constipation, tenesmus, back pain or itching.       Review of Systems   Constitutional:  Positive for appetite change and fatigue. Negative for chills, diaphoresis and fever.   Eyes:  Negative for visual disturbance.   Respiratory:  Negative for cough, chest tightness, shortness of breath and wheezing.    Cardiovascular:  Negative for chest pain, palpitations and leg swelling.   Gastrointestinal:  Positive for abdominal pain, diarrhea and nausea. Negative for anorexia, bloating, constipation, dysphagia, melena and vomiting.   Genitourinary:  Negative for dysuria.   Musculoskeletal:  Negative for arthralgias, back pain, myalgias, neck pain and neck stiffness.   Skin:  Negative for itching, rash and wound.   Neurological:  Negative for dizziness, seizures, syncope, weakness, light-headedness, numbness and headaches.   Psychiatric/Behavioral:  Negative for confusion.    All other systems reviewed and are negative.          Objective       ED Triage Vitals [01/14/25 1838]   Temperature Pulse Blood Pressure Respirations SpO2 Patient Position - Orthostatic VS   98.1 °F (36.7 °C) (!) 115 122/74 20 99 % Sitting      Temp Source Heart Rate Source BP Location FiO2 (%) Pain Score    Oral Monitor Left arm -- 7      Vitals      Date and Time Temp Pulse SpO2 Resp BP Pain Score FACES Pain Rating User   01/14/25 2145 98.2 °F (36.8 °C) 90 97  % 15 93/58 -- -- AF   01/14/25 1838 98.1 °F (36.7 °C) 115 99 % 20 122/74 7 -- RCC            Physical Exam  Vitals and nursing note reviewed.   Constitutional:       General: She is not in acute distress.     Appearance: Normal appearance. She is normal weight. She is not ill-appearing, toxic-appearing or diaphoretic.   HENT:      Head: Normocephalic and atraumatic.      Nose: Nose normal. No congestion or rhinorrhea.      Mouth/Throat:      Mouth: Mucous membranes are moist.      Pharynx: No oropharyngeal exudate or posterior oropharyngeal erythema.   Eyes:      General: No scleral icterus.        Right eye: No discharge.         Left eye: No discharge.      Extraocular Movements: Extraocular movements intact.      Pupils: Pupils are equal, round, and reactive to light.   Cardiovascular:      Rate and Rhythm: Normal rate and regular rhythm.      Pulses: Normal pulses.      Heart sounds: Normal heart sounds. No murmur heard.     No friction rub. No gallop.   Pulmonary:      Effort: Pulmonary effort is normal. No respiratory distress.      Breath sounds: Normal breath sounds. No stridor. No wheezing, rhonchi or rales.   Chest:      Chest wall: No tenderness.   Abdominal:      General: Abdomen is flat. There is no distension.      Palpations: Abdomen is soft.      Tenderness: There is abdominal tenderness. There is no right CVA tenderness, left CVA tenderness, guarding or rebound.      Comments: Generalized abdominal tenderness to palpation without any focality.  Negative Abbott sign.  Nonperitoneal.  No flank pain.   Musculoskeletal:         General: Normal range of motion.      Cervical back: Normal range of motion. No tenderness.      Right lower leg: No edema.      Left lower leg: No edema.   Skin:     General: Skin is warm and dry.      Capillary Refill: Capillary refill takes less than 2 seconds.      Coloration: Skin is not jaundiced or pale.   Neurological:      General: No focal deficit present.      Mental  Status: She is alert and oriented to person, place, and time. Mental status is at baseline.   Psychiatric:         Mood and Affect: Mood normal.         Behavior: Behavior normal.         Results Reviewed       Procedure Component Value Units Date/Time    Rapid drug screen, urine [[id number]]  (Abnormal) Collected: 01/14/25 1935    Lab Status: Final result Specimen: Urine, Clean Catch Updated: 01/14/25 2000     Amph/Meth UR Negative     Barbiturate Ur Negative     Benzodiazepine Urine Negative     Cocaine Urine Negative     Methadone Urine Negative     Opiate Urine Negative     PCP Ur Negative     THC Urine Negative     Oxycodone Urine Positive     Fentanyl Urine Negative     HYDROCODONE URINE Negative    Narrative:      Presumptive report. If requested, specimen will be sent to reference lab for confirmation.  FOR MEDICAL PURPOSES ONLY.   IF CONFIRMATION NEEDED PLEASE CONTACT THE LAB WITHIN 5 DAYS.    Drug Screen Cutoff Levels:  AMPHETAMINE/METHAMPHETAMINES  1000 ng/mL  BARBITURATES     200 ng/mL  BENZODIAZEPINES     200 ng/mL  COCAINE      300 ng/mL  METHADONE      300 ng/mL  OPIATES      300 ng/mL  PHENCYCLIDINE     25 ng/mL  THC       50 ng/mL  OXYCODONE      100 ng/mL  FENTANYL      5 ng/mL  HYDROCODONE     300 ng/mL    UA w Reflex to Microscopic w Reflex to Culture [[id number]]  (Abnormal) Collected: 01/14/25 1935    Lab Status: Final result Specimen: Urine, Clean Catch Updated: 01/14/25 1951     Color, UA Yellow     Clarity, UA Clear     Specific Gravity, UA 1.025     pH, UA 7.0     Leukocytes, UA Negative     Nitrite, UA Negative     Protein, UA Negative mg/dl      Glucose, UA Negative mg/dl      Ketones, UA 80 (3+) mg/dl      Urobilinogen, UA 0.2 E.U./dl      Bilirubin, UA 1+     Occult Blood, UA Negative    hCG, qualitative pregnancy [[id number]]  (Normal) Collected: 01/14/25 1917    Lab Status: Final result Specimen: Blood from Arm, Right Updated: 01/14/25 1950     Preg, Serum Negative    Procalcitonin  [[id number]]  (Normal) Collected: 01/14/25 1917    Lab Status: Final result Specimen: Blood from Arm, Right Updated: 01/14/25 1950     Procalcitonin <0.05 ng/ml     Lactic acid, plasma (w/reflex if result > 2.0) [[id number]]  (Normal) Collected: 01/14/25 1917    Lab Status: Final result Specimen: Blood from Arm, Right Updated: 01/14/25 1940     LACTIC ACID 1.0 mmol/L     Narrative:      Result may be elevated if tourniquet was used during collection.    Comprehensive metabolic panel [[id number]]  (Abnormal) Collected: 01/14/25 1917    Lab Status: Final result Specimen: Blood from Arm, Right Updated: 01/14/25 1940     Sodium 140 mmol/L      Potassium 4.1 mmol/L      Chloride 105 mmol/L      CO2 22 mmol/L      ANION GAP 13 mmol/L      BUN 12 mg/dL      Creatinine 0.68 mg/dL      Glucose 93 mg/dL      Calcium 10.0 mg/dL      AST 10 U/L      ALT 7 U/L      Alkaline Phosphatase 45 U/L      Total Protein 7.3 g/dL      Albumin 4.6 g/dL      Total Bilirubin 0.42 mg/dL      eGFR 114 ml/min/1.73sq m     Narrative:      National Kid[employer] Disease Foundation guidelines for Chronic Kidney Disease (CKD):     Stage 1 with normal or high GFR (GFR > 90 mL/min/1.73 square meters)    Stage 2 Mild CKD (GFR = 60-89 mL/min/1.73 square meters)    Stage 3A Moderate CKD (GFR = 45-59 mL/min/1.73 square meters)    Stage 3B Moderate CKD (GFR = 30-44 mL/min/1.73 square meters)    Stage 4 Severe CKD (GFR = 15-29 mL/min/1.73 square meters)    Stage 5 End Stage CKD (GFR <15 mL/min/1.73 square meters)  Note: GFR calculation is accurate only with a steady state creatinine    Lipase [[id number]]  (Abnormal) Collected: 01/14/25 1917    Lab Status: Final result Specimen: Blood from Arm, Right Updated: 01/14/25 1940     Lipase 8 u/L     Magnesium [[id number]]  (Normal) Collected: 01/14/25 1917    Lab Status: Final result Specimen: Blood from Arm, Right Updated: 01/14/25 1940     Magnesium 1.9 mg/dL     CBC and differential [[id number]]  (Abnormal) Collected:  01/14/25 1917    Lab Status: Final result Specimen: Blood from Arm, Right Updated: 01/14/25 1928     WBC 8.15 Thousand/uL      RBC 4.94 Million/uL      Hemoglobin 14.4 g/dL      Hematocrit 43.0 %      MCV 87 fL      MCH 29.1 pg      MCHC 33.5 g/dL      RDW 13.2 %      MPV 10.2 fL      Platelets 344 Thousands/uL      nRBC 0 /100 WBCs      Segmented % 78 %      Immature Grans % 0 %      Lymphocytes % 16 %      Monocytes % 5 %      Eosinophils Relative 0 %      Basophils Relative 1 %      Absolute Neutrophils 6.29 Thousands/µL      Absolute Immature Grans 0.03 Thousand/uL      Absolute Lymphocytes 1.34 Thousands/µL      Absolute Monocytes 0.43 Thousand/µL      Eosinophils Absolute 0.02 Thousand/µL      Basophils Absolute 0.04 Thousands/µL     Carboxyhemoglobin [[id number]]  (Abnormal) Collected: 01/14/25 1917    Lab Status: Final result Specimen: Blood from Arm, Right Updated: 01/14/25 1924     Carbon Monoxide, Blood 9.1 %     Narrative:      Therapeutic levels (1 mg/mL and 2 mg/mL) of hydroxocobalamin may interfere with the fCOHb and fMetHb where it may cause lower than expected values  Normal Carboxyhemoglobin range for nonsmokers is <1.5%   Normal Carboxyhemoglobin range for smokers is 1.5% to 5.1%             CT abdomen pelvis with contrast   Final Interpretation by Gabe Oakley MD (01/14 2056)         1. Mild wall thickening and pericolonic stranding involving the distal colon which may reflect mild nonspecific colitis. No intestinal obstruction.   2. Additional findings as noted.      The study was marked in EPIC for immediate notification.         Workstation performed: [id number]             Procedures    ED Medication and Procedure Management   Prior to Admission Medications   Prescriptions Last Dose Informant Patient Reported? Taking?   Diclofenac Sodium (VOLTAREN) 1 %   No No   Sig: Apply 2 g topically 4 (four) times a day   QUEtiapine (SEROquel) 50 mg tablet   No No   Sig: Take 1 tablet (50 mg total)  by mouth daily at bedtime   chlorhexidine (PERIDEX) 0.12 % solution   No No   Sig: Apply 15 mL to the mouth or throat 2 (two) times a day   famotidine (PEPCID) 40 MG tablet   No No   Sig: TAKE 1 TABLET (40 MG TOTAL) BY MOUTH DAILY BEFORE DINNER   hydrOXYzine pamoate (VISTARIL) 25 mg capsule   No No   Sig: Take 1 capsule (25 mg total) by mouth 3 (three) times a day as needed for anxiety   methocarbamol (ROBAXIN) 500 mg tablet   No No   Sig: TAKE 1 TABLET BY MOUTH 3 TIMES A DAY AS NEEDED FOR MUSCLE SPASMS.   metoclopramide (REGLAN) 5 mg tablet   No No   Sig: Take 1 tablet (5 mg total) by mouth 3 (three) times a day as needed (q8 hours as needed)   metoclopramide (REGLAN) 5 mg tablet   No Yes   Sig: Take 2 tablets (10 mg total) by mouth 3 (three) times a day as needed (q8 hours as needed)   omeprazole (PriLOSEC) 40 MG capsule   No No   Sig: TAKE 1 CAPSULE BY MOUTH EVERY DAY BEFORE BREAKFAST   ondansetron (ZOFRAN) 4 mg tablet   No No   Sig: Take 1 tablet (4 mg total) by mouth every 6 (six) hours   rizatriptan (MAXALT-MLT) 5 mg disintegrating tablet   No No   Sig: TAKE 1TAB BY MOUTH ONCE AS NEEDED FOR MIGRAINE FOR UP TO 1 DOSE MAY REPEAT IN 2 HOURS IF NECESSARY   sucralfate (CARAFATE) 1 g/10 mL suspension   No No   Sig: Take 10 mL (1 g total) by mouth 4 (four) times a day for 7 days      Facility-Administered Medications: None     Discharge Medication List as of 1/14/2025  9:38 PM        CONTINUE these medications which have CHANGED    Details   metoclopramide (REGLAN) 5 mg tablet Take 2 tablets (10 mg total) by mouth 3 (three) times a day as needed (q8 hours as needed), Starting Tue 1/14/2025, Normal           CONTINUE these medications which have NOT CHANGED    Details   chlorhexidine (PERIDEX) 0.12 % solution Apply 15 mL to the mouth or throat 2 (two) times a day, Starting Tue 4/2/2024, Normal      Diclofenac Sodium (VOLTAREN) 1 % Apply 2 g topically 4 (four) times a day, Starting Tue 4/2/2024, Normal      famotidine  (PEPCID) 40 MG tablet TAKE 1 TABLET (40 MG TOTAL) BY MOUTH DAILY BEFORE DINNER, Starting Fri 11/1/2024, Normal      hydrOXYzine pamoate (VISTARIL) 25 mg capsule Take 1 capsule (25 mg total) by mouth 3 (three) times a day as needed for anxiety, Starting Tue 4/30/2024, Normal      methocarbamol (ROBAXIN) 500 mg tablet TAKE 1 TABLET BY MOUTH 3 TIMES A DAY AS NEEDED FOR MUSCLE SPASMS., Normal      omeprazole (PriLOSEC) 40 MG capsule TAKE 1 CAPSULE BY MOUTH EVERY DAY BEFORE BREAKFAST, Starting Sat 10/12/2024, Normal      ondansetron (ZOFRAN) 4 mg tablet Take 1 tablet (4 mg total) by mouth every 6 (six) hours, Starting Fri 1/10/2025, Normal      QUEtiapine (SEROquel) 50 mg tablet Take 1 tablet (50 mg total) by mouth daily at bedtime, Starting Fri 1/3/2025, Normal      rizatriptan (MAXALT-MLT) 5 mg disintegrating tablet TAKE 1TAB BY MOUTH ONCE AS NEEDED FOR MIGRAINE FOR UP TO 1 DOSE MAY REPEAT IN 2 HOURS IF NECESSARY, Normal      sucralfate (CARAFATE) 1 g/10 mL suspension Take 10 mL (1 g total) by mouth 4 (four) times a day for 7 days, Starting Fri 1/10/2025, Until Fri 1/17/2025, Normal           No discharge procedures on file.  ED SEPSIS DOCUMENTATION   Time reflects when diagnosis was documented in both MDM as applicable and the Disposition within this note       Time User Action Codes Description Comment    1/14/2025  9:35 PM Meng Lane [K52.9] Colitis     1/14/2025  9:35 PM Meng Lane [R11.0] Chronic nausea     1/14/2025  9:37 PM Meng Lane [R11.0] Nausea                  Kojo Barrios PA-C  01/17/25 4044

## 2025-01-29 DIAGNOSIS — F32.A ANXIETY AND DEPRESSION: ICD-10-CM

## 2025-01-29 DIAGNOSIS — M26.623 BILATERAL TEMPOROMANDIBULAR JOINT PAIN: ICD-10-CM

## 2025-01-29 DIAGNOSIS — R11.0 NAUSEA: ICD-10-CM

## 2025-01-29 DIAGNOSIS — F41.9 ANXIETY AND DEPRESSION: ICD-10-CM

## 2025-01-29 DIAGNOSIS — R11.2 NAUSEA AND VOMITING, UNSPECIFIED VOMITING TYPE: Primary | ICD-10-CM

## 2025-01-29 RX ORDER — CLONIDINE HYDROCHLORIDE 0.1 MG/1
TABLET ORAL
Qty: 180 TABLET | Refills: 1 | Status: SHIPPED | OUTPATIENT
Start: 2025-01-29

## 2025-01-29 RX ORDER — METOCLOPRAMIDE 5 MG/1
10 TABLET ORAL 3 TIMES DAILY PRN
Qty: 60 TABLET | Refills: 0 | OUTPATIENT
Start: 2025-01-29

## 2025-01-29 RX ORDER — ONDANSETRON 4 MG/1
4 TABLET, ORALLY DISINTEGRATING ORAL EVERY 6 HOURS PRN
Qty: 90 TABLET | Refills: 0 | Status: SHIPPED | OUTPATIENT
Start: 2025-01-29

## 2025-01-29 NOTE — TELEPHONE ENCOUNTER
Reason for call:   [x] Refill   [] Prior Auth  [] Other:     Office:   [x] PCP/Provider - [facility]/  [] Specialty/Provider -     Medication:   metoclopramide (REGLAN) 5 mg tablet        Dose/Frequency: Sig: Take 2 tablets (10 mg total) by mouth 3 (three) times a day as needed (q8 hours as needed)      Quantity: 60    Pharmacy: [id number] [place] [id number]  Phone: [phone]  Fax: [phone]    Does the patient have enough for 3 days?   [] Yes   [x] No - Send as HP to POD

## 2025-01-30 RX ORDER — METHOCARBAMOL 500 MG/1
TABLET, FILM COATED ORAL
Qty: 90 TABLET | Refills: 0 | Status: SHIPPED | OUTPATIENT
Start: 2025-01-30

## 2025-02-05 DIAGNOSIS — R11.0 NAUSEA: ICD-10-CM

## 2025-02-05 RX ORDER — METOCLOPRAMIDE 5 MG/1
10 TABLET ORAL 3 TIMES DAILY PRN
Qty: 60 TABLET | Refills: 0 | Status: SHIPPED | OUTPATIENT
Start: 2025-02-05

## 2025-02-05 NOTE — TELEPHONE ENCOUNTER
Patient sent a request for this medication on 1/29/25 which was denied on 1/31/25 because Zofran was prescribed the day prior. Patient would like the provider to know the pharmacy is giving her problems with filling Zofran and she cannot afford to pay out of pocket. Please review and advise.     Reason for call:   [x] Refill   [] Prior Auth  [] Other:     Office:   [x] PCP/Provider - Marie  [] Specialty/Provider -     Medication: Metoclopramide 5mg    Dose/Frequency: 2 tab tid    Quantity: 60    Pharmacy: [id number] [place], [place] - [address] [place] [address] [phone]     Does the patient have enough for 3 days?   [] Yes   [x] No - Send as HP to POD

## 2025-02-09 DIAGNOSIS — K21.9 GASTROESOPHAGEAL REFLUX DISEASE WITHOUT ESOPHAGITIS: ICD-10-CM

## 2025-02-10 RX ORDER — OMEPRAZOLE 40 MG/1
40 CAPSULE, DELAYED RELEASE ORAL
Qty: 90 CAPSULE | Refills: 1 | Status: SHIPPED | OUTPATIENT
Start: 2025-02-10

## 2025-02-14 DIAGNOSIS — M26.623 BILATERAL TEMPOROMANDIBULAR JOINT PAIN: ICD-10-CM

## 2025-02-14 RX ORDER — METHOCARBAMOL 500 MG/1
TABLET, FILM COATED ORAL
Qty: 90 TABLET | Refills: 0 | Status: SHIPPED | OUTPATIENT
Start: 2025-02-28

## 2025-03-07 ENCOUNTER — VBI (OUTPATIENT)
Dept: ADMINISTRATIVE | Facility: OTHER | Age: 35
End: 2025-03-07

## 2025-03-07 NOTE — TELEPHONE ENCOUNTER
03/07/25 5:15 PM     Chart reviewed for Pap Smear (HPV) aka Cervical Cancer Screening ; nothing is submitted to the patient's insurance at this time.     Lou Tillman   [facility]

## 2025-03-14 DIAGNOSIS — M26.623 BILATERAL TEMPOROMANDIBULAR JOINT PAIN: ICD-10-CM

## 2025-03-14 DIAGNOSIS — G44.52 NDPH (NEW DAILY PERSISTENT HEADACHE): ICD-10-CM

## 2025-03-14 RX ORDER — RIZATRIPTAN BENZOATE 5 MG/1
TABLET, ORALLY DISINTEGRATING ORAL
Qty: 12 TABLET | Refills: 5 | Status: SHIPPED | OUTPATIENT
Start: 2025-03-14

## 2025-03-17 RX ORDER — METHOCARBAMOL 500 MG/1
TABLET, FILM COATED ORAL
Qty: 90 TABLET | Refills: 0 | Status: SHIPPED | OUTPATIENT
Start: 2025-03-17

## 2025-03-20 DIAGNOSIS — K52.9 GASTROENTERITIS: ICD-10-CM

## 2025-03-20 NOTE — TELEPHONE ENCOUNTER
Reason for call:   [x] Refill   [] Prior Auth  [] Other: pt stated she has some of the disintegrating tablets but they don't really help     Office:   [x] PCP/Provider - Dr Marie  [] Specialty/Provider -     Medication: ondansetron     Dose/Frequency: 4 mg take 1 tablet every 6 hours as needed    Quantity: 12    Pharmacy: [facility]   Does the patient have enough for 3 days?   [] Yes   [x] No - Send as HP to POD    Mail Away Pharmacy   Does the patient have enough for 10 days?   [] Yes   [] No - Send as HP to POD

## 2025-03-21 RX ORDER — ONDANSETRON 4 MG/1
4 TABLET, FILM COATED ORAL EVERY 6 HOURS
Qty: 90 TABLET | Refills: 0 | Status: SHIPPED | OUTPATIENT
Start: 2025-03-21

## 2025-03-22 DIAGNOSIS — R11.2 NAUSEA AND VOMITING, UNSPECIFIED VOMITING TYPE: ICD-10-CM

## 2025-03-22 DIAGNOSIS — R11.0 NAUSEA: ICD-10-CM

## 2025-03-24 RX ORDER — ONDANSETRON 4 MG/1
4 TABLET, ORALLY DISINTEGRATING ORAL EVERY 6 HOURS PRN
Qty: 90 TABLET | Refills: 0 | Status: SHIPPED | OUTPATIENT
Start: 2025-03-24

## 2025-03-24 RX ORDER — METOCLOPRAMIDE 5 MG/1
TABLET ORAL
Qty: 60 TABLET | Refills: 0 | Status: SHIPPED | OUTPATIENT
Start: 2025-03-24

## 2025-03-27 ENCOUNTER — HOSPITAL ENCOUNTER (EMERGENCY)
Facility: HOSPITAL | Age: 35
Discharge: HOME | End: 2025-03-27
Attending: EMERGENCY MEDICINE
Payer: COMMERCIAL

## 2025-03-27 VITALS
TEMPERATURE: 97.7 F | DIASTOLIC BLOOD PRESSURE: 62 MMHG | SYSTOLIC BLOOD PRESSURE: 137 MMHG | HEART RATE: 58 BPM | RESPIRATION RATE: 18 BRPM | OXYGEN SATURATION: 98 %

## 2025-03-27 DIAGNOSIS — K04.7 DENTAL INFECTION: ICD-10-CM

## 2025-03-27 DIAGNOSIS — N39.0 UTI (URINARY TRACT INFECTION): Primary | ICD-10-CM

## 2025-03-27 LAB
BACTERIA UR QL AUTO: ABNORMAL /HPF
BILIRUB UR QL STRIP: NEGATIVE
CLARITY UR: CLEAR
COLOR UR: YELLOW
GLUCOSE UR STRIP-MCNC: NEGATIVE MG/DL
HGB UR QL STRIP.AUTO: NEGATIVE
KETONES UR STRIP-MCNC: NEGATIVE MG/DL
LEUKOCYTE ESTERASE UR QL STRIP: ABNORMAL
NITRITE UR QL STRIP: POSITIVE
NON-SQ EPI CELLS URNS QL MICRO: ABNORMAL /HPF
PH UR STRIP.AUTO: 6.5 [PH]
PROT UR STRIP-MCNC: NEGATIVE MG/DL
RBC #/AREA URNS AUTO: ABNORMAL /HPF
SP GR UR STRIP.AUTO: 1.02 (ref 1–1.03)
UROBILINOGEN UR QL STRIP.AUTO: 0.2 E.U./DL
WBC #/AREA URNS AUTO: ABNORMAL /HPF

## 2025-03-27 PROCEDURE — 99283 EMERGENCY DEPT VISIT LOW MDM: CPT

## 2025-03-27 PROCEDURE — 81001 URINALYSIS AUTO W/SCOPE: CPT | Performed by: EMERGENCY MEDICINE

## 2025-03-27 PROCEDURE — 99284 EMERGENCY DEPT VISIT MOD MDM: CPT | Performed by: EMERGENCY MEDICINE

## 2025-03-27 PROCEDURE — 87077 CULTURE AEROBIC IDENTIFY: CPT | Performed by: EMERGENCY MEDICINE

## 2025-03-27 PROCEDURE — 87086 URINE CULTURE/COLONY COUNT: CPT | Performed by: EMERGENCY MEDICINE

## 2025-03-27 PROCEDURE — 87186 SC STD MICRODIL/AGAR DIL: CPT | Performed by: EMERGENCY MEDICINE

## 2025-03-27 RX ORDER — OXYCODONE HYDROCHLORIDE 5 MG/1
5 TABLET ORAL ONCE
Refills: 0 | Status: COMPLETED | OUTPATIENT
Start: 2025-03-27 | End: 2025-03-27

## 2025-03-27 RX ORDER — DIPHENHYDRAMINE HYDROCHLORIDE AND LIDOCAINE HYDROCHLORIDE AND ALUMINUM HYDROXIDE AND MAGNESIUM HYDRO
10 KIT ONCE
Status: DISCONTINUED | OUTPATIENT
Start: 2025-03-27 | End: 2025-03-27 | Stop reason: HOSPADM

## 2025-03-27 RX ORDER — PHENAZOPYRIDINE HYDROCHLORIDE 100 MG/1
200 TABLET, FILM COATED ORAL ONCE
Status: COMPLETED | OUTPATIENT
Start: 2025-03-27 | End: 2025-03-27

## 2025-03-27 RX ORDER — IBUPROFEN 600 MG/1
600 TABLET, FILM COATED ORAL EVERY 6 HOURS PRN
Qty: 30 TABLET | Refills: 0 | Status: SHIPPED | OUTPATIENT
Start: 2025-03-27

## 2025-03-27 RX ORDER — PHENAZOPYRIDINE HYDROCHLORIDE 200 MG/1
200 TABLET, FILM COATED ORAL 3 TIMES DAILY
Qty: 6 TABLET | Refills: 0 | Status: SHIPPED | OUTPATIENT
Start: 2025-03-27

## 2025-03-27 RX ADMIN — OXYCODONE 5 MG: 5 TABLET ORAL at 20:30

## 2025-03-27 RX ADMIN — PHENAZOPYRIDINE 200 MG: 100 TABLET ORAL at 20:57

## 2025-03-27 RX ADMIN — AMOXICILLIN AND CLAVULANATE POTASSIUM 1 TABLET: 875; 125 TABLET, FILM COATED ORAL at 20:30

## 2025-03-28 ENCOUNTER — RESULTS FOLLOW-UP (OUTPATIENT)
Dept: EMERGENCY DEPT | Facility: HOSPITAL | Age: 35
End: 2025-03-28

## 2025-03-28 NOTE — ED PROVIDER NOTES
Time reflects when diagnosis was documented in both MDM as applicable and the Disposition within this note       Time User Action Codes Description Comment    3/27/2025  8:37 PM Jayna Camacho Add [N39.0] UTI (urinary tract infection)     3/27/2025  8:38 PM Jayna Camacho Add [K04.7] Dental infection           ED Disposition       ED Disposition   Discharge    Condition   Stable    Date/Time   Thu Mar 27, 2025  8:37 PM    Comment   Christine Fay discharge to home/self care.                   Assessment & Plan       Medical Decision Making  Differential diagnosis includes but is not limited to TMJ dysfunction, dental infection, abscess,, UTI, pyelonephritis    Problems Addressed:  Dental infection: acute illness or injury  UTI (urinary tract infection): acute illness or injury    Amount and/or Complexity of Data Reviewed  Labs: ordered. Decision-making details documented in ED Course.    Risk  Prescription drug management.             Medications   oxyCODONE (ROXICODONE) IR tablet 5 mg (5 mg Oral Given 3/27/25 2030)   amoxicillin-clavulanate (AUGMENTIN) 875-125 mg per tablet 1 tablet (1 tablet Oral Given 3/27/25 2030)   phenazopyridine (PYRIDIUM) tablet 200 mg (200 mg Oral Given 3/27/25 2057)       ED Risk Strat Scores                            SBIRT 20yo+      Flowsheet Row Most Recent Value   Initial Alcohol Screen: US AUDIT-C     1. How often do you have a drink containing alcohol? 0 Filed at: 03/27/2025 2019   2. How many drinks containing alcohol do you have on a typical day you are drinking?  0 Filed at: 03/27/2025 2019   3a. Male UNDER 65: How often do you have five or more drinks on one occasion? 0 Filed at: 03/27/2025 2019   3b. FEMALE Any Age, or MALE 65+: How often do you have 4 or more drinks on one occassion? 0 Filed at: 03/27/2025 2019   Audit-C Score 0 Filed at: 03/27/2025 2019   CRISTINO: How many times in the past year have you...    Used an illegal drug or used a prescription medication for  non-medical reasons? Never Filed at: 03/27/2025 2019                            History of Present Illness       Chief Complaint   Patient presents with    Possible UTI     Reports UTI symptoms for about 3days- urinary frequency, burning, pain and itching. Last penicillin 3 days ago. Also reports TMJ pain for about a week. Took ibuprofen and tylenol around 6pm tonight.       Past Medical History:   Diagnosis Date    Anxiety     Asthma     Depression     GERD (gastroesophageal reflux disease)     Hernia, abdominal     Migraines     Muscle spasm     Psychiatric disorder     Anx, Depression    Renal disorder     kidney stones      Past Surgical History:   Procedure Laterality Date    CYSTOSCOPY      removal of kidney stone    [place] RETROGRADE PYELOGRAM  8/28/2018    HERNIA REPAIR      [place] CYSTO/URETERO W/LITHOTRIPSY &INDWELL STENT INSRT Right 8/28/2018    Procedure: CYSTOSCOPY URETEROSCOPY WITH RETROGRADE PYELOGRAM AND INSERTION STENT URETERAL;  Surgeon: North Arauz MD;  Location: [facility];  Service: Urology    TOOTH EXTRACTION        Family History   Problem Relation Age of Onset    Diabetes Mother     Hyperlipidemia Mother     Stroke Mother     Heart disease Mother     Asthma Mother     Other Mother         Degen. of Intervertabral disc.    Nephrolithiasis Father     Nephrolithiasis Brother       Social History     Tobacco Use    Smoking status: Every Day     Current packs/day: 0.50     Average packs/day: 0.5 packs/day for 13.0 years (6.5 ttl pk-yrs)     Types: Cigarettes    Smokeless tobacco: Never   Vaping Use    Vaping status: Never Used   Substance Use Topics    Alcohol use: Not Currently     Comment: Alcohol intake:   None  - As per Afsaneh     Drug use: No     Comment: Illicit drugs:   none  - As per Afsaneh       E-Cigarette/Vaping    E-Cigarette Use Never User       E-Cigarette/Vaping Substances    Nicotine No     THC No     CBD No     Flavoring No     Other No     Unknown No       I have reviewed and  agree with the history as documented.     34-year-old female comes in with multiple complaints.  Patient comes in complaining of mouth and jaw pain that she thinks is her TMJ acting up and also UTI symptoms.      History provided by:  Patient   used: No    Dental Pain  Location:  Upper  Quality:  Aching and constant  Severity:  Severe  Onset quality:  Gradual  Progression:  Worsening  Chronicity:  Recurrent  Context: malocclusion and poor dentition    Previous work-up:  Dental exam  Ineffective treatments:  NSAIDs  Associated symptoms: no congestion, no fever, no headaches, no neck swelling and no oral bleeding    Risk factors: periodontal disease    Risk factors: no alcohol problem and no immunosuppression    Urinary Frequency  Associated symptoms: no abdominal pain, no chest pain, no congestion, no cough, no diarrhea, no ear pain, no fatigue, no fever, no headaches, no rash, no shortness of breath and no wheezing        Review of Systems   Constitutional:  Negative for fatigue and fever.   HENT:  Negative for congestion and ear pain.    Eyes:  Negative for discharge and redness.   Respiratory:  Negative for apnea, cough, shortness of breath and wheezing.    Cardiovascular:  Negative for chest pain.   Gastrointestinal:  Negative for abdominal pain and diarrhea.   Endocrine: Negative for cold intolerance and polydipsia.   Genitourinary:  Positive for frequency. Negative for difficulty urinating and hematuria.   Musculoskeletal:  Negative for arthralgias and back pain.   Skin:  Negative for color change and rash.   Allergic/Immunologic: Negative for environmental allergies and immunocompromised state.   Neurological:  Negative for numbness and headaches.   Hematological:  Negative for adenopathy. Does not bruise/bleed easily.   Psychiatric/Behavioral:  Negative for agitation and behavioral problems.            Objective       ED Triage Vitals   Temperature Pulse Blood Pressure Respirations SpO2  Patient Position - Orthostatic VS   03/27/25 2017 03/27/25 2017 03/27/25 2017 03/27/25 2017 03/27/25 2017 03/27/25 2017   97.7 °F (36.5 °C) 58 137/62 18 98 % Sitting      Temp Source Heart Rate Source BP Location FiO2 (%) Pain Score    03/27/25 2017 03/27/25 2017 03/27/25 2017 -- 03/27/25 2030    Oral Monitor Right arm  8      Vitals      Date and Time Temp Pulse SpO2 Resp BP Pain Score FACES Pain Rating User   03/27/25 2030 -- -- -- -- -- 8 -- KH   03/27/25 2017 97.7 °F (36.5 °C) 58 98 % 18 137/62 -- -- DS            Physical Exam  HENT:      Mouth/Throat:      Comments: Swelling of the upper and lower gums as were as vesicles and the roof of her mouth.        Results Reviewed       Procedure Component Value Units Date/Time    Urine culture [[id number]]  (Abnormal) Collected: 03/27/25 2021    Lab Status: Preliminary result Specimen: Urine, Other Updated: 03/28/25 1643     Urine Culture >100,000 cfu/ml Gram Negative Vickey    Urine Microscopic [[id number]]  (Abnormal) Collected: 03/27/25 2021    Lab Status: Final result Specimen: Urine, Other Updated: 03/27/25 2125     RBC, UA 0-1 /hpf      WBC, UA Innumerable /hpf      Epithelial Cells Innumerable /hpf      Bacteria, UA Innumerable /hpf     UA w Reflex to Microscopic w Reflex to Culture [[id number]]  (Abnormal) Collected: 03/27/25 2021    Lab Status: Final result Specimen: Urine, Other Updated: 03/27/25 2029     Color, UA Yellow     Clarity, UA Clear     Specific Gravity, UA 1.020     pH, UA 6.5     Leukocytes, UA 2+     Nitrite, UA Positive     Protein, UA Negative mg/dl      Glucose, UA Negative mg/dl      Ketones, UA Negative mg/dl      Urobilinogen, UA 0.2 E.U./dl      Bilirubin, UA Negative     Occult Blood, UA Negative            No orders to display       Procedures    ED Medication and Procedure Management   Prior to Admission Medications   Prescriptions Last Dose Informant Patient Reported? Taking?   Diclofenac Sodium (VOLTAREN) 1 %   No No   Sig: Apply 2 g  topically 4 (four) times a day   QUEtiapine (SEROquel) 50 mg tablet   No No   Sig: Take 1 tablet (50 mg total) by mouth daily at bedtime   chlorhexidine (PERIDEX) 0.12 % solution   No No   Sig: Apply 15 mL to the mouth or throat 2 (two) times a day   cloNIDine (CATAPRES) 0.1 mg tablet   No No   Sig: Take 1 tablet upto 2 x a day as needed for panic and anxiety   famotidine (PEPCID) 40 MG tablet   No No   Sig: TAKE 1 TABLET (40 MG TOTAL) BY MOUTH DAILY BEFORE DINNER   hydrOXYzine pamoate (VISTARIL) 25 mg capsule   No No   Sig: Take 1 capsule (25 mg total) by mouth 3 (three) times a day as needed for anxiety   methocarbamol (ROBAXIN) 500 mg tablet   No No   Sig: TAKE 1 TABLET BY MOUTH THREE TIMES A DAY AS NEEDED FOR MUSCLE SPASM   metoclopramide (REGLAN) 5 mg tablet   No No   Sig: TAKE 1 TABLET (5 MG TOTAL) BY MOUTH 3 (THREE) TIMES A DAY AS NEEDED (EVERY 8 HOURS AS NEEDED)   omeprazole (PriLOSEC) 40 MG capsule   No No   Sig: TAKE 1 CAPSULE BY MOUTH EVERY DAY BEFORE BREAKFAST   ondansetron (ZOFRAN) 4 mg tablet   No No   Sig: Take 1 tablet (4 mg total) by mouth every 6 (six) hours   ondansetron (ZOFRAN-ODT) 4 mg disintegrating tablet   No No   Sig: TAKE 1 TABLET (4 MG TOTAL) BY MOUTH EVERY 6 (SIX) HOURS AS NEEDED FOR NAUSEA OR VOMITING   rizatriptan (MAXALT-MLT) 5 mg disintegrating tablet   No No   Sig: TAKE 1TAB BY MOUTH ONCE AS NEEDED FOR MIGRAINE FOR UP TO 1 DOSE MAY REPEAT IN 2 HOURS IF NECESSARY   sucralfate (CARAFATE) 1 g/10 mL suspension   No No   Sig: Take 10 mL (1 g total) by mouth 4 (four) times a day for 7 days      Facility-Administered Medications: None     Discharge Medication List as of 3/27/2025  8:41 PM        START taking these medications    Details   amoxicillin-clavulanate (AUGMENTIN) 875-125 mg per tablet Take 1 tablet by mouth every 12 (twelve) hours for 7 days, Starting Thu 3/27/2025, Until u 4/3/2025, Normal      ibuprofen (MOTRIN) 600 mg tablet Take 1 tablet (600 mg total) by mouth every 6  (six) hours as needed for mild pain or moderate pain, Starting Thu 3/27/2025, Normal      phenazopyridine (PYRIDIUM) 200 mg tablet Take 1 tablet (200 mg total) by mouth 3 (three) times a day, Starting Thu 3/27/2025, Normal           CONTINUE these medications which have NOT CHANGED    Details   chlorhexidine (PERIDEX) 0.12 % solution Apply 15 mL to the mouth or throat 2 (two) times a day, Starting Tue 4/2/2024, Normal      cloNIDine (CATAPRES) 0.1 mg tablet Take 1 tablet upto 2 x a day as needed for panic and anxiety, Normal      Diclofenac Sodium (VOLTAREN) 1 % Apply 2 g topically 4 (four) times a day, Starting Tue 4/2/2024, Normal      famotidine (PEPCID) 40 MG tablet TAKE 1 TABLET (40 MG TOTAL) BY MOUTH DAILY BEFORE DINNER, Starting Fri 11/1/2024, Normal      hydrOXYzine pamoate (VISTARIL) 25 mg capsule Take 1 capsule (25 mg total) by mouth 3 (three) times a day as needed for anxiety, Starting Tue 4/30/2024, Normal      methocarbamol (ROBAXIN) 500 mg tablet TAKE 1 TABLET BY MOUTH THREE TIMES A DAY AS NEEDED FOR MUSCLE SPASM, Normal      metoclopramide (REGLAN) 5 mg tablet TAKE 1 TABLET (5 MG TOTAL) BY MOUTH 3 (THREE) TIMES A DAY AS NEEDED (EVERY 8 HOURS AS NEEDED), Normal      omeprazole (PriLOSEC) 40 MG capsule TAKE 1 CAPSULE BY MOUTH EVERY DAY BEFORE BREAKFAST, Starting Mon 2/10/2025, Normal      ondansetron (ZOFRAN) 4 mg tablet Take 1 tablet (4 mg total) by mouth every 6 (six) hours, Starting Fri 3/21/2025, Normal      ondansetron (ZOFRAN-ODT) 4 mg disintegrating tablet TAKE 1 TABLET (4 MG TOTAL) BY MOUTH EVERY 6 (SIX) HOURS AS NEEDED FOR NAUSEA OR VOMITING, Starting Mon 3/24/2025, Normal      QUEtiapine (SEROquel) 50 mg tablet Take 1 tablet (50 mg total) by mouth daily at bedtime, Starting Fri 1/3/2025, Normal      rizatriptan (MAXALT-MLT) 5 mg disintegrating tablet TAKE 1TAB BY MOUTH ONCE AS NEEDED FOR MIGRAINE FOR UP TO 1 DOSE MAY REPEAT IN 2 HOURS IF NECESSARY, Normal      sucralfate (CARAFATE) 1 g/10 mL  suspension Take 10 mL (1 g total) by mouth 4 (four) times a day for 7 days, Starting Fri 1/10/2025, Until Fri 1/17/2025, Normal           No discharge procedures on file.  ED SEPSIS DOCUMENTATION   Time reflects when diagnosis was documented in both MDM as applicable and the Disposition within this note       Time User Action Codes Description Comment    3/27/2025  8:37 PM Jayna Camacho [N39.0] UTI (urinary tract infection)     3/27/2025  8:38 PM Jayna Camacho Add [K04.7] Dental infection                  Jayna Camacho DO  03/28/25 2013

## 2025-03-29 LAB — BACTERIA UR CULT: ABNORMAL

## 2025-03-30 RX ORDER — NITROFURANTOIN 25; 75 MG/1; MG/1
100 CAPSULE ORAL 2 TIMES DAILY
Qty: 10 CAPSULE | Refills: 0 | Status: SHIPPED | OUTPATIENT
Start: 2025-03-30

## 2025-04-13 DIAGNOSIS — R11.0 NAUSEA: ICD-10-CM

## 2025-04-16 RX ORDER — METOCLOPRAMIDE 5 MG/1
TABLET ORAL
Qty: 60 TABLET | Refills: 0 | Status: SHIPPED | OUTPATIENT
Start: 2025-04-16

## 2025-04-21 ENCOUNTER — HOSPITAL ENCOUNTER (EMERGENCY)
Facility: HOSPITAL | Age: 35
Discharge: HOME | End: 2025-04-21
Attending: EMERGENCY MEDICINE
Payer: COMMERCIAL

## 2025-04-21 VITALS
SYSTOLIC BLOOD PRESSURE: 114 MMHG | OXYGEN SATURATION: 97 % | TEMPERATURE: 98.2 F | RESPIRATION RATE: 18 BRPM | DIASTOLIC BLOOD PRESSURE: 57 MMHG | HEART RATE: 85 BPM

## 2025-04-21 DIAGNOSIS — M26.629 CHRONIC TMJ PAIN: Primary | ICD-10-CM

## 2025-04-21 DIAGNOSIS — G89.29 CHRONIC TMJ PAIN: Primary | ICD-10-CM

## 2025-04-21 PROCEDURE — 99283 EMERGENCY DEPT VISIT LOW MDM: CPT

## 2025-04-21 PROCEDURE — 99284 EMERGENCY DEPT VISIT MOD MDM: CPT | Performed by: PHYSICIAN ASSISTANT

## 2025-04-21 NOTE — ED PROVIDER NOTES
"Time reflects when diagnosis was documented in both MDM as applicable and the Disposition within this note       Time User Action Codes Description Comment    4/21/2025  3:19 PM Oly Amador Add [M26.629,  G89.29] Chronic TMJ pain           ED Disposition       ED Disposition   Discharge    Condition   Stable    Date/Time   Mon Apr 21, 2025  3:18 PM    Comment   Christine Fay discharge to home/self care.                   Assessment & Plan       Medical Decision Making  Differential diagnosis includes but is not limited to TMJ dysfunction, dental infection, abscess, dental caries, drug seeking behavior  Patient with chronic TMJ pain states \"her dentist does not treat this\", however patient given multiple resources in the past, does have multiple dental caries  I offered Tylenol, Motrin, muscle relaxants here in emergency department.  Patient states she has been taking all of these without any improvement.  Patient has allergy to Toradol  Offered ice pack - pt states she has that at home.  Discussed , pt states she's tried and doesn't tolerate that well.  NO indication for antibiotics  PT states for DC, outpt FU.  Multiple resources given to patient including our dental clinic list, [facility]    Amount and/or Complexity of Data Reviewed  External Data Reviewed: notes.    Risk  OTC drugs.             Medications - No data to display    ED Risk Strat Scores                    No data recorded        SBIRT 20yo+      Flowsheet Row Most Recent Value   Initial Alcohol Screen: US AUDIT-C     1. How often do you have a drink containing alcohol? 0 Filed at: 04/21/2025 1443   2. How many drinks containing alcohol do you have on a typical day you are drinking?  0 Filed at: 04/21/2025 1443   3a. Male UNDER 65: How often do you have five or more drinks on one occasion? 0 Filed at: 04/21/2025 1443   3b. FEMALE Any Age, or MALE 65+: How often do you have 4 or more drinks on one occassion? 0 Filed at: 04/21/2025 1443 "   Audit-C Score 0 Filed at: 04/21/2025 1446   CRISTINO: How many times in the past year have you...    Used an illegal drug or used a prescription medication for non-medical reasons? Never Filed at: 04/21/2025 1443                            History of Present Illness       Chief Complaint   Patient presents with    Temporomandibular Joint Pain     Pt presents to the ed with TMJ, reports starting two days ago, last dose of tylenol and motrin at 0530       Past Medical History:   Diagnosis Date    Anxiety     Asthma     Depression     GERD (gastroesophageal reflux disease)     Hernia, abdominal     Migraines     Muscle spasm     Psychiatric disorder     Anx, Depression    Renal disorder     kidney stones      Past Surgical History:   Procedure Laterality Date    CYSTOSCOPY      removal of kidney stone    [place] RETROGRADE PYELOGRAM  8/28/2018    HERNIA REPAIR      [place] CYSTO/URETERO W/LITHOTRIPSY &INDWELL STENT INSRT Right 8/28/2018    Procedure: CYSTOSCOPY URETEROSCOPY WITH RETROGRADE PYELOGRAM AND INSERTION STENT URETERAL;  Surgeon: North Arauz MD;  Location: [facility];  Service: Urology    TOOTH EXTRACTION        Family History   Problem Relation Age of Onset    Diabetes Mother     Hyperlipidemia Mother     Stroke Mother     Heart disease Mother     Asthma Mother     Other Mother         Degen. of Intervertabral disc.    Nephrolithiasis Father     Nephrolithiasis Brother       Social History     Tobacco Use    Smoking status: Every Day     Current packs/day: 0.50     Average packs/day: 0.5 packs/day for 13.0 years (6.5 ttl pk-yrs)     Types: Cigarettes    Smokeless tobacco: Never   Vaping Use    Vaping status: Never Used   Substance Use Topics    Alcohol use: Not Currently     Comment: Alcohol intake:   None  - As per Afsaneh     Drug use: No     Comment: Illicit drugs:   none  - As per Afsaneh       E-Cigarette/Vaping    E-Cigarette Use Never User       E-Cigarette/Vaping Substances    Nicotine No     THC No      CBD No     Flavoring No     Other No     Unknown No       I have reviewed and agree with the history as documented.     PMH: Anxiety/Depression, Asthma, GERD, abdominal Hernia, Migraines, kidney stones, TMJ  Past Surgical History: CYSTOSCOPY-removal of kidney stone, HERNIA REPAIR, TOOTH EXTRACTION       Pt presents to ED c/o 2 day h/o TMJ pain  Pt took Tylenol and motrin at 0530, about 9 hrs ago                     Pt with multiple ED visits for dental complaints, ongoing TMJ issues.  Denies fevers. Denies swallowing issues. No shortness of breath.                  Review of Systems   Constitutional:  Negative for fever.   HENT:  Positive for dental problem.    All other systems reviewed and are negative.          Objective       ED Triage Vitals   Temperature Pulse Blood Pressure Respirations SpO2 Patient Position - Orthostatic VS   04/21/25 1442 04/21/25 1442 04/21/25 1443 04/21/25 1442 04/21/25 1442 04/21/25 1443   98.2 °F (36.8 °C) 85 114/57 18 97 % Sitting      Temp Source Heart Rate Source BP Location FiO2 (%) Pain Score    04/21/25 1442 04/21/25 1442 04/21/25 1443 -- --    Oral Monitor Left arm        Vitals      Date and Time Temp Pulse SpO2 Resp BP Pain Score FACES Pain Rating User   04/21/25 1443 -- -- -- -- 114/57 -- -- SD   04/21/25 1442 98.2 °F (36.8 °C) 85 97 % 18 -- -- -- SD            Physical Exam  Vitals and nursing note reviewed.   Constitutional:       Appearance: She is well-developed.   HENT:      Head: Normocephalic and atraumatic.      Right Ear: External ear normal.      Left Ear: External ear normal.      Nose: Nose normal.      Mouth/Throat:      Mouth: Mucous membranes are moist.      Pharynx: Oropharynx is clear.   Eyes:      Conjunctiva/sclera: Conjunctivae normal.   Cardiovascular:      Rate and Rhythm: Normal rate and regular rhythm.   Pulmonary:      Effort: Pulmonary effort is normal.      Breath sounds: Normal breath sounds.   Abdominal:      General: Bowel sounds are normal.       Palpations: Abdomen is soft.   Musculoskeletal:         General: Normal range of motion.      Cervical back: Normal range of motion.   Skin:     General: Skin is warm and dry.   Neurological:      Mental Status: She is alert and oriented to person, place, and time.   Psychiatric:         Behavior: Behavior normal.         Results Reviewed       None            No orders to display       Procedures    ED Medication and Procedure Management   Prior to Admission Medications   Prescriptions Last Dose Informant Patient Reported? Taking?   Diclofenac Sodium (VOLTAREN) 1 %   No No   Sig: Apply 2 g topically 4 (four) times a day   QUEtiapine (SEROquel) 50 mg tablet   No No   Sig: Take 1 tablet (50 mg total) by mouth daily at bedtime   chlorhexidine (PERIDEX) 0.12 % solution   No No   Sig: Apply 15 mL to the mouth or throat 2 (two) times a day   cloNIDine (CATAPRES) 0.1 mg tablet   No No   Sig: Take 1 tablet upto 2 x a day as needed for panic and anxiety   famotidine (PEPCID) 40 MG tablet   No No   Sig: TAKE 1 TABLET (40 MG TOTAL) BY MOUTH DAILY BEFORE DINNER   hydrOXYzine pamoate (VISTARIL) 25 mg capsule   No No   Sig: Take 1 capsule (25 mg total) by mouth 3 (three) times a day as needed for anxiety   ibuprofen (MOTRIN) 600 mg tablet   No No   Sig: Take 1 tablet (600 mg total) by mouth every 6 (six) hours as needed for mild pain or moderate pain   methocarbamol (ROBAXIN) 500 mg tablet   No No   Sig: TAKE 1 TABLET BY MOUTH THREE TIMES A DAY AS NEEDED FOR MUSCLE SPASM   metoclopramide (REGLAN) 5 mg tablet   No No   Sig: TAKE 1 TABLET (5 MG TOTAL) BY MOUTH 3 (THREE) TIMES A DAY AS NEEDED (EVERY 8 HOURS AS NEEDED)   nitrofurantoin (MACROBID) 100 mg capsule   No No   Sig: Take 1 capsule (100 mg total) by mouth 2 (two) times a day   omeprazole (PriLOSEC) 40 MG capsule   No No   Sig: TAKE 1 CAPSULE BY MOUTH EVERY DAY BEFORE BREAKFAST   ondansetron (ZOFRAN) 4 mg tablet   No No   Sig: Take 1 tablet (4 mg total) by mouth every 6  (six) hours   ondansetron (ZOFRAN-ODT) 4 mg disintegrating tablet   No No   Sig: TAKE 1 TABLET (4 MG TOTAL) BY MOUTH EVERY 6 (SIX) HOURS AS NEEDED FOR NAUSEA OR VOMITING   phenazopyridine (PYRIDIUM) 200 mg tablet   No No   Sig: Take 1 tablet (200 mg total) by mouth 3 (three) times a day   rizatriptan (MAXALT-MLT) 5 mg disintegrating tablet   No No   Sig: TAKE 1TAB BY MOUTH ONCE AS NEEDED FOR MIGRAINE FOR UP TO 1 DOSE MAY REPEAT IN 2 HOURS IF NECESSARY   sucralfate (CARAFATE) 1 g/10 mL suspension   No No   Sig: Take 10 mL (1 g total) by mouth 4 (four) times a day for 7 days      Facility-Administered Medications: None     Discharge Medication List as of 4/21/2025  3:22 PM        CONTINUE these medications which have NOT CHANGED    Details   chlorhexidine (PERIDEX) 0.12 % solution Apply 15 mL to the mouth or throat 2 (two) times a day, Starting Tue 4/2/2024, Normal      cloNIDine (CATAPRES) 0.1 mg tablet Take 1 tablet upto 2 x a day as needed for panic and anxiety, Normal      Diclofenac Sodium (VOLTAREN) 1 % Apply 2 g topically 4 (four) times a day, Starting Tue 4/2/2024, Normal      famotidine (PEPCID) 40 MG tablet TAKE 1 TABLET (40 MG TOTAL) BY MOUTH DAILY BEFORE DINNER, Starting Fri 11/1/2024, Normal      hydrOXYzine pamoate (VISTARIL) 25 mg capsule Take 1 capsule (25 mg total) by mouth 3 (three) times a day as needed for anxiety, Starting Tue 4/30/2024, Normal      ibuprofen (MOTRIN) 600 mg tablet Take 1 tablet (600 mg total) by mouth every 6 (six) hours as needed for mild pain or moderate pain, Starting Thu 3/27/2025, Normal      methocarbamol (ROBAXIN) 500 mg tablet TAKE 1 TABLET BY MOUTH THREE TIMES A DAY AS NEEDED FOR MUSCLE SPASM, Normal      metoclopramide (REGLAN) 5 mg tablet TAKE 1 TABLET (5 MG TOTAL) BY MOUTH 3 (THREE) TIMES A DAY AS NEEDED (EVERY 8 HOURS AS NEEDED), Normal      nitrofurantoin (MACROBID) 100 mg capsule Take 1 capsule (100 mg total) by mouth 2 (two) times a day, Starting Sun 3/30/2025,  Normal      omeprazole (PriLOSEC) 40 MG capsule TAKE 1 CAPSULE BY MOUTH EVERY DAY BEFORE BREAKFAST, Starting Mon 2/10/2025, Normal      ondansetron (ZOFRAN) 4 mg tablet Take 1 tablet (4 mg total) by mouth every 6 (six) hours, Starting Fri 3/21/2025, Normal      ondansetron (ZOFRAN-ODT) 4 mg disintegrating tablet TAKE 1 TABLET (4 MG TOTAL) BY MOUTH EVERY 6 (SIX) HOURS AS NEEDED FOR NAUSEA OR VOMITING, Starting Mon 3/24/2025, Normal      phenazopyridine (PYRIDIUM) 200 mg tablet Take 1 tablet (200 mg total) by mouth 3 (three) times a day, Starting Thu 3/27/2025, Normal      QUEtiapine (SEROquel) 50 mg tablet Take 1 tablet (50 mg total) by mouth daily at bedtime, Starting Fri 1/3/2025, Normal      rizatriptan (MAXALT-MLT) 5 mg disintegrating tablet TAKE 1TAB BY MOUTH ONCE AS NEEDED FOR MIGRAINE FOR UP TO 1 DOSE MAY REPEAT IN 2 HOURS IF NECESSARY, Normal      sucralfate (CARAFATE) 1 g/10 mL suspension Take 10 mL (1 g total) by mouth 4 (four) times a day for 7 days, Starting Fri 1/10/2025, Until Fri 1/17/2025, Normal           No discharge procedures on file.  ED SEPSIS DOCUMENTATION   Time reflects when diagnosis was documented in both MDM as applicable and the Disposition within this note       Time User Action Codes Description Comment    4/21/2025  3:19 PM Oly Amador Add [M26.629,  G89.29] Chronic TMJ pain                  Oly Amador PA-C  04/21/25 [id number]

## 2025-04-21 NOTE — DISCHARGE INSTRUCTIONS
Continue with Tylenol 650mg every 4 hours or Ibuprofen 600mg every 6 hours; you can alternate the 2 medications taking something every 3 hours for pain.  Continue to use your muscle relaxants  Ice to area  Try finding a mouth guard that helps and you can wear.    Follow-up with your dentist or try another one in the list I gave, or oral surgeon.

## 2025-04-23 DIAGNOSIS — K52.9 GASTROENTERITIS: ICD-10-CM

## 2025-04-23 DIAGNOSIS — R11.2 NAUSEA AND VOMITING, UNSPECIFIED VOMITING TYPE: ICD-10-CM

## 2025-04-23 NOTE — TELEPHONE ENCOUNTER
"Pt requesting refill of   ondansetron (ZOFRAN) 4 mg tablet 4 mg, Oral, Every 6 hours   She states the hospital gave her disintegrating ones and they give her bad heartburn, pt would also like to discuss having her seroquel dose increased she is having \"bad episodes\" please call pt to assist  "

## 2025-04-24 RX ORDER — ONDANSETRON 4 MG/1
4 TABLET, FILM COATED ORAL EVERY 6 HOURS
Qty: 90 TABLET | Refills: 0 | Status: SHIPPED | OUTPATIENT
Start: 2025-04-24

## 2025-04-24 NOTE — TELEPHONE ENCOUNTER
Spoke to patient and appointment made. She will call back if she is unable to keep this appointment.

## 2025-04-27 ENCOUNTER — TELEPHONE (OUTPATIENT)
Dept: OTHER | Facility: OTHER | Age: 35
End: 2025-04-27

## 2025-04-28 NOTE — TELEPHONE ENCOUNTER
Patient is calling regarding cancelling an appointment.    Date/Time: 4/28 2 PM    Patient was rescheduled: YES [] NO [x]    Patient requesting call back to reschedule: YES [] NO [x]

## 2025-04-29 DIAGNOSIS — R11.0 NAUSEA: ICD-10-CM

## 2025-04-29 DIAGNOSIS — R11.2 NAUSEA AND VOMITING, UNSPECIFIED VOMITING TYPE: ICD-10-CM

## 2025-04-29 DIAGNOSIS — K21.9 GASTROESOPHAGEAL REFLUX DISEASE WITHOUT ESOPHAGITIS: ICD-10-CM

## 2025-04-29 RX ORDER — ONDANSETRON 4 MG/1
4 TABLET, ORALLY DISINTEGRATING ORAL EVERY 6 HOURS PRN
Qty: 90 TABLET | Refills: 0 | Status: SHIPPED | OUTPATIENT
Start: 2025-04-29

## 2025-04-29 RX ORDER — METOCLOPRAMIDE 5 MG/1
TABLET ORAL
Qty: 60 TABLET | Refills: 0 | Status: SHIPPED | OUTPATIENT
Start: 2025-04-29

## 2025-04-29 RX ORDER — FAMOTIDINE 40 MG/1
40 TABLET, FILM COATED ORAL
Qty: 30 TABLET | Refills: 5 | Status: SHIPPED | OUTPATIENT
Start: 2025-04-29

## 2025-04-29 NOTE — TELEPHONE ENCOUNTER
Patient called to request refill. Advised pharmacy requested this and it is pending. She does not have enough for 3 days

## 2025-04-30 DIAGNOSIS — K21.9 GASTROESOPHAGEAL REFLUX DISEASE WITHOUT ESOPHAGITIS: ICD-10-CM

## 2025-04-30 DIAGNOSIS — M26.623 BILATERAL TEMPOROMANDIBULAR JOINT PAIN: ICD-10-CM

## 2025-04-30 RX ORDER — FAMOTIDINE 40 MG/1
40 TABLET, FILM COATED ORAL
Qty: 30 TABLET | Refills: 11 | OUTPATIENT
Start: 2025-04-30

## 2025-05-01 RX ORDER — METHOCARBAMOL 500 MG/1
TABLET, FILM COATED ORAL
Qty: 90 TABLET | Refills: 0 | Status: SHIPPED | OUTPATIENT
Start: 2025-05-01

## 2025-05-05 DIAGNOSIS — R11.2 NAUSEA AND VOMITING, UNSPECIFIED VOMITING TYPE: ICD-10-CM

## 2025-05-05 DIAGNOSIS — R11.0 NAUSEA: ICD-10-CM

## 2025-05-06 RX ORDER — ONDANSETRON 4 MG/1
4 TABLET, ORALLY DISINTEGRATING ORAL EVERY 6 HOURS PRN
Qty: 90 TABLET | Refills: 0 | OUTPATIENT
Start: 2025-05-06

## 2025-05-06 RX ORDER — METOCLOPRAMIDE 5 MG/1
TABLET ORAL
Qty: 60 TABLET | Refills: 0 | OUTPATIENT
Start: 2025-05-06

## 2025-05-12 ENCOUNTER — HOSPITAL ENCOUNTER (EMERGENCY)
Facility: HOSPITAL | Age: 35
Discharge: HOME | End: 2025-05-13
Attending: EMERGENCY MEDICINE
Payer: COMMERCIAL

## 2025-05-12 VITALS
OXYGEN SATURATION: 98 % | DIASTOLIC BLOOD PRESSURE: 65 MMHG | TEMPERATURE: 97.7 F | RESPIRATION RATE: 16 BRPM | SYSTOLIC BLOOD PRESSURE: 114 MMHG | HEART RATE: 79 BPM

## 2025-05-12 DIAGNOSIS — M26.629 TEMPOROMANDIBULAR JOINT (TMJ) PAIN: Primary | ICD-10-CM

## 2025-05-12 DIAGNOSIS — K08.89 PAIN, DENTAL: ICD-10-CM

## 2025-05-12 PROCEDURE — 99284 EMERGENCY DEPT VISIT MOD MDM: CPT | Performed by: EMERGENCY MEDICINE

## 2025-05-12 PROCEDURE — 99283 EMERGENCY DEPT VISIT LOW MDM: CPT

## 2025-05-12 RX ORDER — CYCLOBENZAPRINE HCL 10 MG
5 TABLET ORAL ONCE
Status: COMPLETED | OUTPATIENT
Start: 2025-05-13 | End: 2025-05-12

## 2025-05-12 RX ORDER — OXYCODONE HYDROCHLORIDE 5 MG/1
5 TABLET ORAL ONCE
Refills: 0 | Status: DISCONTINUED | OUTPATIENT
Start: 2025-05-13 | End: 2025-05-12

## 2025-05-12 RX ADMIN — CYCLOBENZAPRINE 5 MG: 10 TABLET, FILM COATED ORAL at 23:55

## 2025-05-13 NOTE — ED PROVIDER NOTES
Time reflects when diagnosis was documented in both MDM as applicable and the Disposition within this note       Time User Action Codes Description Comment    5/12/2025 11:49 PM Ray Urbina Add [K08.9,  G89.29] Chronic dental pain     5/12/2025 11:49 PM Ray Urbina Remove [K08.9,  G89.29] Chronic dental pain     5/12/2025 11:49 PM Ray Urbina Add [K08.89] Pain, dental     5/12/2025 11:51 PM Ray Urbina Remove [K08.89] Pain, dental     5/12/2025 11:51 PM Ray Urbina Add [M26.629] Temporomandibular joint (TMJ) pain     5/12/2025 11:53 PM Ray Urbina Add [K08.89] Pain, dental           ED Disposition       ED Disposition   Discharge    Condition   Stable    Date/Time   Mon May 12, 2025 11:52 PM    Comment   Christine Fay discharge to home/self care.                   Assessment & Plan       Medical Decision Making  Patient is a 34-year-old female seen in the emergency department with concern for TMJ pain, dental pain.  Patient has known dental cavity, on antibiotics.  There is no evidence of periodontal abscess or trismus on evaluation.  Patient was treated with medication for symptom control.  Plan to have patient follow up with dentistry/oral surgery.  Referrals were sent, and patient was provided outpatient dental list.  Patient stable for discharge home.  Discharge instructions were reviewed with patient.    Problems Addressed:  Pain, dental: acute illness or injury  Temporomandibular joint (TMJ) pain: acute illness or injury    Risk  Prescription drug management.             Medications   cyclobenzaprine (FLEXERIL) tablet 5 mg (5 mg Oral Given 5/12/25 9879)       ED Risk Strat Scores                    No data recorded        SBIRT 22yo+      Flowsheet Row Most Recent Value   Initial Alcohol Screen: US AUDIT-C     1. How often do you have a drink containing alcohol? 0 Filed at: 05/12/2025 7200   2. How many drinks containing alcohol do you have on a typical day you are drinking?  0 Filed at:  05/12/2025 2324   3a. Male UNDER 65: How often do you have five or more drinks on one occasion? 0 Filed at: 05/12/2025 2324   3b. FEMALE Any Age, or MALE 65+: How often do you have 4 or more drinks on one occassion? 0 Filed at: 05/12/2025 2324   Audit-C Score 0 Filed at: 05/12/2025 2324   CRISTINO: How many times in the past year have you...    Used an illegal drug or used a prescription medication for non-medical reasons? Never Filed at: 05/12/2025 2324                            History of Present Illness       Chief Complaint   Patient presents with    Jaw Pain     Reports jaw pain all day. Has a dentist appt on 5/23.        Past Medical History:   Diagnosis Date    Anxiety     Asthma     Depression     GERD (gastroesophageal reflux disease)     Hernia, abdominal     Migraines     Muscle spasm     Psychiatric disorder     Anx, Depression    Renal disorder     kidney stones      Past Surgical History:   Procedure Laterality Date    CYSTOSCOPY      removal of kidney stone    FL RETROGRADE PYELOGRAM  8/28/2018    HERNIA REPAIR      PA CYSTO/URETERO W/LITHOTRIPSY &INDWELL STENT INSRT Right 8/28/2018    Procedure: CYSTOSCOPY URETEROSCOPY WITH RETROGRADE PYELOGRAM AND INSERTION STENT URETERAL;  Surgeon: North Arauz MD;  Location: [facility];  Service: Urology    TOOTH EXTRACTION        Family History   Problem Relation Age of Onset    Diabetes Mother     Hyperlipidemia Mother     Stroke Mother     Heart disease Mother     Asthma Mother     Other Mother         Degen. of Intervertabral disc.    Nephrolithiasis Father     Nephrolithiasis Brother       Social History     Tobacco Use    Smoking status: Every Day     Current packs/day: 0.50     Average packs/day: 0.5 packs/day for 13.0 years (6.5 ttl pk-yrs)     Types: Cigarettes    Smokeless tobacco: Never   Vaping Use    Vaping status: Never Used   Substance Use Topics    Alcohol use: Not Currently     Comment: Alcohol intake:   None  - As per [place]     Drug use: No      Comment: Illicit drugs:   none  - As per [place]       E-Cigarette/Vaping    E-Cigarette Use Never User       E-Cigarette/Vaping Substances    Nicotine No     THC No     CBD No     Flavoring No     Other No     Unknown No       I have reviewed and agree with the history as documented.     Patient is a 34-year-old female seen in the emergency department with concern for bilateral TMJ pain over the past several days.  Patient notes minimal improvement with ibuprofen and acetaminophen at home.  Patient also notes right lower dental pain, and explains that she is taking Augmentin for a dental infection.  Patient states that she is scheduled to follow-up with a dentist.  Patient notes no fever, chest pain, shortness of breath, sore throat, abdominal pain, nausea, vomiting, weakness, numbness, tingling.  Patient notes no other definite clear exacerbating or alleviating factors for her symptoms.        Review of Systems   Constitutional:  Negative for chills and fever.   HENT:  Positive for dental problem. Negative for ear pain and trouble swallowing.         TMJ pain   Eyes:  Negative for pain and visual disturbance.   Respiratory:  Negative for cough and shortness of breath.    Cardiovascular:  Negative for chest pain and palpitations.   Gastrointestinal:  Negative for abdominal pain and vomiting.   Genitourinary:  Negative for decreased urine volume and difficulty urinating.   Musculoskeletal:  Negative for gait problem and neck stiffness.   Skin:  Negative for color change and rash.   Neurological:  Negative for seizures and syncope.   Psychiatric/Behavioral:  Negative for agitation and confusion.    All other systems reviewed and are negative.          Objective       ED Triage Vitals [05/12/25 2322]   Temperature Pulse Blood Pressure Respirations SpO2 Patient Position - Orthostatic VS   97.7 °F (36.5 °C) 79 114/65 16 98 % --      Temp src Heart Rate Source BP Location FiO2 (%) Pain Score    -- -- -- -- --       Vitals      Date and Time Temp Pulse SpO2 Resp BP Pain Score FACES Pain Rating User   05/12/25 2322 97.7 °F (36.5 °C) 79 98 % 16 114/65 -- --             Physical Exam  Vitals and nursing note reviewed.   Constitutional:       General: She is not in acute distress.     Appearance: She is well-developed.   HENT:      Head: Normocephalic and atraumatic.      Jaw: No trismus.      Comments: Tenderness to palpation over bilateral TMJ     Right Ear: External ear normal.      Left Ear: External ear normal.      Nose: Nose normal.      Mouth/Throat:      Pharynx: Oropharynx is clear.     Eyes:      General: No scleral icterus.     Conjunctiva/sclera: Conjunctivae normal.   Cardiovascular:      Rate and Rhythm: Normal rate.      Comments: well-perfused extremities  Pulmonary:      Effort: Pulmonary effort is normal. No respiratory distress.   Abdominal:      General: Abdomen is flat. There is no distension.   Musculoskeletal:         General: No deformity or signs of injury.      Cervical back: Normal range of motion and neck supple.   Skin:     General: Skin is warm and dry.   Neurological:      General: No focal deficit present.      Mental Status: She is alert.      Cranial Nerves: No cranial nerve deficit.      Sensory: No sensory deficit.   Psychiatric:         Mood and Affect: Mood normal.         Thought Content: Thought content normal.         Results Reviewed       None            No orders to display       Procedures    ED Medication and Procedure Management   Prior to Admission Medications   Prescriptions Last Dose Informant Patient Reported? Taking?   CLONAZEPAM PO   Yes No   Sig: Take by mouth   Diclofenac Sodium (VOLTAREN) 1 %   No No   Sig: Apply 2 g topically 4 (four) times a day   Lidocaine Viscous HCl (XYLOCAINE) 2 % mucosal solution   Yes No   Sig: 15 ML BY MUCOUS MEMBRANE ROUTE EVERY 6 (SIX) HOURS AS NEEDED (FOR DENTAL PAIN).   QUEtiapine (SEROquel) 50 mg tablet   No No   Sig: Take 1 tablet (50 mg  total) by mouth daily at bedtime   chlorhexidine (PERIDEX) 0.12 % solution   No No   Sig: Apply 15 mL to the mouth or throat 2 (two) times a day   cloNIDine (CATAPRES) 0.1 mg tablet   No No   Sig: Take 1 tablet upto 2 x a day as needed for panic and anxiety   famotidine (PEPCID) 40 MG tablet   No No   Sig: TAKE 1 TABLET (40 MG TOTAL) BY MOUTH DAILY BEFORE DINNER   hydrOXYzine pamoate (VISTARIL) 25 mg capsule   No No   Sig: Take 1 capsule (25 mg total) by mouth 3 (three) times a day as needed for anxiety   ibuprofen (MOTRIN) 600 mg tablet   No No   Sig: Take 1 tablet (600 mg total) by mouth every 6 (six) hours as needed for mild pain or moderate pain   methocarbamol (ROBAXIN) 500 mg tablet   No No   Sig: TAKE 1 TABLET BY MOUTH THREE TIMES A DAY AS NEEDED FOR MUSCLE SPASM   metoclopramide (REGLAN) 5 mg tablet   No No   Sig: TAKE 1 TABLET (5 MG TOTAL) BY MOUTH 3 (THREE) TIMES A DAY AS NEEDED (EVERY 8 HOURS AS NEEDED)   nitrofurantoin (MACROBID) 100 mg capsule   No No   Sig: Take 1 capsule (100 mg total) by mouth 2 (two) times a day   omeprazole (PriLOSEC) 40 MG capsule   No No   Sig: TAKE 1 CAPSULE BY MOUTH EVERY DAY BEFORE BREAKFAST   ondansetron (ZOFRAN) 4 mg tablet   No No   Sig: Take 1 tablet (4 mg total) by mouth every 6 (six) hours   ondansetron (ZOFRAN-ODT) 4 mg disintegrating tablet   No No   Sig: TAKE 1 TABLET (4 MG TOTAL) BY MOUTH EVERY 6 (SIX) HOURS AS NEEDED FOR NAUSEA OR VOMITING   oxyCODONE (ROXICODONE) 5 immediate release tablet   Yes No   Sig: Take 5 mg by mouth every 8 (eight) hours as needed   penicillin V potassium (VEETID) 500 mg tablet   Yes No   Sig: TAKE 1 TABLET BY MOUTH 4 TIMES A DAY FOR 7 DAYS.   phenazopyridine (PYRIDIUM) 200 mg tablet   No No   Sig: Take 1 tablet (200 mg total) by mouth 3 (three) times a day   rOPINIRole (REQUIP) 0.25 mg tablet   Yes No   Sig: Take 0.25 mg by mouth daily   rizatriptan (MAXALT-MLT) 5 mg disintegrating tablet   No No   Sig: TAKE 1TAB BY MOUTH ONCE AS NEEDED  FOR MIGRAINE FOR UP TO 1 DOSE MAY REPEAT IN 2 HOURS IF NECESSARY   sucralfate (CARAFATE) 1 g/10 mL suspension   No No   Sig: Take 10 mL (1 g total) by mouth 4 (four) times a day for 7 days      Facility-Administered Medications: None     Discharge Medication List as of 5/12/2025 11:57 PM        CONTINUE these medications which have NOT CHANGED    Details   chlorhexidine (PERIDEX) 0.12 % solution Apply 15 mL to the mouth or throat 2 (two) times a day, Starting Tue 4/2/2024, Normal      CLONAZEPAM PO Take by mouth, Historical Med      cloNIDine (CATAPRES) 0.1 mg tablet Take 1 tablet upto 2 x a day as needed for panic and anxiety, Normal      Diclofenac Sodium (VOLTAREN) 1 % Apply 2 g topically 4 (four) times a day, Starting Tue 4/2/2024, Normal      famotidine (PEPCID) 40 MG tablet TAKE 1 TABLET (40 MG TOTAL) BY MOUTH DAILY BEFORE DINNER, Starting Tue 4/29/2025, Normal      hydrOXYzine pamoate (VISTARIL) 25 mg capsule Take 1 capsule (25 mg total) by mouth 3 (three) times a day as needed for anxiety, Starting Tue 4/30/2024, Normal      ibuprofen (MOTRIN) 600 mg tablet Take 1 tablet (600 mg total) by mouth every 6 (six) hours as needed for mild pain or moderate pain, Starting Thu 3/27/2025, Normal      Lidocaine Viscous HCl (XYLOCAINE) 2 % mucosal solution 15 ML BY MUCOUS MEMBRANE ROUTE EVERY 6 (SIX) HOURS AS NEEDED (FOR DENTAL PAIN)., Historical Med      methocarbamol (ROBAXIN) 500 mg tablet TAKE 1 TABLET BY MOUTH THREE TIMES A DAY AS NEEDED FOR MUSCLE SPASM, Normal      metoclopramide (REGLAN) 5 mg tablet TAKE 1 TABLET (5 MG TOTAL) BY MOUTH 3 (THREE) TIMES A DAY AS NEEDED (EVERY 8 HOURS AS NEEDED), Normal      nitrofurantoin (MACROBID) 100 mg capsule Take 1 capsule (100 mg total) by mouth 2 (two) times a day, Starting Sun 3/30/2025, Normal      omeprazole (PriLOSEC) 40 MG capsule TAKE 1 CAPSULE BY MOUTH EVERY DAY BEFORE BREAKFAST, Starting Mon 2/10/2025, Normal      ondansetron (ZOFRAN) 4 mg tablet Take 1 tablet (4 mg  total) by mouth every 6 (six) hours, Starting Thu 4/24/2025, Normal      ondansetron (ZOFRAN-ODT) 4 mg disintegrating tablet TAKE 1 TABLET (4 MG TOTAL) BY MOUTH EVERY 6 (SIX) HOURS AS NEEDED FOR NAUSEA OR VOMITING, Starting Tue 4/29/2025, Normal      oxyCODONE (ROXICODONE) 5 immediate release tablet Take 5 mg by mouth every 8 (eight) hours as needed, Starting Wed 12/18/2024, Historical Med      penicillin V potassium (VEETID) 500 mg tablet TAKE 1 TABLET BY MOUTH 4 TIMES A DAY FOR 7 DAYS., Historical Med      phenazopyridine (PYRIDIUM) 200 mg tablet Take 1 tablet (200 mg total) by mouth 3 (three) times a day, Starting Thu 3/27/2025, Normal      QUEtiapine (SEROquel) 50 mg tablet Take 1 tablet (50 mg total) by mouth daily at bedtime, Starting Fri 1/3/2025, Normal      rizatriptan (MAXALT-MLT) 5 mg disintegrating tablet TAKE 1TAB BY MOUTH ONCE AS NEEDED FOR MIGRAINE FOR UP TO 1 DOSE MAY REPEAT IN 2 HOURS IF NECESSARY, Normal      rOPINIRole (REQUIP) 0.25 mg tablet Take 0.25 mg by mouth daily, Starting Thu 3/13/2025, Historical Med      sucralfate (CARAFATE) 1 g/10 mL suspension Take 10 mL (1 g total) by mouth 4 (four) times a day for 7 days, Starting Fri 1/10/2025, Until Fri 1/17/2025, Normal             ED SEPSIS DOCUMENTATION   Time reflects when diagnosis was documented in both MDM as applicable and the Disposition within this note       Time User Action Codes Description Comment    5/12/2025 11:49 PM Ray Urbina [K08.9,  G89.29] Chronic dental pain     5/12/2025 11:49 PM Ray Urbina [K08.9,  G89.29] Chronic dental pain     5/12/2025 11:49 PM Ray Urbina [K08.89] Pain, dental     5/12/2025 11:51 PM Ray Urbina [K08.89] Pain, dental     5/12/2025 11:51 PM Ray Urbina [M26.629] Temporomandibular joint (TMJ) pain     5/12/2025 11:53 PM Ray Urbina Add [K08.89] Pain, dental                  Ray Urbina MD  05/13/25 0014

## 2025-05-13 NOTE — DISCHARGE INSTRUCTIONS
Follow up with oral surgery/outpatient providers, and return to the emergency department for new or worsening symptoms.

## 2025-05-14 ENCOUNTER — HOSPITAL ENCOUNTER (EMERGENCY)
Facility: HOSPITAL | Age: 35
Discharge: HOME | End: 2025-05-14
Attending: EMERGENCY MEDICINE
Payer: COMMERCIAL

## 2025-05-14 VITALS
DIASTOLIC BLOOD PRESSURE: 69 MMHG | SYSTOLIC BLOOD PRESSURE: 119 MMHG | OXYGEN SATURATION: 97 % | HEART RATE: 111 BPM | TEMPERATURE: 98.6 F | RESPIRATION RATE: 20 BRPM

## 2025-05-14 DIAGNOSIS — G89.29 CHRONIC TMJ PAIN: Primary | ICD-10-CM

## 2025-05-14 DIAGNOSIS — M26.629 CHRONIC TMJ PAIN: Primary | ICD-10-CM

## 2025-05-14 PROCEDURE — 99284 EMERGENCY DEPT VISIT MOD MDM: CPT | Performed by: EMERGENCY MEDICINE

## 2025-05-14 PROCEDURE — 99283 EMERGENCY DEPT VISIT LOW MDM: CPT

## 2025-05-14 RX ORDER — OXYCODONE HYDROCHLORIDE 5 MG/1
5 TABLET ORAL ONCE
Refills: 0 | Status: COMPLETED | OUTPATIENT
Start: 2025-05-14 | End: 2025-05-14

## 2025-05-14 RX ORDER — ACETAMINOPHEN 325 MG/1
975 TABLET ORAL ONCE
Status: COMPLETED | OUTPATIENT
Start: 2025-05-14 | End: 2025-05-14

## 2025-05-14 RX ORDER — IBUPROFEN 600 MG/1
600 TABLET, FILM COATED ORAL ONCE
Status: COMPLETED | OUTPATIENT
Start: 2025-05-14 | End: 2025-05-14

## 2025-05-14 RX ADMIN — OXYCODONE 5 MG: 5 TABLET ORAL at 19:43

## 2025-05-14 RX ADMIN — IBUPROFEN 600 MG: 600 TABLET ORAL at 19:44

## 2025-05-14 RX ADMIN — ACETAMINOPHEN 975 MG: 325 TABLET, FILM COATED ORAL at 19:43

## 2025-05-14 NOTE — ED PROVIDER NOTES
Time reflects when diagnosis was documented in both MDM as applicable and the Disposition within this note       Time User Action Codes Description Comment    5/14/2025  7:36 PM Ray Urbina [M26.629,  G89.29] Chronic TMJ pain           ED Disposition       ED Disposition   Discharge    Condition   Stable    Date/Time   Wed May 14, 2025  7:39 PM    Comment   Christine Fay discharge to home/self care.                   Assessment & Plan       Medical Decision Making  Patient is a 34-year-old female seen in the emergency department with concern for acute exacerbation of chronic TMJ pain.  There is no evidence of periodontal abscess or other deep space infection.  Patient has already been prescribed Augmentin.  Patient was treated with medication for symptom control of apparent TMJ pain.  Patient was instructed to follow-up with hand surgery/outpatient providers.  Patient was instructed to utilize diclofenac gel at home.  Plan to have patient follow up with oral surgery/outpatient providers. Patient stable for discharge home.  Discharge instructions were reviewed with patient.    Risk  OTC drugs.  Prescription drug management.             Medications   ibuprofen (MOTRIN) tablet 600 mg (600 mg Oral Given 5/14/25 1944)   acetaminophen (TYLENOL) tablet 975 mg (975 mg Oral Given 5/14/25 1943)   oxyCODONE (ROXICODONE) IR tablet 5 mg (5 mg Oral Given 5/14/25 1943)       ED Risk Strat Scores                    No data recorded                            History of Present Illness       Chief Complaint   Patient presents with    Temporomandibular Joint Pain     Pt reports TMJ pain, last dose of muscle relaxer around 1330 this afternoon.       Past Medical History:   Diagnosis Date    Anxiety     Asthma     Depression     GERD (gastroesophageal reflux disease)     Hernia, abdominal     Migraines     Muscle spasm     Psychiatric disorder     Anx, Depression    Renal disorder     kidney stones      Past Surgical History:    Procedure Laterality Date    CYSTOSCOPY      removal of kidney stone    [place] RETROGRADE PYELOGRAM  8/28/2018    HERNIA REPAIR      [place] CYSTO/URETERO W/LITHOTRIPSY &INDWELL STENT INSRT Right 8/28/2018    Procedure: CYSTOSCOPY URETEROSCOPY WITH RETROGRADE PYELOGRAM AND INSERTION STENT URETERAL;  Surgeon: North Arauz MD;  Location: [facility];  Service: Urology    TOOTH EXTRACTION        Family History   Problem Relation Age of Onset    Diabetes Mother     Hyperlipidemia Mother     Stroke Mother     Heart disease Mother     Asthma Mother     Other Mother         Degen. of Intervertabral disc.    Nephrolithiasis Father     Nephrolithiasis Brother       Social History[1]   E-Cigarette/Vaping    E-Cigarette Use Never User       E-Cigarette/Vaping Substances    Nicotine No     THC No     CBD No     Flavoring No     Other No     Unknown No       I have reviewed and agree with the history as documented.     Patient is a 34-year-old female seen in the emergency department with concern for bilateral TMJ pain, worsening over approximately the past 3 days review of records shows the patient has had multiple recent emergency department visits with similar complaints.  Patient notes minimal relief with ibuprofen, muscle relaxant, and Norco at home patient states that she is scheduled to follow up with oral surgery as an outpatient.  Review of records shows the patient had recent facial CT, which showed evidence of dental caries, but no periodontal abscess.  Patient notes mild nausea. Patient notes no fever, chest pain, shortness of breath, abdominal pain, weakness, numbness, tingling.  Patient notes no other definite clear exacerbating or alleviating factors for her symptoms.        Review of Systems   Constitutional:  Negative for chills and fever.   HENT:  Positive for ear pain.         Bilateral TMJ pain   Eyes:  Negative for pain and visual disturbance.   Respiratory:  Negative for cough and shortness of breath.     Cardiovascular:  Negative for chest pain and palpitations.   Gastrointestinal:  Positive for nausea. Negative for abdominal pain and vomiting.   Musculoskeletal:  Negative for gait problem and neck stiffness.   Skin:  Negative for color change and rash.   Neurological:  Negative for weakness and numbness.   Psychiatric/Behavioral:  Negative for agitation and confusion.            Objective       ED Triage Vitals   Temperature Pulse Blood Pressure Respirations SpO2 Patient Position - Orthostatic VS   05/14/25 1920 05/14/25 1920 05/14/25 1920 05/14/25 1920 05/14/25 1920 05/14/25 1920   98.6 °F (37 °C) (!) 111 119/69 20 97 % Sitting      Temp Source Heart Rate Source BP Location FiO2 (%) Pain Score    05/14/25 1920 05/14/25 1920 05/14/25 1920 -- 05/14/25 1943    Oral Monitor Right arm  8      Vitals      Date and Time Temp Pulse SpO2 Resp BP Pain Score FACES Pain Rating User   05/14/25 1944 -- -- -- -- -- 8 -- SR   05/14/25 1943 -- -- -- -- -- 8 -- SR   05/14/25 1920 98.6 °F (37 °C) 111 97 % 20 119/69 -- -- BM            Physical Exam  Vitals and nursing note reviewed.   Constitutional:       Appearance: She is well-developed.      Comments: Appears uncomfortable   HENT:      Head: Normocephalic and atraumatic.      Right Ear: Tympanic membrane, ear canal and external ear normal.      Left Ear: Tympanic membrane, ear canal and external ear normal.      Nose: Nose normal.      Mouth/Throat:      Mouth: Mucous membranes are moist.      Comments: Apparent dental caries, no apparent peridental abscess; tenderness to palpation of the bilateral TMJ region    Eyes:      General: No scleral icterus.     Conjunctiva/sclera: Conjunctivae normal.       Cardiovascular:      Rate and Rhythm: Tachycardia present.      Comments: Well-perfused extremities  Pulmonary:      Effort: Pulmonary effort is normal. No respiratory distress.   Abdominal:      General: Abdomen is flat. There is no distension.     Musculoskeletal:          General: No deformity or signs of injury.      Cervical back: Normal range of motion and neck supple.     Skin:     General: Skin is warm and dry.     Neurological:      General: No focal deficit present.      Mental Status: She is alert.      Cranial Nerves: No cranial nerve deficit.      Sensory: No sensory deficit.     Psychiatric:         Mood and Affect: Mood normal.         Thought Content: Thought content normal.         Results Reviewed       None            No orders to display       Procedures    ED Medication and Procedure Management   Prior to Admission Medications   Prescriptions Last Dose Informant Patient Reported? Taking?   CLONAZEPAM PO   Yes No   Sig: Take by mouth   Diclofenac Sodium (VOLTAREN) 1 %   No No   Sig: Apply 2 g topically 4 (four) times a day   Lidocaine Viscous HCl (XYLOCAINE) 2 % mucosal solution   Yes No   Sig: 15 ML BY MUCOUS MEMBRANE ROUTE EVERY 6 (SIX) HOURS AS NEEDED (FOR DENTAL PAIN).   QUEtiapine (SEROquel) 50 mg tablet   No No   Sig: Take 1 tablet (50 mg total) by mouth daily at bedtime   chlorhexidine (PERIDEX) 0.12 % solution   No No   Sig: Apply 15 mL to the mouth or throat 2 (two) times a day   cloNIDine (CATAPRES) 0.1 mg tablet   No No   Sig: Take 1 tablet upto 2 x a day as needed for panic and anxiety   famotidine (PEPCID) 40 MG tablet   No No   Sig: TAKE 1 TABLET (40 MG TOTAL) BY MOUTH DAILY BEFORE DINNER   hydrOXYzine pamoate (VISTARIL) 25 mg capsule   No No   Sig: Take 1 capsule (25 mg total) by mouth 3 (three) times a day as needed for anxiety   ibuprofen (MOTRIN) 600 mg tablet   No No   Sig: Take 1 tablet (600 mg total) by mouth every 6 (six) hours as needed for mild pain or moderate pain   methocarbamol (ROBAXIN) 500 mg tablet   No No   Sig: TAKE 1 TABLET BY MOUTH THREE TIMES A DAY AS NEEDED FOR MUSCLE SPASM   metoclopramide (REGLAN) 5 mg tablet   No No   Sig: TAKE 1 TABLET (5 MG TOTAL) BY MOUTH 3 (THREE) TIMES A DAY AS NEEDED (EVERY 8 HOURS AS NEEDED)    nitrofurantoin (MACROBID) 100 mg capsule   No No   Sig: Take 1 capsule (100 mg total) by mouth 2 (two) times a day   omeprazole (PriLOSEC) 40 MG capsule   No No   Sig: TAKE 1 CAPSULE BY MOUTH EVERY DAY BEFORE BREAKFAST   ondansetron (ZOFRAN) 4 mg tablet   No No   Sig: Take 1 tablet (4 mg total) by mouth every 6 (six) hours   ondansetron (ZOFRAN-ODT) 4 mg disintegrating tablet   No No   Sig: TAKE 1 TABLET (4 MG TOTAL) BY MOUTH EVERY 6 (SIX) HOURS AS NEEDED FOR NAUSEA OR VOMITING   oxyCODONE (ROXICODONE) 5 immediate release tablet   Yes No   Sig: Take 5 mg by mouth every 8 (eight) hours as needed   penicillin V potassium (VEETID) 500 mg tablet   Yes No   Sig: TAKE 1 TABLET BY MOUTH 4 TIMES A DAY FOR 7 DAYS.   phenazopyridine (PYRIDIUM) 200 mg tablet   No No   Sig: Take 1 tablet (200 mg total) by mouth 3 (three) times a day   rOPINIRole (REQUIP) 0.25 mg tablet   Yes No   Sig: Take 0.25 mg by mouth daily   rizatriptan (MAXALT-MLT) 5 mg disintegrating tablet   No No   Sig: TAKE 1TAB BY MOUTH ONCE AS NEEDED FOR MIGRAINE FOR UP TO 1 DOSE MAY REPEAT IN 2 HOURS IF NECESSARY   sucralfate (CARAFATE) 1 g/10 mL suspension   No No   Sig: Take 10 mL (1 g total) by mouth 4 (four) times a day for 7 days      Facility-Administered Medications: None     Discharge Medication List as of 5/14/2025  7:41 PM        CONTINUE these medications which have CHANGED    Details   Diclofenac Sodium (VOLTAREN) 1 % Apply 2 g topically 3 (three) times a day as needed (pain) for up to 7 days, Starting Wed 5/14/2025, Until Wed 5/21/2025 at 2359, Normal           CONTINUE these medications which have NOT CHANGED    Details   chlorhexidine (PERIDEX) 0.12 % solution Apply 15 mL to the mouth or throat 2 (two) times a day, Starting Tue 4/2/2024, Normal      CLONAZEPAM PO Take by mouth, Historical Med      cloNIDine (CATAPRES) 0.1 mg tablet Take 1 tablet upto 2 x a day as needed for panic and anxiety, Normal      famotidine (PEPCID) 40 MG tablet TAKE 1  TABLET (40 MG TOTAL) BY MOUTH DAILY BEFORE DINNER, Starting Tue 4/29/2025, Normal      hydrOXYzine pamoate (VISTARIL) 25 mg capsule Take 1 capsule (25 mg total) by mouth 3 (three) times a day as needed for anxiety, Starting Tue 4/30/2024, Normal      ibuprofen (MOTRIN) 600 mg tablet Take 1 tablet (600 mg total) by mouth every 6 (six) hours as needed for mild pain or moderate pain, Starting Thu 3/27/2025, Normal      Lidocaine Viscous HCl (XYLOCAINE) 2 % mucosal solution 15 ML BY MUCOUS MEMBRANE ROUTE EVERY 6 (SIX) HOURS AS NEEDED (FOR DENTAL PAIN)., Historical Med      methocarbamol (ROBAXIN) 500 mg tablet TAKE 1 TABLET BY MOUTH THREE TIMES A DAY AS NEEDED FOR MUSCLE SPASM, Normal      metoclopramide (REGLAN) 5 mg tablet TAKE 1 TABLET (5 MG TOTAL) BY MOUTH 3 (THREE) TIMES A DAY AS NEEDED (EVERY 8 HOURS AS NEEDED), Normal      nitrofurantoin (MACROBID) 100 mg capsule Take 1 capsule (100 mg total) by mouth 2 (two) times a day, Starting Sun 3/30/2025, Normal      omeprazole (PriLOSEC) 40 MG capsule TAKE 1 CAPSULE BY MOUTH EVERY DAY BEFORE BREAKFAST, Starting Mon 2/10/2025, Normal      ondansetron (ZOFRAN) 4 mg tablet Take 1 tablet (4 mg total) by mouth every 6 (six) hours, Starting Thu 4/24/2025, Normal      ondansetron (ZOFRAN-ODT) 4 mg disintegrating tablet TAKE 1 TABLET (4 MG TOTAL) BY MOUTH EVERY 6 (SIX) HOURS AS NEEDED FOR NAUSEA OR VOMITING, Starting Tue 4/29/2025, Normal      oxyCODONE (ROXICODONE) 5 immediate release tablet Take 5 mg by mouth every 8 (eight) hours as needed, Starting Wed 12/18/2024, Historical Med      penicillin V potassium (VEETID) 500 mg tablet TAKE 1 TABLET BY MOUTH 4 TIMES A DAY FOR 7 DAYS., Historical Med      phenazopyridine (PYRIDIUM) 200 mg tablet Take 1 tablet (200 mg total) by mouth 3 (three) times a day, Starting Thu 3/27/2025, Normal      QUEtiapine (SEROquel) 50 mg tablet Take 1 tablet (50 mg total) by mouth daily at bedtime, Starting Fri 1/3/2025, Normal      rizatriptan  (MAXALT-MLT) 5 mg disintegrating tablet TAKE 1TAB BY MOUTH ONCE AS NEEDED FOR MIGRAINE FOR UP TO 1 DOSE MAY REPEAT IN 2 HOURS IF NECESSARY, Normal      rOPINIRole (REQUIP) 0.25 mg tablet Take 0.25 mg by mouth daily, Starting Thu 3/13/2025, Historical Med      sucralfate (CARAFATE) 1 g/10 mL suspension Take 10 mL (1 g total) by mouth 4 (four) times a day for 7 days, Starting Fri 1/10/2025, Until Fri 1/17/2025, Normal             ED SEPSIS DOCUMENTATION   Time reflects when diagnosis was documented in both MDM as applicable and the Disposition within this note       Time User Action Codes Description Comment    5/14/2025  7:36 PM Ray Urbina Add [M26.629,  G89.29] Chronic TMJ pain                    Ray Urbina MD  05/14/25 1954         [1]   Social History  Tobacco Use    Smoking status: Every Day     Current packs/day: 0.50     Average packs/day: 0.5 packs/day for 13.0 years (6.5 ttl pk-yrs)     Types: Cigarettes    Smokeless tobacco: Never   Vaping Use    Vaping status: Never Used   Substance Use Topics    Alcohol use: Not Currently     Comment: Alcohol intake:   None  - As per Afsaneh     Drug use: No     Comment: Illicit drugs:   none  - As per Afsaneh Urbina MD  05/14/25 1954

## 2025-05-19 ENCOUNTER — HOSPITAL ENCOUNTER (EMERGENCY)
Facility: HOSPITAL | Age: 35
Discharge: HOME | End: 2025-05-19
Attending: EMERGENCY MEDICINE
Payer: COMMERCIAL

## 2025-05-19 VITALS
OXYGEN SATURATION: 100 % | DIASTOLIC BLOOD PRESSURE: 72 MMHG | RESPIRATION RATE: 20 BRPM | TEMPERATURE: 98.4 F | HEART RATE: 98 BPM | SYSTOLIC BLOOD PRESSURE: 122 MMHG

## 2025-05-19 DIAGNOSIS — G89.29 CHRONIC TMJ PAIN: ICD-10-CM

## 2025-05-19 DIAGNOSIS — N94.6 MENSTRUAL CRAMPS: Primary | ICD-10-CM

## 2025-05-19 DIAGNOSIS — M26.629 CHRONIC TMJ PAIN: ICD-10-CM

## 2025-05-19 PROCEDURE — 99284 EMERGENCY DEPT VISIT MOD MDM: CPT | Performed by: EMERGENCY MEDICINE

## 2025-05-19 PROCEDURE — 99283 EMERGENCY DEPT VISIT LOW MDM: CPT

## 2025-05-19 RX ORDER — LIDOCAINE 50 MG/G
1 PATCH TOPICAL ONCE
Status: DISCONTINUED | OUTPATIENT
Start: 2025-05-19 | End: 2025-05-19 | Stop reason: HOSPADM

## 2025-05-19 RX ADMIN — LIDOCAINE 1 PATCH: 700 PATCH TOPICAL at 20:52

## 2025-05-20 NOTE — ED PROVIDER NOTES
Time reflects when diagnosis was documented in both MDM as applicable and the Disposition within this note       Time User Action Codes Description Comment    5/19/2025  8:48 PM Meng Lane Add [N94.6] Menstrual cramps     5/19/2025  8:48 PM Meng Lane Add [M26.629,  G89.29] Chronic TMJ pain           ED Disposition       ED Disposition   Discharge    Condition   Stable    Date/Time   Mon May 19, 2025  8:49 PM    Comment   Christine Fay discharge to home/self care.                   Assessment & Plan       Medical Decision Making  33 y/o female with hx of chronic TMJ pain, anxiety, depression, GERD, and migraines presents to the ED for evaluation of menstrual cramps and TMJ pain.  The patient has been experiencing exacerbation of her chronic TMJ pain over the last week and has had multiple ER visits at this emergency room as well as others locally for this pain.  The patient has been instructed to follow-up with her dentist and notes that she has an upcoming appointment.  She was seen in the ED at outside hospital ([facility] earlier [facility] for her TMJ pain and was evaluated and discharged.  She returns to this ER tonight due to her persistent TMJ pain as well as her lower abdominal cramping/menstrual cramping.  She states that she just started her menses and is having painful menstrual cramps.  She tried using a heating pad at home with no relief.  She is also taken ibuprofen, Tylenol, and Mobic just prior to arrival with no relief.  No other symptoms or complaints.    Vital signs reviewed.  See physical exam documentation for exam findings.  Differential diagnosis includes but is not limited to chronic TMJ pain and/or menstrual cramps.  The patient has already taken over-the-counter analgesic medications.  I offered the patient Lidoderm patch and she is accepting of this treatment option.  No indication for serum labs or imaging at this time. I discussed all findings, treatment, red  flags/return precautions, and outpatient follow-up and the patient/family understand and agree. Stable for discharge.    Risk  Prescription drug management.        ED Course as of 05/19/25 2115   Mon May 19, 2025   2111 Pulse: 98   2111 Blood Pressure: 122/72   2111 Temperature: 98.4 °F (36.9 °C)   2111 Respirations: 20   2111 SpO2: 100 %       Medications   lidocaine (LIDODERM) 5 % patch 1 patch (1 patch Topical Medication Applied 5/19/25 2052)       ED Risk Strat Scores                    No data recorded        SBIRT 20yo+      Flowsheet Row Most Recent Value   Initial Alcohol Screen: US AUDIT-C     1. How often do you have a drink containing alcohol? 0 Filed at: 05/19/2025 2037   2. How many drinks containing alcohol do you have on a typical day you are drinking?  0 Filed at: 05/19/2025 2037   3b. FEMALE Any Age, or MALE 65+: How often do you have 4 or more drinks on one occassion? 0 Filed at: 05/19/2025 2037   Audit-C Score 0 Filed at: 05/19/2025 2037   CRISTINO: How many times in the past year have you...    Used an illegal drug or used a prescription medication for non-medical reasons? Never Filed at: 05/19/2025 2037                            History of Present Illness       Chief Complaint   Patient presents with    Dysmenorrhea     Pt arrived ambulatory with c/o menstrual cramps and pain r/t TMJ. Pt took ibuprofen, tylenol and mobic with little to no relief       Past Medical History:   Diagnosis Date    Anxiety     Asthma     Depression     GERD (gastroesophageal reflux disease)     Hernia, abdominal     Migraines     Muscle spasm     Psychiatric disorder     Anx, Depression    Renal disorder     kidney stones      Past Surgical History:   Procedure Laterality Date    CYSTOSCOPY      removal of kidney stone    FL RETROGRADE PYELOGRAM  8/28/2018    HERNIA REPAIR      IA CYSTO/URETERO W/LITHOTRIPSY &INDWELL STENT INSRT Right 8/28/2018    Procedure: CYSTOSCOPY URETEROSCOPY WITH RETROGRADE PYELOGRAM AND INSERTION  STENT URETERAL;  Surgeon: North Arauz MD;  Location: [facility];  Service: Urology    TOOTH EXTRACTION        Family History   Problem Relation Age of Onset    Diabetes Mother     Hyperlipidemia Mother     Stroke Mother     Heart disease Mother     Asthma Mother     Other Mother         Degen. of Intervertabral disc.    Nephrolithiasis Father     Nephrolithiasis Brother       Social History[1]   E-Cigarette/Vaping    E-Cigarette Use Never User       E-Cigarette/Vaping Substances    Nicotine No     THC No     CBD No     Flavoring No     Other No     Unknown No       I have reviewed and agree with the history as documented.     33 y/o female with hx of chronic TMJ pain, anxiety, depression, GERD, and migraines presents to the ED for evaluation of menstrual cramps and TMJ pain.  The patient has been experiencing exacerbation of her chronic TMJ pain over the last week and has had multiple ER visits at this emergency room as well as others locally for this pain.  The patient has been instructed to follow-up with her dentist and notes that she has an upcoming appointment.  She was seen in the ED at outside hospital ([facility]) earlier tonight for her TMJ pain and was evaluated and discharged.  She returns to this ER tonight due to her persistent TMJ pain as well as her lower abdominal cramping/menstrual cramping.  She states that she just started her menses and is having painful menstrual cramps.  She tried using a heating pad at home with no relief.  She is also taken ibuprofen, Tylenol, and Mobic just prior to arrival with no relief.  No other symptoms or complaints.        Review of Systems   Constitutional:  Negative for chills and fever.   HENT:  Negative for congestion, rhinorrhea and sore throat.         Chronic TMJ pain, see HPI   Respiratory:  Negative for cough and shortness of breath.    Cardiovascular:  Negative for chest pain and palpitations.   Gastrointestinal:  Negative for abdominal  pain, diarrhea, nausea and vomiting.   Genitourinary:  Positive for vaginal bleeding (Menses). Negative for dysuria, hematuria and vaginal discharge.        Menstrual cramping, see HPI   Musculoskeletal:  Negative for back pain and neck pain.   Neurological:  Negative for dizziness, weakness, light-headedness, numbness and headaches.   All other systems reviewed and are negative.          Objective       ED Triage Vitals [05/19/25 2037]   Temperature Pulse Blood Pressure Respirations SpO2 Patient Position - Orthostatic VS   98.4 °F (36.9 °C) (!) 120 122/72 20 100 % Sitting      Temp src Heart Rate Source BP Location FiO2 (%) Pain Score    -- Monitor Right arm -- --      Vitals      Date and Time Temp Pulse SpO2 Resp BP Pain Score FACES Pain Rating User   05/19/25 2104 -- 98 -- -- -- -- -- MO   05/19/25 2037 98.4 °F (36.9 °C) 120 100 % 20 122/72 -- -- MO            Physical Exam  Vitals and nursing note reviewed.   Constitutional:       General: She is not in acute distress.     Appearance: Normal appearance. She is normal weight. She is not ill-appearing.   HENT:      Head: Normocephalic and atraumatic.      Right Ear: External ear normal.      Left Ear: External ear normal.      Nose: Nose normal. No congestion or rhinorrhea.      Mouth/Throat:      Mouth: Mucous membranes are moist.      Pharynx: Oropharynx is clear. No oropharyngeal exudate or posterior oropharyngeal erythema.     Eyes:      Extraocular Movements: Extraocular movements intact.      Conjunctiva/sclera: Conjunctivae normal.      Pupils: Pupils are equal, round, and reactive to light.       Cardiovascular:      Rate and Rhythm: Normal rate and regular rhythm.      Pulses: Normal pulses.      Heart sounds: Normal heart sounds. No murmur heard.  Pulmonary:      Effort: Pulmonary effort is normal. No respiratory distress.      Breath sounds: Normal breath sounds. No wheezing or rales.   Abdominal:      General: Abdomen is flat. Bowel sounds are normal.  There is no distension.      Palpations: Abdomen is soft.      Tenderness: There is no abdominal tenderness. There is no right CVA tenderness, left CVA tenderness or guarding.     Musculoskeletal:         General: No swelling or tenderness. Normal range of motion.      Cervical back: Normal range of motion and neck supple. No tenderness.     Skin:     General: Skin is warm and dry.      Capillary Refill: Capillary refill takes less than 2 seconds.     Neurological:      General: No focal deficit present.      Mental Status: She is alert and oriented to person, place, and time.         Results Reviewed       None            No orders to display       Procedures    ED Medication and Procedure Management   Prior to Admission Medications   Prescriptions Last Dose Informant Patient Reported? Taking?   CLONAZEPAM PO   Yes No   Sig: Take by mouth   Diclofenac Sodium (VOLTAREN) 1 %   No No   Sig: Apply 2 g topically 3 (three) times a day as needed (pain) for up to 7 days   Lidocaine Viscous HCl (XYLOCAINE) 2 % mucosal solution   Yes No   Sig: 15 ML BY MUCOUS MEMBRANE ROUTE EVERY 6 (SIX) HOURS AS NEEDED (FOR DENTAL PAIN).   QUEtiapine (SEROquel) 50 mg tablet   No No   Sig: Take 1 tablet (50 mg total) by mouth daily at bedtime   chlorhexidine (PERIDEX) 0.12 % solution   No No   Sig: Apply 15 mL to the mouth or throat 2 (two) times a day   cloNIDine (CATAPRES) 0.1 mg tablet   No No   Sig: Take 1 tablet upto 2 x a day as needed for panic and anxiety   famotidine (PEPCID) 40 MG tablet   No No   Sig: TAKE 1 TABLET (40 MG TOTAL) BY MOUTH DAILY BEFORE DINNER   hydrOXYzine pamoate (VISTARIL) 25 mg capsule   No No   Sig: Take 1 capsule (25 mg total) by mouth 3 (three) times a day as needed for anxiety   ibuprofen (MOTRIN) 600 mg tablet   No No   Sig: Take 1 tablet (600 mg total) by mouth every 6 (six) hours as needed for mild pain or moderate pain   methocarbamol (ROBAXIN) 500 mg tablet   No No   Sig: TAKE 1 TABLET BY MOUTH THREE  TIMES A DAY AS NEEDED FOR MUSCLE SPASM   metoclopramide (REGLAN) 5 mg tablet   No No   Sig: TAKE 1 TABLET (5 MG TOTAL) BY MOUTH 3 (THREE) TIMES A DAY AS NEEDED (EVERY 8 HOURS AS NEEDED)   nitrofurantoin (MACROBID) 100 mg capsule   No No   Sig: Take 1 capsule (100 mg total) by mouth 2 (two) times a day   omeprazole (PriLOSEC) 40 MG capsule   No No   Sig: TAKE 1 CAPSULE BY MOUTH EVERY DAY BEFORE BREAKFAST   ondansetron (ZOFRAN) 4 mg tablet   No No   Sig: Take 1 tablet (4 mg total) by mouth every 6 (six) hours   ondansetron (ZOFRAN-ODT) 4 mg disintegrating tablet   No No   Sig: TAKE 1 TABLET (4 MG TOTAL) BY MOUTH EVERY 6 (SIX) HOURS AS NEEDED FOR NAUSEA OR VOMITING   oxyCODONE (ROXICODONE) 5 immediate release tablet   Yes No   Sig: Take 5 mg by mouth every 8 (eight) hours as needed   penicillin V potassium (VEETID) 500 mg tablet   Yes No   Sig: TAKE 1 TABLET BY MOUTH 4 TIMES A DAY FOR 7 DAYS.   phenazopyridine (PYRIDIUM) 200 mg tablet   No No   Sig: Take 1 tablet (200 mg total) by mouth 3 (three) times a day   rOPINIRole (REQUIP) 0.25 mg tablet   Yes No   Sig: Take 0.25 mg by mouth daily   rizatriptan (MAXALT-MLT) 5 mg disintegrating tablet   No No   Sig: TAKE 1TAB BY MOUTH ONCE AS NEEDED FOR MIGRAINE FOR UP TO 1 DOSE MAY REPEAT IN 2 HOURS IF NECESSARY   sucralfate (CARAFATE) 1 g/10 mL suspension   No No   Sig: Take 10 mL (1 g total) by mouth 4 (four) times a day for 7 days      Facility-Administered Medications: None     Patient's Medications   Discharge Prescriptions    No medications on file     No discharge procedures on file.  ED SEPSIS DOCUMENTATION   Time reflects when diagnosis was documented in both MDM as applicable and the Disposition within this note       Time User Action Codes Description Comment    5/19/2025  8:48 PM Meng Lane [N94.6] Menstrual cramps     5/19/2025  8:48 PM Meng Lane [M26.629,  G89.29] Chronic TMJ pain                      [1]   Social History  Tobacco Use    Smoking  status: Every Day     Current packs/day: 0.50     Average packs/day: 0.5 packs/day for 13.0 years (6.5 ttl pk-yrs)     Types: Cigarettes    Smokeless tobacco: Never   Vaping Use    Vaping status: Never Used   Substance Use Topics    Alcohol use: Not Currently     Comment: Alcohol intake:   None  - As per Afsaneh     Drug use: No     Comment: Illicit drugs:   none  - As per Afsaneh Lane MD  05/19/25 0622

## 2025-05-23 DIAGNOSIS — F41.9 ANXIETY AND DEPRESSION: ICD-10-CM

## 2025-05-23 DIAGNOSIS — R11.2 NAUSEA AND VOMITING, UNSPECIFIED VOMITING TYPE: ICD-10-CM

## 2025-05-23 DIAGNOSIS — K52.9 GASTROENTERITIS: ICD-10-CM

## 2025-05-23 DIAGNOSIS — F32.A ANXIETY AND DEPRESSION: ICD-10-CM

## 2025-05-23 RX ORDER — QUETIAPINE FUMARATE 50 MG/1
50 TABLET, FILM COATED ORAL
Qty: 7 TABLET | Refills: 0 | Status: SHIPPED | OUTPATIENT
Start: 2025-05-23

## 2025-05-23 RX ORDER — HYDROCODONE BITARTRATE AND ACETAMINOPHEN 5; 325 MG/1; MG/1
1 TABLET ORAL EVERY 6 HOURS PRN
COMMUNITY
Start: 2025-05-13 | End: 2025-05-27

## 2025-05-23 RX ORDER — ONDANSETRON 4 MG/1
4 TABLET, FILM COATED ORAL EVERY 6 HOURS
Qty: 28 TABLET | Refills: 0 | Status: SHIPPED | OUTPATIENT
Start: 2025-05-23 | End: 2025-05-30

## 2025-05-25 DIAGNOSIS — R11.0 NAUSEA: ICD-10-CM

## 2025-05-25 DIAGNOSIS — M26.623 BILATERAL TEMPOROMANDIBULAR JOINT PAIN: ICD-10-CM

## 2025-05-25 DIAGNOSIS — R11.2 NAUSEA AND VOMITING, UNSPECIFIED VOMITING TYPE: ICD-10-CM

## 2025-05-27 ENCOUNTER — TELEMEDICINE (OUTPATIENT)
Dept: FAMILY MEDICINE CLINIC | Facility: CLINIC | Age: 35
End: 2025-05-27
Payer: COMMERCIAL

## 2025-05-27 VITALS — HEIGHT: 60 IN | WEIGHT: 130 LBS | BODY MASS INDEX: 25.52 KG/M2

## 2025-05-27 DIAGNOSIS — M26.623 BILATERAL TEMPOROMANDIBULAR JOINT PAIN: ICD-10-CM

## 2025-05-27 DIAGNOSIS — K21.9 GASTROESOPHAGEAL REFLUX DISEASE WITHOUT ESOPHAGITIS: ICD-10-CM

## 2025-05-27 DIAGNOSIS — G89.29 CHRONIC DENTAL PAIN: ICD-10-CM

## 2025-05-27 DIAGNOSIS — F41.9 ANXIETY: ICD-10-CM

## 2025-05-27 DIAGNOSIS — F32.A ANXIETY AND DEPRESSION: ICD-10-CM

## 2025-05-27 DIAGNOSIS — F41.9 ANXIETY AND DEPRESSION: ICD-10-CM

## 2025-05-27 DIAGNOSIS — R11.0 NAUSEA: Primary | ICD-10-CM

## 2025-05-27 DIAGNOSIS — K08.9 CHRONIC DENTAL PAIN: ICD-10-CM

## 2025-05-27 PROCEDURE — 99214 OFFICE O/P EST MOD 30 MIN: CPT | Performed by: FAMILY MEDICINE

## 2025-05-27 RX ORDER — CLONIDINE HYDROCHLORIDE 0.1 MG/1
TABLET ORAL
Qty: 180 TABLET | Refills: 1 | Status: SHIPPED | OUTPATIENT
Start: 2025-05-27

## 2025-05-27 NOTE — PROGRESS NOTES
Virtual Regular Visit  Name: Christine Fay      : 1990      MRN: [id number]  Encounter Provider: Dinesh Marie MD  Encounter Date: 2025   Encounter department: [facility]  :    Lets see if we can keep her out of the Emergency Department   Motrin as needed   Flexeril as needed   Odt vs tabs for zofran depending on how long she needs it to last     Can cont with maxlat as needed   Assessment & Plan  Nausea    Orders:    Ambulatory Referral to Gastroenterology; Future    Gastroesophageal reflux disease without esophagitis    Orders:    Ambulatory Referral to Gastroenterology; Future    Chronic dental pain    Orders:    Ambulatory Referral to Gastroenterology; Future    Anxiety and depression      Orders:    cloNIDine (CATAPRES) 0.1 mg tablet; Take 1 tablet upto 2 x a day as needed for panic and anxiety    Anxiety  Cont clonidine as needed        Bilateral temporomandibular joint pain  Flexeril 10mg 2 times a day as needed            Assessment & Plan    Lets see if we can keep her out of the Emergency Department   Motrin as needed   Flexeril as needed   Odt vs tabs for zofran depending on how long she needs it to last     Can cont with maxlat as needed       History of Present Illness     History of Present Illness      Here to go over chronic issues and labs / imaging studies if applicable.     + nausea   No vomiting   Odt - works faster but not longer   The tabs work longer     Now on flex 10mg for mid and lower back pain       Migraine taking relpax ( last night)     Clonidine for anxiety     Review of Systems   HENT:  Positive for dental problem.    Neurological:  Positive for headaches.       Objective   Ht 5' (1.524 m)   Wt 59 kg (130 lb)   LMP 2025 (Exact Date)   BMI 25.39 kg/m²     Physical Exam  Vitals reviewed.   Constitutional:       Appearance: She is well-developed.   HENT:      Head: Normocephalic and atraumatic.   Pulmonary:      Effort: Pulmonary effort is  normal.     Skin:     General: Skin is warm and dry.     Neurological:      Mental Status: She is alert and oriented to person, place, and time.     Psychiatric:         Behavior: Behavior normal.         Thought Content: Thought content normal.         Judgment: Judgment normal.         Administrative Statements   Encounter provider Dinesh Marie MD    The Patient is located at Home and in the following state in which I hold an active license [place].    The patient was identified by name and date of birth. Christine MCKAY Chacelokesh was informed that this is a telemedicine visit and that the visit is being conducted through the Epic Embedded platform. She agrees to proceed..  My office door was closed. No one else was in the room.  She acknowledged consent and understanding of privacy and security of the video platform. The patient has agreed to participate and understands they can discontinue the visit at any time.

## 2025-05-28 RX ORDER — METOCLOPRAMIDE 5 MG/1
TABLET ORAL
Qty: 60 TABLET | Refills: 0 | Status: SHIPPED | OUTPATIENT
Start: 2025-05-28

## 2025-05-28 RX ORDER — METHOCARBAMOL 500 MG/1
TABLET, FILM COATED ORAL
Qty: 90 TABLET | Refills: 0 | Status: SHIPPED | OUTPATIENT
Start: 2025-05-28

## 2025-05-28 RX ORDER — ONDANSETRON 4 MG/1
4 TABLET, ORALLY DISINTEGRATING ORAL EVERY 6 HOURS PRN
Qty: 90 TABLET | Refills: 0 | Status: SHIPPED | OUTPATIENT
Start: 2025-05-28

## 2025-06-01 ENCOUNTER — APPOINTMENT (EMERGENCY)
Dept: RADIOLOGY | Facility: HOSPITAL | Age: 35
End: 2025-06-01
Payer: COMMERCIAL

## 2025-06-01 ENCOUNTER — HOSPITAL ENCOUNTER (EMERGENCY)
Facility: HOSPITAL | Age: 35
Discharge: HOME | End: 2025-06-01
Attending: EMERGENCY MEDICINE | Admitting: EMERGENCY MEDICINE
Payer: COMMERCIAL

## 2025-06-01 VITALS
HEART RATE: 92 BPM | RESPIRATION RATE: 18 BRPM | SYSTOLIC BLOOD PRESSURE: 124 MMHG | TEMPERATURE: 98.7 F | OXYGEN SATURATION: 96 % | DIASTOLIC BLOOD PRESSURE: 70 MMHG

## 2025-06-01 DIAGNOSIS — M25.511 ACUTE PAIN OF RIGHT SHOULDER: Primary | ICD-10-CM

## 2025-06-01 PROCEDURE — 73030 X-RAY EXAM OF SHOULDER: CPT

## 2025-06-01 PROCEDURE — 99283 EMERGENCY DEPT VISIT LOW MDM: CPT

## 2025-06-01 PROCEDURE — 99284 EMERGENCY DEPT VISIT MOD MDM: CPT | Performed by: EMERGENCY MEDICINE

## 2025-06-01 NOTE — ED PROVIDER NOTES
Time reflects when diagnosis was documented in both MDM as applicable and the Disposition within this note       Time User Action Codes Description Comment    6/1/2025  6:37 PM Rony Benítez Add [M25.511] Acute pain of right shoulder           ED Disposition       ED Disposition   Discharge    Condition   Stable    Date/Time   Sun Jun 1, 2025  6:37 PM    Comment   Christine Fay discharge to home/self care.                   Assessment & Plan       Medical Decision Making  35-year-old female presented to the emergency department for evaluation of shoulder pain.  Differential includes but not limited to fracture, dislocation, subluxation, strain, sprain.  These and other diagnoses were considered.  Patient offered analgesic medications but she declined.  Imaging of the right shoulder on my independent interpretation with no acute fracture or dislocation.  Patient was provided with a sling for comfort with recommendation to follow-up with her PCP and orthopedic surgery for continued symptoms.  Return precautions were discussed.    The patient (and/or family present) agrees with the plan for discharge and feels comfortable to go home with proper follow-up. Diagnostic tests were reviewed. Diagnosis, care plan and treatment options were discussed. All questions were answered prior to discharge. I considered the patient's other medical conditions as applicable/noted above in my medical decision making. Advised the patient to return for worsening or additional problems. The patient (and/or family present) verbalized understanding of the discharge instructions and warnings that would necessitate return to the Emergency Department.          Amount and/or Complexity of Data Reviewed  Radiology: ordered and independent interpretation performed.             Medications - No data to display    ED Risk Strat Scores                    No data recorded        SBIRT 20yo+      Flowsheet Row Most Recent Value   Initial Alcohol  Screen: US AUDIT-C     1. How often do you have a drink containing alcohol? 0 Filed at: 06/01/2025 1754   2. How many drinks containing alcohol do you have on a typical day you are drinking?  0 Filed at: 06/01/2025 1754   3b. FEMALE Any Age, or MALE 65+: How often do you have 4 or more drinks on one occassion? 0 Filed at: 06/01/2025 1754   Audit-C Score 0 Filed at: 06/01/2025 1754   CRISTINO: How many times in the past year have you...    Used an illegal drug or used a prescription medication for non-medical reasons? Never Filed at: 06/01/2025 1754                            History of Present Illness       Chief Complaint   Patient presents with    Shoulder Pain     Ambulatory patient with right shoulder pain starting appx 5 days ago, reports falling on it 3 days ago but hurt prior to this event. Tylenol and motrin around 1500       Past Medical History[1]   Past Surgical History[2]   Family History[3]   Social History[4]   E-Cigarette/Vaping    E-Cigarette Use Never User       E-Cigarette/Vaping Substances    Nicotine No     THC No     CBD No     Flavoring No     Other No     Unknown No       I have reviewed and agree with the history as documented.     35-year-old female presents to the emergency department for evaluation of shoulder pain.  Patient reports worsening right shoulder pain over the past 5 days.  Patient reports gradual onset pain that she describes as sharp.  Denies any falls or direct trauma to her shoulder prior to the pain starting but states that 3 days ago her right arm gave out when she was getting out of bed and she did fall on her shoulder.  She denies head strike or loss of consciousness.  She has been taking Motrin and Robaxin with some relief.  No headache, neck pain or localized numbness, tingling or weakness.        Review of Systems   Constitutional:  Negative for chills and fever.   HENT:  Negative for ear pain and sore throat.    Eyes:  Negative for pain and visual disturbance.    Respiratory:  Negative for cough and shortness of breath.    Cardiovascular:  Negative for chest pain and palpitations.   Gastrointestinal:  Negative for abdominal pain and vomiting.   Genitourinary:  Negative for dysuria and hematuria.   Musculoskeletal:  Positive for arthralgias. Negative for back pain.   Skin:  Negative for color change and rash.   Neurological:  Negative for seizures and syncope.   All other systems reviewed and are negative.          Objective       ED Triage Vitals [06/01/25 1751]   Temperature Pulse Blood Pressure Respirations SpO2 Patient Position - Orthostatic VS   98.7 °F (37.1 °C) (!) 107 124/70 18 97 % Sitting      Temp Source Heart Rate Source BP Location FiO2 (%) Pain Score    Oral Monitor Left arm -- 8      Vitals      Date and Time Temp Pulse SpO2 Resp BP Pain Score FACES Pain Rating User   06/01/25 1841 -- 92 96 % 18 -- -- -- TRINO   06/01/25 1751 98.7 °F (37.1 °C) 107 97 % 18 124/70 8 -- DU            Physical Exam  Vitals and nursing note reviewed.   Constitutional:       General: She is not in acute distress.     Appearance: She is well-developed.   HENT:      Head: Normocephalic and atraumatic.     Eyes:      Conjunctiva/sclera: Conjunctivae normal.       Cardiovascular:      Rate and Rhythm: Regular rhythm. Tachycardia present.      Pulses:           Radial pulses are 2+ on the right side and 2+ on the left side.      Heart sounds: No murmur heard.  Pulmonary:      Effort: Pulmonary effort is normal. No respiratory distress.      Breath sounds: Normal breath sounds.   Abdominal:      Palpations: Abdomen is soft.      Tenderness: There is no abdominal tenderness.     Musculoskeletal:         General: Tenderness present. No swelling.      Right shoulder: Tenderness present. No swelling or deformity. Decreased range of motion.      Left shoulder: Normal.      Right upper arm: Normal.      Left upper arm: Normal.      Right elbow: Normal.      Left elbow: Normal.      Cervical back:  Neck supple.      Comments: Distal upper extremity pulse, motor and sensation intact and symmetric bilaterally     Skin:     General: Skin is warm and dry.      Capillary Refill: Capillary refill takes less than 2 seconds.     Neurological:      Mental Status: She is alert.      Sensory: Sensation is intact.      Motor: Motor function is intact.     Psychiatric:         Mood and Affect: Mood normal.         Results Reviewed       None            XR shoulder 2+ views RIGHT   ED Interpretation by Rony Benítez MD (06/01 1832)   No acute fracture or dislocation          Procedures    ED Medication and Procedure Management   Prior to Admission Medications   Prescriptions Last Dose Informant Patient Reported? Taking?   Diclofenac Sodium (VOLTAREN) 1 %   No No   Sig: Apply 2 g topically 3 (three) times a day as needed (pain) for up to 7 days   chlorhexidine (PERIDEX) 0.12 % solution   No No   Sig: Apply 15 mL to the mouth or throat 2 (two) times a day   cloNIDine (CATAPRES) 0.1 mg tablet   No No   Sig: Take 1 tablet upto 2 x a day as needed for panic and anxiety   famotidine (PEPCID) 40 MG tablet   No No   Sig: TAKE 1 TABLET (40 MG TOTAL) BY MOUTH DAILY BEFORE DINNER   methocarbamol (ROBAXIN) 500 mg tablet   No No   Sig: TAKE 1 TABLET BY MOUTH THREE TIMES A DAY AS NEEDED FOR MUSCLE SPASM   metoclopramide (REGLAN) 5 mg tablet   No No   Sig: TAKE 1 TABLET (5 MG TOTAL) BY MOUTH 3 (THREE) TIMES A DAY AS NEEDED (EVERY 8 HOURS AS NEEDED)   omeprazole (PriLOSEC) 40 MG capsule   No No   Sig: TAKE 1 CAPSULE BY MOUTH EVERY DAY BEFORE BREAKFAST   ondansetron (ZOFRAN) 4 mg tablet   No No   Sig: Take 1 tablet (4 mg total) by mouth every 6 (six) hours for 7 days   ondansetron (ZOFRAN-ODT) 4 mg disintegrating tablet   No No   Sig: TAKE 1 TABLET (4 MG TOTAL) BY MOUTH EVERY 6 (SIX) HOURS AS NEEDED FOR NAUSEA OR VOMITING   rizatriptan (MAXALT-MLT) 5 mg disintegrating tablet   No No   Sig: TAKE 1TAB BY MOUTH ONCE AS NEEDED FOR  MIGRAINE FOR UP TO 1 DOSE MAY REPEAT IN 2 HOURS IF NECESSARY      Facility-Administered Medications: None     Discharge Medication List as of 6/1/2025  6:37 PM        CONTINUE these medications which have NOT CHANGED    Details   chlorhexidine (PERIDEX) 0.12 % solution Apply 15 mL to the mouth or throat 2 (two) times a day, Starting Tue 4/2/2024, Normal      cloNIDine (CATAPRES) 0.1 mg tablet Take 1 tablet upto 2 x a day as needed for panic and anxiety, Normal      Diclofenac Sodium (VOLTAREN) 1 % Apply 2 g topically 3 (three) times a day as needed (pain) for up to 7 days, Starting Wed 5/14/2025, Until Wed 5/21/2025 at 2359, Normal      famotidine (PEPCID) 40 MG tablet TAKE 1 TABLET (40 MG TOTAL) BY MOUTH DAILY BEFORE DINNER, Starting Tue 4/29/2025, Normal      methocarbamol (ROBAXIN) 500 mg tablet TAKE 1 TABLET BY MOUTH THREE TIMES A DAY AS NEEDED FOR MUSCLE SPASM, Normal      metoclopramide (REGLAN) 5 mg tablet TAKE 1 TABLET (5 MG TOTAL) BY MOUTH 3 (THREE) TIMES A DAY AS NEEDED (EVERY 8 HOURS AS NEEDED), Normal      omeprazole (PriLOSEC) 40 MG capsule TAKE 1 CAPSULE BY MOUTH EVERY DAY BEFORE BREAKFAST, Starting Mon 2/10/2025, Normal      ondansetron (ZOFRAN) 4 mg tablet Take 1 tablet (4 mg total) by mouth every 6 (six) hours for 7 days, Starting Fri 5/23/2025, Until Fri 5/30/2025, Normal      ondansetron (ZOFRAN-ODT) 4 mg disintegrating tablet TAKE 1 TABLET (4 MG TOTAL) BY MOUTH EVERY 6 (SIX) HOURS AS NEEDED FOR NAUSEA OR VOMITING, Starting Wed 5/28/2025, Normal      rizatriptan (MAXALT-MLT) 5 mg disintegrating tablet TAKE 1TAB BY MOUTH ONCE AS NEEDED FOR MIGRAINE FOR UP TO 1 DOSE MAY REPEAT IN 2 HOURS IF NECESSARY, Normal             ED SEPSIS DOCUMENTATION   Time reflects when diagnosis was documented in both MDM as applicable and the Disposition within this note       Time User Action Codes Description Comment    6/1/2025  6:37 PM Rony Benítez Add [M25.511] Acute pain of right shoulder                     [1]   Past Medical History:  Diagnosis Date    Anxiety     Asthma     Depression     GERD (gastroesophageal reflux disease)     Hernia, abdominal     Migraines     Muscle spasm     Psychiatric disorder     Anx, Depression    Renal disorder     kidney stones   [2]   Past Surgical History:  Procedure Laterality Date    CYSTOSCOPY      removal of kidney stone    [place] RETROGRADE PYELOGRAM  8/28/2018    HERNIA REPAIR      [place] CYSTO/URETERO W/LITHOTRIPSY &INDWELL STENT INSRT Right 8/28/2018    Procedure: CYSTOSCOPY URETEROSCOPY WITH RETROGRADE PYELOGRAM AND INSERTION STENT URETERAL;  Surgeon: North Arauz MD;  Location: [facility];  Service: Urology    TOOTH EXTRACTION     [3]   Family History  Problem Relation Name Age of Onset    Diabetes Mother hypothyroid,fast heart     Hyperlipidemia Mother hypothyroid,fast heart     Stroke Mother hypothyroid,fast heart     Heart disease Mother hypothyroid,fast heart     Asthma Mother hypothyroid,fast heart     Other Mother hypothyroid,fast heart         Degen. of Intervertabral disc.    Nephrolithiasis Father      Nephrolithiasis Brother     [4]   Social History  Tobacco Use    Smoking status: Every Day     Current packs/day: 0.50     Average packs/day: 0.5 packs/day for 13.0 years (6.5 ttl pk-yrs)     Types: Cigarettes    Smokeless tobacco: Never   Vaping Use    Vaping status: Never Used   Substance Use Topics    Alcohol use: Not Currently     Comment: Alcohol intake:   None  - As per Afsaneh     Drug use: No     Comment: Illicit drugs:   none  - As per Afsaneh Benítez MD  06/01/25 3434

## 2025-06-04 ENCOUNTER — HOSPITAL ENCOUNTER (EMERGENCY)
Facility: HOSPITAL | Age: 35
Discharge: HOME | End: 2025-06-04
Attending: EMERGENCY MEDICINE
Payer: COMMERCIAL

## 2025-06-04 ENCOUNTER — HOSPITAL ENCOUNTER (EMERGENCY)
Facility: HOSPITAL | Age: 35
Discharge: HOME | End: 2025-06-05
Attending: EMERGENCY MEDICINE
Payer: COMMERCIAL

## 2025-06-04 VITALS
HEART RATE: 96 BPM | TEMPERATURE: 98.6 F | DIASTOLIC BLOOD PRESSURE: 81 MMHG | RESPIRATION RATE: 18 BRPM | SYSTOLIC BLOOD PRESSURE: 134 MMHG | OXYGEN SATURATION: 98 %

## 2025-06-04 DIAGNOSIS — K02.9 PAIN DUE TO DENTAL CARIES: ICD-10-CM

## 2025-06-04 DIAGNOSIS — K08.89 PAIN, DENTAL: Primary | ICD-10-CM

## 2025-06-04 DIAGNOSIS — K02.9 PAIN DUE TO DENTAL CARIES: Primary | ICD-10-CM

## 2025-06-04 PROCEDURE — 99282 EMERGENCY DEPT VISIT SF MDM: CPT

## 2025-06-04 PROCEDURE — 99284 EMERGENCY DEPT VISIT MOD MDM: CPT | Performed by: PHYSICIAN ASSISTANT

## 2025-06-04 RX ORDER — OXYCODONE AND ACETAMINOPHEN 5; 325 MG/1; MG/1
1 TABLET ORAL ONCE
Refills: 0 | Status: COMPLETED | OUTPATIENT
Start: 2025-06-04 | End: 2025-06-04

## 2025-06-04 RX ADMIN — OXYCODONE HYDROCHLORIDE AND ACETAMINOPHEN 1 TABLET: 5; 325 TABLET ORAL at 17:27

## 2025-06-04 NOTE — ED PROVIDER NOTES
Time reflects when diagnosis was documented in both MDM as applicable and the Disposition within this note       Time User Action Codes Description Comment    6/4/2025  5:25 PM Oly Amador Add [K02.9] Pain due to dental caries           ED Disposition       ED Disposition   Discharge    Condition   Stable    Date/Time   Wed Jun 4, 2025  5:25 PM    Comment   Christine Fay discharge to home/self care.                   Assessment & Plan       Medical Decision Making  Patient with dental pain history of caries  Considered dental caries, dental pain, abscess, gingivitis, cellulitis, among others  Patient has antibiotics at home  To continue with pain regimen.  Will add 1 dose of opiate here in emergency department  Stable for discharge outpatient follow-up with dentist    Amount and/or Complexity of Data Reviewed  External Data Reviewed: notes.    Risk  OTC drugs.  Prescription drug management.             Medications   oxyCODONE-acetaminophen (PERCOCET) 5-325 mg per tablet 1 tablet (1 tablet Oral Given 6/4/25 1727)       ED Risk Strat Scores                    No data recorded        SBIRT 20yo+      Flowsheet Row Most Recent Value   Initial Alcohol Screen: US AUDIT-C     1. How often do you have a drink containing alcohol? 0 Filed at: 06/04/2025 1701   Audit-C Score 0 Filed at: 06/04/2025 1701   CRISTINO: How many times in the past year have you...    Used an illegal drug or used a prescription medication for non-medical reasons? Never Filed at: 06/04/2025 1701                            History of Present Illness       Chief Complaint   Patient presents with    Dental Pain     R sided and frontal dental pain for the past 3 days, pt states she has tried numerous OTC and topical remedies w/o relief. Has dental appointment scheduled for 6/16.       Past Medical History[1]   Past Surgical History[2]   Family History[3]   Social History[4]   E-Cigarette/Vaping    E-Cigarette Use Never User       E-Cigarette/Vaping  Substances    Nicotine No     THC No     CBD No     Flavoring No     Other No     Unknown No       I have reviewed and agree with the history as documented.     PMH reviewed  No pertinent PSH  Patient presents to ED c/o 3-day history of pain to right front teeth and right lower back molars that are broken, decaying.  Patient reports multiple doses of Tylenol, ibuprofen, even tried a muscle relaxant, ice to area, salt water rinses without improvement.  Patient's been taking Augmentin that she had leftover from a prior infection at home.  Pt reports dental appt scheduled on the 16th.  Seen at another ED yesterday-and they did not do anything for her.  Patient with allergies to aspirin, ketorolac, tramadol, Naprosyn  No fever, facial swelling, trouble eating or drinking, no respiratory distress        Review of Systems   Constitutional:  Negative for chills and fever.   HENT:  Positive for dental problem. Negative for facial swelling.    Skin:  Negative for rash.   All other systems reviewed and are negative.          Objective       ED Triage Vitals [06/04/25 1659]   Temperature Pulse Blood Pressure Respirations SpO2 Patient Position - Orthostatic VS   98.6 °F (37 °C) 96 134/81 18 98 % Sitting      Temp Source Heart Rate Source BP Location FiO2 (%) Pain Score    Oral Monitor Right arm -- 9      Vitals      Date and Time Temp Pulse SpO2 Resp BP Pain Score FACES Pain Rating User   06/04/25 1727 -- -- -- -- -- 9 -- KLB   06/04/25 1659 98.6 °F (37 °C) 96 98 % 18 134/81 9 -- KLB            Physical Exam  Vitals and nursing note reviewed.   Constitutional:       General: She is in acute distress (mild).      Appearance: She is well-developed.   HENT:      Head: Normocephalic and atraumatic.      Right Ear: External ear normal.      Left Ear: External ear normal.      Nose: Nose normal.      Mouth/Throat:      Lips: No lesions.      Mouth: Mucous membranes are moist. No oral lesions.      Dentition: Abnormal dentition.  Dental tenderness and dental caries present. No gingival swelling, dental abscesses or gum lesions.      Pharynx: Oropharynx is clear. Uvula midline. No pharyngeal swelling, oropharyngeal exudate, posterior oropharyngeal erythema or uvula swelling.      Tonsils: No tonsillar exudate.        Comments: Multiple dental caries, multiple broken, decaying teeth, no facial swelling, no gingival swelling, no obvious abscess    Eyes:      Conjunctiva/sclera: Conjunctivae normal.       Cardiovascular:      Rate and Rhythm: Normal rate.   Pulmonary:      Effort: Pulmonary effort is normal.   Abdominal:      General: Bowel sounds are normal.     Musculoskeletal:         General: Normal range of motion.      Cervical back: Normal range of motion.   Lymphadenopathy:      Cervical: No cervical adenopathy.     Skin:     General: Skin is warm and dry.      Findings: No lesion or rash.     Neurological:      Mental Status: She is alert.     Psychiatric:         Behavior: Behavior normal.      Comments: crying         Results Reviewed       None            No orders to display       Procedures    ED Medication and Procedure Management   Prior to Admission Medications   Prescriptions Last Dose Informant Patient Reported? Taking?   Diclofenac Sodium (VOLTAREN) 1 %   No No   Sig: Apply 2 g topically 3 (three) times a day as needed (pain) for up to 7 days   cloNIDine (CATAPRES) 0.1 mg tablet   No No   Sig: Take 1 tablet upto 2 x a day as needed for panic and anxiety   famotidine (PEPCID) 40 MG tablet   No No   Sig: TAKE 1 TABLET (40 MG TOTAL) BY MOUTH DAILY BEFORE DINNER   methocarbamol (ROBAXIN) 500 mg tablet   No No   Sig: TAKE 1 TABLET BY MOUTH THREE TIMES A DAY AS NEEDED FOR MUSCLE SPASM   metoclopramide (REGLAN) 5 mg tablet   No No   Sig: TAKE 1 TABLET (5 MG TOTAL) BY MOUTH 3 (THREE) TIMES A DAY AS NEEDED (EVERY 8 HOURS AS NEEDED)   omeprazole (PriLOSEC) 40 MG capsule   No No   Sig: TAKE 1 CAPSULE BY MOUTH EVERY DAY BEFORE BREAKFAST    ondansetron (ZOFRAN) 4 mg tablet   No No   Sig: Take 1 tablet (4 mg total) by mouth every 6 (six) hours for 7 days   ondansetron (ZOFRAN-ODT) 4 mg disintegrating tablet   No No   Sig: TAKE 1 TABLET (4 MG TOTAL) BY MOUTH EVERY 6 (SIX) HOURS AS NEEDED FOR NAUSEA OR VOMITING   rizatriptan (MAXALT-MLT) 5 mg disintegrating tablet   No No   Sig: TAKE 1TAB BY MOUTH ONCE AS NEEDED FOR MIGRAINE FOR UP TO 1 DOSE MAY REPEAT IN 2 HOURS IF NECESSARY      Facility-Administered Medications: None     Discharge Medication List as of 6/4/2025  5:26 PM        CONTINUE these medications which have NOT CHANGED    Details   cloNIDine (CATAPRES) 0.1 mg tablet Take 1 tablet upto 2 x a day as needed for panic and anxiety, Normal      Diclofenac Sodium (VOLTAREN) 1 % Apply 2 g topically 3 (three) times a day as needed (pain) for up to 7 days, Starting Wed 5/14/2025, Until Wed 5/21/2025 at 2359, Normal      famotidine (PEPCID) 40 MG tablet TAKE 1 TABLET (40 MG TOTAL) BY MOUTH DAILY BEFORE DINNER, Starting Tue 4/29/2025, Normal      methocarbamol (ROBAXIN) 500 mg tablet TAKE 1 TABLET BY MOUTH THREE TIMES A DAY AS NEEDED FOR MUSCLE SPASM, Normal      metoclopramide (REGLAN) 5 mg tablet TAKE 1 TABLET (5 MG TOTAL) BY MOUTH 3 (THREE) TIMES A DAY AS NEEDED (EVERY 8 HOURS AS NEEDED), Normal      omeprazole (PriLOSEC) 40 MG capsule TAKE 1 CAPSULE BY MOUTH EVERY DAY BEFORE BREAKFAST, Starting Mon 2/10/2025, Normal      ondansetron (ZOFRAN) 4 mg tablet Take 1 tablet (4 mg total) by mouth every 6 (six) hours for 7 days, Starting Fri 5/23/2025, Until Fri 5/30/2025, Normal      ondansetron (ZOFRAN-ODT) 4 mg disintegrating tablet TAKE 1 TABLET (4 MG TOTAL) BY MOUTH EVERY 6 (SIX) HOURS AS NEEDED FOR NAUSEA OR VOMITING, Starting Wed 5/28/2025, Normal      rizatriptan (MAXALT-MLT) 5 mg disintegrating tablet TAKE 1TAB BY MOUTH ONCE AS NEEDED FOR MIGRAINE FOR UP TO 1 DOSE MAY REPEAT IN 2 HOURS IF NECESSARY, Normal           No discharge procedures on  file.  ED SEPSIS DOCUMENTATION   Time reflects when diagnosis was documented in both MDM as applicable and the Disposition within this note       Time User Action Codes Description Comment    6/4/2025  5:25 PM Oly Amador [K02.9] Pain due to dental caries                      [1]   Past Medical History:  Diagnosis Date    Anxiety     Asthma     Depression     GERD (gastroesophageal reflux disease)     Hernia, abdominal     Migraines     Muscle spasm     Psychiatric disorder     Anx, Depression    Renal disorder     kidney stones   [2]   Past Surgical History:  Procedure Laterality Date    CYSTOSCOPY      removal of kidney stone    [place] RETROGRADE PYELOGRAM  8/28/2018    HERNIA REPAIR      [place] CYSTO/URETERO W/LITHOTRIPSY &INDWELL STENT INSRT Right 8/28/2018    Procedure: CYSTOSCOPY URETEROSCOPY WITH RETROGRADE PYELOGRAM AND INSERTION STENT URETERAL;  Surgeon: North Arauz MD;  Location: [facility];  Service: Urology    TOOTH EXTRACTION     [3]   Family History  Problem Relation Name Age of Onset    Diabetes Mother hypothyroid,fast heart     Hyperlipidemia Mother hypothyroid,fast heart     Stroke Mother hypothyroid,fast heart     Heart disease Mother hypothyroid,fast heart     Asthma Mother hypothyroid,fast heart     Other Mother hypothyroid,fast heart         Degen. of Intervertabral disc.    Nephrolithiasis Father      Nephrolithiasis Brother     [4]   Social History  Tobacco Use    Smoking status: Every Day     Current packs/day: 0.50     Average packs/day: 0.5 packs/day for 13.0 years (6.5 ttl pk-yrs)     Types: Cigarettes    Smokeless tobacco: Never   Vaping Use    Vaping status: Never Used   Substance Use Topics    Alcohol use: Not Currently     Comment: Alcohol intake:   None  - As per Afsaneh     Drug use: No     Comment: Illicit drugs:   none  - As per Afsaneh Amador PA-C  06/04/25 1510

## 2025-06-05 VITALS
TEMPERATURE: 98.7 F | OXYGEN SATURATION: 98 % | HEART RATE: 110 BPM | DIASTOLIC BLOOD PRESSURE: 79 MMHG | SYSTOLIC BLOOD PRESSURE: 122 MMHG | RESPIRATION RATE: 18 BRPM

## 2025-06-05 VITALS
DIASTOLIC BLOOD PRESSURE: 60 MMHG | TEMPERATURE: 98.3 F | RESPIRATION RATE: 18 BRPM | OXYGEN SATURATION: 99 % | HEART RATE: 115 BPM | SYSTOLIC BLOOD PRESSURE: 118 MMHG

## 2025-06-05 DIAGNOSIS — K02.9 PAIN DUE TO DENTAL CARIES: Primary | ICD-10-CM

## 2025-06-05 PROCEDURE — 99284 EMERGENCY DEPT VISIT MOD MDM: CPT | Performed by: EMERGENCY MEDICINE

## 2025-06-05 PROCEDURE — 99282 EMERGENCY DEPT VISIT SF MDM: CPT

## 2025-06-05 RX ORDER — DIPHENHYDRAMINE HYDROCHLORIDE AND LIDOCAINE HYDROCHLORIDE AND ALUMINUM HYDROXIDE AND MAGNESIUM HYDRO
10 KIT ONCE
Status: COMPLETED | OUTPATIENT
Start: 2025-06-05 | End: 2025-06-05

## 2025-06-05 RX ORDER — OXYCODONE HYDROCHLORIDE 5 MG/1
5 TABLET ORAL ONCE
Refills: 0 | Status: COMPLETED | OUTPATIENT
Start: 2025-06-05 | End: 2025-06-05

## 2025-06-05 RX ADMIN — OXYCODONE 5 MG: 5 TABLET ORAL at 22:57

## 2025-06-05 RX ADMIN — OXYCODONE 5 MG: 5 TABLET ORAL at 00:22

## 2025-06-05 RX ADMIN — DIPHENHYDRAMINE HYDROCHLORIDE AND LIDOCAINE HYDROCHLORIDE AND ALUMINUM HYDROXIDE AND MAGNESIUM HYDRO 10 ML: KIT at 00:22

## 2025-06-05 NOTE — ED PROVIDER NOTES
Time reflects when diagnosis was documented in both MDM as applicable and the Disposition within this note       Time User Action Codes Description Comment    6/5/2025 12:16 AM Jayna Camacho Add [K08.89] Pain, dental     6/5/2025 12:16 AM Maribellotto Jayna K Add [K02.9] Pain due to dental caries           ED Disposition       ED Disposition   Discharge    Condition   Stable    Date/Time   Thu Jun 5, 2025 12:16 AM    Comment   Christine Fay discharge to home/self care.                   Assessment & Plan       Medical Decision Making  Differential diagnosis includes but is not limited to dental caries, dental infection, dental abscess    Problems Addressed:  Pain due to dental caries: acute illness or injury  Pain, dental: acute illness or injury    Risk  OTC drugs.  Prescription drug management.             Medications   oxyCODONE (ROXICODONE) IR tablet 5 mg (5 mg Oral Given 6/5/25 0022)   diphenhydramine, lidocaine, Al/Mg hydroxide, simethicone (Magic Mouthwash) oral solution 10 mL (10 mL Swish & Spit Given 6/5/25 0022)       ED Risk Strat Scores                    No data recorded        SBIRT 22yo+      Flowsheet Row Most Recent Value   Initial Alcohol Screen: US AUDIT-C     1. How often do you have a drink containing alcohol? 0 Filed at: 06/04/2025 2348   2. How many drinks containing alcohol do you have on a typical day you are drinking?  0 Filed at: 06/04/2025 2348   3b. FEMALE Any Age, or MALE 65+: How often do you have 4 or more drinks on one occassion? 0 Filed at: 06/04/2025 2348   Audit-C Score 0 Filed at: 06/04/2025 2348   CRISTINO: How many times in the past year have you...    Used an illegal drug or used a prescription medication for non-medical reasons? Never Filed at: 06/04/2025 2348                            History of Present Illness       Chief Complaint   Patient presents with    Dental Pain     Pt reports dental pain x 3 days. Took tylenol and motrin at 10:45 pm tonight with no relief. States  she has a dentist appt on 6/16.       Past Medical History[1]   Past Surgical History[2]   Family History[3]   Social History[4]   E-Cigarette/Vaping    E-Cigarette Use Never User       E-Cigarette/Vaping Substances    Nicotine No     THC No     CBD No     Flavoring No     Other No     Unknown No       I have reviewed and agree with the history as documented.     35-year-old female well-known to this department comes in for dental pain.  This is patient's second visit here today.  Patient has a known history of poor dentition does have a dental appointment for 616.  Patient has been on a regiment of Motrin Tylenol Robaxin and Orajel without relief of her pain.  Seen here earlier for same.      History provided by:  Patient and medical records   used: No    Dental Pain  Location:  Generalized  Severity:  Severe  Onset quality:  Gradual  Timing:  Constant  Progression:  Worsening  Chronicity:  Chronic  Context: dental caries and poor dentition    Ineffective treatments:  Acetaminophen, NSAIDs and topical anesthetic gel  Associated symptoms: gum swelling    Associated symptoms: no congestion, no fever and no headaches    Risk factors: periodontal disease    Risk factors: no alcohol problem        Review of Systems   Constitutional:  Negative for fatigue and fever.   HENT:  Negative for congestion and ear pain.    Eyes:  Negative for discharge and redness.   Respiratory:  Negative for apnea, cough, shortness of breath and wheezing.    Cardiovascular:  Negative for chest pain.   Gastrointestinal:  Negative for abdominal pain and diarrhea.   Endocrine: Negative for cold intolerance and polydipsia.   Genitourinary:  Negative for difficulty urinating and hematuria.   Musculoskeletal:  Negative for arthralgias and back pain.   Skin:  Negative for color change and rash.   Allergic/Immunologic: Negative for environmental allergies and immunocompromised state.   Neurological:  Negative for numbness and  headaches.   Hematological:  Negative for adenopathy. Does not bruise/bleed easily.   Psychiatric/Behavioral:  Negative for agitation and behavioral problems.            Objective       ED Triage Vitals   Temperature Pulse Blood Pressure Respirations SpO2 Patient Position - Orthostatic VS   06/04/25 2347 06/04/25 2347 06/04/25 2347 06/04/25 2347 06/04/25 2347 06/04/25 2347   98.3 °F (36.8 °C) (!) 124 120/65 18 96 % Lying      Temp Source Heart Rate Source BP Location FiO2 (%) Pain Score    06/04/25 2347 06/04/25 2347 06/04/25 2347 -- 06/05/25 0022    Oral Monitor Left arm  9      Vitals      Date and Time Temp Pulse SpO2 Resp BP Pain Score FACES Pain Rating User   06/05/25 0026 -- 115 99 % 18 118/60 -- -- MS   06/05/25 0022 -- -- -- -- -- 9 -- MS   06/04/25 2347 98.3 °F (36.8 °C) 124 96 % -- 120/65 -- -- BW   06/04/25 2347 -- -- -- 18 -- -- -- MS            Physical Exam  Vitals and nursing note reviewed.   Constitutional:       Appearance: Normal appearance. She is well-developed. She is not toxic-appearing.   HENT:      Head: Normocephalic and atraumatic.      Right Ear: Tympanic membrane and external ear normal.      Left Ear: Tympanic membrane and external ear normal.      Nose: Nose normal. No nasal deformity or rhinorrhea.      Mouth/Throat:      Dentition: Abnormal dentition. Dental caries present.      Pharynx: Uvula midline.     Eyes:      General: Lids are normal.         Right eye: No discharge.         Left eye: No discharge.      Conjunctiva/sclera: Conjunctivae normal.      Pupils: Pupils are equal, round, and reactive to light.     Neck:      Vascular: No carotid bruit or JVD.      Trachea: Trachea normal.     Cardiovascular:      Rate and Rhythm: Normal rate and regular rhythm. No extrasystoles are present.     Chest Wall: PMI is not displaced.      Pulses: Normal pulses.   Pulmonary:      Effort: Pulmonary effort is normal. No accessory muscle usage or respiratory distress.      Breath sounds:  Normal breath sounds. No wheezing, rhonchi or rales.   Abdominal:      General: Bowel sounds are normal.      Palpations: Abdomen is soft. Abdomen is not rigid. There is no mass.      Tenderness: There is no abdominal tenderness. There is no guarding or rebound.     Musculoskeletal:      Right shoulder: No swelling, deformity or bony tenderness. Normal range of motion.      Cervical back: Normal range of motion and neck supple. No deformity, tenderness or bony tenderness.   Lymphadenopathy:      Cervical: No cervical adenopathy.     Skin:     General: Skin is warm and dry.      Findings: No rash.     Neurological:      Mental Status: She is alert and oriented to person, place, and time.      GCS: GCS eye subscore is 4. GCS verbal subscore is 5. GCS motor subscore is 6.      Cranial Nerves: No cranial nerve deficit.      Sensory: No sensory deficit.      Deep Tendon Reflexes: Reflexes are normal and symmetric.     Psychiatric:         Speech: Speech normal.         Behavior: Behavior normal.         Results Reviewed       None            No orders to display       Procedures    ED Medication and Procedure Management   Prior to Admission Medications   Prescriptions Last Dose Informant Patient Reported? Taking?   Diclofenac Sodium (VOLTAREN) 1 %   No No   Sig: Apply 2 g topically 3 (three) times a day as needed (pain) for up to 7 days   cloNIDine (CATAPRES) 0.1 mg tablet   No No   Sig: Take 1 tablet upto 2 x a day as needed for panic and anxiety   famotidine (PEPCID) 40 MG tablet   No No   Sig: TAKE 1 TABLET (40 MG TOTAL) BY MOUTH DAILY BEFORE DINNER   methocarbamol (ROBAXIN) 500 mg tablet   No No   Sig: TAKE 1 TABLET BY MOUTH THREE TIMES A DAY AS NEEDED FOR MUSCLE SPASM   metoclopramide (REGLAN) 5 mg tablet   No No   Sig: TAKE 1 TABLET (5 MG TOTAL) BY MOUTH 3 (THREE) TIMES A DAY AS NEEDED (EVERY 8 HOURS AS NEEDED)   omeprazole (PriLOSEC) 40 MG capsule   No No   Sig: TAKE 1 CAPSULE BY MOUTH EVERY DAY BEFORE BREAKFAST    ondansetron (ZOFRAN) 4 mg tablet   No No   Sig: Take 1 tablet (4 mg total) by mouth every 6 (six) hours for 7 days   ondansetron (ZOFRAN-ODT) 4 mg disintegrating tablet   No No   Sig: TAKE 1 TABLET (4 MG TOTAL) BY MOUTH EVERY 6 (SIX) HOURS AS NEEDED FOR NAUSEA OR VOMITING   rizatriptan (MAXALT-MLT) 5 mg disintegrating tablet   No No   Sig: TAKE 1TAB BY MOUTH ONCE AS NEEDED FOR MIGRAINE FOR UP TO 1 DOSE MAY REPEAT IN 2 HOURS IF NECESSARY      Facility-Administered Medications: None     Discharge Medication List as of 6/5/2025 12:17 AM        CONTINUE these medications which have NOT CHANGED    Details   cloNIDine (CATAPRES) 0.1 mg tablet Take 1 tablet upto 2 x a day as needed for panic and anxiety, Normal      Diclofenac Sodium (VOLTAREN) 1 % Apply 2 g topically 3 (three) times a day as needed (pain) for up to 7 days, Starting Wed 5/14/2025, Until Wed 5/21/2025 at 2359, Normal      famotidine (PEPCID) 40 MG tablet TAKE 1 TABLET (40 MG TOTAL) BY MOUTH DAILY BEFORE DINNER, Starting Tue 4/29/2025, Normal      methocarbamol (ROBAXIN) 500 mg tablet TAKE 1 TABLET BY MOUTH THREE TIMES A DAY AS NEEDED FOR MUSCLE SPASM, Normal      metoclopramide (REGLAN) 5 mg tablet TAKE 1 TABLET (5 MG TOTAL) BY MOUTH 3 (THREE) TIMES A DAY AS NEEDED (EVERY 8 HOURS AS NEEDED), Normal      omeprazole (PriLOSEC) 40 MG capsule TAKE 1 CAPSULE BY MOUTH EVERY DAY BEFORE BREAKFAST, Starting Mon 2/10/2025, Normal      ondansetron (ZOFRAN) 4 mg tablet Take 1 tablet (4 mg total) by mouth every 6 (six) hours for 7 days, Starting Fri 5/23/2025, Until Fri 5/30/2025, Normal      ondansetron (ZOFRAN-ODT) 4 mg disintegrating tablet TAKE 1 TABLET (4 MG TOTAL) BY MOUTH EVERY 6 (SIX) HOURS AS NEEDED FOR NAUSEA OR VOMITING, Starting Wed 5/28/2025, Normal      rizatriptan (MAXALT-MLT) 5 mg disintegrating tablet TAKE 1TAB BY MOUTH ONCE AS NEEDED FOR MIGRAINE FOR UP TO 1 DOSE MAY REPEAT IN 2 HOURS IF NECESSARY, Normal           No discharge procedures on  file.  ED SEPSIS DOCUMENTATION   Time reflects when diagnosis was documented in both MDM as applicable and the Disposition within this note       Time User Action Codes Description Comment    6/5/2025 12:16 AM Jayna Camacho Add [K08.89] Pain, dental     6/5/2025 12:16 AM Jayna Camacho Add [K02.9] Pain due to dental caries                    [1]   Past Medical History:  Diagnosis Date    Anxiety     Asthma     Depression     GERD (gastroesophageal reflux disease)     Hernia, abdominal     Migraines     Muscle spasm     Psychiatric disorder     Anx, Depression    Renal disorder     kidney stones   [2]   Past Surgical History:  Procedure Laterality Date    CYSTOSCOPY      removal of kidney stone    [place] RETROGRADE PYELOGRAM  8/28/2018    HERNIA REPAIR      [place] CYSTO/URETERO W/LITHOTRIPSY &INDWELL STENT INSRT Right 8/28/2018    Procedure: CYSTOSCOPY URETEROSCOPY WITH RETROGRADE PYELOGRAM AND INSERTION STENT URETERAL;  Surgeon: North Arauz MD;  Location: [facility];  Service: Urology    TOOTH EXTRACTION     [3]   Family History  Problem Relation Name Age of Onset    Diabetes Mother hypothyroid,fast heart     Hyperlipidemia Mother hypothyroid,fast heart     Stroke Mother hypothyroid,fast heart     Heart disease Mother hypothyroid,fast heart     Asthma Mother hypothyroid,fast heart     Other Mother hypothyroid,fast heart         Degen. of Intervertabral disc.    Nephrolithiasis Father      Nephrolithiasis Brother     [4]   Social History  Tobacco Use    Smoking status: Every Day     Current packs/day: 0.50     Average packs/day: 0.5 packs/day for 13.0 years (6.5 ttl pk-yrs)     Types: Cigarettes    Smokeless tobacco: Never   Vaping Use    Vaping status: Never Used   Substance Use Topics    Alcohol use: Not Currently     Comment: Alcohol intake:   None  - As per Afsaneh     Drug use: No     Comment: Illicit drugs:   none  - As per Afsaneh Camacho,   06/05/25 0044

## 2025-06-10 NOTE — ED PROVIDER NOTES
Time reflects when diagnosis was documented in both MDM as applicable and the Disposition within this note       Time User Action Codes Description Comment    6/5/2025 10:51 PM Hattie Izaguirre Add [K02.9] Pain due to dental caries           ED Disposition       ED Disposition   Discharge    Condition   Stable    Date/Time   Thu Jun 5, 2025 10:51 PM    Comment   Christine Fay discharge to home/self care.                   Assessment & Plan       Medical Decision Making  35-year-old female presents with persistent dental pain.  She is awaiting the previously scheduled dentist appointment.  Has been taking maximal outpatient medical therapy.  Is also already taking antibiotics.  Patient is uncomfortable appearing no facial swelling or visible dental abscess.  Mildly tachycardic on arrival attributable to pain.  Advised patient that we cannot prescribe opiates for dental pain but will give her a one-time dose of oxycodone in the ED.    Risk  Prescription drug management.             Medications   oxyCODONE (ROXICODONE) IR tablet 5 mg (5 mg Oral Given 6/5/25 2257)       ED Risk Strat Scores                    No data recorded        SBIRT 22yo+      Flowsheet Row Most Recent Value   Initial Alcohol Screen: US AUDIT-C     1. How often do you have a drink containing alcohol? 0 Filed at: 06/05/2025 2225   2. How many drinks containing alcohol do you have on a typical day you are drinking?  0 Filed at: 06/05/2025 2225   3b. FEMALE Any Age, or MALE 65+: How often do you have 4 or more drinks on one occassion? 0 Filed at: 06/05/2025 2225   Audit-C Score 0 Filed at: 06/05/2025 2225   CRISTINO: How many times in the past year have you...    Used an illegal drug or used a prescription medication for non-medical reasons? Never Filed at: 06/05/2025 2225                            History of Present Illness       Chief Complaint   Patient presents with    Dental Pain     Ongoing dental pain. Has appointment with dentist on 6/16. Took  ibuprofen, tylenol, magic mouth wash and flexeril without relief at 2130.        Past Medical History[1]   Past Surgical History[2]   Family History[3]   Social History[4]   E-Cigarette/Vaping    E-Cigarette Use Never User       E-Cigarette/Vaping Substances    Nicotine No     THC No     CBD No     Flavoring No     Other No     Unknown No       I have reviewed and agree with the history as documented.     35-year-old female presents with persistent dental pain.  She is awaiting the previously scheduled dentist appointment.  Has been taking maximal outpatient medical therapy.  Is also already taking antibiotics.         Review of Systems   All other systems reviewed and are negative.          Objective       ED Triage Vitals   Temperature Pulse Blood Pressure Respirations SpO2 Patient Position - Orthostatic VS   06/05/25 2223 06/05/25 2223 06/05/25 2223 06/05/25 2223 06/05/25 2223 06/05/25 2223   98.7 °F (37.1 °C) (!) 110 122/79 18 98 % Sitting      Temp Source Heart Rate Source BP Location FiO2 (%) Pain Score    06/05/25 2223 06/05/25 2223 06/05/25 2223 -- 06/05/25 2257    Oral Monitor Right arm  10 - Worst Possible Pain      Vitals      Date and Time Temp Pulse SpO2 Resp BP Pain Score FACES Pain Rating User   06/05/25 2257 -- -- -- -- -- 10 - Worst Possible Pain -- KH   06/05/25 2223 98.7 °F (37.1 °C) 110 98 % 18 122/79 -- -- AW            Physical Exam  Constitutional:       Comments: Uncomfortable appearing, holding ice pack to face   HENT:      Mouth/Throat:      Mouth: Mucous membranes are moist.      Comments: Diffusely poor dentition, no focal gingival swelling or fluctuance    Eyes:      Extraocular Movements: Extraocular movements intact.      Conjunctiva/sclera: Conjunctivae normal.       Cardiovascular:      Rate and Rhythm: Regular rhythm. Tachycardia present.   Pulmonary:      Effort: Pulmonary effort is normal.     Musculoskeletal:      Cervical back: Neck supple.     Skin:     General: Skin is dry.      Neurological:      General: No focal deficit present.      Mental Status: She is alert.     Psychiatric:         Behavior: Behavior normal.         Results Reviewed       None            No orders to display       Procedures    ED Medication and Procedure Management   Prior to Admission Medications   Prescriptions Last Dose Informant Patient Reported? Taking?   Diclofenac Sodium (VOLTAREN) 1 %   No No   Sig: Apply 2 g topically 3 (three) times a day as needed (pain) for up to 7 days   cloNIDine (CATAPRES) 0.1 mg tablet   No No   Sig: Take 1 tablet upto 2 x a day as needed for panic and anxiety   famotidine (PEPCID) 40 MG tablet   No No   Sig: TAKE 1 TABLET (40 MG TOTAL) BY MOUTH DAILY BEFORE DINNER   methocarbamol (ROBAXIN) 500 mg tablet   No No   Sig: TAKE 1 TABLET BY MOUTH THREE TIMES A DAY AS NEEDED FOR MUSCLE SPASM   metoclopramide (REGLAN) 5 mg tablet   No No   Sig: TAKE 1 TABLET (5 MG TOTAL) BY MOUTH 3 (THREE) TIMES A DAY AS NEEDED (EVERY 8 HOURS AS NEEDED)   omeprazole (PriLOSEC) 40 MG capsule   No No   Sig: TAKE 1 CAPSULE BY MOUTH EVERY DAY BEFORE BREAKFAST   ondansetron (ZOFRAN) 4 mg tablet   No No   Sig: Take 1 tablet (4 mg total) by mouth every 6 (six) hours for 7 days   ondansetron (ZOFRAN-ODT) 4 mg disintegrating tablet   No No   Sig: TAKE 1 TABLET (4 MG TOTAL) BY MOUTH EVERY 6 (SIX) HOURS AS NEEDED FOR NAUSEA OR VOMITING   rizatriptan (MAXALT-MLT) 5 mg disintegrating tablet   No No   Sig: TAKE 1TAB BY MOUTH ONCE AS NEEDED FOR MIGRAINE FOR UP TO 1 DOSE MAY REPEAT IN 2 HOURS IF NECESSARY      Facility-Administered Medications: None     Discharge Medication List as of 6/5/2025 10:56 PM        CONTINUE these medications which have NOT CHANGED    Details   cloNIDine (CATAPRES) 0.1 mg tablet Take 1 tablet upto 2 x a day as needed for panic and anxiety, Normal      Diclofenac Sodium (VOLTAREN) 1 % Apply 2 g topically 3 (three) times a day as needed (pain) for up to 7 days, Starting Wed 5/14/2025, Until  Wed 5/21/2025 at 2359, Normal      famotidine (PEPCID) 40 MG tablet TAKE 1 TABLET (40 MG TOTAL) BY MOUTH DAILY BEFORE DINNER, Starting Tue 4/29/2025, Normal      methocarbamol (ROBAXIN) 500 mg tablet TAKE 1 TABLET BY MOUTH THREE TIMES A DAY AS NEEDED FOR MUSCLE SPASM, Normal      metoclopramide (REGLAN) 5 mg tablet TAKE 1 TABLET (5 MG TOTAL) BY MOUTH 3 (THREE) TIMES A DAY AS NEEDED (EVERY 8 HOURS AS NEEDED), Normal      omeprazole (PriLOSEC) 40 MG capsule TAKE 1 CAPSULE BY MOUTH EVERY DAY BEFORE BREAKFAST, Starting Mon 2/10/2025, Normal      ondansetron (ZOFRAN) 4 mg tablet Take 1 tablet (4 mg total) by mouth every 6 (six) hours for 7 days, Starting Fri 5/23/2025, Until Fri 5/30/2025, Normal      ondansetron (ZOFRAN-ODT) 4 mg disintegrating tablet TAKE 1 TABLET (4 MG TOTAL) BY MOUTH EVERY 6 (SIX) HOURS AS NEEDED FOR NAUSEA OR VOMITING, Starting Wed 5/28/2025, Normal      rizatriptan (MAXALT-MLT) 5 mg disintegrating tablet TAKE 1TAB BY MOUTH ONCE AS NEEDED FOR MIGRAINE FOR UP TO 1 DOSE MAY REPEAT IN 2 HOURS IF NECESSARY, Normal           No discharge procedures on file.  ED SEPSIS DOCUMENTATION   Time reflects when diagnosis was documented in both MDM as applicable and the Disposition within this note       Time User Action Codes Description Comment    6/5/2025 10:51 PM Hattie Izaguirre Add [K02.9] Pain due to dental caries                    [1]   Past Medical History:  Diagnosis Date    Anxiety     Asthma     Depression     GERD (gastroesophageal reflux disease)     Hernia, abdominal     Migraines     Muscle spasm     Psychiatric disorder     Anx, Depression    Renal disorder     kidney stones   [2]   Past Surgical History:  Procedure Laterality Date    CYSTOSCOPY      removal of kidney stone    [place] RETROGRADE PYELOGRAM  8/28/2018    HERNIA REPAIR      [place] CYSTO/URETERO W/LITHOTRIPSY &INDWELL STENT INSRT Right 8/28/2018    Procedure: CYSTOSCOPY URETEROSCOPY WITH RETROGRADE PYELOGRAM AND INSERTION STENT URETERAL;   Surgeon: North Arauz MD;  Location: [facility];  Service: Urology    TOOTH EXTRACTION     [3]   Family History  Problem Relation Name Age of Onset    Diabetes Mother hypothyroid,fast heart     Hyperlipidemia Mother hypothyroid,fast heart     Stroke Mother hypothyroid,fast heart     Heart disease Mother hypothyroid,fast heart     Asthma Mother hypothyroid,fast heart     Other Mother hypothyroid,fast heart         Degen. of Intervertabral disc.    Nephrolithiasis Father      Nephrolithiasis Brother     [4]   Social History  Tobacco Use    Smoking status: Every Day     Current packs/day: 0.50     Average packs/day: 0.5 packs/day for 13.0 years (6.5 ttl pk-yrs)     Types: Cigarettes    Smokeless tobacco: Never   Vaping Use    Vaping status: Never Used   Substance Use Topics    Alcohol use: Not Currently     Comment: Alcohol intake:   None  - As per [place]     Drug use: No     Comment: Illicit drugs:   none  - As per Afsaneh         Hattie Izaguirre MD  06/10/25 8604

## 2025-06-11 DIAGNOSIS — R11.2 NAUSEA AND VOMITING, UNSPECIFIED VOMITING TYPE: ICD-10-CM

## 2025-06-11 DIAGNOSIS — G47.00 INSOMNIA, UNSPECIFIED TYPE: Primary | ICD-10-CM

## 2025-06-12 ENCOUNTER — HOSPITAL ENCOUNTER (EMERGENCY)
Facility: HOSPITAL | Age: 35
Discharge: HOME | End: 2025-06-13
Attending: EMERGENCY MEDICINE
Payer: COMMERCIAL

## 2025-06-12 VITALS
HEART RATE: 105 BPM | DIASTOLIC BLOOD PRESSURE: 87 MMHG | RESPIRATION RATE: 18 BRPM | OXYGEN SATURATION: 98 % | TEMPERATURE: 98.4 F | SYSTOLIC BLOOD PRESSURE: 119 MMHG

## 2025-06-12 DIAGNOSIS — K08.89 DENTALGIA: Primary | ICD-10-CM

## 2025-06-12 DIAGNOSIS — G89.29 CHRONIC PAIN: ICD-10-CM

## 2025-06-12 PROCEDURE — 99282 EMERGENCY DEPT VISIT SF MDM: CPT

## 2025-06-13 PROCEDURE — 99283 EMERGENCY DEPT VISIT LOW MDM: CPT

## 2025-06-13 PROCEDURE — 99284 EMERGENCY DEPT VISIT MOD MDM: CPT | Performed by: EMERGENCY MEDICINE

## 2025-06-13 RX ORDER — CLINDAMYCIN HYDROCHLORIDE 150 MG/1
450 CAPSULE ORAL EVERY 8 HOURS SCHEDULED
Qty: 63 CAPSULE | Refills: 0 | Status: SHIPPED | OUTPATIENT
Start: 2025-06-13 | End: 2025-06-20

## 2025-06-13 RX ORDER — DIPHENHYDRAMINE HCL 25 MG
50 TABLET ORAL ONCE
Status: DISCONTINUED | OUTPATIENT
Start: 2025-06-13 | End: 2025-06-13 | Stop reason: HOSPADM

## 2025-06-13 RX ORDER — KETOROLAC TROMETHAMINE 30 MG/ML
15 INJECTION, SOLUTION INTRAMUSCULAR; INTRAVENOUS ONCE
Status: DISCONTINUED | OUTPATIENT
Start: 2025-06-13 | End: 2025-06-13 | Stop reason: HOSPADM

## 2025-06-13 RX ORDER — CLINDAMYCIN HYDROCHLORIDE 150 MG/1
450 CAPSULE ORAL ONCE
Status: COMPLETED | OUTPATIENT
Start: 2025-06-13 | End: 2025-06-13

## 2025-06-13 RX ORDER — ONDANSETRON 4 MG/1
4 TABLET, FILM COATED ORAL EVERY 8 HOURS PRN
Qty: 15 TABLET | Refills: 0 | Status: SHIPPED | OUTPATIENT
Start: 2025-06-13 | End: 2025-06-18

## 2025-06-13 RX ADMIN — CLINDAMYCIN HYDROCHLORIDE 450 MG: 150 CAPSULE ORAL at 00:13

## 2025-06-13 NOTE — DISCHARGE INSTRUCTIONS
Follow-up with your primary care physician and your dentist as soon as possible.  Please return to the emergency department as needed for worsening symptoms.  If you develop a severe allergic reaction including but not limited to difficulty breathing, swelling of your tongue or throat, vomiting, or anything else concerning to you please return to the emergency department immediately by calling 911.

## 2025-06-13 NOTE — ED PROVIDER NOTES
Time reflects when diagnosis was documented in both MDM as applicable and the Disposition within this note       Time User Action Codes Description Comment    6/13/2025 12:02 AM Ludy Gambino [K08.89] Dentalgia     6/13/2025 12:14 AM Ludy Gambino [G89.29] Chronic pain           ED Disposition       ED Disposition   Discharge    Condition   Stable    Date/Time   Fri Jun 13, 2025 12:02 AM    Comment   Christinejoshua Fay discharge to home/self care.                   Assessment & Plan       Medical Decision Making  35-year-old female presenting for dental pain.  This is a chronic matter for patient.  Differential diagnoses include but not limited to dental infection, dental abscess, dental caries, TMJ dysfunction.  No red flag signs or symptoms in patient's history.  Her vital signs are stable.  Offered changing her antibiotic to clindamycin which she is amenable to.  Discussed that patient's pain is chronic and she is advised to continue using Tylenol, Motrin, and Magic mouthwash.  Patient subsequently requesting to get a dose of Toradol although she has an allergy listed.  The risks of this were discussed with patient and she understands that but would like a dose anyway.  She refuses to stay for monitoring after the dose.  Patient subsequently refused to dose as she did not want to get stuck with a needle.  She was discharged in stable condition.  She was advised to follow-up with dentistry.  Return precautions were discussed.    Problems Addressed:  Chronic pain: acute illness or injury  Dentalgia: acute illness or injury    Risk  OTC drugs.  Prescription drug management.        ED Course as of 06/13/25 0019   Fri Jun 13, 2025   0002 Long discussion was held with patient regarding her chronic pain.  She is already using Tylenol and ibuprofen at home along with Magic mouthwash and viscous lidocaine.  Offered to switch patient's antibiotic to clindamycin as this may help her more.  Patient states that she  would like to try Toradol although she has an allergy listed.  She states that she gets itchy with it.  She is amenable to get Benadryl along with the Toradol.  She denies any history of anaphylaxis to Toradol.  On review of her allergies, she has various allergies listed to NSAIDs but tolerates ibuprofen.  I discussed that it is possible that she may have a more severe allergic reaction to the Toradol including anaphylaxis which can be life-threatening.  She understands this, however, she would like to receive a dose of Toradol and then be discharged.  Her  is at bedside and he is in agreement with this plan and states that he will monitor her for any symptoms.  If she develops symptoms, they understand to return to the emergency department immediately by calling 911.       Medications   ketorolac (TORADOL) injection 15 mg (15 mg Intramuscular Not Given 6/13/25 0018)   diphenhydrAMINE (BENADRYL) tablet 50 mg (50 mg Oral Not Given 6/13/25 0018)   clindamycin (CLEOCIN) capsule 450 mg (450 mg Oral Given 6/13/25 0013)       ED Risk Strat Scores                    No data recorded        SBIRT 20yo+      Flowsheet Row Most Recent Value   Initial Alcohol Screen: US AUDIT-C     1. How often do you have a drink containing alcohol? 0 Filed at: 06/12/2025 2344   2. How many drinks containing alcohol do you have on a typical day you are drinking?  0 Filed at: 06/12/2025 2344   3b. FEMALE Any Age, or MALE 65+: How often do you have 4 or more drinks on one occassion? 0 Filed at: 06/12/2025 2344   Audit-C Score 0 Filed at: 06/12/2025 2344   CRISTINO: How many times in the past year have you...    Used an illegal drug or used a prescription medication for non-medical reasons? Never Filed at: 06/12/2025 2344                            History of Present Illness       Chief Complaint   Patient presents with    Dental Pain     Pt arrived ambulatory with c/o dental pain. Pt has a hx of dental caries and TMJ. Pt has taken ibuprofen  and tylenol with no relief       Past Medical History[1]   Past Surgical History[2]   Family History[3]   Social History[4]   E-Cigarette/Vaping    E-Cigarette Use Never User       E-Cigarette/Vaping Substances    Nicotine No     THC No     CBD No     Flavoring No     Other No     Unknown No       I have reviewed and agree with the history as documented.     35-year-old female who presents for evaluation of dental pain.  Patient reports chronic, ongoing pain along both of her TMJ joints as well as multiple areas of dentition.  She has been taking Tylenol, ibuprofen, Magic mouthwash, viscous lidocaine without relief.  She denies any fevers or difficulty swallowing.  She has an appointment with dentistry in 3 days.        Review of Systems   Constitutional:  Negative for chills and fever.   HENT:  Positive for dental problem. Negative for trouble swallowing.    Eyes:  Negative for visual disturbance.   Respiratory:  Negative for shortness of breath.    Cardiovascular:  Negative for chest pain.   Gastrointestinal:  Negative for abdominal pain, nausea and vomiting.   Genitourinary:  Negative for flank pain.   Musculoskeletal:  Negative for gait problem.   Skin:  Negative for rash.   Neurological:  Negative for weakness.   All other systems reviewed and are negative.          Objective       ED Triage Vitals [06/12/25 2342]   Temperature Pulse Blood Pressure Respirations SpO2 Patient Position - Orthostatic VS   98.4 °F (36.9 °C) 105 119/87 18 98 % Sitting      Temp Source Heart Rate Source BP Location FiO2 (%) Pain Score    Oral Monitor Left arm -- --      Vitals      Date and Time Temp Pulse SpO2 Resp BP Pain Score FACES Pain Rating User   06/12/25 2342 98.4 °F (36.9 °C) 105 98 % 18 119/87 -- -- MO            Physical Exam  Vitals reviewed.   Constitutional:       General: She is not in acute distress.     Appearance: She is not ill-appearing.   HENT:      Head: Normocephalic and atraumatic.      Nose: Nose normal.       Mouth/Throat:      Mouth: Mucous membranes are moist.      Comments: No pharyngeal erythema.  No peritonsillar abscess or tonsillar swelling.  Uvula midline.  Tolerating secretions.  No trismus.  She has overall poor dentition with multiple broken and cracked teeth, multiple dental caries.  There are no dental abscesses noted.    Eyes:      Conjunctiva/sclera: Conjunctivae normal.       Cardiovascular:      Rate and Rhythm: Normal rate.   Pulmonary:      Effort: Pulmonary effort is normal.   Abdominal:      General: There is no distension.     Musculoskeletal:         General: Normal range of motion.      Cervical back: Normal range of motion and neck supple.     Skin:     General: Skin is warm and dry.     Neurological:      General: No focal deficit present.      Mental Status: She is alert.         Results Reviewed       None            No orders to display       Procedures    ED Medication and Procedure Management   Prior to Admission Medications   Prescriptions Last Dose Informant Patient Reported? Taking?   Diclofenac Sodium (VOLTAREN) 1 %   No No   Sig: Apply 2 g topically 3 (three) times a day as needed (pain) for up to 7 days   cloNIDine (CATAPRES) 0.1 mg tablet   No No   Sig: Take 1 tablet upto 2 x a day as needed for panic and anxiety   famotidine (PEPCID) 40 MG tablet   No No   Sig: TAKE 1 TABLET (40 MG TOTAL) BY MOUTH DAILY BEFORE DINNER   methocarbamol (ROBAXIN) 500 mg tablet   No No   Sig: TAKE 1 TABLET BY MOUTH THREE TIMES A DAY AS NEEDED FOR MUSCLE SPASM   metoclopramide (REGLAN) 5 mg tablet   No No   Sig: TAKE 1 TABLET (5 MG TOTAL) BY MOUTH 3 (THREE) TIMES A DAY AS NEEDED (EVERY 8 HOURS AS NEEDED)   omeprazole (PriLOSEC) 40 MG capsule   No No   Sig: TAKE 1 CAPSULE BY MOUTH EVERY DAY BEFORE BREAKFAST   ondansetron (ZOFRAN) 4 mg tablet   No No   Sig: Take 1 tablet (4 mg total) by mouth every 6 (six) hours for 7 days   ondansetron (ZOFRAN-ODT) 4 mg disintegrating tablet   No No   Sig: TAKE 1 TABLET (4  MG TOTAL) BY MOUTH EVERY 6 (SIX) HOURS AS NEEDED FOR NAUSEA OR VOMITING   rizatriptan (MAXALT-MLT) 5 mg disintegrating tablet   No No   Sig: TAKE 1TAB BY MOUTH ONCE AS NEEDED FOR MIGRAINE FOR UP TO 1 DOSE MAY REPEAT IN 2 HOURS IF NECESSARY      Facility-Administered Medications: None     Patient's Medications   Discharge Prescriptions    CLINDAMYCIN (CLEOCIN) 150 MG CAPSULE    Take 3 capsules (450 mg total) by mouth every 8 (eight) hours for 7 days       Start Date: 6/13/2025 End Date: 6/20/2025       Order Dose: 450 mg       Quantity: 63 capsule    Refills: 0    ONDANSETRON (ZOFRAN) 4 MG TABLET    Take 1 tablet (4 mg total) by mouth every 8 (eight) hours as needed for nausea or vomiting for up to 5 days       Start Date: 6/13/2025 End Date: 6/18/2025       Order Dose: 4 mg       Quantity: 15 tablet    Refills: 0     No discharge procedures on file.  ED SEPSIS DOCUMENTATION   Time reflects when diagnosis was documented in both MDM as applicable and the Disposition within this note       Time User Action Codes Description Comment    6/13/2025 12:02 AM Ludy Gambino [K08.89] Dentalgia     6/13/2025 12:14 AM Ludy Gambino [G89.29] Chronic pain                    [1]   Past Medical History:  Diagnosis Date    Anxiety     Asthma     Depression     GERD (gastroesophageal reflux disease)     Hernia, abdominal     Migraines     Muscle spasm     Psychiatric disorder     Anx, Depression    Renal disorder     kidney stones   [2]   Past Surgical History:  Procedure Laterality Date    CYSTOSCOPY      removal of kidney stone    FL RETROGRADE PYELOGRAM  8/28/2018    HERNIA REPAIR      MT CYSTO/URETERO W/LITHOTRIPSY &INDWELL STENT INSRT Right 8/28/2018    Procedure: CYSTOSCOPY URETEROSCOPY WITH RETROGRADE PYELOGRAM AND INSERTION STENT URETERAL;  Surgeon: North Arauz MD;  Location: [facility];  Service: Urology    TOOTH EXTRACTION     [3]   Family History  Problem Relation Name Age of Onset    Diabetes Mother  hypothyroid,fast heart     Hyperlipidemia Mother hypothyroid,fast heart     Stroke Mother hypothyroid,fast heart     Heart disease Mother hypothyroid,fast heart     Asthma Mother hypothyroid,fast heart     Other Mother hypothyroid,fast heart         Degen. of Intervertabral disc.    Nephrolithiasis Father      Nephrolithiasis Brother     [4]   Social History  Tobacco Use    Smoking status: Every Day     Current packs/day: 0.50     Average packs/day: 0.5 packs/day for 13.0 years (6.5 ttl pk-yrs)     Types: Cigarettes    Smokeless tobacco: Never   Vaping Use    Vaping status: Never Used   Substance Use Topics    Alcohol use: Not Currently     Comment: Alcohol intake:   None  - As per Afsaneh     Drug use: No     Comment: Illicit drugs:   none  - As per Afsaneh Gambino MD  06/13/25 0020

## 2025-06-14 ENCOUNTER — HOSPITAL ENCOUNTER (EMERGENCY)
Facility: HOSPITAL | Age: 35
Discharge: HOME | End: 2025-06-14
Attending: EMERGENCY MEDICINE
Payer: COMMERCIAL

## 2025-06-14 VITALS
TEMPERATURE: 98.6 F | OXYGEN SATURATION: 98 % | SYSTOLIC BLOOD PRESSURE: 133 MMHG | DIASTOLIC BLOOD PRESSURE: 81 MMHG | RESPIRATION RATE: 16 BRPM | HEART RATE: 98 BPM

## 2025-06-14 DIAGNOSIS — M26.629 TEMPOROMANDIBULAR JOINT (TMJ) PAIN: Primary | ICD-10-CM

## 2025-06-14 PROCEDURE — 99284 EMERGENCY DEPT VISIT MOD MDM: CPT | Performed by: EMERGENCY MEDICINE

## 2025-06-14 RX ADMIN — DICLOFENAC SODIUM 2 G: 10 GEL TOPICAL at 00:38

## 2025-06-15 RX ORDER — QUETIAPINE FUMARATE 50 MG/1
50 TABLET, FILM COATED ORAL
Qty: 90 TABLET | Refills: 1 | Status: SHIPPED | OUTPATIENT
Start: 2025-06-15

## 2025-06-15 RX ORDER — ONDANSETRON 4 MG/1
4 TABLET, ORALLY DISINTEGRATING ORAL EVERY 6 HOURS PRN
Qty: 90 TABLET | Refills: 0 | Status: SHIPPED | OUTPATIENT
Start: 2025-06-15

## 2025-06-16 DIAGNOSIS — R11.0 NAUSEA: ICD-10-CM

## 2025-06-16 RX ORDER — METOCLOPRAMIDE 5 MG/1
TABLET ORAL
Qty: 60 TABLET | Refills: 0 | Status: SHIPPED | OUTPATIENT
Start: 2025-06-16

## 2025-07-14 DIAGNOSIS — R11.2 NAUSEA AND VOMITING, UNSPECIFIED VOMITING TYPE: ICD-10-CM

## 2025-07-14 NOTE — TELEPHONE ENCOUNTER
Reason for call:   [x] Refill   [] Prior Auth  [] Other:     Office:   [x] PCP/Provider - [facility]/ MD Frank  [] Specialty/Provider -     Medication: ondansetron (ZOFRAN-ODT) 4 mg disintegrating tablet     Dose/Frequency: TAKE 1 TABLET (4 MG TOTAL) BY MOUTH EVERY 6 (SIX) HOURS AS NEEDED FOR NAUSEA OR VOMITING    Quantity: 90    Pharmacy: [facility]/pharmacy #[id number] - [place] - [address] [phone]    Local Pharmacy   Does the patient have enough for 3 days?   [] Yes   [x] No - Send as HP to POD    Mail Away Pharmacy   Does the patient have enough for 10 days?   [] Yes   [] No - Send as HP to POD

## 2025-07-15 ENCOUNTER — TELEPHONE (OUTPATIENT)
Dept: FAMILY MEDICINE CLINIC | Facility: CLINIC | Age: 35
End: 2025-07-15

## 2025-07-15 RX ORDER — ONDANSETRON 4 MG/1
4 TABLET, ORALLY DISINTEGRATING ORAL EVERY 6 HOURS PRN
Qty: 90 TABLET | Refills: 0 | Status: SHIPPED | OUTPATIENT
Start: 2025-07-15

## 2025-07-22 NOTE — DISCHARGE INSTRUCTIONS
Continued Stay Note  [facility]     Patient Name: Ibrahima Nava  MRN: [id number]  Today's Date: 7/22/2025    Admit Date: 7/15/2025    Plan: Inpatient Rehab   Discharge Plan       Row Name 07/22/25 1317       Plan    Plan Inpatient Rehab    Patient/Family in Agreement with Plan yes    Plan Comments Spoke with admissions at [facility] Swing Bed unit.  They currently do not have an available bed.  Updated patient in room and wife by wife by phone.  Patient choice list left at bedside.  Wife will look at list when she arrives.  CM will follow up with her tomorrow, 7/23.                   Discharge Codes    No documentation.                 Expected Discharge Date and Time       Expected Discharge Date Expected Discharge Time    Jul 24, 2025               Codie Parekh RN     Follow up at [facility] ER if symptoms worsen as they have more dental resources  [phone]  You will be getting a call from the  team from [facility] to assist you with Dental visit

## 2025-08-04 DIAGNOSIS — K21.9 GASTROESOPHAGEAL REFLUX DISEASE WITHOUT ESOPHAGITIS: ICD-10-CM

## 2025-08-05 RX ORDER — OMEPRAZOLE 40 MG/1
40 CAPSULE, DELAYED RELEASE ORAL
Qty: 90 CAPSULE | Refills: 1 | Status: SHIPPED | OUTPATIENT
Start: 2025-08-05

## 2025-08-08 ENCOUNTER — TELEPHONE (OUTPATIENT)
Age: 35
End: 2025-08-08

## 2025-08-08 DIAGNOSIS — K08.89 DENTALGIA: ICD-10-CM

## 2025-08-22 DIAGNOSIS — R11.2 NAUSEA AND VOMITING, UNSPECIFIED VOMITING TYPE: ICD-10-CM

## 2025-08-22 RX ORDER — ONDANSETRON 4 MG/1
4 TABLET, ORALLY DISINTEGRATING ORAL EVERY 6 HOURS PRN
Qty: 90 TABLET | Refills: 0 | Status: SHIPPED | OUTPATIENT
Start: 2025-08-22

## (undated) DEVICE — BASKET STONE RTRVL ZERO TIP 2.4FR

## (undated) DEVICE — CATH URETERAL 5FR X 70 CM FLEX TIP POLYUR BARD

## (undated) DEVICE — PREMIUM DRY TRAY LF: Brand: MEDLINE INDUSTRIES, INC.

## (undated) DEVICE — INVIEW CLEAR LEGGINGS: Brand: CONVERTORS

## (undated) DEVICE — STERILE SURGICAL LUBRICANT,  TUBE: Brand: SURGILUBE

## (undated) DEVICE — 3M™ TEGADERM™ TRANSPARENT FILM DRESSING FRAME STYLE, 1624W, 2-3/8 IN X 2-3/4 IN (6 CM X 7 CM), 100/CT 4CT/CASE: Brand: 3M™ TEGADERM™

## (undated) DEVICE — GUIDEWIRE STRGHT TIP 0.035 IN  SOLO PLUS

## (undated) DEVICE — UROCATCH BAG

## (undated) DEVICE — CHLORHEXIDINE 4PCT 4 OZ

## (undated) DEVICE — PACK TUR

## (undated) DEVICE — GLOVE SRG BIOGEL 7